# Patient Record
Sex: FEMALE | Race: WHITE | NOT HISPANIC OR LATINO | Employment: OTHER | URBAN - METROPOLITAN AREA
[De-identification: names, ages, dates, MRNs, and addresses within clinical notes are randomized per-mention and may not be internally consistent; named-entity substitution may affect disease eponyms.]

---

## 2017-01-11 RX ORDER — AMLODIPINE BESYLATE 5 MG/1
TABLET ORAL
Qty: 90 TAB | Refills: 3 | Status: SHIPPED | OUTPATIENT
Start: 2017-01-11 | End: 2017-07-11 | Stop reason: SDUPTHER

## 2017-01-17 ENCOUNTER — OFFICE VISIT (OUTPATIENT)
Dept: INTERNAL MEDICINE | Facility: IMAGING CENTER | Age: 82
End: 2017-01-17
Payer: MEDICARE

## 2017-01-17 ENCOUNTER — HOSPITAL ENCOUNTER (OUTPATIENT)
Facility: MEDICAL CENTER | Age: 82
End: 2017-01-17
Attending: INTERNAL MEDICINE
Payer: MEDICARE

## 2017-01-17 VITALS
RESPIRATION RATE: 12 BRPM | HEIGHT: 62 IN | OXYGEN SATURATION: 94 % | HEART RATE: 67 BPM | SYSTOLIC BLOOD PRESSURE: 140 MMHG | WEIGHT: 151 LBS | DIASTOLIC BLOOD PRESSURE: 70 MMHG | TEMPERATURE: 97 F | BODY MASS INDEX: 27.79 KG/M2

## 2017-01-17 DIAGNOSIS — E78.00 PURE HYPERCHOLESTEROLEMIA: ICD-10-CM

## 2017-01-17 DIAGNOSIS — K44.9 HIATAL HERNIA: ICD-10-CM

## 2017-01-17 DIAGNOSIS — I10 ESSENTIAL HYPERTENSION: ICD-10-CM

## 2017-01-17 DIAGNOSIS — Z12.31 SCREENING MAMMOGRAM, ENCOUNTER FOR: ICD-10-CM

## 2017-01-17 DIAGNOSIS — Z12.11 SCREENING FOR COLON CANCER: ICD-10-CM

## 2017-01-17 DIAGNOSIS — R41.3 MEMORY LOSS: ICD-10-CM

## 2017-01-17 DIAGNOSIS — M81.0 OSTEOPOROSIS: ICD-10-CM

## 2017-01-17 DIAGNOSIS — K21.9 GASTROESOPHAGEAL REFLUX DISEASE WITHOUT ESOPHAGITIS: ICD-10-CM

## 2017-01-17 DIAGNOSIS — H83.3X3: ICD-10-CM

## 2017-01-17 DIAGNOSIS — M43.16 SPONDYLOLISTHESIS OF LUMBAR REGION: ICD-10-CM

## 2017-01-17 DIAGNOSIS — Z12.11 SCREEN FOR COLON CANCER: ICD-10-CM

## 2017-01-17 DIAGNOSIS — Z00.00 MEDICARE ANNUAL WELLNESS VISIT, SUBSEQUENT: ICD-10-CM

## 2017-01-17 LAB
ALBUMIN SERPL BCP-MCNC: 4.2 G/DL (ref 3.2–4.9)
ALBUMIN/GLOB SERPL: 1.3 G/DL
ALP SERPL-CCNC: 123 U/L (ref 30–99)
ALT SERPL-CCNC: 18 U/L (ref 2–50)
ANION GAP SERPL CALC-SCNC: 9 MMOL/L (ref 0–11.9)
APPEARANCE UR: CLEAR
AST SERPL-CCNC: 23 U/L (ref 12–45)
BASOPHILS # BLD AUTO: 0.08 K/UL (ref 0–0.12)
BASOPHILS NFR BLD AUTO: 1 % (ref 0–1.8)
BILIRUB SERPL-MCNC: 0.6 MG/DL (ref 0.1–1.5)
BILIRUB UR QL STRIP.AUTO: NEGATIVE
BUN SERPL-MCNC: 19 MG/DL (ref 8–22)
CALCIUM SERPL-MCNC: 9.4 MG/DL (ref 8.5–10.5)
CHLORIDE SERPL-SCNC: 105 MMOL/L (ref 96–112)
CHOLEST SERPL-MCNC: 170 MG/DL (ref 100–199)
CO2 SERPL-SCNC: 26 MMOL/L (ref 20–33)
COLOR UR AUTO: COLORLESS
CREAT SERPL-MCNC: 0.85 MG/DL (ref 0.5–1.4)
CREAT UR-MCNC: 34.6 MG/DL
CULTURE IF INDICATED INDCX: NO UA CULTURE
EOSINOPHIL # BLD: 0.22 K/UL (ref 0–0.51)
EOSINOPHIL NFR BLD AUTO: 2.8 % (ref 0–6.9)
ERYTHROCYTE [DISTWIDTH] IN BLOOD BY AUTOMATED COUNT: 43.8 FL (ref 35.9–50)
GLOBULIN SER CALC-MCNC: 3.3 G/DL (ref 1.9–3.5)
GLUCOSE SERPL-MCNC: 97 MG/DL (ref 65–99)
GLUCOSE UR STRIP.AUTO-MCNC: NEGATIVE MG/DL
HCT VFR BLD AUTO: 45.8 % (ref 37–47)
HDLC SERPL-MCNC: 54 MG/DL
HGB BLD-MCNC: 14.4 G/DL (ref 12–16)
IMM GRANULOCYTES # BLD AUTO: 0.04 K/UL (ref 0–0.11)
IMM GRANULOCYTES NFR BLD AUTO: 0.5 % (ref 0–0.9)
KETONES UR STRIP.AUTO-MCNC: NEGATIVE MG/DL
LDLC SERPL CALC-MCNC: 98 MG/DL
LEUKOCYTE ESTERASE UR QL STRIP.AUTO: NEGATIVE
LYMPHOCYTES # BLD: 2.32 K/UL (ref 1–4.8)
LYMPHOCYTES NFR BLD AUTO: 29.6 % (ref 22–41)
MCH RBC QN AUTO: 25.4 PG (ref 27–33)
MCHC RBC AUTO-ENTMCNC: 31.4 G/DL (ref 33.6–35)
MCV RBC AUTO: 80.6 FL (ref 81.4–97.8)
MICRO URNS: NORMAL
MICROALBUMIN UR-MCNC: <0.7 MG/DL
MICROALBUMIN/CREAT UR: NORMAL MG/G (ref 0–30)
MONOCYTES # BLD: 0.53 K/UL (ref 0–0.85)
MONOCYTES NFR BLD AUTO: 6.8 % (ref 0–13.4)
NEUTROPHILS # BLD: 4.64 K/UL (ref 2–7.15)
NEUTROPHILS NFR BLD AUTO: 59.3 % (ref 44–72)
NITRITE UR QL STRIP.AUTO: NEGATIVE
NRBC # BLD AUTO: 0 K/UL
NRBC BLD-RTO: 0 /100 WBC
PH UR: 5.5 [PH]
PLATELET # BLD AUTO: 280 K/UL (ref 164–446)
PMV BLD AUTO: 11 FL (ref 9–12.9)
POTASSIUM SERPL-SCNC: 4 MMOL/L (ref 3.6–5.5)
PROT SERPL-MCNC: 7.5 G/DL (ref 6–8.2)
PROT UR QL STRIP: NEGATIVE MG/DL
RBC # BLD AUTO: 5.68 M/UL (ref 4.2–5.4)
RBC UR QL AUTO: NEGATIVE
SODIUM SERPL-SCNC: 140 MMOL/L (ref 135–145)
SP GR UR STRIP.AUTO: 1
TRIGL SERPL-MCNC: 92 MG/DL (ref 0–149)
TSH SERPL DL<=0.005 MIU/L-ACNC: 2.58 UIU/ML (ref 0.3–3.7)
VIT B12 SERPL-MCNC: 279 PG/ML (ref 211–911)
WBC # BLD AUTO: 7.8 K/UL (ref 4.8–10.8)

## 2017-01-17 PROCEDURE — 81003 URINALYSIS AUTO W/O SCOPE: CPT

## 2017-01-17 PROCEDURE — 82607 VITAMIN B-12: CPT

## 2017-01-17 PROCEDURE — 85025 COMPLETE CBC W/AUTO DIFF WBC: CPT

## 2017-01-17 PROCEDURE — 80061 LIPID PANEL: CPT

## 2017-01-17 PROCEDURE — 82043 UR ALBUMIN QUANTITATIVE: CPT

## 2017-01-17 PROCEDURE — 80053 COMPREHEN METABOLIC PANEL: CPT

## 2017-01-17 PROCEDURE — 84443 ASSAY THYROID STIM HORMONE: CPT

## 2017-01-17 PROCEDURE — 82570 ASSAY OF URINE CREATININE: CPT

## 2017-01-17 PROCEDURE — G0439 PPPS, SUBSEQ VISIT: HCPCS | Performed by: INTERNAL MEDICINE

## 2017-01-17 ASSESSMENT — PATIENT HEALTH QUESTIONNAIRE - PHQ9: CLINICAL INTERPRETATION OF PHQ2 SCORE: 0

## 2017-01-17 NOTE — MR AVS SNAPSHOT
"        Gloria CYNTHIA Patel   2017 9:00 AM   Office Visit   MRN: [id number]    [facility]   Dept Phone:  [phone]    Description:  Female : 1924   Provider:  Mike Otero M.D.           Reason for Visit     Annual Exam           Allergies as of 2017     Allergen Noted Reactions    Flagyl [Metronidazole Hcl] 2009       Keflex 2009       Morphine 2008   Anaphylaxis    anaphylaxis    Sulfa Drugs 2008   Rash    rash      You were diagnosed with     Medicare annual wellness visit, subsequent   [277168]       Essential hypertension   [7825121]       Osteoporosis   [8232682]       Gastroesophageal reflux disease without esophagitis   [241282]       Spondylolisthesis of lumbar region   [704650]       Pure hypercholesterolemia   [272.0.ICD-9-CM]       Hiatal hernia   [527010]       Memory loss   [780.93.ICD-9-CM]       Screening mammogram, encounter for   [2983116]       Screen for colon cancer   [740070]       Hearing loss d/t noise, bilateral   [3827027]         Vital Signs     Blood Pressure Pulse Temperature Respirations Height Weight    140/70 mmHg 67 36.1 °C (97 °F) 12 1.575 m (5' 2\") 68.493 kg (151 lb)    Body Mass Index Oxygen Saturation Smoking Status             27.61 kg/m2 94% Never Smoker          Basic Information     Date Of Birth Sex Race Ethnicity Preferred Language    1924 Female White Non- English      Problem List              ICD-10-CM Priority Class Noted - Resolved    Hip pain M25.559   2010 - Present    Hypothyroid E03.9   2010 - Present    Overactive bladder N32.81   2010 - Present    Hiatal hernia K44.9   2010 - Present    Hyperlipidemia E78.5   3/7/2012 - Present    GERD (gastroesophageal reflux disease) K21.9   2012 - Present    Spondylolisthesis of lumbar region M43.16   2012 - Present    Impingement syndrome of left shoulder M75.42   2012 - Present    Intervertebral disc protrusion " [id number]   5/22/2012 - Present    Benign positional vertigo H81.10   2/27/2015 - Present    Osteoporosis M81.0   9/8/2015 - Present    TIA (transient ischemic attack) G45.9   4/28/2016 - Present    HTN (hypertension) I10   4/28/2016 - Present    Vision changes H53.9   4/28/2016 - Present      Health Maintenance        Date Due Completion Dates    IMM DTaP/Tdap/Td Vaccine (1 - Tdap) 7/9/1943 ---    BONE DENSITY 9/22/2020 9/22/2015, 8/28/2012, 8/11/2010, 12/16/2008, 12/11/2006            Current Immunizations     13-VALENT PCV PREVNAR 5/18/2015    Hepatitis B Vaccine Non-Recombivax (Ped/Adol) 2/6/2008    Influenza TIV (IM) 9/8/2014, 10/8/2012    Influenza Vaccine Adult HD 10/17/2016, 10/19/2015    Pneumococcal polysaccharide vaccine (PPSV-23) 10/14/2005    SHINGLES VACCINE 2/6/2008      Below and/or attached are the medications your provider expects you to take. Review all of your home medications and newly ordered medications with your provider and/or pharmacist. Follow medication instructions as directed by your provider and/or pharmacist. Please keep your medication list with you and share with your provider. Update the information when medications are discontinued, doses are changed, or new medications (including over-the-counter products) are added; and carry medication information at all times in the event of emergency situations     Allergies:  FLAGYL - (reactions not documented)     KEFLEX - (reactions not documented)     MORPHINE - Anaphylaxis     SULFA DRUGS - Rash               Medications  Valid as of: January 17, 2017 -  1:06 PM    Generic Name Brand Name Tablet Size Instructions for use    ALPRAZolam (Tab) XANAX 0.25 MG Take 1 Tab by mouth 1 time daily as needed for Anxiety.        AmLODIPine Besylate (Tab) NORVASC 5 MG Take 5 mg by mouth every day.        AmLODIPine Besylate (Tab) NORVASC 5 MG TAKE 1 TAB BY MOUTH EVERY DAY.        Ascorbic Acid   Take 1 Tab by mouth every day.        Aspirin (Chew Tab)  ASA 81 MG Take 4 Tabs by mouth every day.        Azelastine HCl (Solution) ASTELIN 137 MCG/SPRAY Spray 1 Spray in nose 1 time daily as needed for Rhinitis.        Cholecalciferol   Take 1 Tab by mouth every day.        Levothyroxine Sodium (Tab) SYNTHROID 75 MCG TAKE 1 TABLET BY MOUTH EVERY DAY        Lisinopril (Tab) PRINIVIL 10 MG Take 1 Tab by mouth every 12 hours.        Metoprolol Succinate (TABLET SR 24 HR) TOPROL XL 25 MG Take 25 mg by mouth every day.        Omeprazole (CAPSULE DELAYED RELEASE) PRILOSEC 20 MG TAKE ONE CAPSULE BY MOUTH TWICE A DAY 30MINUTES BEFORE BREAKFAST AND DINNER        Travoprost (Solution) TRAVATAN Z 0.004 % Place 1 Drop in both eyes every evening.        .                 Medicines prescribed today were sent to:     [facility][id number] - [place], [place] - [address] AT [address] [place] [address]    Phone: [phone] Fax: [phone]    Open 24 Hours?: No      Medication refill instructions:       If your prescription bottle indicates you have medication refills left, it is not necessary to call your provider’s office. Please contact your pharmacy and they will refill your medication.    If your prescription bottle indicates you do not have any refills left, you may request refills at any time through one of the following ways: The online [employer] system (except Urgent Care), by calling your provider’s office, or by asking your pharmacy to contact your provider’s office with a refill request. Medication refills are processed only during regular business hours and may not be available until the next business day. Your provider may request additional information or to have a follow-up visit with you prior to refilling your medication.   *Please Note: Medication refills are assigned a new Rx number when refilled electronically. Your pharmacy may indicate that no refills were authorized even though a new prescription for the same medication is  available at the pharmacy. Please request the medicine by name with the pharmacy before contacting your provider for a refill.        Your To Do List     Future Labs/Procedures Complete By Expires    CBC WITH DIFFERENTIAL  As directed 7/20/2017    COMP METABOLIC PANEL  As directed 7/20/2017    LIPID PROFILE  As directed 7/19/2017    MA-SCREEN MAMMO W/CAD-BILAT  As directed 1/17/2018    MICROALBUMIN CREAT RATIO URINE  As directed 7/17/2017    OCCULT BLOOD FECES IMMUNOASSAY  As directed 1/18/2018    TSH  As directed 7/20/2017    URINALYSIS,CULTURE IF INDICATED  As directed 1/17/2018    VITAMIN B12  As directed 7/20/2017      Other Notes About Your Plan     Colonoscopy No repeat recommended  Dexa 9/22/15  Osteoporosis   Mammo 12/29/14 A1c 1/9/13  5.7   [facility] (scheduled 4/24/13) Neurosurg-[facility]           [employer] Access Code: [email or web address]  Expires: 2/16/2017  1:06 PM    [employer]  A secure, online tool to manage your health information     [employer]’s [employer]® is a secure, online tool that connects you to your personalized health information from the privacy of your home -- day or night - making it very easy for you to manage your healthcare. Once the activation process is completed, you can even access your medical information using the [employer] florina, which is available for free in the Apple Florina store or Google Play store.     [employer] provides the following levels of access (as shown below):   My Chart Features   [facility]  Specialists [facility]  Urgent  Care Non-[facility]  Primary Care  Doctor   Email your healthcare team securely and privately 24/7 X X X    Manage appointments: schedule your next appointment; view details of past/upcoming appointments X      Request prescription refills. X      View recent personal medical records, including lab and immunizations X X X X   View health record, including  health history, allergies, medications X X X X   Read reports about your outpatient visits, procedures, consult and ER notes X X X X   See your discharge summary, which is a recap of your hospital and/or ER visit that includes your diagnosis, lab results, and care plan. X X       How to register for [employer]:  1. Go to  [email or web address].  2. Click on the Sign Up Now box, which takes you to the New Member Sign Up page. You will need to provide the following information:  a. Enter your [employer] Access Code exactly as it appears at the top of this page. (You will not need to use this code after you’ve completed the sign-up process. If you do not sign up before the expiration date, you must request a new code.)   b. Enter your date of birth.   c. Enter your home email address.   d. Click Submit, and follow the next screen’s instructions.  3. Create a [employer] ID. This will be your [employer] login ID and cannot be changed, so think of one that is secure and easy to remember.  4. Create a [employer] password. You can change your password at any time.  5. Enter your Password Reset Question and Answer. This can be used at a later time if you forget your password.   6. Enter your e-mail address. This allows you to receive e-mail notifications when new information is available in [employer].  7. Click Sign Up. You can now view your health information.    For assistance activating your [employer] account, call [phone]

## 2017-01-17 NOTE — PROGRESS NOTES
89 y.o. female presents for the following:    Patient comes in for health risk assessment.    Hypertension-stable and current medications. Blood pressure is at goal today.    Hypothyroid-stable and current medication. Last thyroid function test were last year but were normal. She has multiple readings that have been normal. No hyper or hypothyroid symptoms.    Generalized anxiety disorder-ongoing symptoms. She has a prescription for Xanax but she does not take. She is not interested in other medication.    Osteoporosis-multiple bone density showing osteoporosis in the forearm and osteopenia in the hip. She is failed multiple bisphosphonates due to G.I. side effects.    Lumbar spondylolisthesis-ongoing issues. Chronic pain. Daily symptoms. She takes Tylenol occasionally. She had seen Dr. Jolly in the past but failed injections in therapy. No radicular symptoms.    Hyperlipidemia-currently on no therapy. No recent evaluation of labs.    Hiatal hernia-stable on Prilosec. She takes daily.    GERD-presumably due to hiatal hernia. Stable on Prilosec as above.    Memory loss-she has found that she is more forgetful. She is doing word puzzles on her tablet. Her family also reports that she is using [employer] regularly. She drives. No issues with getting loss, traffic violations or other.    Glaucoma-stable on Travatan. She gets six-month follow-up with ophthalmology.    Screening test:  due for mammogram. She was scheduled last year but canceled because of inclement weather. We discussed that a mammogram is probably not indicated based on her age but she would like to continue monitoring. I think this is reasonable as she is otherwise in excellent health.  Colon cancer screening. She had colonoscopy in 2014. No additional colonoscopy recommended. I recommended fecal immunoassay.    Up-to-date on immunizations.        Screening exams-recommended mammogram and fecal immunoassay.    MMSE    -What is the:  Year, season, date,  "day, month.               5/5 points    -Where are we:   State, county, town, hospital, floor.      5/5 points    -3 objects immediate recall.                                             3/3 points    -World backwards or serial sevens.                                 3/5 points    -Three-minute recall.                                                         1/3 points    -Name 2 objects.                                                               2/2 points    -Repeat the following.      \"No ifs and is or buts\"                                                    1/1 point    -Follow a 3 stage command.      Paper right hand, fold in half, place on floor.                 3/3 points    -Follow a written command.       Close your eyes                                                          1/1 point    -Write a complete sentence.                                           1/1 point    -Copy a design.                                                                1/1 point    Total                                                                                  26/30    Cutoff of 24 indicating dementia.  Annual Wellness Visit/Health Risk Assessment:    Past medical:  Past Medical History   Diagnosis Date   • GERD (gastroesophageal reflux disease)      medicated   • Hypertension      medicated   • Diverticulitis      medicated   • Arthritis    • Unspecified urinary incontinence    • Heart burn    • Hiatus hernia syndrome    • Glaucoma        Past surgical:  Past Surgical History   Procedure Laterality Date   • Other  1945     tonsilectomy   • Other  1954     mikey. breast bx   • Other orthopedic surgery       osteoporosis - medicated   • Other orthopedic surgery  2000     back surgery   • Cholecystectomy  1966     rahat   • Other abdominal surgery  1954     kidney tacked up ?   • Gyn surgery  1970     hysterectomy   • Hip arthroplasty total  6/21/2011     Performed by AMPARO CRAFT at SURGERY [facility] ORS   • " Us-needle core bx-breast panel         Family history: relating to possible risk factors for your patient  Family History   Problem Relation Age of Onset   • Diabetes     • Heart Disease     • Lung Disease     • Cancer Sister        Current Providers (including home care/DME’s):   Colonoscopy No repeat recommended  Dexa 9/22/15  Osteoporosis   Mammo 12/29/14 A1c 1/9/13  5.7   GI-[facility] Re[facility] (scheduled 4/24/13) Neurosurg-[facility]  Laboratory-[facility]      Patient Care Team:  Mike Otero M.D. as PCP - General  Khloe Jones R.N. as Registered Nurse      Medications:   Current Outpatient Prescriptions Ordered in [facility]   Medication Sig Dispense Refill   • amlodipine (NORVASC) 5 MG Tab TAKE 1 TAB BY MOUTH EVERY DAY. 90 Tab 3   • omeprazole (PRILOSEC) 20 MG delayed-release capsule TAKE ONE CAPSULE BY MOUTH TWICE A DAY 30MINUTES BEFORE BREAKFAST AND DINNER 60 Cap 11   • lisinopril (PRINIVIL) 10 MG Tab Take 1 Tab by mouth every 12 hours. 180 Tab 3   • levothyroxine (SYNTHROID) 75 MCG Tab TAKE 1 TABLET BY MOUTH EVERY DAY 90 Tab 3   • alprazolam (XANAX) 0.25 MG Tab Take 1 Tab by mouth 1 time daily as needed for Anxiety. 15 Tab 0   • amlodipine (NORVASC) 5 MG Tab Take 5 mg by mouth every day.     • ASCORBIC ACID PO Take 1 Tab by mouth every day.     • Cholecalciferol (VITAMIN D PO) Take 1 Tab by mouth every day.     • metoprolol SR (TOPROL XL) 25 MG TABLET SR 24 HR Take 25 mg by mouth every day.     • aspirin (ASA) 81 MG Chew Tab chewable tablet Take 4 Tabs by mouth every day. 100 Tab 11   • travoprost (TRAVATAN) 0.004 % SOLN Place 1 Drop in both eyes every evening.     • azelastine (ASTELIN) 137 MCG/SPRAY nasal spray Mode 1 Spray in nose 1 time daily as needed for Rhinitis. 30 mL 3     Current Facility-Administered Medications Ordered in Epic   Medication Dose Route Frequency Provider Last Rate Last Dose   • denosumab (PROLIA) subq injection 60 mg  60 mg Subcutaneous  Once Mike Otero M.D.           Supplements (calcium/vitamins): if not lisited in medications    Depression Screening    Little interest or pleasure in doing things?  0 - not at all  Feeling down, depressed , or hopeless? 0 - not at all  Trouble falling or staying asleep, or sleeping too much?     Feeling tired or having little energy?     Poor appetite or overeating?     Feeling bad about yourself - or that you are a failure or have let yourself or your family down?    Trouble concentrating on things, such as reading the newspaper or watching television?    Moving or speaking so slowly that other people could have noticed.  Or the opposite - being so fidgety or restless that you have been moving around a lot more than usual?     Thoughts that you would be better off dead, or of hurting yourself?     Patient Health Questionnaire Score:      If depressive symptoms identified deferred to follow up visit unless specifically addressed in assesment and plan.      Screening for Cognitive Impairment    Three Minute Recall (banana, sunrise, fence)  1/3    Draw clock face with all 12 numbers set to the hand to show 10 minures past 11 o'clock  1    Cognitive concerns identified defferred for follow up unless specifically addressed in assesment and plan.    Fall Risk Assessment    Has the patient had two or more falls in the last year or any fall with injury in the last year?  Yes    Safety Assessment    Throw rugs on floor.  Yes  Handrails on all stairs.  No  Good lighting in all hallways.  Yes  Difficulty hearing.  No  Patient counseled about all safety risks that were identified.    Functional Assessment ADLs    Are there any barriers preventing you from cooking for yourself or meeting nutritional needs?  No.    Are there any barriers preventing you from driving safely or obtaining transportation?  No.    Are there any barriers preventing you from using a telephone or calling for help?  No.    Are there any barriers  preventing you from shopping?  No.    Are there any barriers preventing you from taking care of your own finances?  No.    Are there any barriers preventing you from managing your medications?  No.    Are currently engaing any exercise or physical activity?  No.       Health Maintenance Summary                IMM DTaP/Tdap/Td Vaccine Overdue 7/9/1943     Annual Wellness Visit Overdue 9/8/2016      Done 9/8/2015      Patient has more history with this topic...    BONE DENSITY Next Due 9/22/2020      Done 9/22/2015 DS-BONE DENSITY STUDY (DEXA)     Patient has more history with this topic...          Patient Care Team:  Mike Otero M.D. as PCP - General  Khloe Jones R.N. as Registered Nurse      Risk Factors:  Depression screening: using standardized screening tools: No depression  Depression Screening    Little interest or pleasure in doing things?  0 - not at all  Feeling down, depressed , or hopeless? 0 - not at all  Trouble falling or staying asleep, or sleeping too much?     Feeling tired or having little energy?     Poor appetite or overeating?     Feeling bad about yourself - or that you are a failure or have let yourself or your family down?    Trouble concentrating on things, such as reading the newspaper or watching television?    Moving or speaking so slowly that other people could have noticed.  Or the opposite - being so fidgety or restless that you have been moving around a lot more than usual?     Thoughts that you would be better off dead, or of hurting yourself?     Patient Health Questionnaire Score:        If depressive symptoms identified deferred to follow up visit unless specifically addressed in assesment and plan.      Interpretation of PHQ-9 Total Score   Score Severity   1-4 Minimal Depression   5-9 Mild Depression   10-14 Moderate Depression   15-19 Moderately Severe Depression   20-27 Severe Depression      Ability to perform ADL’s: Able to perform all activities of daily  "living.  Hearing impairment: Moderate hearing impairment.  Fall risks: Increased risk of falls.  Safety Management: Normal safety precautions including seatbelts and smoke detectors.  Cognitive function: using standardized screening tools: Normal cognitive function.  Urinary and bowel function. No incontinence, nocturia or frequency. Regular bowels. No diarrhea or rectal bleeding/melena.      Written screening schedule 5-10 years out:  Colonoscopy No repeat recommended  Dexa 9/22/15  Osteoporosis   Mammo 12/29/14 A1c 1/9/13  5.7   -[facility] (scheduled 4/24/13) Neurosurg-[facility]  Laboratory-[facility]    Vital measurements:  Blood pressure 140/70, pulse 67, temperature 36.1 °C (97 °F), resp. rate 12, height 1.575 m (5' 2\"), weight 68.493 kg (151 lb), SpO2 94 %.  Body mass index is 27.61 kg/(m^2). (Goal BM I>18 <25)      Exam: *Note disease specific examination*  General: Mildly overweight.  No distress.  Normal appearing. She walks with a cane.  HEENT: Pupils are equal.  Conjunctiva is normal.  Head is normal appearing.  Ears, canals and tympanic membranes are normal.  Oral cavity is pink and moist without lesion.  Neck: Supple without JVD or bruit.  Thyroid is not enlarged.  Pulmonary: Clear with good breath sounds.  Cardiovascular regular rate and rhythm.  No murmur auscultated.  Carotid, radial and pedal pulses are intact.  Abdomen: Soft, nontender, nondistended.  Normal bowel sounds.  Organs are not enlarged.  Neurologic: Cranial nerves intact.  Strength and sensation are normal.  Normal patellar reflex. Gait is normal.  Skin: No obvious suspicious lesions. Diffuse solar changes including seborrheic keratosis.  Lymph: No cervical, supraclavicular, axillary, abdominal or inguinal adenopathy noted.  Breast: Bilateral breasts are normal in appearance. Surgical scar on the right breast. Nipple and areola are normal in appearance. No abnormal skin texture. "   Extremities: Bilateral onychomycosis    Assessment/Plan: *Note for HRA please use the highest specificity diagnosis codes to describe patient’s chronic conditions*    1. Medicare annual wellness visit, subsequent  Annual Wellness Visit - Includes PPPS Subsequent ()   2. Essential hypertension  Annual Wellness Visit - Includes PPPS Subsequent ()   3. Osteoporosis  Annual Wellness Visit - Includes PPPS Subsequent ()    denosumab (PROLIA) subq injection 60 mg   4. Gastroesophageal reflux disease without esophagitis  Annual Wellness Visit - Includes PPPS Subsequent ()   5. Spondylolisthesis of lumbar region  Annual Wellness Visit - Includes PPPS Subsequent ()   6. Pure hypercholesterolemia  Annual Wellness Visit - Includes PPPS Subsequent ()   7. Hiatal hernia  Annual Wellness Visit - Includes PPPS Subsequent ()   8. Memory loss  Annual Wellness Visit - Includes PPPS Subsequent ()   9. Screening mammogram, encounter for  Annual Wellness Visit - Includes PPPS Subsequent ()    MA-SCREEN MAMMO W/CAD-BILAT   10. Screen for colon cancer  Annual Wellness Visit - Includes PPPS Subsequent ()    OCCULT BLOOD FECES IMMUNOASSAY       Assessment/plan:  89-year-old female. She is in good health.    Hypertension-stable on current medications. Do for routine laboratory workup.  Hypothyroid-stable on current supplement. Due for repeat laboratory.  Anxiety-chronic symptoms but stable. She is not interested in SSRI or other. I encouraged her to avoid the Xanax because of side effects.  Osteoporosis-I would like her to start therapy. She is at increased risk of falling. We will see if Prolia is available.  Lumbar spondylolisthesis-daily symptoms. Recommended Tylenol extra strength one tablet three times daily. Encouraged her to continue to use her cane because of changes in gait related to the pain from her spondylolisthesis. She already uses nightlights. No rugs or other trip  hazards.  Generalized osteoarthritis-as above.  Hyperlipidemia-repeat laboratory. Consider statin therapy if indicated.  Hiatal hernia-stable on Prilosec.  GERD-as above.  Mild memory loss without significant cognitive impairment. Recommend that she continue with her WORDsearch. We also discussed reading including reading out loud.  Glaucoma-stable on Travatan.      Tx options for risk factors:    Referrals out: as appropriate   Weight loss: Body mass index is 27.61 kg/(m^2). recommended additional weight loss. Goal BMI 25   Physical activity: Good physical activity level.   Smoking cessation: Nonsmoker   Fall prevention: No increased his fall   Nutrition: Good overall nutrition. Balanced lean meat, fruits, vegetables and complex carbohydrates.      Annual Wellness Visit/HRA  (initial, one time only)                                                   (subsequent)

## 2017-01-31 ENCOUNTER — OUTPATIENT INFUSION SERVICES (OUTPATIENT)
Dept: ONCOLOGY | Facility: MEDICAL CENTER | Age: 82
End: 2017-01-31
Attending: INTERNAL MEDICINE
Payer: MEDICARE

## 2017-01-31 VITALS
WEIGHT: 152.12 LBS | TEMPERATURE: 97.2 F | OXYGEN SATURATION: 97 % | HEIGHT: 60 IN | HEART RATE: 61 BPM | SYSTOLIC BLOOD PRESSURE: 162 MMHG | DIASTOLIC BLOOD PRESSURE: 73 MMHG | BODY MASS INDEX: 29.86 KG/M2 | RESPIRATION RATE: 18 BRPM

## 2017-01-31 PROCEDURE — 700111 HCHG RX REV CODE 636 W/ 250 OVERRIDE (IP): Performed by: INTERNAL MEDICINE

## 2017-01-31 RX ADMIN — DENOSUMAB 60 MG: 60 INJECTION SUBCUTANEOUS at 11:59

## 2017-01-31 ASSESSMENT — PAIN SCALES - GENERAL: PAINLEVEL: NO PAIN

## 2017-01-31 NOTE — Clinical Note
Infusion Services   [address] [place] [address]  [place] [address]  Phone: [phone]  Fax: [phone]              Dear Dr. Otero,    Your patient, Gloria Patel (: 1924), was scheduled at [facility].  Gloria's encounter diagnosis is:  No diagnosis found.  She arrived for her appointment, and  the scheduled treatment was given. These medications were administered to the patient: We administered denosumab..  Gloria tolerated treatment.. In addition, the following labs were drawn  No results found for this or any previous visit (from the past 24 hour(s)).         Her next appointment is scheduled for 2017.    For more information, you may review the nurse's progress notes in chart review under the notes section.       Sincerely,  Infusion [facility]

## 2017-01-31 NOTE — PROGRESS NOTES
Pt to infusion services ambulatory per self with cane and accompanied by daughter.  Pt here for first Prolia injection today.  Plan of care reviewed.  Pt and daughter verbalizes understanding.  Medication and possible side effects reviewed; medication handout provided.  Pt had labs drawn on 01/17/17.  Pt denies any invasive dental work or oral surgery in the past 4 weeks or plans to have any invasive dental work or oral surgery in the next 4 weeks.  Pt tells me she is taking vitamin d, however is not currently taking calcium.  Discussed with pharmacist; okay for Prolia today.  Pharmacist, Dr. Aguilar, to patient chairside to discuss home calcium and vitamin d supplementation recommendations with patient and daughter; both verbalize understanding.  Prolia administered per MD orders to right back of arm via subcutaneous injection.  Pt tolerated well.  Band aid applied to injection site.  Pt observed for approximately 15 minutes post injection for first dose.  No adverse effects observed or expressed.  Pt released to self care and care of daughter in no apparent distress after completion of treatment, ambulatory with cane.  Pt to return in 6 months; next appointment scheduled.

## 2017-02-02 ENCOUNTER — TELEPHONE (OUTPATIENT)
Dept: INTERNAL MEDICINE | Facility: IMAGING CENTER | Age: 82
End: 2017-02-02

## 2017-02-02 ENCOUNTER — HOSPITAL ENCOUNTER (OUTPATIENT)
Facility: MEDICAL CENTER | Age: 82
End: 2017-02-02
Attending: INTERNAL MEDICINE
Payer: MEDICARE

## 2017-02-02 ENCOUNTER — OFFICE VISIT (OUTPATIENT)
Dept: INTERNAL MEDICINE | Facility: IMAGING CENTER | Age: 82
End: 2017-02-02
Payer: MEDICARE

## 2017-02-02 VITALS
HEART RATE: 78 BPM | SYSTOLIC BLOOD PRESSURE: 138 MMHG | RESPIRATION RATE: 16 BRPM | HEIGHT: 62 IN | TEMPERATURE: 97.3 F | DIASTOLIC BLOOD PRESSURE: 82 MMHG | WEIGHT: 151 LBS | BODY MASS INDEX: 27.79 KG/M2 | OXYGEN SATURATION: 98 %

## 2017-02-02 DIAGNOSIS — R82.90 ABNORMAL URINALYSIS: ICD-10-CM

## 2017-02-02 DIAGNOSIS — R35.0 FREQUENT URINATION: ICD-10-CM

## 2017-02-02 DIAGNOSIS — R53.1 WEAKNESS: ICD-10-CM

## 2017-02-02 DIAGNOSIS — N30.00 ACUTE CYSTITIS WITHOUT HEMATURIA: ICD-10-CM

## 2017-02-02 LAB
APPEARANCE UR: CLEAR
BILIRUB UR STRIP-MCNC: NORMAL MG/DL
COLOR UR AUTO: NORMAL
GLUCOSE UR STRIP.AUTO-MCNC: NORMAL MG/DL
KETONES UR STRIP.AUTO-MCNC: NORMAL MG/DL
LEUKOCYTE ESTERASE UR QL STRIP.AUTO: NORMAL
NITRITE UR QL STRIP.AUTO: NORMAL
PH UR STRIP.AUTO: 5 [PH] (ref 5–8)
PROT UR QL STRIP: NORMAL MG/DL
RBC UR QL AUTO: NORMAL
SP GR UR STRIP.AUTO: 1
UROBILINOGEN UR STRIP-MCNC: NORMAL MG/DL

## 2017-02-02 PROCEDURE — 87186 SC STD MICRODIL/AGAR DIL: CPT

## 2017-02-02 PROCEDURE — 87077 CULTURE AEROBIC IDENTIFY: CPT

## 2017-02-02 PROCEDURE — G8482 FLU IMMUNIZE ORDER/ADMIN: HCPCS | Performed by: INTERNAL MEDICINE

## 2017-02-02 PROCEDURE — 99213 OFFICE O/P EST LOW 20 MIN: CPT | Performed by: INTERNAL MEDICINE

## 2017-02-02 PROCEDURE — 87086 URINE CULTURE/COLONY COUNT: CPT

## 2017-02-02 PROCEDURE — 4040F PNEUMOC VAC/ADMIN/RCVD: CPT | Performed by: INTERNAL MEDICINE

## 2017-02-02 PROCEDURE — G8432 DEP SCR NOT DOC, RNG: HCPCS | Performed by: INTERNAL MEDICINE

## 2017-02-02 PROCEDURE — 1036F TOBACCO NON-USER: CPT | Performed by: INTERNAL MEDICINE

## 2017-02-02 PROCEDURE — 0518F FALL PLAN OF CARE DOCD: CPT | Mod: 8P | Performed by: INTERNAL MEDICINE

## 2017-02-02 PROCEDURE — 1100F PTFALLS ASSESS-DOCD GE2>/YR: CPT | Performed by: INTERNAL MEDICINE

## 2017-02-02 PROCEDURE — 81002 URINALYSIS NONAUTO W/O SCOPE: CPT | Performed by: INTERNAL MEDICINE

## 2017-02-02 PROCEDURE — G8420 CALC BMI NORM PARAMETERS: HCPCS | Performed by: INTERNAL MEDICINE

## 2017-02-02 PROCEDURE — 3288F FALL RISK ASSESSMENT DOCD: CPT | Mod: 8P | Performed by: INTERNAL MEDICINE

## 2017-02-02 RX ORDER — NITROFURANTOIN 25; 75 MG/1; MG/1
100 CAPSULE ORAL 2 TIMES DAILY
Qty: 20 CAP | Refills: 0 | Status: SHIPPED | OUTPATIENT
Start: 2017-02-02 | End: 2017-07-11

## 2017-02-02 NOTE — TELEPHONE ENCOUNTER
Patient is unable to take calcium supplements due to stomach upset.  Dr Otero recommends Vit D 2000u daily.

## 2017-02-02 NOTE — MR AVS SNAPSHOT
"        Gloria Patel   2017 3:00 PM   Office Visit   MRN: [id number]    Department:  [facility]   Dept Phone:  [phone]    Description:  Female : 1924   Provider:  Mike Otero M.D.           Allergies as of 2017     Allergen Noted Reactions    Flagyl [Metronidazole Hcl] 2009       Keflex 2009       Morphine 2008   Anaphylaxis    anaphylaxis    Sulfa Drugs 2008   Rash    rash      You were diagnosed with     Acute cystitis without hematuria   [206317]       Frequent urination   [510678]       Abnormal urinalysis   [883664]       Weakness   [675050]         Vital Signs     Blood Pressure Pulse Temperature Respirations Height Weight    138/82 mmHg 78 36.3 °C (97.3 °F) 16 1.575 m (5' 2\") 68.493 kg (151 lb)    Body Mass Index Oxygen Saturation Smoking Status             27.61 kg/m2 98% Never Smoker          Basic Information     Date Of Birth Sex Race Ethnicity Preferred Language    1924 Female White Non- English      Your appointments     2017  3:20 PM   MA [id number] with [facility] MG 2   [facility] ([address])    [address]  [place] [address] [phone]           No deodorant, powder, perfume or lotion under the arm or breast area.            Aug 01, 2017 11:30 AM   Est Injection with INFUSION QUICK INJECT   Infusion Services ([facility])    [address]  [place] [address] [phone]              Problem List              ICD-10-CM Priority Class Noted - Resolved    Hip pain M25.559   2010 - Present    Hypothyroid E03.9   2010 - Present    Overactive bladder N32.81   2010 - Present    Hiatal hernia K44.9   2010 - Present    Hyperlipidemia E78.5   3/7/2012 - Present    GERD (gastroesophageal reflux disease) K21.9   2012 - Present    Spondylolisthesis of lumbar region M43.16   2012 - Present    Impingement syndrome of left shoulder M75.42   2012 - " Present    Intervertebral disc protrusion [id number]   5/22/2012 - Present    Benign positional vertigo H81.10   2/27/2015 - Present    Osteoporosis M81.0   9/8/2015 - Present    TIA (transient ischemic attack) G45.9   4/28/2016 - Present    HTN (hypertension) I10   4/28/2016 - Present    Vision changes H53.9   4/28/2016 - Present      Health Maintenance        Date Due Completion Dates    IMM DTaP/Tdap/Td Vaccine (1 - Tdap) 7[birth year]9/1943 ---    BONE DENSITY 9/22/2020 9/22/2015, 8/28/2012, 8/11/2010, 12/16/2008, 12/11/2006            Results     POCT Urinalysis      Component Value Standard Range & Units    POC Color light yellow Negative    POC Appearance clear Negative    POC Leukocyte Esterase large Negative    POC Nitrites neg Negative    POC Urobiligen neg Negative (0.2) mg/dL    POC Protein neg Negative mg/dL    POC Urine PH 5.0 5.0 - 8.0    POC Blood neg Negative    POC Specific Gravity 1.000 <1.005 - >1.030    POC Ketones neg Negative mg/dL    POC Biliruben neg Negative mg/dL    POC Glucose neg Negative mg/dL                        Current Immunizations     13-VALENT PCV PREVNAR 5/18/2015    Hepatitis B Vaccine Non-Recombivax (Ped/Adol) 2/6/2008    Influenza TIV (IM) 9/8/2014, 10/8/2012    Influenza Vaccine Adult HD 10/17/2016, 10/19/2015    Pneumococcal polysaccharide vaccine (PPSV-23) 10/14/2005    SHINGLES VACCINE 2/6/2008      Below and/or attached are the medications your provider expects you to take. Review all of your home medications and newly ordered medications with your provider and/or pharmacist. Follow medication instructions as directed by your provider and/or pharmacist. Please keep your medication list with you and share with your provider. Update the information when medications are discontinued, doses are changed, or new medications (including over-the-counter products) are added; and carry medication information at all times in the event of emergency situations     Allergies:  FLAGYL - (reactions  not documented)     KEFLEX - (reactions not documented)     MORPHINE - Anaphylaxis     SULFA DRUGS - Rash               Medications  Valid as of: February 02, 2017 -  3:35 PM    Generic Name Brand Name Tablet Size Instructions for use    ALPRAZolam (Tab) XANAX 0.25 MG Take 1 Tab by mouth 1 time daily as needed for Anxiety.        AmLODIPine Besylate (Tab) NORVASC 5 MG Take 5 mg by mouth every day.        AmLODIPine Besylate (Tab) NORVASC 5 MG TAKE 1 TAB BY MOUTH EVERY DAY.        Ascorbic Acid   Take 1 Tab by mouth every day.        Aspirin (Chew Tab) ASA 81 MG Take 4 Tabs by mouth every day.        Azelastine HCl (Solution) ASTELIN 137 MCG/SPRAY Spray 1 Spray in nose 1 time daily as needed for Rhinitis.        Cholecalciferol   Take 1 Tab by mouth every day.        Levothyroxine Sodium (Tab) SYNTHROID 75 MCG TAKE 1 TABLET BY MOUTH EVERY DAY        Lisinopril (Tab) PRINIVIL 10 MG Take 1 Tab by mouth every 12 hours.        Metoprolol Succinate (TABLET SR 24 HR) TOPROL XL 25 MG Take 25 mg by mouth every day.        Nitrofurantoin Monohyd Macro (Cap) MACROBID 100 MG Take 1 Cap by mouth 2 times a day.        Omeprazole (CAPSULE DELAYED RELEASE) PRILOSEC 20 MG TAKE ONE CAPSULE BY MOUTH TWICE A DAY 30MINUTES BEFORE BREAKFAST AND DINNER        Travoprost (Solution) TRAVATAN Z 0.004 % Place 1 Drop in both eyes every evening.        .                 Medicines prescribed today were sent to:     [facility]/PHARMACY #[id number] - [place], [place] - [address] [place] [address]    Phone: [phone] Fax: [phone]    Open 24 Hours?: No      Medication refill instructions:       If your prescription bottle indicates you have medication refills left, it is not necessary to call your provider’s office. Please contact your pharmacy and they will refill your medication.    If your prescription bottle indicates you do not have any refills left, you may request refills at any time through one  of the following ways: The online [employer] system (except Urgent Care), by calling your provider’s office, or by asking your pharmacy to contact your provider’s office with a refill request. Medication refills are processed only during regular business hours and may not be available until the next business day. Your provider may request additional information or to have a follow-up visit with you prior to refilling your medication.   *Please Note: Medication refills are assigned a new Rx number when refilled electronically. Your pharmacy may indicate that no refills were authorized even though a new prescription for the same medication is available at the pharmacy. Please request the medicine by name with the pharmacy before contacting your provider for a refill.        Your To Do List     Future Labs/Procedures Complete By Expires    URINE CULTURE(NEW)  As directed 2/2/2018      Other Notes About Your Plan     Colonoscopy No repeat recommended  Dexa 9/22/15  Osteoporosis   Mammo 12/29/14 A1c 1/9/13  5.7   -[facility] Med & Rehab-[facility] (scheduled 4/24/13) Neurosurg-[facility]  Laboratory-[facility]           [employer] Status: Patient Declined

## 2017-02-04 LAB
BACTERIA UR CULT: ABNORMAL
SIGNIFICANT IND 70042: ABNORMAL
SOURCE SOURCE: ABNORMAL

## 2017-02-14 ENCOUNTER — HOSPITAL ENCOUNTER (OUTPATIENT)
Facility: MEDICAL CENTER | Age: 82
End: 2017-02-14
Attending: INTERNAL MEDICINE
Payer: MEDICARE

## 2017-02-14 ENCOUNTER — NON-PROVIDER VISIT (OUTPATIENT)
Dept: INTERNAL MEDICINE | Facility: IMAGING CENTER | Age: 82
End: 2017-02-14
Payer: MEDICARE

## 2017-02-14 DIAGNOSIS — N39.0 FREQUENT UTI: ICD-10-CM

## 2017-02-14 LAB
APPEARANCE UR: CLEAR
BILIRUB UR QL STRIP.AUTO: NEGATIVE
COLOR UR AUTO: YELLOW
CULTURE IF INDICATED INDCX: NO UA CULTURE
GLUCOSE UR STRIP.AUTO-MCNC: NEGATIVE MG/DL
KETONES UR STRIP.AUTO-MCNC: NEGATIVE MG/DL
LEUKOCYTE ESTERASE UR QL STRIP.AUTO: NEGATIVE
MICRO URNS: NORMAL
NITRITE UR QL STRIP.AUTO: NEGATIVE
PH UR: 5.5 [PH]
PROT UR QL STRIP: NEGATIVE MG/DL
RBC UR QL AUTO: NEGATIVE
SP GR UR STRIP.AUTO: 1.01

## 2017-02-14 PROCEDURE — 81003 URINALYSIS AUTO W/O SCOPE: CPT

## 2017-02-15 ENCOUNTER — APPOINTMENT (OUTPATIENT)
Dept: RADIOLOGY | Facility: MEDICAL CENTER | Age: 82
End: 2017-02-15
Attending: EMERGENCY MEDICINE
Payer: MEDICARE

## 2017-02-15 ENCOUNTER — HOSPITAL ENCOUNTER (EMERGENCY)
Facility: MEDICAL CENTER | Age: 82
End: 2017-02-15
Attending: EMERGENCY MEDICINE
Payer: MEDICARE

## 2017-02-15 VITALS
DIASTOLIC BLOOD PRESSURE: 60 MMHG | RESPIRATION RATE: 23 BRPM | SYSTOLIC BLOOD PRESSURE: 153 MMHG | BODY MASS INDEX: 27.38 KG/M2 | TEMPERATURE: 96.8 F | HEIGHT: 61 IN | HEART RATE: 60 BPM | OXYGEN SATURATION: 95 % | WEIGHT: 145 LBS

## 2017-02-15 DIAGNOSIS — K92.2 ACUTE LOWER GASTROINTESTINAL BLEEDING: ICD-10-CM

## 2017-02-15 DIAGNOSIS — R07.89 LEFT-SIDED CHEST WALL PAIN: ICD-10-CM

## 2017-02-15 LAB
ABO GROUP BLD: NORMAL
ALBUMIN SERPL BCP-MCNC: 3.9 G/DL (ref 3.2–4.9)
ALBUMIN/GLOB SERPL: 1.4 G/DL
ALP SERPL-CCNC: 127 U/L (ref 30–99)
ALT SERPL-CCNC: 13 U/L (ref 2–50)
ANION GAP SERPL CALC-SCNC: 7 MMOL/L (ref 0–11.9)
APTT PPP: 29.9 SEC (ref 24.7–36)
AST SERPL-CCNC: 17 U/L (ref 12–45)
BASOPHILS # BLD AUTO: 0.9 % (ref 0–1.8)
BASOPHILS # BLD: 0.06 K/UL (ref 0–0.12)
BILIRUB SERPL-MCNC: 0.5 MG/DL (ref 0.1–1.5)
BLD GP AB SCN SERPL QL: NORMAL
BUN SERPL-MCNC: 19 MG/DL (ref 8–22)
CALCIUM SERPL-MCNC: 8.4 MG/DL (ref 8.5–10.5)
CHLORIDE SERPL-SCNC: 110 MMOL/L (ref 96–112)
CO2 SERPL-SCNC: 23 MMOL/L (ref 20–33)
CREAT SERPL-MCNC: 0.77 MG/DL (ref 0.5–1.4)
EOSINOPHIL # BLD AUTO: 0.27 K/UL (ref 0–0.51)
EOSINOPHIL NFR BLD: 4.1 % (ref 0–6.9)
ERYTHROCYTE [DISTWIDTH] IN BLOOD BY AUTOMATED COUNT: 44.8 FL (ref 35.9–50)
GFR SERPL CREATININE-BSD FRML MDRD: >60 ML/MIN/1.73 M 2
GLOBULIN SER CALC-MCNC: 2.8 G/DL (ref 1.9–3.5)
GLUCOSE SERPL-MCNC: 101 MG/DL (ref 65–99)
HCT VFR BLD AUTO: 43.1 % (ref 37–47)
HGB BLD-MCNC: 13.4 G/DL (ref 12–16)
IMM GRANULOCYTES # BLD AUTO: 0.02 K/UL (ref 0–0.11)
IMM GRANULOCYTES NFR BLD AUTO: 0.3 % (ref 0–0.9)
INR PPP: 0.92 (ref 0.87–1.13)
LIPASE SERPL-CCNC: 10 U/L (ref 11–82)
LYMPHOCYTES # BLD AUTO: 2.13 K/UL (ref 1–4.8)
LYMPHOCYTES NFR BLD: 32.4 % (ref 22–41)
MCH RBC QN AUTO: 24.8 PG (ref 27–33)
MCHC RBC AUTO-ENTMCNC: 31.1 G/DL (ref 33.6–35)
MCV RBC AUTO: 79.8 FL (ref 81.4–97.8)
MONOCYTES # BLD AUTO: 0.53 K/UL (ref 0–0.85)
MONOCYTES NFR BLD AUTO: 8.1 % (ref 0–13.4)
NEUTROPHILS # BLD AUTO: 3.57 K/UL (ref 2–7.15)
NEUTROPHILS NFR BLD: 54.2 % (ref 44–72)
NRBC # BLD AUTO: 0 K/UL
NRBC BLD AUTO-RTO: 0 /100 WBC
PLATELET # BLD AUTO: 274 K/UL (ref 164–446)
PMV BLD AUTO: 10.4 FL (ref 9–12.9)
POTASSIUM SERPL-SCNC: 4.2 MMOL/L (ref 3.6–5.5)
PROT SERPL-MCNC: 6.7 G/DL (ref 6–8.2)
PROTHROMBIN TIME: 12.6 SEC (ref 12–14.6)
RBC # BLD AUTO: 5.4 M/UL (ref 4.2–5.4)
RH BLD: NORMAL
SODIUM SERPL-SCNC: 140 MMOL/L (ref 135–145)
WBC # BLD AUTO: 6.6 K/UL (ref 4.8–10.8)

## 2017-02-15 PROCEDURE — 80053 COMPREHEN METABOLIC PANEL: CPT

## 2017-02-15 PROCEDURE — 36415 COLL VENOUS BLD VENIPUNCTURE: CPT

## 2017-02-15 PROCEDURE — 83690 ASSAY OF LIPASE: CPT

## 2017-02-15 PROCEDURE — 93005 ELECTROCARDIOGRAM TRACING: CPT

## 2017-02-15 PROCEDURE — 86850 RBC ANTIBODY SCREEN: CPT

## 2017-02-15 PROCEDURE — 86900 BLOOD TYPING SEROLOGIC ABO: CPT

## 2017-02-15 PROCEDURE — 86901 BLOOD TYPING SEROLOGIC RH(D): CPT

## 2017-02-15 PROCEDURE — 71010 DX-CHEST-LIMITED (1 VIEW): CPT

## 2017-02-15 PROCEDURE — 85730 THROMBOPLASTIN TIME PARTIAL: CPT

## 2017-02-15 PROCEDURE — 99284 EMERGENCY DEPT VISIT MOD MDM: CPT

## 2017-02-15 PROCEDURE — 85025 COMPLETE CBC W/AUTO DIFF WBC: CPT

## 2017-02-15 PROCEDURE — 85610 PROTHROMBIN TIME: CPT

## 2017-02-15 ASSESSMENT — PAIN SCALES - GENERAL
PAINLEVEL_OUTOF10: 0
PAINLEVEL_OUTOF10: 0

## 2017-02-15 ASSESSMENT — LIFESTYLE VARIABLES: DO YOU DRINK ALCOHOL: NO

## 2017-02-15 NOTE — ED AVS SNAPSHOT
Home Care Instructions                                                                                                                Gloria Patel   MRN: [id number]    Department:  [facility], Emergency Dept   Date of Visit:  2/15/2017            [facility], Emergency Dept    [address] [address]    Phone:  [phone]      You were seen by     1. Zachary Abarca M.D.    2. Shelia Rasmussen M.D.      Your Diagnosis Was     Acute lower gastrointestinal bleeding     K92.2       Follow-up Information     1. Follow up with Nat Montano M.D.. Call in 1 day.    Specialty:  Gastroenterology    Why:  if you haven't heard from the office for close follow-up    Contact information    [address] [address]  [phone]        Medication Information     Review all of your home medications and newly ordered medications with your primary doctor and/or pharmacist as soon as possible. Follow medication instructions as directed by your doctor and/or pharmacist.     Please keep your complete medication list with you and share with your physician. Update the information when medications are discontinued, doses are changed, or new medications (including over-the-counter products) are added; and carry medication information at all times in the event of emergency situations.               Medication List      ASK your doctor about these medications        Instructions    alprazolam 0.25 MG Tabs   Commonly known as:  XANAX    Take 1 Tab by mouth 1 time daily as needed for Anxiety.   Dose:  0.25 mg       * amlodipine 5 MG Tabs   Commonly known as:  NORVASC    Take 5 mg by mouth every day.   Dose:  5 mg       * amlodipine 5 MG Tabs   Commonly known as:  NORVASC    TAKE 1 TAB BY MOUTH EVERY DAY.       ASCORBIC ACID PO    Take 1 Tab by mouth every day.   Dose:  1 Tab       aspirin 81 MG Chew chewable tablet   Commonly known as:  ASA    Take 4 Tabs by mouth every  "day.   Dose:  324 mg       azelastine 137 MCG/SPRAY nasal spray   Commonly known as:  ASTELIN    Spray 1 Spray in nose 1 time daily as needed for Rhinitis.   Dose:  1 Spray       levothyroxine 75 MCG Tabs   Commonly known as:  SYNTHROID    Doctor's comments:  refill   TAKE 1 TABLET BY MOUTH EVERY DAY       lisinopril 10 MG Tabs   Commonly known as:  PRINIVIL    Take 1 Tab by mouth every 12 hours.   Dose:  10 mg       metoprolol SR 25 MG Tb24   Commonly known as:  TOPROL XL    Take 25 mg by mouth every day.   Dose:  25 mg       nitrofurantoin monohydr macro 100 MG Caps   Commonly known as:  MACROBID    Take 1 Cap by mouth 2 times a day.   Dose:  100 mg       omeprazole 20 MG delayed-release capsule   Commonly known as:  PRILOSEC    TAKE ONE CAPSULE BY MOUTH TWICE A DAY 30MINUTES BEFORE BREAKFAST AND DINNER       TRAVATAN 0.004 % Soln   Generic drug:  travoprost    Place 1 Drop in both eyes every evening.   Dose:  1 Drop       VITAMIN D PO    Take 1 Tab by mouth every day.   Dose:  1 Tab       * Notice:  This list has 2 medication(s) that are the same as other medications prescribed for you. Read the directions carefully, and ask your doctor or other care provider to review them with you.            Procedures and tests performed during your visit     APTT    CARDIAC MONITORING    CBC WITH DIFFERENTIAL    COD (ADULT)    COMP METABOLIC PANEL    DX-CHEST-LIMITED (1 VIEW)    EKG (ER)    ESTIMATED GFR    LIPASE    O2 Protocol    PROTHROMBIN TIME    SALINE LOCK        Discharge Instructions       Bloody Stools  Bloody stools often mean that there is a problem in the digestive tract. Your caregiver may use the term \"melena\" to describe black, tarry, and bad smelling stools or \"hematochezia\" to describe red or maroon-colored stools. Blood seen in the stool can be caused by bleeding anywhere along the intestinal tract.   A black stool usually means that blood is coming from the upper part of the gastrointestinal tract " (esophagus, stomach, or small bowel). Passing maroon-colored stools or bright red blood usually means that blood is coming from lower down in the large bowel or the rectum. However, sometimes massive bleeding in the stomach or small intestine can cause bright red bloody stools.   Consuming black licorice, lead, iron pills, medicines containing bismuth subsalicylate, or blueberries can also cause black stools. Your caregiver can test black stools to see if blood is present.  It is important that the cause of the bleeding be found. Treatment can then be started, and the problem can be corrected. Rectal bleeding may not be serious, but you should not assume everything is okay until you know the cause. It is very important to follow up with your caregiver or a specialist in gastrointestinal problems.  CAUSES   Blood in the stools can come from various underlying causes. Often, the cause is not found during your first visit. Testing is often needed to discover the cause of bleeding in the gastrointestinal tract. Causes range from simple to serious or even life-threatening. Possible causes include:  · Hemorrhoids. These are veins that are full of blood (engorged) in the rectum. They cause pain, inflammation, and may bleed.  · Anal fissures. These are areas of painful tearing which may bleed. They are often caused by passing hard stool.  · Diverticulosis. These are pouches that form on the colon over time, with age, and may bleed significantly.  · Diverticulitis. This is inflammation in areas with diverticulosis. It can cause pain, fever, and bloody stools, although bleeding is rare.  · Proctitis and colitis. These are inflamed areas of the rectum or colon. They may cause pain, fever, and bloody stools.  · Polyps and cancer. Colon cancer is a leading cause of preventable cancer death. It often starts out as precancerous polyps that can be removed during a colonoscopy, preventing progression into cancer. Sometimes, polyps  and cancer may cause rectal bleeding.  · Gastritis and ulcers. Bleeding from the upper gastrointestinal tract (near the stomach) may travel through the intestines and produce black, sometimes tarry, often bad smelling stools. In certain cases, if the bleeding is fast enough, the stools may not be black, but red and the condition may be life-threatening.  SYMPTOMS   You may have stools that are bright red and bloody, that are normal color with blood on them, or that are dark black and tarry. In some cases, you may only have blood in the toilet bowl. Any of these cases need medical care. You may also have:  · Pain at the anus or anywhere in the rectum.  · Lightheadedness or feeling faint.  · Extreme weakness.  · Nausea or vomiting.  · Fever.  DIAGNOSIS  Your caregiver may use the following methods to find the cause of your bleeding:  · Taking a medical history. Age is important. Older people tend to develop polyps and cancer more often. If there is anal pain and a hard, large stool associated with bleeding, a tear of the anus may be the cause. If blood drips into the toilet after a bowel movement, bleeding hemorrhoids may be the problem. The color and frequency of the bleeding are additional considerations. In most cases, the medical history provides clues, but seldom the final answer.  · A visual and finger (digital) exam. Your caregiver will inspect the anal area, looking for tears and hemorrhoids. A finger exam can provide information when there is tenderness or a growth inside. In men, the prostate is also examined.  · Endoscopy. Several types of small, long scopes (endoscopes) are used to view the colon.  · In the office, your caregiver may use a rigid, or more commonly, a flexible viewing sigmoidoscope. This exam is called flexible sigmoidoscopy. It is performed in 5 to 10 minutes.  · A more thorough exam is accomplished with a colonoscope. It allows your caregiver to view the entire 5 to 6 foot long colon.  Medicine to help you relax (sedative) is usually given for this exam. Frequently, a bleeding lesion may be present beyond the reach of the sigmoidoscope. So, a colonoscopy may be the best exam to start with. Both exams are usually done on an outpatient basis. This means the patient does not stay overnight in the hospital or surgery center.  · An upper endoscopy may be needed to examine your stomach. Sedation is used and a flexible endoscope is put in your mouth, down to your stomach.  · A barium enema X-ray. This is an X-ray exam. It uses liquid barium inserted by enema into the rectum. This test alone may not identify an actual bleeding point. X-rays highlight abnormal shadows, such as those made by lumps (tumors), diverticuli, or colitis.  TREATMENT   Treatment depends on the cause of your bleeding.   · For bleeding from the stomach or colon, the caregiver doing your endoscopy or colonoscopy may be able to stop the bleeding as part of the procedure.  · Inflammation or infection of the colon can be treated with medicines.  · Many rectal problems can be treated with creams, suppositories, or warm baths.  · Surgery is sometimes needed.  · Blood transfusions are sometimes needed if you have lost a lot of blood.  · For any bleeding problem, let your caregiver know if you take aspirin or other blood thinners regularly.  HOME CARE INSTRUCTIONS   · Take any medicines exactly as prescribed.  · Keep your stools soft by eating a diet high in fiber. Prunes (1 to 3 a day) work well for many people.  · Drink enough water and fluids to keep your urine clear or pale yellow.  · Take sitz baths if advised. A sitz bath is when you sit in a bathtub with warm water for 10 to 15 minutes to soak, soothe, and cleanse the rectal area.  · If enemas or suppositories are advised, be sure you know how to use them. Tell your caregiver if you have problems with this.  · Monitor your bowel movements to look for signs of improvement or  worsening.  SEEK MEDICAL CARE IF:   · You do not improve in the time expected.  · Your condition worsens after initial improvement.  · You develop any new symptoms.  SEEK IMMEDIATE MEDICAL CARE IF:   · You develop severe or prolonged rectal bleeding.  · You vomit blood.  · You feel weak or faint.  · You have a fever.  MAKE SURE YOU:  · Understand these instructions.  · Will watch your condition.  · Will get help right away if you are not doing well or get worse.  Document Released: 12/08/2003 Document Revised: 03/11/2013 Document Reviewed: 05/04/2012  ExitCare® Patient Information ©2014 [employer].            Patient Information     Patient Information    Following emergency treatment: all patient requiring follow-up care must return either to a private physician or a clinic if your condition worsens before you are able to obtain further medical attention, please return to the emergency room.     Billing Information    At [facility] at [facility], we work to make the billing process streamlined for our patients.  Our Representatives are here to answer any questions you may have regarding your hospital bill.  If you have insurance coverage and have supplied your insurance information to us, we will submit a claim to your insurer on your behalf.  Should you have any questions regarding your bill, we can be reached online or by phone as follows:  Online: You are able pay your bills online or live chat with our representatives about any billing questions you may have. We are here to help Monday - Friday from 8:00am to 7:30pm and 9:00am - 12:00pm on Saturdays.  Please visit [email or web address]  for more information.   Phone:  [phone] or [phone]    Please note that your emergency physician, surgeon, pathologist, radiologist, anesthesiologist, and other specialists are not employed by [facility] and will therefore bill separately for their services.  Please contact them directly for any  questions concerning their bills at the numbers below:     Emergency Physician Services:  [phone]  [facility]:  [phone]  [facility]:  [phone]  [facility]:  [phone]    1. Your final bill may vary from the amount quoted upon discharge if all procedures are not complete at that time, or if your doctor has additional procedures of which we are not aware. You will receive an additional bill if you return to the Emergency Department at [facility] for suture removal regardless of the facility of which the sutures were placed.     2. Please arrange for settlement of this account at the emergency registration.    3. All self-pay accounts are due in full at the time of treatment.  If you are unable to meet this obligation then payment is expected within 4-5 days.     4. If you have had radiology studies (CT, X-ray, Ultrasound, MRI), you have received a preliminary result during your emergency department visit. Please contact the radiology department [phone] to receive a copy of your final result. Please discuss the Final result with your primary physician or with the follow up physician provided.     Crisis Hotline:  [place] Crisis Hotline:  3-284-EBXFDAA or 1-836.150.2528  [place] Crisis Hotline:    1-319.298.3709 or 712-451-9978         ED Discharge Follow Up Questions    1. In order to provide you with very good care, we would like to follow up with a phone call in the next few days.  May we have your permission to contact you?     YES /  NO    2. What is the best phone number to call you? (       )_____-__________    3. What is the best time to call you?      Morning  /  Afternoon  /  Evening                   Patient Signature:  ____________________________________________________________    Date:  ____________________________________________________________      Your appointments     Feb 27, 2017  3:20 PM   PETE ALBARRAN with [facility] MG 2    [facility] ([address])    [address]  [place] [address]   [phone]           No deodorant, powder, perfume or lotion under the arm or breast area.            Aug 01, 2017 11:30 AM   Est Injection with INFUSION QUICK INJECT   [facility] ([facility])    [address] [facility] [address]  [place] [address]   [phone]

## 2017-02-15 NOTE — ED NOTES
Pt. Ambulated to the bathroom well with steady gait with the use of her aparicio. Stool sample and urine sample collected.

## 2017-02-15 NOTE — ED NOTES
IV D/C'd.  Discharge instructions provided to pt.  Pt states understanding.  Pt states all questions have been answered.  Copy of discharge provided to pt.  Signed copy in chart.  Prescriptions provided to pt. Pt states that all personal belongings are in possession.  Pt escorted off unit with assistance from 7 with daughter without incident.

## 2017-02-15 NOTE — ED AVS SNAPSHOT
LocalOn Access Code: [email or web address]  Expires: 2/16/2017  1:06 PM    Your email address is not on file at [facility].  Email Addresses are required for you to sign up for LocalOn, please contact [phone] to verify your personal information and to provide your email address prior to attempting to register for LocalOn.    Gloria Patel  [address] [place] [address]    LocalOn  A secure, online tool to manage your health information     [facility]’s LocalOn® is a secure, online tool that connects you to your personalized health information from the privacy of your home -- day or night - making it very easy for you to manage your healthcare. Once the activation process is completed, you can even access your medical information using the LocalOn florina, which is available for free in the Apple Florina store or Google Play store.     To learn more about LocalOn, visit [email or web address]    There are two levels of access available (as shown below):   My Chart Features  [facility] Primary Care Doctor [facility]  Specialists [facility]  Urgent  Care Non-[facility] Primary Care Doctor   Email your healthcare team securely and privately 24/7 X X X    Manage appointments: schedule your next appointment; view details of past/upcoming appointments X      Request prescription refills. X      View recent personal medical records, including lab and immunizations X X X X   View health record, including health history, allergies, medications X X X X   Read reports about your outpatient visits, procedures, consult and ER notes X X X X   See your discharge summary, which is a recap of your hospital and/or ER visit that includes your diagnosis, lab results, and care plan X X  X     How to register for LocalOn:  Once your e-mail address has been verified, follow the following steps to sign up for LocalOn.     1. Go to  [email or web address]  2. Click on the Sign Up Now box, which takes you to the New Member Sign Up page. You  will need to provide the following information:  a. Enter your seedchange Access Code exactly as it appears at the top of this page. (You will not need to use this code after you’ve completed the sign-up process. If you do not sign up before the expiration date, you must request a new code.)   b. Enter your date of birth.   c. Enter your home email address.   d. Click Submit, and follow the next screen’s instructions.  3. Create a [employer] ID. This will be your seedchange login ID and cannot be changed, so think of one that is secure and easy to remember.  4. Create a seedchange password. You can change your password at any time.  5. Enter your Password Reset Question and Answer. This can be used at a later time if you forget your password.   6. Enter your e-mail address. This allows you to receive e-mail notifications when new information is available in seedchange.  7. Click Sign Up. You can now view your health information.    For assistance activating your seedchange account, call [phone]

## 2017-02-15 NOTE — ED AVS SNAPSHOT
2/15/2017          Gloria Patel  [address] [place] [address]    Dear Gloria:    [facility] wants to ensure your discharge home is safe and you or your loved ones have had all your questions answered regarding your care after you leave the hospital.    You may receive a telephone call within two days of your discharge.  This call is to make certain you understand your discharge instructions as well as ensure we provided you with the best care possible during your stay with us.     The call will only last approximately 3-5 minutes and will be done by a nurse.    Once again, we want to ensure your discharge home is safe and that you have a clear understanding of any next steps in your care.  If you have any questions or concerns, please do not hesitate to contact us, we are here for you.  Thank you for choosing [facility] for your healthcare needs.    Sincerely,    Allen Enamorado    [facility]

## 2017-02-15 NOTE — ED PROVIDER NOTES
"ED Provider Note    Scribed for Dr. Shelia Rasmussen M.D. by Ibrahima Morales. 2/15/2017, 10:11 AM.    Primary care provider: Mike Otero M.D.  Means of arrival: Ambulance  History obtained from: Patient  History limited by: None    CHIEF COMPLAINT  Chief Complaint   Patient presents with   • Bloody Stools     Pt. states having bright red blood in her stool this morning and describes it as \"a lot of blood.\" Pt. states no abdominal pain at this time.   • Rib Pain     Pt. states she fell recently and has left sided rib pain. Bruising is apparent there.       [facility]  Gloria Patel is a 89 y.o. female who presents to the Emergency Department by ambulance complaining of bloody stools and rib pain. The patient reports 3 weeks ago she had a ground level fall and has had left rib pain since the fall. Her rib pain has persisted and she did see her primary care provider for evaluation. She primarily came to the ED because of bright red blood in her solid stool as well as the toilet paper when she wiped this morning. She describes it as \"a lot of blood\" and \"I have never had that before\". She had a second stool this morning she says was normal with no blood. She denies any pain other than her left rib pain. No abdominal pain. She denies any fever, shortness of breath, nausea, vomiting, dizziness. She states she was recently on antibiotics or a UTI. She has had colonoscopys in the past. Last one was about 5 years ago. She has a past surgical history of hysterectomy and cholecystectomy. She has a past medical history of diverticulitis. No recent extensive use of aspirin or anti-inflammatory medication.    REVIEW OF SYSTEMS  Pertinent positives include bloody stools, left rib pain. Pertinent negatives include no abdominal pain, fever, shortness of breath, nausea, vomiting, dizziness, diarrhea. As above, all other systems reviewed and are negative.   See HPI for further details.     PAST MEDICAL HISTORY  Past Medical History "   Diagnosis Date   • GERD (gastroesophageal reflux disease)      medicated   • Hypertension      medicated   • Diverticulitis      medicated   • Arthritis    • Unspecified urinary incontinence    • Heart burn    • Hiatus hernia syndrome    • Glaucoma        SURGICAL HISTORY  Past Surgical History   Procedure Laterality Date   • Other  1945     tonsilectomy   • Other  1954     mikey. breast bx   • Other orthopedic surgery       osteoporosis - medicated   • Other orthopedic surgery  2000     back surgery   • Cholecystectomy  1966     rahat   • Other abdominal surgery  1954     kidney tacked up ?   • Gyn surgery  1970     hysterectomy   • Hip arthroplasty total  6/21/2011     Performed by AMPARO CRAFT at [facility]   • Us-needle core bx-breast panel         SOCIAL HISTORY  Social History   Substance Use Topics   • Smoking status: Never Smoker    • Smokeless tobacco: Never Used   • Alcohol Use: 0.0 oz/week     0 Standard drinks or equivalent per week      Comment: occassional wine      History   Drug Use No       FAMILY HISTORY  Family History   Problem Relation Age of Onset   • Diabetes     • Heart Disease     • Lung Disease     • Cancer Sister        CURRENT MEDICATIONS  Home Medications     Reviewed by Marcia Hinkle R.N. (Registered Nurse) on 02/15/17 at 0933  Med List Status: Partial    Medication Last Dose Status    alprazolam (XANAX) 0.25 MG Tab prn Active    amlodipine (NORVASC) 5 MG Tab  Active    amlodipine (NORVASC) 5 MG Tab taking Active    ASCORBIC ACID PO not taking Active    aspirin (ASA) 81 MG Chew Tab chewable tablet taking Active    azelastine (ASTELIN) 137 MCG/SPRAY nasal spray evenings Active    Cholecalciferol (VITAMIN D PO) taking Active    levothyroxine (SYNTHROID) 75 MCG Tab taking Active    lisinopril (PRINIVIL) 10 MG Tab taking Active    metoprolol SR (TOPROL XL) 25 MG TABLET SR 24 HR taking Active    nitrofurantoin monohydr macro (MACROBID) 100 MG Cap  Active     "omeprazole (PRILOSEC) 20 MG delayed-release capsule taking Active    travoprost (TRAVATAN) 0.004 % SOLN using Active                ALLERGIES  Allergies   Allergen Reactions   • Flagyl [Metronidazole Hcl]    • Keflex    • Morphine Anaphylaxis     anaphylaxis   • Sulfa Drugs Rash     rash       PHYSICAL EXAM  VITAL SIGNS: /60 mmHg  Pulse 66  Temp(Src) 36.2 °C (97.2 °F)  Resp 20  Ht 1.549 m (5' 1\")  Wt 65.772 kg (145 lb)  BMI 27.41 kg/m2  Vitals reviewed.    Consitutional: Well-developed, well-nourished. Negative for: distress.  HENT: Normocephalic, right external ear normal, left external ear normal, oropharynx clear and moist.  Eyes: Conjunctivae normal, extraocular movements normal. Negative for: discharge in right and left eye, icterus.  Neck: Range of motion normal, supple. Negative for cervical adenopathy.  Cardiovascular: Normal rate, regular rhythm, heart sounds normal, intact distal pulses. Negative for: murmur, rub, gallop.  Pulmonary/Chest Wall: Positive tenderness over the left lateral lower chest cage. No pain with AP compression over the sternum. Effort normal, breath sounds normal. Negative for: respiratory distress, wheezes, rales, rhonchi.   Abdominal: Soft, bowel sounds normal. Negative for: distention, tenderness, rebound, guarding.  Rectal: Light brown stool is guaiac negative per testing rectal.   Musculoskeletal: Normal range of motion. Negative for edema.  Neurological: Alert and oriented x3. No focal deficits.  Skin: Warm, dry. Negative for rash.  Psych: Mildly anxious, behavior normal, judgment normal.    DIAGNOSTIC STUDIES / PROCEDURES    LABS  Results for orders placed or performed during the hospital encounter of 02/15/17   COD (ADULT)   Result Value Ref Range    ABO Grouping Only B     Rh Grouping Only POS     Antibody Screen-Cod NEG    CBC WITH DIFFERENTIAL   Result Value Ref Range    WBC 6.6 4.8 - 10.8 K/uL    RBC 5.40 4.20 - 5.40 M/uL    Hemoglobin 13.4 12.0 - 16.0 g/dL    " Hematocrit 43.1 37.0 - 47.0 %    MCV 79.8 (L) 81.4 - 97.8 fL    MCH 24.8 (L) 27.0 - 33.0 pg    MCHC 31.1 (L) 33.6 - 35.0 g/dL    RDW 44.8 35.9 - 50.0 fL    Platelet Count 274 164 - 446 K/uL    MPV 10.4 9.0 - 12.9 fL    Neutrophils-Polys 54.20 44.00 - 72.00 %    Lymphocytes 32.40 22.00 - 41.00 %    Monocytes 8.10 0.00 - 13.40 %    Eosinophils 4.10 0.00 - 6.90 %    Basophils 0.90 0.00 - 1.80 %    Immature Granulocytes 0.30 0.00 - 0.90 %    Nucleated RBC 0.00 /100 WBC    Neutrophils (Absolute) 3.57 2.00 - 7.15 K/uL    Lymphs (Absolute) 2.13 1.00 - 4.80 K/uL    Monos (Absolute) 0.53 0.00 - 0.85 K/uL    Eos (Absolute) 0.27 0.00 - 0.51 K/uL    Baso (Absolute) 0.06 0.00 - 0.12 K/uL    Immature Granulocytes (abs) 0.02 0.00 - 0.11 K/uL    NRBC (Absolute) 0.00 K/uL   COMP METABOLIC PANEL   Result Value Ref Range    Sodium 140 135 - 145 mmol/L    Potassium 4.2 3.6 - 5.5 mmol/L    Chloride 110 96 - 112 mmol/L    Co2 23 20 - 33 mmol/L    Anion Gap 7.0 0.0 - 11.9    Glucose 101 (H) 65 - 99 mg/dL    Bun 19 8 - 22 mg/dL    Creatinine 0.77 0.50 - 1.40 mg/dL    Calcium 8.4 (L) 8.5 - 10.5 mg/dL    AST(SGOT) 17 12 - 45 U/L    ALT(SGPT) 13 2 - 50 U/L    Alkaline Phosphatase 127 (H) 30 - 99 U/L    Total Bilirubin 0.5 0.1 - 1.5 mg/dL    Albumin 3.9 3.2 - 4.9 g/dL    Total Protein 6.7 6.0 - 8.2 g/dL    Globulin 2.8 1.9 - 3.5 g/dL    A-G Ratio 1.4 g/dL   LIPASE   Result Value Ref Range    Lipase 10 (L) 11 - 82 U/L   PROTHROMBIN TIME   Result Value Ref Range    PT 12.6 12.0 - 14.6 sec    INR 0.92 0.87 - 1.13   APTT   Result Value Ref Range    APTT 29.9 24.7 - 36.0 sec   ESTIMATED GFR   Result Value Ref Range    GFR If African American >60 >60 mL/min/1.73 m 2    GFR If Non African American >60 >60 mL/min/1.73 m 2       All labs reviewed by me.    EKG Interpretation:  12-lead EKG by my interpretation shows:SINUS BRADYCARDIA at 59  VENTRICULAR PREMATURE COMPLEX  LEFT AXIS DEVIATION which has been seen previously  No pathologic Q waves  No ST or  T-wave changes to indicate ischemia or infarct  LATE PRECORDIAL R/S TRANSITION  PROBABLE LEFT VENTRICULAR HYPERTROPHY  Compared to ECG 04/28/2016 08:18:53    RADIOLOGY  DX-CHEST-LIMITED (1 VIEW)   Final Result      Stable enlargement of the cardiomediastinal silhouette without acute cardiopulmonary disease.        The radiologist's interpretation of all radiological studies have been reviewed by me.    COURSE & MEDICAL DECISION MAKING  Nursing notes, VS, PMSFHx reviewed in chart.    10:11 AM Patient seen and examined at bedside. The patient presents with bloody stools and left sided rib pain and the differential diagnosis includes but is not limited to musculoskeletal pain, bleeding hemorrhoid, anal fissure. Abdominal exam is completely negative with no other GI symptoms, so I do not think a CT is warranted . Ordered DX-chest, Estimated GFR, COD, CBC with differential, CMP, Lipase, Prothrombin time, APTT, ABO confirmation.     11:37 PM - Paged ANNA     11:44 AM I discussed the patient's case and the above findings with Dr. Bazzi (G.I.) who agreed to follow-up with the patient closely as an outpatient    11:55 AM - Patient re-evaluated at bedside. Patient reports feeling ok. Patient's lab and radiology results discussed which showed no acute findings. No anemia, no signs of ongoing blood loss. I informed patient I spoke with Dr. Bazzi who agreed patient is stable to be discharged home. Discussed patient's condition. Patient advised to return to the ED if symptoms worsen and to follow up with Dr. Montano (G.I.). The patient understood and is in agreement.           DISPOSITION:  Patient will be discharged home in stable condition.    FOLLOW UP:  Nat Montano M.D.  [address] [place] [id number]  [phone]    Call in 1 day  if you haven't heard from the office for close follow-up      FINAL IMPRESSION  1. Acute lower gastrointestinal bleeding    2. Left-sided chest wall pain          IIbrahima  (Scribe), am scribing for, and in the presence of Dr. Shelia Rasmussen    Electronically signed by: Ibrahima Morales (Scribe), 2/15/2017    I, Dr. Shelia Rasmussen providers found personally performed the services described in this documentation, as scribed by Ibrahima Morales in my presence, and it is both accurate and complete.    The note accurately reflects work and decisions made by me.  Shelia Rasmussen  2/16/2017  11:47 AM

## 2017-02-15 NOTE — ED NOTES
"Gloria Patel  89 y.o.  Chief Complaint   Patient presents with   • Bloody Stools     Pt. states having bright red blood in her stool this morning and describes it as \"a lot of blood.\" Pt. states no abdominal pain at this time.   • Rib Pain     Pt. states she fell recently and has left sided rib pain. Bruising is apparent there.     /60 mmHg  Pulse 66  Temp(Src) 36.2 °C (97.2 °F)  Resp 20  Ht 1.549 m (5' 1\")  Wt 65.772 kg (145 lb)  BMI 27.41 kg/m2  Pt has a 20G IV in her left ac placed prior to arrival. EKG completed  "

## 2017-02-15 NOTE — DISCHARGE INSTRUCTIONS
"Bloody Stools  Bloody stools often mean that there is a problem in the digestive tract. Your caregiver may use the term \"melena\" to describe black, tarry, and bad smelling stools or \"hematochezia\" to describe red or maroon-colored stools. Blood seen in the stool can be caused by bleeding anywhere along the intestinal tract.   A black stool usually means that blood is coming from the upper part of the gastrointestinal tract (esophagus, stomach, or small bowel). Passing maroon-colored stools or bright red blood usually means that blood is coming from lower down in the large bowel or the rectum. However, sometimes massive bleeding in the stomach or small intestine can cause bright red bloody stools.   Consuming black licorice, lead, iron pills, medicines containing bismuth subsalicylate, or blueberries can also cause black stools. Your caregiver can test black stools to see if blood is present.  It is important that the cause of the bleeding be found. Treatment can then be started, and the problem can be corrected. Rectal bleeding may not be serious, but you should not assume everything is okay until you know the cause. It is very important to follow up with your caregiver or a specialist in gastrointestinal problems.  CAUSES   Blood in the stools can come from various underlying causes. Often, the cause is not found during your first visit. Testing is often needed to discover the cause of bleeding in the gastrointestinal tract. Causes range from simple to serious or even life-threatening. Possible causes include:  · Hemorrhoids. These are veins that are full of blood (engorged) in the rectum. They cause pain, inflammation, and may bleed.  · Anal fissures. These are areas of painful tearing which may bleed. They are often caused by passing hard stool.  · Diverticulosis. These are pouches that form on the colon over time, with age, and may bleed significantly.  · Diverticulitis. This is inflammation in areas with " diverticulosis. It can cause pain, fever, and bloody stools, although bleeding is rare.  · Proctitis and colitis. These are inflamed areas of the rectum or colon. They may cause pain, fever, and bloody stools.  · Polyps and cancer. Colon cancer is a leading cause of preventable cancer death. It often starts out as precancerous polyps that can be removed during a colonoscopy, preventing progression into cancer. Sometimes, polyps and cancer may cause rectal bleeding.  · Gastritis and ulcers. Bleeding from the upper gastrointestinal tract (near the stomach) may travel through the intestines and produce black, sometimes tarry, often bad smelling stools. In certain cases, if the bleeding is fast enough, the stools may not be black, but red and the condition may be life-threatening.  SYMPTOMS   You may have stools that are bright red and bloody, that are normal color with blood on them, or that are dark black and tarry. In some cases, you may only have blood in the toilet bowl. Any of these cases need medical care. You may also have:  · Pain at the anus or anywhere in the rectum.  · Lightheadedness or feeling faint.  · Extreme weakness.  · Nausea or vomiting.  · Fever.  DIAGNOSIS  Your caregiver may use the following methods to find the cause of your bleeding:  · Taking a medical history. Age is important. Older people tend to develop polyps and cancer more often. If there is anal pain and a hard, large stool associated with bleeding, a tear of the anus may be the cause. If blood drips into the toilet after a bowel movement, bleeding hemorrhoids may be the problem. The color and frequency of the bleeding are additional considerations. In most cases, the medical history provides clues, but seldom the final answer.  · A visual and finger (digital) exam. Your caregiver will inspect the anal area, looking for tears and hemorrhoids. A finger exam can provide information when there is tenderness or a growth inside. In men, the  prostate is also examined.  · Endoscopy. Several types of small, long scopes (endoscopes) are used to view the colon.  · In the office, your caregiver may use a rigid, or more commonly, a flexible viewing sigmoidoscope. This exam is called flexible sigmoidoscopy. It is performed in 5 to 10 minutes.  · A more thorough exam is accomplished with a colonoscope. It allows your caregiver to view the entire 5 to 6 foot long colon. Medicine to help you relax (sedative) is usually given for this exam. Frequently, a bleeding lesion may be present beyond the reach of the sigmoidoscope. So, a colonoscopy may be the best exam to start with. Both exams are usually done on an outpatient basis. This means the patient does not stay overnight in the hospital or surgery center.  · An upper endoscopy may be needed to examine your stomach. Sedation is used and a flexible endoscope is put in your mouth, down to your stomach.  · A barium enema X-ray. This is an X-ray exam. It uses liquid barium inserted by enema into the rectum. This test alone may not identify an actual bleeding point. X-rays highlight abnormal shadows, such as those made by lumps (tumors), diverticuli, or colitis.  TREATMENT   Treatment depends on the cause of your bleeding.   · For bleeding from the stomach or colon, the caregiver doing your endoscopy or colonoscopy may be able to stop the bleeding as part of the procedure.  · Inflammation or infection of the colon can be treated with medicines.  · Many rectal problems can be treated with creams, suppositories, or warm baths.  · Surgery is sometimes needed.  · Blood transfusions are sometimes needed if you have lost a lot of blood.  · For any bleeding problem, let your caregiver know if you take aspirin or other blood thinners regularly.  HOME CARE INSTRUCTIONS   · Take any medicines exactly as prescribed.  · Keep your stools soft by eating a diet high in fiber. Prunes (1 to 3 a day) work well for many people.  · Drink  enough water and fluids to keep your urine clear or pale yellow.  · Take sitz baths if advised. A sitz bath is when you sit in a bathtub with warm water for 10 to 15 minutes to soak, soothe, and cleanse the rectal area.  · If enemas or suppositories are advised, be sure you know how to use them. Tell your caregiver if you have problems with this.  · Monitor your bowel movements to look for signs of improvement or worsening.  SEEK MEDICAL CARE IF:   · You do not improve in the time expected.  · Your condition worsens after initial improvement.  · You develop any new symptoms.  SEEK IMMEDIATE MEDICAL CARE IF:   · You develop severe or prolonged rectal bleeding.  · You vomit blood.  · You feel weak or faint.  · You have a fever.  MAKE SURE YOU:  · Understand these instructions.  · Will watch your condition.  · Will get help right away if you are not doing well or get worse.  Document Released: 12/08/2003 Document Revised: 03/11/2013 Document Reviewed: 05/04/2012  [employer]® Patient Information ©2014 [employer].

## 2017-02-16 LAB — EKG IMPRESSION: NORMAL

## 2017-02-27 ENCOUNTER — HOSPITAL ENCOUNTER (OUTPATIENT)
Dept: RADIOLOGY | Facility: MEDICAL CENTER | Age: 82
End: 2017-02-27
Attending: INTERNAL MEDICINE
Payer: MEDICARE

## 2017-02-27 DIAGNOSIS — Z12.31 SCREENING MAMMOGRAM, ENCOUNTER FOR: ICD-10-CM

## 2017-02-27 PROCEDURE — 77063 BREAST TOMOSYNTHESIS BI: CPT

## 2017-03-01 NOTE — PROGRESS NOTES
"Chief Complaint   Patient presents with   • Dysuria       HISTORY OF THE PRESENT ILLNESS: Patient is a 89 y.o. female. Patient comes in with complaint of one day of dysuria and frequency. She has a history of urinary tract infections. No fever or chills. No flank pain.       Allergies: Flagyl; Keflex; Morphine; and Sulfa drugs    Current Outpatient Prescriptions Ordered in [facility]   Medication Sig Dispense Refill   • nitrofurantoin monohydr macro (MACROBID) 100 MG Cap Take 1 Cap by mouth 2 times a day. 20 Cap 0   • amlodipine (NORVASC) 5 MG Tab TAKE 1 TAB BY MOUTH EVERY DAY. 90 Tab 3   • omeprazole (PRILOSEC) 20 MG delayed-release capsule TAKE ONE CAPSULE BY MOUTH TWICE A DAY 30MINUTES BEFORE BREAKFAST AND DINNER 60 Cap 11   • lisinopril (PRINIVIL) 10 MG Tab Take 1 Tab by mouth every 12 hours. 180 Tab 3   • levothyroxine (SYNTHROID) 75 MCG Tab TAKE 1 TABLET BY MOUTH EVERY DAY 90 Tab 3   • alprazolam (XANAX) 0.25 MG Tab Take 1 Tab by mouth 1 time daily as needed for Anxiety. 15 Tab 0   • azelastine (ASTELIN) 137 MCG/SPRAY nasal spray [place] 1 Spray in nose 1 time daily as needed for Rhinitis. 30 mL 3   • amlodipine (NORVASC) 5 MG Tab Take 5 mg by mouth every day.     • ASCORBIC ACID PO Take 1 Tab by mouth every day.     • Cholecalciferol (VITAMIN D PO) Take 1 Tab by mouth every day.     • metoprolol SR (TOPROL XL) 25 MG TABLET SR 24 HR Take 25 mg by mouth every day.     • aspirin (ASA) 81 MG Chew Tab chewable tablet Take 4 Tabs by mouth every day. 100 Tab 11   • travoprost (TRAVATAN) 0.004 % SOLN Place 1 Drop in both eyes every evening.       No current Epic-ordered facility-administered medications on file.       Past medical history, social history and family history were reviewed from chart today    Review of systems: Per HPI.   All others negative.     Exam: Blood pressure 138/82, pulse 78, temperature 36.3 °C (97.3 °F), resp. rate 16, height 1.575 m (5' 2\"), weight 68.493 kg (151 lb), SpO2 98 %.  General: " Well-appearing. Well-developed. No signs of distress.  HEENT: Grossly normal. Oral cavity is pink and moist.  Neck: Supple without JVD or bruit.  Pulmonary: Clear with good breath sounds. Normal effort.  Cardiovascular: Regular. Carotid and radial pulses are intact.  Abdomen: Soft, nontender, nondistended. Spleen and liver are not enlarged.  Neurologic: Cranial nerves II through XII are grossly normal, alert and oriented x3      Assessment/Plan  1. Acute cystitis without hematuria  nitrofurantoin monohydr macro (MACROBID) 100 MG Cap   2. Frequent urination  POCT Urinalysis   3. Abnormal urinalysis  URINE CULTURE(NEW)   4. Weakness  POCT Urinalysis       89-year-old female with probable acute cystitis.  Recommended Macrobid  Adjust antibiotics as indicated.  Follow-up the symptoms persist, ER if symptoms worsen.

## 2017-03-09 RX ORDER — METOPROLOL SUCCINATE 25 MG/1
TABLET, EXTENDED RELEASE ORAL
Qty: 90 TAB | Refills: 3 | Status: SHIPPED | OUTPATIENT
Start: 2017-03-09 | End: 2017-07-11 | Stop reason: SDUPTHER

## 2017-03-16 ENCOUNTER — NON-PROVIDER VISIT (OUTPATIENT)
Dept: INTERNAL MEDICINE | Facility: IMAGING CENTER | Age: 82
End: 2017-03-16
Payer: MEDICARE

## 2017-03-16 ENCOUNTER — HOSPITAL ENCOUNTER (OUTPATIENT)
Facility: MEDICAL CENTER | Age: 82
End: 2017-03-16
Attending: INTERNAL MEDICINE
Payer: MEDICARE

## 2017-03-16 DIAGNOSIS — R82.90 ABNORMAL URINALYSIS: ICD-10-CM

## 2017-03-16 DIAGNOSIS — R35.0 FREQUENT URINATION: ICD-10-CM

## 2017-03-16 LAB
APPEARANCE UR: CLEAR
BILIRUB UR STRIP-MCNC: NORMAL MG/DL
COLOR UR AUTO: NORMAL
GLUCOSE UR STRIP.AUTO-MCNC: NORMAL MG/DL
KETONES UR STRIP.AUTO-MCNC: NORMAL MG/DL
LEUKOCYTE ESTERASE UR QL STRIP.AUTO: NORMAL
NITRITE UR QL STRIP.AUTO: NORMAL
PH UR STRIP.AUTO: 6.5 [PH] (ref 5–8)
PROT UR QL STRIP: NORMAL MG/DL
RBC UR QL AUTO: NORMAL
SP GR UR STRIP.AUTO: 1
UROBILINOGEN UR STRIP-MCNC: NORMAL MG/DL

## 2017-03-16 PROCEDURE — 87086 URINE CULTURE/COLONY COUNT: CPT

## 2017-03-16 PROCEDURE — 81002 URINALYSIS NONAUTO W/O SCOPE: CPT | Performed by: INTERNAL MEDICINE

## 2017-03-17 DIAGNOSIS — R82.90 ABNORMAL URINALYSIS: ICD-10-CM

## 2017-03-19 LAB
BACTERIA UR CULT: NORMAL
SIGNIFICANT IND 70042: NORMAL
SOURCE SOURCE: NORMAL

## 2017-05-17 RX ORDER — AZELASTINE 1 MG/ML
SPRAY, METERED NASAL
Qty: 90 SPRAY | Refills: 2 | Status: SHIPPED | OUTPATIENT
Start: 2017-05-17 | End: 2017-07-11 | Stop reason: SDUPTHER

## 2017-05-24 RX ORDER — OMEPRAZOLE 20 MG/1
CAPSULE, DELAYED RELEASE ORAL
Qty: 60 CAP | Refills: 11 | Status: SHIPPED | OUTPATIENT
Start: 2017-05-24 | End: 2018-09-25 | Stop reason: SDUPTHER

## 2017-07-11 ENCOUNTER — HOSPITAL ENCOUNTER (OUTPATIENT)
Facility: MEDICAL CENTER | Age: 82
End: 2017-07-11
Attending: INTERNAL MEDICINE
Payer: MEDICARE

## 2017-07-11 ENCOUNTER — OFFICE VISIT (OUTPATIENT)
Dept: INTERNAL MEDICINE | Facility: IMAGING CENTER | Age: 82
End: 2017-07-11
Payer: MEDICARE

## 2017-07-11 VITALS
DIASTOLIC BLOOD PRESSURE: 70 MMHG | OXYGEN SATURATION: 94 % | SYSTOLIC BLOOD PRESSURE: 134 MMHG | HEIGHT: 62 IN | TEMPERATURE: 97.4 F | BODY MASS INDEX: 27.23 KG/M2 | WEIGHT: 148 LBS | RESPIRATION RATE: 16 BRPM | HEART RATE: 64 BPM

## 2017-07-11 DIAGNOSIS — I10 ESSENTIAL HYPERTENSION: ICD-10-CM

## 2017-07-11 DIAGNOSIS — H61.21 IMPACTED CERUMEN OF RIGHT EAR: ICD-10-CM

## 2017-07-11 DIAGNOSIS — N30.01 ACUTE CYSTITIS WITH HEMATURIA: ICD-10-CM

## 2017-07-11 DIAGNOSIS — E03.9 HYPOTHYROIDISM, UNSPECIFIED TYPE: ICD-10-CM

## 2017-07-11 DIAGNOSIS — R82.90 ABNORMAL URINALYSIS: ICD-10-CM

## 2017-07-11 DIAGNOSIS — K21.9 GASTROESOPHAGEAL REFLUX DISEASE WITHOUT ESOPHAGITIS: ICD-10-CM

## 2017-07-11 DIAGNOSIS — R35.0 URINARY FREQUENCY: ICD-10-CM

## 2017-07-11 DIAGNOSIS — K44.9 HIATAL HERNIA: ICD-10-CM

## 2017-07-11 DIAGNOSIS — M43.16 SPONDYLOLISTHESIS OF LUMBAR REGION: ICD-10-CM

## 2017-07-11 LAB
APPEARANCE UR: CLEAR
BILIRUB UR STRIP-MCNC: NORMAL MG/DL
COLOR UR AUTO: YELLOW
GLUCOSE UR STRIP.AUTO-MCNC: NORMAL MG/DL
KETONES UR STRIP.AUTO-MCNC: NORMAL MG/DL
LEUKOCYTE ESTERASE UR QL STRIP.AUTO: NORMAL
NITRITE UR QL STRIP.AUTO: POSITIVE
PH UR STRIP.AUTO: 6 [PH] (ref 5–8)
PROT UR QL STRIP: NORMAL MG/DL
RBC UR QL AUTO: NORMAL
SP GR UR STRIP.AUTO: 1.01
UROBILINOGEN UR STRIP-MCNC: NORMAL MG/DL

## 2017-07-11 PROCEDURE — 99214 OFFICE O/P EST MOD 30 MIN: CPT | Performed by: INTERNAL MEDICINE

## 2017-07-11 PROCEDURE — 81002 URINALYSIS NONAUTO W/O SCOPE: CPT | Performed by: INTERNAL MEDICINE

## 2017-07-11 PROCEDURE — 87077 CULTURE AEROBIC IDENTIFY: CPT

## 2017-07-11 PROCEDURE — 87086 URINE CULTURE/COLONY COUNT: CPT

## 2017-07-11 PROCEDURE — 87186 SC STD MICRODIL/AGAR DIL: CPT

## 2017-07-11 RX ORDER — AMLODIPINE BESYLATE 5 MG/1
5 TABLET ORAL
Qty: 90 TAB | Refills: 3 | Status: SHIPPED | OUTPATIENT
Start: 2017-07-11 | End: 2018-03-26 | Stop reason: SDUPTHER

## 2017-07-11 RX ORDER — CIPROFLOXACIN 250 MG/1
250 TABLET, FILM COATED ORAL 2 TIMES DAILY
Qty: 14 TAB | Refills: 0 | Status: SHIPPED | OUTPATIENT
Start: 2017-07-11 | End: 2017-07-31

## 2017-07-11 RX ORDER — AZELASTINE 1 MG/ML
SPRAY, METERED NASAL
Qty: 90 SPRAY | Refills: 2 | Status: SHIPPED | OUTPATIENT
Start: 2017-07-11 | End: 2019-08-24

## 2017-07-11 RX ORDER — LEVOTHYROXINE SODIUM 0.07 MG/1
75 TABLET ORAL
Qty: 90 TAB | Refills: 3 | Status: SHIPPED | OUTPATIENT
Start: 2017-07-11 | End: 2018-07-10 | Stop reason: SDUPTHER

## 2017-07-11 RX ORDER — LISINOPRIL 10 MG/1
10 TABLET ORAL EVERY 12 HOURS
Qty: 180 TAB | Refills: 3 | Status: SHIPPED | OUTPATIENT
Start: 2017-07-11 | End: 2017-09-29

## 2017-07-11 RX ORDER — METOPROLOL SUCCINATE 25 MG/1
25 TABLET, EXTENDED RELEASE ORAL
Qty: 90 TAB | Refills: 3 | Status: SHIPPED | OUTPATIENT
Start: 2017-07-11 | End: 2018-08-19 | Stop reason: SDUPTHER

## 2017-07-11 NOTE — PROGRESS NOTES
Chief Complaint   Patient presents with   • Follow-Up     Chronic issues    • Dysuria       HISTORY OF THE PRESENT ILLNESS: Patient is a 89 y.o. female.     Patient comes in asking for refills on her medications. She is here for follow-up on her chronic conditions.    Reflux-stable on Prilosec. She gets occasional symptoms depending on timing and rich nests of food she eats.    Hiatal hernia-as above.    Hypothyroid-clinically euthyroid. Last thyroid function tests were normal.    Hypertension-pressure stable on current medications including metoprolol, amlodipine and lisinopril.    Chronic left lower extremity edema-dependent edema. Presumably due to venous insufficiency. This has been a chronic issue. Symptoms may be complicated by amlodipine but we have previously discussed continuing the medication because of her good blood pressure control.       Allergies: Flagyl; Keflex; Morphine; and Sulfa drugs    Current Outpatient Prescriptions Ordered in [facility]   Medication Sig Dispense Refill   • ciprofloxacin (CIPRO) 250 MG Tab Take 1 Tab by mouth 2 times a day. 14 Tab 0   • azelastine (ASTELIN) 137 MCG/SPRAY nasal spray INSTILL 1 SPRAY INTO NOSTRIL TWICE DAILY 90 Spray 2   • metoprolol SR (TOPROL XL) 25 MG TABLET SR 24 HR Take 1 Tab by mouth every day. 90 Tab 3   • amlodipine (NORVASC) 5 MG Tab Take 1 Tab by mouth every day. 90 Tab 3   • lisinopril (PRINIVIL) 10 MG Tab Take 1 Tab by mouth every 12 hours. 180 Tab 3   • levothyroxine (SYNTHROID) 75 MCG Tab Take 1 Tab by mouth every day. 90 Tab 3   • omeprazole (PRILOSEC) 20 MG delayed-release capsule TAKE ONE CAPSULE BY MOUTH TWICE A DAY 30MINUTES BEFORE BREAKFAST AND DINNER 60 Cap 11   • ASCORBIC ACID PO Take 1 Tab by mouth every day.     • Cholecalciferol (VITAMIN D PO) Take 1 Tab by mouth every day.     • travoprost (TRAVATAN) 0.004 % SOLN Place 1 Drop in both eyes every evening.       No current Epic-ordered facility-administered medications on file.       Past  "medical history, social history and family history were reviewed from chart today    Review of systems: Per HPI. She complains of dysuria. No fever, chills, headache, chest pain. All others negative.     Exam: Blood pressure 134/70, pulse 64, temperature 36.3 °C (97.4 °F), resp. rate 16, height 1.575 m (5' 2\"), weight 67.132 kg (148 lb), SpO2 94 %.  General: Well-appearing. Well-developed. No signs of distress.  HEENT: Grossly normal. Oral cavity is pink and moist. The right ear was obstructed with hard cerumen. The ear canal and tympanic membranes were normal after removal. The left canal had a small amount of cerumen but was otherwise unremarkable. Tympanic membranes was normal.  Neck: Supple without JVD or bruit.  Pulmonary: Clear with good breath sounds. Normal effort.  Cardiovascular: Regular. Carotid and radial pulses are intact.  Abdomen: Soft, nontender, nondistended. Spleen and liver are not enlarged.  Neurologic: Cranial nerves II through XII are grossly normal, alert and oriented x3      Assessment/Plan  1. Urinary frequency  POCT Urinalysis   2. Abnormal urinalysis  URINE CULTURE(NEW)   3. Acute cystitis with hematuria     4. Essential hypertension  lisinopril (PRINIVIL) 10 MG Tab   5. Gastroesophageal reflux disease without esophagitis     6. Spondylolisthesis of lumbar region     7. Hiatal hernia     8. Hypothyroidism, unspecified type     9. Impacted cerumen of right ear         Patient has dysuria. Urinalysis is suggestive of infection. I recommended starting antibiotics and adjusting if needed. Typically her cultures have been pan sensitive.    Blood pressure stable on current medications. No change in treatment.    Reflexes stable on PPI. No change in treatment.    She has chronic low back pain from spondylolisthesis. She treats with Tylenol. We discussed additional workup including imaging, physical therapy or referral but she does not wish further intervention at this time.    Thyroid is stable. " Clinically euthyroid. Last thyroid function tests were normal. No change in treatment.    It was discussed with the patient that they have a cerumen impaction. The risks and benefits of removal were discussed. Specifically we discussed the benefits of reduced risk of infection, improved visualization of the landmarks and to improve discomfort due to  impaction. Risk were discussed and included but not limited to infection, bleeding, pain and possibly tympanic membrane perforation. A large cerumen plug was removed from the right ear. Forceps were used to remove the plug. No complications from some impaction removal. Post cerumen impaction care was discussed. Encouraged regular cleaning but to avoid deep penetration with foreign objects such as a Q-tip.

## 2017-07-11 NOTE — MR AVS SNAPSHOT
"        Gloria Patel   2017 10:00 AM   Office Visit   MRN: [id number]    Department:  [facility]   Dept Phone:  [phone]    Description:  Female : 1924   Provider:  Mike Otero M.D.           Reason for Visit     Follow-Up Chronic issues     Dysuria           Allergies as of 2017     Allergen Noted Reactions    Flagyl [Metronidazole Hcl] 2009       Keflex 2009       Morphine 2008   Anaphylaxis    anaphylaxis    Sulfa Drugs 2008   Rash    rash      You were diagnosed with     Urinary frequency   [788.41.ICD-9-CM]       Abnormal urinalysis   [245468]       Acute cystitis with hematuria   [646698]       Essential hypertension   [6382047]       Gastroesophageal reflux disease without esophagitis   [886952]       Spondylolisthesis of lumbar region   [906945]       Hiatal hernia   [399443]       Hypothyroidism, unspecified type   [8838304]       Impacted cerumen of right ear   [611402]         Vital Signs     Blood Pressure Pulse Temperature Respirations Height Weight    134/70 mmHg 64 36.3 °C (97.4 °F) 16 1.575 m (5' 2\") 67.132 kg (148 lb)    Body Mass Index Oxygen Saturation Smoking Status             27.06 kg/m2 94% Never Smoker          Basic Information     Date Of Birth Sex Race Ethnicity Preferred Language    1924 Female White Non- English      Your appointments     Aug 01, 2017 11:30 AM   Est Injection with INFUSION QUICK INJECT   Infusion Services ([facility])    [address] [place] [id number] [phone]              Problem List              ICD-10-CM Priority Class Noted - Resolved    Hip pain M25.559   2010 - Present    Hypothyroid E03.9   2010 - Present    Overactive bladder N32.81   2010 - Present    Hiatal hernia K44.9   2010 - Present    Hyperlipidemia E78.5   3/7/2012 - Present    GERD (gastroesophageal reflux disease) K21.9   2012 - Present    Spondylolisthesis of lumbar region M43.16   " 5/22/2012 - Present    Impingement syndrome of left shoulder M75.42   5/22/2012 - Present    Intervertebral disc protrusion [id number]   5/22/2012 - Present    Benign positional vertigo H81.10   2/27/2015 - Present    Osteoporosis M81.0   9/8/2015 - Present    TIA (transient ischemic attack) G45.9   4/28/2016 - Present    HTN (hypertension) I10   4/28/2016 - Present    Vision changes H53.9   4/28/2016 - Present      Health Maintenance        Date Due Completion Dates    IMM DTaP/Tdap/Td Vaccine (1 - Tdap) 7/9/1943 ---    IMM INFLUENZA (1) 9/1/2017 10/17/2016, 10/19/2015, 9/8/2014, 10/8/2012    BONE DENSITY 9/22/2020 9/22/2015, 8/28/2012, 8/11/2010, 12/16/2008, 12/11/2006            Results     POCT Urinalysis      Component Value Standard Range & Units    POC Color yellow Negative    POC Appearance clear Negative    POC Leukocyte Esterase moderate Negative    POC Nitrites positive Negative    POC Urobiligen neg Negative (0.2) mg/dL    POC Protein trace Negative mg/dL    POC Urine PH 6.0 5.0 - 8.0    POC Blood trace Negative    POC Specific Gravity 1.010 <1.005 - >1.030    POC Ketones neg Negative mg/dL    POC Biliruben neg Negative mg/dL    POC Glucose neg Negative mg/dL                        Current Immunizations     13-VALENT PCV PREVNAR 5/18/2015    Hepatitis B Vaccine Non-Recombivax (Ped/Adol) 2/6/2008    Influenza TIV (IM) 9/8/2014, 10/8/2012    Influenza Vaccine Adult HD 10/17/2016, 10/19/2015    Pneumococcal polysaccharide vaccine (PPSV-23) 10/14/2005    SHINGLES VACCINE 2/6/2008      Below and/or attached are the medications your provider expects you to take. Review all of your home medications and newly ordered medications with your provider and/or pharmacist. Follow medication instructions as directed by your provider and/or pharmacist. Please keep your medication list with you and share with your provider. Update the information when medications are discontinued, doses are changed, or new medications  (including over-the-counter products) are added; and carry medication information at all times in the event of emergency situations     Allergies:  FLAGYL - (reactions not documented)     KEFLEX - (reactions not documented)     MORPHINE - Anaphylaxis     SULFA DRUGS - Rash               Medications  Valid as of: July 11, 2017 - 12:09 PM    Generic Name Brand Name Tablet Size Instructions for use    AmLODIPine Besylate (Tab) NORVASC 5 MG Take 1 Tab by mouth every day.        Ascorbic Acid   Take 1 Tab by mouth every day.        Azelastine HCl (Solution) ASTELIN 137 MCG/SPRAY INSTILL 1 SPRAY INTO NOSTRIL TWICE DAILY        Cholecalciferol   Take 1 Tab by mouth every day.        Ciprofloxacin HCl (Tab) CIPRO 250 MG Take 1 Tab by mouth 2 times a day.        Levothyroxine Sodium (Tab) SYNTHROID 75 MCG Take 1 Tab by mouth every day.        Lisinopril (Tab) PRINIVIL 10 MG Take 1 Tab by mouth every 12 hours.        Metoprolol Succinate (TABLET SR 24 HR) TOPROL XL 25 MG Take 1 Tab by mouth every day.        Omeprazole (CAPSULE DELAYED RELEASE) PRILOSEC 20 MG TAKE ONE CAPSULE BY MOUTH TWICE A DAY 30MINUTES BEFORE BREAKFAST AND DINNER        Travoprost (Solution) TRAVATAN Z 0.004 % Place 1 Drop in both eyes every evening.        .                 Medicines prescribed today were sent to:     [facility]/PHARMACY #[id number] - [place], [place] - [address] [place] [id number]    Phone: [phone] Fax: [phone]    Open 24 Hours?: No      Medication refill instructions:       If your prescription bottle indicates you have medication refills left, it is not necessary to call your provider’s office. Please contact your pharmacy and they will refill your medication.    If your prescription bottle indicates you do not have any refills left, you may request refills at any time through one of the following ways: The online [employer] system (except Urgent Care), by calling your provider’s office, or  by asking your pharmacy to contact your provider’s office with a refill request. Medication refills are processed only during regular business hours and may not be available until the next business day. Your provider may request additional information or to have a follow-up visit with you prior to refilling your medication.   *Please Note: Medication refills are assigned a new Rx number when refilled electronically. Your pharmacy may indicate that no refills were authorized even though a new prescription for the same medication is available at the pharmacy. Please request the medicine by name with the pharmacy before contacting your provider for a refill.        Your To Do List     Future Labs/Procedures Complete By Expires    URINE CULTURE(NEW)  As directed 7/11/2018      Other Notes About Your Plan     Colonoscopy No repeat recommended  Dexa 9/22/15  Osteoporosis   Mammo 2/27/17  Cat 2 A1c 1/9/13  5.7   [facility] Med [facility] Re[facility] ENT-Elías (scheduled 4/24/13) Neurosurg-[facility] Uro-Cynthia Pod[facility]  Laboratory-[facility]           MyChart Status: Patient Declined

## 2017-07-14 LAB
BACTERIA UR CULT: ABNORMAL
BACTERIA UR CULT: ABNORMAL
SIGNIFICANT IND 70042: ABNORMAL
SOURCE SOURCE: ABNORMAL

## 2017-07-18 ENCOUNTER — HOSPITAL ENCOUNTER (OUTPATIENT)
Facility: MEDICAL CENTER | Age: 82
End: 2017-07-18
Attending: INTERNAL MEDICINE
Payer: MEDICARE

## 2017-07-18 ENCOUNTER — NON-PROVIDER VISIT (OUTPATIENT)
Dept: INTERNAL MEDICINE | Facility: IMAGING CENTER | Age: 82
End: 2017-07-18
Payer: MEDICARE

## 2017-07-18 DIAGNOSIS — R35.0 URINARY FREQUENCY: ICD-10-CM

## 2017-07-18 LAB
APPEARANCE UR: CLEAR
BILIRUB UR QL STRIP.AUTO: NEGATIVE
COLOR UR: YELLOW
CULTURE IF INDICATED INDCX: NO UA CULTURE
GLUCOSE UR STRIP.AUTO-MCNC: NEGATIVE MG/DL
KETONES UR STRIP.AUTO-MCNC: NEGATIVE MG/DL
LEUKOCYTE ESTERASE UR QL STRIP.AUTO: NEGATIVE
MICRO URNS: NORMAL
NITRITE UR QL STRIP.AUTO: NEGATIVE
PH UR STRIP.AUTO: 6 [PH]
PROT UR QL STRIP: NEGATIVE MG/DL
RBC UR QL AUTO: NEGATIVE
SP GR UR STRIP.AUTO: 1.02
UROBILINOGEN UR STRIP.AUTO-MCNC: 0.2 MG/DL

## 2017-07-18 PROCEDURE — 81003 URINALYSIS AUTO W/O SCOPE: CPT

## 2017-07-31 DIAGNOSIS — M81.0 AGE RELATED OSTEOPOROSIS, UNSPECIFIED PATHOLOGICAL FRACTURE PRESENCE: ICD-10-CM

## 2017-08-01 ENCOUNTER — APPOINTMENT (OUTPATIENT)
Dept: ONCOLOGY | Facility: MEDICAL CENTER | Age: 82
End: 2017-08-01
Attending: INTERNAL MEDICINE
Payer: MEDICARE

## 2017-08-19 ENCOUNTER — TELEPHONE (OUTPATIENT)
Dept: INTERNAL MEDICINE | Facility: IMAGING CENTER | Age: 82
End: 2017-08-19

## 2017-08-20 NOTE — TELEPHONE ENCOUNTER
Ximena Crawford, pt's niece, called today around noon to discuss Gloria's complaints of waking up with ringing in her ears, sense of fullness, and head pressure since 6 am. Pt denied having any sinus congestion or URI symptoms and had associated feelings of weakness (very non specific despite many questions). Pt's /87 with HR in high 50s. I brought up atmospheric changes causing eustachian tube dysfunction and summer cold is going around - niece did not feel that the patient was suffering from either of these issues, and I directed them to seek evaluation at UC or ER given their appropriate level of concern about her new symptoms and advanced age. I encouraged Ximena to call me back anytime if I could be of further assistance.

## 2017-08-22 ENCOUNTER — OFFICE VISIT (OUTPATIENT)
Dept: INTERNAL MEDICINE | Facility: IMAGING CENTER | Age: 82
End: 2017-08-22
Payer: MEDICARE

## 2017-08-22 VITALS
RESPIRATION RATE: 12 BRPM | HEIGHT: 62 IN | OXYGEN SATURATION: 96 % | DIASTOLIC BLOOD PRESSURE: 78 MMHG | TEMPERATURE: 97.9 F | WEIGHT: 146 LBS | SYSTOLIC BLOOD PRESSURE: 130 MMHG | HEART RATE: 83 BPM | BODY MASS INDEX: 26.87 KG/M2

## 2017-08-22 DIAGNOSIS — H69.93 EUSTACHIAN TUBE DYSFUNCTION, BILATERAL: ICD-10-CM

## 2017-08-22 DIAGNOSIS — R35.0 URINARY FREQUENCY: ICD-10-CM

## 2017-08-22 LAB
APPEARANCE UR: NORMAL
BILIRUB UR STRIP-MCNC: NORMAL MG/DL
COLOR UR AUTO: YELLOW
GLUCOSE UR STRIP.AUTO-MCNC: NORMAL MG/DL
KETONES UR STRIP.AUTO-MCNC: NORMAL MG/DL
LEUKOCYTE ESTERASE UR QL STRIP.AUTO: NORMAL
NITRITE UR QL STRIP.AUTO: NORMAL
PH UR STRIP.AUTO: 5 [PH] (ref 5–8)
PROT UR QL STRIP: NORMAL MG/DL
RBC UR QL AUTO: NORMAL
SP GR UR STRIP.AUTO: 1
UROBILINOGEN UR STRIP-MCNC: NORMAL MG/DL

## 2017-08-22 PROCEDURE — 99214 OFFICE O/P EST MOD 30 MIN: CPT | Performed by: FAMILY MEDICINE

## 2017-08-22 PROCEDURE — 81002 URINALYSIS NONAUTO W/O SCOPE: CPT | Performed by: FAMILY MEDICINE

## 2017-08-22 RX ORDER — FLUTICASONE PROPIONATE 50 MCG
SPRAY, SUSPENSION (ML) NASAL
Qty: 1 BOTTLE | Refills: 1 | Status: SHIPPED | OUTPATIENT
Start: 2017-08-22 | End: 2017-11-06 | Stop reason: SDUPTHER

## 2017-08-22 NOTE — MR AVS SNAPSHOT
"        Gloria MARIE Patel   2017 11:30 AM   Office Visit   MRN: [id number]    [facility]   Dept Phone:  [phone]    Description:  Female : 1924   Provider:  Elisa Limon M.D.           Allergies as of 2017     Allergen Noted Reactions    Flagyl [Metronidazole Hcl] 2009       Keflex 2009       Morphine 2008   Anaphylaxis    anaphylaxis    Sulfa Drugs 2008   Rash    rash      You were diagnosed with     Urinary frequency   [788.41.ICD-9-CM]       Eustachian tube dysfunction, bilateral   [2378900]         Vital Signs     Blood Pressure Pulse Temperature Respirations Height Weight    130/78 mmHg 83 36.6 °C (97.9 °F) 12 1.575 m (5' 2\") 66.225 kg (146 lb)    Body Mass Index Oxygen Saturation Smoking Status             26.70 kg/m2 96% Never Smoker          Basic Information     Date Of Birth Sex Race Ethnicity Preferred Language    1924 Female White Non- English      Problem List              ICD-10-CM Priority Class Noted - Resolved    Hip pain M25.559   2010 - Present    Hypothyroid E03.9   2010 - Present    Overactive bladder N32.81   2010 - Present    Hiatal hernia K44.9   2010 - Present    Hyperlipidemia E78.5   3/7/2012 - Present    GERD (gastroesophageal reflux disease) K21.9   2012 - Present    Spondylolisthesis of lumbar region M43.16   2012 - Present    Impingement syndrome of left shoulder M75.42   2012 - Present    Intervertebral disc protrusion [id number]   2012 - Present    Benign positional vertigo H81.10   2015 - Present    Osteoporosis M81.0   2015 - Present    TIA (transient ischemic attack) G45.9   2016 - Present    HTN (hypertension) I10   2016 - Present    Vision changes H53.9   2016 - Present      Health Maintenance        Date Due Completion Dates    IMM DTaP/Tdap/Td Vaccine (1 - Tdap) 1943 ---    IMM INFLUENZA (1) 2017 10/17/2016, 10/19/2015, " 9/8/2014, 10/8/2012    BONE DENSITY 9/22/2020 9/22/2015, 8/28/2012, 8/11/2010, 12/16/2008, 12/11/2006            Results     POCT Urinalysis      Component Value Standard Range & Units    POC Color yellow Negative    POC Appearance sara Negative    POC Leukocyte Esterase neg Negative    POC Nitrites neg Negative    POC Urobiligen neg Negative (0.2) mg/dL    POC Protein neg Negative mg/dL    POC Urine PH 5.0 5.0 - 8.0    POC Blood neg Negative    POC Specific Gravity 1.005 <1.005 - >1.030    POC Ketones neg Negative mg/dL    POC Biliruben neg Negative mg/dL    POC Glucose neg Negative mg/dL                        Current Immunizations     13-VALENT PCV PREVNAR 5/18/2015    Hepatitis B Vaccine Non-Recombivax (Ped/Adol) 2/6/2008    Influenza TIV (IM) 9/8/2014, 10/8/2012    Influenza Vaccine Adult HD 10/17/2016, 10/19/2015    Pneumococcal polysaccharide vaccine (PPSV-23) 10/14/2005    SHINGLES VACCINE 2/6/2008      Below and/or attached are the medications your provider expects you to take. Review all of your home medications and newly ordered medications with your provider and/or pharmacist. Follow medication instructions as directed by your provider and/or pharmacist. Please keep your medication list with you and share with your provider. Update the information when medications are discontinued, doses are changed, or new medications (including over-the-counter products) are added; and carry medication information at all times in the event of emergency situations     Allergies:  FLAGYL - (reactions not documented)     KEFLEX - (reactions not documented)     MORPHINE - Anaphylaxis     SULFA DRUGS - Rash               Medications  Valid as of: August 22, 2017 -  1:20 PM    Generic Name Brand Name Tablet Size Instructions for use    AmLODIPine Besylate (Tab) NORVASC 5 MG Take 1 Tab by mouth every day.        Ascorbic Acid   Take 1 Tab by mouth every day.        Azelastine HCl (Solution) ASTELIN 137 MCG/SPRAY INSTILL 1  SPRAY INTO NOSTRIL TWICE DAILY        Cholecalciferol   Take 1 Tab by mouth every day.        Fluticasone Propionate (Suspension) FLONASE 50 MCG/ACT One spray each nostril two times per day.        Levothyroxine Sodium (Tab) SYNTHROID 75 MCG Take 1 Tab by mouth every day.        Lisinopril (Tab) PRINIVIL 10 MG Take 1 Tab by mouth every 12 hours.        Metoprolol Succinate (TABLET SR 24 HR) TOPROL XL 25 MG Take 1 Tab by mouth every day.        Omeprazole (CAPSULE DELAYED RELEASE) PRILOSEC 20 MG TAKE ONE CAPSULE BY MOUTH TWICE A DAY 30MINUTES BEFORE BREAKFAST AND DINNER        Travoprost (Solution) TRAVATAN Z 0.004 % Place 1 Drop in both eyes every evening.        .                 Medicines prescribed today were sent to:     [facility]/PHARMACY #[id number] - [place], [place] - [address] [place] [address] AT [address] [address]    Phone: [phone] Fax: [phone]    Open 24 Hours?: No      Medication refill instructions:       If your prescription bottle indicates you have medication refills left, it is not necessary to call your provider’s office. Please contact your pharmacy and they will refill your medication.    If your prescription bottle indicates you do not have any refills left, you may request refills at any time through one of the following ways: The online [employer] system (except Urgent Care), by calling your provider’s office, or by asking your pharmacy to contact your provider’s office with a refill request. Medication refills are processed only during regular business hours and may not be available until the next business day. Your provider may request additional information or to have a follow-up visit with you prior to refilling your medication.   *Please Note: Medication refills are assigned a new Rx number when refilled electronically. Your pharmacy may indicate that no refills were authorized even though a new prescription for the same medication is available at the pharmacy.  Please request the medicine by name with the pharmacy before contacting your provider for a refill.        Other Notes About Your Plan     Colonoscopy No repeat recommended  Dexa 9/22/15  Osteoporosis   Mammo 2/27/17  Cat 2 A1c 1/9/13  5.7   [facility] (scheduled 4/24/13) Neurosurg-[facility]  Laboratory[facility]           MyChart Status: Patient Declined

## 2017-08-22 NOTE — PROGRESS NOTES
"CC: \"not feeling right\"    HPI:  Patient is a 89 y.o. female established patient of Dr. Otero who presents today with her daughter for evaluation of ongoing complaints of \"not feeling right\" since Saturday. I spoke to the patient's niece on Saturday to discuss pt complaining about being tired/ having head fullness without sinus symptoms/ mild b/l ear ringing since waking. Considering her advanced age, I recommended that she take Gloria to  for evaluation. The patient's complaints then resolved almost completely on Sunday and started to come back on Monday. She also states that she has urinary frequency and is up numerous times per night going to the bathroom for the past few days. She has provided a urine sample here at clinic today for evaluation. She is an inconsistent historian today but does state that she has had clear nasal drainage at times but ringing in her ears has resolved. Her daughter brought up melatonin use topic for sleep previously suggested by Dr. Otero - pt reports taking it once in awhile but not nightly. She denies other systemic complaints when asked today.     Patient Active Problem List    Diagnosis Date Noted   • TIA (transient ischemic attack) 04/28/2016   • HTN (hypertension) 04/28/2016   • Vision changes 04/28/2016   • Osteoporosis 09/08/2015   • Benign positional vertigo 02/27/2015   • GERD (gastroesophageal reflux disease) 05/22/2012   • Spondylolisthesis of lumbar region 05/22/2012   • Impingement syndrome of left shoulder 05/22/2012   • Intervertebral disc protrusion 05/22/2012   • Hyperlipidemia 03/07/2012   • Hiatal hernia 11/09/2010   • Hip pain 08/24/2010   • Hypothyroid 08/24/2010   • Overactive bladder 08/24/2010     Past medical, surgical, family, and social history was reviewed in Epic chart by me today.     Medications and allergies reviewed and updated in Epic chart by me today.     ROS:  Pertinent positives listed above in HPI. All other systems have been reviewed and " "are negative.    PE:   /78 mmHg  Pulse 83  Temp(Src) 36.6 °C (97.9 °F)  Resp 12  Ht 1.575 m (5' 2\")  Wt 66.225 kg (146 lb)  BMI 26.70 kg/m2  SpO2 96%  Vital signs reviewed with patient.     Gen: Well developed; well nourished; no acute distress; age appropriate appearance   HEENT: Normocephalic; atraumatic; PEERLA b/l; sclera clear b/l; b/l external auditory canals WNL; b/l TM WNL; oropharynx clear; oral mucosa moist; tongue midline; dentition adequate   Neck: No adenopathy; no thyromegaly  CV: Regular rate and rhythm; S1/ S2 present; no murmur, gallop or rub noted  Pulm: No respiratory distress; clear to ascultation b/l; no wheezing or stridor noted b/l  Abd: Adequate bowel sounds noted; soft and nontender; no rebound, rigidity, nor distention  Extremities: No peripheral edema b/l LE extremities/ no clubbing nor cyanosis noted  Skin: Warm and dry; no rashes noted   Neuro: No focal deficits noted; steady gait with cane  Psych: AAOx3 (likely chronic); mood and affect are appropriate    A/P:  1. Ongoing urinary frequency  No source of infection noted to explain symptoms/ POCT UA WNL at clinic today/ Recommended OTC Azo for PRN use.   - POCT Urinalysis    2. Eustachian tube dysfunction, bilateral  Suspect eustachian tube dysfunction as one potential source - major issue is her lack of regular nighttime sleep. I recommended that she go home and take a nap. If she would like to use melatonin, I would suggest using it nightly for seven days to see if it will be effective for her.   - fluticasone (FLONASE) 50 MCG/ACT nasal spray; One spray each nostril two times per day.  Dispense: 1 Bottle; Refill: 1    Pt is to follow-up with Dr. Otero if above symptoms do not improve and for regularly scheduled visits.             "

## 2017-09-01 DIAGNOSIS — R42 VERTIGO: ICD-10-CM

## 2017-09-01 DIAGNOSIS — H92.02 OTALGIA, LEFT: ICD-10-CM

## 2017-09-04 RX ORDER — MECLIZINE HYDROCHLORIDE 25 MG/1
25 TABLET ORAL 3 TIMES DAILY PRN
Qty: 30 TAB | Refills: 0 | Status: SHIPPED | OUTPATIENT
Start: 2017-09-04 | End: 2019-08-24

## 2017-09-08 DIAGNOSIS — B00.2 ORAL HERPES: ICD-10-CM

## 2017-09-08 RX ORDER — ACYCLOVIR 50 MG/G
OINTMENT TOPICAL
Qty: 15 G | Refills: 1 | Status: SHIPPED | OUTPATIENT
Start: 2017-09-08 | End: 2019-08-24

## 2017-09-29 DIAGNOSIS — I10 ESSENTIAL HYPERTENSION: ICD-10-CM

## 2017-09-29 RX ORDER — LISINOPRIL 10 MG/1
TABLET ORAL
Qty: 180 TAB | Refills: 3 | Status: SHIPPED | OUTPATIENT
Start: 2017-09-29 | End: 2017-10-09

## 2017-10-09 DIAGNOSIS — I10 ESSENTIAL HYPERTENSION: ICD-10-CM

## 2017-10-09 RX ORDER — LISINOPRIL 10 MG/1
TABLET ORAL
Qty: 180 TAB | Refills: 3 | Status: SHIPPED | OUTPATIENT
Start: 2017-10-09 | End: 2018-07-10 | Stop reason: SDUPTHER

## 2017-10-31 ENCOUNTER — OFFICE VISIT (OUTPATIENT)
Dept: INTERNAL MEDICINE | Facility: IMAGING CENTER | Age: 82
End: 2017-10-31
Payer: MEDICARE

## 2017-10-31 VITALS
HEIGHT: 62 IN | OXYGEN SATURATION: 93 % | RESPIRATION RATE: 16 BRPM | DIASTOLIC BLOOD PRESSURE: 80 MMHG | SYSTOLIC BLOOD PRESSURE: 142 MMHG | WEIGHT: 144 LBS | HEART RATE: 66 BPM | TEMPERATURE: 97.2 F | BODY MASS INDEX: 26.5 KG/M2

## 2017-10-31 DIAGNOSIS — I10 ESSENTIAL HYPERTENSION: ICD-10-CM

## 2017-10-31 DIAGNOSIS — N32.81 OVERACTIVE BLADDER: ICD-10-CM

## 2017-10-31 DIAGNOSIS — R42 VERTIGO: ICD-10-CM

## 2017-10-31 DIAGNOSIS — R35.0 URINARY FREQUENCY: ICD-10-CM

## 2017-10-31 LAB
APPEARANCE UR: CLEAR
BILIRUB UR STRIP-MCNC: NORMAL MG/DL
COLOR UR AUTO: YELLOW
GLUCOSE UR STRIP.AUTO-MCNC: NORMAL MG/DL
KETONES UR STRIP.AUTO-MCNC: NORMAL MG/DL
LEUKOCYTE ESTERASE UR QL STRIP.AUTO: NORMAL
NITRITE UR QL STRIP.AUTO: NORMAL
PH UR STRIP.AUTO: 5 [PH] (ref 5–8)
PROT UR QL STRIP: NORMAL MG/DL
RBC UR QL AUTO: NORMAL
SP GR UR STRIP.AUTO: 1
UROBILINOGEN UR STRIP-MCNC: NORMAL MG/DL

## 2017-10-31 PROCEDURE — 81002 URINALYSIS NONAUTO W/O SCOPE: CPT | Performed by: INTERNAL MEDICINE

## 2017-10-31 PROCEDURE — 99214 OFFICE O/P EST MOD 30 MIN: CPT | Performed by: INTERNAL MEDICINE

## 2017-11-01 NOTE — PROGRESS NOTES
Chief Complaint   Patient presents with   • Urinary Frequency       HISTORY OF THE PRESENT ILLNESS: Patient is a 89 y.o. female.Patient comes in with complaint of urinary frequency. Her urinalysis in the office was unremarkable. She has a history of overactive bladder. She was previously seen by urology who tried her on multiple anticholinergics with no success. She was also tried on Myrbetriq but was unable to tolerate. She plans to follow-up with urology. They had also discussed Botox injections.    She also complains of dizziness. Symptoms are primarily on the left side. She has a history of left-sided symptoms presumably due to vertigo. She had previous workup with ENT. She was also diagnosed with chronic maxillary sinusitis. She denies any nasal discharge. She had been given a prescription for Valium by the ENT but this was never filled. We prescribed meclizine approximately 2 months ago. Patient is uncertain if it was helpful.       Allergies: Flagyl [metronidazole hcl]; Keflex; Morphine; and Sulfa drugs    Current Outpatient Prescriptions Ordered in [facility]   Medication Sig Dispense Refill   • lisinopril (PRINIVIL) 10 MG Tab TAKE 1 TABLET BY MOUTH EVERY 12 HOURS 180 Tab 3   • acyclovir (ZOVIRAX) 5 % Ointment Apply to affected area(s) every 3 hours. 15 g 1   • meclizine (ANTIVERT) 25 MG Tab TAKE 1 TAB BY MOUTH 3 TIMES A DAY AS NEEDED FOR DIZZINESS. 30 Tab 0   • fluticasone (FLONASE) 50 MCG/ACT nasal spray One spray each nostril two times per day. 1 Bottle 1   • azelastine (ASTELIN) 137 MCG/SPRAY nasal spray INSTILL 1 SPRAY INTO NOSTRIL TWICE DAILY 90 Spray 2   • metoprolol SR (TOPROL XL) 25 MG TABLET SR 24 HR Take 1 Tab by mouth every day. 90 Tab 3   • amlodipine (NORVASC) 5 MG Tab Take 1 Tab by mouth every day. 90 Tab 3   • levothyroxine (SYNTHROID) 75 MCG Tab Take 1 Tab by mouth every day. 90 Tab 3   • omeprazole (PRILOSEC) 20 MG delayed-release capsule TAKE ONE CAPSULE BY MOUTH TWICE A DAY 30MINUTES BEFORE  "BREAKFAST AND DINNER 60 Cap 11   • ASCORBIC ACID PO Take 1 Tab by mouth every day.     • Cholecalciferol (VITAMIN D PO) Take 1 Tab by mouth every day.     • travoprost (TRAVATAN) 0.004 % SOLN Place 1 Drop in both eyes every evening.       No current Epic-ordered facility-administered medications on file.        Past medical history, social history and family history were reviewed from chart today    Review of systems: Per HPI. All others negative.     Exam: Blood pressure 142/80, pulse 66, temperature 36.2 °C (97.2 °F), resp. rate 16, height 1.575 m (5' 2\"), weight 65.3 kg (144 lb), SpO2 93 %.  General: Well-appearing. Well-developed. No signs of distress.  HEENT: Grossly normal. Oral cavity is pink and moist.  Neck: Supple without JVD or bruit.  Pulmonary: Clear with good breath sounds. Normal effort.  Cardiovascular: Regular. Carotid and radial pulses are intact.  Abdomen: Soft, nontender, nondistended. Spleen and liver are not enlarged.  Neurologic: Cranial nerves II through XII are grossly normal, alert and oriented x3      Assessment/Plan  1. Urinary frequency  POCT Urinalysis   2. Overactive bladder     3. Vertigo     4. Essential hypertension           Urinary frequency presumably due to overactive bladder. Urinalysis was negative. I will defer to urology as she has already failed multiple anticholinergics and Myrbetriq.    Her dizziness seems vertiginous. She had a positive Hallpike maneuver. Recommend she try meclizine. I would recommend avoiding Valium due to side effects. If she does not improve then she should follow-up with ENT for Epley maneuver. Her canals were not obstructed but she had moderate cerumen bilaterally. These could be cleaned also.    Blood pressure is at goal today. She called over the weekend with significantly elevated blood pressures. After no improvement despite additional oral antihypertensive it was felt that her cuff could be measuring incorrect. I recommended that she continue " her current dose of medication and that she bring her cuff in for evaluation for purchase a new cuff but should still bring in so that we can correlate with manual sphygmomanometer

## 2017-11-06 DIAGNOSIS — H69.93 EUSTACHIAN TUBE DYSFUNCTION, BILATERAL: ICD-10-CM

## 2017-11-06 RX ORDER — FLUTICASONE PROPIONATE 50 MCG
SPRAY, SUSPENSION (ML) NASAL
Qty: 1 BOTTLE | Refills: 1 | Status: SHIPPED | OUTPATIENT
Start: 2017-11-06 | End: 2018-04-25 | Stop reason: SDUPTHER

## 2018-01-29 ENCOUNTER — HOSPITAL ENCOUNTER (OUTPATIENT)
Dept: LAB | Facility: MEDICAL CENTER | Age: 83
End: 2018-01-29
Attending: INTERNAL MEDICINE
Payer: MEDICARE

## 2018-01-29 DIAGNOSIS — R35.0 URINE FREQUENCY: ICD-10-CM

## 2018-01-29 LAB
APPEARANCE UR: CLEAR
BACTERIA #/AREA URNS HPF: NEGATIVE /HPF
BILIRUB UR QL STRIP.AUTO: NEGATIVE
COLOR UR: YELLOW
CULTURE IF INDICATED INDCX: YES UA CULTURE
EPI CELLS #/AREA URNS HPF: NEGATIVE /HPF
GLUCOSE UR STRIP.AUTO-MCNC: NEGATIVE MG/DL
HYALINE CASTS #/AREA URNS LPF: ABNORMAL /LPF
KETONES UR STRIP.AUTO-MCNC: NEGATIVE MG/DL
LEUKOCYTE ESTERASE UR QL STRIP.AUTO: ABNORMAL
MICRO URNS: ABNORMAL
NITRITE UR QL STRIP.AUTO: NEGATIVE
PH UR STRIP.AUTO: 6.5 [PH]
PROT UR QL STRIP: NEGATIVE MG/DL
RBC # URNS HPF: ABNORMAL /HPF
RBC UR QL AUTO: NEGATIVE
SP GR UR STRIP.AUTO: 1.01
UROBILINOGEN UR STRIP.AUTO-MCNC: 0.2 MG/DL
WBC #/AREA URNS HPF: ABNORMAL /HPF

## 2018-01-29 PROCEDURE — 87086 URINE CULTURE/COLONY COUNT: CPT

## 2018-01-29 PROCEDURE — 81001 URINALYSIS AUTO W/SCOPE: CPT

## 2018-01-30 ENCOUNTER — TELEPHONE (OUTPATIENT)
Dept: INTERNAL MEDICINE | Facility: IMAGING CENTER | Age: 83
End: 2018-01-30

## 2018-01-30 DIAGNOSIS — D64.9 ANEMIA, UNSPECIFIED TYPE: ICD-10-CM

## 2018-01-30 DIAGNOSIS — I10 ESSENTIAL HYPERTENSION: ICD-10-CM

## 2018-01-30 DIAGNOSIS — E03.9 HYPOTHYROIDISM, UNSPECIFIED TYPE: ICD-10-CM

## 2018-01-30 DIAGNOSIS — M81.6 LOCALIZED OSTEOPOROSIS WITHOUT CURRENT PATHOLOGICAL FRACTURE: ICD-10-CM

## 2018-01-30 DIAGNOSIS — E78.00 PURE HYPERCHOLESTEROLEMIA: ICD-10-CM

## 2018-01-31 LAB
BACTERIA UR CULT: NORMAL
SIGNIFICANT IND 70042: NORMAL
SITE SITE: NORMAL
SOURCE SOURCE: NORMAL

## 2018-02-02 ENCOUNTER — HOSPITAL ENCOUNTER (OUTPATIENT)
Facility: MEDICAL CENTER | Age: 83
End: 2018-02-02
Attending: INTERNAL MEDICINE
Payer: MEDICARE

## 2018-02-02 ENCOUNTER — NON-PROVIDER VISIT (OUTPATIENT)
Dept: INTERNAL MEDICINE | Facility: IMAGING CENTER | Age: 83
End: 2018-02-02
Payer: MEDICARE

## 2018-02-02 DIAGNOSIS — E03.9 HYPOTHYROIDISM, UNSPECIFIED TYPE: ICD-10-CM

## 2018-02-02 DIAGNOSIS — D64.9 ANEMIA, UNSPECIFIED TYPE: ICD-10-CM

## 2018-02-02 DIAGNOSIS — E78.00 PURE HYPERCHOLESTEROLEMIA: ICD-10-CM

## 2018-02-02 LAB
ALBUMIN SERPL BCP-MCNC: 4 G/DL (ref 3.2–4.9)
ALBUMIN/GLOB SERPL: 1.3 G/DL
ALP SERPL-CCNC: 98 U/L (ref 30–99)
ALT SERPL-CCNC: 13 U/L (ref 2–50)
ANION GAP SERPL CALC-SCNC: 8 MMOL/L (ref 0–11.9)
AST SERPL-CCNC: 23 U/L (ref 12–45)
BASOPHILS # BLD AUTO: 0.7 % (ref 0–1.8)
BASOPHILS # BLD: 0.06 K/UL (ref 0–0.12)
BILIRUB SERPL-MCNC: 0.6 MG/DL (ref 0.1–1.5)
BUN SERPL-MCNC: 20 MG/DL (ref 8–22)
CALCIUM SERPL-MCNC: 8.9 MG/DL (ref 8.5–10.5)
CHLORIDE SERPL-SCNC: 106 MMOL/L (ref 96–112)
CHOLEST SERPL-MCNC: 194 MG/DL (ref 100–199)
CO2 SERPL-SCNC: 25 MMOL/L (ref 20–33)
CREAT SERPL-MCNC: 0.83 MG/DL (ref 0.5–1.4)
EOSINOPHIL # BLD AUTO: 0.41 K/UL (ref 0–0.51)
EOSINOPHIL NFR BLD: 5.1 % (ref 0–6.9)
ERYTHROCYTE [DISTWIDTH] IN BLOOD BY AUTOMATED COUNT: 46.3 FL (ref 35.9–50)
GLOBULIN SER CALC-MCNC: 3.1 G/DL (ref 1.9–3.5)
GLUCOSE SERPL-MCNC: 93 MG/DL (ref 65–99)
HCT VFR BLD AUTO: 47.2 % (ref 37–47)
HDLC SERPL-MCNC: 52 MG/DL
HGB BLD-MCNC: 15 G/DL (ref 12–16)
IMM GRANULOCYTES # BLD AUTO: 0.02 K/UL (ref 0–0.11)
IMM GRANULOCYTES NFR BLD AUTO: 0.2 % (ref 0–0.9)
IRON SATN MFR SERPL: 25 % (ref 15–55)
IRON SERPL-MCNC: 74 UG/DL (ref 40–170)
LDLC SERPL CALC-MCNC: 122 MG/DL
LYMPHOCYTES # BLD AUTO: 2.96 K/UL (ref 1–4.8)
LYMPHOCYTES NFR BLD: 36.8 % (ref 22–41)
MCH RBC QN AUTO: 26.3 PG (ref 27–33)
MCHC RBC AUTO-ENTMCNC: 31.8 G/DL (ref 33.6–35)
MCV RBC AUTO: 82.7 FL (ref 81.4–97.8)
MONOCYTES # BLD AUTO: 0.63 K/UL (ref 0–0.85)
MONOCYTES NFR BLD AUTO: 7.8 % (ref 0–13.4)
NEUTROPHILS # BLD AUTO: 3.96 K/UL (ref 2–7.15)
NEUTROPHILS NFR BLD: 49.4 % (ref 44–72)
NRBC # BLD AUTO: 0 K/UL
NRBC BLD-RTO: 0 /100 WBC
PLATELET # BLD AUTO: 266 K/UL (ref 164–446)
PMV BLD AUTO: 10.6 FL (ref 9–12.9)
POTASSIUM SERPL-SCNC: 4.2 MMOL/L (ref 3.6–5.5)
PROT SERPL-MCNC: 7.1 G/DL (ref 6–8.2)
RBC # BLD AUTO: 5.71 M/UL (ref 4.2–5.4)
SODIUM SERPL-SCNC: 139 MMOL/L (ref 135–145)
T4 FREE SERPL-MCNC: 1.43 NG/DL (ref 0.53–1.43)
TIBC SERPL-MCNC: 295 UG/DL (ref 250–450)
TRIGL SERPL-MCNC: 102 MG/DL (ref 0–149)
TSH SERPL DL<=0.005 MIU/L-ACNC: 1.79 UIU/ML (ref 0.38–5.33)
WBC # BLD AUTO: 8 K/UL (ref 4.8–10.8)

## 2018-02-02 PROCEDURE — 85025 COMPLETE CBC W/AUTO DIFF WBC: CPT

## 2018-02-02 PROCEDURE — 84439 ASSAY OF FREE THYROXINE: CPT

## 2018-02-02 PROCEDURE — 80061 LIPID PANEL: CPT

## 2018-02-02 PROCEDURE — 83540 ASSAY OF IRON: CPT

## 2018-02-02 PROCEDURE — 83550 IRON BINDING TEST: CPT

## 2018-02-02 PROCEDURE — 84443 ASSAY THYROID STIM HORMONE: CPT

## 2018-02-02 PROCEDURE — 80053 COMPREHEN METABOLIC PANEL: CPT

## 2018-02-16 ENCOUNTER — HOSPITAL ENCOUNTER (OUTPATIENT)
Facility: MEDICAL CENTER | Age: 83
End: 2018-02-16
Attending: INTERNAL MEDICINE
Payer: MEDICARE

## 2018-02-16 ENCOUNTER — OFFICE VISIT (OUTPATIENT)
Dept: INTERNAL MEDICINE | Facility: IMAGING CENTER | Age: 83
End: 2018-02-16
Payer: MEDICARE

## 2018-02-16 VITALS
TEMPERATURE: 97.4 F | WEIGHT: 146 LBS | SYSTOLIC BLOOD PRESSURE: 140 MMHG | OXYGEN SATURATION: 94 % | HEART RATE: 67 BPM | DIASTOLIC BLOOD PRESSURE: 82 MMHG | RESPIRATION RATE: 16 BRPM | HEIGHT: 62 IN | BODY MASS INDEX: 26.87 KG/M2

## 2018-02-16 DIAGNOSIS — Z00.00 MEDICARE ANNUAL WELLNESS VISIT, SUBSEQUENT: ICD-10-CM

## 2018-02-16 DIAGNOSIS — M81.6 LOCALIZED OSTEOPOROSIS WITHOUT CURRENT PATHOLOGICAL FRACTURE: ICD-10-CM

## 2018-02-16 DIAGNOSIS — Z12.11 SCREEN FOR COLON CANCER: ICD-10-CM

## 2018-02-16 DIAGNOSIS — E03.9 HYPOTHYROIDISM, UNSPECIFIED TYPE: ICD-10-CM

## 2018-02-16 DIAGNOSIS — H40.9 GLAUCOMA OF BOTH EYES, UNSPECIFIED GLAUCOMA TYPE: ICD-10-CM

## 2018-02-16 DIAGNOSIS — K21.9 GASTROESOPHAGEAL REFLUX DISEASE WITHOUT ESOPHAGITIS: ICD-10-CM

## 2018-02-16 DIAGNOSIS — R35.0 FREQUENT URINATION: ICD-10-CM

## 2018-02-16 DIAGNOSIS — E78.00 PURE HYPERCHOLESTEROLEMIA: ICD-10-CM

## 2018-02-16 DIAGNOSIS — R82.90 ABNORMAL URINALYSIS: ICD-10-CM

## 2018-02-16 DIAGNOSIS — M43.16 SPONDYLOLISTHESIS OF LUMBAR REGION: ICD-10-CM

## 2018-02-16 DIAGNOSIS — K44.9 HIATAL HERNIA: ICD-10-CM

## 2018-02-16 DIAGNOSIS — I10 ESSENTIAL HYPERTENSION: ICD-10-CM

## 2018-02-16 LAB
APPEARANCE UR: NORMAL
BILIRUB UR STRIP-MCNC: NORMAL MG/DL
COLOR UR AUTO: YELLOW
GLUCOSE UR STRIP.AUTO-MCNC: NORMAL MG/DL
KETONES UR STRIP.AUTO-MCNC: NORMAL MG/DL
LEUKOCYTE ESTERASE UR QL STRIP.AUTO: NORMAL
NITRITE UR QL STRIP.AUTO: POSITIVE
PH UR STRIP.AUTO: 5 [PH] (ref 5–8)
PROT UR QL STRIP: NORMAL MG/DL
RBC UR QL AUTO: NORMAL
SP GR UR STRIP.AUTO: 1.01
UROBILINOGEN UR STRIP-MCNC: NORMAL MG/DL

## 2018-02-16 PROCEDURE — 82274 ASSAY TEST FOR BLOOD FECAL: CPT

## 2018-02-16 PROCEDURE — 87186 SC STD MICRODIL/AGAR DIL: CPT

## 2018-02-16 PROCEDURE — G0439 PPPS, SUBSEQ VISIT: HCPCS | Performed by: INTERNAL MEDICINE

## 2018-02-16 PROCEDURE — 87086 URINE CULTURE/COLONY COUNT: CPT

## 2018-02-16 PROCEDURE — 81002 URINALYSIS NONAUTO W/O SCOPE: CPT | Performed by: INTERNAL MEDICINE

## 2018-02-16 PROCEDURE — 87077 CULTURE AEROBIC IDENTIFY: CPT

## 2018-02-16 ASSESSMENT — PATIENT HEALTH QUESTIONNAIRE - PHQ9: CLINICAL INTERPRETATION OF PHQ2 SCORE: 0

## 2018-02-16 ASSESSMENT — ACTIVITIES OF DAILY LIVING (ADL): BATHING_REQUIRES_ASSISTANCE: 0

## 2018-02-16 NOTE — PROGRESS NOTES
"89 y.o. female presents for the following:    Hypertension-stable and current medications. Blood pressure is at borderline elevated today.     Hypothyroid-clinically euthyroid. Thyroid function tests are normal. She takes her thyroid with her other medications in the morning.     Generalized anxiety disorder-ongoing symptoms. Symptoms are predominantly in the evening.      Osteoporosis-multiple bone density showing osteoporosis in the forearm and osteopenia in the hip. She is failed multiple bisphosphonates due to G.I. side effects. Last year she received a Prolia injection. She did not get the subsequent injection because she \"did not feel any better\" we have previously discussed taking Tylenol on a regular basis but she did not follow through with this.     Lumbar spondylolisthesis-ongoing issues. Chronic pain. Daily symptoms. She takes Tylenol occasionally. She had seen Dr. Jolly in the past but failed injections in therapy. No radicular symptoms.     Hyperlipidemia-currently on no therapy. Cholesterol is stable. Total cholesterol is <200. LDL is above 100.     Hiatal hernia-stable on Prilosec. She takes daily.     GERD-presumably due to hiatal hernia. Stable on Prilosec as above.     Memory she continues to be forgetful. She is doing word puzzles on her tablet. Her family also reports that she is using [employer] regularly. She drives no issues.. No issues with getting loss, traffic violations or other.     Glaucoma-stable on Travatan. She gets six-month follow-up with ophthalmology.     Screening test:  She'll get mammogram later this year.  She reports that she completed her fecal immunoassay last year but we did not receive results.     Up-to-date on immunizations.    Annual Wellness Visit/Health Risk Assessment:    Past medical:  Past Medical History:   Diagnosis Date   • Arthritis    • Diverticulitis     medicated   • GERD (gastroesophageal reflux disease)     medicated   • Glaucoma    • Heart burn    • " Hiatus hernia syndrome    • Hypertension     medicated   • Unspecified urinary incontinence        Past surgical:  Past Surgical History:   Procedure Laterality Date   • HIP ARTHROPLASTY TOTAL  6/21/2011    Performed by AMPARO CRAFT at SURGERY [facility] ORS   • OTHER ORTHOPEDIC SURGERY  2000    back surgery   • GYN SURGERY  1970    hysterectomy   • CHOLECYSTECTOMY  1966    rahat   • OTHER  1954    mikey. breast bx   • OTHER ABDOMINAL SURGERY  1954    kidney tacked up ?   • OTHER  1945    tonsilectomy   • OTHER ORTHOPEDIC SURGERY      osteoporosis - medicated   • US-NEEDLE CORE BX-BREAST PANEL         Family history: relating to possible risk factors for your patient  Family History   Problem Relation Age of Onset   • Diabetes     • Heart Disease     • Lung Disease     • Cancer Sister        Current Providers (including home care/DME’s):   Colonoscopy No repeat recommended  Dexa 9/22/15  Osteoporosis   Mammo 2/27/17  Cat 2 A1c 1/9/13  5.7   -[facility] (scheduled 4/24/13) Neurosurg-[facility]      Patient Care Team:  Mike Otero M.D. as PCP - General  Khloe Jones R.N. (Inactive) as Registered Nurse      Medications:   Current Outpatient Prescriptions Ordered in [facility]   Medication Sig Dispense Refill   • fluticasone (FLONASE) 50 MCG/ACT nasal spray ONE SPRAY EACH NOSTRIL TWO TIMES PER DAY. 1 Bottle 1   • lisinopril (PRINIVIL) 10 MG Tab TAKE 1 TABLET BY MOUTH EVERY 12 HOURS 180 Tab 3   • azelastine (ASTELIN) 137 MCG/SPRAY nasal spray INSTILL 1 SPRAY INTO NOSTRIL TWICE DAILY 90 Spray 2   • metoprolol SR (TOPROL XL) 25 MG TABLET SR 24 HR Take 1 Tab by mouth every day. 90 Tab 3   • amlodipine (NORVASC) 5 MG Tab Take 1 Tab by mouth every day. 90 Tab 3   • levothyroxine (SYNTHROID) 75 MCG Tab Take 1 Tab by mouth every day. 90 Tab 3   • omeprazole (PRILOSEC) 20 MG delayed-release capsule TAKE ONE CAPSULE BY MOUTH TWICE A DAY 30MINUTES BEFORE BREAKFAST AND DINNER  60 Cap 11   • Cholecalciferol (VITAMIN D PO) Take 1 Tab by mouth every day.     • travoprost (TRAVATAN) 0.004 % SOLN Place 1 Drop in both eyes every evening.     • acyclovir (ZOVIRAX) 5 % Ointment Apply to affected area(s) every 3 hours. 15 g 1   • meclizine (ANTIVERT) 25 MG Tab TAKE 1 TAB BY MOUTH 3 TIMES A DAY AS NEEDED FOR DIZZINESS. 30 Tab 0     Current Facility-Administered Medications Ordered in Epic   Medication Dose Route Frequency Provider Last Rate Last Dose   • denosumab (PROLIA) subq injection 60 mg  60 mg Subcutaneous Once Mike Otero M.D.           Supplements (calcium/vitamins): if not lisited in medications    Chief Complaint   Patient presents with   • Annual Exam         HPI:  Gloria Patel is a 89 y.o. here for Medicare Annual Wellness Visit     Patient Active Problem List    Diagnosis Date Noted   • TIA (transient ischemic attack) 04/28/2016   • HTN (hypertension) 04/28/2016   • Vision changes 04/28/2016   • Osteoporosis 09/08/2015   • Benign positional vertigo 02/27/2015   • GERD (gastroesophageal reflux disease) 05/22/2012   • Spondylolisthesis of lumbar region 05/22/2012   • Impingement syndrome of left shoulder 05/22/2012   • Intervertebral disc protrusion 05/22/2012   • Hyperlipidemia 03/07/2012   • Hiatal hernia 11/09/2010   • Hip pain 08/24/2010   • Hypothyroid 08/24/2010   • Overactive bladder 08/24/2010       Current Outpatient Prescriptions   Medication Sig Dispense Refill   • fluticasone (FLONASE) 50 MCG/ACT nasal spray ONE SPRAY EACH NOSTRIL TWO TIMES PER DAY. 1 Bottle 1   • lisinopril (PRINIVIL) 10 MG Tab TAKE 1 TABLET BY MOUTH EVERY 12 HOURS 180 Tab 3   • azelastine (ASTELIN) 137 MCG/SPRAY nasal spray INSTILL 1 SPRAY INTO NOSTRIL TWICE DAILY 90 Spray 2   • metoprolol SR (TOPROL XL) 25 MG TABLET SR 24 HR Take 1 Tab by mouth every day. 90 Tab 3   • amlodipine (NORVASC) 5 MG Tab Take 1 Tab by mouth every day. 90 Tab 3   • levothyroxine (SYNTHROID) 75 MCG Tab Take 1 Tab by mouth  every day. 90 Tab 3   • omeprazole (PRILOSEC) 20 MG delayed-release capsule TAKE ONE CAPSULE BY MOUTH TWICE A DAY 30MINUTES BEFORE BREAKFAST AND DINNER 60 Cap 11   • Cholecalciferol (VITAMIN D PO) Take 1 Tab by mouth every day.     • travoprost (TRAVATAN) 0.004 % SOLN Place 1 Drop in both eyes every evening.     • acyclovir (ZOVIRAX) 5 % Ointment Apply to affected area(s) every 3 hours. 15 g 1   • meclizine (ANTIVERT) 25 MG Tab TAKE 1 TAB BY MOUTH 3 TIMES A DAY AS NEEDED FOR DIZZINESS. 30 Tab 0     Current Facility-Administered Medications   Medication Dose Route Frequency Provider Last Rate Last Dose   • denosumab (PROLIA) subq injection 60 mg  60 mg Subcutaneous Once Mike Otero M.D.                Current supplements as per medication list.       Allergies: Flagyl [metronidazole hcl]; Keflex; Morphine; and Sulfa drugs    Current social contact/activities: Socially active with family. She also spends time on Facebook and other social media.    She  reports that she has never smoked. She has never used smokeless tobacco. She reports that she drinks alcohol. She reports that she does not use drugs.  Counseling given: Yes        DPA/Advanced Directive:  Patient does not have an Advanced Directive.  A packet and workshop information was given on Advanced Directives.      ROS:    Gait: Uses a cane   Ostomy: no   Other tubes: no   Amputations: no   Chronic oxygen use: no   Last eye exam: Twice yearly due to history of glaucoma  : Reports urinary leakage during the last 6 months that has somewhat interfered with their daily activities or sleep. incontinence.       Screening:  Due for mammogram and fecal immunoassay  Depression Screening    Little interest or pleasure in doing things?  0 - not at all  Feeling down, depressed , or hopeless? 0 - not at all  Patient Health Questionnaire Score: 0     If depressive symptoms identified deferred to follow up visit unless specifically addressed in assessment and  plan.    Interpretation of PHQ-9 Total Score   Score Severity   1-4 No Depression   5-9 Mild Depression   10-14 Moderate Depression   15-19 Moderately Severe Depression   20-27 Severe Depression    Screening for Cognitive Impairment    Three Minute Recall (apple, watch, quirino)  1/3    Draw clock face with all 12 numbers set to the hand to show 10 minutes past 11 o'clock  1    Cognitive concerns identified deferred for follow up unless specifically addressed in assessment and plan.    Fall Risk Assessment    Has the patient had two or more falls in the last year or any fall with injury in the last year?  No    Safety Assessment    Throw rugs on floor.  Yes  Handrails on all stairs.  No  Good lighting in all hallways.  Yes  Difficulty hearing.  No  Patient counseled about all safety risks that were identified.    Functional Assessment ADLs    Are there any barriers preventing you from cooking for yourself or meeting nutritional needs?  No.    Are there any barriers preventing you from driving safely or obtaining transportation?  No.    Are there any barriers preventing you from using a telephone or calling for help?  No.    Are there any barriers preventing you from shopping?  No.    Are there any barriers preventing you from taking care of your own finances?  No.    Are there any barriers preventing you from managing your medications?  No.    Are there any barriers preventing you from showering/bathing yourself?  No.    Are currently engaging any exercise or physical activity?  No.       Health Maintenance Summary                IMM DTaP/Tdap/Td Vaccine Overdue 7/9/1943     Annual Wellness Visit Overdue 1/18/2018      Done 1/17/2017 Visit Dx: Medicare annual wellness visit, subsequent     Patient has more history with this topic...    BONE DENSITY Next Due 9/22/2020      Done 9/22/2015 DS-BONE DENSITY STUDY (DEXA)     Patient has more history with this topic...          Patient Care Team:  Mike Otero M.D. as PCP  "- General  Khloe Jones R.N. (Inactive) as Registered Nurse        Social History   Substance Use Topics   • Smoking status: Never Smoker   • Smokeless tobacco: Never Used   • Alcohol use 0.0 oz/week      Comment: occassional wine     Family History   Problem Relation Age of Onset   • Diabetes     • Heart Disease     • Lung Disease     • Cancer Sister      She  has a past medical history of Arthritis; Diverticulitis; GERD (gastroesophageal reflux disease); Glaucoma; Heart burn; Hiatus hernia syndrome; Hypertension; and Unspecified urinary incontinence.   Past Surgical History:   Procedure Laterality Date   • HIP ARTHROPLASTY TOTAL  6/21/2011    Performed by AMPARO CRAFT at SURGERY [facility] ORS   • OTHER ORTHOPEDIC SURGERY  2000    back surgery   • GYN SURGERY  1970    hysterectomy   • CHOLECYSTECTOMY  1966    rahat   • OTHER  1954    mikey. breast bx   • OTHER ABDOMINAL SURGERY  1954    kidney tacked up ?   • OTHER  1945    tonsilectomy   • OTHER ORTHOPEDIC SURGERY      osteoporosis - medicated   • US-NEEDLE CORE BX-BREAST PANEL         Exam:     Blood pressure 140/82, pulse 67, temperature 36.3 °C (97.4 °F), resp. rate 16, height 1.575 m (5' 2\"), weight 66.2 kg (146 lb), SpO2 94 %. Body mass index is 26.7 kg/m².    Hearing fair.    Dentition good  Alert, oriented in no acute distress.  Eye contact is good, speech goal directed, affect calm  General:   No distress.  Normal appearing.  HEENT: Pupils are equal.  Conjunctiva is normal.  Head is normal appearing.  Ears, canals and tympanic membranes are normal.  Oral cavity is pink and moist without lesion.  Neck: Supple without JVD or bruit.  Thyroid is not enlarged.  Pulmonary: Clear with good breath sounds.  Cardiovascular regular rate and rhythm.  No murmur auscultated.  Carotid, radial and pedal pulses are intact.  Abdomen: Soft, nontender, nondistended.  Normal bowel sounds.  Organs are not enlarged.  Neurologic: Cranial nerves intact.  Strength and " sensation are normal.  Normal patellar reflex.  Skin: No obvious lesions  Lymph: No cervical, supraclavicular, axillary, abdominal or inguinal adenopathy noted.  Breast: Bilateral breasts are normal in appearance. Nipple and areola are normal in appearance. No abnormal skin texture.       Assessment and Plan. The following treatment and monitoring plan is recommended:    1. Medicare annual wellness visit, subsequent  Generally good health. Encouraged her to consider increasing her social activity with friends. I like to see her getting regular activity. Diet is good. No cognitive issues. No balance issues. Denies depression.    2. Hypothyroidism, unspecified type  Chronically euthyroid. No change in treatment.    3. Gastroesophageal reflux disease without esophagitis  Stable proton pump inhibitor     4. Hiatal hernia  As above    5. Pure hypercholesterolemia  Slightly above goal but overall stable. I do not recommend starting statin therapy due to her age and lack of vascular issues. Please see diagnosed with TIA but uncertain if this was related to anxiety or other.    6. Spondylolisthesis of lumbar region  Ongoing symptoms. Recommended Tylenol 3 times daily.    7. Localized osteoporosis without current pathological fracture  Recommended resume Prolia.  - Denosumab    8. Glaucoma of both eyes, unspecified glaucoma type  Continue Travatan. Follow-up with ophthalmology as scheduled.    9. Essential hypertension  Blood pressure is stable, borderline elevated. We will continue to monitor. Based on her age I recommend that her systolic pressure remained approximately 140.    10. Frequent urination  Recurrent urinary tract issues. Her urinalysis today suggest infection. Culture is pending.    11. Abnormal urinalysis  As above      Services suggested: No services needed at this time  Health Care Screening: Age-appropriate preventive services Medicare covers discussed today and ordered if indicated.  Referrals offered:  Community-based lifestyle interventions to reduce health risks and promote self-management and wellness, fall prevention, nutrition, physical activity, tobacco-use cessation, weight loss, and mental health services as per orders if indicated.    Discussion today about general wellness and lifestyle habits:    · Prevent falls and reduce trip hazards; Cautioned about securing or removing rugs.  · Have a working fire alarm and carbon monoxide detector;   · Engage in regular physical activity and social activities       Follow-up: Routine 3 month follow-up

## 2018-02-19 DIAGNOSIS — N30.00 ACUTE CYSTITIS WITHOUT HEMATURIA: ICD-10-CM

## 2018-02-19 DIAGNOSIS — A49.8 E COLI INFECTION: ICD-10-CM

## 2018-02-19 LAB
BACTERIA UR CULT: ABNORMAL
BACTERIA UR CULT: ABNORMAL
SIGNIFICANT IND 70042: ABNORMAL
SITE SITE: ABNORMAL
SOURCE SOURCE: ABNORMAL

## 2018-02-19 RX ORDER — CIPROFLOXACIN 250 MG/1
250 TABLET, FILM COATED ORAL 2 TIMES DAILY
Qty: 14 TAB | Refills: 0 | Status: SHIPPED | OUTPATIENT
Start: 2018-02-19 | End: 2018-02-28

## 2018-02-22 DIAGNOSIS — Z12.11 SCREEN FOR COLON CANCER: ICD-10-CM

## 2018-02-23 LAB — HEMOCCULT STL QL IA: NEGATIVE

## 2018-02-28 DIAGNOSIS — J01.10 ACUTE NON-RECURRENT FRONTAL SINUSITIS: ICD-10-CM

## 2018-02-28 RX ORDER — DOXYCYCLINE HYCLATE 100 MG
100 TABLET ORAL 2 TIMES DAILY
Qty: 14 TAB | Refills: 0 | Status: SHIPPED | OUTPATIENT
Start: 2018-02-28 | End: 2018-07-06

## 2018-03-26 RX ORDER — AMLODIPINE BESYLATE 5 MG/1
TABLET ORAL
Qty: 90 TAB | Refills: 0 | Status: SHIPPED | OUTPATIENT
Start: 2018-03-26 | End: 2018-10-10

## 2018-04-01 ENCOUNTER — OFFICE VISIT (OUTPATIENT)
Dept: URGENT CARE | Facility: PHYSICIAN GROUP | Age: 83
End: 2018-04-01
Payer: MEDICARE

## 2018-04-01 VITALS
WEIGHT: 146 LBS | DIASTOLIC BLOOD PRESSURE: 56 MMHG | HEART RATE: 62 BPM | OXYGEN SATURATION: 93 % | BODY MASS INDEX: 26.7 KG/M2 | TEMPERATURE: 99.5 F | SYSTOLIC BLOOD PRESSURE: 108 MMHG | RESPIRATION RATE: 16 BRPM

## 2018-04-01 DIAGNOSIS — N30.00 ACUTE CYSTITIS WITHOUT HEMATURIA: ICD-10-CM

## 2018-04-01 DIAGNOSIS — J06.9 VIRAL URI WITH COUGH: ICD-10-CM

## 2018-04-01 PROCEDURE — 99214 OFFICE O/P EST MOD 30 MIN: CPT | Performed by: FAMILY MEDICINE

## 2018-04-01 RX ORDER — NITROFURANTOIN 25; 75 MG/1; MG/1
100 CAPSULE ORAL EVERY 12 HOURS
Qty: 10 CAP | Refills: 0 | Status: SHIPPED | OUTPATIENT
Start: 2018-04-01 | End: 2018-10-14 | Stop reason: SDUPTHER

## 2018-04-01 RX ORDER — BENZONATATE 100 MG/1
100 CAPSULE ORAL 3 TIMES DAILY PRN
Qty: 30 CAP | Refills: 0 | Status: SHIPPED | OUTPATIENT
Start: 2018-04-01 | End: 2018-10-10

## 2018-04-02 ENCOUNTER — TELEPHONE (OUTPATIENT)
Dept: INTERNAL MEDICINE | Facility: IMAGING CENTER | Age: 83
End: 2018-04-02

## 2018-04-02 NOTE — TELEPHONE ENCOUNTER
Patient calls today and states that she is sick.  She was seen in the urgent care yesterday.  She has no fever, no productive cough.  Recommend continue with treatment from UC unless she becomes febrile or productive cough occurs.  Encourage rest & good hydration.  Office will check with her tomorrow for update.

## 2018-04-09 ENCOUNTER — HOSPITAL ENCOUNTER (OUTPATIENT)
Facility: MEDICAL CENTER | Age: 83
End: 2018-04-09
Attending: INTERNAL MEDICINE
Payer: MEDICARE

## 2018-04-09 ENCOUNTER — TELEPHONE (OUTPATIENT)
Dept: INTERNAL MEDICINE | Facility: IMAGING CENTER | Age: 83
End: 2018-04-09

## 2018-04-09 PROCEDURE — 87077 CULTURE AEROBIC IDENTIFY: CPT

## 2018-04-09 PROCEDURE — 87186 SC STD MICRODIL/AGAR DIL: CPT

## 2018-04-09 PROCEDURE — 87086 URINE CULTURE/COLONY COUNT: CPT

## 2018-04-09 NOTE — TELEPHONE ENCOUNTER
Gloria states that her cold symptoms are better.  She does c/o slight dizziness, recommend broth & hydration as she doesn't believe her intake is good.   She will monitor frequent urination and possible UA later this week.

## 2018-04-10 ENCOUNTER — APPOINTMENT (OUTPATIENT)
Dept: ONCOLOGY | Facility: MEDICAL CENTER | Age: 83
End: 2018-04-10
Attending: INTERNAL MEDICINE
Payer: MEDICARE

## 2018-04-11 ENCOUNTER — TELEPHONE (OUTPATIENT)
Dept: INTERNAL MEDICINE | Facility: IMAGING CENTER | Age: 83
End: 2018-04-11

## 2018-04-11 DIAGNOSIS — R35.0 URINARY FREQUENCY: ICD-10-CM

## 2018-04-11 NOTE — TELEPHONE ENCOUNTER
Gloria states that her cough is much better. She is concerned for a possible UTI, urinary frequency.  Order for UA placed.  She will go by [facility] lab tomorrow or Friday for a specimen.

## 2018-04-12 ENCOUNTER — NON-PROVIDER VISIT (OUTPATIENT)
Dept: INTERNAL MEDICINE | Facility: IMAGING CENTER | Age: 83
End: 2018-04-12
Payer: MEDICARE

## 2018-04-12 DIAGNOSIS — R30.0 DYSURIA: ICD-10-CM

## 2018-04-12 DIAGNOSIS — R82.998 LEUKOCYTES IN URINE: ICD-10-CM

## 2018-04-12 DIAGNOSIS — A49.8 E COLI INFECTION: ICD-10-CM

## 2018-04-12 DIAGNOSIS — N30.00 ACUTE CYSTITIS WITHOUT HEMATURIA: ICD-10-CM

## 2018-04-12 LAB
APPEARANCE UR: CLEAR
BILIRUB UR STRIP-MCNC: NORMAL MG/DL
COLOR UR AUTO: YELLOW
GLUCOSE UR STRIP.AUTO-MCNC: NORMAL MG/DL
KETONES UR STRIP.AUTO-MCNC: NORMAL MG/DL
LEUKOCYTE ESTERASE UR QL STRIP.AUTO: NORMAL
NITRITE UR QL STRIP.AUTO: POSITIVE
PH UR STRIP.AUTO: 6 [PH] (ref 5–8)
PROT UR QL STRIP: NORMAL MG/DL
RBC UR QL AUTO: NORMAL
SP GR UR STRIP.AUTO: 1
UROBILINOGEN UR STRIP-MCNC: NORMAL MG/DL

## 2018-04-12 PROCEDURE — 87077 CULTURE AEROBIC IDENTIFY: CPT

## 2018-04-12 PROCEDURE — 81002 URINALYSIS NONAUTO W/O SCOPE: CPT | Performed by: INTERNAL MEDICINE

## 2018-04-12 PROCEDURE — 87186 SC STD MICRODIL/AGAR DIL: CPT

## 2018-04-12 PROCEDURE — 87086 URINE CULTURE/COLONY COUNT: CPT

## 2018-04-12 RX ORDER — CIPROFLOXACIN 250 MG/1
250 TABLET, FILM COATED ORAL 2 TIMES DAILY
Qty: 14 TAB | Refills: 0 | Status: SHIPPED | OUTPATIENT
Start: 2018-04-12 | End: 2018-05-15 | Stop reason: SDUPTHER

## 2018-04-25 DIAGNOSIS — H69.93 EUSTACHIAN TUBE DYSFUNCTION, BILATERAL: ICD-10-CM

## 2018-04-26 RX ORDER — FLUTICASONE PROPIONATE 50 MCG
SPRAY, SUSPENSION (ML) NASAL
Qty: 1 BOTTLE | Refills: 11 | Status: SHIPPED | OUTPATIENT
Start: 2018-04-26 | End: 2019-07-19 | Stop reason: SDUPTHER

## 2018-05-15 ENCOUNTER — HOSPITAL ENCOUNTER (OUTPATIENT)
Facility: MEDICAL CENTER | Age: 83
End: 2018-05-15
Attending: INTERNAL MEDICINE
Payer: MEDICARE

## 2018-05-15 ENCOUNTER — NON-PROVIDER VISIT (OUTPATIENT)
Dept: INTERNAL MEDICINE | Facility: IMAGING CENTER | Age: 83
End: 2018-05-15
Payer: MEDICARE

## 2018-05-15 DIAGNOSIS — R82.998 LEUKOCYTES IN URINE: ICD-10-CM

## 2018-05-15 DIAGNOSIS — A49.8 E COLI INFECTION: ICD-10-CM

## 2018-05-15 DIAGNOSIS — R35.0 URINARY FREQUENCY: ICD-10-CM

## 2018-05-15 DIAGNOSIS — N30.00 ACUTE CYSTITIS WITHOUT HEMATURIA: ICD-10-CM

## 2018-05-15 LAB
APPEARANCE UR: CLEAR
BILIRUB UR STRIP-MCNC: NORMAL MG/DL
COLOR UR AUTO: YELLOW
GLUCOSE UR STRIP.AUTO-MCNC: NORMAL MG/DL
KETONES UR STRIP.AUTO-MCNC: NORMAL MG/DL
LEUKOCYTE ESTERASE UR QL STRIP.AUTO: NORMAL
NITRITE UR QL STRIP.AUTO: NORMAL
PH UR STRIP.AUTO: 5.5 [PH] (ref 5–8)
PROT UR QL STRIP: NORMAL MG/DL
RBC UR QL AUTO: NORMAL
SP GR UR STRIP.AUTO: 1
UROBILINOGEN UR STRIP-MCNC: NORMAL MG/DL

## 2018-05-15 PROCEDURE — 87186 SC STD MICRODIL/AGAR DIL: CPT

## 2018-05-15 PROCEDURE — 87086 URINE CULTURE/COLONY COUNT: CPT

## 2018-05-15 PROCEDURE — 87077 CULTURE AEROBIC IDENTIFY: CPT

## 2018-05-15 PROCEDURE — 81002 URINALYSIS NONAUTO W/O SCOPE: CPT | Performed by: INTERNAL MEDICINE

## 2018-05-15 RX ORDER — CIPROFLOXACIN 250 MG/1
250 TABLET, FILM COATED ORAL 2 TIMES DAILY
Qty: 14 TAB | Refills: 0 | Status: SHIPPED | OUTPATIENT
Start: 2018-05-15 | End: 2018-07-05 | Stop reason: SDUPTHER

## 2018-05-16 ENCOUNTER — HOSPITAL ENCOUNTER (OUTPATIENT)
Dept: RADIOLOGY | Facility: MEDICAL CENTER | Age: 83
End: 2018-05-16
Attending: INTERNAL MEDICINE
Payer: MEDICARE

## 2018-05-16 DIAGNOSIS — Z12.39 SCREENING BREAST EXAMINATION: ICD-10-CM

## 2018-05-16 PROCEDURE — 77063 BREAST TOMOSYNTHESIS BI: CPT

## 2018-05-18 ENCOUNTER — TELEPHONE (OUTPATIENT)
Dept: INTERNAL MEDICINE | Facility: IMAGING CENTER | Age: 83
End: 2018-05-18

## 2018-07-03 ENCOUNTER — HOSPITAL ENCOUNTER (OUTPATIENT)
Facility: MEDICAL CENTER | Age: 83
End: 2018-07-03
Attending: INTERNAL MEDICINE
Payer: MEDICARE

## 2018-07-03 ENCOUNTER — NON-PROVIDER VISIT (OUTPATIENT)
Dept: INTERNAL MEDICINE | Facility: IMAGING CENTER | Age: 83
End: 2018-07-03
Payer: MEDICARE

## 2018-07-03 DIAGNOSIS — R30.9 PAINFUL URINATION: ICD-10-CM

## 2018-07-03 DIAGNOSIS — R39.198 ABNORMAL URINATION: ICD-10-CM

## 2018-07-03 DIAGNOSIS — R35.0 FREQUENCY OF URINATION: ICD-10-CM

## 2018-07-03 LAB
APPEARANCE UR: CLEAR
BILIRUB UR STRIP-MCNC: NEGATIVE MG/DL
COLOR UR AUTO: YELLOW
GLUCOSE UR STRIP.AUTO-MCNC: NEGATIVE MG/DL
KETONES UR STRIP.AUTO-MCNC: NEGATIVE MG/DL
LEUKOCYTE ESTERASE UR QL STRIP.AUTO: NORMAL
NITRITE UR QL STRIP.AUTO: NEGATIVE
PH UR STRIP.AUTO: 7 [PH] (ref 5–8)
PROT UR QL STRIP: NEGATIVE MG/DL
RBC UR QL AUTO: NORMAL
SP GR UR STRIP.AUTO: 1.01
UROBILINOGEN UR STRIP-MCNC: 0.2 MG/DL

## 2018-07-03 PROCEDURE — 81002 URINALYSIS NONAUTO W/O SCOPE: CPT | Performed by: INTERNAL MEDICINE

## 2018-07-03 PROCEDURE — 87077 CULTURE AEROBIC IDENTIFY: CPT

## 2018-07-03 PROCEDURE — 87086 URINE CULTURE/COLONY COUNT: CPT

## 2018-07-03 PROCEDURE — 87186 SC STD MICRODIL/AGAR DIL: CPT

## 2018-07-04 DIAGNOSIS — R39.198 ABNORMAL URINATION: ICD-10-CM

## 2018-07-05 ENCOUNTER — TELEPHONE (OUTPATIENT)
Dept: INTERNAL MEDICINE | Facility: IMAGING CENTER | Age: 83
End: 2018-07-05

## 2018-07-05 DIAGNOSIS — A49.8 E COLI INFECTION: ICD-10-CM

## 2018-07-05 DIAGNOSIS — N30.00 ACUTE CYSTITIS WITHOUT HEMATURIA: ICD-10-CM

## 2018-07-05 RX ORDER — CIPROFLOXACIN 250 MG/1
250 TABLET, FILM COATED ORAL 2 TIMES DAILY
Qty: 14 TAB | Refills: 0 | Status: SHIPPED | OUTPATIENT
Start: 2018-07-05 | End: 2018-07-06

## 2018-07-06 ENCOUNTER — TELEPHONE (OUTPATIENT)
Dept: INTERNAL MEDICINE | Facility: IMAGING CENTER | Age: 83
End: 2018-07-06

## 2018-07-06 DIAGNOSIS — N30.00 ACUTE CYSTITIS WITHOUT HEMATURIA: ICD-10-CM

## 2018-07-06 RX ORDER — NITROFURANTOIN 25; 75 MG/1; MG/1
100 CAPSULE ORAL 2 TIMES DAILY
Qty: 14 CAP | Refills: 0 | Status: SHIPPED | OUTPATIENT
Start: 2018-07-06 | End: 2018-10-10

## 2018-07-10 ENCOUNTER — OFFICE VISIT (OUTPATIENT)
Dept: INTERNAL MEDICINE | Facility: IMAGING CENTER | Age: 83
End: 2018-07-10
Payer: MEDICARE

## 2018-07-10 VITALS
HEIGHT: 62 IN | SYSTOLIC BLOOD PRESSURE: 138 MMHG | TEMPERATURE: 98.4 F | BODY MASS INDEX: 26.87 KG/M2 | RESPIRATION RATE: 16 BRPM | OXYGEN SATURATION: 94 % | HEART RATE: 63 BPM | DIASTOLIC BLOOD PRESSURE: 80 MMHG | WEIGHT: 146 LBS

## 2018-07-10 DIAGNOSIS — F41.9 ANXIETY: ICD-10-CM

## 2018-07-10 DIAGNOSIS — R07.9 CHEST PAIN, UNSPECIFIED TYPE: ICD-10-CM

## 2018-07-10 DIAGNOSIS — R53.81 MALAISE: ICD-10-CM

## 2018-07-10 DIAGNOSIS — I10 ESSENTIAL HYPERTENSION: ICD-10-CM

## 2018-07-10 DIAGNOSIS — R23.1 CLAMMINESS: ICD-10-CM

## 2018-07-10 DIAGNOSIS — N30.00 ACUTE CYSTITIS WITHOUT HEMATURIA: ICD-10-CM

## 2018-07-10 PROCEDURE — 99214 OFFICE O/P EST MOD 30 MIN: CPT | Performed by: INTERNAL MEDICINE

## 2018-07-10 RX ORDER — LISINOPRIL 10 MG/1
TABLET ORAL
Qty: 180 TAB | Refills: 3 | Status: SHIPPED | OUTPATIENT
Start: 2018-07-10 | End: 2018-12-10 | Stop reason: SDUPTHER

## 2018-07-10 RX ORDER — LEVOTHYROXINE SODIUM 0.07 MG/1
75 TABLET ORAL
Qty: 90 TAB | Refills: 3 | Status: SHIPPED | OUTPATIENT
Start: 2018-07-10 | End: 2019-08-23 | Stop reason: SDUPTHER

## 2018-07-10 RX ORDER — AMLODIPINE BESYLATE 5 MG/1
TABLET ORAL
Qty: 90 TAB | Refills: 3 | Status: SHIPPED | OUTPATIENT
Start: 2018-07-10 | End: 2019-09-05 | Stop reason: SDUPTHER

## 2018-07-10 NOTE — PROGRESS NOTES
"Chief Complaint   Patient presents with   • Malaise   • UTI       HISTORY OF THE PRESENT ILLNESS: Patient is a 89 y.o. female.     Patient comes in complaining of malaise and feeling clammy.  Symptoms seem related to starting Macrobid for urinary tract infection.  She previously been on ciprofloxacin but this was discontinued because of resistance.  She complains of ongoing polyuria.  Her daughter reports that she drinks moderate amount of fluid because of sensation of dry mouth.  No dysuria.  No hematuria.  She also reports that she gets occasional chest pain that radiates towards the shoulder.  This is been ongoing for \"a long time\".  She had hospitalization for this in the past with negative cardiac workup including stress test.  She also believes that the symptoms could be related to anxiety.  This is also been an ongoing issue.  We tried Xanax as needed but this caused excessive sedation.  She is willing to consider SSRI therapy.    Allergies: Flagyl [metronidazole hcl]; Keflex; Morphine; and Sulfa drugs    Current Outpatient Prescriptions Ordered in [facility]   Medication Sig Dispense Refill   • sertraline (ZOLOFT) 50 MG Tab Take 1/2 tab in the morning for 1 week then increase to full tablet. 30 Tab 3   • nitrofurantoin monohydr macro (MACROBID) 100 MG Cap Take 1 Cap by mouth 2 times a day. 14 Cap 0   • fluticasone (FLONASE) 50 MCG/ACT nasal spray ONE SPRAY EACH NOSTRIL TWO TIMES PER DAY. 1 Bottle 11   • benzonatate (TESSALON) 100 MG Cap Take 1 Cap by mouth 3 times a day as needed for Cough. 30 Cap 0   • amLODIPine (NORVASC) 5 MG Tab TAKE 1 TABLET BY MOUTH ONCE DAILY 90 Tab 0   • lisinopril (PRINIVIL) 10 MG Tab TAKE 1 TABLET BY MOUTH EVERY 12 HOURS 180 Tab 3   • acyclovir (ZOVIRAX) 5 % Ointment Apply to affected area(s) every 3 hours. 15 g 1   • meclizine (ANTIVERT) 25 MG Tab TAKE 1 TAB BY MOUTH 3 TIMES A DAY AS NEEDED FOR DIZZINESS. 30 Tab 0   • azelastine (ASTELIN) 137 MCG/SPRAY nasal spray INSTILL 1 SPRAY INTO " "NOSTRIL TWICE DAILY 90 Spray 2   • metoprolol SR (TOPROL XL) 25 MG TABLET SR 24 HR Take 1 Tab by mouth every day. 90 Tab 3   • levothyroxine (SYNTHROID) 75 MCG Tab Take 1 Tab by mouth every day. 90 Tab 3   • omeprazole (PRILOSEC) 20 MG delayed-release capsule TAKE ONE CAPSULE BY MOUTH TWICE A DAY 30MINUTES BEFORE BREAKFAST AND DINNER 60 Cap 11   • Cholecalciferol (VITAMIN D PO) Take 1 Tab by mouth every day.     • travoprost (TRAVATAN) 0.004 % SOLN Place 1 Drop in both eyes every evening.       No current Epic-ordered facility-administered medications on file.        Past medical history, social history and family history were reviewed from chart today    Review of systems: Per HPI.    Denies headache, chest pain, fever, chills, diarrhea, constipation, abdominal pain, palpitations, depression   All others negative.     Exam: Blood pressure 138/80, pulse 63, temperature 36.9 °C (98.4 °F), resp. rate 16, height 1.575 m (5' 2\"), weight 66.2 kg (146 lb), SpO2 94 %.  General: Well-appearing. Well-developed. No signs of distress.  HEENT: Grossly normal. Oral cavity is pink and moist.  Neck: Supple without JVD or bruit.  Pulmonary: Clear with good breath sounds. Normal effort.  Cardiovascular: Regular. Carotid and radial pulses are intact.  Abdomen: Soft, nontender, nondistended. Spleen and liver are not enlarged.  Neurologic: Cranial nerves II through XII are grossly normal, alert and oriented x3      Assessment/Plan  1. Acute cystitis without hematuria     2. Malaise     3. Clamminess     4. Anxiety  sertraline (ZOLOFT) 50 MG Tab   5. Chest pain, unspecified type       Recommended that she complete the antibiotics.  She has 2 days left.  I suspect the antibiotics are the cause of her malaise and clamminess.  Cannot exclude ongoing infection but the sensitivities suggest that she should respond to the Macrobid.  Trial of Zoloft for her anxiety issues.  No further workup of the chest pain at this time as her symptoms are " the same as when she was hospitalized and underwent negative cardiac workup.  I agree with the patient that these are probably anxiety related.

## 2018-08-20 RX ORDER — METOPROLOL SUCCINATE 25 MG/1
25 TABLET, EXTENDED RELEASE ORAL
Qty: 90 TAB | Refills: 1 | Status: SHIPPED | OUTPATIENT
Start: 2018-08-20 | End: 2019-02-22 | Stop reason: SDUPTHER

## 2018-09-26 RX ORDER — OMEPRAZOLE 20 MG/1
CAPSULE, DELAYED RELEASE ORAL
Qty: 180 CAP | Refills: 3 | Status: SHIPPED | OUTPATIENT
Start: 2018-09-26 | End: 2019-08-24

## 2018-10-10 ENCOUNTER — HOSPITAL ENCOUNTER (OUTPATIENT)
Facility: MEDICAL CENTER | Age: 83
End: 2018-10-10
Attending: INTERNAL MEDICINE
Payer: MEDICARE

## 2018-10-10 ENCOUNTER — OFFICE VISIT (OUTPATIENT)
Dept: INTERNAL MEDICINE | Facility: IMAGING CENTER | Age: 83
End: 2018-10-10
Payer: MEDICARE

## 2018-10-10 VITALS
SYSTOLIC BLOOD PRESSURE: 128 MMHG | WEIGHT: 145 LBS | BODY MASS INDEX: 26.68 KG/M2 | HEART RATE: 60 BPM | DIASTOLIC BLOOD PRESSURE: 78 MMHG | HEIGHT: 62 IN | TEMPERATURE: 98.1 F | RESPIRATION RATE: 16 BRPM | OXYGEN SATURATION: 94 %

## 2018-10-10 DIAGNOSIS — K21.9 GASTROESOPHAGEAL REFLUX DISEASE WITHOUT ESOPHAGITIS: ICD-10-CM

## 2018-10-10 DIAGNOSIS — E03.9 HYPOTHYROIDISM, UNSPECIFIED TYPE: ICD-10-CM

## 2018-10-10 DIAGNOSIS — E78.00 PURE HYPERCHOLESTEROLEMIA: ICD-10-CM

## 2018-10-10 DIAGNOSIS — R35.0 URINARY FREQUENCY: ICD-10-CM

## 2018-10-10 DIAGNOSIS — R82.998 LEUKOCYTES IN URINE: ICD-10-CM

## 2018-10-10 DIAGNOSIS — I10 ESSENTIAL HYPERTENSION: ICD-10-CM

## 2018-10-10 DIAGNOSIS — M47.816 LUMBAR SPONDYLOSIS: ICD-10-CM

## 2018-10-10 DIAGNOSIS — N32.81 OVERACTIVE BLADDER: ICD-10-CM

## 2018-10-10 DIAGNOSIS — H81.10 BENIGN PAROXYSMAL POSITIONAL VERTIGO, UNSPECIFIED LATERALITY: ICD-10-CM

## 2018-10-10 DIAGNOSIS — M81.6 LOCALIZED OSTEOPOROSIS WITHOUT CURRENT PATHOLOGICAL FRACTURE: ICD-10-CM

## 2018-10-10 DIAGNOSIS — R82.90 ABNORMAL URINALYSIS: ICD-10-CM

## 2018-10-10 LAB
ALBUMIN SERPL BCP-MCNC: 4.2 G/DL (ref 3.2–4.9)
ALBUMIN/GLOB SERPL: 1.4 G/DL
ALP SERPL-CCNC: 103 U/L (ref 30–99)
ALT SERPL-CCNC: 15 U/L (ref 2–50)
ANION GAP SERPL CALC-SCNC: 9 MMOL/L (ref 0–11.9)
APPEARANCE UR: NORMAL
AST SERPL-CCNC: 19 U/L (ref 12–45)
BASOPHILS # BLD AUTO: 1 % (ref 0–1.8)
BASOPHILS # BLD: 0.07 K/UL (ref 0–0.12)
BILIRUB SERPL-MCNC: 0.6 MG/DL (ref 0.1–1.5)
BILIRUB UR STRIP-MCNC: NORMAL MG/DL
BUN SERPL-MCNC: 22 MG/DL (ref 8–22)
CALCIUM SERPL-MCNC: 9.3 MG/DL (ref 8.5–10.5)
CHLORIDE SERPL-SCNC: 103 MMOL/L (ref 96–112)
CHOLEST SERPL-MCNC: 213 MG/DL (ref 100–199)
CO2 SERPL-SCNC: 26 MMOL/L (ref 20–33)
COLOR UR AUTO: YELLOW
CREAT SERPL-MCNC: 0.96 MG/DL (ref 0.5–1.4)
EOSINOPHIL # BLD AUTO: 0.3 K/UL (ref 0–0.51)
EOSINOPHIL NFR BLD: 4.2 % (ref 0–6.9)
ERYTHROCYTE [DISTWIDTH] IN BLOOD BY AUTOMATED COUNT: 45.8 FL (ref 35.9–50)
GLOBULIN SER CALC-MCNC: 2.9 G/DL (ref 1.9–3.5)
GLUCOSE SERPL-MCNC: 85 MG/DL (ref 65–99)
GLUCOSE UR STRIP.AUTO-MCNC: NORMAL MG/DL
HCT VFR BLD AUTO: 44.2 % (ref 37–47)
HDLC SERPL-MCNC: 51 MG/DL
HGB BLD-MCNC: 14.2 G/DL (ref 12–16)
IMM GRANULOCYTES # BLD AUTO: 0.02 K/UL (ref 0–0.11)
IMM GRANULOCYTES NFR BLD AUTO: 0.3 % (ref 0–0.9)
KETONES UR STRIP.AUTO-MCNC: NORMAL MG/DL
LDLC SERPL CALC-MCNC: 136 MG/DL
LEUKOCYTE ESTERASE UR QL STRIP.AUTO: NORMAL
LYMPHOCYTES # BLD AUTO: 2.3 K/UL (ref 1–4.8)
LYMPHOCYTES NFR BLD: 32.5 % (ref 22–41)
MCH RBC QN AUTO: 26.5 PG (ref 27–33)
MCHC RBC AUTO-ENTMCNC: 32.1 G/DL (ref 33.6–35)
MCV RBC AUTO: 82.6 FL (ref 81.4–97.8)
MONOCYTES # BLD AUTO: 0.55 K/UL (ref 0–0.85)
MONOCYTES NFR BLD AUTO: 7.8 % (ref 0–13.4)
NEUTROPHILS # BLD AUTO: 3.84 K/UL (ref 2–7.15)
NEUTROPHILS NFR BLD: 54.2 % (ref 44–72)
NITRITE UR QL STRIP.AUTO: POSITIVE
NRBC # BLD AUTO: 0 K/UL
NRBC BLD-RTO: 0 /100 WBC
PH UR STRIP.AUTO: 6 [PH] (ref 5–8)
PLATELET # BLD AUTO: 260 K/UL (ref 164–446)
PMV BLD AUTO: 10.9 FL (ref 9–12.9)
POTASSIUM SERPL-SCNC: 4.4 MMOL/L (ref 3.6–5.5)
PROT SERPL-MCNC: 7.1 G/DL (ref 6–8.2)
PROT UR QL STRIP: NORMAL MG/DL
RBC # BLD AUTO: 5.35 M/UL (ref 4.2–5.4)
RBC UR QL AUTO: NORMAL
SODIUM SERPL-SCNC: 138 MMOL/L (ref 135–145)
SP GR UR STRIP.AUTO: 1.01
T4 FREE SERPL-MCNC: 1.41 NG/DL (ref 0.53–1.43)
TRIGL SERPL-MCNC: 128 MG/DL (ref 0–149)
TSH SERPL DL<=0.005 MIU/L-ACNC: 1.42 UIU/ML (ref 0.38–5.33)
UROBILINOGEN UR STRIP-MCNC: NORMAL MG/DL
WBC # BLD AUTO: 7.1 K/UL (ref 4.8–10.8)

## 2018-10-10 PROCEDURE — 87077 CULTURE AEROBIC IDENTIFY: CPT

## 2018-10-10 PROCEDURE — 99214 OFFICE O/P EST MOD 30 MIN: CPT | Performed by: INTERNAL MEDICINE

## 2018-10-10 PROCEDURE — 81002 URINALYSIS NONAUTO W/O SCOPE: CPT | Performed by: INTERNAL MEDICINE

## 2018-10-10 PROCEDURE — 87086 URINE CULTURE/COLONY COUNT: CPT

## 2018-10-10 PROCEDURE — 84439 ASSAY OF FREE THYROXINE: CPT

## 2018-10-10 PROCEDURE — 80053 COMPREHEN METABOLIC PANEL: CPT

## 2018-10-10 PROCEDURE — 85025 COMPLETE CBC W/AUTO DIFF WBC: CPT

## 2018-10-10 PROCEDURE — 84443 ASSAY THYROID STIM HORMONE: CPT

## 2018-10-10 PROCEDURE — 87186 SC STD MICRODIL/AGAR DIL: CPT

## 2018-10-10 PROCEDURE — 80061 LIPID PANEL: CPT

## 2018-10-10 NOTE — PROGRESS NOTES
Chief Complaint   Patient presents with   • Hypertension   • Gastrophageal Reflux   • UTI       HISTORY OF THE PRESENT ILLNESS: Patient is a 89 y.o. female.     Patient comes in for follow-up on chronic issues.  Generally she has been doing well.  Her daughter, Tejal continues to check on her regularly.  Since her last visit she had one fall and feels that she is having more problems with balance.  She is a walker at home and when she goes out.  Today, she is using a cane because of the short distance between her car and the office.    She has a history of hypertension which is well-controlled with medications including metoprolol, lisinopril and amlodipine.    She has a history of reflux which is stable on Prilosec.    She is a history of recurrent urinary tract infection.  She complains of some increase in frequency but no dysuria.  Her urine analysis in the office today suggest ongoing infection.    She complains of ongoing orthopedic issues including back pain and left shoulder pain.  She saw Dr. Baker in the past with good results.  She is interested in reevaluation.    She complains of fullness in her ears.  She has a history of cerumen impaction.  She has noticed some decreased hearing primarily in the right ear.      Allergies: Flagyl [metronidazole hcl]; Keflex; Morphine; and Sulfa drugs    Current Outpatient Prescriptions Ordered in [facility]   Medication Sig Dispense Refill   • omeprazole (PRILOSEC) 20 MG delayed-release capsule TAKE ONE CAPSULE BY MOUTH TWICE A DAY 30MINUTES BEFORE BREAKFAST AND DINNER 180 Cap 3   • metoprolol SR (TOPROL XL) 25 MG TABLET SR 24 HR TAKE 1 TAB BY MOUTH EVERY DAY. 90 Tab 1   • levothyroxine (SYNTHROID) 75 MCG Tab TAKE 1 TAB BY MOUTH EVERY DAY. 90 Tab 3   • lisinopril (PRINIVIL) 10 MG Tab TAKE 1 TABLET BY MOUTH EVERY 12 HOURS 180 Tab 3   • amLODIPine (NORVASC) 5 MG Tab TAKE 1 TABLET BY MOUTH ONCE DAILY 90 Tab 3   • fluticasone (FLONASE) 50 MCG/ACT nasal spray ONE SPRAY EACH  "NOSTRIL TWO TIMES PER DAY. 1 Bottle 11   • acyclovir (ZOVIRAX) 5 % Ointment Apply to affected area(s) every 3 hours. 15 g 1   • meclizine (ANTIVERT) 25 MG Tab TAKE 1 TAB BY MOUTH 3 TIMES A DAY AS NEEDED FOR DIZZINESS. 30 Tab 0   • azelastine (ASTELIN) 137 MCG/SPRAY nasal spray INSTILL 1 SPRAY INTO NOSTRIL TWICE DAILY 90 [place] 2   • Cholecalciferol (VITAMIN D PO) Take 1 Tab by mouth every day.     • travoprost (TRAVATAN) 0.004 % SOLN Place 1 Drop in both eyes every evening.       No current Epic-ordered facility-administered medications on file.        Past medical history, social history and family history were reviewed from chart today    Review of systems: Per HPI.    Denies headache, chest pain, fever, chills, diarrhea, constipation, abdominal pain, palpitations, depression   All others negative.     Exam: Blood pressure 128/78, pulse 60, temperature 36.7 °C (98.1 °F), temperature source Temporal, resp. rate 16, height 1.575 m (5' 2\"), weight 65.8 kg (145 lb), SpO2 94 %.  General: Well-appearing. Well-developed. No signs of distress.  HEENT: Grossly normal. Oral cavity is pink and moist.  Neck: Supple without JVD or bruit.  Pulmonary: Clear with good breath sounds. Normal effort.  Cardiovascular: Regular. Carotid and radial pulses are intact.  Abdomen: Soft, nontender, nondistended. Spleen and liver are not enlarged.  Neurologic: Cranial nerves II through XII are grossly normal, alert and oriented x3      Assessment/Plan  1. Essential hypertension  COMP METABOLIC PANEL   2. Pure hypercholesterolemia  LIPID PROFILE    COMP METABOLIC PANEL   3. Hypothyroidism, unspecified type  TSH    FREE THYROXINE   4. Localized osteoporosis without current pathological fracture     5. Overactive bladder     6. Gastroesophageal reflux disease without esophagitis     7. Benign paroxysmal positional vertigo, unspecified laterality     8. Urinary frequency  POCT Urinalysis   9. Abnormal urinalysis  URINE CULTURE(NEW)    CBC WITH " DIFFERENTIAL   10. Leukocytes in urine  URINE CULTURE(NEW)    CBC WITH DIFFERENTIAL       For her balance issue I encouraged her to consider physical therapy but she refuses.  She will try to be better about activity and work on standing exercises that we discussed in the office today.  She will continue to use her walker.    Blood pressure stable.  No change in treatment    Reflux is stable.  No change in treatment.    I would refer her back to Dr. Baker for her back and shoulder issues.  These are chronic and she has had workup in the past including neurosurgery who discussed possible back surgery but did not recommended due to the long duration of surgery and recovery.  Her pain overall is reasonably well controlled with Tylenol.    We will wait on the culture before starting antibiotics for the urinary tract symptoms.  Her urine analysis is strongly positive including positive for nitrates.    Her right ear canal was completely obstructed with cerumen.  This was extracted with forceps.  There were no complications.  She had wax in the left canal also but the removal was stopped when it was evident the cerumen was adhered to the wall and removing was causing mild bleeding.  I encouraged home Debrox.  Follow-up if symptoms persist or worsen.’

## 2018-10-14 DIAGNOSIS — N30.00 ACUTE CYSTITIS WITHOUT HEMATURIA: ICD-10-CM

## 2018-10-14 RX ORDER — NITROFURANTOIN 25; 75 MG/1; MG/1
100 CAPSULE ORAL EVERY 12 HOURS
Qty: 14 CAP | Refills: 0 | Status: SHIPPED | OUTPATIENT
Start: 2018-10-14 | End: 2019-01-17 | Stop reason: SDUPTHER

## 2018-12-10 ENCOUNTER — TELEPHONE (OUTPATIENT)
Dept: INTERNAL MEDICINE | Facility: IMAGING CENTER | Age: 83
End: 2018-12-10

## 2018-12-10 DIAGNOSIS — I10 ESSENTIAL HYPERTENSION: ICD-10-CM

## 2018-12-10 RX ORDER — LISINOPRIL 10 MG/1
TABLET ORAL
Qty: 180 TAB | Refills: 3 | Status: SHIPPED | OUTPATIENT
Start: 2018-12-10 | End: 2019-04-23

## 2019-01-09 ENCOUNTER — HOSPITAL ENCOUNTER (OUTPATIENT)
Dept: RADIOLOGY | Facility: MEDICAL CENTER | Age: 84
End: 2019-01-09
Attending: PHYSICAL MEDICINE & REHABILITATION
Payer: MEDICARE

## 2019-01-09 DIAGNOSIS — M47.812 CERVICAL SPONDYLOSIS WITHOUT MYELOPATHY: ICD-10-CM

## 2019-01-09 DIAGNOSIS — M25.511 PAIN OF BOTH SHOULDER JOINTS: ICD-10-CM

## 2019-01-09 DIAGNOSIS — M25.512 PAIN OF BOTH SHOULDER JOINTS: ICD-10-CM

## 2019-01-09 DIAGNOSIS — M54.2 CERVICALGIA: ICD-10-CM

## 2019-01-09 PROCEDURE — 72040 X-RAY EXAM NECK SPINE 2-3 VW: CPT

## 2019-01-09 PROCEDURE — 73030 X-RAY EXAM OF SHOULDER: CPT | Mod: LT

## 2019-01-09 PROCEDURE — 73030 X-RAY EXAM OF SHOULDER: CPT | Mod: RT

## 2019-01-15 ENCOUNTER — HOSPITAL ENCOUNTER (OUTPATIENT)
Facility: MEDICAL CENTER | Age: 84
End: 2019-01-15
Attending: INTERNAL MEDICINE
Payer: MEDICARE

## 2019-01-15 ENCOUNTER — OFFICE VISIT (OUTPATIENT)
Dept: INTERNAL MEDICINE | Facility: IMAGING CENTER | Age: 84
End: 2019-01-15
Payer: MEDICARE

## 2019-01-15 VITALS
RESPIRATION RATE: 16 BRPM | TEMPERATURE: 97.8 F | SYSTOLIC BLOOD PRESSURE: 130 MMHG | WEIGHT: 146 LBS | BODY MASS INDEX: 26.87 KG/M2 | HEIGHT: 62 IN | DIASTOLIC BLOOD PRESSURE: 74 MMHG | OXYGEN SATURATION: 92 % | HEART RATE: 62 BPM

## 2019-01-15 DIAGNOSIS — I10 ESSENTIAL HYPERTENSION: ICD-10-CM

## 2019-01-15 DIAGNOSIS — R82.90 ABNORMAL URINALYSIS: ICD-10-CM

## 2019-01-15 DIAGNOSIS — M19.011 PRIMARY OSTEOARTHRITIS OF BOTH SHOULDERS: ICD-10-CM

## 2019-01-15 DIAGNOSIS — R35.0 URINARY FREQUENCY: ICD-10-CM

## 2019-01-15 DIAGNOSIS — M19.012 PRIMARY OSTEOARTHRITIS OF BOTH SHOULDERS: ICD-10-CM

## 2019-01-15 DIAGNOSIS — M43.16 SPONDYLOLISTHESIS OF LUMBAR REGION: ICD-10-CM

## 2019-01-15 DIAGNOSIS — K21.9 GASTROESOPHAGEAL REFLUX DISEASE WITHOUT ESOPHAGITIS: ICD-10-CM

## 2019-01-15 LAB
APPEARANCE UR: CLEAR
BILIRUB UR STRIP-MCNC: NORMAL MG/DL
COLOR UR AUTO: YELLOW
GLUCOSE UR STRIP.AUTO-MCNC: NORMAL MG/DL
KETONES UR STRIP.AUTO-MCNC: NORMAL MG/DL
LEUKOCYTE ESTERASE UR QL STRIP.AUTO: NORMAL
NITRITE UR QL STRIP.AUTO: NORMAL
PH UR STRIP.AUTO: 5 [PH] (ref 5–8)
PROT UR QL STRIP: NORMAL MG/DL
RBC UR QL AUTO: NORMAL
SP GR UR STRIP.AUTO: <=1.005
UROBILINOGEN UR STRIP-MCNC: NORMAL MG/DL

## 2019-01-15 PROCEDURE — 81002 URINALYSIS NONAUTO W/O SCOPE: CPT | Performed by: INTERNAL MEDICINE

## 2019-01-15 PROCEDURE — 87077 CULTURE AEROBIC IDENTIFY: CPT

## 2019-01-15 PROCEDURE — 87086 URINE CULTURE/COLONY COUNT: CPT

## 2019-01-15 PROCEDURE — 99214 OFFICE O/P EST MOD 30 MIN: CPT | Performed by: INTERNAL MEDICINE

## 2019-01-15 PROCEDURE — 87186 SC STD MICRODIL/AGAR DIL: CPT

## 2019-01-17 ENCOUNTER — TELEPHONE (OUTPATIENT)
Dept: INTERNAL MEDICINE | Facility: IMAGING CENTER | Age: 84
End: 2019-01-17

## 2019-01-17 DIAGNOSIS — N30.00 ACUTE CYSTITIS WITHOUT HEMATURIA: ICD-10-CM

## 2019-01-17 RX ORDER — NITROFURANTOIN 25; 75 MG/1; MG/1
100 CAPSULE ORAL EVERY 12 HOURS
Qty: 14 CAP | Refills: 0 | Status: SHIPPED | OUTPATIENT
Start: 2019-01-17 | End: 2019-04-25 | Stop reason: SDUPTHER

## 2019-01-22 PROBLEM — M19.011 PRIMARY OSTEOARTHRITIS OF BOTH SHOULDERS: Status: ACTIVE | Noted: 2019-01-22

## 2019-01-22 PROBLEM — M19.012 PRIMARY OSTEOARTHRITIS OF BOTH SHOULDERS: Status: ACTIVE | Noted: 2019-01-22

## 2019-01-22 RX ORDER — LISINOPRIL 20 MG/1
10 TABLET ORAL 2 TIMES DAILY
Refills: 3 | COMMUNITY
Start: 2018-12-30 | End: 2019-09-17

## 2019-01-22 RX ORDER — TRAVOPROST 0.04 MG/ML
SOLUTION/ DROPS OPHTHALMIC
Refills: 6 | COMMUNITY
Start: 2018-11-28 | End: 2019-09-28

## 2019-01-22 NOTE — PROGRESS NOTES
Chief Complaint   Patient presents with   • Hypertension   • Shoulder Pain   • Back Pain       HISTORY OF THE PRESENT ILLNESS: Patient is a 89 y.o. female.     Patient comes in with her daughter for follow-up on chronic issues.  She is been feeling well.  Her only complaint is problems with balance.  This is been an ongoing issue.  She is using a walker.  There has been no falls.    Hypertension-stable on lisinopril, amlodipine and metoprolol.    Reflux-stable on Prilosec.  She takes 20 mg daily.    Chronic back and shoulder pain-stable.  She sees Dr. Baker as needed.  She is on no pain medications.  She had recent imaging that showed severe osteoarthritis in both the right and left shoulder.  She has been receiving injections with good results.    Hypothyroid-clinically euthyroid.    Laboratory from October was reviewed.  Her conference of metabolic is normal other than chronically elevated alkaline phosphatase.  Her LDL cholesterol is above goal at 136 but no treatment is indicated based on age and lack of vascular disease.  Her thyroid function studies were normal.    Allergies: Flagyl [metronidazole hcl]; Keflex; Morphine; and Sulfa drugs    Current Outpatient Prescriptions Ordered in [facility]   Medication Sig Dispense Refill   • lisinopril (PRINIVIL) 10 MG Tab TAKE 1 TABLET BY MOUTH EVERY 12 HOURS 180 Tab 3   • omeprazole (PRILOSEC) 20 MG delayed-release capsule TAKE ONE CAPSULE BY MOUTH TWICE A DAY 30MINUTES BEFORE BREAKFAST AND DINNER 180 Cap 3   • metoprolol SR (TOPROL XL) 25 MG TABLET SR 24 HR TAKE 1 TAB BY MOUTH EVERY DAY. 90 Tab 1   • levothyroxine (SYNTHROID) 75 MCG Tab TAKE 1 TAB BY MOUTH EVERY DAY. 90 Tab 3   • amLODIPine (NORVASC) 5 MG Tab TAKE 1 TABLET BY MOUTH ONCE DAILY 90 Tab 3   • fluticasone (FLONASE) 50 MCG/ACT nasal spray ONE SPRAY EACH NOSTRIL TWO TIMES PER DAY. 1 Bottle 11   • acyclovir (ZOVIRAX) 5 % Ointment Apply to affected area(s) every 3 hours. 15 g 1   • meclizine (ANTIVERT) 25 MG Tab TAKE  "1 TAB BY MOUTH 3 TIMES A DAY AS NEEDED FOR DIZZINESS. 30 Tab 0   • azelastine (ASTELIN) 137 MCG/SPRAY nasal spray INSTILL 1 SPRAY INTO NOSTRIL TWICE DAILY 90 [place] 2   • Cholecalciferol (VITAMIN D PO) Take 1 Tab by mouth every day.     • travoprost (TRAVATAN) 0.004 % SOLN Place 1 Drop in both eyes every evening.     • nitrofurantoin monohydr macro (MACROBID) 100 MG Cap Take 1 Cap by mouth every 12 hours for 7 days. 14 Cap 0     No current Epic-ordered facility-administered medications on file.        Past medical history, social history and family history were reviewed from chart today    Review of systems: Per HPI.    Denies headache, chest pain, fever, chills, diarrhea, constipation, abdominal pain, palpitations, depression   All others negative.     Exam: Blood pressure 130/74, pulse 62, temperature 36.6 °C (97.8 °F), temperature source Temporal, resp. rate 16, height 1.575 m (5' 2\"), weight 66.2 kg (146 lb), SpO2 92 %.  General: Well-appearing. Well-developed. No signs of distress.  HEENT: Grossly normal. Oral cavity is pink and moist.  Neck: Supple without JVD or bruit.  Pulmonary: Clear with good breath sounds. Normal effort.  Cardiovascular: Regular. Carotid and radial pulses are intact.  Abdomen: Soft, nontender, nondistended. Spleen and liver are not enlarged.  Neurologic: Cranial nerves II through XII are grossly normal, alert and oriented x3      Assessment/Plan  1. Essential hypertension     2. Gastroesophageal reflux disease without esophagitis     3. Urinary frequency  POCT Urinalysis   4. Abnormal urinalysis  URINE CULTURE(NEW)   5. Spondylolisthesis of lumbar region     6. Primary osteoarthritis of both shoulders         Blood pressure stable.  No change in treatment  Abnormal urine analysis.  Sent for culture.  Treatment is indicated.  Continue to follow-up with physiatry for back and shoulder issues.  Reflux stable on Prevacid.  No change in treatment.  Continue using walker for balance issues.  If " she has falls then she should contact office and at that time would recommend physical therapy.  She has done this in the past.

## 2019-02-22 RX ORDER — METOPROLOL SUCCINATE 25 MG/1
TABLET, EXTENDED RELEASE ORAL
Qty: 90 TAB | Refills: 3 | Status: ON HOLD | OUTPATIENT
Start: 2019-02-22 | End: 2019-08-25

## 2019-03-04 DIAGNOSIS — R35.0 URINARY FREQUENCY: ICD-10-CM

## 2019-03-05 ENCOUNTER — TELEPHONE (OUTPATIENT)
Dept: INTERNAL MEDICINE | Facility: IMAGING CENTER | Age: 84
End: 2019-03-05

## 2019-03-05 NOTE — TELEPHONE ENCOUNTER
Patient states that she has started Cipro due to frequent urination prior to obtaining specimen.  Dr Otero aware.  Instructed to continue Cipro BID X 11 doses, which she has at home.  If the symptoms do not improve, Dr Otero would like to obtain urine culture.  Gloria acknowledges plan.

## 2019-03-14 ENCOUNTER — HOSPITAL ENCOUNTER (OUTPATIENT)
Dept: LAB | Facility: MEDICAL CENTER | Age: 84
End: 2019-03-14
Attending: INTERNAL MEDICINE
Payer: MEDICARE

## 2019-03-14 DIAGNOSIS — R35.0 URINARY FREQUENCY: ICD-10-CM

## 2019-03-14 LAB
APPEARANCE UR: CLEAR
BILIRUB UR QL STRIP.AUTO: NEGATIVE
COLOR UR: YELLOW
GLUCOSE UR STRIP.AUTO-MCNC: NEGATIVE MG/DL
KETONES UR STRIP.AUTO-MCNC: NEGATIVE MG/DL
LEUKOCYTE ESTERASE UR QL STRIP.AUTO: NEGATIVE
MICRO URNS: NORMAL
NITRITE UR QL STRIP.AUTO: NEGATIVE
PH UR STRIP.AUTO: 6.5 [PH]
PROT UR QL STRIP: NEGATIVE MG/DL
RBC UR QL AUTO: NEGATIVE
SP GR UR STRIP.AUTO: 1.01
UROBILINOGEN UR STRIP.AUTO-MCNC: 0.2 MG/DL

## 2019-03-14 PROCEDURE — 81003 URINALYSIS AUTO W/O SCOPE: CPT

## 2019-03-15 ENCOUNTER — TELEPHONE (OUTPATIENT)
Dept: INTERNAL MEDICINE | Facility: IMAGING CENTER | Age: 84
End: 2019-03-15

## 2019-03-18 ENCOUNTER — NON-PROVIDER VISIT (OUTPATIENT)
Dept: INTERNAL MEDICINE | Facility: IMAGING CENTER | Age: 84
End: 2019-03-18
Payer: MEDICARE

## 2019-03-18 ENCOUNTER — HOSPITAL ENCOUNTER (OUTPATIENT)
Facility: MEDICAL CENTER | Age: 84
End: 2019-03-18
Attending: INTERNAL MEDICINE
Payer: MEDICARE

## 2019-03-18 DIAGNOSIS — I10 ESSENTIAL HYPERTENSION: ICD-10-CM

## 2019-03-18 DIAGNOSIS — E03.9 HYPOTHYROIDISM, UNSPECIFIED TYPE: ICD-10-CM

## 2019-03-18 DIAGNOSIS — E78.00 PURE HYPERCHOLESTEROLEMIA: ICD-10-CM

## 2019-03-18 DIAGNOSIS — M81.6 LOCALIZED OSTEOPOROSIS WITHOUT CURRENT PATHOLOGICAL FRACTURE: ICD-10-CM

## 2019-03-18 DIAGNOSIS — R35.0 URINE FREQUENCY: ICD-10-CM

## 2019-03-18 LAB
25(OH)D3 SERPL-MCNC: 27 NG/ML (ref 30–100)
ALBUMIN SERPL BCP-MCNC: 3.9 G/DL (ref 3.2–4.9)
ALBUMIN/GLOB SERPL: 1.4 G/DL
ALP SERPL-CCNC: 136 U/L (ref 30–99)
ALT SERPL-CCNC: 11 U/L (ref 2–50)
ANION GAP SERPL CALC-SCNC: 6 MMOL/L (ref 0–11.9)
AST SERPL-CCNC: 19 U/L (ref 12–45)
BASOPHILS # BLD AUTO: 1 % (ref 0–1.8)
BASOPHILS # BLD: 0.05 K/UL (ref 0–0.12)
BILIRUB SERPL-MCNC: 0.6 MG/DL (ref 0.1–1.5)
BUN SERPL-MCNC: 24 MG/DL (ref 8–22)
CALCIUM SERPL-MCNC: 8.9 MG/DL (ref 8.5–10.5)
CHLORIDE SERPL-SCNC: 108 MMOL/L (ref 96–112)
CHOLEST SERPL-MCNC: 183 MG/DL (ref 100–199)
CO2 SERPL-SCNC: 25 MMOL/L (ref 20–33)
CREAT SERPL-MCNC: 0.84 MG/DL (ref 0.5–1.4)
EOSINOPHIL # BLD AUTO: 0.22 K/UL (ref 0–0.51)
EOSINOPHIL NFR BLD: 4.3 % (ref 0–6.9)
ERYTHROCYTE [DISTWIDTH] IN BLOOD BY AUTOMATED COUNT: 46.2 FL (ref 35.9–50)
GLOBULIN SER CALC-MCNC: 2.8 G/DL (ref 1.9–3.5)
GLUCOSE SERPL-MCNC: 105 MG/DL (ref 65–99)
HCT VFR BLD AUTO: 44.2 % (ref 37–47)
HDLC SERPL-MCNC: 57 MG/DL
HGB BLD-MCNC: 13.7 G/DL (ref 12–16)
IMM GRANULOCYTES # BLD AUTO: 0.01 K/UL (ref 0–0.11)
IMM GRANULOCYTES NFR BLD AUTO: 0.2 % (ref 0–0.9)
LDLC SERPL CALC-MCNC: 109 MG/DL
LYMPHOCYTES # BLD AUTO: 1.37 K/UL (ref 1–4.8)
LYMPHOCYTES NFR BLD: 26.9 % (ref 22–41)
MCH RBC QN AUTO: 25.9 PG (ref 27–33)
MCHC RBC AUTO-ENTMCNC: 31 G/DL (ref 33.6–35)
MCV RBC AUTO: 83.7 FL (ref 81.4–97.8)
MONOCYTES # BLD AUTO: 0.46 K/UL (ref 0–0.85)
MONOCYTES NFR BLD AUTO: 9 % (ref 0–13.4)
NEUTROPHILS # BLD AUTO: 2.99 K/UL (ref 2–7.15)
NEUTROPHILS NFR BLD: 58.6 % (ref 44–72)
NRBC # BLD AUTO: 0 K/UL
NRBC BLD-RTO: 0 /100 WBC
PLATELET # BLD AUTO: 247 K/UL (ref 164–446)
PMV BLD AUTO: 10.3 FL (ref 9–12.9)
POTASSIUM SERPL-SCNC: 4.2 MMOL/L (ref 3.6–5.5)
PROT SERPL-MCNC: 6.7 G/DL (ref 6–8.2)
RBC # BLD AUTO: 5.28 M/UL (ref 4.2–5.4)
SODIUM SERPL-SCNC: 139 MMOL/L (ref 135–145)
T4 FREE SERPL-MCNC: 1.52 NG/DL (ref 0.53–1.43)
TRIGL SERPL-MCNC: 87 MG/DL (ref 0–149)
TSH SERPL DL<=0.005 MIU/L-ACNC: 0.52 UIU/ML (ref 0.38–5.33)
WBC # BLD AUTO: 5.1 K/UL (ref 4.8–10.8)

## 2019-03-18 PROCEDURE — 82306 VITAMIN D 25 HYDROXY: CPT

## 2019-03-18 PROCEDURE — 84439 ASSAY OF FREE THYROXINE: CPT

## 2019-03-18 PROCEDURE — 80053 COMPREHEN METABOLIC PANEL: CPT

## 2019-03-18 PROCEDURE — 84443 ASSAY THYROID STIM HORMONE: CPT

## 2019-03-18 PROCEDURE — 85025 COMPLETE CBC W/AUTO DIFF WBC: CPT

## 2019-03-18 PROCEDURE — 80061 LIPID PANEL: CPT

## 2019-04-23 ENCOUNTER — HOSPITAL ENCOUNTER (OUTPATIENT)
Facility: MEDICAL CENTER | Age: 84
End: 2019-04-23
Attending: INTERNAL MEDICINE
Payer: MEDICARE

## 2019-04-23 ENCOUNTER — OFFICE VISIT (OUTPATIENT)
Dept: INTERNAL MEDICINE | Facility: IMAGING CENTER | Age: 84
End: 2019-04-23
Payer: MEDICARE

## 2019-04-23 VITALS
WEIGHT: 142 LBS | RESPIRATION RATE: 16 BRPM | TEMPERATURE: 98.3 F | DIASTOLIC BLOOD PRESSURE: 78 MMHG | HEIGHT: 62 IN | SYSTOLIC BLOOD PRESSURE: 110 MMHG | HEART RATE: 64 BPM | OXYGEN SATURATION: 93 % | BODY MASS INDEX: 26.13 KG/M2

## 2019-04-23 DIAGNOSIS — K21.9 GASTROESOPHAGEAL REFLUX DISEASE WITHOUT ESOPHAGITIS: ICD-10-CM

## 2019-04-23 DIAGNOSIS — R82.90 ABNORMAL URINALYSIS: ICD-10-CM

## 2019-04-23 DIAGNOSIS — M19.011 PRIMARY OSTEOARTHRITIS OF BOTH SHOULDERS: ICD-10-CM

## 2019-04-23 DIAGNOSIS — I10 ESSENTIAL HYPERTENSION: ICD-10-CM

## 2019-04-23 DIAGNOSIS — R31.9 HEMATURIA, UNSPECIFIED TYPE: ICD-10-CM

## 2019-04-23 DIAGNOSIS — R35.0 URINARY FREQUENCY: ICD-10-CM

## 2019-04-23 DIAGNOSIS — F41.1 GENERALIZED ANXIETY DISORDER: ICD-10-CM

## 2019-04-23 DIAGNOSIS — M43.16 SPONDYLOLISTHESIS OF LUMBAR REGION: ICD-10-CM

## 2019-04-23 DIAGNOSIS — M19.012 PRIMARY OSTEOARTHRITIS OF BOTH SHOULDERS: ICD-10-CM

## 2019-04-23 LAB
APPEARANCE UR: NORMAL
BILIRUB UR STRIP-MCNC: NORMAL MG/DL
COLOR UR AUTO: YELLOW
CREAT UR-MCNC: 74.5 MG/DL
GLUCOSE UR STRIP.AUTO-MCNC: NORMAL MG/DL
KETONES UR STRIP.AUTO-MCNC: NORMAL MG/DL
LEUKOCYTE ESTERASE UR QL STRIP.AUTO: NORMAL
MICROALBUMIN UR-MCNC: 2 MG/DL
MICROALBUMIN/CREAT UR: 27 MG/G (ref 0–30)
NITRITE UR QL STRIP.AUTO: POSITIVE
PH UR STRIP.AUTO: 6 [PH] (ref 5–8)
PROT UR QL STRIP: NORMAL MG/DL
RBC UR QL AUTO: NORMAL
SP GR UR STRIP.AUTO: <=1.005
UROBILINOGEN UR STRIP-MCNC: NORMAL MG/DL

## 2019-04-23 PROCEDURE — 87186 SC STD MICRODIL/AGAR DIL: CPT

## 2019-04-23 PROCEDURE — 87077 CULTURE AEROBIC IDENTIFY: CPT

## 2019-04-23 PROCEDURE — 82570 ASSAY OF URINE CREATININE: CPT

## 2019-04-23 PROCEDURE — 99214 OFFICE O/P EST MOD 30 MIN: CPT | Performed by: INTERNAL MEDICINE

## 2019-04-23 PROCEDURE — 82043 UR ALBUMIN QUANTITATIVE: CPT

## 2019-04-23 PROCEDURE — 87086 URINE CULTURE/COLONY COUNT: CPT

## 2019-04-23 PROCEDURE — 81002 URINALYSIS NONAUTO W/O SCOPE: CPT | Performed by: INTERNAL MEDICINE

## 2019-04-23 NOTE — PROGRESS NOTES
"Chief Complaint   Patient presents with   • Hypertension   • Urinary Frequency       HISTORY OF THE PRESENT ILLNESS: Patient is a 89 y.o. female.     Patient comes in for follow-up on her chronic issues.  She is here with her daughter today.    She has ongoing urinary frequency.  Her urinalysis today was suggestive of infection with trace hemolyzed blood and nitrates.  She has a history of recurrent urinary tract infections.    She is a history of hypertension.  This is been stable on her current medications.  Her medication list was reviewed with her today.    We spent constable time discussing her arthritic/orthopedic issues.  Since her last visit she has seen Dr. Baker on multiple occasions.  She received injections in the left shoulder without benefit.  She is also been through physical therapy which she felt exacerbated the condition.  She is diagnosed with severe bilateral shoulder arthritis.  Dr. Baker told her there is nothing more she can do and recommended Tylenol.  She also recommended a trial of Celebrex or possibly tramadol.  Patient has been on both in the past.  The tramadol was discontinued when she was admitted to the hospital for for possible TIA it was felt to be a possible side effect of the medication.  The Celebrex was discontinued after she had heme positive stool and nausea.  She was seen by GI and started on chronic proton pump inhibitor therapy.  Her daughter reports that GI felt it was probably hemorrhoidal in etiology.    The shoulder and back pain are affecting her quality of life.  It is affecting her balance.  She is using a walker.  She has been partially compliant with the Tylenol therapy recommended.    We discussed her reflux history.  She remains on proton pump inhibitor.  She is tolerating without side effects.  She reports it is her \"miracle drug\"    Lastly, we discussed her generalized anxiety.  I recommend a trial of SSRI therapy in the past but she is refused because of fear " "of side effects.  We discussed short acting medications including benzodiazepines but I do not recommend because of her balance issue as well as avoiding benzodiazepine in elderly patients.    Allergies: Flagyl [metronidazole hcl]; Keflex; Morphine; and Sulfa drugs    Current Outpatient Prescriptions Ordered in [facility]   Medication Sig Dispense Refill   • metoprolol SR (TOPROL XL) 25 MG TABLET SR 24 HR TAKE 1 TABLET BY MOUTH EVERY DAY 90 Tab 3   • lisinopril (PRINIVIL) 20 MG Tab Take 10 mg by mouth 2 Times a Day.  3   • omeprazole (PRILOSEC) 20 MG delayed-release capsule TAKE ONE CAPSULE BY MOUTH TWICE A DAY 30MINUTES BEFORE BREAKFAST AND DINNER 180 Cap 3   • levothyroxine (SYNTHROID) 75 MCG Tab TAKE 1 TAB BY MOUTH EVERY DAY. 90 Tab 3   • amLODIPine (NORVASC) 5 MG Tab TAKE 1 TABLET BY MOUTH ONCE DAILY 90 Tab 3   • TRAVATAN Z 0.004 % Solution INSTILL 1 DROP INTO BOTH EYES AT BEDTIME  6   • fluticasone (FLONASE) 50 MCG/ACT nasal spray ONE SPRAY EACH NOSTRIL TWO TIMES PER DAY. 1 Bottle 11   • acyclovir (ZOVIRAX) 5 % Ointment Apply to affected area(s) every 3 hours. 15 g 1   • meclizine (ANTIVERT) 25 MG Tab TAKE 1 TAB BY MOUTH 3 TIMES A DAY AS NEEDED FOR DIZZINESS. 30 Tab 0   • azelastine (ASTELIN) 137 MCG/SPRAY nasal spray INSTILL 1 SPRAY INTO NOSTRIL TWICE DAILY 90 [place] 2   • Cholecalciferol (VITAMIN D PO) Take 1 Tab by mouth every day.     • travoprost (TRAVATAN) 0.004 % SOLN Place 1 Drop in both eyes every evening.       No current Epic-ordered facility-administered medications on file.        Past medical history, social history and family history were reviewed from chart today    Review of systems: Per HPI.    Denies headache, chest pain, fever, chills, diarrhea, constipation, abdominal pain, palpitations, depression   All others negative.     Exam: /78 (BP Location: Left arm, Patient Position: Sitting, BP Cuff Size: Adult)   Pulse 64   Temp 36.8 °C (98.3 °F) (Temporal)   Resp 16   Ht 1.575 m (5' 2\")   Wt " 64.4 kg (142 lb)   SpO2 93%   General: Well-appearing. Well-developed. No signs of distress.  She is walking with a walker.  HEENT: Grossly normal. Oral cavity is pink and moist.  Neck: Supple without JVD or bruit.  Pulmonary: Clear with good breath sounds. Normal effort.  Cardiovascular: Regular. Carotid and radial pulses are intact.  Abdomen: Soft, nontender, nondistended. Spleen and liver are not enlarged.  Neurologic: Cranial nerves II through XII are grossly normal, alert and oriented x3      Diagnosis:  1. Urinary frequency  POCT Urinalysis   2. Abnormal urinalysis  URINE CULTURE(NEW)   3. Hematuria, unspecified type  URINE CULTURE(NEW)   4. Essential hypertension     5. Primary osteoarthritis of both shoulders     6. Spondylolisthesis of lumbar region     7. Gastroesophageal reflux disease without esophagitis     8. Generalized anxiety disorder         Treat urinary tract infection based on culture results.  Blood pressure stable no change in treatment.  Trial of extra strength Tylenol in the morning, afternoon and 2 tablets at bedtime.  If this does not prove beneficial consider trial of Celebrex.  Continue proton pump inhibitor for reflux.  She is nitrogen treatment for her anxiety.  Her daughter acknowledges that the patient has anxiety and she wishes that she would get treatment but understands that she is not interested in further medications.

## 2019-04-25 DIAGNOSIS — N30.00 ACUTE CYSTITIS WITHOUT HEMATURIA: ICD-10-CM

## 2019-04-25 RX ORDER — NITROFURANTOIN 25; 75 MG/1; MG/1
100 CAPSULE ORAL EVERY 12 HOURS
Qty: 14 CAP | Refills: 0 | Status: SHIPPED
Start: 2019-04-25 | End: 2019-05-29 | Stop reason: SDUPTHER

## 2019-05-17 ENCOUNTER — HOSPITAL ENCOUNTER (OUTPATIENT)
Dept: RADIOLOGY | Facility: MEDICAL CENTER | Age: 84
End: 2019-05-17
Attending: INTERNAL MEDICINE
Payer: MEDICARE

## 2019-05-17 DIAGNOSIS — Z12.31 VISIT FOR SCREENING MAMMOGRAM: ICD-10-CM

## 2019-05-17 PROCEDURE — 77063 BREAST TOMOSYNTHESIS BI: CPT

## 2019-05-22 ENCOUNTER — TELEPHONE (OUTPATIENT)
Dept: INTERNAL MEDICINE | Facility: IMAGING CENTER | Age: 84
End: 2019-05-22

## 2019-05-29 ENCOUNTER — NON-PROVIDER VISIT (OUTPATIENT)
Dept: INTERNAL MEDICINE | Facility: IMAGING CENTER | Age: 84
End: 2019-05-29
Payer: MEDICARE

## 2019-05-29 ENCOUNTER — HOSPITAL ENCOUNTER (OUTPATIENT)
Facility: MEDICAL CENTER | Age: 84
End: 2019-05-29
Attending: INTERNAL MEDICINE
Payer: MEDICARE

## 2019-05-29 DIAGNOSIS — N30.00 ACUTE CYSTITIS WITHOUT HEMATURIA: ICD-10-CM

## 2019-05-29 DIAGNOSIS — R30.0 DYSURIA: ICD-10-CM

## 2019-05-29 DIAGNOSIS — R82.90 ABNORMAL URINALYSIS: ICD-10-CM

## 2019-05-29 DIAGNOSIS — R82.998 LEUKOCYTES IN URINE: ICD-10-CM

## 2019-05-29 LAB
APPEARANCE UR: CLEAR
BILIRUB UR STRIP-MCNC: NORMAL MG/DL
COLOR UR AUTO: YELLOW
GLUCOSE UR STRIP.AUTO-MCNC: NORMAL MG/DL
KETONES UR STRIP.AUTO-MCNC: NORMAL MG/DL
LEUKOCYTE ESTERASE UR QL STRIP.AUTO: NORMAL
NITRITE UR QL STRIP.AUTO: POSITIVE
PH UR STRIP.AUTO: 5.5 [PH] (ref 5–8)
PROT UR QL STRIP: NORMAL MG/DL
RBC UR QL AUTO: NORMAL
SP GR UR STRIP.AUTO: 1.01
UROBILINOGEN UR STRIP-MCNC: NORMAL MG/DL

## 2019-05-29 PROCEDURE — 81002 URINALYSIS NONAUTO W/O SCOPE: CPT | Performed by: INTERNAL MEDICINE

## 2019-05-29 PROCEDURE — 87086 URINE CULTURE/COLONY COUNT: CPT

## 2019-05-29 PROCEDURE — 87186 SC STD MICRODIL/AGAR DIL: CPT

## 2019-05-29 PROCEDURE — 87077 CULTURE AEROBIC IDENTIFY: CPT

## 2019-05-29 RX ORDER — NITROFURANTOIN 25; 75 MG/1; MG/1
100 CAPSULE ORAL EVERY 12 HOURS
Qty: 14 CAP | Refills: 0 | Status: SHIPPED | OUTPATIENT
Start: 2019-05-29 | End: 2019-06-12 | Stop reason: SDUPTHER

## 2019-06-12 DIAGNOSIS — N30.00 ACUTE CYSTITIS WITHOUT HEMATURIA: ICD-10-CM

## 2019-06-12 RX ORDER — NITROFURANTOIN 25; 75 MG/1; MG/1
100 CAPSULE ORAL EVERY 12 HOURS
Qty: 14 CAP | Refills: 0 | Status: SHIPPED | OUTPATIENT
Start: 2019-06-12 | End: 2019-06-19

## 2019-06-12 NOTE — TELEPHONE ENCOUNTER
Gloria states that she has no way of getting here today to give us a urine sample.  She states that she is having frequency and burning again.  It just started up again last night.

## 2019-06-22 DIAGNOSIS — N30.00 ACUTE CYSTITIS WITHOUT HEMATURIA: ICD-10-CM

## 2019-06-22 DIAGNOSIS — A49.8 E COLI INFECTION: ICD-10-CM

## 2019-06-22 RX ORDER — CIPROFLOXACIN 250 MG/1
250 TABLET, FILM COATED ORAL 2 TIMES DAILY
Qty: 14 TAB | Refills: 0 | Status: SHIPPED | OUTPATIENT
Start: 2019-06-22 | End: 2019-08-24

## 2019-06-25 DIAGNOSIS — R35.0 URINARY FREQUENCY: ICD-10-CM

## 2019-06-25 DIAGNOSIS — M79.672 FOOT PAIN, LEFT: ICD-10-CM

## 2019-06-25 DIAGNOSIS — G47.00 INSOMNIA, UNSPECIFIED TYPE: ICD-10-CM

## 2019-06-25 RX ORDER — TRAZODONE HYDROCHLORIDE 50 MG/1
25 TABLET ORAL
Qty: 15 TAB | Refills: 3 | Status: SHIPPED | OUTPATIENT
Start: 2019-06-25 | End: 2019-08-24

## 2019-07-01 ENCOUNTER — NON-PROVIDER VISIT (OUTPATIENT)
Dept: INTERNAL MEDICINE | Facility: IMAGING CENTER | Age: 84
End: 2019-07-01
Payer: MEDICARE

## 2019-07-01 DIAGNOSIS — R35.0 URINARY FREQUENCY: ICD-10-CM

## 2019-07-01 PROCEDURE — 81002 URINALYSIS NONAUTO W/O SCOPE: CPT | Performed by: FAMILY MEDICINE

## 2019-07-02 ENCOUNTER — TELEPHONE (OUTPATIENT)
Dept: INTERNAL MEDICINE | Facility: IMAGING CENTER | Age: 84
End: 2019-07-02

## 2019-07-14 ENCOUNTER — APPOINTMENT (OUTPATIENT)
Dept: RADIOLOGY | Facility: MEDICAL CENTER | Age: 84
End: 2019-07-14
Attending: EMERGENCY MEDICINE
Payer: MEDICARE

## 2019-07-14 ENCOUNTER — HOSPITAL ENCOUNTER (EMERGENCY)
Facility: MEDICAL CENTER | Age: 84
End: 2019-07-14
Attending: EMERGENCY MEDICINE
Payer: MEDICARE

## 2019-07-14 VITALS
SYSTOLIC BLOOD PRESSURE: 151 MMHG | RESPIRATION RATE: 21 BRPM | HEART RATE: 64 BPM | OXYGEN SATURATION: 97 % | WEIGHT: 141.98 LBS | TEMPERATURE: 98.4 F | BODY MASS INDEX: 24.24 KG/M2 | DIASTOLIC BLOOD PRESSURE: 59 MMHG | HEIGHT: 64 IN

## 2019-07-14 DIAGNOSIS — R55 NEAR SYNCOPE: ICD-10-CM

## 2019-07-14 DIAGNOSIS — M53.3 SACRAL BACK PAIN: ICD-10-CM

## 2019-07-14 DIAGNOSIS — M47.818 OSTEOARTHRITIS OF SACRAL AND SACROCOCCYGEAL SPINAL REGION, UNSPECIFIED SPINAL OSTEOARTHRITIS COMPLICATION STATUS: ICD-10-CM

## 2019-07-14 DIAGNOSIS — N39.0 URINARY TRACT INFECTION WITHOUT HEMATURIA, SITE UNSPECIFIED: ICD-10-CM

## 2019-07-14 LAB
ALBUMIN SERPL BCP-MCNC: 3.8 G/DL (ref 3.2–4.9)
ALBUMIN/GLOB SERPL: 1.3 G/DL
ALP SERPL-CCNC: 81 U/L (ref 30–99)
ALT SERPL-CCNC: 9 U/L (ref 2–50)
ANION GAP SERPL CALC-SCNC: 7 MMOL/L (ref 0–11.9)
APPEARANCE UR: CLEAR
AST SERPL-CCNC: 13 U/L (ref 12–45)
BACTERIA #/AREA URNS HPF: NEGATIVE /HPF
BASOPHILS # BLD AUTO: 0.7 % (ref 0–1.8)
BASOPHILS # BLD: 0.04 K/UL (ref 0–0.12)
BILIRUB SERPL-MCNC: 0.4 MG/DL (ref 0.1–1.5)
BILIRUB UR QL STRIP.AUTO: NEGATIVE
BUN SERPL-MCNC: 18 MG/DL (ref 8–22)
CALCIUM SERPL-MCNC: 8.7 MG/DL (ref 8.5–10.5)
CHLORIDE SERPL-SCNC: 107 MMOL/L (ref 96–112)
CO2 SERPL-SCNC: 25 MMOL/L (ref 20–33)
COLOR UR: YELLOW
COMMENT 1642: NORMAL
CREAT SERPL-MCNC: 0.78 MG/DL (ref 0.5–1.4)
EKG IMPRESSION: NORMAL
EOSINOPHIL # BLD AUTO: 0.17 K/UL (ref 0–0.51)
EOSINOPHIL NFR BLD: 2.9 % (ref 0–6.9)
EPI CELLS #/AREA URNS HPF: NEGATIVE /HPF
ERYTHROCYTE [DISTWIDTH] IN BLOOD BY AUTOMATED COUNT: 43.8 FL (ref 35.9–50)
GLOBULIN SER CALC-MCNC: 2.9 G/DL (ref 1.9–3.5)
GLUCOSE SERPL-MCNC: 106 MG/DL (ref 65–99)
GLUCOSE UR STRIP.AUTO-MCNC: NEGATIVE MG/DL
HCT VFR BLD AUTO: 43 % (ref 37–47)
HGB BLD-MCNC: 13.6 G/DL (ref 12–16)
HYALINE CASTS #/AREA URNS LPF: ABNORMAL /LPF
IMM GRANULOCYTES # BLD AUTO: 0.02 K/UL (ref 0–0.11)
IMM GRANULOCYTES NFR BLD AUTO: 0.3 % (ref 0–0.9)
KETONES UR STRIP.AUTO-MCNC: NEGATIVE MG/DL
LEUKOCYTE ESTERASE UR QL STRIP.AUTO: ABNORMAL
LYMPHOCYTES # BLD AUTO: 1.53 K/UL (ref 1–4.8)
LYMPHOCYTES NFR BLD: 26.4 % (ref 22–41)
MCH RBC QN AUTO: 26.5 PG (ref 27–33)
MCHC RBC AUTO-ENTMCNC: 31.6 G/DL (ref 33.6–35)
MCV RBC AUTO: 83.7 FL (ref 81.4–97.8)
MICRO URNS: ABNORMAL
MONOCYTES # BLD AUTO: 0.51 K/UL (ref 0–0.85)
MONOCYTES NFR BLD AUTO: 8.8 % (ref 0–13.4)
MORPHOLOGY BLD-IMP: NORMAL
NEUTROPHILS # BLD AUTO: 3.52 K/UL (ref 2–7.15)
NEUTROPHILS NFR BLD: 60.9 % (ref 44–72)
NITRITE UR QL STRIP.AUTO: NEGATIVE
NRBC # BLD AUTO: 0 K/UL
NRBC BLD-RTO: 0 /100 WBC
PH UR STRIP.AUTO: 6.5 [PH]
PLATELET # BLD AUTO: 216 K/UL (ref 164–446)
PMV BLD AUTO: 9.9 FL (ref 9–12.9)
POTASSIUM SERPL-SCNC: 3.9 MMOL/L (ref 3.6–5.5)
PROT SERPL-MCNC: 6.7 G/DL (ref 6–8.2)
PROT UR QL STRIP: NEGATIVE MG/DL
RBC # BLD AUTO: 5.14 M/UL (ref 4.2–5.4)
RBC # URNS HPF: ABNORMAL /HPF
RBC UR QL AUTO: NEGATIVE
SODIUM SERPL-SCNC: 139 MMOL/L (ref 135–145)
SP GR UR STRIP.AUTO: 1.01
TROPONIN T SERPL-MCNC: 13 NG/L (ref 6–19)
UROBILINOGEN UR STRIP.AUTO-MCNC: 0.2 MG/DL
WBC # BLD AUTO: 5.8 K/UL (ref 4.8–10.8)
WBC #/AREA URNS HPF: ABNORMAL /HPF

## 2019-07-14 PROCEDURE — 96365 THER/PROPH/DIAG IV INF INIT: CPT

## 2019-07-14 PROCEDURE — 87086 URINE CULTURE/COLONY COUNT: CPT

## 2019-07-14 PROCEDURE — 700105 HCHG RX REV CODE 258: Performed by: EMERGENCY MEDICINE

## 2019-07-14 PROCEDURE — 700111 HCHG RX REV CODE 636 W/ 250 OVERRIDE (IP): Performed by: EMERGENCY MEDICINE

## 2019-07-14 PROCEDURE — 84484 ASSAY OF TROPONIN QUANT: CPT

## 2019-07-14 PROCEDURE — 72220 X-RAY EXAM SACRUM TAILBONE: CPT

## 2019-07-14 PROCEDURE — 80053 COMPREHEN METABOLIC PANEL: CPT

## 2019-07-14 PROCEDURE — 36415 COLL VENOUS BLD VENIPUNCTURE: CPT

## 2019-07-14 PROCEDURE — 93005 ELECTROCARDIOGRAM TRACING: CPT | Performed by: EMERGENCY MEDICINE

## 2019-07-14 PROCEDURE — 85025 COMPLETE CBC W/AUTO DIFF WBC: CPT

## 2019-07-14 PROCEDURE — 99285 EMERGENCY DEPT VISIT HI MDM: CPT

## 2019-07-14 PROCEDURE — 81001 URINALYSIS AUTO W/SCOPE: CPT

## 2019-07-14 RX ORDER — NITROFURANTOIN 25; 75 MG/1; MG/1
100 CAPSULE ORAL 2 TIMES DAILY
Qty: 10 CAP | Refills: 0 | Status: SHIPPED | OUTPATIENT
Start: 2019-07-14 | End: 2019-08-19 | Stop reason: SDUPTHER

## 2019-07-14 RX ADMIN — CEFTRIAXONE SODIUM 1 G: 1 INJECTION, POWDER, FOR SOLUTION INTRAMUSCULAR; INTRAVENOUS at 12:31

## 2019-07-14 ASSESSMENT — ENCOUNTER SYMPTOMS
HEADACHES: 0
FALLS: 0
VOMITING: 0
PALPITATIONS: 0
COUGH: 0
ABDOMINAL PAIN: 0
CHILLS: 0
FEVER: 0
DIARRHEA: 0
NAUSEA: 0
FLANK PAIN: 0
SHORTNESS OF BREATH: 0

## 2019-07-14 ASSESSMENT — LIFESTYLE VARIABLES: DO YOU DRINK ALCOHOL: NO

## 2019-07-14 NOTE — ED NOTES
PIV removed and bandaged.  Gloria V Patel discharged via wheekchair with RN to family in lobby driving home.  Discharge instructions given and reviewed, patient educated to follow up with PCP, verbalized understanding.  Prescriptions given x 1 - electronic .  All personal belongings in possession.  No questions at this time.

## 2019-07-14 NOTE — ED NOTES
Family member agitated over wait to be DC'd. Educated regarding time for IVPB ABX to infuse and arrival of pt to another room. Assist RN to bedside to assist with DC and removal of PIV.

## 2019-07-14 NOTE — ED PROVIDER NOTES
"ED Provider Note    ED Provider Note    Primary care provider: Mike Otero M.D.  Means of arrival: EMS  History obtained from: Patient  History limited by: None    CHIEF COMPLAINT  Chief Complaint   Patient presents with   • Near Syncopal   • Urinary Frequency   • Tailbone Pain       HPI  Gloria Patel is a 89 y.o. female who presents to the Emergency Department of urinary frequency.  An episode of near syncope and pain to her left tailbone.  Patient states that about a week ago, she was leaning down to get in the area is been bothering her since then although she is been able to ambulate per her normal.  Patient is actually quite independent.  She lives alone.  She has history of frequent urinary tract infections.  She states even just a week ago, she finished a course of antibiotics and feels as though she likely has another UTI she denies feeling any urinary frequency or dysuria.  No hematuria.  Additionally, she notes this morning, she was sitting down when she suddenly felt \"woozy\".  She had just gotten up to go to the bathroom and had returned to where she was sitting when this occurred.  It resolved and she is not having any time.  She denies any association with chest pain, shortness of breath. No vertiginous symptoms.  No recent trauma or falls.      REVIEW OF SYSTEMS  Review of Systems   Constitutional: Negative for chills and fever.   HENT: Negative for congestion.    Respiratory: Negative for cough and shortness of breath.    Cardiovascular: Negative for chest pain and palpitations.   Gastrointestinal: Negative for abdominal pain, diarrhea, nausea and vomiting.   Genitourinary: Negative for dysuria, flank pain, frequency, hematuria and urgency.   Musculoskeletal: Negative for falls.   Skin: Negative for rash.   Neurological: Negative for headaches.   All other systems reviewed and are negative.      PAST MEDICAL HISTORY   has a past medical history of Arthritis; Diverticulitis; GERD " (gastroesophageal reflux disease); Glaucoma; Heart burn; Hiatus hernia syndrome; Hypertension; Primary osteoarthritis of both shoulders (1/22/2019); and Unspecified urinary incontinence.    SURGICAL HISTORY   has a past surgical history that includes other (1945); other (1954); other orthopedic surgery; other orthopedic surgery (2000); cholecystectomy (1966); other abdominal surgery (1954); gyn surgery (1970); hip arthroplasty total (6/21/2011); and us-needle core bx-breast panel.    SOCIAL HISTORY  Social History   Substance Use Topics   • Smoking status: Never Smoker   • Smokeless tobacco: Never Used   • Alcohol use 0.0 oz/week      Comment: occassional wine      History   Drug Use No       FAMILY HISTORY  Family History   Problem Relation Age of Onset   • Diabetes Unknown    • Heart Disease Unknown    • Lung Disease Unknown    • Cancer Sister        CURRENT MEDICATIONS  Home Medications     Reviewed by Nohemi Beaver R.N. (Registered Nurse) on 07/14/19 at 1022  Med List Status: Complete   Medication Last Dose Status   acyclovir (ZOVIRAX) 5 % Ointment  Active   amLODIPine (NORVASC) 5 MG Tab  Active   azelastine (ASTELIN) 137 MCG/SPRAY nasal spray  Active   Cholecalciferol (VITAMIN D PO)  Active   ciprofloxacin (CIPRO) 250 MG Tab  Active   fluticasone (FLONASE) 50 MCG/ACT nasal spray  Active   levothyroxine (SYNTHROID) 75 MCG Tab  Active   lisinopril (PRINIVIL) 20 MG Tab  Active   meclizine (ANTIVERT) 25 MG Tab  Active   metoprolol SR (TOPROL XL) 25 MG TABLET SR 24 HR  Active   omeprazole (PRILOSEC) 20 MG delayed-release capsule  Active   TRAVATAN Z 0.004 % Solution  Active   travoprost (TRAVATAN) 0.004 % SOLN  Active   traZODone (DESYREL) 50 MG Tab  Active                ALLERGIES  Allergies   Allergen Reactions   • Flagyl [Metronidazole Hcl]    • Keflex    • Morphine Anaphylaxis     anaphylaxis   • Sulfa Drugs Rash     rash       PHYSICAL EXAM  VITAL SIGNS: /59   Pulse 63   Temp 36.9 °C (98.4 °F)  "(Temporal)   Resp (!) 21   Ht 1.626 m (5' 4\")   Wt 64.4 kg (141 lb 15.6 oz)   SpO2 95%   BMI 24.37 kg/m²   Vitals reviewed.  Constitutional: Patient is oriented to person, place, and time. Appears well-developed and well-nourished. No distress.    Head: Normocephalic and atraumatic.   Ears: Normal external ears bilaterally.   Mouth/Throat: Oropharynx is clear and moist  Eyes: Conjunctivae are normal.   Neck: Normal range of motion. Neck supple.  Cardiovascular: Normal rate, regular rhythm and normal heart sounds. Normal peripheral pulses.  Pulmonary/Chest: Effort normal and breath sounds normal. No respiratory distress, no wheezes, rhonchi, or rales. No chest wall tenderness.  Abdominal: Soft. Bowel sounds are normal. There is no tenderness. No rebound or guarding, or peritoneal signs. No CVA tenderness.  Musculoskeletal: No edema and no tenderness.   Neurological: No focal deficits.   Skin: Skin is warm and dry. No erythema. No pallor.   Psychiatric: Patient has a normal mood and affect.     LABS  Results for orders placed or performed during the hospital encounter of 07/14/19   CBC WITH DIFFERENTIAL   Result Value Ref Range    WBC 5.8 4.8 - 10.8 K/uL    RBC 5.14 4.20 - 5.40 M/uL    Hemoglobin 13.6 12.0 - 16.0 g/dL    Hematocrit 43.0 37.0 - 47.0 %    MCV 83.7 81.4 - 97.8 fL    MCH 26.5 (L) 27.0 - 33.0 pg    MCHC 31.6 (L) 33.6 - 35.0 g/dL    RDW 43.8 35.9 - 50.0 fL    Platelet Count 216 164 - 446 K/uL    MPV 9.9 9.0 - 12.9 fL    Neutrophils-Polys 60.90 44.00 - 72.00 %    Lymphocytes 26.40 22.00 - 41.00 %    Monocytes 8.80 0.00 - 13.40 %    Eosinophils 2.90 0.00 - 6.90 %    Basophils 0.70 0.00 - 1.80 %    Immature Granulocytes 0.30 0.00 - 0.90 %    Nucleated RBC 0.00 /100 WBC    Neutrophils (Absolute) 3.52 2.00 - 7.15 K/uL    Lymphs (Absolute) 1.53 1.00 - 4.80 K/uL    Monos (Absolute) 0.51 0.00 - 0.85 K/uL    Eos (Absolute) 0.17 0.00 - 0.51 K/uL    Baso (Absolute) 0.04 0.00 - 0.12 K/uL    Immature Granulocytes " (abs) 0.02 0.00 - 0.11 K/uL    NRBC (Absolute) 0.00 K/uL   COMP METABOLIC PANEL   Result Value Ref Range    Sodium 139 135 - 145 mmol/L    Potassium 3.9 3.6 - 5.5 mmol/L    Chloride 107 96 - 112 mmol/L    Co2 25 20 - 33 mmol/L    Anion Gap 7.0 0.0 - 11.9    Glucose 106 (H) 65 - 99 mg/dL    Bun 18 8 - 22 mg/dL    Creatinine 0.78 0.50 - 1.40 mg/dL    Calcium 8.7 8.5 - 10.5 mg/dL    AST(SGOT) 13 12 - 45 U/L    ALT(SGPT) 9 2 - 50 U/L    Alkaline Phosphatase 81 30 - 99 U/L    Total Bilirubin 0.4 0.1 - 1.5 mg/dL    Albumin 3.8 3.2 - 4.9 g/dL    Total Protein 6.7 6.0 - 8.2 g/dL    Globulin 2.9 1.9 - 3.5 g/dL    A-G Ratio 1.3 g/dL   TROPONIN   Result Value Ref Range    Troponin T 13 6 - 19 ng/L   URINALYSIS CULTURE, IF INDICATED   Result Value Ref Range    Color Yellow     Character Clear     Specific Gravity 1.006 <1.035    Ph 6.5 5.0 - 8.0    Glucose Negative Negative mg/dL    Ketones Negative Negative mg/dL    Protein Negative Negative mg/dL    Bilirubin Negative Negative    Urobilinogen, Urine 0.2 Negative    Nitrite Negative Negative    Leukocyte Esterase Moderate (A) Negative    Occult Blood Negative Negative    Micro Urine Req Microscopic    URINE MICROSCOPIC (W/UA)   Result Value Ref Range    WBC 10-20 (A) /hpf    RBC 0-2 /hpf    Bacteria Negative None /hpf    Epithelial Cells Negative /hpf    Hyaline Cast 0-2 /lpf   ESTIMATED GFR   Result Value Ref Range    GFR If African American >60 >60 mL/min/1.73 m 2    GFR If Non African American >60 >60 mL/min/1.73 m 2   PERIPHERAL SMEAR REVIEW   Result Value Ref Range    Peripheral Smear Review see below    DIFFERENTIAL COMMENT   Result Value Ref Range    Comments-Diff see below    EKG (NOW)   Result Value Ref Range    Report       [facility] Emergency Dept.    Test Date:  2019-07-14  Pt Name:    MARY BRAND              Department: ER  MRN:        [id number]                      Room:        20  Gender:     Female                       Technician:  [id number]  :        [id number]                   Requested By:SANG KRUGER  Order #:    [id number]                    Reading MD: SANG KRUGER DO    Measurements  Intervals                                Axis  Rate:       58                           P:          25  AR:         196                          QRS:        -34  QRSD:       90                           T:          4  QT:         444  QTc:        437    Interpretive Statements  SINUS BRADYCARDIA  LEFT AXIS DEVIATION  LATE PRECORDIAL R/S TRANSITION  BORDERLINE T WAVE ABNORMALITIES  Compared to ECG 02/15/2017 09:27:28  T-wave abnormality now present  Ventricular premature complex(es) no longer present  Q waves no longer present  Myocardial infarct finding no longer present  Possible isch emia no longer present    Electronically Signed On 2019 11:56:14 PDT by SANG KRUGER DO         All labs reviewed by me.    EKG Interpretation  Interpreted by me    Rhythm: Sinus bradycardia  Rate: 58  Axis: normal  Ectopy: none  Conduction: normal  ST Segments: no acute change  T Waves: no acute change  Q Waves: none    Clinical Impression: Comparison made to prior EKG from 2017.  No acute changes no acute changes noted.  Sinus bradycardia.    RADIOLOGY  DX-SACRUM AND COCCYX 2+   Final Result      1.  No acute findings.        The radiologist's interpretation of all radiological studies have been reviewed by me.    COURSE & MEDICAL DECISION MAKING  Pertinent Labs & Imaging studies reviewed. (See chart for details)    Obtained and reviewed past medical records.  Multiple outpatient encounters.  Last ED visit in 2017.  Patient was seen for bloody stools and rib pain.    10:18 AM - Patient seen and examined at bedside.  This is a very pleasant and overall quite healthy, appearing younger than her stated age, 89-year-old female who lives independently.  She had an episode of near syncope today while sitting down.  No associated symptoms.  She has a  history of frequent UTIs and is concerned about a recurrence.  No recent trauma.  She is noted ambulating to and from the bathroom without significant symptoms.  IV started, labs drawn per nursing protocols.  Will obtain an EKG.    The differential diagnoses include but are not limited to: Near syncope, UTI, dehydration, electrolyte disturbance, arrhythmia.    12:10 PM patient's reevaluated bedside.  Her niece is also at the bedside.  We discussed x-ray findings which shows significant arthritis but no acute findings or fractures noted.  She also has evidence of a urinary tract infection and we again discussed talking with her primary care doctor about maintenance therapy for this.  Her niece thinks that she is probably had 6 UTIs this year.  She will be treated with antibiotics.  The patient is very much eager to go home.  We discussed possibility of admission she is not amenable to this plan.  She has helped that comes into the house 3 times a week and she also has family in town who can help her.  At this point, I think this is a reasonable disposition.  Patient is very much awake, alert with a decision-making capacity, despite her age.  Patient has numerous positive urine cultures in our system, going back to 2 February 2018.  There is evidence of resistance to Bactrim, ciprofloxacin, levofloxacin.  It has been sensitive to cephalosporins.  Most recently in May of this year, urine positive for E. coli with pan sensitivity and intermediate sensitivity only to cephalothin.  Urine culture from April and January of this year shows pan sensitivity.    Once medicated, she will be discharged home in stable condition.    FINAL IMPRESSION  1. Urinary tract infection without hematuria, site unspecified    2. Near syncope    3. Sacral back pain    4. 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

## 2019-07-14 NOTE — ED TRIAGE NOTES
"BIB EMS from home with   Chief Complaint   Patient presents with   • Near Syncopal   • Urinary Frequency   • Tailbone Pain   Per report, pt states recurrent UTI x 3 months. Last course of oral ABX finished 5 days ago. States urinary frequency, however denies hematuria, dysuria, or flank pain.     States 7 days ago she was doing laundry when she bent over and heard a \"pop\" to tailbone and has 8/10 .     Near syncopal today while sitting and watching TV.      "

## 2019-07-14 NOTE — ED NOTES
This RN walked with pt to BR and back to room. C/o significant tailbone pain and has an unsteady gait. Urine specimen obtained and set to lab. Pure-wik set up due to urinary frequency and unsteady gait. Family member at bedside and updated on POC and pending tests. Discussed imaging with ERP and orders place. PIV occluded and slightly dislodged. PIV removed and per ERP ok to hold off for now.

## 2019-07-16 LAB
BACTERIA UR CULT: NORMAL
SIGNIFICANT IND 70042: NORMAL
SITE SITE: NORMAL
SOURCE SOURCE: NORMAL

## 2019-07-19 DIAGNOSIS — H69.93 EUSTACHIAN TUBE DYSFUNCTION, BILATERAL: ICD-10-CM

## 2019-07-19 RX ORDER — FLUTICASONE PROPIONATE 50 MCG
SPRAY, SUSPENSION (ML) NASAL
Qty: 3 BOTTLE | Refills: 3 | Status: SHIPPED | OUTPATIENT
Start: 2019-07-19 | End: 2019-08-24

## 2019-07-25 ENCOUNTER — OFFICE VISIT (OUTPATIENT)
Dept: INTERNAL MEDICINE | Facility: IMAGING CENTER | Age: 84
End: 2019-07-25
Payer: MEDICARE

## 2019-07-25 VITALS
OXYGEN SATURATION: 92 % | TEMPERATURE: 98.4 F | RESPIRATION RATE: 16 BRPM | WEIGHT: 138 LBS | HEIGHT: 64 IN | BODY MASS INDEX: 23.56 KG/M2 | HEART RATE: 62 BPM | SYSTOLIC BLOOD PRESSURE: 140 MMHG | DIASTOLIC BLOOD PRESSURE: 70 MMHG

## 2019-07-25 DIAGNOSIS — R35.0 URINARY FREQUENCY: ICD-10-CM

## 2019-07-25 DIAGNOSIS — M54.32 SCIATICA, LEFT SIDE: ICD-10-CM

## 2019-07-25 DIAGNOSIS — N32.81 OVERACTIVE BLADDER: ICD-10-CM

## 2019-07-25 PROCEDURE — 99214 OFFICE O/P EST MOD 30 MIN: CPT | Performed by: INTERNAL MEDICINE

## 2019-07-25 RX ORDER — TRIMETHOPRIM 100 MG/1
100 TABLET ORAL 2 TIMES DAILY
Qty: 60 TAB | Refills: 0
Start: 2019-07-25 | End: 2019-08-24

## 2019-07-25 NOTE — PROGRESS NOTES
Chief Complaint   Patient presents with   • Dysuria   • Urinary Frequency   • Leg Pain       HISTORY OF THE PRESENT ILLNESS: Patient is a 89 y.o. female.     Patient comes in after recent ER visit.  She was for dysuria and frequency.  She was diagnosed with urinary tract infection.  Her culture was negative.  Her urine was positive for leukocytes.  The emergency room physician told the patient she may need to be on prophylactic antibiotics.  When she presented to the emergency department she also complained of fatigue and feeling weak.  She also complains of left buttock and leg pain.  She has a history of spondylolisthesis and sciatica.  She has seen Dr. Baker in the past.  Her daughter is present with her today.    Allergies: Flagyl [metronidazole hcl]; Keflex; Morphine; and Sulfa drugs    Current Outpatient Prescriptions Ordered in [facility]   Medication Sig Dispense Refill   • trimethoprim (TRIMPEX) 100 MG Tab Take 1 Tab by mouth 2 times a day. 60 Tab 0   • fluticasone (FLONASE) 50 MCG/ACT nasal spray USE ONE SPRAY EACH NOSTRIL TWO TIMES PER DAY. 3 Bottle 3   • traZODone (DESYREL) 50 MG Tab Take 0.5 Tabs by mouth every bedtime. 15 Tab 3   • ciprofloxacin (CIPRO) 250 MG Tab Take 1 Tab by mouth 2 times a day. 14 Tab 0   • metoprolol SR (TOPROL XL) 25 MG TABLET SR 24 HR TAKE 1 TABLET BY MOUTH EVERY DAY 90 Tab 3   • lisinopril (PRINIVIL) 20 MG Tab Take 10 mg by mouth 2 Times a Day.  3   • TRAVATAN Z 0.004 % Solution INSTILL 1 DROP INTO BOTH EYES AT BEDTIME  6   • omeprazole (PRILOSEC) 20 MG delayed-release capsule TAKE ONE CAPSULE BY MOUTH TWICE A DAY 30MINUTES BEFORE BREAKFAST AND DINNER 180 Cap 3   • levothyroxine (SYNTHROID) 75 MCG Tab TAKE 1 TAB BY MOUTH EVERY DAY. 90 Tab 3   • amLODIPine (NORVASC) 5 MG Tab TAKE 1 TABLET BY MOUTH ONCE DAILY 90 Tab 3   • acyclovir (ZOVIRAX) 5 % Ointment Apply to affected area(s) every 3 hours. 15 g 1   • meclizine (ANTIVERT) 25 MG Tab TAKE 1 TAB BY MOUTH 3 TIMES A DAY AS NEEDED FOR  "DIZZINESS. 30 Tab 0   • azelastine (ASTELIN) 137 MCG/SPRAY nasal spray INSTILL 1 SPRAY INTO NOSTRIL TWICE DAILY 90 [place] 2   • Cholecalciferol (VITAMIN D PO) Take 1 Tab by mouth every day.     • travoprost (TRAVATAN) 0.004 % SOLN Place 1 Drop in both eyes every evening.       No current Epic-ordered facility-administered medications on file.        Past medical history, social history and family history were reviewed from chart today    Review of systems: Per HPI.      All others negative.     Exam: /70 (BP Location: Left arm, Patient Position: Sitting, BP Cuff Size: Adult)   Pulse 62   Temp 36.9 °C (98.4 °F) (Temporal)   Resp 16   Ht 1.626 m (5' 4\")   Wt 62.6 kg (138 lb)   SpO2 92%   General: Well-appearing. Well-developed. No signs of distress.  She is walking with a walker.  HEENT: Grossly normal. Oral cavity is pink and moist.  Neck: Supple without JVD or bruit.  Pulmonary: Clear with good breath sounds. Normal effort.  Cardiovascular: Regular. Carotid and radial pulses are intact.  Abdomen: Soft, nontender, nondistended. Spleen and liver are not enlarged.  Neurologic: Cranial nerves II through XII are grossly normal, alert and oriented x3      Diagnosis:  1. Sciatica, left side     2. Urinary frequency     3. Overactive bladder         Encouraged her to follow-up with Dr. Baker for sciatica symptoms.  She has ongoing urinary frequency.  Her last urine culture was negative.  She has previously seen urology.  She was last seen approximately 5 years ago.  At that time she was diagnosed with overactive bladder.  She failed anticholinergics due to constipation and dry mouth.  She was tried on Myrbetriq but this was discontinued for unclear reasons.    Samples of Myrbetriq 50 mg were given.  Take 1 tablet in the dinner.  Follow-up with physiatry as above.  I do not recommend prophylactic antibiotics at this time.    "

## 2019-08-19 ENCOUNTER — TELEPHONE (OUTPATIENT)
Dept: INTERNAL MEDICINE | Facility: IMAGING CENTER | Age: 84
End: 2019-08-19

## 2019-08-19 RX ORDER — NITROFURANTOIN 25; 75 MG/1; MG/1
100 CAPSULE ORAL 2 TIMES DAILY
Qty: 10 CAP | Refills: 0 | Status: ON HOLD | OUTPATIENT
Start: 2019-08-19 | End: 2019-08-25

## 2019-08-19 NOTE — TELEPHONE ENCOUNTER
Gloria is in [place] with family and having sx of UTI.   Push fluids and will send in macrobid.  followup when back in [place]

## 2019-08-22 DIAGNOSIS — R53.1 WEAKNESS: Primary | ICD-10-CM

## 2019-08-22 LAB — EKG IMPRESSION: NORMAL

## 2019-08-22 PROCEDURE — 93010 ELECTROCARDIOGRAM REPORT: CPT | Performed by: INTERNAL MEDICINE

## 2019-08-23 RX ORDER — LEVOTHYROXINE SODIUM 0.07 MG/1
TABLET ORAL
Qty: 90 TAB | Refills: 3 | Status: SHIPPED | OUTPATIENT
Start: 2019-08-23 | End: 2020-04-27 | Stop reason: SDUPTHER

## 2019-08-24 ENCOUNTER — HOSPITAL ENCOUNTER (OUTPATIENT)
Facility: MEDICAL CENTER | Age: 84
End: 2019-08-25
Attending: EMERGENCY MEDICINE | Admitting: INTERNAL MEDICINE
Payer: MEDICARE

## 2019-08-24 ENCOUNTER — APPOINTMENT (OUTPATIENT)
Dept: CARDIOLOGY | Facility: MEDICAL CENTER | Age: 84
End: 2019-08-24
Attending: INTERNAL MEDICINE
Payer: MEDICARE

## 2019-08-24 DIAGNOSIS — M43.16 SPONDYLOLISTHESIS OF LUMBAR REGION: ICD-10-CM

## 2019-08-24 DIAGNOSIS — R42 DIZZINESS: ICD-10-CM

## 2019-08-24 DIAGNOSIS — R79.89 ELEVATED TROPONIN: ICD-10-CM

## 2019-08-24 DIAGNOSIS — R53.1 WEAKNESS: ICD-10-CM

## 2019-08-24 DIAGNOSIS — R11.0 NAUSEA: ICD-10-CM

## 2019-08-24 PROBLEM — R00.1 BRADYCARDIA: Status: ACTIVE | Noted: 2019-08-24

## 2019-08-24 PROBLEM — I16.0 HYPERTENSIVE URGENCY: Status: ACTIVE | Noted: 2019-08-24

## 2019-08-24 LAB
ALBUMIN SERPL BCP-MCNC: 3.6 G/DL (ref 3.2–4.9)
ALBUMIN/GLOB SERPL: 1.3 G/DL
ALP SERPL-CCNC: 70 U/L (ref 30–99)
ALT SERPL-CCNC: 7 U/L (ref 2–50)
ANION GAP SERPL CALC-SCNC: 9 MMOL/L (ref 0–11.9)
APPEARANCE UR: CLEAR
AST SERPL-CCNC: 14 U/L (ref 12–45)
BASOPHILS # BLD AUTO: 0.4 % (ref 0–1.8)
BASOPHILS # BLD: 0.03 K/UL (ref 0–0.12)
BILIRUB SERPL-MCNC: 0.6 MG/DL (ref 0.1–1.5)
BILIRUB UR QL STRIP.AUTO: NEGATIVE
BUN SERPL-MCNC: 13 MG/DL (ref 8–22)
CALCIUM SERPL-MCNC: 8.8 MG/DL (ref 8.5–10.5)
CHLORIDE SERPL-SCNC: 106 MMOL/L (ref 96–112)
CO2 SERPL-SCNC: 22 MMOL/L (ref 20–33)
COLOR UR: YELLOW
CREAT SERPL-MCNC: 0.72 MG/DL (ref 0.5–1.4)
EKG IMPRESSION: NORMAL
EOSINOPHIL # BLD AUTO: 0.26 K/UL (ref 0–0.51)
EOSINOPHIL NFR BLD: 3.9 % (ref 0–6.9)
ERYTHROCYTE [DISTWIDTH] IN BLOOD BY AUTOMATED COUNT: 44.7 FL (ref 35.9–50)
GLOBULIN SER CALC-MCNC: 2.7 G/DL (ref 1.9–3.5)
GLUCOSE SERPL-MCNC: 103 MG/DL (ref 65–99)
GLUCOSE UR STRIP.AUTO-MCNC: NEGATIVE MG/DL
HCT VFR BLD AUTO: 41.3 % (ref 37–47)
HGB BLD-MCNC: 12.7 G/DL (ref 12–16)
IMM GRANULOCYTES # BLD AUTO: 0.03 K/UL (ref 0–0.11)
IMM GRANULOCYTES NFR BLD AUTO: 0.4 % (ref 0–0.9)
KETONES UR STRIP.AUTO-MCNC: NEGATIVE MG/DL
LEUKOCYTE ESTERASE UR QL STRIP.AUTO: NEGATIVE
LV EJECT FRACT  99904: 55
LV EJECT FRACT MOD 2C 99903: 60.82
LV EJECT FRACT MOD 4C 99902: 56.57
LV EJECT FRACT MOD BP 99901: 57.89
LYMPHOCYTES # BLD AUTO: 1.62 K/UL (ref 1–4.8)
LYMPHOCYTES NFR BLD: 24.2 % (ref 22–41)
MCH RBC QN AUTO: 25.4 PG (ref 27–33)
MCHC RBC AUTO-ENTMCNC: 30.8 G/DL (ref 33.6–35)
MCV RBC AUTO: 82.6 FL (ref 81.4–97.8)
MICRO URNS: NORMAL
MONOCYTES # BLD AUTO: 0.51 K/UL (ref 0–0.85)
MONOCYTES NFR BLD AUTO: 7.6 % (ref 0–13.4)
NEUTROPHILS # BLD AUTO: 4.24 K/UL (ref 2–7.15)
NEUTROPHILS NFR BLD: 63.5 % (ref 44–72)
NITRITE UR QL STRIP.AUTO: NEGATIVE
NRBC # BLD AUTO: 0 K/UL
NRBC BLD-RTO: 0 /100 WBC
PH UR STRIP.AUTO: 6 [PH] (ref 5–8)
PLATELET # BLD AUTO: 230 K/UL (ref 164–446)
PMV BLD AUTO: 9.6 FL (ref 9–12.9)
POTASSIUM SERPL-SCNC: 4 MMOL/L (ref 3.6–5.5)
PROT SERPL-MCNC: 6.3 G/DL (ref 6–8.2)
PROT UR QL STRIP: NEGATIVE MG/DL
RBC # BLD AUTO: 5 M/UL (ref 4.2–5.4)
RBC UR QL AUTO: NEGATIVE
SODIUM SERPL-SCNC: 137 MMOL/L (ref 135–145)
SP GR UR STRIP.AUTO: 1.01
TROPONIN T SERPL-MCNC: 20 NG/L (ref 6–19)
UROBILINOGEN UR STRIP.AUTO-MCNC: 0.2 MG/DL
WBC # BLD AUTO: 6.7 K/UL (ref 4.8–10.8)

## 2019-08-24 PROCEDURE — 36415 COLL VENOUS BLD VENIPUNCTURE: CPT

## 2019-08-24 PROCEDURE — 80053 COMPREHEN METABOLIC PANEL: CPT

## 2019-08-24 PROCEDURE — 93306 TTE W/DOPPLER COMPLETE: CPT | Mod: 26 | Performed by: INTERNAL MEDICINE

## 2019-08-24 PROCEDURE — A9270 NON-COVERED ITEM OR SERVICE: HCPCS | Performed by: EMERGENCY MEDICINE

## 2019-08-24 PROCEDURE — 99285 EMERGENCY DEPT VISIT HI MDM: CPT

## 2019-08-24 PROCEDURE — A9270 NON-COVERED ITEM OR SERVICE: HCPCS | Performed by: INTERNAL MEDICINE

## 2019-08-24 PROCEDURE — 81003 URINALYSIS AUTO W/O SCOPE: CPT

## 2019-08-24 PROCEDURE — 99220 PR INITIAL OBSERVATION CARE,LEVL III: CPT | Performed by: INTERNAL MEDICINE

## 2019-08-24 PROCEDURE — 700102 HCHG RX REV CODE 250 W/ 637 OVERRIDE(OP): Performed by: INTERNAL MEDICINE

## 2019-08-24 PROCEDURE — G0378 HOSPITAL OBSERVATION PER HR: HCPCS

## 2019-08-24 PROCEDURE — 700102 HCHG RX REV CODE 250 W/ 637 OVERRIDE(OP): Performed by: EMERGENCY MEDICINE

## 2019-08-24 PROCEDURE — 93005 ELECTROCARDIOGRAM TRACING: CPT | Performed by: EMERGENCY MEDICINE

## 2019-08-24 PROCEDURE — 84484 ASSAY OF TROPONIN QUANT: CPT

## 2019-08-24 PROCEDURE — 93306 TTE W/DOPPLER COMPLETE: CPT

## 2019-08-24 PROCEDURE — 85025 COMPLETE CBC W/AUTO DIFF WBC: CPT

## 2019-08-24 RX ORDER — POLYETHYLENE GLYCOL 3350 17 G/17G
1 POWDER, FOR SOLUTION ORAL
Status: DISCONTINUED | OUTPATIENT
Start: 2019-08-24 | End: 2019-08-25 | Stop reason: HOSPADM

## 2019-08-24 RX ORDER — ONDANSETRON 4 MG/1
4 TABLET, ORALLY DISINTEGRATING ORAL EVERY 4 HOURS PRN
Status: DISCONTINUED | OUTPATIENT
Start: 2019-08-24 | End: 2019-08-24

## 2019-08-24 RX ORDER — AMLODIPINE BESYLATE 5 MG/1
5 TABLET ORAL
Status: DISCONTINUED | OUTPATIENT
Start: 2019-08-25 | End: 2019-08-25 | Stop reason: HOSPADM

## 2019-08-24 RX ORDER — OMEPRAZOLE 20 MG/1
20 CAPSULE, DELAYED RELEASE ORAL DAILY
COMMUNITY
End: 2019-09-28

## 2019-08-24 RX ORDER — ENALAPRILAT 1.25 MG/ML
1.25 INJECTION INTRAVENOUS EVERY 6 HOURS PRN
Status: DISCONTINUED | OUTPATIENT
Start: 2019-08-24 | End: 2019-08-25 | Stop reason: HOSPADM

## 2019-08-24 RX ORDER — ASPIRIN 81 MG/1
324 TABLET, CHEWABLE ORAL ONCE
Status: COMPLETED | OUTPATIENT
Start: 2019-08-24 | End: 2019-08-24

## 2019-08-24 RX ORDER — OMEPRAZOLE 20 MG/1
20 CAPSULE, DELAYED RELEASE ORAL DAILY
Status: DISCONTINUED | OUTPATIENT
Start: 2019-08-25 | End: 2019-08-25 | Stop reason: HOSPADM

## 2019-08-24 RX ORDER — BISACODYL 10 MG
10 SUPPOSITORY, RECTAL RECTAL
Status: DISCONTINUED | OUTPATIENT
Start: 2019-08-24 | End: 2019-08-25 | Stop reason: HOSPADM

## 2019-08-24 RX ORDER — LEVOTHYROXINE SODIUM 0.07 MG/1
75 TABLET ORAL
Status: DISCONTINUED | OUTPATIENT
Start: 2019-08-25 | End: 2019-08-25 | Stop reason: HOSPADM

## 2019-08-24 RX ORDER — ONDANSETRON 2 MG/ML
4 INJECTION INTRAMUSCULAR; INTRAVENOUS EVERY 4 HOURS PRN
Status: DISCONTINUED | OUTPATIENT
Start: 2019-08-24 | End: 2019-08-24

## 2019-08-24 RX ORDER — PHENAZOPYRIDINE HYDROCHLORIDE 100 MG/1
100 TABLET, FILM COATED ORAL 3 TIMES DAILY PRN
COMMUNITY
End: 2019-08-24

## 2019-08-24 RX ORDER — LATANOPROST 50 UG/ML
1 SOLUTION/ DROPS OPHTHALMIC EVERY EVENING
Status: DISCONTINUED | OUTPATIENT
Start: 2019-08-24 | End: 2019-08-25 | Stop reason: HOSPADM

## 2019-08-24 RX ORDER — LISINOPRIL 10 MG/1
10 TABLET ORAL 2 TIMES DAILY
Status: DISCONTINUED | OUTPATIENT
Start: 2019-08-24 | End: 2019-08-25 | Stop reason: HOSPADM

## 2019-08-24 RX ORDER — ACETAMINOPHEN 325 MG/1
650 TABLET ORAL EVERY 6 HOURS PRN
Status: DISCONTINUED | OUTPATIENT
Start: 2019-08-24 | End: 2019-08-25 | Stop reason: HOSPADM

## 2019-08-24 RX ORDER — AMOXICILLIN 250 MG
2 CAPSULE ORAL 2 TIMES DAILY
Status: DISCONTINUED | OUTPATIENT
Start: 2019-08-24 | End: 2019-08-25 | Stop reason: HOSPADM

## 2019-08-24 RX ORDER — HYDROCHLOROTHIAZIDE 25 MG/1
25 TABLET ORAL
Status: DISCONTINUED | OUTPATIENT
Start: 2019-08-24 | End: 2019-08-25 | Stop reason: HOSPADM

## 2019-08-24 RX ORDER — METOPROLOL SUCCINATE 25 MG/1
25 TABLET, EXTENDED RELEASE ORAL DAILY
Status: DISCONTINUED | OUTPATIENT
Start: 2019-08-25 | End: 2019-08-24

## 2019-08-24 RX ADMIN — HYDROCHLOROTHIAZIDE 25 MG: 25 TABLET ORAL at 14:59

## 2019-08-24 RX ADMIN — LISINOPRIL 10 MG: 10 TABLET ORAL at 17:54

## 2019-08-24 RX ADMIN — ACETAMINOPHEN 650 MG: 325 TABLET ORAL at 20:03

## 2019-08-24 RX ADMIN — ASPIRIN 81 MG 324 MG: 81 TABLET ORAL at 14:58

## 2019-08-24 RX ADMIN — LATANOPROST 1 DROP: 50 SOLUTION OPHTHALMIC at 20:04

## 2019-08-24 ASSESSMENT — ENCOUNTER SYMPTOMS
STRIDOR: 0
ORTHOPNEA: 0
FOCAL WEAKNESS: 0
DIARRHEA: 0
CHILLS: 0
SEIZURES: 0
SHORTNESS OF BREATH: 0
NAUSEA: 1
EYE DISCHARGE: 0
WEIGHT LOSS: 0
HEADACHES: 0
HEARTBURN: 0
BLURRED VISION: 0
ABDOMINAL PAIN: 0
COUGH: 0
BACK PAIN: 0
DIZZINESS: 1
PALPITATIONS: 0
DEPRESSION: 0
SPUTUM PRODUCTION: 0
INSOMNIA: 0
NECK PAIN: 0
EYE REDNESS: 0
NERVOUS/ANXIOUS: 0
EYE PAIN: 0
MYALGIAS: 0
VOMITING: 0
FEVER: 0

## 2019-08-24 ASSESSMENT — LIFESTYLE VARIABLES
ON A TYPICAL DAY WHEN YOU DRINK ALCOHOL HOW MANY DRINKS DO YOU HAVE: 0
TOTAL SCORE: 0
ALCOHOL_USE: NO
HAVE YOU EVER FELT YOU SHOULD CUT DOWN ON YOUR DRINKING: NO
TOTAL SCORE: 0
CONSUMPTION TOTAL: NEGATIVE
AVERAGE NUMBER OF DAYS PER WEEK YOU HAVE A DRINK CONTAINING ALCOHOL: 0
HOW MANY TIMES IN THE PAST YEAR HAVE YOU HAD 5 OR MORE DRINKS IN A DAY: 0
EVER_SMOKED: NEVER
TOTAL SCORE: 0
HAVE PEOPLE ANNOYED YOU BY CRITICIZING YOUR DRINKING: NO
EVER HAD A DRINK FIRST THING IN THE MORNING TO STEADY YOUR NERVES TO GET RID OF A HANGOVER: NO
EVER FELT BAD OR GUILTY ABOUT YOUR DRINKING: NO

## 2019-08-24 ASSESSMENT — PATIENT HEALTH QUESTIONNAIRE - PHQ9
2. FEELING DOWN, DEPRESSED, IRRITABLE, OR HOPELESS: NOT AT ALL
SUM OF ALL RESPONSES TO PHQ9 QUESTIONS 1 AND 2: 0
1. LITTLE INTEREST OR PLEASURE IN DOING THINGS: NOT AT ALL

## 2019-08-24 NOTE — ED NOTES
Med rec complete per pt at bedside with family and list (returned)  Allergies reviewed  Pt states she was taking an old  ABX from 2016 earlier this week because she thought she had a UTI   Pt went to doctor and they gave her a new RX of Macrobid 100mg twice daily X 5 days, pt only took one dose   Pt also got Oxybutynin and Phenazopyridine but states she stopped taking them because she thinks they were making her sick

## 2019-08-24 NOTE — CARE PLAN
Problem: Discharge Barriers/Planning  Goal: Patient's continuum of care needs will be met  Outcome: PROGRESSING AS EXPECTED     Admitted for Nausea.    Seen pt, AOx 4. On cardiac monitor with Sbrady, HR 48-50's. Denies any pain and nausea. Plan of care discussed includes Safety, labs, cardiac monitoring, BP management/medications adjustment, ECHO,  and pt understands.

## 2019-08-24 NOTE — ASSESSMENT & PLAN NOTE
Systolic pressure over 180  With heart rate around 60  I stopped metoprolol   Continue amlodipine 5 mg daily and lisinopril 10 mg daily  I added hydrochlorothiazide 25 mg daily  Adjust medication as needed  IV Vasotec with parameter

## 2019-08-24 NOTE — H&P
Hospital Medicine History & Physical Note    Date of Service  8/24/2019    Primary Care Physician  Mike Otero M.D.    Consultants  none    Code Status  full    Chief Complaint  Nausea, dizziness    History of Presenting Illness  89 y.o. female with PMH of HTN, hypothyroid, GERD who presented 8/24/2019 with    nausea and dizziness for the past several day.  She was recently evaluated at [facility] emergency room for her symptoms and she was discharged from the ER without any significant finding.  But she continued to have above symptom and therefore she decided to come to ER.  She stated that she does not drink much water.  She usually have more dizziness with body position changes.  In the ER her blood pressure was over 180.  She will be admitted for observation.    Review of Systems  Review of Systems   Constitutional: Negative for chills, fever and weight loss.   HENT: Negative for congestion and nosebleeds.    Eyes: Negative for blurred vision, pain, discharge and redness.   Respiratory: Negative for cough, sputum production, shortness of breath and stridor.    Cardiovascular: Negative for chest pain, palpitations and orthopnea.   Gastrointestinal: Positive for nausea. Negative for abdominal pain, diarrhea, heartburn and vomiting.   Genitourinary: Negative for dysuria, frequency and urgency.   Musculoskeletal: Negative for back pain, myalgias and neck pain.   Skin: Negative for itching and rash.   Neurological: Positive for dizziness. Negative for focal weakness, seizures and headaches.   Psychiatric/Behavioral: Negative for depression. The patient is not nervous/anxious and does not have insomnia.        Past Medical History   has a past medical history of Arthritis, Diverticulitis, GERD (gastroesophageal reflux disease), Glaucoma, Heart burn, Hiatus hernia syndrome, Hypertension, Primary osteoarthritis of both shoulders (1/22/2019), and Unspecified urinary incontinence.    Surgical History   has a past  surgical history that includes other (1945); other (1954); other orthopedic surgery; other orthopedic surgery (2000); cholecystectomy (1966); other abdominal surgery (1954); gyn surgery (1970); hip arthroplasty total (6/21/2011); and us-needle core bx-breast panel.     Family History  family history includes Cancer in her sister; Diabetes in her unknown relative; Heart Disease in her unknown relative; Lung Disease in her unknown relative.     Social History   reports that she has never smoked. She has never used smokeless tobacco. She reports that she drinks alcohol. She reports that she does not use drugs.    Allergies  Allergies   Allergen Reactions   • Flagyl [Metronidazole Hcl]    • Keflex    • Morphine Anaphylaxis     anaphylaxis   • Sulfa Drugs Rash     rash       Medications  Prior to Admission Medications   Prescriptions Last Dose Informant Patient Reported? Taking?   TRAVATAN Z 0.004 % Solution 8/23/2019 at HS Patient Yes No   Sig: INSTILL 1 DROP INTO BOTH EYES AT BEDTIME   amLODIPine (NORVASC) 5 MG Tab 8/24/2019 at AM Patient No No   Sig: TAKE 1 TABLET BY MOUTH ONCE DAILY   levothyroxine (SYNTHROID) 75 MCG Tab 8/24/2019 at AM Patient No No   Sig: TAKE 1 TABLET BY MOUTH EVERY DAY   lisinopril (PRINIVIL) 20 MG Tab 8/24/2019 at AM Patient Yes No   Sig: Take 10 mg by mouth 2 Times a Day.   metoprolol SR (TOPROL XL) 25 MG TABLET SR 24 HR 8/24/2019 at AM Patient No No   Sig: TAKE 1 TABLET BY MOUTH EVERY DAY   nitrofurantoin monohyd macro (MACROBID) 100 MG Cap 8/20/2019 at PM Patient No No   Sig: Take 1 Cap by mouth 2 times a day for 5 days.   omeprazole (PRILOSEC) 20 MG delayed-release capsule 8/24/2019 at AM Patient Yes Yes   Sig: Take 20 mg by mouth every day.      Facility-Administered Medications: None       Physical Exam  Temp:  [36.9 °C (98.5 °F)] 36.9 °C (98.5 °F)  Pulse:  [55-68] 55  Resp:  [15] 15  BP: (165)/(59) 165/59  SpO2:  [92 %-96 %] 92 %    Physical Exam   Constitutional: She is oriented to  person, place, and time. No distress.   HENT:   Head: Normocephalic and atraumatic.   Mouth/Throat: Oropharynx is clear and moist.   Eyes: Pupils are equal, round, and reactive to light. Conjunctivae and EOM are normal.   Neck: Normal range of motion. Neck supple. No tracheal deviation present. No thyromegaly present.   Cardiovascular: Normal rate and regular rhythm.   No murmur heard.  Pulmonary/Chest: Effort normal and breath sounds normal. No respiratory distress. She has no wheezes.   Abdominal: Soft. Bowel sounds are normal. She exhibits no distension. There is no tenderness.   Musculoskeletal: She exhibits no edema or tenderness.   Neurological: She is alert and oriented to person, place, and time. No cranial nerve deficit.   Skin: Skin is warm and dry. She is not diaphoretic. No erythema.   Psychiatric: She has a normal mood and affect. Her behavior is normal. Thought content normal.   Vitals reviewed.      Laboratory:  Recent Labs     08/24/19  0935   WBC 6.7   RBC 5.00   HEMOGLOBIN 12.7   HEMATOCRIT 41.3   MCV 82.6   MCH 25.4*   MCHC 30.8*   RDW 44.7   PLATELETCT 230   MPV 9.6     Recent Labs     08/24/19  0935   SODIUM 137   POTASSIUM 4.0   CHLORIDE 106   CO2 22   GLUCOSE 103*   BUN 13   CREATININE 0.72   CALCIUM 8.8     Recent Labs     08/24/19  0935   ALTSGPT 7   ASTSGOT 14   ALKPHOSPHAT 70   TBILIRUBIN 0.6   GLUCOSE 103*         No results for input(s): NTPROBNP in the last 72 hours.      Recent Labs     08/24/19  0935   TROPONINT 20*       Urinalysis:    Recent Labs     08/24/19  0819   SPECGRAVITY 1.006   GLUCOSEUR Negative   KETONES Negative   NITRITE Negative   LEUKESTERAS Negative        Imaging:  No orders to display         Assessment/Plan:  I anticipate this patient is appropriate for observation status at this time.    Bradycardia- (present on admission)  Assessment & Plan  Likely related to metoprolol  Hold metoprolol  Monitor on telemetry    Hypertensive urgency- (present on  admission)  Assessment & Plan  Systolic pressure over 180  With heart rate around 60  I stopped metoprolol   Continue amlodipine 5 mg daily and lisinopril 10 mg daily  I added hydrochlorothiazide 25 mg daily  Adjust medication as needed  IV Vasotec with parameter      Dizziness- (present on admission)  Assessment & Plan  To check orthostatic blood pressure  Monitor on telemetry  I order echocardiogram  For precaution      VTE prophylaxis: lovenox

## 2019-08-24 NOTE — ED TRIAGE NOTES
Pt BIB [facility] with c/c of nausea for approx 5 days. Pt was taken to [facility] ER on Tuesday and had a large work up for nausea and urinary frequency. Pt reporting she is still not feeling better

## 2019-08-25 ENCOUNTER — HOME HEALTH ADMISSION (OUTPATIENT)
Dept: HOME HEALTH SERVICES | Facility: HOME HEALTHCARE | Age: 84
End: 2019-08-25
Payer: MEDICARE

## 2019-08-25 VITALS
SYSTOLIC BLOOD PRESSURE: 166 MMHG | HEIGHT: 62 IN | BODY MASS INDEX: 25.56 KG/M2 | OXYGEN SATURATION: 94 % | HEART RATE: 48 BPM | DIASTOLIC BLOOD PRESSURE: 76 MMHG | WEIGHT: 138.89 LBS | TEMPERATURE: 96.8 F | RESPIRATION RATE: 16 BRPM

## 2019-08-25 LAB
ALBUMIN SERPL BCP-MCNC: 3.2 G/DL (ref 3.2–4.9)
ALBUMIN/GLOB SERPL: 1.3 G/DL
ALP SERPL-CCNC: 63 U/L (ref 30–99)
ALT SERPL-CCNC: 5 U/L (ref 2–50)
ANION GAP SERPL CALC-SCNC: 6 MMOL/L (ref 0–11.9)
AST SERPL-CCNC: 13 U/L (ref 12–45)
BILIRUB SERPL-MCNC: 0.5 MG/DL (ref 0.1–1.5)
BUN SERPL-MCNC: 15 MG/DL (ref 8–22)
CALCIUM SERPL-MCNC: 8.5 MG/DL (ref 8.5–10.5)
CHLORIDE SERPL-SCNC: 104 MMOL/L (ref 96–112)
CO2 SERPL-SCNC: 25 MMOL/L (ref 20–33)
CREAT SERPL-MCNC: 0.9 MG/DL (ref 0.5–1.4)
ERYTHROCYTE [DISTWIDTH] IN BLOOD BY AUTOMATED COUNT: 44.2 FL (ref 35.9–50)
GLOBULIN SER CALC-MCNC: 2.5 G/DL (ref 1.9–3.5)
GLUCOSE SERPL-MCNC: 103 MG/DL (ref 65–99)
HCT VFR BLD AUTO: 38.2 % (ref 37–47)
HGB BLD-MCNC: 12.2 G/DL (ref 12–16)
MCH RBC QN AUTO: 26.5 PG (ref 27–33)
MCHC RBC AUTO-ENTMCNC: 31.9 G/DL (ref 33.6–35)
MCV RBC AUTO: 82.9 FL (ref 81.4–97.8)
PLATELET # BLD AUTO: 194 K/UL (ref 164–446)
PMV BLD AUTO: 9.2 FL (ref 9–12.9)
POTASSIUM SERPL-SCNC: 4 MMOL/L (ref 3.6–5.5)
PROT SERPL-MCNC: 5.7 G/DL (ref 6–8.2)
RBC # BLD AUTO: 4.61 M/UL (ref 4.2–5.4)
SODIUM SERPL-SCNC: 135 MMOL/L (ref 135–145)
T4 FREE SERPL-MCNC: 1.06 NG/DL (ref 0.53–1.43)
TSH SERPL DL<=0.005 MIU/L-ACNC: 2.19 UIU/ML (ref 0.38–5.33)
WBC # BLD AUTO: 5.7 K/UL (ref 4.8–10.8)

## 2019-08-25 PROCEDURE — 85027 COMPLETE CBC AUTOMATED: CPT

## 2019-08-25 PROCEDURE — 700102 HCHG RX REV CODE 250 W/ 637 OVERRIDE(OP): Performed by: INTERNAL MEDICINE

## 2019-08-25 PROCEDURE — 84439 ASSAY OF FREE THYROXINE: CPT

## 2019-08-25 PROCEDURE — 84443 ASSAY THYROID STIM HORMONE: CPT

## 2019-08-25 PROCEDURE — A9270 NON-COVERED ITEM OR SERVICE: HCPCS | Performed by: INTERNAL MEDICINE

## 2019-08-25 PROCEDURE — 99217 PR OBSERVATION CARE DISCHARGE: CPT | Performed by: INTERNAL MEDICINE

## 2019-08-25 PROCEDURE — 36415 COLL VENOUS BLD VENIPUNCTURE: CPT

## 2019-08-25 PROCEDURE — G0378 HOSPITAL OBSERVATION PER HR: HCPCS

## 2019-08-25 PROCEDURE — 97161 PT EVAL LOW COMPLEX 20 MIN: CPT

## 2019-08-25 PROCEDURE — 80053 COMPREHEN METABOLIC PANEL: CPT

## 2019-08-25 RX ADMIN — AMLODIPINE BESYLATE 5 MG: 5 TABLET ORAL at 05:55

## 2019-08-25 RX ADMIN — LISINOPRIL 10 MG: 10 TABLET ORAL at 05:55

## 2019-08-25 RX ADMIN — HYDROCHLOROTHIAZIDE 25 MG: 25 TABLET ORAL at 05:55

## 2019-08-25 RX ADMIN — LEVOTHYROXINE SODIUM 75 MCG: 75 TABLET ORAL at 05:58

## 2019-08-25 RX ADMIN — OMEPRAZOLE 20 MG: 20 CAPSULE, DELAYED RELEASE ORAL at 05:56

## 2019-08-25 ASSESSMENT — COGNITIVE AND FUNCTIONAL STATUS - GENERAL
MOBILITY SCORE: 24
SUGGESTED CMS G CODE MODIFIER MOBILITY: CH

## 2019-08-25 ASSESSMENT — GAIT ASSESSMENTS
GAIT LEVEL OF ASSIST: SUPERVISED
ASSISTIVE DEVICE: FRONT WHEEL WALKER
DISTANCE (FEET): 300

## 2019-08-25 NOTE — DISCHARGE PLANNING
CM met with pt and daughter at bedside to discuss HH. [facility] and [facility] were selected and Choice was faxed to ext [phone] with follow up call to Carolina DHILLON. Original filed in medical record.    Pt and daughter were given a copy of choice to follow up tomorrow if they've not been contacted for acceptance.

## 2019-08-25 NOTE — DISCHARGE SUMMARY
Discharge Summary    CHIEF COMPLAINT ON ADMISSION  Chief Complaint   Patient presents with   • Nausea       Reason for Admission  EMS     Admission Date  8/24/2019    Discharge Date  08/25/19    CODE STATUS  Full Code    HPI & HOSPITAL COURSE  This is a 89 y.o. Female PMH HTN, hypothyroidism, GERD and previous TIA who presented 8/24/2019 with nausea and dizziness.  Please see full H&P by Dr. Schwartz.  Her home BP medications include metoprolol, lisinopril and amlodipine.  CBC and BMP unremarkable.  Troponin 20. TSH and T4 WNL.  UA negative for infection.  Twelve-lead EKG per my read shows sinus rhythm with first-degree AVB.  Overnight telemetry showed sinus rhythm sinus bradycardia rates 45 at 62. We discontinued her Toprol-XL.  (-) orthostatics. ECHO shows EF 55% with mild prolapse of the posterior mitral leaflet and RVSP 49 mmHg.  She was evaluated by PT and OT who recommend home health.  This was set up for her prior to discharge.  She was seen and examined prior to discharge.  She is ambulatory independently with no chest pain, shortness of breath, dizziness or palpitations.  Her SBP's have been 140-160/60-70's. Her HR is 50. She and her daughter at bedside are agreeable to the plan of care and eager for DC home today.     Therefore, she is discharged in good and stable condition to home with close outpatient follow-up    FOLLOW UP ITEMS POST DISCHARGE  Discharge Instructions per MIKE Lancaster    -FU with PCP  -Home Health  -STOP taking Metoprolol for now.    DIET: As Tolerated    ACTIVITY: As Tolerated    DIAGNOSIS: Low heart rate, dizziness    Return to ER if your heart rate continues to be low and you are experiencing dizziness, weakness, CP, SOB.    DISCHARGE DIAGNOSES  Active Problems:    Dizziness POA: Yes    Hypertensive urgency POA: Yes    Bradycardia POA: Yes  Resolved Problems:    * No resolved hospital problems. *      FOLLOW UP  No future appointments.  Mike Otero M.D.  [address]  [place] [address]  [phone]    In 2 weeks  Call for Follow up appointment      MEDICATIONS ON DISCHARGE     Medication List      CONTINUE taking these medications      Instructions   amLODIPine 5 MG Tabs  Commonly known as:  NORVASC   TAKE 1 TABLET BY MOUTH ONCE DAILY     levothyroxine 75 MCG Tabs  Commonly known as:  SYNTHROID   TAKE 1 TABLET BY MOUTH EVERY DAY     lisinopril 20 MG Tabs  Commonly known as:  PRINIVIL   Take 10 mg by mouth 2 Times a Day.  Dose:  10 mg     omeprazole 20 MG delayed-release capsule  Commonly known as:  PRILOSEC   Take 20 mg by mouth every day.  Dose:  20 mg     TRAVATAN Z 0.004 % Soln  Generic drug:  travoprost   INSTILL 1 DROP INTO BOTH EYES AT BEDTIME        STOP taking these medications    metoprolol SR 25 MG Tb24  Commonly known as:  TOPROL XL     nitrofurantoin monohyd macro 100 MG Caps  Commonly known as:  MACROBID            Allergies  Allergies   Allergen Reactions   • Flagyl [Metronidazole Hcl]    • Keflex    • Morphine Anaphylaxis     anaphylaxis   • Sulfa Drugs Rash     rash       DIET  Orders Placed This Encounter   Procedures   • Diet Order Regular     Standing Status:   Standing     Number of Occurrences:   1     Order Specific Question:   Diet:     Answer:   Regular [1]       ACTIVITY  As tolerated.  Weight bearing as tolerated    CONSULTATIONS  NONE    PROCEDURES  NONE    LABORATORY  Lab Results   Component Value Date    SODIUM 135 08/25/2019    POTASSIUM 4.0 08/25/2019    CHLORIDE 104 08/25/2019    CO2 25 08/25/2019    GLUCOSE 103 (H) 08/25/2019    BUN 15 08/25/2019    CREATININE 0.90 08/25/2019    CREATININE 0.99 01/09/2013    GLOMRATE >59 08/04/2010        Lab Results   Component Value Date    WBC 5.7 08/25/2019    WBC 7.8 01/09/2013    HEMOGLOBIN 12.2 08/25/2019    HEMATOCRIT 38.2 08/25/2019    PLATELETCT 194 08/25/2019        LESLY Lancaster.

## 2019-08-25 NOTE — THERAPY
"Physical Therapy Evaluation completed.   Bed Mobility:  Supine to Sit: Supervised  Transfers: Sit to Stand: Supervised  Gait: Level Of Assist: Supervised with Front-Wheel Walker       Plan of Care: Patient with no further skilled PT needs in the acute care setting at this time  Discharge Recommendations: Equipment: No Equipment Needed. Post-acute therapy Discharge to home with home health for additional skilled therapy services.    See \"Rehab Therapy-Acute\" Patient Summary Report for complete documentation.     Pt is a 89 y.o. female admitted to the hospital for nausea and dizziness. Pt demonstrates good functional mobility, activity tolerance, and ambulation skills. Pt reports that she is 100% at baseline in terms of mobility. Pt would not benefit from further skilled PT in the acute care setting at this time. Anticipate d/c home with home health.  "

## 2019-08-25 NOTE — DISCHARGE PLANNING
ATTN: Case Management  RE: Referral for Home Health    As of 08/25/2019, we have accepted the Home Health referral for the patient listed above.    A [facility] clinician will be out to see the patient within 48 hours. If you have any questions or concerns regarding the patient’s transition to Home Health, please do not hesitate to contact us at [phone].      We look forward to collaborating with you,  [facility] Home Health Team

## 2019-08-25 NOTE — FACE TO FACE
Face to Face Supporting Documentation - Home Health    The encounter with this patient was in whole or in part the primary reason for home health admission.    Date of encounter:   Patient:                    MRN:                       YOB: 2019  Gloria Patel  [id number]  7/9/1924     Home health to see patient for:  Skilled Nursing care for assessment, interventions & education, Physical Therapy evaluation and treatment and Occupational therapy evaluation and treatment    Skilled need for:  New Onset Medical Diagnosis dizziness, increased weakness and Medication Management BP meds    Skilled nursing interventions to include:  Comment: Assessments and Medication Management    Homebound status evidenced by:  Need the aid of supportive devices such as crutches, canes, wheelchairs or walkers or Needs the assistance of another person in order to leave the home. Leaving home requires a considerable and taxing effort. There is a normal inability to leave the home.    Community Physician to provide follow up care: Mike Otero M.D.     Optional Interventions? No      I certify the face to face encounter for this home health care referral meets the CMS requirements and the encounter/clinical assessment with the patient was, in whole, or in part, for the medical condition(s) listed above, which is the primary reason for home health care. Based on my clinical findings: the service(s) are medically necessary, support the need for home health care, and the homebound criteria are met.  I certify that this patient has had a face to face encounter by myself.  LESLY Lancaster. - NPI: [id number]

## 2019-08-25 NOTE — DISCHARGE INSTRUCTIONS
Discharge Instructions    Discharged to home by car with relative. Discharged via wheelchair, hospital escort: Yes.  Special equipment needed: Not Applicable    Be sure to schedule a follow-up appointment with your primary care doctor or any specialists as instructed.     Discharge Plan:   Diet Plan: Discussed  Activity Level: Discussed  Confirmed Follow up Appointment: Patient to Call and Schedule Appointment  Confirmed Symptoms Management: Discussed  Medication Reconciliation Updated: Yes  Influenza Vaccine Indication: Not indicated: Previously immunized this influenza season and > 8 years of age    I understand that a diet low in cholesterol, fat, and sodium is recommended for good health. Unless I have been given specific instructions below for another diet, I accept this instruction as my diet prescription.   Other diet: Regular Diet    Special Instructions: None    · Is patient discharged on Warfarin / Coumadin?   No     Depression / Suicide Risk    As you are discharged from this [facility] facility, it is important to learn how to keep safe from harming yourself.    Recognize the warning signs:  · Abrupt changes in personality, positive or negative- including increase in energy   · Giving away possessions  · Change in eating patterns- significant weight changes-  positive or negative  · Change in sleeping patterns- unable to sleep or sleeping all the time   · Unwillingness or inability to communicate  · Depression  · Unusual sadness, discouragement and loneliness  · Talk of wanting to die  · Neglect of personal appearance   · Rebelliousness- reckless behavior  · Withdrawal from people/activities they love  · Confusion- inability to concentrate     If you or a loved one observes any of these behaviors or has concerns about self-harm, here's what you can do:  · Talk about it- your feelings and reasons for harming yourself  · Remove any means that you might use to hurt yourself (examples: pills, rope,  extension cords, firearm)  · Get professional help from the community (Mental Health, Substance Abuse, psychological counseling)  · Do not be alone:Call your Safe Contact- someone whom you trust who will be there for you.  · Call your local CRISIS HOTLINE 637-6512 or 788-670-5012  · Call your local Children's Mobile Crisis Response Team [place] (345) 694-3121 or www.REQQI  · Call the toll free National Suicide Prevention Hotlines   · National Suicide Prevention Lifeline 370-453-EJNV (3506)  · [place] [facility] 800-SUICIDE (753-5870)        Discharge Instructions per MIKE Lancaster    -FU with PCP  -Home Health  -STOP taking Metoprolol for now.    DIET: As Tolerated    ACTIVITY: As Tolerated    DIAGNOSIS: Low heart rate, dizziness    Return to ER if your heart rate continues to be low and you are experiencing dizziness, weakness, CP, SOB.

## 2019-08-25 NOTE — DISCHARGE PLANNING
Received Choice form at 1100  Agency/Facility Name: [facility]  Referral sent per Choice form at 1110

## 2019-08-25 NOTE — PROGRESS NOTES
Gave report to Cecily BENTON.    Shift/Tele Summary: Ayaz, HR 47-50's when awake, lowest at 42 when she is sleeping. No nausea. Tolerating PO well.

## 2019-08-28 ENCOUNTER — HOME CARE VISIT (OUTPATIENT)
Dept: HOME HEALTH SERVICES | Facility: HOME HEALTHCARE | Age: 84
End: 2019-08-28
Payer: MEDICARE

## 2019-08-28 VITALS
DIASTOLIC BLOOD PRESSURE: 74 MMHG | HEIGHT: 62 IN | RESPIRATION RATE: 16 BRPM | WEIGHT: 134.5 LBS | BODY MASS INDEX: 24.75 KG/M2 | TEMPERATURE: 98.3 F | SYSTOLIC BLOOD PRESSURE: 149 MMHG | OXYGEN SATURATION: 94 % | HEART RATE: 71 BPM

## 2019-08-28 PROCEDURE — 665999 HH PPS REVENUE DEBIT

## 2019-08-28 PROCEDURE — 665001 SOC-HOME HEALTH

## 2019-08-28 PROCEDURE — 665998 HH PPS REVENUE CREDIT

## 2019-08-28 PROCEDURE — G0493 RN CARE EA 15 MIN HH/HOSPICE: HCPCS

## 2019-08-28 ASSESSMENT — ENCOUNTER SYMPTOMS
DEPRESSED MOOD: 1
VOMITING: DENIES
NAUSEA: DENIES

## 2019-08-28 ASSESSMENT — PATIENT HEALTH QUESTIONNAIRE - PHQ9
CLINICAL INTERPRETATION OF PHQ2 SCORE: 0
1. LITTLE INTEREST OR PLEASURE IN DOING THINGS: 00
2. FEELING DOWN, DEPRESSED, IRRITABLE, OR HOPELESS: 01

## 2019-08-28 ASSESSMENT — ACTIVITIES OF DAILY LIVING (ADL)
OASIS_M1830: 03
HOME_HEALTH_OASIS: 00

## 2019-08-29 ENCOUNTER — DOCUMENTATION (OUTPATIENT)
Dept: VASCULAR LAB | Facility: MEDICAL CENTER | Age: 84
End: 2019-08-29

## 2019-08-29 ENCOUNTER — OFFICE VISIT (OUTPATIENT)
Dept: INTERNAL MEDICINE | Facility: IMAGING CENTER | Age: 84
End: 2019-08-29
Payer: MEDICARE

## 2019-08-29 VITALS
DIASTOLIC BLOOD PRESSURE: 70 MMHG | TEMPERATURE: 97.6 F | HEART RATE: 59 BPM | OXYGEN SATURATION: 94 % | RESPIRATION RATE: 16 BRPM | WEIGHT: 138 LBS | HEIGHT: 62 IN | BODY MASS INDEX: 25.4 KG/M2 | SYSTOLIC BLOOD PRESSURE: 152 MMHG

## 2019-08-29 DIAGNOSIS — F41.1 GENERALIZED ANXIETY DISORDER: ICD-10-CM

## 2019-08-29 DIAGNOSIS — F51.04 PSYCHOPHYSIOLOGICAL INSOMNIA: ICD-10-CM

## 2019-08-29 DIAGNOSIS — R35.0 FREQUENT URINATION: ICD-10-CM

## 2019-08-29 LAB
APPEARANCE UR: CLEAR
BILIRUB UR STRIP-MCNC: NORMAL MG/DL
COLOR UR AUTO: YELLOW
GLUCOSE UR STRIP.AUTO-MCNC: NORMAL MG/DL
KETONES UR STRIP.AUTO-MCNC: NORMAL MG/DL
LEUKOCYTE ESTERASE UR QL STRIP.AUTO: NORMAL
NITRITE UR QL STRIP.AUTO: NORMAL
PH UR STRIP.AUTO: 6.5 [PH] (ref 5–8)
PROT UR QL STRIP: NORMAL MG/DL
RBC UR QL AUTO: NORMAL
SP GR UR STRIP.AUTO: 1.01
UROBILINOGEN UR STRIP-MCNC: NORMAL MG/DL

## 2019-08-29 PROCEDURE — 665998 HH PPS REVENUE CREDIT

## 2019-08-29 PROCEDURE — 99213 OFFICE O/P EST LOW 20 MIN: CPT | Performed by: INTERNAL MEDICINE

## 2019-08-29 PROCEDURE — 665999 HH PPS REVENUE DEBIT

## 2019-08-29 PROCEDURE — 81002 URINALYSIS NONAUTO W/O SCOPE: CPT | Performed by: INTERNAL MEDICINE

## 2019-08-29 NOTE — PROGRESS NOTES
Chief Complaint   Patient presents with   • Insomnia   • Anxiety       HISTORY OF THE PRESENT ILLNESS: Patient is a 89 y.o. female.     Patient comes in for evaluation of inability to sleep. She is been feeling more anxious. She has a history of anxiety and is currently on sertraline. She is also been hospitalized twice recently. In mid-July she was in the hospital in [place] for dysuria. The cultures ended up negative in the file diagnosis was overactive bladder. She was discharged on some medication but it is unclear what. She was then seen in emergency department last week here in [facility]. This was for nausea and dizziness. She was found to be bradycardic on monitor. Her metoprolol was discontinued.    Currently, she denies dizziness or dysuria. Her urine analysis in the office is normal. Her only complaint she is having difficulty sleeping because she feels anxious. She previously been given a prescription for trazodone. She is not been taking it for unclear reasons.        Allergies: Flagyl [metronidazole hcl]; Keflex; Morphine; and Sulfa drugs    Current Outpatient Medications Ordered in Epic   Medication Sig Dispense Refill   • non-formulary med Take 1 Cap by mouth as needed (Bladder). AZO Bladder control PRN for urinary tract symptoms.     • sertraline (ZOLOFT) 50 MG Tab Take 50 mg by mouth every day. Take 1/2 tablet for 7 days then start taking a whole tablet  Indications: Major Depressive Disorder     • traZODone (DESYREL) 50 MG Tab Take 50 mg by mouth at bedtime as needed for Sleep. Indications: Trouble Sleeping     • acetaminophen (TYLENOL) 650 MG CR tablet Take 650 mg by mouth every 6 hours as needed for Mild Pain.     • Multiple Vitamins-Minerals (PRESERVISION AREDS 2 PO) Take 1 Cap by mouth every day. Indications: Vitamin supplement     • Cholecalciferol (VITAMIN D) 2000 units Cap Take 1 Cap by mouth every day. Indications: Supplement     • aspirin 81 MG EC tablet Take 81 mg by mouth every day.    "  • Acetaminophen 500 MG Cap Take 1 Cap by mouth every day. Indications: Pain     • famotidine (PEPCID) 20 MG Tab Take 10 mg by mouth as needed (acid reflux).     • polyethylene glycol 3350 (MIRALAX) Powder Take 17 g by mouth every day. Indications: Constipation     • omeprazole (PRILOSEC) 20 MG delayed-release capsule Take 20 mg by mouth every day.     • levothyroxine (SYNTHROID) 75 MCG Tab TAKE 1 TABLET BY MOUTH EVERY DAY 90 Tab 3   • lisinopril (PRINIVIL) 20 MG Tab Take 10 mg by mouth 2 Times a Day.  3   • TRAVATAN Z 0.004 % Solution INSTILL 1 DROP INTO BOTH EYES AT BEDTIME  6   • amLODIPine (NORVASC) 5 MG Tab TAKE 1 TABLET BY MOUTH ONCE DAILY 90 Tab 3     No current Epic-ordered facility-administered medications on file.        Past medical history, social history and family history were reviewed from chart today    Review of systems: Per HPI.      All others negative.     Exam: /70 (BP Location: Left arm, Patient Position: Sitting, BP Cuff Size: Adult)   Pulse (!) 59   Temp 36.4 °C (97.6 °F) (Temporal)   Resp 16   Ht 1.575 m (5' 2\")   Wt 62.6 kg (138 lb)   SpO2 94%   General: Anxious and somewhat disheveled but friendly and appropriate  HEENT: Grossly normal. Oral cavity is pink and moist.  Neck: Supple without JVD or bruit.  Pulmonary: Clear with good breath sounds. Normal effort.  Cardiovascular: Regular. Carotid and radial pulses are intact.  Abdomen: Soft, nontender, nondistended. Spleen and liver are not enlarged.  Neurologic: Cranial nerves II through XII are grossly normal, alert and oriented x3      Diagnosis:  1. Psychophysiological insomnia     2. Generalized anxiety disorder     3. Frequent urination  POCT Urinalysis       Assessment:  insomnia due to anxiety.    Plan:  trial of trazodone 50 mg. She has her previous prescription.  If she does not tolerate the trazodone consider increasing the dose to 100 mg or possibly starting temazepam. I would like to avoid benzodiazepines but this is " been an ongoing issue. I do not think she is good candidate for Ambien.

## 2019-08-30 ENCOUNTER — APPOINTMENT (OUTPATIENT)
Dept: RADIOLOGY | Facility: MEDICAL CENTER | Age: 84
DRG: 392 | End: 2019-08-30
Attending: EMERGENCY MEDICINE
Payer: MEDICARE

## 2019-08-30 ENCOUNTER — HOSPITAL ENCOUNTER (INPATIENT)
Facility: MEDICAL CENTER | Age: 84
LOS: 2 days | DRG: 392 | End: 2019-09-01
Attending: EMERGENCY MEDICINE | Admitting: INTERNAL MEDICINE
Payer: MEDICARE

## 2019-08-30 ENCOUNTER — HOME CARE VISIT (OUTPATIENT)
Dept: HOME HEALTH SERVICES | Facility: HOME HEALTHCARE | Age: 84
End: 2019-08-30
Payer: MEDICARE

## 2019-08-30 VITALS
SYSTOLIC BLOOD PRESSURE: 107 MMHG | DIASTOLIC BLOOD PRESSURE: 60 MMHG | HEART RATE: 77 BPM | TEMPERATURE: 98.1 F | RESPIRATION RATE: 16 BRPM | OXYGEN SATURATION: 96 %

## 2019-08-30 DIAGNOSIS — K57.92 DIVERTICULITIS: ICD-10-CM

## 2019-08-30 DIAGNOSIS — K59.00 CONSTIPATION, UNSPECIFIED CONSTIPATION TYPE: ICD-10-CM

## 2019-08-30 LAB
ALBUMIN SERPL BCP-MCNC: 4.1 G/DL (ref 3.2–4.9)
ALBUMIN/GLOB SERPL: 1.4 G/DL
ALP SERPL-CCNC: 86 U/L (ref 30–99)
ALT SERPL-CCNC: 10 U/L (ref 2–50)
ANION GAP SERPL CALC-SCNC: 12 MMOL/L (ref 0–11.9)
APPEARANCE UR: CLEAR
AST SERPL-CCNC: 21 U/L (ref 12–45)
BACTERIA #/AREA URNS HPF: NEGATIVE /HPF
BASOPHILS # BLD AUTO: 0.3 % (ref 0–1.8)
BASOPHILS # BLD: 0.06 K/UL (ref 0–0.12)
BILIRUB SERPL-MCNC: 1 MG/DL (ref 0.1–1.5)
BILIRUB UR QL STRIP.AUTO: ABNORMAL
BUN SERPL-MCNC: 16 MG/DL (ref 8–22)
CALCIUM SERPL-MCNC: 9.2 MG/DL (ref 8.5–10.5)
CHLORIDE SERPL-SCNC: 101 MMOL/L (ref 96–112)
CO2 SERPL-SCNC: 20 MMOL/L (ref 20–33)
COLOR UR: ABNORMAL
CREAT SERPL-MCNC: 1.21 MG/DL (ref 0.5–1.4)
EOSINOPHIL # BLD AUTO: 0.04 K/UL (ref 0–0.51)
EOSINOPHIL NFR BLD: 0.2 % (ref 0–6.9)
EPI CELLS #/AREA URNS HPF: NORMAL /HPF
ERYTHROCYTE [DISTWIDTH] IN BLOOD BY AUTOMATED COUNT: 45.6 FL (ref 35.9–50)
GLOBULIN SER CALC-MCNC: 3 G/DL (ref 1.9–3.5)
GLUCOSE SERPL-MCNC: 124 MG/DL (ref 65–99)
GLUCOSE UR STRIP.AUTO-MCNC: NEGATIVE MG/DL
HCT VFR BLD AUTO: 44.8 % (ref 37–47)
HGB BLD-MCNC: 14.3 G/DL (ref 12–16)
HYALINE CASTS #/AREA URNS LPF: NORMAL /LPF
IMM GRANULOCYTES # BLD AUTO: 0.09 K/UL (ref 0–0.11)
IMM GRANULOCYTES NFR BLD AUTO: 0.5 % (ref 0–0.9)
INR PPP: 1.05 (ref 0.87–1.13)
KETONES UR STRIP.AUTO-MCNC: 15 MG/DL
LACTATE BLD-SCNC: 1.8 MMOL/L (ref 0.5–2)
LACTATE BLD-SCNC: 2 MMOL/L (ref 0.5–2)
LACTATE BLD-SCNC: 2.3 MMOL/L (ref 0.5–2)
LEUKOCYTE ESTERASE UR QL STRIP.AUTO: ABNORMAL
LIPASE SERPL-CCNC: 3 U/L (ref 11–82)
LYMPHOCYTES # BLD AUTO: 0.61 K/UL (ref 1–4.8)
LYMPHOCYTES NFR BLD: 3.3 % (ref 22–41)
MCH RBC QN AUTO: 26.5 PG (ref 27–33)
MCHC RBC AUTO-ENTMCNC: 31.9 G/DL (ref 33.6–35)
MCV RBC AUTO: 83.1 FL (ref 81.4–97.8)
MICRO URNS: ABNORMAL
MONOCYTES # BLD AUTO: 0.86 K/UL (ref 0–0.85)
MONOCYTES NFR BLD AUTO: 4.7 % (ref 0–13.4)
NEUTROPHILS # BLD AUTO: 16.61 K/UL (ref 2–7.15)
NEUTROPHILS NFR BLD: 91 % (ref 44–72)
NITRITE UR QL STRIP.AUTO: NEGATIVE
NRBC # BLD AUTO: 0 K/UL
NRBC BLD-RTO: 0 /100 WBC
PH UR STRIP.AUTO: 5 [PH] (ref 5–8)
PLATELET # BLD AUTO: 252 K/UL (ref 164–446)
PMV BLD AUTO: 9.5 FL (ref 9–12.9)
POTASSIUM SERPL-SCNC: 4.1 MMOL/L (ref 3.6–5.5)
PROT SERPL-MCNC: 7.1 G/DL (ref 6–8.2)
PROT UR QL STRIP: NEGATIVE MG/DL
PROTHROMBIN TIME: 13.9 SEC (ref 12–14.6)
RBC # BLD AUTO: 5.39 M/UL (ref 4.2–5.4)
RBC # URNS HPF: NORMAL /HPF
RBC UR QL AUTO: NEGATIVE
SODIUM SERPL-SCNC: 133 MMOL/L (ref 135–145)
SP GR UR STRIP.AUTO: 1.04
UROBILINOGEN UR STRIP.AUTO-MCNC: 1 MG/DL
WBC # BLD AUTO: 18.3 K/UL (ref 4.8–10.8)
WBC #/AREA URNS HPF: NORMAL /HPF

## 2019-08-30 PROCEDURE — 665999 HH PPS REVENUE DEBIT

## 2019-08-30 PROCEDURE — 83690 ASSAY OF LIPASE: CPT

## 2019-08-30 PROCEDURE — 99223 1ST HOSP IP/OBS HIGH 75: CPT | Performed by: INTERNAL MEDICINE

## 2019-08-30 PROCEDURE — 302128 INFUSION PUMP W/POLE: Performed by: INTERNAL MEDICINE

## 2019-08-30 PROCEDURE — 36415 COLL VENOUS BLD VENIPUNCTURE: CPT

## 2019-08-30 PROCEDURE — 96375 TX/PRO/DX INJ NEW DRUG ADDON: CPT

## 2019-08-30 PROCEDURE — 700111 HCHG RX REV CODE 636 W/ 250 OVERRIDE (IP): Performed by: INTERNAL MEDICINE

## 2019-08-30 PROCEDURE — 700117 HCHG RX CONTRAST REV CODE 255: Performed by: EMERGENCY MEDICINE

## 2019-08-30 PROCEDURE — 96376 TX/PRO/DX INJ SAME DRUG ADON: CPT

## 2019-08-30 PROCEDURE — 74177 CT ABD & PELVIS W/CONTRAST: CPT

## 2019-08-30 PROCEDURE — 700105 HCHG RX REV CODE 258: Performed by: EMERGENCY MEDICINE

## 2019-08-30 PROCEDURE — A9270 NON-COVERED ITEM OR SERVICE: HCPCS | Performed by: INTERNAL MEDICINE

## 2019-08-30 PROCEDURE — 87040 BLOOD CULTURE FOR BACTERIA: CPT

## 2019-08-30 PROCEDURE — 80053 COMPREHEN METABOLIC PANEL: CPT

## 2019-08-30 PROCEDURE — 700111 HCHG RX REV CODE 636 W/ 250 OVERRIDE (IP): Performed by: EMERGENCY MEDICINE

## 2019-08-30 PROCEDURE — 700102 HCHG RX REV CODE 250 W/ 637 OVERRIDE(OP): Performed by: INTERNAL MEDICINE

## 2019-08-30 PROCEDURE — 85025 COMPLETE CBC W/AUTO DIFF WBC: CPT

## 2019-08-30 PROCEDURE — 700102 HCHG RX REV CODE 250 W/ 637 OVERRIDE(OP): Performed by: EMERGENCY MEDICINE

## 2019-08-30 PROCEDURE — A9270 NON-COVERED ITEM OR SERVICE: HCPCS | Performed by: EMERGENCY MEDICINE

## 2019-08-30 PROCEDURE — 85610 PROTHROMBIN TIME: CPT

## 2019-08-30 PROCEDURE — 81001 URINALYSIS AUTO W/SCOPE: CPT

## 2019-08-30 PROCEDURE — 96365 THER/PROPH/DIAG IV INF INIT: CPT

## 2019-08-30 PROCEDURE — 700105 HCHG RX REV CODE 258: Performed by: INTERNAL MEDICINE

## 2019-08-30 PROCEDURE — 665998 HH PPS REVENUE CREDIT

## 2019-08-30 PROCEDURE — 770006 HCHG ROOM/CARE - MED/SURG/GYN SEMI*

## 2019-08-30 PROCEDURE — 99285 EMERGENCY DEPT VISIT HI MDM: CPT

## 2019-08-30 PROCEDURE — 83605 ASSAY OF LACTIC ACID: CPT

## 2019-08-30 PROCEDURE — G0151 HHCP-SERV OF PT,EA 15 MIN: HCPCS

## 2019-08-30 RX ORDER — HYDROMORPHONE HYDROCHLORIDE 1 MG/ML
0.5 INJECTION, SOLUTION INTRAMUSCULAR; INTRAVENOUS; SUBCUTANEOUS ONCE
Status: COMPLETED | OUTPATIENT
Start: 2019-08-30 | End: 2019-08-30

## 2019-08-30 RX ORDER — ONDANSETRON 2 MG/ML
4 INJECTION INTRAMUSCULAR; INTRAVENOUS EVERY 4 HOURS PRN
Status: DISCONTINUED | OUTPATIENT
Start: 2019-08-30 | End: 2019-09-01 | Stop reason: HOSPADM

## 2019-08-30 RX ORDER — POLYETHYLENE GLYCOL 3350 17 G/17G
1 POWDER, FOR SOLUTION ORAL
Status: DISCONTINUED | OUTPATIENT
Start: 2019-08-30 | End: 2019-09-01 | Stop reason: HOSPADM

## 2019-08-30 RX ORDER — LACTOBACILLUS RHAMNOSUS GG 10B CELL
1 CAPSULE ORAL
Status: DISCONTINUED | OUTPATIENT
Start: 2019-08-31 | End: 2019-09-01 | Stop reason: HOSPADM

## 2019-08-30 RX ORDER — SODIUM CHLORIDE, SODIUM LACTATE, POTASSIUM CHLORIDE, CALCIUM CHLORIDE 600; 310; 30; 20 MG/100ML; MG/100ML; MG/100ML; MG/100ML
1000 INJECTION, SOLUTION INTRAVENOUS ONCE
Status: COMPLETED | OUTPATIENT
Start: 2019-08-30 | End: 2019-08-30

## 2019-08-30 RX ORDER — ONDANSETRON 4 MG/1
4 TABLET, ORALLY DISINTEGRATING ORAL EVERY 4 HOURS PRN
Status: DISCONTINUED | OUTPATIENT
Start: 2019-08-30 | End: 2019-09-01 | Stop reason: HOSPADM

## 2019-08-30 RX ORDER — HYDROMORPHONE HYDROCHLORIDE 2 MG/1
0.5 TABLET ORAL
Status: DISCONTINUED | OUTPATIENT
Start: 2019-08-30 | End: 2019-09-01 | Stop reason: HOSPADM

## 2019-08-30 RX ORDER — TRAZODONE HYDROCHLORIDE 50 MG/1
50 TABLET ORAL
Status: DISCONTINUED | OUTPATIENT
Start: 2019-08-30 | End: 2019-09-01 | Stop reason: HOSPADM

## 2019-08-30 RX ORDER — SODIUM CHLORIDE, SODIUM LACTATE, POTASSIUM CHLORIDE, AND CALCIUM CHLORIDE .6; .31; .03; .02 G/100ML; G/100ML; G/100ML; G/100ML
500 INJECTION, SOLUTION INTRAVENOUS
Status: DISCONTINUED | OUTPATIENT
Start: 2019-08-30 | End: 2019-09-01 | Stop reason: HOSPADM

## 2019-08-30 RX ORDER — POLYETHYLENE GLYCOL 3350 17 G/17G
17 POWDER, FOR SOLUTION ORAL DAILY
Status: DISCONTINUED | OUTPATIENT
Start: 2019-08-31 | End: 2019-08-30

## 2019-08-30 RX ORDER — AMLODIPINE BESYLATE 5 MG/1
5 TABLET ORAL
Status: DISCONTINUED | OUTPATIENT
Start: 2019-08-31 | End: 2019-09-01 | Stop reason: HOSPADM

## 2019-08-30 RX ORDER — LISINOPRIL 10 MG/1
10 TABLET ORAL 2 TIMES DAILY
Status: DISCONTINUED | OUTPATIENT
Start: 2019-08-30 | End: 2019-09-01 | Stop reason: HOSPADM

## 2019-08-30 RX ORDER — ONDANSETRON 2 MG/ML
4 INJECTION INTRAMUSCULAR; INTRAVENOUS ONCE
Status: COMPLETED | OUTPATIENT
Start: 2019-08-30 | End: 2019-08-30

## 2019-08-30 RX ORDER — LEVOTHYROXINE SODIUM 0.07 MG/1
75 TABLET ORAL DAILY
Status: DISCONTINUED | OUTPATIENT
Start: 2019-08-31 | End: 2019-09-01 | Stop reason: HOSPADM

## 2019-08-30 RX ORDER — LATANOPROST 50 UG/ML
1 SOLUTION/ DROPS OPHTHALMIC EVERY EVENING
Status: DISCONTINUED | OUTPATIENT
Start: 2019-08-30 | End: 2019-09-01 | Stop reason: HOSPADM

## 2019-08-30 RX ORDER — ACETAMINOPHEN 325 MG/1
650 TABLET ORAL ONCE
Status: COMPLETED | OUTPATIENT
Start: 2019-08-30 | End: 2019-08-30

## 2019-08-30 RX ORDER — BISACODYL 10 MG
10 SUPPOSITORY, RECTAL RECTAL
Status: DISCONTINUED | OUTPATIENT
Start: 2019-08-30 | End: 2019-09-01 | Stop reason: HOSPADM

## 2019-08-30 RX ORDER — MULTIVIT-MIN/IRON/FOLIC ACID/K 18-600-40
1 CAPSULE ORAL DAILY
Status: DISCONTINUED | OUTPATIENT
Start: 2019-08-31 | End: 2019-08-30

## 2019-08-30 RX ORDER — AMOXICILLIN 250 MG
2 CAPSULE ORAL 2 TIMES DAILY
Status: DISCONTINUED | OUTPATIENT
Start: 2019-08-30 | End: 2019-09-01 | Stop reason: HOSPADM

## 2019-08-30 RX ORDER — ACETAMINOPHEN 325 MG/1
650 TABLET ORAL EVERY 6 HOURS PRN
Status: DISCONTINUED | OUTPATIENT
Start: 2019-08-30 | End: 2019-09-01 | Stop reason: HOSPADM

## 2019-08-30 RX ADMIN — LATANOPROST 1 DROP: 50 SOLUTION OPHTHALMIC at 21:45

## 2019-08-30 RX ADMIN — AMPICILLIN AND SULBACTAM 3 G: 2; 1 INJECTION, POWDER, FOR SOLUTION INTRAVENOUS at 23:08

## 2019-08-30 RX ADMIN — SENNOSIDES, DOCUSATE SODIUM 2 TABLET: 50; 8.6 TABLET, FILM COATED ORAL at 21:04

## 2019-08-30 RX ADMIN — IOHEXOL 90 ML: 350 INJECTION, SOLUTION INTRAVENOUS at 16:17

## 2019-08-30 RX ADMIN — ONDANSETRON 4 MG: 2 INJECTION INTRAMUSCULAR; INTRAVENOUS at 13:32

## 2019-08-30 RX ADMIN — ACETAMINOPHEN 650 MG: 325 TABLET, FILM COATED ORAL at 17:38

## 2019-08-30 RX ADMIN — HYDROMORPHONE HYDROCHLORIDE 0.5 MG: 1 INJECTION, SOLUTION INTRAMUSCULAR; INTRAVENOUS; SUBCUTANEOUS at 13:40

## 2019-08-30 RX ADMIN — HYDROMORPHONE HYDROCHLORIDE 0.5 MG: 1 INJECTION, SOLUTION INTRAMUSCULAR; INTRAVENOUS; SUBCUTANEOUS at 14:49

## 2019-08-30 RX ADMIN — SODIUM CHLORIDE, POTASSIUM CHLORIDE, SODIUM LACTATE AND CALCIUM CHLORIDE 1000 ML: 600; 310; 30; 20 INJECTION, SOLUTION INTRAVENOUS at 13:08

## 2019-08-30 RX ADMIN — PIPERACILLIN AND TAZOBACTAM 3.38 G: 3; .375 INJECTION, POWDER, LYOPHILIZED, FOR SOLUTION INTRAVENOUS; PARENTERAL at 14:58

## 2019-08-30 RX ADMIN — HYDROMORPHONE HYDROCHLORIDE 0.5 MG: 1 INJECTION, SOLUTION INTRAMUSCULAR; INTRAVENOUS; SUBCUTANEOUS at 17:18

## 2019-08-30 RX ADMIN — LISINOPRIL 10 MG: 10 TABLET ORAL at 21:04

## 2019-08-30 ASSESSMENT — LIFESTYLE VARIABLES
EVER_SMOKED: NEVER
HOW MANY TIMES IN THE PAST YEAR HAVE YOU HAD 5 OR MORE DRINKS IN A DAY: 0
EVER FELT BAD OR GUILTY ABOUT YOUR DRINKING: NO
TOTAL SCORE: 0
ON A TYPICAL DAY WHEN YOU DRINK ALCOHOL HOW MANY DRINKS DO YOU HAVE: 0
CONSUMPTION TOTAL: NEGATIVE
TOTAL SCORE: 0
ALCOHOL_USE: NO
HAVE YOU EVER FELT YOU SHOULD CUT DOWN ON YOUR DRINKING: NO
AVERAGE NUMBER OF DAYS PER WEEK YOU HAVE A DRINK CONTAINING ALCOHOL: 0
EVER HAD A DRINK FIRST THING IN THE MORNING TO STEADY YOUR NERVES TO GET RID OF A HANGOVER: NO
TOTAL SCORE: 0
HAVE PEOPLE ANNOYED YOU BY CRITICIZING YOUR DRINKING: NO

## 2019-08-30 ASSESSMENT — PATIENT HEALTH QUESTIONNAIRE - PHQ9
SUM OF ALL RESPONSES TO PHQ9 QUESTIONS 1 AND 2: 0
2. FEELING DOWN, DEPRESSED, IRRITABLE, OR HOPELESS: NOT AT ALL
1. LITTLE INTEREST OR PLEASURE IN DOING THINGS: NOT AT ALL

## 2019-08-30 ASSESSMENT — ACTIVITIES OF DAILY LIVING (ADL)
ADLS_COMMENTS: <!--EPICS-->SEE OT EVAL<!--EPICE-->
IADLS_COMMENTS: <!--EPICS-->SEE OT EVAL<!--EPICE-->
TRANSPORTATION COMMENTS: REQUIRES ASSIST OF ANOTHER PERSON AND AD OUT OF HOME

## 2019-08-30 ASSESSMENT — COGNITIVE AND FUNCTIONAL STATUS - GENERAL
SUGGESTED CMS G CODE MODIFIER MOBILITY: CK
WALKING IN HOSPITAL ROOM: A LITTLE
MOVING FROM LYING ON BACK TO SITTING ON SIDE OF FLAT BED: A LOT
HELP NEEDED FOR BATHING: A LITTLE
CLIMB 3 TO 5 STEPS WITH RAILING: A LOT
MOBILITY SCORE: 16
STANDING UP FROM CHAIR USING ARMS: A LOT
DRESSING REGULAR LOWER BODY CLOTHING: A LITTLE
PERSONAL GROOMING: A LITTLE
SUGGESTED CMS G CODE MODIFIER DAILY ACTIVITY: CJ
DAILY ACTIVITIY SCORE: 20
MOVING TO AND FROM BED TO CHAIR: A LITTLE
TOILETING: A LITTLE

## 2019-08-30 ASSESSMENT — COPD QUESTIONNAIRES
HAVE YOU SMOKED AT LEAST 100 CIGARETTES IN YOUR ENTIRE LIFE: NO/DON'T KNOW
DO YOU EVER COUGH UP ANY MUCUS OR PHLEGM?: NO/ONLY WITH OCCASIONAL COLDS OR INFECTIONS
DURING THE PAST 4 WEEKS HOW MUCH DID YOU FEEL SHORT OF BREATH: NONE/LITTLE OF THE TIME
IN THE PAST 12 MONTHS DO YOU DO LESS THAN YOU USED TO BECAUSE OF YOUR BREATHING PROBLEMS: DISAGREE/UNSURE
COPD SCREENING SCORE: 2

## 2019-08-30 NOTE — ED PROVIDER NOTES
ED Provider Note    Scribed for Beto Zhu M.D. by Brianna Serrano. 8/30/2019  12:43 PM    Primary care provider: Mike Otero M.D.  Means of arrival: ambulance  History obtained from: Patient  History limited by: None    CHIEF COMPLAINT  Chief Complaint   Patient presents with   • Abdominal Pain       [facility]  Gloria Patel is a 89 y.o. female who presents to the Emergency Department for evaluation of moderate left upper abdominal pain onset earlier this morning. Patient reports that her last bowel movement was 5 days ago. She is still urinating appropriately. She reports, that she feels she needs to have a bowel movement but is unable to produce any stool when she sits for long periods of time. No complaints of fevers, vomiting. Patient does have a history of bowel obstruction that required surgical intervention. She was not experiencing any vomiting at that time and further abdominal surgery history includes appendectomy, cholecystectomy, hysterectomy. No reported history of ulcers or infectious process of her abdomen.    REVIEW OF SYSTEMS  Pertinent negatives include no fevers, vomiting. As above, all other systems reviewed and are negative.   See HPI for further details.     PAST MEDICAL HISTORY   has a past medical history of Arthritis, Diverticulitis, GERD (gastroesophageal reflux disease), Glaucoma, Heart burn, Hiatus hernia syndrome, Hypertension, Primary osteoarthritis of both shoulders (1/22/2019), and Unspecified urinary incontinence.    SURGICAL HISTORY   has a past surgical history that includes other (1945); other (1954); other orthopedic surgery; other orthopedic surgery (2000); cholecystectomy (1966); other abdominal surgery (1954); gyn surgery (1970); hip arthroplasty total (6/21/2011); and us-needle core bx-breast panel.    SOCIAL HISTORY  Social History     Tobacco Use   • Smoking status: Never Smoker   • Smokeless tobacco: Never Used   Substance Use Topics   • Alcohol use: Yes      Alcohol/week: 0.0 oz     Comment: occassional wine   • Drug use: No      Social History     Substance and Sexual Activity   Drug Use No       FAMILY HISTORY  Family History   Problem Relation Age of Onset   • Diabetes Other    • Heart Disease Other    • Lung Disease Other    • Cancer Sister        CURRENT MEDICATIONS    Current Facility-Administered Medications:   •  HYDROmorphone (DILAUDID) tablet 0.5 mg, 0.5 mg, Oral, Q2HRS PRN, Beto Zhu M.D., Stopped at 08/30/19 1331  •  HYDROmorphone pf (DILAUDID) injection 0.5 mg, 0.5 mg, Intravenous, Once, Beto Zhu M.D.    Current Outpatient Medications:   •  non-formulary med, Take 1 Cap by mouth as needed (Bladder). AZO Bladder control PRN for urinary tract symptoms., Disp: , Rfl:   •  sertraline (ZOLOFT) 50 MG Tab, Take 50 mg by mouth every day. Take 1/2 tablet for 7 days then start taking a whole tablet  Indications: Major Depressive Disorder, Disp: , Rfl:   •  traZODone (DESYREL) 50 MG Tab, Take 50 mg by mouth at bedtime as needed for Sleep. Indications: Trouble Sleeping, Disp: , Rfl:   •  acetaminophen (TYLENOL) 650 MG CR tablet, Take 650 mg by mouth every 6 hours as needed for Mild Pain., Disp: , Rfl:   •  Multiple Vitamins-Minerals (PRESERVISION AREDS 2 PO), Take 1 Cap by mouth every day. Indications: Vitamin supplement, Disp: , Rfl:   •  Cholecalciferol (VITAMIN D) 2000 units Cap, Take 1 Cap by mouth every day. Indications: Supplement, Disp: , Rfl:   •  aspirin 81 MG EC tablet, Take 81 mg by mouth every day., Disp: , Rfl:   •  Acetaminophen 500 MG Cap, Take 1 Cap by mouth every day. Indications: Pain, Disp: , Rfl:   •  famotidine (PEPCID) 20 MG Tab, Take 10 mg by mouth as needed (acid reflux)., Disp: , Rfl:   •  polyethylene glycol 3350 (MIRALAX) Powder, Take 17 g by mouth every day. Indications: Constipation, Disp: , Rfl:   •  omeprazole (PRILOSEC) 20 MG delayed-release capsule, Take 20 mg by mouth every day., Disp: , Rfl:   •  levothyroxine (SYNTHROID) 75  "MCG Tab, TAKE 1 TABLET BY MOUTH EVERY DAY, Disp: 90 Tab, Rfl: 3  •  lisinopril (PRINIVIL) 20 MG Tab, Take 10 mg by mouth 2 Times a Day., Disp: , Rfl: 3  •  TRAVATAN Z 0.004 % Solution, INSTILL 1 DROP INTO BOTH EYES AT BEDTIME, Disp: , Rfl: 6  •  amLODIPine (NORVASC) 5 MG Tab, TAKE 1 TABLET BY MOUTH ONCE DAILY, Disp: 90 Tab, Rfl: 3    ALLERGIES  Allergies   Allergen Reactions   • Flagyl [Metronidazole Hcl]    • Keflex    • Morphine Anaphylaxis     anaphylaxis   • Sulfa Drugs Rash     rash       PHYSICAL EXAM  VITAL SIGNS: /53   Pulse 85   Resp 20   Ht 1.575 m (5' 2\")   Wt 62.1 kg (137 lb)   SpO2 93%   BMI 25.06 kg/m²     Constitutional: Well developed, Well nourished, No acute distress, Non-toxic appearance.   HENT: Normocephalic, Atraumatic, Bilateral external ears normal, Oropharynx is clear mucous membranes are dry.  Eyes: Pupils are equal round and reactive, EOMI, Conjunctiva normal, No discharge.   Neck: Normal range of motion, No tenderness, Supple  Lymphatic: No lymphadenopathy noted.   Cardiovascular: Regular rate and rhythm without murmur rub or gallop.  Thorax & Lungs: Clear breath sounds bilaterally without wheezes, rhonchi or rales. There is no chest wall tenderness.   Abdomen: Soft, LUQ tenderness. There is no rebound or guarding. No organomegaly is appreciated. Bowel sounds are normal.  Rectal: Non thrombosed non erythematous external hemorrhoids with no indications of impaction.  Skin: Normal without rash.   Extremities: Intact distal pulses, No edema, No tenderness, No cyanosis, No clubbing. Capillary refill is less than 2 seconds.  Musculoskeletal: Good range of motion in all major joints. No tenderness to palpation or major deformities noted.   Neurologic: Alert & oriented x 3, Normal motor function, Normal sensory function, No focal deficits noted. Reflexes are normal.  Psychiatric: Affect normal, Judgment normal, Mood normal. There is no suicidal ideation or patient reported " hallucinations.       DIAGNOSTIC STUDIES / PROCEDURES    LABS  Labs Reviewed   CBC WITH DIFFERENTIAL - Abnormal; Notable for the following components:       Result Value    WBC 18.3 (*)     MCH 26.5 (*)     MCHC 31.9 (*)     Neutrophils-Polys 91.00 (*)     Lymphocytes 3.30 (*)     Neutrophils (Absolute) 16.61 (*)     Lymphs (Absolute) 0.61 (*)     Monos (Absolute) 0.86 (*)     All other components within normal limits   COMP METABOLIC PANEL - Abnormal; Notable for the following components:    Sodium 133 (*)     Anion Gap 12.0 (*)     Glucose 124 (*)     All other components within normal limits   LIPASE - Abnormal; Notable for the following components:    Lipase 3 (*)     All other components within normal limits   ESTIMATED GFR - Abnormal; Notable for the following components:    GFR If  50 (*)     GFR If Non  41 (*)     All other components within normal limits   LACTIC ACID   URINALYSIS,CULTURE IF INDICATED      All labs reviewed by me.    RADIOLOGY  CT-ABDOMEN-PELVIS WITH   Final Result      Diverticulosis with mild bowel wall thickening and inflammatory change involving the splenic flexure suggesting diverticulitis without evidence of abscess or free air.      Large amount of stool in the colon suggest constipation.      Intra and extra hepatic biliary duct dilatation likely related to patient age and history of cholecystectomy.      Large hiatal hernia.      Bilateral renal cysts.        The radiologist's interpretation of all radiological studies have been reviewed by me.    COURSE & MEDICAL DECISION MAKING  Nursing notes, VS, PMSFHx reviewed in chart.    12:43 PM Patient seen and examined at bedside. Ordered for imaging and lab work to evaluate. Patient was treated with 0.5mg IV Dilaudid for pain management and IV LR for his dry mucous membranes and NPO status with concerns for possible surgical etiology, including bowel obstruction. I informed the patient that I would  evaluate for any acute process with lab work and imaging, managing her discomfort here in the ED. She understands and agrees with treatment plan.    1:13 PM Patient's WBC is elevated at 18.3, 3.375mg IV Zosyn will be started.  I am concerned about a surgical process as well as infection.  White blood cell count shows 91% shift with no evidence of significant acidosis but does have mild elevation of anion gap of 12.  Lactic acid is unremarkable at 1.8.  I do not think she has sepsis.    CT scan shows diverticulitis without abscess or perforation.  There is no evidence of mass.    4:47 PM I re-evaluated patient at bedside and updated her on her work up results for today. She is still experiencing abdominal pain and I informed her that this is most likely due to diverticulitis evident on her work up today. I explained that she would be admitted for further evaluation and treatment, ordering for pain medication here in the ED to continue to manage. She understands and agrees.    DISPOSITION:  Patient will be admitted to Dr. Fletcher, Hospitalist in guarded condition.    FINAL IMPRESSION  1. Diverticulitis    2. Constipation, unspecified constipation type          I, Brianna Serrano (Gael), am scribing for, and in the presence of, Beto Zhu M.D..    Electronically signed by: Brianna Serrano (Gael), 8/30/2019    IBeto M.D. personally performed the services described in this documentation, as scribed by Brianna Serrano in my presence, and it is both accurate and complete.    The note accurately reflects work and decisions made by me.  Beto Zhu  8/30/2019  5:05 PM

## 2019-08-30 NOTE — ED TRIAGE NOTES
Patient arrives with complaints of lower abdominal pain, right side worse than left, since this morning. Patient reports she was recently discharged from the hospital with similar symptoms on Tuesday. Denies fevers. Reports pain does not radiate anywhere else.

## 2019-08-31 ENCOUNTER — HOME CARE VISIT (OUTPATIENT)
Dept: HOME HEALTH SERVICES | Facility: HOME HEALTHCARE | Age: 84
End: 2019-08-31
Payer: MEDICARE

## 2019-08-31 LAB
ANION GAP SERPL CALC-SCNC: 10 MMOL/L (ref 0–11.9)
BUN SERPL-MCNC: 19 MG/DL (ref 8–22)
CALCIUM SERPL-MCNC: 8.6 MG/DL (ref 8.5–10.5)
CHLORIDE SERPL-SCNC: 98 MMOL/L (ref 96–112)
CO2 SERPL-SCNC: 21 MMOL/L (ref 20–33)
CREAT SERPL-MCNC: 1.16 MG/DL (ref 0.5–1.4)
ERYTHROCYTE [DISTWIDTH] IN BLOOD BY AUTOMATED COUNT: 46.7 FL (ref 35.9–50)
GLUCOSE SERPL-MCNC: 142 MG/DL (ref 65–99)
HCT VFR BLD AUTO: 41.3 % (ref 37–47)
HGB BLD-MCNC: 12.6 G/DL (ref 12–16)
LACTATE BLD-SCNC: 1.5 MMOL/L (ref 0.5–2)
LACTATE BLD-SCNC: 2.5 MMOL/L (ref 0.5–2)
MCH RBC QN AUTO: 25.6 PG (ref 27–33)
MCHC RBC AUTO-ENTMCNC: 30.5 G/DL (ref 33.6–35)
MCV RBC AUTO: 83.9 FL (ref 81.4–97.8)
PLATELET # BLD AUTO: 222 K/UL (ref 164–446)
PMV BLD AUTO: 9.2 FL (ref 9–12.9)
POTASSIUM SERPL-SCNC: 4.5 MMOL/L (ref 3.6–5.5)
RBC # BLD AUTO: 4.92 M/UL (ref 4.2–5.4)
SODIUM SERPL-SCNC: 129 MMOL/L (ref 135–145)
WBC # BLD AUTO: 17.7 K/UL (ref 4.8–10.8)

## 2019-08-31 PROCEDURE — 36415 COLL VENOUS BLD VENIPUNCTURE: CPT

## 2019-08-31 PROCEDURE — A9270 NON-COVERED ITEM OR SERVICE: HCPCS | Performed by: INTERNAL MEDICINE

## 2019-08-31 PROCEDURE — 665998 HH PPS REVENUE CREDIT

## 2019-08-31 PROCEDURE — 99232 SBSQ HOSP IP/OBS MODERATE 35: CPT | Performed by: HOSPITALIST

## 2019-08-31 PROCEDURE — 700105 HCHG RX REV CODE 258: Performed by: INTERNAL MEDICINE

## 2019-08-31 PROCEDURE — 700105 HCHG RX REV CODE 258: Performed by: HOSPITALIST

## 2019-08-31 PROCEDURE — 770006 HCHG ROOM/CARE - MED/SURG/GYN SEMI*

## 2019-08-31 PROCEDURE — 80048 BASIC METABOLIC PNL TOTAL CA: CPT

## 2019-08-31 PROCEDURE — 700111 HCHG RX REV CODE 636 W/ 250 OVERRIDE (IP): Performed by: INTERNAL MEDICINE

## 2019-08-31 PROCEDURE — 665999 HH PPS REVENUE DEBIT

## 2019-08-31 PROCEDURE — 85027 COMPLETE CBC AUTOMATED: CPT

## 2019-08-31 PROCEDURE — 83605 ASSAY OF LACTIC ACID: CPT

## 2019-08-31 PROCEDURE — 700102 HCHG RX REV CODE 250 W/ 637 OVERRIDE(OP): Performed by: INTERNAL MEDICINE

## 2019-08-31 RX ORDER — SODIUM CHLORIDE 9 MG/ML
1000 INJECTION, SOLUTION INTRAVENOUS ONCE
Status: COMPLETED | OUTPATIENT
Start: 2019-08-31 | End: 2019-08-31

## 2019-08-31 RX ADMIN — ENOXAPARIN SODIUM 30 MG: 100 INJECTION SUBCUTANEOUS at 14:04

## 2019-08-31 RX ADMIN — SENNOSIDES, DOCUSATE SODIUM 2 TABLET: 50; 8.6 TABLET, FILM COATED ORAL at 05:52

## 2019-08-31 RX ADMIN — LISINOPRIL 10 MG: 10 TABLET ORAL at 18:19

## 2019-08-31 RX ADMIN — ASPIRIN 81 MG: 81 TABLET, COATED ORAL at 05:52

## 2019-08-31 RX ADMIN — SERTRALINE HYDROCHLORIDE 50 MG: 50 TABLET ORAL at 05:52

## 2019-08-31 RX ADMIN — THERA TABS 1 TABLET: TAB at 05:52

## 2019-08-31 RX ADMIN — AMLODIPINE BESYLATE 5 MG: 5 TABLET ORAL at 05:52

## 2019-08-31 RX ADMIN — Medication 1 CAPSULE: at 07:47

## 2019-08-31 RX ADMIN — LEVOTHYROXINE SODIUM 75 MCG: 75 TABLET ORAL at 05:53

## 2019-08-31 RX ADMIN — AMPICILLIN AND SULBACTAM 3 G: 2; 1 INJECTION, POWDER, FOR SOLUTION INTRAVENOUS at 10:29

## 2019-08-31 RX ADMIN — ONDANSETRON 4 MG: 2 INJECTION INTRAMUSCULAR; INTRAVENOUS at 07:47

## 2019-08-31 RX ADMIN — FAMOTIDINE 20 MG: 10 INJECTION INTRAVENOUS at 05:53

## 2019-08-31 RX ADMIN — ACETAMINOPHEN 650 MG: 325 TABLET, FILM COATED ORAL at 03:35

## 2019-08-31 RX ADMIN — LISINOPRIL 10 MG: 10 TABLET ORAL at 05:52

## 2019-08-31 RX ADMIN — POLYETHYLENE GLYCOL 3350 1 PACKET: 17 POWDER, FOR SOLUTION ORAL at 14:04

## 2019-08-31 RX ADMIN — SODIUM CHLORIDE 1000 ML: 9 INJECTION, SOLUTION INTRAVENOUS at 03:24

## 2019-08-31 RX ADMIN — LATANOPROST 1 DROP: 50 SOLUTION OPHTHALMIC at 18:19

## 2019-08-31 RX ADMIN — AMPICILLIN AND SULBACTAM 3 G: 2; 1 INJECTION, POWDER, FOR SOLUTION INTRAVENOUS at 21:10

## 2019-08-31 ASSESSMENT — ENCOUNTER SYMPTOMS
HEMOPTYSIS: 0
FEVER: 0
NERVOUS/ANXIOUS: 0
FLANK PAIN: 0
HEADACHES: 0
DIZZINESS: 0
COUGH: 0
FOCAL WEAKNESS: 0
ABDOMINAL PAIN: 1
CONSTIPATION: 1
DIARRHEA: 0
FALLS: 0
BLOOD IN STOOL: 0
LOSS OF CONSCIOUSNESS: 0
INSOMNIA: 0
EYE PAIN: 0
TREMORS: 0
NAUSEA: 0
PALPITATIONS: 0
WEAKNESS: 0
WHEEZING: 0
CONSTIPATION: 0
SEIZURES: 0
PSYCHIATRIC NEGATIVE: 1
EYE REDNESS: 0
CHILLS: 0
MYALGIAS: 0
SHORTNESS OF BREATH: 0
VOMITING: 0

## 2019-08-31 NOTE — H&P
Hospital Medicine History & Physical Note    Date of Service  8/30/2019    Primary Care Physician  Mike Otero M.D.    Consultants      Code Status  full    Chief Complaint  Abdominal Pain        History of Presenting Illness  89 y.o. female who presented 8/30/2019 with Abdominal Pain  Cognitive deficits but pretty sharp for her age.  History of appendectomy, cholecystectomy, hysterectomy  Presented this morning with LLQ pain. She admits she has been constipated and last moved bowels last week. She did say she has poor PO intake. She does pass gas. No nausea or vomiting. No unusual food, no recent travel. Denies fevers or chills.    At the ED, she is afebrile, hemodynamically stable.  Diverticulosis with mild bowel wall thickening and inflammatory change involving the splenic flexure suggesting diverticulitis without evidence of abscess or free air.  Large amount of stool in the colon suggest constipation.  Intra and extra hepatic biliary duct dilatation likely related to patient age and history of cholecystectomy.  Large hiatal hernia.  Bilateral renal cysts.  Leukocytosis.   When I saw her at ED, no acute distress. Mild LLQ tenderness.    Review of Systems  Review of Systems   Constitutional: Negative for chills and fever.   HENT: Negative for congestion, hearing loss and nosebleeds.    Eyes: Negative for pain and redness.   Respiratory: Negative for cough, hemoptysis, shortness of breath and wheezing.    Cardiovascular: Negative for chest pain and palpitations.   Gastrointestinal: Positive for abdominal pain and constipation. Negative for blood in stool, diarrhea, nausea and vomiting.   Genitourinary: Negative for dysuria, frequency and hematuria.   Musculoskeletal: Negative for falls, joint pain and myalgias.   Skin: Negative for rash.   Neurological: Negative for dizziness, tremors, focal weakness, seizures, loss of consciousness, weakness and headaches.   Psychiatric/Behavioral: The patient is not  nervous/anxious and does not have insomnia.    All other systems reviewed and are negative.      Past Medical History   has a past medical history of Arthritis, Diverticulitis, GERD (gastroesophageal reflux disease), Glaucoma, Heart burn, Hiatus hernia syndrome, Hypertension, Primary osteoarthritis of both shoulders (1/22/2019), and Unspecified urinary incontinence.    Surgical History   has a past surgical history that includes other (1945); other (1954); other orthopedic surgery; other orthopedic surgery (2000); cholecystectomy (1966); other abdominal surgery (1954); gyn surgery (1970); hip arthroplasty total (6/21/2011); and us-needle core bx-breast panel.     Family History  family history includes Cancer in her sister; Diabetes in an other family member; Heart Disease in an other family member; Lung Disease in an other family member.     Social History   reports that she has never smoked. She has never used smokeless tobacco. She reports that she drinks alcohol. She reports that she does not use drugs.    Allergies  Allergies   Allergen Reactions   • Flagyl [Metronidazole Hcl]    • Keflex    • Morphine Anaphylaxis     anaphylaxis   • Sulfa Drugs Rash     rash       Medications  Prior to Admission Medications   Prescriptions Last Dose Informant Patient Reported? Taking?   Cholecalciferol (VITAMIN D) 2000 units Cap 8/30/2019 at AM Family Member Yes No   Sig: Take 1 Cap by mouth every day. Indications: Supplement   Multiple Vitamins-Minerals (PRESERVISION AREDS 2 PO) 8/30/2019 at AM Family Member Yes No   Sig: Take 1 Cap by mouth every day. Indications: Vitamin supplement   TRAVATAN Z 0.004 % Solution 8/29/2019 at HS Family Member Yes No   Sig: INSTILL 1 DROP INTO BOTH EYES AT BEDTIME   acetaminophen (TYLENOL) 650 MG CR tablet PRN at PRN Family Member Yes No   Sig: Take 650 mg by mouth every 6 hours as needed for Mild Pain.   amLODIPine (NORVASC) 5 MG Tab 8/30/2019 at AM Family Member No No   Sig: TAKE 1 TABLET BY  MOUTH ONCE DAILY   aspirin 81 MG EC tablet 8/30/2019 at AM Family Member Yes No   Sig: Take 81 mg by mouth every day.   famotidine (PEPCID) 20 MG Tab PRN at PRN Family Member Yes No   Sig: Take 10 mg by mouth as needed (acid reflux).   levothyroxine (SYNTHROID) 75 MCG Tab 8/30/2019 at AM Family Member No No   Sig: TAKE 1 TABLET BY MOUTH EVERY DAY   lisinopril (PRINIVIL) 20 MG Tab 8/30/2019 at AM Family Member Yes No   Sig: Take 10 mg by mouth 2 Times a Day.   non-formulary med PRN at PRN Family Member Yes No   Sig: Take 1 Cap by mouth as needed (Bladder). AZO Bladder control PRN for urinary tract symptoms.   omeprazole (PRILOSEC) 20 MG delayed-release capsule 8/30/2019 at AM Family Member Yes No   Sig: Take 20 mg by mouth every day.   polyethylene glycol 3350 (MIRALAX) Powder 8/30/2019 at AM Family Member Yes No   Sig: Take 17 g by mouth every day. Indications: Constipation   sertraline (ZOLOFT) 50 MG Tab 8/30/2019 at AM Family Member Yes No   Sig: Take 50 mg by mouth every day. Indications: Major Depressive Disorder   traZODone (DESYREL) 50 MG Tab PRN at PRN Family Member Yes No   Sig: Take 50 mg by mouth at bedtime as needed for Sleep. Indications: Trouble Sleeping      Facility-Administered Medications: None       Physical Exam  Temp:  [36.5 °C (97.7 °F)] 36.5 °C (97.7 °F)  Pulse:  [60-86] 86  Resp:  [16-20] 16  BP: (114-147)/(53-69) 147/69  SpO2:  [93 %-97 %] 96 %    Physical Exam   Constitutional: She appears well-developed.   Elderly, frail     HENT:   Head: Normocephalic and atraumatic.   Eyes: Conjunctivae are normal. No scleral icterus.   Neck: Normal range of motion. Neck supple.   Cardiovascular: Normal rate and regular rhythm. Exam reveals no gallop and no friction rub.   No murmur heard.  Pulmonary/Chest: Effort normal and breath sounds normal. No respiratory distress. She has no wheezes. She has no rales.   Abdominal: Soft. Bowel sounds are normal. She exhibits no distension. There is tenderness (LLQ).  There is no rebound and no guarding.   Musculoskeletal: She exhibits no edema or tenderness.   Neurological: She is alert.   Skin: Skin is warm.   Psychiatric: She has a normal mood and affect. Her behavior is normal.       Laboratory:  Recent Labs     08/30/19  1240   WBC 18.3*   RBC 5.39   HEMOGLOBIN 14.3   HEMATOCRIT 44.8   MCV 83.1   MCH 26.5*   MCHC 31.9*   RDW 45.6   PLATELETCT 252   MPV 9.5     Recent Labs     08/30/19  1240   SODIUM 133*   POTASSIUM 4.1   CHLORIDE 101   CO2 20   GLUCOSE 124*   BUN 16   CREATININE 1.21   CALCIUM 9.2     Recent Labs     08/30/19  1240   ALTSGPT 10   ASTSGOT 21   ALKPHOSPHAT 86   TBILIRUBIN 1.0   LIPASE 3*   GLUCOSE 124*         No results for input(s): NTPROBNP in the last 72 hours.      No results for input(s): TROPONINT in the last 72 hours.    Urinalysis:    No results found     Imaging:  CT-ABDOMEN-PELVIS WITH   Final Result      Diverticulosis with mild bowel wall thickening and inflammatory change involving the splenic flexure suggesting diverticulitis without evidence of abscess or free air.      Large amount of stool in the colon suggest constipation.      Intra and extra hepatic biliary duct dilatation likely related to patient age and history of cholecystectomy.      Large hiatal hernia.      Bilateral renal cysts.            Assessment/Plan:  I anticipate this patient will require at least two midnights for appropriate medical management, necessitating inpatient admission.    * Diverticulitis  Assessment & Plan  Uncomplicated.  Ordered bowel rest, IV fluids, antiemetics, pain control with bowel regimen.   Discuss diverticular diet  Treat constipation  Referral to GI in the outpatient although given age would probably not need procedures.    CN (constipation)  Assessment & Plan  Bowel regimen ordered    HTN (hypertension)- (present on admission)  Assessment & Plan  Stable. Continue blood pressure medications.    Hiatal hernia- (present on admission)  Assessment &  Plan  Ordered famotidine    Hypothyroid- (present on admission)  Assessment & Plan  Continue synthroid        VTE prophylaxis: SCDs, Lovenox SQ  Reviewed vitals, labs, imaging, staff notes.  Discussed assessment and plan with Gloria Patel   Discussed with ED physician

## 2019-08-31 NOTE — PROGRESS NOTES
[facility] Medicine Daily Progress Note    Date of Service  8/31/2019    Chief Complaint  89 y.o. female admitted 8/30/2019 with diverticulitis and increasing abdominal pain.    Interval Problem Update  Feels improved after having BMs x3 but still with left lower quadrant abdominal discomfort. No concerns from bedside RN.    Consultants/Specialty  None    Code Status  Full    Disposition  Anticipate home in the next 1-2 days.    Review of Systems  Review of Systems   Constitutional: Positive for malaise/fatigue. Negative for chills and fever.   Respiratory: Negative for shortness of breath.    Cardiovascular: Negative for chest pain and palpitations.   Gastrointestinal: Positive for abdominal pain. Negative for blood in stool, constipation, nausea and vomiting.   Genitourinary: Negative for dysuria and flank pain.   Musculoskeletal: Negative for falls.   Neurological: Negative for dizziness, loss of consciousness and headaches.   Psychiatric/Behavioral: Negative.    All other systems reviewed and are negative.       Physical Exam  Temp:  [36.5 °C (97.7 °F)-37.4 °C (99.3 °F)] 37.1 °C (98.8 °F)  Pulse:  [60-86] 68  Resp:  [14-20] 17  BP: (112-147)/(52-69) 117/54  SpO2:  [92 %-97 %] 92 %    Physical Exam   Constitutional: She is oriented to person, place, and time. She appears well-developed. No distress.   Elderly    HENT:   Head: Normocephalic.   Mouth/Throat: Oropharynx is clear and moist.   Eyes: EOM are normal. No scleral icterus.   Neck: Normal range of motion. No tracheal deviation present.   Cardiovascular: Normal rate, regular rhythm and intact distal pulses.   Pulmonary/Chest: Effort normal and breath sounds normal. No respiratory distress. She has no rales.   Abdominal: Soft. Bowel sounds are normal. She exhibits no distension. There is tenderness (LLQ).   Musculoskeletal: She exhibits no tenderness.   Neurological: She is alert and oriented to person, place, and time. No cranial nerve deficit.   Skin: Skin is  warm and dry.   Psychiatric: She has a normal mood and affect. Her behavior is normal.   Vitals reviewed.      Fluids    Intake/Output Summary (Last 24 hours) at 8/31/2019 0748  Last data filed at 8/31/2019 0425  Gross per 24 hour   Intake 350 ml   Output --   Net 350 ml       Laboratory  Recent Labs     08/30/19  1240 08/31/19  0208   WBC 18.3* 17.7*   RBC 5.39 4.92   HEMOGLOBIN 14.3 12.6   HEMATOCRIT 44.8 41.3   MCV 83.1 83.9   MCH 26.5* 25.6*   MCHC 31.9* 30.5*   RDW 45.6 46.7   PLATELETCT 252 222   MPV 9.5 9.2     Recent Labs     08/30/19  1240 08/31/19  0208   SODIUM 133* 129*   POTASSIUM 4.1 4.5   CHLORIDE 101 98   CO2 20 21   GLUCOSE 124* 142*   BUN 16 19   CREATININE 1.21 1.16   CALCIUM 9.2 8.6     Recent Labs     08/30/19  2155   INR 1.05               Imaging  CT-ABDOMEN-PELVIS WITH   Final Result      Diverticulosis with mild bowel wall thickening and inflammatory change involving the splenic flexure suggesting diverticulitis without evidence of abscess or free air.      Large amount of stool in the colon suggest constipation.      Intra and extra hepatic biliary duct dilatation likely related to patient age and history of cholecystectomy.      Large hiatal hernia.      Bilateral renal cysts.           Assessment/Plan  * Diverticulitis- (present on admission)  Assessment & Plan  Uncomplicated.  - continue with IV fluids, antiemetics, pain control  - start full liquid diet then advance as she tolerates  - Treat constipation, increased bowel regimen    CN (constipation)  Assessment & Plan  Resolved. Had BM x3 in the first 24 hours  - Bowel regimen    HTN (hypertension)- (present on admission)  Assessment & Plan  Stable.   - Continue home lisinopril and amlodipine with holding parameters    Hiatal hernia- (present on admission)  Assessment & Plan  - famotidine    Hypothyroid- (present on admission)  Assessment & Plan  - Continue synthroid       VTE prophylaxis: lovenox

## 2019-08-31 NOTE — ASSESSMENT & PLAN NOTE
Uncomplicated.  - continue with IV fluids, antiemetics, pain control  - start full liquid diet then advance as she tolerates  - Treat constipation, increased bowel regimen

## 2019-08-31 NOTE — PROGRESS NOTES
Lactic acid level trending up. MD aware, MD gave writer telephone order to give pt IV bolus of NS @200ml/hr. Next Lactic acid lab to be drawn @0545  Pt had 1 Lg BM overnight, continues to have LLQ abdominal pain  Pt endorsed HA, given tylenol   3/10 per pt report  Pt endorses dizziness, ambulates up with one, walker

## 2019-08-31 NOTE — CARE PLAN
Problem: Communication  Goal: The ability to communicate needs accurately and effectively will improve  Outcome: PROGRESSING AS EXPECTED   Pt communicating needs as they arise. Pt without home hearing aids, however pt able to repeat back instructions when they are given in a clear and moderately loud manner.     Problem: Safety  Goal: Will remain free from falls  Outcome: PROGRESSING AS EXPECTED   Pt calling appropriately. Pt with mild STM loss, however, pt is aware of fall risk precautions and is compliant.

## 2019-08-31 NOTE — DISCHARGE PLANNING
*ATTN Discharge Planning team: This patient is currently on service with [facility]. Please submit a referral order and face-to-face prior to discharging the patient home. If you have any questions, contact our Transitional Care Specialist team at x [phone].

## 2019-08-31 NOTE — CARE PLAN
Problem: Infection  Goal: Will remain free from infection  Outcome: PROGRESSING AS EXPECTED   Provide antibiotics as ordered      Problem: Safety  Goal: Will remain free from injury  Outcome: PROGRESSING SLOWER THAN EXPECTED   Safety precautions in use including use of call bell for assistance, bed in lowest position, bed/chair alarm utilized and use of treaded socks. Objects in room moved to allow access to bathroom

## 2019-08-31 NOTE — PROGRESS NOTES
2 RN skin check completed with Mandy BENTON  Devices in place: Nasal Cannula.  Skin assessed under devices: yes.  Confirmed pressure ulcers: None.  New potential pressure ulcers noted? None. Wound consult placed? No. Photo uploaded? n/a.   The following interventions are in place: Hourly rounding, pt turns self, ambulation as able.

## 2019-08-31 NOTE — PROGRESS NOTES
"Received bedside report from Night RN. Pt awake and alert. Bed alarm in use. No complains of pain at this time. Discussed plan of care. Stand by assistance with front wheeled walker. Up to bathroom frequently with assistance. /54   Pulse 68   Temp 37.1 °C (98.8 °F) (Temporal)   Resp 17   Ht 1.575 m (5' 2\")   Wt 64.7 kg (142 lb 10.2 oz)   SpO2 92%   BMI 26.09 kg/m²     "

## 2019-08-31 NOTE — ED NOTES
Med rec complete per pt family at bedside   Allergies reviewed  No oral ABX since pt was discharged on 08/25/19 (stopped Macrobid)

## 2019-09-01 VITALS
OXYGEN SATURATION: 93 % | TEMPERATURE: 98.1 F | RESPIRATION RATE: 17 BRPM | SYSTOLIC BLOOD PRESSURE: 134 MMHG | DIASTOLIC BLOOD PRESSURE: 57 MMHG | HEART RATE: 74 BPM | BODY MASS INDEX: 26.25 KG/M2 | WEIGHT: 142.64 LBS | HEIGHT: 62 IN

## 2019-09-01 PROBLEM — K59.00 CN (CONSTIPATION): Status: RESOLVED | Noted: 2019-08-30 | Resolved: 2019-09-01

## 2019-09-01 PROBLEM — K57.92 DIVERTICULITIS: Status: RESOLVED | Noted: 2019-08-30 | Resolved: 2019-09-01

## 2019-09-01 LAB
ANION GAP SERPL CALC-SCNC: 8 MMOL/L (ref 0–11.9)
BUN SERPL-MCNC: 12 MG/DL (ref 8–22)
CALCIUM SERPL-MCNC: 8.5 MG/DL (ref 8.5–10.5)
CHLORIDE SERPL-SCNC: 100 MMOL/L (ref 96–112)
CO2 SERPL-SCNC: 24 MMOL/L (ref 20–33)
CREAT SERPL-MCNC: 0.85 MG/DL (ref 0.5–1.4)
ERYTHROCYTE [DISTWIDTH] IN BLOOD BY AUTOMATED COUNT: 44.5 FL (ref 35.9–50)
GLUCOSE SERPL-MCNC: 108 MG/DL (ref 65–99)
HCT VFR BLD AUTO: 39 % (ref 37–47)
HGB BLD-MCNC: 12.6 G/DL (ref 12–16)
MCH RBC QN AUTO: 26.3 PG (ref 27–33)
MCHC RBC AUTO-ENTMCNC: 32.3 G/DL (ref 33.6–35)
MCV RBC AUTO: 81.4 FL (ref 81.4–97.8)
PLATELET # BLD AUTO: 234 K/UL (ref 164–446)
PMV BLD AUTO: 9.6 FL (ref 9–12.9)
POTASSIUM SERPL-SCNC: 3.5 MMOL/L (ref 3.6–5.5)
RBC # BLD AUTO: 4.79 M/UL (ref 4.2–5.4)
SODIUM SERPL-SCNC: 132 MMOL/L (ref 135–145)
WBC # BLD AUTO: 8.7 K/UL (ref 4.8–10.8)

## 2019-09-01 PROCEDURE — 36415 COLL VENOUS BLD VENIPUNCTURE: CPT

## 2019-09-01 PROCEDURE — 665999 HH PPS REVENUE DEBIT

## 2019-09-01 PROCEDURE — 700111 HCHG RX REV CODE 636 W/ 250 OVERRIDE (IP): Performed by: INTERNAL MEDICINE

## 2019-09-01 PROCEDURE — 700105 HCHG RX REV CODE 258: Performed by: INTERNAL MEDICINE

## 2019-09-01 PROCEDURE — A9270 NON-COVERED ITEM OR SERVICE: HCPCS | Performed by: INTERNAL MEDICINE

## 2019-09-01 PROCEDURE — 665998 HH PPS REVENUE CREDIT

## 2019-09-01 PROCEDURE — 85027 COMPLETE CBC AUTOMATED: CPT

## 2019-09-01 PROCEDURE — 80048 BASIC METABOLIC PNL TOTAL CA: CPT

## 2019-09-01 PROCEDURE — 99239 HOSP IP/OBS DSCHRG MGMT >30: CPT | Performed by: HOSPITALIST

## 2019-09-01 PROCEDURE — 700102 HCHG RX REV CODE 250 W/ 637 OVERRIDE(OP): Performed by: INTERNAL MEDICINE

## 2019-09-01 RX ORDER — LACTOBACILLUS RHAMNOSUS GG 10B CELL
1 CAPSULE ORAL
Qty: 5 CAP | Refills: 0 | Status: SHIPPED | OUTPATIENT
Start: 2019-09-02 | End: 2019-09-13

## 2019-09-01 RX ORDER — AMOXICILLIN AND CLAVULANATE POTASSIUM 875; 125 MG/1; MG/1
1 TABLET, FILM COATED ORAL 2 TIMES DAILY
Qty: 10 TAB | Refills: 0 | Status: SHIPPED | OUTPATIENT
Start: 2019-09-01 | End: 2019-09-13

## 2019-09-01 RX ADMIN — LISINOPRIL 10 MG: 10 TABLET ORAL at 04:14

## 2019-09-01 RX ADMIN — AMPICILLIN AND SULBACTAM 3 G: 2; 1 INJECTION, POWDER, FOR SOLUTION INTRAVENOUS at 08:24

## 2019-09-01 RX ADMIN — LEVOTHYROXINE SODIUM 75 MCG: 75 TABLET ORAL at 04:14

## 2019-09-01 RX ADMIN — ACETAMINOPHEN 650 MG: 325 TABLET, FILM COATED ORAL at 02:18

## 2019-09-01 RX ADMIN — ASPIRIN 81 MG: 81 TABLET, COATED ORAL at 04:14

## 2019-09-01 RX ADMIN — ENOXAPARIN SODIUM 30 MG: 100 INJECTION SUBCUTANEOUS at 04:13

## 2019-09-01 RX ADMIN — AMLODIPINE BESYLATE 5 MG: 5 TABLET ORAL at 04:14

## 2019-09-01 RX ADMIN — ONDANSETRON 4 MG: 2 INJECTION INTRAMUSCULAR; INTRAVENOUS at 04:09

## 2019-09-01 RX ADMIN — SERTRALINE HYDROCHLORIDE 50 MG: 50 TABLET ORAL at 04:14

## 2019-09-01 RX ADMIN — FAMOTIDINE 20 MG: 10 INJECTION INTRAVENOUS at 04:12

## 2019-09-01 RX ADMIN — Medication 1 CAPSULE: at 08:23

## 2019-09-01 RX ADMIN — THERA TABS 1 TABLET: TAB at 04:14

## 2019-09-01 NOTE — DISCHARGE PLANNING
ATTN: Case Management  RE: Referral for Home Health    As of 09/01/2019, we have accepted the Home Health referral for the patient listed above.    A [facility] clinician will be out to see the patient within 48 hours. If you have any questions or concerns regarding the patient’s transition to Home Health, please do not hesitate to contact us at [phone].      We look forward to collaborating with you,  [facility] Home Health Team

## 2019-09-01 NOTE — CARE PLAN
Problem: Safety  Goal: Will remain free from injury  Outcome: PROGRESSING AS EXPECTED  Intervention: Provide assistance with mobility  Flowsheets (Taken 9/1/2019 0200 by Jeannette Arrington, C.N.A.)  Assistance: Assistance of One  Ambulation Tolerance: Tires Quickly  Note:   Patient educated regarding fall risks. Environmental hazards reviewed such as IV tubing, spills. Patient directed to use call light for assistance before getting out of bed or ambulating. Patient verbalized understanding.        Problem: Pain Management  Goal: Pain level will decrease to patient's comfort goal  Outcome: PROGRESSING AS EXPECTED  Intervention: Follow pain managment plan developed in collaboration with patient and Interdisciplinary Team  Note:   Patient educated regarding pharmacological and non-pharmacological pain management. Review of PRN pain management, medication schedule, pain scale, and reassessment.

## 2019-09-01 NOTE — FACE TO FACE
Face to Face Supporting Documentation - Home Health    The encounter with this patient was in whole or in part the primary reason for home health admission.    Date of encounter:   Patient:                    MRN:                       YOB: 2019  Gloria Patel  [id number]  7/9/1924     Home health to see patient for:  Skilled Nursing care for assessment, interventions & education, Physical Therapy evaluation and treatment and Occupational therapy evaluation and treatment    Skilled need for:  Recent Deterioration of Health Status constipation and diverticulitis    Skilled nursing interventions to include:  Comment: medication education    Homebound status evidenced by:  Needs the assistance of another person in order to leave the home. Leaving home requires a considerable and taxing effort. There is a normal inability to leave the home.    Community Physician to provide follow up care: Mike Otero M.D.     Optional Interventions? No      I certify the face to face encounter for this home health care referral meets the CMS requirements and the encounter/clinical assessment with the patient was, in whole, or in part, for the medical condition(s) listed above, which is the primary reason for home health care. Based on my clinical findings: the service(s) are medically necessary, support the need for home health care, and the homebound criteria are met.  I certify that this patient has had a face to face encounter by myself.  Destiny Ghotra M.D. - NPI: [id number]

## 2019-09-01 NOTE — DISCHARGE SUMMARY
Discharge Summary    CHIEF COMPLAINT ON ADMISSION  Chief Complaint   Patient presents with   • Abdominal Pain       Reason for Admission  EMS     Admission Date  8/30/2019    CODE STATUS  Full Code    HPI & HOSPITAL COURSE  This is a 89 y.o. female with mild cognitive deficits, multiple abdominal surgeries (appendectomy, hysterectomy, and cholecystectomy) here with abdominal pain. She has noted constipation but still with flatus. CT showed diverticulitis without abscess or free air. She was started on IVF, bowel rest and empiric antibiotics. On admission, WBC of 17.7, Na 129, Cr 1.16 that all resolved the next day. Lactic acidosis resolved with IVF. She was given a bowel regimen and then had multiple bowel movements with significant improvement in her abdominal pain. She was continued on antibiotics and diet slowly advanced. She felt improved and was eager for discharge. Evaluated by PT who recommended continuation of her home health services.        Therefore, she is discharged in good and stable condition to home with organized home healthcare and close outpatient follow-up.    The patient met 2-midnight criteria for an inpatient stay at the time of discharge.    Discharge Date  9/1/2019    FOLLOW UP ITEMS POST DISCHARGE  Please follow up with your PCP and GI if clinically indicated.    DISCHARGE DIAGNOSES  Principal Problem (Resolved):    Diverticulitis POA: Yes  Active Problems:    Hypothyroid POA: Yes    Hiatal hernia POA: Yes    HTN (hypertension) POA: Yes  Resolved Problems:    CN (constipation) POA: Unknown      FOLLOW UP  Future Appointments   Date Time Provider Department Center   9/2/2019 To Be Determined Angela Perkins R.N. [facility] None   9/2/2019 To Be Determined Kami Burk, OT [facility] None   9/3/2019 To Be Determined Joan K Ormonde, PT [facility] None   9/5/2019 To Be Determined Joan K Ormonde, PT [facility] None   9/9/2019 To Be Determined Joan K Ormonde, PT [facility] None   9/10/2019 To Be Determined SINA ROMAN  TE[facility] None   9/11/2019 To Be Determined Joan K Ormonde, PT [facility] None   9/17/2019 To Be Determined RWN  TE[facility] None   9/24/2019 To Be Determined RWN  TE[facility] None   10/1/2019 To Be Determined Angela Perkins R.N. [facility] None   10/8/2019 To Be Determined Angela Perkins R.N. [facility] None   10/15/2019 To Be Determined Angela Perkins R.N. [facility] None   10/22/2019 To Be Determined Angela Perkins R.N. [facility] None     Mike Otero M.D.  [address] [facility]  [address]  [facility] [address]  [phone]            MEDICATIONS ON DISCHARGE     Medication List      START taking these medications      Instructions   amoxicillin-clavulanate 875-125 MG Tabs  Commonly known as:  AUGMENTIN   Take 1 Tab by mouth 2 times a day for 5 days.  Dose:  1 Tab     lactobacillus rhamnosus Caps capsule  Start taking on:  9/2/2019   Take 1 Cap by mouth every morning with breakfast for 5 days.  Dose:  1 Cap        CONTINUE taking these medications      Instructions   acetaminophen 650 MG CR tablet  Commonly known as:  TYLENOL   Take 650 mg by mouth every 6 hours as needed for Mild Pain.  Dose:  650 mg     amLODIPine 5 MG Tabs  Commonly known as:  NORVASC   TAKE 1 TABLET BY MOUTH ONCE DAILY     aspirin 81 MG EC tablet   Take 81 mg by mouth every day.  Dose:  81 mg     famotidine 20 MG Tabs  Commonly known as:  PEPCID   Take 10 mg by mouth as needed (acid reflux).  Dose:  10 mg     levothyroxine 75 MCG Tabs  Commonly known as:  SYNTHROID   TAKE 1 TABLET BY MOUTH EVERY DAY     lisinopril 20 MG Tabs  Commonly known as:  PRINIVIL   Take 10 mg by mouth 2 Times a Day.  Dose:  10 mg     non-formulary med   Take 1 Cap by mouth as needed (Bladder). AZO Bladder control PRN for urinary tract symptoms.  Dose:  1 Cap     omeprazole 20 MG delayed-release capsule  Commonly known as:  PRILOSEC   Take 20 mg by mouth every day.  Dose:  20 mg     polyethylene glycol 3350 Powd  Commonly known as:  MIRALAX   Take 17 g by mouth every day.  Indications: Constipation  Dose:  17 g     PRESERVISION AREDS 2 PO   Take 1 Cap by mouth every day. Indications: Vitamin supplement  Dose:  1 Cap     sertraline 50 MG Tabs  Commonly known as:  ZOLOFT   Take 50 mg by mouth every day. Indications: Major Depressive Disorder  Dose:  50 mg     TRAVATAN Z 0.004 % Soln  Generic drug:  travoprost   INSTILL 1 DROP INTO BOTH EYES AT BEDTIME     traZODone 50 MG Tabs  Commonly known as:  DESYREL   Take 50 mg by mouth at bedtime as needed for Sleep. Indications: Trouble Sleeping  Dose:  50 mg     Vitamin D 2000 units Caps   Take 1 Cap by mouth every day. Indications: Supplement  Dose:  1 Cap            Allergies  Allergies   Allergen Reactions   • Flagyl [Metronidazole Hcl]    • Keflex    • Morphine Anaphylaxis     anaphylaxis   • Sulfa Drugs Rash     rash       DIET  Orders Placed This Encounter   Procedures   • Diet Order Full Liquid     Standing Status:   Standing     Number of Occurrences:   1     Order Specific Question:   Diet:     Answer:   Full Liquid [11]   • Discontinue Diet Tray     Standing Status:   Standing     Number of Occurrences:   1       ACTIVITY  As tolerated.  Weight bearing as tolerated    CONSULTATIONS  None    PROCEDURES  CT-ABDOMEN-PELVIS WITH   Final Result      Diverticulosis with mild bowel wall thickening and inflammatory change involving the splenic flexure suggesting diverticulitis without evidence of abscess or free air.      Large amount of stool in the colon suggest constipation.      Intra and extra hepatic biliary duct dilatation likely related to patient age and history of cholecystectomy.      Large hiatal hernia.      Bilateral renal cysts.            LABORATORY  Lab Results   Component Value Date    SODIUM 132 (L) 09/01/2019    POTASSIUM 3.5 (L) 09/01/2019    CHLORIDE 100 09/01/2019    CO2 24 09/01/2019    GLUCOSE 108 (H) 09/01/2019    BUN 12 09/01/2019    CREATININE 0.85 09/01/2019    CREATININE 0.99 01/09/2013    GLOMRATE >59 08/04/2010         Lab Results   Component Value Date    WBC 8.7 09/01/2019    WBC 7.8 01/09/2013    HEMOGLOBIN 12.6 09/01/2019    HEMATOCRIT 39.0 09/01/2019    PLATELETCT 234 09/01/2019        Total time of the discharge process exceeds 33 minutes.

## 2019-09-01 NOTE — DISCHARGE INSTRUCTIONS
Discharge Instructions per Destiny Ghotra M.D.    Please follow up with your PCP.    DIET: GI soft until your abdominal pain completely resolves    ACTIVITY: as tolerated    DIAGNOSIS: constipation and diverticulitis    Return to ER if your develop new or worsening symptoms, or bleeding.    Discharge Instructions    Discharged to home by car with relative. Discharged via wheelchair, hospital escort: Refused.  Special equipment needed: Not Applicable    Be sure to schedule a follow-up appointment with your primary care doctor or any specialists as instructed.     Discharge Plan:   Diet Plan: Discussed  Activity Level: Discussed  Confirmed Follow up Appointment: Patient to Call and Schedule Appointment  Confirmed Symptoms Management: Discussed  Medication Reconciliation Updated: Yes  Influenza Vaccine Indication: Indicated: Not available from distributor/    I understand that a diet low in cholesterol, fat, and sodium is recommended for good health. Unless I have been given specific instructions below for another diet, I accept this instruction as my diet prescription.   Other diet: GI soft    Special Instructions: None    · Is patient discharged on Warfarin / Coumadin?   No     Depression / Suicide Risk    As you are discharged from this [facility] facility, it is important to learn how to keep safe from harming yourself.    Recognize the warning signs:  · Abrupt changes in personality, positive or negative- including increase in energy   · Giving away possessions  · Change in eating patterns- significant weight changes-  positive or negative  · Change in sleeping patterns- unable to sleep or sleeping all the time   · Unwillingness or inability to communicate  · Depression  · Unusual sadness, discouragement and loneliness  · Talk of wanting to die  · Neglect of personal appearance   · Rebelliousness- reckless behavior  · Withdrawal from people/activities they love  · Confusion- inability to  concentrate     If you or a loved one observes any of these behaviors or has concerns about self-harm, here's what you can do:  · Talk about it- your feelings and reasons for harming yourself  · Remove any means that you might use to hurt yourself (examples: pills, rope, extension cords, firearm)  · Get professional help from the community (Mental Health, Substance Abuse, psychological counseling)  · Do not be alone:Call your Safe Contact- someone whom you trust who will be there for you.  · Call your local CRISIS HOTLINE 200-5906 or 091-840-0020  · Call your local Children's Mobile Crisis Response Team [place] (310) 324-3009 or www.[employer]  · Call the toll free National Suicide Prevention Hotlines   · National Suicide Prevention Lifeline 600-187-VNVS (1760)  · Cashback [place] [employer] 800-SUICIDE (140-6151)        Diverticulitis  Diverticulitis is when small pockets that have formed in your colon (large intestine) become infected or swollen.  Follow these instructions at home:  · Follow your doctor's instructions.  · Follow a special diet if told by your doctor.  · When you feel better, your doctor may tell you to change your diet. You may be told to eat a lot of fiber. Fruits and vegetables are good sources of fiber. Fiber makes it easier to poop (have bowel movements).  · Take supplements or probiotics as told by your doctor.  · Only take medicines as told by your doctor.  · Keep all follow-up visits with your doctor.  Contact a doctor if:  · Your pain does not get better.  · You have a hard time eating food.  · You are not pooping like normal.  Get help right away if:  · Your pain gets worse.  · Your problems do not get better.  · Your problems suddenly get worse.  · You have a fever.  · You keep throwing up (vomiting).  · You have bloody or black, tarry poop (stool).  This information is not intended to replace advice given to you by your health care provider. Make sure you discuss any  questions you have with your health care provider.  Document Released: 06/05/2009 Document Revised: 05/25/2017 Document Reviewed: 11/12/2014  [employer] Interactive Patient Education © 2017 [employer]

## 2019-09-01 NOTE — PROGRESS NOTES
Report received from CHETAN Thomas. Assumed care at 0700, assessment complete. Pt is A & O x 4, intermittently confused, in the AM/first rounds pt is confused, asked orientation questions pt is able to answer appropriately.  Pt denies having any pain at this time. Fall precautions and appropriate signs in place. Pt oriented to unit routine, call light/phone system and RN extension number provided. Pt educated regarding fall precautions. Bed alarm in use. Pt denies any additional needs at this time. Call light within reach.

## 2019-09-01 NOTE — PROGRESS NOTES
0245: Patient is A&O x2. Disoriented to place and time. This is a change from HS assessment which patient was A&O x4. Patient believes the staff has kidnapped her away from the hospital. Patient was able to be redirected to place and time, intermittent confusion continues. Pain assessed 0/10 at worst, 4/10 at best d/t reported headache. PRN pain medicine provided as ordered. Patient educated regarding pharmacological and non-pharmacological interventions for pain.     Overall assessment: Neuro assessment shows 4/5 RUE, 4/5 LUE, 4/5 LLE, 4/5 RLE. Patient denies numbness and tingling present. Cardiac assessment WNL. Lungs diminished per auscultation. Last BM 9/1. Abdomen round, firm, tender to palpitation. Edy score 18. High fall risk. Interventions in place.

## 2019-09-01 NOTE — DISCHARGE PLANNING
Rec'd call from Neftaly with [facility] health requesting MD place a resume Home Health order and a face to face for pt who discharged yesterday. Gene texted Dr Ghotra to request she place those.

## 2019-09-01 NOTE — PROGRESS NOTES
Patient complained of left lower abdominal pain. She also had bloating and hyperactive bowel sounds. She was given miralax. She then had four large loose stools.

## 2019-09-02 PROCEDURE — 665998 HH PPS REVENUE CREDIT

## 2019-09-02 PROCEDURE — 665999 HH PPS REVENUE DEBIT

## 2019-09-03 ENCOUNTER — HOME CARE VISIT (OUTPATIENT)
Dept: HOME HEALTH SERVICES | Facility: HOME HEALTHCARE | Age: 84
End: 2019-09-03
Payer: MEDICARE

## 2019-09-03 ENCOUNTER — ANTICOAGULATION MONITORING (OUTPATIENT)
Dept: VASCULAR LAB | Facility: MEDICAL CENTER | Age: 84
End: 2019-09-03

## 2019-09-03 VITALS
HEART RATE: 69 BPM | HEIGHT: 62 IN | DIASTOLIC BLOOD PRESSURE: 68 MMHG | OXYGEN SATURATION: 16 % | RESPIRATION RATE: 16 BRPM | TEMPERATURE: 99 F | BODY MASS INDEX: 26.39 KG/M2 | WEIGHT: 143.38 LBS | SYSTOLIC BLOOD PRESSURE: 122 MMHG

## 2019-09-03 PROCEDURE — 665999 HH PPS REVENUE DEBIT

## 2019-09-03 PROCEDURE — 665998 HH PPS REVENUE CREDIT

## 2019-09-03 PROCEDURE — G0493 RN CARE EA 15 MIN HH/HOSPICE: HCPCS

## 2019-09-03 ASSESSMENT — ENCOUNTER SYMPTOMS
VOMITING: DENIES
DIFFICULTY THINKING: 1
DIFFICULTY THINKING: 1
DEPRESSED MOOD: 1
NAUSEA: DENIES

## 2019-09-03 ASSESSMENT — PATIENT HEALTH QUESTIONNAIRE - PHQ9
CLINICAL INTERPRETATION OF PHQ2 SCORE: 1
2. FEELING DOWN, DEPRESSED, IRRITABLE, OR HOPELESS: 01
5. POOR APPETITE OR OVEREATING: 0 - NOT AT ALL
1. LITTLE INTEREST OR PLEASURE IN DOING THINGS: 00
SUM OF ALL RESPONSES TO PHQ QUESTIONS 1-9: 2

## 2019-09-03 ASSESSMENT — ACTIVITIES OF DAILY LIVING (ADL)
OASIS_M1830: 03
HOME_HEALTH_OASIS: 01

## 2019-09-03 NOTE — PROGRESS NOTES
Received referral from [facility]. Medications reviewed.     Medium   Drug-Drug: aspirin and lisinopril   Inhibition of prostaglandin synthesis by salicylates may impair the beneficial effects of ACE inhibitors in heart failure and hypertension.   Filtered by system settings     Drug-Drug: levothyroxine and sertraline   Pharmacological effects and therapeutic benefits of Thyroid Hormones may be decreased by Sertraline.   Filtered by system settings     Drug-Drug: aspirin and sertraline   The risk of upper gastrointestinal bleeding may be increased with concurrent administration of Aspirin and Selective Serotonin Reuptake Inhibitors.   Filtered by system settings     Drug-Drug: traZODone and sertraline   Coadministration of Trazodone and Serotonin Modulators may lead to the development of serotonin syndrome.       All minor interactions, no interventions needed     Shant Coleman M.S., Pharm.D., [facility], [facility]    This note was created using voice recognition software (Dragon). The accuracy of the dictation is limited by the abilities of the software. I have reviewed the note prior to signing, however some errors in grammar and context are still possible. If you have any questions related to this note please do not hesitate to contact our office.

## 2019-09-04 LAB
BACTERIA BLD CULT: NORMAL
BACTERIA BLD CULT: NORMAL
SIGNIFICANT IND 70042: NORMAL
SIGNIFICANT IND 70042: NORMAL
SITE SITE: NORMAL
SITE SITE: NORMAL
SOURCE SOURCE: NORMAL
SOURCE SOURCE: NORMAL

## 2019-09-04 PROCEDURE — 665998 HH PPS REVENUE CREDIT

## 2019-09-04 PROCEDURE — 665999 HH PPS REVENUE DEBIT

## 2019-09-05 ENCOUNTER — HOME CARE VISIT (OUTPATIENT)
Dept: HOME HEALTH SERVICES | Facility: HOME HEALTHCARE | Age: 84
End: 2019-09-05
Payer: MEDICARE

## 2019-09-05 PROCEDURE — 665999 HH PPS REVENUE DEBIT

## 2019-09-05 PROCEDURE — 665998 HH PPS REVENUE CREDIT

## 2019-09-05 PROCEDURE — G0299 HHS/HOSPICE OF RN EA 15 MIN: HCPCS

## 2019-09-05 PROCEDURE — G0151 HHCP-SERV OF PT,EA 15 MIN: HCPCS

## 2019-09-05 RX ORDER — AMLODIPINE BESYLATE 5 MG/1
TABLET ORAL
Qty: 90 TAB | Refills: 3 | Status: SHIPPED | OUTPATIENT
Start: 2019-09-05 | End: 2020-04-27 | Stop reason: SDUPTHER

## 2019-09-06 ENCOUNTER — HOME CARE VISIT (OUTPATIENT)
Dept: HOME HEALTH SERVICES | Facility: HOME HEALTHCARE | Age: 84
End: 2019-09-06
Payer: MEDICARE

## 2019-09-06 VITALS
SYSTOLIC BLOOD PRESSURE: 120 MMHG | HEART RATE: 70 BPM | RESPIRATION RATE: 18 BRPM | DIASTOLIC BLOOD PRESSURE: 70 MMHG | TEMPERATURE: 96.2 F | OXYGEN SATURATION: 97 %

## 2019-09-06 VITALS
HEART RATE: 70 BPM | SYSTOLIC BLOOD PRESSURE: 120 MMHG | TEMPERATURE: 98.4 F | RESPIRATION RATE: 18 BRPM | DIASTOLIC BLOOD PRESSURE: 70 MMHG | OXYGEN SATURATION: 97 %

## 2019-09-06 PROCEDURE — G0152 HHCP-SERV OF OT,EA 15 MIN: HCPCS

## 2019-09-06 PROCEDURE — 665998 HH PPS REVENUE CREDIT

## 2019-09-06 PROCEDURE — 665999 HH PPS REVENUE DEBIT

## 2019-09-06 SDOH — ECONOMIC STABILITY: HOUSING INSECURITY: HOME SAFETY: THE PROCESS OF INCREASING PT'S CURRENT LEVEL OF CARE; WILL DISCUSS FURTHER WITH MSW.

## 2019-09-06 SDOH — ECONOMIC STABILITY: HOUSING INSECURITY
HOME SAFETY: LIVES ALONE IN ONE STORY HOME WITH 2 STEPS TO ENTER. PLOF: INDEPENDENT GAIT WITH 4WW, LIGHT HOUSEHOLD ACTIVITY, INDEPENDENT SHOWERING, DRIVING TO BEAUTY SHOP, CG DOES MOST DRIVING. HAS A CG 3 DAYS A WEEK FOR 3 HOURS EACH DAY. FAMILY/ PT CURRENTLY IN

## 2019-09-06 ASSESSMENT — ACTIVITIES OF DAILY LIVING (ADL)
TRANSPORTATION COMMENTS: REQUIRES ASSISTANCE TO LEAVE THE HOME
IADLS_COMMENTS: <!--EPICS-->REFER TO OT EVALUATION<!--EPICE-->
ADLS_COMMENTS: <!--EPICS-->REFER TO OT EVALUATION<!--EPICE-->

## 2019-09-07 PROCEDURE — 665998 HH PPS REVENUE CREDIT

## 2019-09-07 PROCEDURE — 665999 HH PPS REVENUE DEBIT

## 2019-09-08 PROCEDURE — 665998 HH PPS REVENUE CREDIT

## 2019-09-08 PROCEDURE — 665999 HH PPS REVENUE DEBIT

## 2019-09-09 ENCOUNTER — HOME CARE VISIT (OUTPATIENT)
Dept: HOME HEALTH SERVICES | Facility: HOME HEALTHCARE | Age: 84
End: 2019-09-09
Payer: MEDICARE

## 2019-09-09 VITALS
HEART RATE: 73 BPM | TEMPERATURE: 98.5 F | OXYGEN SATURATION: 97 % | SYSTOLIC BLOOD PRESSURE: 138 MMHG | DIASTOLIC BLOOD PRESSURE: 70 MMHG | RESPIRATION RATE: 16 BRPM

## 2019-09-09 VITALS
OXYGEN SATURATION: 96 % | DIASTOLIC BLOOD PRESSURE: 70 MMHG | SYSTOLIC BLOOD PRESSURE: 120 MMHG | TEMPERATURE: 97.7 F | HEART RATE: 60 BPM | RESPIRATION RATE: 18 BRPM

## 2019-09-09 VITALS
HEART RATE: 60 BPM | TEMPERATURE: 97.7 F | RESPIRATION RATE: 18 BRPM | DIASTOLIC BLOOD PRESSURE: 80 MMHG | OXYGEN SATURATION: 94 % | SYSTOLIC BLOOD PRESSURE: 120 MMHG

## 2019-09-09 PROCEDURE — G0151 HHCP-SERV OF PT,EA 15 MIN: HCPCS

## 2019-09-09 PROCEDURE — G0495 RN CARE TRAIN/EDU IN HH: HCPCS

## 2019-09-09 PROCEDURE — 665999 HH PPS REVENUE DEBIT

## 2019-09-09 PROCEDURE — 665998 HH PPS REVENUE CREDIT

## 2019-09-09 ASSESSMENT — ACTIVITIES OF DAILY LIVING (ADL)
TELEPHONE_ASSISTANCE: 0
EATING_ASSISTANCE: 1
BATHING_ASSISTANCE: 4
GROOMING_ASSISTANCE: 0
TOILETING_EQUIPMENT_USED: 3:1 COMMODE
ORAL_CARE_ASSISTANCE: 0
DRESSING_LB_ASSISTANCE: 1
TRANSPORTATION COMMENTS: REQUIRES ASSISTANCE TO LEAVE THE HOME
DRESSING_UB_ASSISTANCE: 1
TOILETING_ASSISTANCE: 0
BATHING_ASSISTIVE_EQUIPMENT_USED: GRAB BAR

## 2019-09-09 ASSESSMENT — ENCOUNTER SYMPTOMS
DEBILITATING PAIN: 1
DEBILITATING PAIN: 1
DEPRESSED MOOD: 1
SEVERE DYSPNEA: 1

## 2019-09-10 ENCOUNTER — HOME CARE VISIT (OUTPATIENT)
Dept: HOME HEALTH SERVICES | Facility: HOME HEALTHCARE | Age: 84
End: 2019-09-10
Payer: MEDICARE

## 2019-09-10 PROCEDURE — G0180 MD CERTIFICATION HHA PATIENT: HCPCS | Performed by: INTERNAL MEDICINE

## 2019-09-10 PROCEDURE — 665998 HH PPS REVENUE CREDIT

## 2019-09-10 PROCEDURE — 665999 HH PPS REVENUE DEBIT

## 2019-09-10 PROCEDURE — G0152 HHCP-SERV OF OT,EA 15 MIN: HCPCS

## 2019-09-11 VITALS
HEART RATE: 69 BPM | DIASTOLIC BLOOD PRESSURE: 68 MMHG | OXYGEN SATURATION: 94 % | RESPIRATION RATE: 17 BRPM | SYSTOLIC BLOOD PRESSURE: 116 MMHG | TEMPERATURE: 98.9 F

## 2019-09-11 PROCEDURE — 665998 HH PPS REVENUE CREDIT

## 2019-09-11 PROCEDURE — 665999 HH PPS REVENUE DEBIT

## 2019-09-11 ASSESSMENT — ENCOUNTER SYMPTOMS: DEBILITATING PAIN: 1

## 2019-09-12 ENCOUNTER — HOME CARE VISIT (OUTPATIENT)
Dept: HOME HEALTH SERVICES | Facility: HOME HEALTHCARE | Age: 84
End: 2019-09-12
Payer: MEDICARE

## 2019-09-12 PROCEDURE — 665999 HH PPS REVENUE DEBIT

## 2019-09-12 PROCEDURE — G0151 HHCP-SERV OF PT,EA 15 MIN: HCPCS

## 2019-09-12 PROCEDURE — 665998 HH PPS REVENUE CREDIT

## 2019-09-13 ENCOUNTER — HOME CARE VISIT (OUTPATIENT)
Dept: HOME HEALTH SERVICES | Facility: HOME HEALTHCARE | Age: 84
End: 2019-09-13
Payer: MEDICARE

## 2019-09-13 VITALS
RESPIRATION RATE: 16 BRPM | OXYGEN SATURATION: 97 % | HEART RATE: 68 BPM | SYSTOLIC BLOOD PRESSURE: 110 MMHG | TEMPERATURE: 97.7 F | DIASTOLIC BLOOD PRESSURE: 70 MMHG

## 2019-09-13 VITALS
SYSTOLIC BLOOD PRESSURE: 120 MMHG | RESPIRATION RATE: 17 BRPM | DIASTOLIC BLOOD PRESSURE: 66 MMHG | HEART RATE: 62 BPM | TEMPERATURE: 98.6 F | OXYGEN SATURATION: 98 %

## 2019-09-13 PROCEDURE — G0493 RN CARE EA 15 MIN HH/HOSPICE: HCPCS

## 2019-09-13 PROCEDURE — 665999 HH PPS REVENUE DEBIT

## 2019-09-13 PROCEDURE — 665998 HH PPS REVENUE CREDIT

## 2019-09-13 ASSESSMENT — ENCOUNTER SYMPTOMS
VOMITING: DENIES
DEBILITATING PAIN: 1
DEPRESSED MOOD: 1
NAUSEA: DENIES
DIFFICULTY THINKING: 1

## 2019-09-13 ASSESSMENT — ACTIVITIES OF DAILY LIVING (ADL): TRANSPORTATION COMMENTS: REQUIRES ASSISTANCE TO LEAVE THE HOME

## 2019-09-14 PROCEDURE — 665999 HH PPS REVENUE DEBIT

## 2019-09-14 PROCEDURE — 665998 HH PPS REVENUE CREDIT

## 2019-09-15 DIAGNOSIS — I10 ESSENTIAL HYPERTENSION: ICD-10-CM

## 2019-09-15 PROCEDURE — 665998 HH PPS REVENUE CREDIT

## 2019-09-15 PROCEDURE — 665999 HH PPS REVENUE DEBIT

## 2019-09-16 ENCOUNTER — HOME CARE VISIT (OUTPATIENT)
Dept: HOME HEALTH SERVICES | Facility: HOME HEALTHCARE | Age: 84
End: 2019-09-16
Payer: MEDICARE

## 2019-09-16 VITALS
DIASTOLIC BLOOD PRESSURE: 80 MMHG | RESPIRATION RATE: 18 BRPM | SYSTOLIC BLOOD PRESSURE: 128 MMHG | TEMPERATURE: 99 F | HEART RATE: 68 BPM | OXYGEN SATURATION: 96 %

## 2019-09-16 PROCEDURE — 665999 HH PPS REVENUE DEBIT

## 2019-09-16 PROCEDURE — G0299 HHS/HOSPICE OF RN EA 15 MIN: HCPCS

## 2019-09-16 PROCEDURE — 665998 HH PPS REVENUE CREDIT

## 2019-09-16 RX ORDER — LISINOPRIL 20 MG/1
TABLET ORAL
Qty: 90 TAB | Refills: 3 | Status: SHIPPED | OUTPATIENT
Start: 2019-09-16 | End: 2020-04-27 | Stop reason: SDUPTHER

## 2019-09-16 ASSESSMENT — ENCOUNTER SYMPTOMS
NAUSEA: DENIES
VOMITING: DENIES

## 2019-09-17 ENCOUNTER — HOME CARE VISIT (OUTPATIENT)
Dept: HOME HEALTH SERVICES | Facility: HOME HEALTHCARE | Age: 84
End: 2019-09-17
Payer: MEDICARE

## 2019-09-17 DIAGNOSIS — R11.2 NAUSEA & VOMITING: ICD-10-CM

## 2019-09-17 PROCEDURE — 665998 HH PPS REVENUE CREDIT

## 2019-09-17 PROCEDURE — G0155 HHCP-SVS OF CSW,EA 15 MIN: HCPCS

## 2019-09-17 PROCEDURE — G0152 HHCP-SERV OF OT,EA 15 MIN: HCPCS

## 2019-09-17 PROCEDURE — 665999 HH PPS REVENUE DEBIT

## 2019-09-17 RX ORDER — ONDANSETRON 4 MG/1
4 TABLET, ORALLY DISINTEGRATING ORAL EVERY 8 HOURS
Qty: 10 TAB | Refills: 0 | Status: SHIPPED | OUTPATIENT
Start: 2019-09-17 | End: 2019-09-28

## 2019-09-18 ENCOUNTER — HOME CARE VISIT (OUTPATIENT)
Dept: HOME HEALTH SERVICES | Facility: HOME HEALTHCARE | Age: 84
End: 2019-09-18
Payer: MEDICARE

## 2019-09-18 VITALS
RESPIRATION RATE: 19 BRPM | SYSTOLIC BLOOD PRESSURE: 122 MMHG | OXYGEN SATURATION: 95 % | HEART RATE: 61 BPM | TEMPERATURE: 99.6 F | DIASTOLIC BLOOD PRESSURE: 76 MMHG

## 2019-09-18 VITALS
OXYGEN SATURATION: 95 % | DIASTOLIC BLOOD PRESSURE: 66 MMHG | SYSTOLIC BLOOD PRESSURE: 128 MMHG | TEMPERATURE: 98.4 F | RESPIRATION RATE: 20 BRPM | HEART RATE: 71 BPM

## 2019-09-18 PROCEDURE — 665998 HH PPS REVENUE CREDIT

## 2019-09-18 PROCEDURE — G0151 HHCP-SERV OF PT,EA 15 MIN: HCPCS

## 2019-09-18 PROCEDURE — 665999 HH PPS REVENUE DEBIT

## 2019-09-18 ASSESSMENT — ACTIVITIES OF DAILY LIVING (ADL)
DRESSING_UB_ASSISTANCE: 1
TRANSPORTATION COMMENTS: REQUIRES ASSISTANCE TO LEAVE THE HOME
TOILETING_ASSISTANCE: 0
EATING_ASSISTANCE: 0
ORAL_CARE_ASSISTANCE: 0
DRESSING_LB_ASSISTANCE: 4
GROOMING_ASSISTANCE: 0
BATHING_ASSISTANCE: 1

## 2019-09-19 ENCOUNTER — OFFICE VISIT (OUTPATIENT)
Dept: INTERNAL MEDICINE | Facility: IMAGING CENTER | Age: 84
End: 2019-09-19
Payer: MEDICARE

## 2019-09-19 ENCOUNTER — HOSPITAL ENCOUNTER (OUTPATIENT)
Facility: MEDICAL CENTER | Age: 84
End: 2019-09-19
Attending: INTERNAL MEDICINE
Payer: MEDICARE

## 2019-09-19 VITALS
HEIGHT: 62 IN | DIASTOLIC BLOOD PRESSURE: 70 MMHG | RESPIRATION RATE: 16 BRPM | SYSTOLIC BLOOD PRESSURE: 128 MMHG | HEART RATE: 77 BPM | TEMPERATURE: 97.3 F | BODY MASS INDEX: 25.21 KG/M2 | WEIGHT: 137 LBS | OXYGEN SATURATION: 94 %

## 2019-09-19 DIAGNOSIS — R35.0 URINARY FREQUENCY: ICD-10-CM

## 2019-09-19 DIAGNOSIS — Z23 NEED FOR INFLUENZA VACCINATION: ICD-10-CM

## 2019-09-19 DIAGNOSIS — R82.90 ABNORMAL URINALYSIS: ICD-10-CM

## 2019-09-19 DIAGNOSIS — R30.0 DYSURIA: ICD-10-CM

## 2019-09-19 DIAGNOSIS — R31.9 HEMATURIA, UNSPECIFIED TYPE: ICD-10-CM

## 2019-09-19 LAB
APPEARANCE UR: NORMAL
BILIRUB UR STRIP-MCNC: NORMAL MG/DL
COLOR UR AUTO: YELLOW
GLUCOSE UR STRIP.AUTO-MCNC: NORMAL MG/DL
KETONES UR STRIP.AUTO-MCNC: NORMAL MG/DL
LEUKOCYTE ESTERASE UR QL STRIP.AUTO: NORMAL
NITRITE UR QL STRIP.AUTO: POSITIVE
PH UR STRIP.AUTO: 5.5 [PH] (ref 5–8)
PROT UR QL STRIP: NORMAL MG/DL
RBC UR QL AUTO: NORMAL
SP GR UR STRIP.AUTO: 1.01
UROBILINOGEN UR STRIP-MCNC: NORMAL MG/DL

## 2019-09-19 PROCEDURE — 81002 URINALYSIS NONAUTO W/O SCOPE: CPT | Performed by: INTERNAL MEDICINE

## 2019-09-19 PROCEDURE — 99214 OFFICE O/P EST MOD 30 MIN: CPT | Performed by: INTERNAL MEDICINE

## 2019-09-19 PROCEDURE — 665999 HH PPS REVENUE DEBIT

## 2019-09-19 PROCEDURE — 87086 URINE CULTURE/COLONY COUNT: CPT

## 2019-09-19 PROCEDURE — 87186 SC STD MICRODIL/AGAR DIL: CPT

## 2019-09-19 PROCEDURE — 87077 CULTURE AEROBIC IDENTIFY: CPT

## 2019-09-19 PROCEDURE — G0008 ADMIN INFLUENZA VIRUS VAC: HCPCS | Performed by: INTERNAL MEDICINE

## 2019-09-19 PROCEDURE — 90662 IIV NO PRSV INCREASED AG IM: CPT | Performed by: INTERNAL MEDICINE

## 2019-09-19 PROCEDURE — 665998 HH PPS REVENUE CREDIT

## 2019-09-19 RX ORDER — AMOXICILLIN AND CLAVULANATE POTASSIUM 875; 125 MG/1; MG/1
1 TABLET, FILM COATED ORAL 2 TIMES DAILY
Qty: 20 TAB | Refills: 0 | Status: ON HOLD | OUTPATIENT
Start: 2019-09-19 | End: 2019-09-30

## 2019-09-19 RX ORDER — MIRTAZAPINE 15 MG/1
7.5-15 TABLET, FILM COATED ORAL
Qty: 30 TAB | Refills: 3 | Status: SHIPPED | OUTPATIENT
Start: 2019-09-19 | End: 2020-01-09

## 2019-09-19 NOTE — LETTER
2019        Gloria Patel  [address] [place] [address]        Dear Giuliana:    This letter is in regards to Gloria Patel ( 2024).  She will be moving to your facility in the near future.  She has a complex medical history including benign positional vertigo, dizziness, osteoarthritis of the shoulders and spondylolisthesis of the lumbar spine.  Because of her chronic condition she requires additional care with medications and activities of daily living.    If you have any questions or concerns, please don't hesitate to call.        Sincerely,        Mike Otero M.D.  Board-Certified in Internal Medicine  [address] [place]  Office: [phone]  Fax: [phone]

## 2019-09-19 NOTE — PROGRESS NOTES
Chief Complaint   Patient presents with   • Hospital Follow-up     Diverticulitis       HISTORY OF THE PRESENT ILLNESS: Patient is a 89 y.o. female.     Patient comes in for follow-up after recent hospitalization.  She was admitted on August 30 and discharged on September 1 for acute diverticulitis.  She has had diverticulitis in the past.  She was treated with IV antibiotics and discharged on amoxicillin.  She reports that she improved but her left lower quadrant abdominal pain has never completely resolved.  She had a normal bowel movement yesterday.  No fever chills.    She complains of dysuria.  She has frequent urinary tract infections.  Her urine analysis was positive for nitrates in the office today.    Her daughter reports that she has been having issues with sleep.  She has done well with trazodone but often has anxiety in the morning after taking the medication.  Symptoms occur only on nights that she takes the medication.        Allergies: Flagyl [metronidazole hcl]; Keflex; Morphine; and Sulfa drugs    Current Outpatient Medications Ordered in Epic   Medication Sig Dispense Refill   • mirtazapine (REMERON) 15 MG Tab Take 0.5-1 Tabs by mouth every bedtime. As needed for insomnia 30 Tab 3   • ondansetron (ZOFRAN ODT) 4 MG TABLET DISPERSIBLE Take 1 Tab by mouth every 8 hours. AS NEEDED FOR NAUSEA/VOMITING. 10 Tab 0   • lisinopril (PRINIVIL) 20 MG Tab TAKE HALF A TABLET BY MOUTH TWICE A DAY 90 Tab 3   • amLODIPine (NORVASC) 5 MG Tab TAKE 1 TABLET BY MOUTH EVERY DAY 90 Tab 3   • non-formulary med Take 1 Cap by mouth as needed (Bladder). AZO Bladder control PRN for urinary tract symptoms.     • sertraline (ZOLOFT) 50 MG Tab Take 50 mg by mouth every day. Indications: Major Depressive Disorder     • acetaminophen (TYLENOL) 650 MG CR tablet Take 650 mg by mouth every 6 hours as needed for Mild Pain.     • Multiple Vitamins-Minerals (PRESERVISION AREDS 2 PO) Take 1 Cap by mouth every day. Indications: Vitamin  "supplement     • Cholecalciferol (VITAMIN D) 2000 units Cap Take 1 Cap by mouth every day. Indications: Supplement     • aspirin 81 MG EC tablet Take 81 mg by mouth every day.     • famotidine (PEPCID) 20 MG Tab Take 10 mg by mouth as needed (acid reflux).     • polyethylene glycol 3350 (MIRALAX) Powder Take 17 g by mouth every day. Indications: Constipation     • omeprazole (PRILOSEC) 20 MG delayed-release capsule Take 20 mg by mouth every day.     • levothyroxine (SYNTHROID) 75 MCG Tab TAKE 1 TABLET BY MOUTH EVERY DAY 90 Tab 3   • TRAVATAN Z 0.004 % Solution INSTILL 1 DROP INTO BOTH EYES AT BEDTIME  6     No current [facility]-ordered facility-administered medications on file.        Past medical history, social history and family history were reviewed from chart today    Review of systems: Per HPI.    Denies headache, chest pain, fever, chills, diarrhea, constipation, palpitations, depression   All others negative.     Exam: /70 (BP Location: Right arm, Patient Position: Sitting, BP Cuff Size: Adult)   Pulse 77   Temp 36.3 °C (97.3 °F) (Temporal)   Resp 16   Ht 1.575 m (5' 2\")   Wt 62.1 kg (137 lb)   SpO2 94%   General: Well-appearing. Well-developed. No signs of distress.  HEENT: Grossly normal. Oral cavity is pink and moist.  Neck: Supple without JVD or bruit.  Pulmonary: Clear with good breath sounds. Normal effort.  Cardiovascular: Regular. Carotid and radial pulses are intact.  Abdomen: Left lower quadrant tenderness.  Increased bowel sounds.  No rebound.  Neurologic: Cranial nerves II through XII are grossly normal, alert and oriented x3      Diagnosis:  1. Dysuria  POCT Urinalysis   2. Urinary frequency  POCT Urinalysis   3. Hematuria, unspecified type  URINE CULTURE(NEW)   4. Abnormal urinalysis  URINE CULTURE(NEW)       I believe the diverticulitis has not completely resolved.  I recommend resuming Augmentin.  We will check her urine culture and treat appropriately.  Try switching from trazodone to " mirtazapine.  If she continues to have anxiety issues in the morning when she takes the medication we will switch her off tricyclic/tetracyclic medications entirely.

## 2019-09-20 ENCOUNTER — HOME CARE VISIT (OUTPATIENT)
Dept: HOME HEALTH SERVICES | Facility: HOME HEALTHCARE | Age: 84
End: 2019-09-20
Payer: MEDICARE

## 2019-09-20 VITALS
OXYGEN SATURATION: 95 % | TEMPERATURE: 98.8 F | RESPIRATION RATE: 18 BRPM | SYSTOLIC BLOOD PRESSURE: 125 MMHG | HEART RATE: 63 BPM | DIASTOLIC BLOOD PRESSURE: 70 MMHG

## 2019-09-20 VITALS
OXYGEN SATURATION: 94 % | SYSTOLIC BLOOD PRESSURE: 125 MMHG | RESPIRATION RATE: 18 BRPM | TEMPERATURE: 98.8 F | DIASTOLIC BLOOD PRESSURE: 70 MMHG | HEART RATE: 63 BPM

## 2019-09-20 PROCEDURE — G0151 HHCP-SERV OF PT,EA 15 MIN: HCPCS

## 2019-09-20 PROCEDURE — 665999 HH PPS REVENUE DEBIT

## 2019-09-20 PROCEDURE — G0299 HHS/HOSPICE OF RN EA 15 MIN: HCPCS

## 2019-09-20 PROCEDURE — 665998 HH PPS REVENUE CREDIT

## 2019-09-20 ASSESSMENT — ACTIVITIES OF DAILY LIVING (ADL)
LAUNDRY_ASSISTANCE: 6
HOUSEKEEPING_ASSISTANCE: 6
TRANSPORTATION_ASSISTANCE: 6
SHOPPING_ASSISTANCE: 6
TELEPHONE_ASSISTANCE: 0
MEAL_PREP_ASSISTANCE: 0

## 2019-09-21 PROCEDURE — 665999 HH PPS REVENUE DEBIT

## 2019-09-21 PROCEDURE — 665998 HH PPS REVENUE CREDIT

## 2019-09-22 ENCOUNTER — TELEPHONE (OUTPATIENT)
Dept: INTERNAL MEDICINE | Facility: IMAGING CENTER | Age: 84
End: 2019-09-22

## 2019-09-22 PROCEDURE — 665999 HH PPS REVENUE DEBIT

## 2019-09-22 PROCEDURE — 665998 HH PPS REVENUE CREDIT

## 2019-09-23 ENCOUNTER — HOME CARE VISIT (OUTPATIENT)
Dept: HOME HEALTH SERVICES | Facility: HOME HEALTHCARE | Age: 84
End: 2019-09-23
Payer: MEDICARE

## 2019-09-23 VITALS
HEART RATE: 70 BPM | DIASTOLIC BLOOD PRESSURE: 80 MMHG | RESPIRATION RATE: 18 BRPM | OXYGEN SATURATION: 94 % | TEMPERATURE: 98.3 F | SYSTOLIC BLOOD PRESSURE: 130 MMHG

## 2019-09-23 PROCEDURE — 665999 HH PPS REVENUE DEBIT

## 2019-09-23 PROCEDURE — G0495 RN CARE TRAIN/EDU IN HH: HCPCS

## 2019-09-23 PROCEDURE — 665998 HH PPS REVENUE CREDIT

## 2019-09-23 ASSESSMENT — ENCOUNTER SYMPTOMS
VOMITING: DENIES
NAUSEA: DENIES

## 2019-09-24 PROCEDURE — 665998 HH PPS REVENUE CREDIT

## 2019-09-24 PROCEDURE — 665999 HH PPS REVENUE DEBIT

## 2019-09-25 PROCEDURE — 665998 HH PPS REVENUE CREDIT

## 2019-09-25 PROCEDURE — 665999 HH PPS REVENUE DEBIT

## 2019-09-26 ENCOUNTER — HOME CARE VISIT (OUTPATIENT)
Dept: HOME HEALTH SERVICES | Facility: HOME HEALTHCARE | Age: 84
End: 2019-09-26
Payer: MEDICARE

## 2019-09-26 VITALS
RESPIRATION RATE: 18 BRPM | OXYGEN SATURATION: 95 % | SYSTOLIC BLOOD PRESSURE: 110 MMHG | DIASTOLIC BLOOD PRESSURE: 78 MMHG | TEMPERATURE: 98 F | HEART RATE: 75 BPM

## 2019-09-26 PROCEDURE — 665999 HH PPS REVENUE DEBIT

## 2019-09-26 PROCEDURE — 665998 HH PPS REVENUE CREDIT

## 2019-09-26 PROCEDURE — G0151 HHCP-SERV OF PT,EA 15 MIN: HCPCS

## 2019-09-27 ENCOUNTER — HOME CARE VISIT (OUTPATIENT)
Dept: HOME HEALTH SERVICES | Facility: HOME HEALTHCARE | Age: 84
End: 2019-09-27
Payer: MEDICARE

## 2019-09-27 VITALS
OXYGEN SATURATION: 96 % | RESPIRATION RATE: 16 BRPM | SYSTOLIC BLOOD PRESSURE: 120 MMHG | DIASTOLIC BLOOD PRESSURE: 70 MMHG | HEART RATE: 68 BPM | TEMPERATURE: 98.7 F

## 2019-09-27 PROCEDURE — 665999 HH PPS REVENUE DEBIT

## 2019-09-27 PROCEDURE — G0299 HHS/HOSPICE OF RN EA 15 MIN: HCPCS

## 2019-09-27 PROCEDURE — 665998 HH PPS REVENUE CREDIT

## 2019-09-27 ASSESSMENT — ENCOUNTER SYMPTOMS
NAUSEA: DENIES
VOMITING: DENIES
DEBILITATING PAIN: 1

## 2019-09-27 ASSESSMENT — ACTIVITIES OF DAILY LIVING (ADL): TRANSPORTATION COMMENTS: REQUIRES ASSISTANCE TO LEAVE THE HOME

## 2019-09-28 ENCOUNTER — HOSPITAL ENCOUNTER (INPATIENT)
Facility: MEDICAL CENTER | Age: 84
LOS: 2 days | DRG: 372 | End: 2019-09-30
Attending: EMERGENCY MEDICINE | Admitting: INTERNAL MEDICINE
Payer: MEDICARE

## 2019-09-28 ENCOUNTER — APPOINTMENT (OUTPATIENT)
Dept: RADIOLOGY | Facility: MEDICAL CENTER | Age: 84
DRG: 372 | End: 2019-09-28
Attending: EMERGENCY MEDICINE
Payer: MEDICARE

## 2019-09-28 DIAGNOSIS — I10 ESSENTIAL HYPERTENSION: ICD-10-CM

## 2019-09-28 DIAGNOSIS — N32.81 OVERACTIVE BLADDER: ICD-10-CM

## 2019-09-28 DIAGNOSIS — R00.1 BRADYCARDIA: ICD-10-CM

## 2019-09-28 DIAGNOSIS — K52.9 COLITIS: ICD-10-CM

## 2019-09-28 PROBLEM — N28.1 RENAL CYST: Status: ACTIVE | Noted: 2019-09-28

## 2019-09-28 PROBLEM — B96.89 URINARY TRACT INFECTION DUE TO ESBL KLEBSIELLA: Status: ACTIVE | Noted: 2019-09-28

## 2019-09-28 PROBLEM — K51.00 PANCOLITIS (HCC): Status: ACTIVE | Noted: 2019-09-28

## 2019-09-28 PROBLEM — N39.0 URINARY TRACT INFECTION DUE TO ESBL KLEBSIELLA: Status: ACTIVE | Noted: 2019-09-28

## 2019-09-28 LAB
ALBUMIN SERPL BCP-MCNC: 3.9 G/DL (ref 3.2–4.9)
ALBUMIN/GLOB SERPL: 1.1 G/DL
ALP SERPL-CCNC: 132 U/L (ref 30–99)
ALT SERPL-CCNC: 12 U/L (ref 2–50)
ANION GAP SERPL CALC-SCNC: 13 MMOL/L (ref 0–11.9)
APPEARANCE UR: CLEAR
AST SERPL-CCNC: 19 U/L (ref 12–45)
BACTERIA #/AREA URNS HPF: ABNORMAL /HPF
BASOPHILS # BLD AUTO: 0.5 % (ref 0–1.8)
BASOPHILS # BLD: 0.06 K/UL (ref 0–0.12)
BILIRUB SERPL-MCNC: 0.6 MG/DL (ref 0.1–1.5)
BILIRUB UR QL STRIP.AUTO: NEGATIVE
BUN SERPL-MCNC: 12 MG/DL (ref 8–22)
C DIFF DNA SPEC QL NAA+PROBE: NEGATIVE
C DIFF TOX A+B STL QL IA: POSITIVE
C DIFF TOX GENS STL QL NAA+PROBE: NORMAL
CALCIUM SERPL-MCNC: 9.1 MG/DL (ref 8.4–10.2)
CHLORIDE SERPL-SCNC: 100 MMOL/L (ref 96–112)
CO2 SERPL-SCNC: 22 MMOL/L (ref 20–33)
COLOR UR: YELLOW
CREAT SERPL-MCNC: 0.82 MG/DL (ref 0.5–1.4)
EOSINOPHIL # BLD AUTO: 0.05 K/UL (ref 0–0.51)
EOSINOPHIL NFR BLD: 0.4 % (ref 0–6.9)
EPI CELLS #/AREA URNS HPF: ABNORMAL /HPF
ERYTHROCYTE [DISTWIDTH] IN BLOOD BY AUTOMATED COUNT: 43.8 FL (ref 35.9–50)
GLOBULIN SER CALC-MCNC: 3.4 G/DL (ref 1.9–3.5)
GLUCOSE SERPL-MCNC: 111 MG/DL (ref 65–99)
GLUCOSE UR STRIP.AUTO-MCNC: NEGATIVE MG/DL
HCT VFR BLD AUTO: 43.7 % (ref 37–47)
HGB BLD-MCNC: 13.9 G/DL (ref 12–16)
IMM GRANULOCYTES # BLD AUTO: 0.05 K/UL (ref 0–0.11)
IMM GRANULOCYTES NFR BLD AUTO: 0.4 % (ref 0–0.9)
KETONES UR STRIP.AUTO-MCNC: NEGATIVE MG/DL
LACTATE BLD-SCNC: 2 MMOL/L (ref 0.5–2)
LACTATE BLD-SCNC: 2.1 MMOL/L (ref 0.5–2)
LEUKOCYTE ESTERASE UR QL STRIP.AUTO: ABNORMAL
LIPASE SERPL-CCNC: 7 U/L (ref 7–58)
LYMPHOCYTES # BLD AUTO: 1.57 K/UL (ref 1–4.8)
LYMPHOCYTES NFR BLD: 12.5 % (ref 22–41)
MCH RBC QN AUTO: 25.6 PG (ref 27–33)
MCHC RBC AUTO-ENTMCNC: 31.8 G/DL (ref 33.6–35)
MCV RBC AUTO: 80.5 FL (ref 81.4–97.8)
MICRO URNS: ABNORMAL
MONOCYTES # BLD AUTO: 0.91 K/UL (ref 0–0.85)
MONOCYTES NFR BLD AUTO: 7.2 % (ref 0–13.4)
NEUTROPHILS # BLD AUTO: 9.95 K/UL (ref 2–7.15)
NEUTROPHILS NFR BLD: 79 % (ref 44–72)
NITRITE UR QL STRIP.AUTO: NEGATIVE
NRBC # BLD AUTO: 0 K/UL
NRBC BLD-RTO: 0 /100 WBC
PH UR STRIP.AUTO: 5.5 [PH] (ref 5–8)
PLATELET # BLD AUTO: 240 K/UL (ref 164–446)
PMV BLD AUTO: 9.5 FL (ref 9–12.9)
POTASSIUM SERPL-SCNC: 4.2 MMOL/L (ref 3.6–5.5)
PROT SERPL-MCNC: 7.3 G/DL (ref 6–8.2)
PROT UR QL STRIP: NEGATIVE MG/DL
RBC # BLD AUTO: 5.43 M/UL (ref 4.2–5.4)
RBC # URNS HPF: ABNORMAL /HPF
RBC UR QL AUTO: NEGATIVE
SODIUM SERPL-SCNC: 135 MMOL/L (ref 135–145)
SP GR UR STRIP.AUTO: <=1.005
WBC # BLD AUTO: 12.6 K/UL (ref 4.8–10.8)
WBC #/AREA URNS HPF: ABNORMAL /HPF
WBC STL QL MICRO: NORMAL

## 2019-09-28 PROCEDURE — 96365 THER/PROPH/DIAG IV INF INIT: CPT

## 2019-09-28 PROCEDURE — 36415 COLL VENOUS BLD VENIPUNCTURE: CPT

## 2019-09-28 PROCEDURE — 89055 LEUKOCYTE ASSESSMENT FECAL: CPT

## 2019-09-28 PROCEDURE — 81001 URINALYSIS AUTO W/SCOPE: CPT

## 2019-09-28 PROCEDURE — 80053 COMPREHEN METABOLIC PANEL: CPT

## 2019-09-28 PROCEDURE — 700111 HCHG RX REV CODE 636 W/ 250 OVERRIDE (IP): Performed by: INTERNAL MEDICINE

## 2019-09-28 PROCEDURE — 87324 CLOSTRIDIUM AG IA: CPT

## 2019-09-28 PROCEDURE — 99223 1ST HOSP IP/OBS HIGH 75: CPT | Mod: AI | Performed by: INTERNAL MEDICINE

## 2019-09-28 PROCEDURE — 87086 URINE CULTURE/COLONY COUNT: CPT

## 2019-09-28 PROCEDURE — A9270 NON-COVERED ITEM OR SERVICE: HCPCS | Performed by: INTERNAL MEDICINE

## 2019-09-28 PROCEDURE — 99285 EMERGENCY DEPT VISIT HI MDM: CPT

## 2019-09-28 PROCEDURE — 87046 STOOL CULTR AEROBIC BACT EA: CPT

## 2019-09-28 PROCEDURE — 700105 HCHG RX REV CODE 258: Performed by: INTERNAL MEDICINE

## 2019-09-28 PROCEDURE — 665999 HH PPS REVENUE DEBIT

## 2019-09-28 PROCEDURE — 700111 HCHG RX REV CODE 636 W/ 250 OVERRIDE (IP): Performed by: EMERGENCY MEDICINE

## 2019-09-28 PROCEDURE — 85025 COMPLETE CBC W/AUTO DIFF WBC: CPT

## 2019-09-28 PROCEDURE — 83605 ASSAY OF LACTIC ACID: CPT

## 2019-09-28 PROCEDURE — 87045 FECES CULTURE AEROBIC BACT: CPT

## 2019-09-28 PROCEDURE — 700105 HCHG RX REV CODE 258: Performed by: EMERGENCY MEDICINE

## 2019-09-28 PROCEDURE — 87899 AGENT NOS ASSAY W/OPTIC: CPT

## 2019-09-28 PROCEDURE — 700102 HCHG RX REV CODE 250 W/ 637 OVERRIDE(OP): Performed by: INTERNAL MEDICINE

## 2019-09-28 PROCEDURE — 665998 HH PPS REVENUE CREDIT

## 2019-09-28 PROCEDURE — 770006 HCHG ROOM/CARE - MED/SURG/GYN SEMI*

## 2019-09-28 PROCEDURE — 74177 CT ABD & PELVIS W/CONTRAST: CPT

## 2019-09-28 PROCEDURE — 83690 ASSAY OF LIPASE: CPT

## 2019-09-28 PROCEDURE — 87493 C DIFF AMPLIFIED PROBE: CPT

## 2019-09-28 PROCEDURE — 700117 HCHG RX CONTRAST REV CODE 255: Performed by: EMERGENCY MEDICINE

## 2019-09-28 RX ORDER — MECLIZINE HYDROCHLORIDE 25 MG/1
25 TABLET ORAL 3 TIMES DAILY PRN
COMMUNITY
End: 2021-12-21

## 2019-09-28 RX ORDER — ONDANSETRON 4 MG/1
4 TABLET, ORALLY DISINTEGRATING ORAL EVERY 4 HOURS PRN
Status: DISCONTINUED | OUTPATIENT
Start: 2019-09-28 | End: 2019-09-30 | Stop reason: HOSPADM

## 2019-09-28 RX ORDER — ONDANSETRON 2 MG/ML
4 INJECTION INTRAMUSCULAR; INTRAVENOUS EVERY 4 HOURS PRN
Status: DISCONTINUED | OUTPATIENT
Start: 2019-09-28 | End: 2019-09-30 | Stop reason: HOSPADM

## 2019-09-28 RX ORDER — LEVOTHYROXINE SODIUM 0.05 MG/1
75 TABLET ORAL
Status: DISCONTINUED | OUTPATIENT
Start: 2019-09-29 | End: 2019-09-28

## 2019-09-28 RX ORDER — OMEPRAZOLE 20 MG/1
20 CAPSULE, DELAYED RELEASE ORAL DAILY
Status: ON HOLD | COMMUNITY
End: 2019-09-30

## 2019-09-28 RX ORDER — MECLIZINE HYDROCHLORIDE 25 MG/1
25 TABLET ORAL 3 TIMES DAILY PRN
Status: DISCONTINUED | OUTPATIENT
Start: 2019-09-28 | End: 2019-09-30 | Stop reason: HOSPADM

## 2019-09-28 RX ORDER — LATANOPROST 50 UG/ML
1 SOLUTION/ DROPS OPHTHALMIC EVERY EVENING
Status: DISCONTINUED | OUTPATIENT
Start: 2019-09-28 | End: 2019-09-30 | Stop reason: HOSPADM

## 2019-09-28 RX ORDER — SODIUM CHLORIDE 9 MG/ML
INJECTION, SOLUTION INTRAVENOUS CONTINUOUS
Status: DISCONTINUED | OUTPATIENT
Start: 2019-09-28 | End: 2019-09-30 | Stop reason: HOSPADM

## 2019-09-28 RX ORDER — ACETAMINOPHEN 325 MG/1
650 TABLET ORAL EVERY 6 HOURS PRN
Status: DISCONTINUED | OUTPATIENT
Start: 2019-09-28 | End: 2019-09-30 | Stop reason: HOSPADM

## 2019-09-28 RX ORDER — LISINOPRIL 20 MG/1
20 TABLET ORAL
Status: DISCONTINUED | OUTPATIENT
Start: 2019-09-28 | End: 2019-09-30 | Stop reason: HOSPADM

## 2019-09-28 RX ORDER — LEVOTHYROXINE SODIUM 0.07 MG/1
75 TABLET ORAL
Status: DISCONTINUED | OUTPATIENT
Start: 2019-09-29 | End: 2019-09-30 | Stop reason: HOSPADM

## 2019-09-28 RX ORDER — MIRTAZAPINE 15 MG/1
15 TABLET, ORALLY DISINTEGRATING ORAL
Status: DISCONTINUED | OUTPATIENT
Start: 2019-09-28 | End: 2019-09-30 | Stop reason: HOSPADM

## 2019-09-28 RX ADMIN — ASPIRIN 81 MG: 81 TABLET, COATED ORAL at 17:31

## 2019-09-28 RX ADMIN — MIRTAZAPINE 15 MG: 15 TABLET, ORALLY DISINTEGRATING ORAL at 20:53

## 2019-09-28 RX ADMIN — SODIUM CHLORIDE: 9 INJECTION, SOLUTION INTRAVENOUS at 17:10

## 2019-09-28 RX ADMIN — LATANOPROST 1 DROP: 50 SOLUTION/ DROPS OPHTHALMIC at 17:31

## 2019-09-28 RX ADMIN — VANCOMYCIN 125 MG: KIT at 17:31

## 2019-09-28 RX ADMIN — AMPICILLIN AND SULBACTAM 3 G: 1; 2 INJECTION, POWDER, FOR SOLUTION INTRAMUSCULAR; INTRAVENOUS at 12:39

## 2019-09-28 RX ADMIN — IOHEXOL 100 ML: 350 INJECTION, SOLUTION INTRAVENOUS at 12:13

## 2019-09-28 RX ADMIN — ENOXAPARIN SODIUM 40 MG: 100 INJECTION SUBCUTANEOUS at 17:09

## 2019-09-28 ASSESSMENT — ENCOUNTER SYMPTOMS
EYE REDNESS: 0
BACK PAIN: 0
FLANK PAIN: 0
SORE THROAT: 0
COUGH: 0
DIARRHEA: 1
NAUSEA: 1
SINUS PAIN: 0
WEAKNESS: 1
SHORTNESS OF BREATH: 0
DIZZINESS: 1
CONSTIPATION: 0
NERVOUS/ANXIOUS: 0
CHILLS: 0
VOMITING: 0
BLOOD IN STOOL: 0
ABDOMINAL PAIN: 1
HEARTBURN: 1
HEADACHES: 0
DIAPHORESIS: 0
WEIGHT LOSS: 0
FEVER: 1
EYE DISCHARGE: 0

## 2019-09-28 ASSESSMENT — PAIN DESCRIPTION - DESCRIPTORS: DESCRIPTORS: ACHING

## 2019-09-28 ASSESSMENT — COGNITIVE AND FUNCTIONAL STATUS - GENERAL
CLIMB 3 TO 5 STEPS WITH RAILING: A LITTLE
MOBILITY SCORE: 22
WALKING IN HOSPITAL ROOM: A LITTLE
SUGGESTED CMS G CODE MODIFIER MOBILITY: CJ
DAILY ACTIVITIY SCORE: 24
SUGGESTED CMS G CODE MODIFIER DAILY ACTIVITY: CH

## 2019-09-28 ASSESSMENT — LIFESTYLE VARIABLES
EVER HAD A DRINK FIRST THING IN THE MORNING TO STEADY YOUR NERVES TO GET RID OF A HANGOVER: NO
EVER_SMOKED: NEVER
EVER FELT BAD OR GUILTY ABOUT YOUR DRINKING: NO
TOTAL SCORE: 0
TOTAL SCORE: 0
AVERAGE NUMBER OF DAYS PER WEEK YOU HAVE A DRINK CONTAINING ALCOHOL: 0
TOTAL SCORE: 0
HOW MANY TIMES IN THE PAST YEAR HAVE YOU HAD 5 OR MORE DRINKS IN A DAY: 0
HAVE YOU EVER FELT YOU SHOULD CUT DOWN ON YOUR DRINKING: NO
HAVE PEOPLE ANNOYED YOU BY CRITICIZING YOUR DRINKING: NO
ON A TYPICAL DAY WHEN YOU DRINK ALCOHOL HOW MANY DRINKS DO YOU HAVE: 0
DOES PATIENT WANT TO STOP DRINKING: NO
CONSUMPTION TOTAL: NEGATIVE
ALCOHOL_USE: NO

## 2019-09-28 ASSESSMENT — PATIENT HEALTH QUESTIONNAIRE - PHQ9
SUM OF ALL RESPONSES TO PHQ9 QUESTIONS 1 AND 2: 0
2. FEELING DOWN, DEPRESSED, IRRITABLE, OR HOPELESS: NOT AT ALL

## 2019-09-28 NOTE — ED TRIAGE NOTES
"Presents with a hx of diverticulitis, and UTI.  She reports a recent hospitalization to address same symptomatology.  Chief Complaint   Patient presents with   • LLQ Pain     /72   Pulse 86   Temp 37.1 °C (98.8 °F) (Temporal)   Resp 20   Ht 1.575 m (5' 2\")   Wt 62 kg (136 lb 11 oz)   SpO2 96%   BMI 25.00 kg/m²     "

## 2019-09-28 NOTE — H&P
Hospital Medicine History & Physical Note    Date of Service  9/28/2019    Primary Care Physician  Mike Otero M.D.    Consultants  Amber- ID    Code Status  DNR/ I OK  Discussed in detail with patient and her daughter who is her POA at bedside.  Daughter stated that no one has ever discussed these things with the patient before.  We addressed CODE STATUS and outcome that patient would not be able to live independently if she were to survive CPR.  Patient decided she did not wish to be resuscitated.  In matters of intubation we discussed duration that would be acceptable, I explained that tracheostomy is routinely done after 7 to 10 days of not being able to wean from the vent-patient daughter agreed that she would not want a tracheostomy.  We discussed feeding tubes, she does not wish to have a feeding tube longer than 2 months.  I filled out a POLST form stating their preferences however patient had left the department before I could get them to sign it.  The form was therefore given to the .  10 minutes were devoted to this activity.    Chief Complaint  Abdominal pain, nausea    History of Presenting Illness  89 y.o. female who presented 9/28/2019 with persistent nausea and lower abdominal pain.  Patient was admitted at the end of August and discharged with 5 days of Augmentin.  She continued to be nauseated and have abdominal pain and loose stools, she followed up with her primary care doctor who stated she should have been treated with 10 days of Augmentin and resumed this medication.  She is now 8 days into her second course but has worsening lower abdominal pain.  For unclear reasons a urine culture was done on the 19th, results show ESBL Klebsiella but is not look like she was ever treated for this.  She has increased urinary frequency but denies dysuria.  She has had loose stools as stated throughout the month but they have been worse over the last 24 hours.  She has had low-grade  temperature of 99.5.  Patient's nausea has been somewhat chronic over recent weeks, there is been no emesis.  The nausea is a little worse in the mornings.  The abdominal pain is mild about 3 out of 10.  She is found no exacerbating or relieving factors, at times it feels crampy like when you have to have a bowel movement.    Review of Systems  Review of Systems   Constitutional: Positive for fever (T-max at home 99 5) and malaise/fatigue. Negative for chills, diaphoresis and weight loss.   HENT: Negative for sinus pain and sore throat.    Eyes: Negative for discharge and redness.   Respiratory: Negative for cough and shortness of breath.    Cardiovascular: Negative for chest pain.   Gastrointestinal: Positive for abdominal pain, diarrhea, heartburn and nausea. Negative for blood in stool, constipation, melena and vomiting.        No noted mucus in the stools no particular foul odor   Genitourinary: Positive for frequency. Negative for dysuria, flank pain, hematuria and urgency.   Musculoskeletal: Negative for back pain and joint pain.   Skin: Negative for itching and rash.   Neurological: Positive for dizziness and weakness. Negative for headaches.   Psychiatric/Behavioral: The patient is not nervous/anxious.        Past Medical History   has a past medical history of Arthritis, Diverticulitis, GERD (gastroesophageal reflux disease), Glaucoma, Heart burn, Hiatus hernia syndrome, Hypertension, Primary osteoarthritis of both shoulders (1/22/2019), and Unspecified urinary incontinence.    Surgical History   has a past surgical history that includes other (1945); other (1954); other orthopedic surgery; other orthopedic surgery (2000); cholecystectomy (1966); other abdominal surgery (1954); gyn surgery (1970); hip arthroplasty total (6/21/2011); and us-needle core bx-breast panel.  Tonsillectomy hip replacement appendectomy with her hysterectomy kidney surgery.    Family History  family history includes Cancer in her  sister; Diabetes in an other family member; Heart Disease in an other family member; Lung Disease in an other family member.     Social History   reports that she has never smoked. She has never used smokeless tobacco. She reports that she drinks alcohol. She reports that she does not use drugs.  Patient lives independently she has a son who lives near [place] she has 1 daughter in [place] and one daughter in [place].    Allergies  Allergies   Allergen Reactions   • Flagyl [Metronidazole Hcl]    • Keflex    • Morphine Anaphylaxis     anaphylaxis   • Sulfa Drugs Rash     rash       Medications  Prior to Admission Medications   Prescriptions Last Dose Informant Patient Reported? Taking?   amLODIPine (NORVASC) 5 MG Tab 9/28/2019 at 0730 Family Member No No   Sig: TAKE 1 TABLET BY MOUTH EVERY DAY   amoxicillin-clavulanate (AUGMENTIN) 875-125 MG Tab 9/27/2019 at PM Family Member No No   Sig: Take 1 Tab by mouth 2 times a day.   aspirin 81 MG EC tablet 9/27/2019 at PM Family Member Yes No   Sig: Take 81 mg by mouth every day.   levothyroxine (SYNTHROID) 75 MCG Tab 9/28/2019 at 0730 Family Member No No   Sig: TAKE 1 TABLET BY MOUTH EVERY DAY   lisinopril (PRINIVIL) 20 MG Tab 9/27/2019 at 0730 Family Member No No   Sig: TAKE HALF A TABLET BY MOUTH TWICE A DAY   meclizine (ANTIVERT) 25 MG Tab PRN at PRN Family Member Yes Yes   Sig: Take 25 mg by mouth 3 times a day as needed.   mirtazapine (REMERON) 15 MG Tab NEW RX Family Member No No   Sig: Take 0.5-1 Tabs by mouth every bedtime. As needed for insomnia   omeprazole (PRILOSEC) 20 MG delayed-release capsule 9/27/2019 at 1600 Family Member Yes Yes   Sig: Take 20 mg by mouth every day.   polyethylene glycol 3350 (MIRALAX) Powder 9/28/2019 at 0730 Family Member Yes No   Sig: Take 17 g by mouth every day. Indications: Constipation      Facility-Administered Medications: None       Physical Exam  Temp:  [37.1 °C (98.8 °F)] 37.1 °C (98.8 °F)  Pulse:  [78-91] 91  Resp:  [20]  20  BP: (127-135)/(60-72) 127/61  SpO2:  [94 %-96 %] 96 %    Physical Exam   Constitutional: She is oriented to person, place, and time. She appears well-developed and well-nourished. No distress.   Nontoxic-appearing   HENT:   Head: Normocephalic and atraumatic.   Mouth/Throat: Oropharynx is clear and moist.   Eyes: Pupils are equal, round, and reactive to light. Conjunctivae and EOM are normal. Right eye exhibits no discharge. Left eye exhibits no discharge. No scleral icterus.   Neck: Neck supple.   Cardiovascular: Normal rate and regular rhythm.   Pulmonary/Chest: Effort normal and breath sounds normal.   Abdominal: Soft. She exhibits distension (Mild fullness). She exhibits no mass. There is tenderness (Lower abdomen). There is no rebound and no guarding.   Decreased bowel   Musculoskeletal: She exhibits edema (Trace bilaterally daughter states this is normal on the left but unusual on the right). She exhibits no tenderness.   Neurological: She is alert and oriented to person, place, and time. No cranial nerve deficit.   Skin: Skin is warm and dry. She is not diaphoretic.   Psychiatric: She has a normal mood and affect.   Nursing note and vitals reviewed.      Laboratory:  Recent Labs     09/28/19  1040   WBC 12.6*   RBC 5.43*   HEMOGLOBIN 13.9   HEMATOCRIT 43.7   MCV 80.5*   MCH 25.6*   MCHC 31.8*   RDW 43.8   PLATELETCT 240   MPV 9.5     Recent Labs     09/28/19  1040   SODIUM 135   POTASSIUM 4.2   CHLORIDE 100   CO2 22   GLUCOSE 111*   BUN 12   CREATININE 0.82   CALCIUM 9.1     Recent Labs     09/28/19  1040   ALTSGPT 12   ASTSGOT 19   ALKPHOSPHAT 132*   TBILIRUBIN 0.6   LIPASE 7   GLUCOSE 111*         No results for input(s): NTPROBNP in the last 72 hours.      No results for input(s): TROPONINT in the last 72 hours.    Urinalysis:    Recent Labs     09/28/19  1113   SPECGRAVITY <=1.005   GLUCOSEUR Negative   KETONES Negative   NITRITE Negative   LEUKESTERAS Small*   WBCURINE 2-5   RBCURINE 0-2   BACTERIA  Few*   EPITHELCELL Few        Imaging:  CT-ABDOMEN-PELVIS WITH   Final Result      1.  Wall thickening and edema of the wall of the large bowel is present. This raises the possibility of colitis. Differential diagnosis includes inflammatory bowel disease or infectious colitis.      2.  Diverticulosis.      3.  Large hiatal hernia again identified.      4.  Bilateral renal cysts again noted.      5.  Small cysts in the pancreatic head region are again identified with no change.               Assessment/Plan:  I anticipate this patient will require at least two midnights for appropriate medical management, necessitating inpatient admission.    Pancolitis (HCC)  Assessment & Plan  Suspect C dif after 1 month of Augmentin for diverticulitis while on PPI     Urinary tract infection due to ESBL Klebsiella  Assessment & Plan  Not previously addressed    HTN (hypertension)- (present on admission)  Assessment & Plan  Continue ACE w/ parameters  Resume Norvasc if BP increases    Benign positional vertigo- (present on admission)  Assessment & Plan  Continue prn antivert    GERD (gastroesophageal reflux disease)- (present on admission)  Assessment & Plan  Hold PPI, - c dif r/o  tums prn    Hypothyroid- (present on admission)  Assessment & Plan  Continue Levothyroxine          VTE prophylaxis: lovenox

## 2019-09-28 NOTE — ED NOTES
Med rec complete per family at bedside with list  Allergies reviewed    Patient is on the 8th day of Augmentin

## 2019-09-28 NOTE — ED PROVIDER NOTES
ED Provider Note  CHIEF COMPLAINT  Chief Complaint   Patient presents with   • LLQ Pain       HPI  Gloria Patel is a 89 y.o. female who presents with complaint of worsening right lower quadrant abdominal pain and worsening nausea over the last several days.  The patient is currently being treated with Augmentin for diverticulitis.  She was admitted with diverticulitis at [facility] on August 30.  She was discharged on September 1.  She followed up with her primary care physician on September 19 and was switched from amoxicillin to Augmentin as Dr. Dayo Otero did not feel she was improving on her antibiotic and still felt that the diverticulitis was present.  Patient has been on her Augmentin since September 19.  She is scheduled to finish her course of antibiotics tomorrow.  Since being on the Augmentin she is had about 3-4 episodes of diarrhea a day.  She has quite a lot of nausea with the medication.  Over the last few days her pain is gotten worse, particularly in the mornings.  The nausea is also worse in the morning.  She has not vomited.  She had a low-grade temperature today of 99.5.  No shaking chills.  No blood in her stool.  The pain in her right lower quadrant is the same pain that she is had on and off since the diagnosis of diverticulitis a month ago.    REVIEW OF SYSTEMS  See HPI for further details.  Positive for nausea, abdominal pain, diarrhea, low-grade temperature.  Negative for shaking chills, cough, runny nose, sore throat, blood in stool, hematuria, dysuria.  All other systems are negative.    PAST MEDICAL HISTORY  Past Medical History:   Diagnosis Date   • Arthritis    • Diverticulitis     medicated   • GERD (gastroesophageal reflux disease)     medicated   • Glaucoma    • Heart burn    • Hiatus hernia syndrome    • Hypertension     medicated   • Primary osteoarthritis of both shoulders 1/22/2019   • Unspecified urinary incontinence        FAMILY HISTORY  Family History   Problem  Relation Age of Onset   • Diabetes Other    • Heart Disease Other    • Lung Disease Other    • Cancer Sister        SOCIAL HISTORY  Social History     Socioeconomic History   • Marital status:      Spouse name: Not on file   • Number of children: Not on file   • Years of education: Not on file   • Highest education level: Not on file   Occupational History   • Not on file   Social Needs   • Financial resource strain: Not on file   • Food insecurity:     Worry: Not on file     Inability: Not on file   • Transportation needs:     Medical: Not on file     Non-medical: Not on file   Tobacco Use   • Smoking status: Never Smoker   • Smokeless tobacco: Never Used   Substance and Sexual Activity   • Alcohol use: Yes     Alcohol/week: 0.0 oz     Comment: occassional wine   • Drug use: No   • Sexual activity: Not on file   Lifestyle   • Physical activity:     Days per week: Not on file     Minutes per session: Not on file   • Stress: Not on file   Relationships   • Social connections:     Talks on phone: Not on file     Gets together: Not on file     Attends Buddhist service: Not on file     Active member of club or organization: Not on file     Attends meetings of clubs or organizations: Not on file     Relationship status: Not on file   • Intimate partner violence:     Fear of current or ex partner: Not on file     Emotionally abused: Not on file     Physically abused: Not on file     Forced sexual activity: Not on file   Other Topics Concern   • Not on file   Social History Narrative   • Not on file       SURGICAL HISTORY  Past Surgical History:   Procedure Laterality Date   • HIP ARTHROPLASTY TOTAL  6/21/2011    Performed by AMPARO CRAFT at SURGERY [facility]   • OTHER ORTHOPEDIC SURGERY  2000    back surgery   • GYN SURGERY  1970    hysterectomy   • CHOLECYSTECTOMY  1966    rahat   • OTHER  1954    mikey. breast bx   • OTHER ABDOMINAL SURGERY  1954    kidney tacked up ?   • OTHER  1945    tonsilectomy  "  • OTHER ORTHOPEDIC SURGERY      osteoporosis - medicated   • US-NEEDLE CORE BX-BREAST PANEL         CURRENT MEDICATIONS  Home Medications    **Home medications have not yet been reviewed for this encounter**         ALLERGIES  Allergies   Allergen Reactions   • Flagyl [Metronidazole Hcl]    • Keflex    • Morphine Anaphylaxis     anaphylaxis   • Sulfa Drugs Rash     rash       PHYSICAL EXAM  VITAL SIGNS: /72   Pulse 86   Temp 37.1 °C (98.8 °F) (Temporal)   Resp 20   Ht 1.575 m (5' 2\")   Wt 62 kg (136 lb 11 oz)   SpO2 96%   BMI 25.00 kg/m²    Constitutional: Well developed, well nourished; No acute distress; Non-toxic appearance.   HENT: Normocephalic, atraumatic; Bilateral external ears normal; Oropharynx with moist mucous membranes; No erythema or exudates in the posterior oropharynx.   Eyes: PERRL, EOMI, Conjunctiva normal. No discharge.   Neck:  Supple, nontender midline; No stridor; No nuchal rigidity.   Lymphatic: No cervical lymphadenopathy noted.   Cardiovascular: Regular rate and rhythm without murmurs, rubs, or gallop.   Thorax & Lungs: No respiratory distress, breath sounds clear to auscultation bilaterally without wheezing, rales or rhonchi. Nontender chest wall. No crepitus or subcutaneous air  Abdomen: Soft, tender to percussion in the left lower quadrant.  Tender to palpation in the left lower quadrant and the right lower quadrant.  Bowel sounds normal. No obvious masses; No pulsatile masses; no rebound, guarding, or peritoneal signs.   Skin: Good color; warm and dry without rash or petechia.  Back: Nontender, No CVA tenderness.    Extremities: Distal dorsalis pedis, posterior tibial pulses are equal bilaterally; No edema; Nontender calves or saphenous, No cyanosis, No clubbing.   Musculoskeletal: Good range of motion in all major joints. No tenderness to palpation or major deformities noted.   Neurologic: Alert & oriented x 4, clear speech        RADIOLOGY/PROCEDURES  CT-ABDOMEN-PELVIS " WITH   Final Result      1.  Wall thickening and edema of the wall of the large bowel is present. This raises the possibility of colitis. Differential diagnosis includes inflammatory bowel disease or infectious colitis.      2.  Diverticulosis.      3.  Large hiatal hernia again identified.      4.  Bilateral renal cysts again noted.      5.  Small cysts in the pancreatic head region are again identified with no change.             COURSE & MEDICAL DECISION MAKING  Pertinent Labs & Imaging studies reviewed. (See chart for details)  Results for orders placed or performed during the hospital encounter of 09/28/19   CBC WITH DIFFERENTIAL   Result Value Ref Range    WBC 12.6 (H) 4.8 - 10.8 K/uL    RBC 5.43 (H) 4.20 - 5.40 M/uL    Hemoglobin 13.9 12.0 - 16.0 g/dL    Hematocrit 43.7 37.0 - 47.0 %    MCV 80.5 (L) 81.4 - 97.8 fL    MCH 25.6 (L) 27.0 - 33.0 pg    MCHC 31.8 (L) 33.6 - 35.0 g/dL    RDW 43.8 35.9 - 50.0 fL    Platelet Count 240 164 - 446 K/uL    MPV 9.5 9.0 - 12.9 fL    Neutrophils-Polys 79.00 (H) 44.00 - 72.00 %    Lymphocytes 12.50 (L) 22.00 - 41.00 %    Monocytes 7.20 0.00 - 13.40 %    Eosinophils 0.40 0.00 - 6.90 %    Basophils 0.50 0.00 - 1.80 %    Immature Granulocytes 0.40 0.00 - 0.90 %    Nucleated RBC 0.00 /100 WBC    Neutrophils (Absolute) 9.95 (H) 2.00 - 7.15 K/uL    Lymphs (Absolute) 1.57 1.00 - 4.80 K/uL    Monos (Absolute) 0.91 (H) 0.00 - 0.85 K/uL    Eos (Absolute) 0.05 0.00 - 0.51 K/uL    Baso (Absolute) 0.06 0.00 - 0.12 K/uL    Immature Granulocytes (abs) 0.05 0.00 - 0.11 K/uL    NRBC (Absolute) 0.00 K/uL   COMP METABOLIC PANEL   Result Value Ref Range    Sodium 135 135 - 145 mmol/L    Potassium 4.2 3.6 - 5.5 mmol/L    Chloride 100 96 - 112 mmol/L    Co2 22 20 - 33 mmol/L    Anion Gap 13.0 (H) 0.0 - 11.9    Glucose 111 (H) 65 - 99 mg/dL    Bun 12 8 - 22 mg/dL    Creatinine 0.82 0.50 - 1.40 mg/dL    Calcium 9.1 8.4 - 10.2 mg/dL    AST(SGOT) 19 12 - 45 U/L    ALT(SGPT) 12 2 - 50 U/L    Alkaline  Phosphatase 132 (H) 30 - 99 U/L    Total Bilirubin 0.6 0.1 - 1.5 mg/dL    Albumin 3.9 3.2 - 4.9 g/dL    Total Protein 7.3 6.0 - 8.2 g/dL    Globulin 3.4 1.9 - 3.5 g/dL    A-G Ratio 1.1 g/dL   LIPASE   Result Value Ref Range    Lipase 7 7 - 58 U/L   URINALYSIS (UA)   Result Value Ref Range    Color Yellow     Character Clear     Specific Gravity <=1.005 <1.035    Ph 5.5 5.0 - 8.0    Glucose Negative Negative mg/dL    Ketones Negative Negative mg/dL    Protein Negative Negative mg/dL    Bilirubin Negative Negative    Nitrite Negative Negative    Leukocyte Esterase Small (A) Negative    Occult Blood Negative Negative    Micro Urine Req Microscopic    STOOL WBC'S   Result Value Ref Range    Stool WBC's None seen None seen   ESTIMATED GFR   Result Value Ref Range    GFR If African American >60 >60 mL/min/1.73 m 2    GFR If Non African American >60 >60 mL/min/1.73 m 2   URINE MICROSCOPIC (W/UA)   Result Value Ref Range    WBC 2-5 /hpf    RBC 0-2 /hpf    Bacteria Few (A) None /hpf    Epithelial Cells Few Few /hpf     1245:  Paged hospitalist.  Hospitalist changes over at 1pm.  Re-paged hospitalist at 1pm.  Dr. Watters sent text back to unit clerk stating she is on her way into the hospital.    1400:  Spoke with Dr. Watters upon her arrival to the ER.  She will admit pt to her service.      Patient presents to the ER with worsening lower abdominal pain, right greater than left, as well as worsening diarrhea and nausea despite being on Augmentin for the last 8 days for a diverticulitis.  Patient was initially diagnosed with diverticulitis at [facility] on August 30.  She was admitted overnight.  She was discharged on September 1.  She followed up with her primary care physician on September 19.  She still had abdominal pain and tenderness.  He was concerned that she was not improved despite being on antibiotics.  He switched her from amoxicillin to Augmentin.  Despite being on Augmentin she continues to have  worsening pain, particularly at nighttime.  She has tenderness in the right lower quadrant and left lower quadrant, right greater than left, here in the ER.  She has had quite a lot of nausea.  No vomiting.  She is had 3-4 episodes of watery and flaky diarrhea a day.  The diarrhea is nonbloody per daughter.  Overall patient is well-appearing.  Her vital signs are normal stable.  Her white counts a little high at 12.6.  CT scan with IV contrast was ordered to evaluate for persistent diverticulitis, colitis, abscess, perforation etc.  CT scan reveals thickening and inflammation of the colon consistent with a colitis.  Patient was given Unasyn here in the ER.  Stool studies were sent at the time of this dictation the C. difficile studies are still pending.  Patient will be admitted to the hospital service under the care of Dr. Watters or further evaluation and management.     FINAL IMPRESSION  1. Colitis Acute         This dictation has been created using voice recognition software. The accuracy of the dictation is limited by the abilities of the software. I expect there may be some errors of grammar and possibly content. I made every attempt to manually correct the errors within my dictation. However, errors related to voice recognition software may still exist and should be interpreted within the appropriate context.    Electronically signed by: Aib Jackson, 9/28/2019 9:45 AM

## 2019-09-28 NOTE — PROGRESS NOTES
Report from ER RN.     Pt ambulated to room with assistance of 2. Uses walker at home normally. Steady gait.

## 2019-09-29 ENCOUNTER — HOME CARE VISIT (OUTPATIENT)
Dept: HOME HEALTH SERVICES | Facility: HOME HEALTHCARE | Age: 84
End: 2019-09-29
Payer: MEDICARE

## 2019-09-29 LAB
ALBUMIN SERPL BCP-MCNC: 3.3 G/DL (ref 3.2–4.9)
ALBUMIN/GLOB SERPL: 1.1 G/DL
ALP SERPL-CCNC: 100 U/L (ref 30–99)
ALT SERPL-CCNC: 10 U/L (ref 2–50)
ANION GAP SERPL CALC-SCNC: 13 MMOL/L (ref 0–11.9)
AST SERPL-CCNC: 18 U/L (ref 12–45)
BASOPHILS # BLD AUTO: 0.7 % (ref 0–1.8)
BASOPHILS # BLD: 0.05 K/UL (ref 0–0.12)
BILIRUB SERPL-MCNC: 0.4 MG/DL (ref 0.1–1.5)
BUN SERPL-MCNC: 10 MG/DL (ref 8–22)
CALCIUM SERPL-MCNC: 8.8 MG/DL (ref 8.4–10.2)
CHLORIDE SERPL-SCNC: 106 MMOL/L (ref 96–112)
CO2 SERPL-SCNC: 21 MMOL/L (ref 20–33)
CREAT SERPL-MCNC: 0.78 MG/DL (ref 0.5–1.4)
E COLI SXT1+2 STL IA: NORMAL
EOSINOPHIL # BLD AUTO: 0.17 K/UL (ref 0–0.51)
EOSINOPHIL NFR BLD: 2.3 % (ref 0–6.9)
ERYTHROCYTE [DISTWIDTH] IN BLOOD BY AUTOMATED COUNT: 44.7 FL (ref 35.9–50)
GLOBULIN SER CALC-MCNC: 2.9 G/DL (ref 1.9–3.5)
GLUCOSE SERPL-MCNC: 96 MG/DL (ref 65–99)
HCT VFR BLD AUTO: 39.3 % (ref 37–47)
HGB BLD-MCNC: 12.5 G/DL (ref 12–16)
IMM GRANULOCYTES # BLD AUTO: 0.01 K/UL (ref 0–0.11)
IMM GRANULOCYTES NFR BLD AUTO: 0.1 % (ref 0–0.9)
LACTATE BLD-SCNC: 1.4 MMOL/L (ref 0.5–2)
LYMPHOCYTES # BLD AUTO: 1.63 K/UL (ref 1–4.8)
LYMPHOCYTES NFR BLD: 22.3 % (ref 22–41)
MCH RBC QN AUTO: 26 PG (ref 27–33)
MCHC RBC AUTO-ENTMCNC: 31.8 G/DL (ref 33.6–35)
MCV RBC AUTO: 81.7 FL (ref 81.4–97.8)
MONOCYTES # BLD AUTO: 0.75 K/UL (ref 0–0.85)
MONOCYTES NFR BLD AUTO: 10.2 % (ref 0–13.4)
NEUTROPHILS # BLD AUTO: 4.71 K/UL (ref 2–7.15)
NEUTROPHILS NFR BLD: 64.4 % (ref 44–72)
NRBC # BLD AUTO: 0 K/UL
NRBC BLD-RTO: 0 /100 WBC
PLATELET # BLD AUTO: 214 K/UL (ref 164–446)
PMV BLD AUTO: 10.2 FL (ref 9–12.9)
POTASSIUM SERPL-SCNC: 4.3 MMOL/L (ref 3.6–5.5)
PROT SERPL-MCNC: 6.2 G/DL (ref 6–8.2)
RBC # BLD AUTO: 4.81 M/UL (ref 4.2–5.4)
SIGNIFICANT IND 70042: NORMAL
SITE SITE: NORMAL
SODIUM SERPL-SCNC: 140 MMOL/L (ref 135–145)
SOURCE SOURCE: NORMAL
WBC # BLD AUTO: 7.3 K/UL (ref 4.8–10.8)

## 2019-09-29 PROCEDURE — A9270 NON-COVERED ITEM OR SERVICE: HCPCS | Performed by: INTERNAL MEDICINE

## 2019-09-29 PROCEDURE — 700111 HCHG RX REV CODE 636 W/ 250 OVERRIDE (IP): Performed by: INTERNAL MEDICINE

## 2019-09-29 PROCEDURE — 99232 SBSQ HOSP IP/OBS MODERATE 35: CPT | Performed by: HOSPITALIST

## 2019-09-29 PROCEDURE — 83605 ASSAY OF LACTIC ACID: CPT

## 2019-09-29 PROCEDURE — 700102 HCHG RX REV CODE 250 W/ 637 OVERRIDE(OP): Performed by: INTERNAL MEDICINE

## 2019-09-29 PROCEDURE — 99223 1ST HOSP IP/OBS HIGH 75: CPT | Performed by: INTERNAL MEDICINE

## 2019-09-29 PROCEDURE — 36415 COLL VENOUS BLD VENIPUNCTURE: CPT

## 2019-09-29 PROCEDURE — 665998 HH PPS REVENUE CREDIT

## 2019-09-29 PROCEDURE — 665999 HH PPS REVENUE DEBIT

## 2019-09-29 PROCEDURE — 80053 COMPREHEN METABOLIC PANEL: CPT

## 2019-09-29 PROCEDURE — 770006 HCHG ROOM/CARE - MED/SURG/GYN SEMI*

## 2019-09-29 PROCEDURE — 85025 COMPLETE CBC W/AUTO DIFF WBC: CPT

## 2019-09-29 RX ADMIN — LATANOPROST 1 DROP: 50 SOLUTION/ DROPS OPHTHALMIC at 20:09

## 2019-09-29 RX ADMIN — ENOXAPARIN SODIUM 40 MG: 100 INJECTION SUBCUTANEOUS at 05:35

## 2019-09-29 RX ADMIN — VANCOMYCIN 125 MG: KIT at 00:20

## 2019-09-29 RX ADMIN — ASPIRIN 81 MG: 81 TABLET, COATED ORAL at 17:00

## 2019-09-29 RX ADMIN — MIRTAZAPINE 15 MG: 15 TABLET, ORALLY DISINTEGRATING ORAL at 20:10

## 2019-09-29 RX ADMIN — LEVOTHYROXINE SODIUM 75 MCG: 75 TABLET ORAL at 05:35

## 2019-09-29 RX ADMIN — VANCOMYCIN 125 MG: KIT at 17:00

## 2019-09-29 RX ADMIN — LISINOPRIL 20 MG: 20 TABLET ORAL at 05:34

## 2019-09-29 RX ADMIN — VANCOMYCIN 125 MG: KIT at 11:17

## 2019-09-29 RX ADMIN — VANCOMYCIN 125 MG: KIT at 07:19

## 2019-09-29 ASSESSMENT — ENCOUNTER SYMPTOMS
COUGH: 0
DEPRESSION: 0
VOMITING: 0
HEMOPTYSIS: 0
HEADACHES: 0
EYE PAIN: 0
PND: 0
BLOOD IN STOOL: 0
HEARTBURN: 0
SPEECH CHANGE: 0
DOUBLE VISION: 0
CLAUDICATION: 0
NAUSEA: 0
NERVOUS/ANXIOUS: 0
SENSORY CHANGE: 0
CONSTIPATION: 0
PHOTOPHOBIA: 0
NECK PAIN: 0
MEMORY LOSS: 0
SPUTUM PRODUCTION: 0
CHILLS: 0
STRIDOR: 0
SHORTNESS OF BREATH: 0
FEVER: 0
BLURRED VISION: 0
DIZZINESS: 0
MYALGIAS: 0
ORTHOPNEA: 0
DIARRHEA: 1
TINGLING: 0
TREMORS: 0
BACK PAIN: 0
SORE THROAT: 0
PALPITATIONS: 0
WEAKNESS: 0

## 2019-09-29 NOTE — CONSULTS
DATE OF SERVICE:  09/29/2019    INFECTIOUS DISEASE CONSULTATION    REASON FOR CONSULT:  Clostridium difficile colitis.    CONSULTING PHYSICIAN:  Vivi Watters MD    HISTORY OF PRESENT ILLNESS:  This is a very pleasant 89-year-old white female   who was admitted to the hospital on 09/28/2019 due to persistent nausea,   diarrhea, and lower abdominal pain that has gotten progressively worse over   the day prior to admission.  She was recently admitted to the hospital and   diagnosed with diverticulitis as well as urinary tract infection.  She   completed 1 course of antibiotics and because of persistent nausea, abdominal   pain, and loose stools she was given another course of Augmentin.  On day 8 of   the 10-day course, her abdominal pain got significantly worse.  She did not   have any florina fever or chills.  She had nausea, but no vomiting, and   decreased oral intake.  By the time she presented to the ED, the abdominal   pain was around 3/10.  It is not worse with food.  Nothing makes it better,   described as crampy, but persistent.  A CT scan was done, which showed   inflammation of the colon.  She was started initially on Unasyn and oral   vancomycin.  Subsequently, C. diff toxin came back positive.  Unasyn has been   discontinued.  She is continuing on oral vancomycin.  Infectious disease is   consulted for antibiotic recommendations and management.    REVIEW OF SYSTEMS:  Since admission, nausea, vomiting, and diarrhea have   improved.  She denies any melena or bright red blood per rectum.  All other   systems are reviewed and are negative.    ALLERGIES:  SHE HAS UNKNOWN REACTION TO FLAGYL, SHE SAID SHE HAS ANAPHYLAXIS   TO MORPHINE, UNKNOWN REACTION TO KEFLEX, AND RASH WITH SULFA DRUGS.    PAST MEDICAL HISTORY:  Osteoarthritis, diverticulitis, GERD, hiatal hernia,   hypertension, and urinary incontinence.    FAMILY HISTORY:  Cancer, diabetes, and heart disease.    SOCIAL HISTORY:  She is a nonsmoker.   She does occasionally drink, does not   use illicit drugs.  She normally lives independently.    PHYSICAL EXAMINATION:  GENERAL:  She is an elderly female in no acute distress, very pleasant and   cooperative.  VITAL SIGNS:  She has been afebrile since admission with a T-max of 99.6,   current temperature 98.5, blood pressure 146/54, pulse 74, respiratory rate   16, oxygen saturation 96% on room air.  She weighs 62 kilos.  HEENT:  Normocephalic, atraumatic.  Pupils are equal, round, and reactive to   light.  Extraocular movements are intact.  Oropharynx is clear.  She has   anicteric sclerae.  She has no conjunctivitis.  NECK:  Supple.  There is no JVD or stridor.  CARDIOVASCULAR:  Regular rate and rhythm with ectopy.  CHEST:  Clear to auscultation bilaterally, unlabored.  There is no chest   tenderness.  ABDOMEN:  Soft, nontender, nondistended.  There is no rebound or guarding.    She does have bowel sounds.  EXTREMITIES:  Show no cyanosis or clubbing.  She has trace dependent edema.    She has osteoarthritic changes in the joints.  No joint swelling.  NEUROLOGIC:  She is awake, alert and oriented.  Speech is fluent without   dysarthria.  Cranial nerves are intact.  She is moving all extremities.  SKIN:  Warm and dry.  She is not diaphoretic.  PSYCHIATRIC:  She has normal mood, normal affect.  Behavior is normal.    CURRENT LABORATORY DATA:  Showing admitting white blood cell count of 12.6,   now 7.3, H and H 12.5 and 39.3, and platelets of 214.  Sodium was 135,   potassium 4.2, chloride 100, bicarbonate 22, glucose 111, BUN 12, creatinine   0.8, AST 19, ALT 12, alkaline phosphatase 132, now 100, albumin of 3.9.    Lipase of 1.1.  Lactic acid was 2.  Stool wbc's were negative.  Urinalysis   showed 2-5 wbc's, 0-2 rbc's, negative nitrite, negative leukocyte esterase.    She did have an urine culture on 09/19 that showed a relatively resistant   Klebsiella oxytoca sensitive to cefepime, cefotaxime, but resistant to    ceftriaxone susceptible to Cipro, Bactrim, and nitrofurantoin, levofloxacin,   gentamicin, etc.  It is not ESBL.  Clostridium difficile toxin was positive on   09/28.    IMAGING:  CT scan of the abdomen and pelvis showed a hiatal hernia, normal   liver and spleen, bilateral renal cysts, normal adrenal glands,   mild-to-moderate wall thickening and edema of the large bowel, and   diverticulosis.    ASSESSMENT AND PLAN:  A 89-year-old female with recent antibiotic treatment   for diverticulitis, admitted with Clostridium difficile colitis.  Since   admission, her gastrointestinal symptoms have improved.  She has been   afebrile.  Her leukocytosis was under 15,000.  She had no acute kidney injury.    This was consistent with mild clostridium difficile colitis.  This is her   first episode.  We would continue her on the oral vancomycin 125 mg p.o. 4   times daily for 14 days.  She is still at a 20%-30% risk of relapse, so if she   does develop diarrhea again in the future, she should be presumed to have   Clostridium difficile colitis until proven otherwise.  If she should require   antibiotics again in the future, she should be considered for oral vancomycin   prophylaxis.  Dietary modifications recommended i.e. avoid caffeine, lactose,   etc. until diarrhea completely resolves.  A CT scan showed no recurrence of   her diverticulitis.  No additional antimicrobial therapy is needed for this.    She does have a prior urine culture positive for Klebsiella, it was not an   extended-spectrum beta-lactamase.  Unclear if it was treated; however, she has   no urinary symptoms, so it is unclear if this represents asymptomatic   bacteriuria versus a prior urinary tract infection, but again no additional   antibiotics are recommended for that.  She is okay from infectious disease   standpoint for discharge if she is cleared by the other services.    Thank you and please reconsult us if we can be of further assistance.     Discussed with internal medicine.       ____________________________________     MD ECTOR LAURENT / MIKEL    DD:  09/29/2019 10:00:51  DT:  09/29/2019 13:43:45    D#:  [id number]  Job#:  [id number]

## 2019-09-29 NOTE — PROGRESS NOTES
[facility] Medicine Daily Progress Note    Date of Service  9/29/2019    Chief Complaint  89 y.o. female admitted 9/28/2019 with abdominal pain.    Hospital Course    per HPI      Interval Problem Update  No acute issues overnight, patient still reporting to have watery diarrhea several episodes since last night.  She felt mildly dizzy as she got up to the bathroom because she was rushing last night, but currently feeling better.  Says she is having mild cramping sensation in her stomach.  Otherwise denies fevers chills.    Consultants/Specialty  ID     Code Status  DNAR/ Intubation okay    Disposition  TBD    Review of Systems  Review of Systems   Constitutional: Positive for malaise/fatigue. Negative for chills and fever.   HENT: Negative for congestion, hearing loss, sore throat and tinnitus.    Eyes: Negative for blurred vision, double vision, photophobia and pain.   Respiratory: Negative for cough, hemoptysis, sputum production, shortness of breath and stridor.    Cardiovascular: Negative for chest pain, palpitations, orthopnea, claudication and PND.   Gastrointestinal: Positive for diarrhea. Negative for blood in stool, constipation, heartburn, melena, nausea and vomiting.   Genitourinary: Negative for dysuria, frequency and urgency.   Musculoskeletal: Negative for back pain, myalgias and neck pain.   Neurological: Negative for dizziness, tingling, tremors, sensory change, speech change, weakness and headaches.   Psychiatric/Behavioral: Negative for depression, memory loss and suicidal ideas. The patient is not nervous/anxious.    All other systems reviewed and are negative.       Physical Exam  Temp:  [36.8 °C (98.3 °F)-37.6 °C (99.6 °F)] 37.6 °C (99.6 °F)  Pulse:  [74-98] 74  Resp:  [16-20] 16  BP: (103-135)/(54-72) 103/54  SpO2:  [92 %-96 %] 92 %    Physical Exam   Constitutional: She is oriented to person, place, and time. She appears well-developed and well-nourished. No distress.   HENT:   Head:  Normocephalic and atraumatic.   Mouth/Throat: No oropharyngeal exudate.   Eyes: Pupils are equal, round, and reactive to light. Conjunctivae are normal. Right eye exhibits no discharge. No scleral icterus.   Neck: Neck supple. No JVD present. No thyromegaly present.   Cardiovascular: Normal rate and intact distal pulses.   No murmur heard.  Pulmonary/Chest: Effort normal and breath sounds normal. No stridor. No respiratory distress. She has no wheezes. She has no rales.   Abdominal: Soft. She exhibits distension. She exhibits no mass. There is no tenderness. There is no rebound and no guarding.   Hyperactive bowel sounds   Musculoskeletal: Normal range of motion. She exhibits no edema.   Neurological: She is alert and oriented to person, place, and time. No cranial nerve deficit.   Skin: Skin is warm. She is not diaphoretic. No erythema.   Psychiatric: She has a normal mood and affect. Her behavior is normal. Thought content normal.   Nursing note and vitals reviewed.      Fluids    Intake/Output Summary (Last 24 hours) at 9/29/2019 0722  Last data filed at 9/28/2019 1800  Gross per 24 hour   Intake 220 ml   Output --   Net 220 ml       Laboratory  Recent Labs     09/28/19  1040 09/29/19  0011   WBC 12.6* 7.3   RBC 5.43* 4.81   HEMOGLOBIN 13.9 12.5   HEMATOCRIT 43.7 39.3   MCV 80.5* 81.7   MCH 25.6* 26.0*   MCHC 31.8* 31.8*   RDW 43.8 44.7   PLATELETCT 240 214   MPV 9.5 10.2     Recent Labs     09/28/19  1040 09/29/19  0011   SODIUM 135 140   POTASSIUM 4.2 4.3   CHLORIDE 100 106   CO2 22 21   GLUCOSE 111* 96   BUN 12 10   CREATININE 0.82 0.78   CALCIUM 9.1 8.8                   Imaging  CT-ABDOMEN-PELVIS WITH   Final Result      1.  Wall thickening and edema of the wall of the large bowel is present. This raises the possibility of colitis. Differential diagnosis includes inflammatory bowel disease or infectious colitis.      2.  Diverticulosis.      3.  Large hiatal hernia again identified.      4.  Bilateral renal  cysts again noted.      5.  Small cysts in the pancreatic head region are again identified with no change.              Assessment/Plan  * Pancolitis (HCC)  Assessment & Plan  Most likely 2/2 to CDIFF from recent antibiotic use  CDIFF PCR +  Resume PO Vancomycin  Still having diarrha but improving.     Renal cyst  Assessment & Plan  bilateral renal cysts noted.  Recommend outpatient urology followup.      Urinary tract infection due to ESBL Klebsiella  Assessment & Plan  Unimpressive UA on admit.  Asymptomatic. Will not treat as most ESBL's are colonizers.     HTN (hypertension)- (present on admission)  Assessment & Plan  Controlled  Resume lisinopril.    Benign positional vertigo- (present on admission)  Assessment & Plan  Continue with meclizine prn.    GERD (gastroesophageal reflux disease)- (present on admission)  Assessment & Plan  D/c omeprazole in light of CDIFF  We will utilize tums.     Hypothyroid- (present on admission)  Assessment & Plan  Continue with home dose of Levothyroxine       Patient plan of care discussed at multidisplinary team rounds and with patient and R.N at beside.    VTE prophylaxis: Lovenox

## 2019-09-29 NOTE — PROGRESS NOTES
Pt with diarrhea, ambulating with walker to bathroom. Placed bedside commode next to bed due to pt stating she was getting dizzy after trying to rush to bathroom.

## 2019-09-29 NOTE — DISCHARGE PLANNING
*ATTN Discharge Planning team: This patient is currently on service with [facility]. Please submit a referral order and face-to-face prior to discharging the patient home. If you have any questions, contact our Transitional Care Specialist team at x [phone].

## 2019-09-30 VITALS
HEART RATE: 78 BPM | RESPIRATION RATE: 18 BRPM | DIASTOLIC BLOOD PRESSURE: 64 MMHG | SYSTOLIC BLOOD PRESSURE: 152 MMHG | TEMPERATURE: 98 F | WEIGHT: 136.69 LBS | BODY MASS INDEX: 25.15 KG/M2 | OXYGEN SATURATION: 95 % | HEIGHT: 62 IN

## 2019-09-30 PROBLEM — K51.00 PANCOLITIS (HCC): Status: RESOLVED | Noted: 2019-09-28 | Resolved: 2019-09-30

## 2019-09-30 PROBLEM — B96.89 URINARY TRACT INFECTION DUE TO ESBL KLEBSIELLA: Status: RESOLVED | Noted: 2019-09-28 | Resolved: 2019-09-30

## 2019-09-30 PROBLEM — N39.0 URINARY TRACT INFECTION DUE TO ESBL KLEBSIELLA: Status: RESOLVED | Noted: 2019-09-28 | Resolved: 2019-09-30

## 2019-09-30 LAB
BACTERIA UR CULT: NORMAL
SIGNIFICANT IND 70042: NORMAL
SITE SITE: NORMAL
SOURCE SOURCE: NORMAL

## 2019-09-30 PROCEDURE — 665998 HH PPS REVENUE CREDIT

## 2019-09-30 PROCEDURE — 665999 HH PPS REVENUE DEBIT

## 2019-09-30 PROCEDURE — 99239 HOSP IP/OBS DSCHRG MGMT >30: CPT | Performed by: HOSPITALIST

## 2019-09-30 PROCEDURE — 99232 SBSQ HOSP IP/OBS MODERATE 35: CPT | Performed by: INTERNAL MEDICINE

## 2019-09-30 PROCEDURE — 700111 HCHG RX REV CODE 636 W/ 250 OVERRIDE (IP): Performed by: INTERNAL MEDICINE

## 2019-09-30 PROCEDURE — A9270 NON-COVERED ITEM OR SERVICE: HCPCS | Performed by: INTERNAL MEDICINE

## 2019-09-30 PROCEDURE — 700102 HCHG RX REV CODE 250 W/ 637 OVERRIDE(OP): Performed by: INTERNAL MEDICINE

## 2019-09-30 RX ADMIN — LISINOPRIL 20 MG: 20 TABLET ORAL at 05:36

## 2019-09-30 RX ADMIN — VANCOMYCIN 125 MG: KIT at 05:36

## 2019-09-30 RX ADMIN — VANCOMYCIN 125 MG: KIT at 11:42

## 2019-09-30 RX ADMIN — ENOXAPARIN SODIUM 40 MG: 100 INJECTION SUBCUTANEOUS at 05:36

## 2019-09-30 RX ADMIN — LEVOTHYROXINE SODIUM 75 MCG: 75 TABLET ORAL at 05:36

## 2019-09-30 RX ADMIN — VANCOMYCIN 125 MG: KIT at 00:10

## 2019-09-30 ASSESSMENT — ENCOUNTER SYMPTOMS
CHILLS: 0
DIARRHEA: 1
HEADACHES: 0
COUGH: 0
ABDOMINAL PAIN: 1
NAUSEA: 0
FEVER: 0
DIZZINESS: 0
SHORTNESS OF BREATH: 0
VOMITING: 0
MYALGIAS: 0

## 2019-09-30 NOTE — PROGRESS NOTES
Infectious Disease Progress Note    Author: Alysa Rivera M.D. Date & Time of service: 2019  8:29 AM    Chief Complaint:  FU Clostridium difficile colitis    Interval History:  89-year-old female with recent antibiotic treatment for diverticulitis, admitted with Clostridium difficile colitis.     afebrile no CBC done today.  Patient denies any nausea, or vomiting.  Patient eating breakfast.  Has some mild abdominal pain.  Had multiple stools overnight but states frequency has decreased    Labs Reviewed.    Review of Systems:  Review of Systems   Constitutional: Negative for chills and fever.   Respiratory: Negative for cough and shortness of breath.    Cardiovascular: Negative for chest pain.   Gastrointestinal: Positive for abdominal pain and diarrhea. Negative for nausea and vomiting.   Genitourinary: Negative for dysuria.   Musculoskeletal: Negative for myalgias.   Neurological: Negative for dizziness and headaches.   All other systems reviewed and are negative.      Hemodynamics:  Temp (24hrs), Av.8 °C (98.2 °F), Min:36.7 °C (98 °F), Max:36.8 °C (98.3 °F)  Temperature: 36.7 °C (98 °F)  Pulse  Av.5  Min: 73  Max: 98   Blood Pressure : 148/68       Physical Exam:  Physical Exam   Constitutional: She is oriented to person, place, and time. She appears well-developed. No distress.   HENT:   Mouth/Throat: No oropharyngeal exudate.   Eyes: Pupils are equal, round, and reactive to light. Conjunctivae and EOM are normal.   Neck: Neck supple.   Cardiovascular: Normal rate, regular rhythm and normal heart sounds.   Pulmonary/Chest: Effort normal. She has no wheezes. She has no rales.   Abdominal: Soft. There is tenderness.   Lower quadrant bilateral   Musculoskeletal: Normal range of motion. She exhibits no edema.   Neurological: She is alert and oriented to person, place, and time.   Skin: Skin is warm. No erythema.   Psychiatric: She has a normal mood and affect. Her behavior is normal.   Pleasant        Meds:    Current Facility-Administered Medications:   •  aspirin  •  lisinopril  •  meclizine  •  mirtazapine  •  vancomycin 50 mg/mL  •  NS  •  enoxaparin  •  acetaminophen  •  ondansetron  •  ondansetron  •  levothyroxine  •  latanoprost    Labs:  Recent Labs     09/28/19  1040 09/29/19  0011   WBC 12.6* 7.3   RBC 5.43* 4.81   HEMOGLOBIN 13.9 12.5   HEMATOCRIT 43.7 39.3   MCV 80.5* 81.7   MCH 25.6* 26.0*   RDW 43.8 44.7   PLATELETCT 240 214   MPV 9.5 10.2   NEUTSPOLYS 79.00* 64.40   LYMPHOCYTES 12.50* 22.30   MONOCYTES 7.20 10.20   EOSINOPHILS 0.40 2.30   BASOPHILS 0.50 0.70     Recent Labs     09/28/19  1040 09/29/19  0011   SODIUM 135 140   POTASSIUM 4.2 4.3   CHLORIDE 100 106   CO2 22 21   GLUCOSE 111* 96   BUN 12 10     Recent Labs     09/28/19  1040 09/29/19  0011   ALBUMIN 3.9 3.3   TBILIRUBIN 0.6 0.4   ALKPHOSPHAT 132* 100*   TOTPROTEIN 7.3 6.2   ALTSGPT 12 10   ASTSGOT 19 18   CREATININE 0.82 0.78       Imaging:  Ct-abdomen-pelvis With    Result Date: 9/28/2019 9/28/2019 11:31 AM HISTORY/REASON FOR EXAM:  Left lower quadrant abdominal pain TECHNIQUE/EXAM DESCRIPTION: CT scan of the abdomen and pelvis with contrast. Contrast-enhanced helical scanning was obtained from the diaphragmatic domes through the pubic symphysis following the bolus administration of 100 mL of Optiray 350 nonionic contrast without complication. Low dose optimization technique was utilized for this CT exam including automated exposure control and adjustment of the mA and/or kV according to patient size. COMPARISON: 08/30/2019 FINDINGS: CT Abdomen: Hiatal hernia is again identified containing the proximal half of the stomach. The liver is normal in appearance. The spleen is normal. Bilateral renal cysts are again identified. Largest again arises from the upper pole of the right kidney measuring 11.3 cm in diameter. The adrenal glands are normal. Small cysts are again noted in the region of the pancreatic head. The aorta is normal  in caliber.  No evidence of retroperitoneal adenopathy. There appears to be mild to moderate wall thickening and edema involving the large bowel. Diverticulosis is again noted. CT Pelvis: There is no evidence of bowel obstruction or inflammatory change. Right hip arthroplasty is again noted. There is no evidence of free fluid.     1.  Wall thickening and edema of the wall of the large bowel is present. This raises the possibility of colitis. Differential diagnosis includes inflammatory bowel disease or infectious colitis. 2.  Diverticulosis. 3.  Large hiatal hernia again identified. 4.  Bilateral renal cysts again noted. 5.  Small cysts in the pancreatic head region are again identified with no change.       Micro:  Results     Procedure Component Value Units Date/Time    URINE CULTURE [[id number]] Collected:  09/28/19 1113    Order Status:  Completed Specimen:  Urine, Clean Catch Updated:  09/30/19 0724     Significant Indicator NEG     Source UR     Site URINE, CLEAN CATCH     Culture Result Mixed enteric layo ,000 cfu/mL    Narrative:       Indication for culture:->Patient WITHOUT an indwelling Snell  catheter in place with new onset of Dysuria, Frequency,  Urgency, and/or Suprapubic pain  Indication for culture:->Patient WITHOUT an indwelling Snell    EHEC(Siga Toxin)Detection [[id number]] Collected:  09/28/19 1035    Order Status:  Completed Specimen:  Stool Updated:  09/29/19 1541     Significant Indicator NEG     Source STL     Site STOOL     EHEC Negative for Shiga Toxin 1 and 2.    Narrative:       Does this patient have risk factors for C-diff?->Yes  C-Diff Risk Factors->antibiotic exposure  Has patient taken stool softeners or laxatives in the last 5  days?->No    CULTURE STOOL [[id number]] Collected:  09/28/19 1035    Order Status:  Completed Specimen:  Stool Updated:  09/29/19 1541     Significant Indicator NEG     Source STL     Site STOOL     Culture Result Culture in progress.    NOTE:    Stool  cultures are screened for Shiga Toxins 1 and 2,    Salmonella, Shigella, Campylobacter, Aeromonas,    Plesiomonas, and Vibrio.       EHEC Negative for Shiga Toxin 1 and 2.    Narrative:       Does this patient have risk factors for C-diff?->Yes  C-Diff Risk Factors->antibiotic exposure  Has patient taken stool softeners or laxatives in the last 5  days?->No    C Diff Toxin [[id number]]  (Abnormal) Collected:  09/28/19 1035    Order Status:  Completed Updated:  09/28/19 2251     C.Diff Toxin A&B Positive     Comment: TOXIN POSITIVE  Toxin detected by EIA; C. difficile detected by PCR.  If clinically correlated, treatment indicated per guidelines.  Test of cure is not recommended.         Narrative:       Does this patient have risk factors for C-diff?->Yes  C-Diff Risk Factors->antibiotic exposure  Has patient taken stool softeners or laxatives in the last 5  days?->No    C Diff by PCR rflx Toxin [[id number]] Collected:  09/28/19 1035    Order Status:  Completed Specimen:  Blood from Stool Updated:  09/28/19 2250     C Diff by PCR See Toxin     [id number] Presumptive Negative    Narrative:       Does this patient have risk factors for C-diff?->Yes  C-Diff Risk Factors->antibiotic exposure  Has patient taken stool softeners or laxatives in the last 5  days?->No    URINALYSIS (UA) [[id number]]  (Abnormal) Collected:  09/28/19 1113    Order Status:  Completed Specimen:  Urine Updated:  09/28/19 1146     Color Yellow     Character Clear     Specific Gravity <=1.005     Ph 5.5     Glucose Negative mg/dL      Ketones Negative mg/dL      Protein Negative mg/dL      Bilirubin Negative     Nitrite Negative     Leukocyte Esterase Small     Occult Blood Negative     Micro Urine Req Microscopic    Narrative:       Indication for culture:->Patient WITHOUT an indwelling Snell  catheter in place with new onset of Dysuria, Frequency,  Urgency, and/or Suprapubic pain          Assessment:  Active Hospital Problems    Diagnosis   •  *Pancolitis (HCC) [K51.00]   C. difficile colitis    Plan:  C. difficile colitis, first episode  Recently treated for diverticulitis and urinary tract infection  No fevers  Leukocytosis resolved  CT+ findings concerning for colitis  Frequency of diarrhea improved per patient  Continue p.o. Vanco 125 mg 4 times daily to complete a 14-day course total  Stop date 10/13/2019  Patient is at increased risk for recurrent C. difficile colitis.  If she needs to be treated with antibiotics in the future, she should also be on p.o. vancomycin twice daily.    Okay to discharge patient from ID standpoint    No ID clinic follow-up needed    Plan of care discussed with internal medicine/Dr. Hoang

## 2019-09-30 NOTE — DISCHARGE PLANNING
Received Choice form at 1400  Agency/Facility Name: [facility]  Referral sent per Choice form at 1403

## 2019-09-30 NOTE — DISCHARGE INSTRUCTIONS
Clostridium Difficile Infection  Introduction   Clostridium difficile (C. difficile or C. diff) infection causes inflammation of the large intestine (colon). This condition can result in damage to the lining of your colon and may lead to another condition called colitis. This infection can be passed from person to person (is contagious).  Follow these instructions at home:  Eating and drinking  · Drink enough fluid to keep your pee (urine) clear or pale yellow.  · Avoid drinking:  ¨ Milk.  ¨ Caffeine.  ¨ Alcohol.  · Follow exact instructions from your doctor about how to get enough fluid in your body (rehydrate).  · Eat small meals often instead of large meals.  Medicines  · Take your antibiotic medicine as told by your doctor. Do not stop taking the antibiotic even if you start to feel better unless your doctor told you to do that.  · Take over-the-counter and prescription medicines only as told by your doctor.  · Do not use medicines to help with watery poop (diarrhea).  General instructions  · Wash your hands fully before you prepare food and after you use the bathroom. Make sure people who live with you also wash their  · hands often.  · Clean the surfaces that you touch. Use a product that contains chlorine bleach.  · Keep all follow-up visits as told by your doctor. This is important.  Contact a doctor if:  · Your symptoms do not get better with treatment.  · Your symptoms get worse with treatment.  · Your symptoms go away and then come back.  · You have a fever.  · You have new symptoms.  Get help right away if:  · You have more pain or tenderness in your belly (abdomen).  · Your poop (stool) is mostly bloody.  · Your poop looks dark black and tarry.  · You cannot eat or drink without throwing up (vomiting).  · You have signs of dehydration, such as:  ¨ Dark pee, very little pee, or no pee.  ¨ Cracked lips.  ¨ Not making tears when you cry.  ¨ Dry mouth.  ¨ Sunken eyes.  ¨ Feeling sleepy.  ¨ Feeling  "weak.  ¨ Feeling dizzy.  This information is not intended to replace advice given to you by your health care provider. Make sure you discuss any questions you have with your health care provider.  Document Released: 10/15/2010 Document Revised: 05/25/2017 Document Reviewed: 06/20/2016  © 2017 [employer]        Clostridium Difficile FAQs  What is Clostridium difficile infection?   Clostridium difficile [pronounced Djw-ZBZFK-gj-um dif-uh-SEEL], also known as \"C. diff\" [See-dif], is a germ that can cause diarrhea. Most cases of C. diff infection occur in patients taking antibiotics. The most common symptoms of a C. diff infection include:  · Watery diarrhea  · Fever  · Loss of appetite  · Nausea  · Belly pain and tenderness  Who is most likely to get C. diff infection?   The elderly and people with certain medical problems have the greatest chance of getting C. diff. C. diff spores can live outside the human body for a very long time and may be found on things in the environment such as bed linens, bed rails, bathroom fixtures, and medical equipment. C. diff infection can spread from person-to-person on contaminated equipment and on the hands of doctors, nurses, other healthcare providers and visitors.  Can C. diff infection be treated?   Yes, there are antibiotics that can be used to treat C. diff. In some severe cases, a person might have to have surgery to remove the infected part of the intestines. This surgery is needed in only 1 or 2 out of every 100 persons with C. diff.  What can I do to help prevent C. diff infections?  · Only take antibiotics as prescribed by your doctor.  · Be sure to clean your own hands often, especially after using the bathroom and before eating.  Can my friends and family get C. diff when they visit me?   C. diff infection usually does not occur in persons who are not taking antibiotics. Visitors are not likely to get C. diff. Still, to make it safer for visitors, they should:  · Clean " their hands before they enter your room and as they leave your room  · Ask the nurse if they need to wear protective gowns and gloves when they visit you.  What do I need to do when I go home from the hospital?   Once you are back at home, you can return to your normal routine. Often, the diarrhea will be better or completely gone before you go home. This makes giving C. diff to other people much less likely. There are a few things you should do, however, to lower the chances of developing C. diff infection again or of spreading it to others.  · If you are given a prescription to treat C. diff, take the medicine exactly as prescribed by your doctor and pharmacist. Do not take half-doses or stop before you run out.  · Wash your hands often, especially after going to the bathroom and before preparing food.  · People who live with you should wash their hands often as well.  · If you develop more diarrhea after you get home, tell your doctor immediately.  · Your doctor may give you additional instructions.  If you have questions, please ask your doctor or nurse.   Developed and co-sponsored by The Society for Healthcare Epidemiology of [place] (SHEA); Infectious Diseases Society of [place] (IDSA); American Hospital Association; Association for Professionals in Infection Control and Epidemiology (APIC); Centers for Disease Control and Prevention (CDC); and The Joint Commission.   This information is not intended to replace advice given to you by your health care provider. Make sure you discuss any questions you have with your health care provider.  Document Released: 12/23/2014 Document Revised: 05/25/2017 Document Reviewed: 03/02/2016  [employer] Patient Education © 2017 [employer]      Vancomycin oral solution  What is this medicine?  VANCOMYCIN (van yefri MYE sin) is a glycopeptide antibiotic. It is used to treat certain kinds of bacterial infections in the bowel. It will not work for colds, flu, or other  viral infections.  This medicine may be used for other purposes; ask your health care provider or pharmacist if you have questions.  COMMON BRAND NAME(S): Vancocin  What should I tell my health care provider before I take this medicine?  They need to know if you have any of these conditions:  -dehydration  -inflammatory bowel disease  -kidney disease  -other chronic illness  -an unusual or allergic reaction to vancomycin, other medicines, foods, dyes, or preservatives  -pregnant or trying to get pregnant  -breast-feeding  How should I use this medicine?  Take this medicine by mouth. Follow the directions on the prescription label. Shake well before using. Use a specially marked spoon or dropper to measure each dose. Ask your pharmacist if you do not have one. Household spoons are not accurate. Take your medicine at regular intervals. Do not take your medicine more often than directed. Take all of your medicine as directed even if you think you are better. Do not skip doses or stop your medicine early.  Talk to your pediatrician regarding the use of this medicine in children. Special care may be needed.  Overdosage: If you think you have taken too much of this medicine contact a poison control center or emergency room at once.  NOTE: This medicine is only for you. Do not share this medicine with others.  What if I miss a dose?  If you miss a dose, take it as soon as you can. If it is almost time for your next dose, take only that dose. Do not take double or extra doses.  What may interact with this medicine?  -birth control pills  -cholestyramine  -colestipol  -vancomycin injection  This list may not describe all possible interactions. Give your health care provider a list of all the medicines, herbs, non-prescription drugs, or dietary supplements you use. Also tell them if you smoke, drink alcohol, or use illegal drugs. Some items may interact with your medicine.  What should I watch for while using this  medicine?  Tell your doctor or health care professional if your symptoms do not improve or if you get new symptoms.  Avoid taking this medicine within 3 or 4 hours of taking cholestyramine or colestipol.  What side effects may I notice from receiving this medicine?  Side effects that you should report to your doctor or health care professional as soon as possible:  -allergic reactions like skin rash, itching or hives, swelling of the face, lips, or tongue  -breathing difficulty  -change in amount, color of urine  -change in hearing  -dizziness  -fever, infection  -redness, blistering, peeling or loosening of the skin, including inside the mouth  -unusual bleeding or bruising  -unusually weak or tired  Side effects that usually do not require medical attention (report to your doctor or health care professional if they continue or are bothersome):  -nausea, vomiting  -stomach cramps  This list may not describe all possible side effects. Call your doctor for medical advice about side effects. You may report side effects to FDA at 2-317-FDA-5732.  Where should I keep my medicine?  Keep out of the reach of children.  After this medicine is mixed by your pharmacist, store it in a refrigerator between 2 and 8 degrees C (36 and 46 degrees F). Do not freeze. Throw away any unused medicine after 14 days.  NOTE: This sheet is a summary. It may not cover all possible information. If you have questions about this medicine, talk to your doctor, pharmacist, or health care provider.  © 2018 Elsevier/Gold Standard (2014-07-25 14:46:53)      Discharge Instructions    Discharged to home by car with relative. Discharged via wheelchair, hospital escort: Yes.  Special equipment needed: Not Applicable    Be sure to schedule a follow-up appointment with your primary care doctor or any specialists as instructed.     Discharge Plan:   Diet Plan: Discussed  Activity Level: Discussed  Confirmed Follow up Appointment: Patient to Call and  Schedule Appointment  Confirmed Symptoms Management: Discussed  Medication Reconciliation Updated: Yes  Influenza Vaccine Indication: Not indicated: Previously immunized this influenza season and > 8 years of age    I understand that a diet low in cholesterol, fat, and sodium is recommended for good health. Unless I have been given specific instructions below for another diet, I accept this instruction as my diet prescription.   Other diet: regular    Special Instructions: None    · Is patient discharged on Warfarin / Coumadin?   No     Depression / Suicide Risk    As you are discharged from this [facility] facility, it is important to learn how to keep safe from harming yourself.    Recognize the warning signs:  · Abrupt changes in personality, positive or negative- including increase in energy   · Giving away possessions  · Change in eating patterns- significant weight changes-  positive or negative  · Change in sleeping patterns- unable to sleep or sleeping all the time   · Unwillingness or inability to communicate  · Depression  · Unusual sadness, discouragement and loneliness  · Talk of wanting to die  · Neglect of personal appearance   · Rebelliousness- reckless behavior  · Withdrawal from people/activities they love  · Confusion- inability to concentrate     If you or a loved one observes any of these behaviors or has concerns about self-harm, here's what you can do:  · Talk about it- your feelings and reasons for harming yourself  · Remove any means that you might use to hurt yourself (examples: pills, rope, extension cords, firearm)  · Get professional help from the community (Mental Health, Substance Abuse, psychological counseling)  · Do not be alone:Call your Safe Contact- someone whom you trust who will be there for you.  · Call your local CRISIS HOTLINE 044-5131 or 195-738-5362  · Call your local Children's Mobile Crisis Response Team [place] (734) 937-7045 or www.[employer]  · Call the  toll free National Suicide Prevention Hotlines   · National Suicide Prevention Lifeline 831-372-OVOR (4968)  · National Hope Line Network 800-SUICIDE (678-2235)

## 2019-09-30 NOTE — DISCHARGE PLANNING
Anticipated Discharge Disposition:   · Home with Home Health Care     Action:   · 1316: LSW notified attending, Dr. Hoang via TT of need for home health order and F2F  · Pt [facility] and voiced she would like to return home with agency services. CHOICE form completed and faxed to [facility] ext [phone].  Barriers to Discharge:   · No barriers identified at this time. Orders/F2F and CHOICE completed for referral to be sent.     Plan:   · Pt to discharge home, transportation provided by family.

## 2019-09-30 NOTE — FACE TO FACE
Face to Face Supporting Documentation - Home Health    The encounter with this patient was in whole or in part the primary reason for home health admission.    Date of encounter:   Patient:                    MRN:                       YOB: 2019  Gloria Patel  [id number]  7/9/1924     Home health to see patient for:  Skilled Nursing care for assessment, interventions & education, Registered dietitian consult, Medical social work consult, Home health aide, Physical Therapy evaluation and treatment and Occupational therapy evaluation and treatment    Skilled need for:  New Onset Medical Diagnosis CIDFF colitis    Skilled nursing interventions to include:  Comment: na    Homebound status evidenced by:  Need the aid of supportive devices such as crutches, canes, wheelchairs or walkers, Require the use of special transportation, Needs the assistance of another person in order to leave the home or Have a condition such that leaving his or her home is medically contraindicated. Leaving home requires a considerable and taxing effort. There is a normal inability to leave the home.    Community Physician to provide follow up care: Mike Otero M.D.     Optional Interventions? No      I certify the face to face encounter for this home health care referral meets the CMS requirements and the encounter/clinical assessment with the patient was, in whole, or in part, for the medical condition(s) listed above, which is the primary reason for home health care. Based on my clinical findings: the service(s) are medically necessary, support the need for home health care, and the homebound criteria are met.  I certify that this patient has had a face to face encounter by myself.  Aleisha Hoang M.D. - NPI: [id number]    [facility]

## 2019-09-30 NOTE — DISCHARGE SUMMARY
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  However during her eighth day of Augmentin course patient started developing abdominal pain, fevers and chills and watery profuse diarrhea.  On admission CT scan of her abdomen and pelvis was done which showed inflammation of her colon, subsequently ordered a C. difficile PCR which was positive.  As result patient was started on oral vancomycin.  Infectious disease was also consulted for antibiotic management.  Over the past 2 days patient has had a significant clinical improvement with resolution of her abdominal pain, in addition to her diarrhea is subsiding.  At this point patient will need to take a 14-day course of oral vancomycin.  I did tell her that if her diarrhea was to get worse to immediately consult the physician or come back to the emergency room.      Therefore, she is discharged in good and stable condition to home with close outpatient follow-up.    The patient met 2-midnight criteria for an inpatient stay at the time of discharge.    Discharge Date  9/30/2019    FOLLOW UP ITEMS POST DISCHARGE  PCP in 1 week    DISCHARGE DIAGNOSES  Principal Problem (Resolved):    Pancolitis (HCC) POA: Unknown  Active Problems:    Hypothyroid POA: Yes    GERD (gastroesophageal reflux disease) POA: Yes      Overview: Sees Dr. Hamilton    HTN (hypertension) POA: Yes    Renal cyst POA: Unknown  Resolved Problems:    Benign positional vertigo POA: Yes    Urinary tract infection due to ESBL Klebsiella POA:  Unknown      FOLLOW UP  Future Appointments   Date Time Provider Department Center   11/6/2019  9:30 AM Mike Otero M.D. [facility] None     No follow-up provider specified.    MEDICATIONS ON DISCHARGE     Medication List      START taking these medications      Instructions   vancomycin 50 mg/mL 50 MG/ML Soln oral solution   Take 2.5 mL by mouth every 6 hours for 13 days.  Dose:  125 mg        CONTINUE taking these medications      Instructions   amLODIPine 5 MG Tabs  Commonly known as:  NORVASC   TAKE 1 TABLET BY MOUTH EVERY DAY     aspirin 81 MG EC tablet   Take 81 mg by mouth every day.  Dose:  81 mg     levothyroxine 75 MCG Tabs  Commonly known as:  SYNTHROID   TAKE 1 TABLET BY MOUTH EVERY DAY     lisinopril 20 MG Tabs  Commonly known as:  PRINIVIL   TAKE HALF A TABLET BY MOUTH TWICE A DAY     meclizine 25 MG Tabs  Commonly known as:  ANTIVERT   Take 25 mg by mouth 3 times a day as needed.  Dose:  25 mg     mirtazapine 15 MG Tabs  Commonly known as:  REMERON   Take 0.5-1 Tabs by mouth every bedtime. As needed for insomnia  Dose:  7.5-15 mg     polyethylene glycol 3350 Powd  Commonly known as:  MIRALAX   Take 17 g by mouth every day. Indications: Constipation  Dose:  17 g        STOP taking these medications    amoxicillin-clavulanate 875-125 MG Tabs  Commonly known as:  AUGMENTIN     omeprazole 20 MG delayed-release capsule  Commonly known as:  PRILOSEC            Allergies  Allergies   Allergen Reactions   • Flagyl [Metronidazole Hcl]    • Keflex    • Morphine Anaphylaxis     anaphylaxis   • Sulfa Drugs Rash     rash       DIET  Orders Placed This Encounter   Procedures   • Diet Order Regular (no steak)     Standing Status:   Standing     Number of Occurrences:   1     Order Specific Question:   Diet:     Answer:   Regular [1]     Comments:   no steak       ACTIVITY  As tolerated.  Weight bearing as tolerated    CONSULTATIONS  ID     PROCEDURES  None    LABORATORY  Lab Results   Component Value Date     SODIUM 140 09/29/2019    POTASSIUM 4.3 09/29/2019    CHLORIDE 106 09/29/2019    CO2 21 09/29/2019    GLUCOSE 96 09/29/2019    BUN 10 09/29/2019    CREATININE 0.78 09/29/2019    CREATININE 0.99 01/09/2013    GLOMRATE >59 08/04/2010        Lab Results   Component Value Date    WBC 7.3 09/29/2019    WBC 7.8 01/09/2013    HEMOGLOBIN 12.5 09/29/2019    HEMATOCRIT 39.3 09/29/2019    PLATELETCT 214 09/29/2019        Total time of the discharge process exceeds 33 minutes.

## 2019-09-30 NOTE — DISCHARGE PLANNING
ATTN: Case Management  RE: Referral for Home Health    As of 9/30/19, we have accepted the Home Health referral for the patient listed above.    A [facility] clinician will be out to see the patient within 48 hours. If you have any questions or concerns regarding the patient’s transition to Home Health, please do not hesitate to contact us at [phone].      We look forward to collaborating with you,  [facility] – part of the [facility] Health Team

## 2019-10-01 ENCOUNTER — HOME CARE VISIT (OUTPATIENT)
Dept: HOME HEALTH SERVICES | Facility: HOME HEALTHCARE | Age: 84
End: 2019-10-01
Payer: MEDICARE

## 2019-10-01 ENCOUNTER — DOCUMENTATION (OUTPATIENT)
Dept: VASCULAR LAB | Facility: MEDICAL CENTER | Age: 84
End: 2019-10-01

## 2019-10-01 VITALS
OXYGEN SATURATION: 98 % | WEIGHT: 133 LBS | HEART RATE: 72 BPM | BODY MASS INDEX: 24.48 KG/M2 | TEMPERATURE: 98.2 F | SYSTOLIC BLOOD PRESSURE: 138 MMHG | DIASTOLIC BLOOD PRESSURE: 65 MMHG | HEIGHT: 62 IN | RESPIRATION RATE: 16 BRPM

## 2019-10-01 LAB
BACTERIA STL CULT: NORMAL
E COLI SXT1+2 STL IA: NORMAL
SIGNIFICANT IND 70042: NORMAL
SITE SITE: NORMAL
SOURCE SOURCE: NORMAL

## 2019-10-01 PROCEDURE — 665998 HH PPS REVENUE CREDIT

## 2019-10-01 PROCEDURE — 665999 HH PPS REVENUE DEBIT

## 2019-10-01 PROCEDURE — G0493 RN CARE EA 15 MIN HH/HOSPICE: HCPCS

## 2019-10-01 ASSESSMENT — ACTIVITIES OF DAILY LIVING (ADL)
TRANSPORTATION COMMENTS: FATIGUES EASILY
OASIS_M1830: 03

## 2019-10-01 ASSESSMENT — ENCOUNTER SYMPTOMS
SHORTNESS OF BREATH: T
ADDITIONAL INFORMATION: 0-7 PAIN RANGE
DIFFICULTY THINKING: 1
NAUSEA: DENIES
POOR JUDGMENT: 1
VOMITING: DENIES

## 2019-10-01 ASSESSMENT — PATIENT HEALTH QUESTIONNAIRE - PHQ9
1. LITTLE INTEREST OR PLEASURE IN DOING THINGS: 00
CLINICAL INTERPRETATION OF PHQ2 SCORE: 0
2. FEELING DOWN, DEPRESSED, IRRITABLE, OR HOPELESS: 00

## 2019-10-01 NOTE — PROGRESS NOTES
[facility]    Received referral from [facility] to review patient's medication list.    Med list reviewed and reconciled.  Allergies reviewed.    Yaritza Rand, CharbelD

## 2019-10-02 ENCOUNTER — HOME CARE VISIT (OUTPATIENT)
Dept: HOME HEALTH SERVICES | Facility: HOME HEALTHCARE | Age: 84
End: 2019-10-02
Payer: MEDICARE

## 2019-10-02 ENCOUNTER — TELEPHONE (OUTPATIENT)
Dept: INTERNAL MEDICINE | Facility: IMAGING CENTER | Age: 84
End: 2019-10-02

## 2019-10-02 PROCEDURE — 665998 HH PPS REVENUE CREDIT

## 2019-10-02 PROCEDURE — 665999 HH PPS REVENUE DEBIT

## 2019-10-02 PROCEDURE — G0493 RN CARE EA 15 MIN HH/HOSPICE: HCPCS

## 2019-10-03 ENCOUNTER — HOME CARE VISIT (OUTPATIENT)
Dept: HOME HEALTH SERVICES | Facility: HOME HEALTHCARE | Age: 84
End: 2019-10-03
Payer: MEDICARE

## 2019-10-03 VITALS
SYSTOLIC BLOOD PRESSURE: 132 MMHG | DIASTOLIC BLOOD PRESSURE: 78 MMHG | TEMPERATURE: 97.6 F | HEART RATE: 74 BPM | RESPIRATION RATE: 16 BRPM | OXYGEN SATURATION: 97 %

## 2019-10-03 PROCEDURE — G0151 HHCP-SERV OF PT,EA 15 MIN: HCPCS

## 2019-10-03 PROCEDURE — 665998 HH PPS REVENUE CREDIT

## 2019-10-03 PROCEDURE — 665999 HH PPS REVENUE DEBIT

## 2019-10-03 ASSESSMENT — ENCOUNTER SYMPTOMS
NAUSEA: DENIES
VOMITING: DENIES

## 2019-10-04 ENCOUNTER — HOME CARE VISIT (OUTPATIENT)
Dept: HOME HEALTH SERVICES | Facility: HOME HEALTHCARE | Age: 84
End: 2019-10-04
Payer: MEDICARE

## 2019-10-04 VITALS
DIASTOLIC BLOOD PRESSURE: 80 MMHG | OXYGEN SATURATION: 95 % | HEART RATE: 65 BPM | TEMPERATURE: 99.3 F | SYSTOLIC BLOOD PRESSURE: 160 MMHG | RESPIRATION RATE: 24 BRPM

## 2019-10-04 PROCEDURE — 665998 HH PPS REVENUE CREDIT

## 2019-10-04 PROCEDURE — 665999 HH PPS REVENUE DEBIT

## 2019-10-04 PROCEDURE — G0299 HHS/HOSPICE OF RN EA 15 MIN: HCPCS

## 2019-10-04 SDOH — ECONOMIC STABILITY: HOUSING INSECURITY
HOME SAFETY: PT LIVES ALONE WITH CAREGIVER AND FAMILY ASSISTANCE. PT IS PLANNING TO TRANSITION TO THE FOUNTAINS ON 10/11.

## 2019-10-04 ASSESSMENT — ACTIVITIES OF DAILY LIVING (ADL): TRANSPORTATION COMMENTS: REQUIRES ASSISTANCE TO LEAVE THE HOME

## 2019-10-05 PROCEDURE — 665999 HH PPS REVENUE DEBIT

## 2019-10-05 PROCEDURE — 665998 HH PPS REVENUE CREDIT

## 2019-10-06 PROCEDURE — 665998 HH PPS REVENUE CREDIT

## 2019-10-06 PROCEDURE — 665999 HH PPS REVENUE DEBIT

## 2019-10-07 ENCOUNTER — HOME CARE VISIT (OUTPATIENT)
Dept: HOME HEALTH SERVICES | Facility: HOME HEALTHCARE | Age: 84
End: 2019-10-07
Payer: MEDICARE

## 2019-10-07 VITALS
DIASTOLIC BLOOD PRESSURE: 70 MMHG | OXYGEN SATURATION: 95 % | RESPIRATION RATE: 18 BRPM | SYSTOLIC BLOOD PRESSURE: 122 MMHG | HEART RATE: 62 BPM | TEMPERATURE: 97.7 F

## 2019-10-07 PROCEDURE — 665998 HH PPS REVENUE CREDIT

## 2019-10-07 PROCEDURE — G0493 RN CARE EA 15 MIN HH/HOSPICE: HCPCS

## 2019-10-07 PROCEDURE — G0151 HHCP-SERV OF PT,EA 15 MIN: HCPCS

## 2019-10-07 PROCEDURE — 665999 HH PPS REVENUE DEBIT

## 2019-10-07 ASSESSMENT — ENCOUNTER SYMPTOMS
VOMITING: DENIES
NAUSEA: DENIES

## 2019-10-08 ENCOUNTER — HOME CARE VISIT (OUTPATIENT)
Dept: HOME HEALTH SERVICES | Facility: HOME HEALTHCARE | Age: 84
End: 2019-10-08
Payer: MEDICARE

## 2019-10-08 VITALS
OXYGEN SATURATION: 95 % | DIASTOLIC BLOOD PRESSURE: 70 MMHG | RESPIRATION RATE: 18 BRPM | TEMPERATURE: 97.7 F | SYSTOLIC BLOOD PRESSURE: 122 MMHG | HEART RATE: 62 BPM

## 2019-10-08 PROCEDURE — G0151 HHCP-SERV OF PT,EA 15 MIN: HCPCS

## 2019-10-08 PROCEDURE — 665998 HH PPS REVENUE CREDIT

## 2019-10-08 PROCEDURE — 665999 HH PPS REVENUE DEBIT

## 2019-10-08 ASSESSMENT — ACTIVITIES OF DAILY LIVING (ADL): TRANSPORTATION COMMENTS: REQUIRES ASSISTANCE TO LEAVE THE HOME

## 2019-10-09 VITALS
SYSTOLIC BLOOD PRESSURE: 130 MMHG | RESPIRATION RATE: 18 BRPM | TEMPERATURE: 98.4 F | DIASTOLIC BLOOD PRESSURE: 82 MMHG | HEART RATE: 64 BPM | OXYGEN SATURATION: 96 %

## 2019-10-09 VITALS
HEART RATE: 71 BPM | RESPIRATION RATE: 18 BRPM | DIASTOLIC BLOOD PRESSURE: 60 MMHG | TEMPERATURE: 98.2 F | OXYGEN SATURATION: 98 % | SYSTOLIC BLOOD PRESSURE: 110 MMHG

## 2019-10-09 PROCEDURE — 665999 HH PPS REVENUE DEBIT

## 2019-10-09 PROCEDURE — 665998 HH PPS REVENUE CREDIT

## 2019-10-09 ASSESSMENT — ACTIVITIES OF DAILY LIVING (ADL): TRANSPORTATION COMMENTS: REQUIRES ASSISTANCE TO LEAVE THE HOME

## 2019-10-10 ENCOUNTER — HOME CARE VISIT (OUTPATIENT)
Dept: HOME HEALTH SERVICES | Facility: HOME HEALTHCARE | Age: 84
End: 2019-10-10
Payer: MEDICARE

## 2019-10-10 PROCEDURE — 665998 HH PPS REVENUE CREDIT

## 2019-10-10 PROCEDURE — G0299 HHS/HOSPICE OF RN EA 15 MIN: HCPCS

## 2019-10-10 PROCEDURE — 665999 HH PPS REVENUE DEBIT

## 2019-10-11 VITALS
HEART RATE: 63 BPM | OXYGEN SATURATION: 98 % | DIASTOLIC BLOOD PRESSURE: 70 MMHG | TEMPERATURE: 98 F | RESPIRATION RATE: 18 BRPM | SYSTOLIC BLOOD PRESSURE: 110 MMHG

## 2019-10-11 PROCEDURE — 665998 HH PPS REVENUE CREDIT

## 2019-10-11 PROCEDURE — 665999 HH PPS REVENUE DEBIT

## 2019-10-11 ASSESSMENT — ENCOUNTER SYMPTOMS
DEBILITATING PAIN: 1
NAUSEA: OCCASIONAL

## 2019-10-12 PROCEDURE — 665999 HH PPS REVENUE DEBIT

## 2019-10-12 PROCEDURE — 665998 HH PPS REVENUE CREDIT

## 2019-10-13 DIAGNOSIS — G47.00 INSOMNIA, UNSPECIFIED TYPE: ICD-10-CM

## 2019-10-13 PROCEDURE — 665998 HH PPS REVENUE CREDIT

## 2019-10-13 PROCEDURE — 665999 HH PPS REVENUE DEBIT

## 2019-10-14 ENCOUNTER — HOME CARE VISIT (OUTPATIENT)
Dept: HOME HEALTH SERVICES | Facility: HOME HEALTHCARE | Age: 84
End: 2019-10-14
Payer: MEDICARE

## 2019-10-14 VITALS
TEMPERATURE: 98.1 F | RESPIRATION RATE: 18 BRPM | OXYGEN SATURATION: 97 % | SYSTOLIC BLOOD PRESSURE: 130 MMHG | HEART RATE: 65 BPM | DIASTOLIC BLOOD PRESSURE: 68 MMHG

## 2019-10-14 DIAGNOSIS — R30.0 DYSURIA: ICD-10-CM

## 2019-10-14 DIAGNOSIS — R19.7 DIARRHEA OF PRESUMED INFECTIOUS ORIGIN: ICD-10-CM

## 2019-10-14 DIAGNOSIS — R35.0 URINARY FREQUENCY: ICD-10-CM

## 2019-10-14 PROCEDURE — G0151 HHCP-SERV OF PT,EA 15 MIN: HCPCS

## 2019-10-14 PROCEDURE — 665999 HH PPS REVENUE DEBIT

## 2019-10-14 PROCEDURE — 665998 HH PPS REVENUE CREDIT

## 2019-10-14 RX ORDER — TRAZODONE HYDROCHLORIDE 50 MG/1
25 TABLET ORAL
Qty: 45 TAB | Refills: 1 | Status: SHIPPED | OUTPATIENT
Start: 2019-10-14 | End: 2019-11-11

## 2019-10-15 ENCOUNTER — TELEPHONE (OUTPATIENT)
Dept: INTERNAL MEDICINE | Facility: IMAGING CENTER | Age: 84
End: 2019-10-15

## 2019-10-15 ENCOUNTER — HOSPITAL ENCOUNTER (OUTPATIENT)
Facility: MEDICAL CENTER | Age: 84
End: 2019-10-15
Attending: INTERNAL MEDICINE
Payer: MEDICARE

## 2019-10-15 ENCOUNTER — HOME CARE VISIT (OUTPATIENT)
Dept: HOME HEALTH SERVICES | Facility: HOME HEALTHCARE | Age: 84
End: 2019-10-15
Payer: MEDICARE

## 2019-10-15 DIAGNOSIS — R11.2 NAUSEA & VOMITING: ICD-10-CM

## 2019-10-15 DIAGNOSIS — R35.0 URINARY FREQUENCY: ICD-10-CM

## 2019-10-15 DIAGNOSIS — R30.0 DYSURIA: ICD-10-CM

## 2019-10-15 DIAGNOSIS — R19.7 DIARRHEA OF PRESUMED INFECTIOUS ORIGIN: ICD-10-CM

## 2019-10-15 DIAGNOSIS — R11.2 NAUSEA AND VOMITING, INTRACTABILITY OF VOMITING NOT SPECIFIED, UNSPECIFIED VOMITING TYPE: ICD-10-CM

## 2019-10-15 LAB
APPEARANCE UR: ABNORMAL
BACTERIA #/AREA URNS HPF: ABNORMAL /HPF
BILIRUB UR QL STRIP.AUTO: NEGATIVE
C DIFF DNA SPEC QL NAA+PROBE: NEGATIVE
C DIFF TOX GENS STL QL NAA+PROBE: NEGATIVE
COLOR UR: YELLOW
EPI CELLS #/AREA URNS HPF: NEGATIVE /HPF
GLUCOSE UR STRIP.AUTO-MCNC: NEGATIVE MG/DL
HYALINE CASTS #/AREA URNS LPF: ABNORMAL /LPF
KETONES UR STRIP.AUTO-MCNC: NEGATIVE MG/DL
LEUKOCYTE ESTERASE UR QL STRIP.AUTO: ABNORMAL
MICRO URNS: ABNORMAL
NITRITE UR QL STRIP.AUTO: POSITIVE
PH UR STRIP.AUTO: 6.5 [PH] (ref 5–8)
PROT UR QL STRIP: NEGATIVE MG/DL
RBC # URNS HPF: ABNORMAL /HPF
RBC UR QL AUTO: ABNORMAL
SP GR UR STRIP.AUTO: 1.01
UROBILINOGEN UR STRIP.AUTO-MCNC: 0.2 MG/DL
WBC #/AREA URNS HPF: ABNORMAL /HPF

## 2019-10-15 PROCEDURE — 87186 SC STD MICRODIL/AGAR DIL: CPT

## 2019-10-15 PROCEDURE — 665999 HH PPS REVENUE DEBIT

## 2019-10-15 PROCEDURE — 665998 HH PPS REVENUE CREDIT

## 2019-10-15 PROCEDURE — 87086 URINE CULTURE/COLONY COUNT: CPT

## 2019-10-15 PROCEDURE — G0299 HHS/HOSPICE OF RN EA 15 MIN: HCPCS

## 2019-10-15 PROCEDURE — 81001 URINALYSIS AUTO W/SCOPE: CPT

## 2019-10-15 PROCEDURE — 87077 CULTURE AEROBIC IDENTIFY: CPT

## 2019-10-15 PROCEDURE — 87493 C DIFF AMPLIFIED PROBE: CPT

## 2019-10-15 RX ORDER — ONDANSETRON 4 MG/1
4 TABLET, ORALLY DISINTEGRATING ORAL EVERY 8 HOURS
Qty: 10 TAB | Refills: 0 | Status: SHIPPED | OUTPATIENT
Start: 2019-10-15 | End: 2019-11-11

## 2019-10-16 ENCOUNTER — HOME CARE VISIT (OUTPATIENT)
Dept: HOME HEALTH SERVICES | Facility: HOME HEALTHCARE | Age: 84
End: 2019-10-16
Payer: MEDICARE

## 2019-10-16 VITALS
RESPIRATION RATE: 17 BRPM | DIASTOLIC BLOOD PRESSURE: 60 MMHG | HEART RATE: 68 BPM | TEMPERATURE: 98.4 F | SYSTOLIC BLOOD PRESSURE: 110 MMHG | OXYGEN SATURATION: 95 %

## 2019-10-16 PROCEDURE — G0151 HHCP-SERV OF PT,EA 15 MIN: HCPCS

## 2019-10-16 PROCEDURE — 665998 HH PPS REVENUE CREDIT

## 2019-10-16 PROCEDURE — 665999 HH PPS REVENUE DEBIT

## 2019-10-17 ENCOUNTER — HOME CARE VISIT (OUTPATIENT)
Dept: HOME HEALTH SERVICES | Facility: HOME HEALTHCARE | Age: 84
End: 2019-10-17
Payer: MEDICARE

## 2019-10-17 PROCEDURE — 665999 HH PPS REVENUE DEBIT

## 2019-10-17 PROCEDURE — 665998 HH PPS REVENUE CREDIT

## 2019-10-18 ENCOUNTER — TELEPHONE (OUTPATIENT)
Dept: INTERNAL MEDICINE | Facility: IMAGING CENTER | Age: 84
End: 2019-10-18

## 2019-10-18 ENCOUNTER — OFFICE VISIT (OUTPATIENT)
Dept: INTERNAL MEDICINE | Facility: IMAGING CENTER | Age: 84
End: 2019-10-18
Payer: MEDICARE

## 2019-10-18 VITALS
DIASTOLIC BLOOD PRESSURE: 70 MMHG | TEMPERATURE: 98 F | RESPIRATION RATE: 15 BRPM | OXYGEN SATURATION: 96 % | HEART RATE: 80 BPM | SYSTOLIC BLOOD PRESSURE: 110 MMHG

## 2019-10-18 VITALS
DIASTOLIC BLOOD PRESSURE: 70 MMHG | TEMPERATURE: 98.1 F | OXYGEN SATURATION: 94 % | HEIGHT: 62 IN | BODY MASS INDEX: 24.29 KG/M2 | RESPIRATION RATE: 12 BRPM | WEIGHT: 132 LBS | HEART RATE: 77 BPM | SYSTOLIC BLOOD PRESSURE: 110 MMHG

## 2019-10-18 DIAGNOSIS — R30.0 DYSURIA: ICD-10-CM

## 2019-10-18 DIAGNOSIS — E03.9 HYPOTHYROIDISM, UNSPECIFIED TYPE: ICD-10-CM

## 2019-10-18 DIAGNOSIS — K21.9 GASTROESOPHAGEAL REFLUX DISEASE WITHOUT ESOPHAGITIS: ICD-10-CM

## 2019-10-18 DIAGNOSIS — I10 ESSENTIAL HYPERTENSION: ICD-10-CM

## 2019-10-18 DIAGNOSIS — R11.2 NON-INTRACTABLE VOMITING WITH NAUSEA, UNSPECIFIED VOMITING TYPE: ICD-10-CM

## 2019-10-18 DIAGNOSIS — G47.00 INSOMNIA, UNSPECIFIED TYPE: ICD-10-CM

## 2019-10-18 DIAGNOSIS — A04.72 C. DIFFICILE COLITIS: ICD-10-CM

## 2019-10-18 DIAGNOSIS — F41.1 GENERALIZED ANXIETY DISORDER: ICD-10-CM

## 2019-10-18 PROCEDURE — 99214 OFFICE O/P EST MOD 30 MIN: CPT | Performed by: INTERNAL MEDICINE

## 2019-10-18 PROCEDURE — 665999 HH PPS REVENUE DEBIT

## 2019-10-18 PROCEDURE — 665998 HH PPS REVENUE CREDIT

## 2019-10-18 RX ORDER — CIPROFLOXACIN 250 MG/1
250 TABLET, FILM COATED ORAL 2 TIMES DAILY
Qty: 14 TAB | Refills: 0 | Status: SHIPPED | OUTPATIENT
Start: 2019-10-18 | End: 2019-10-22 | Stop reason: SDUPTHER

## 2019-10-18 RX ORDER — FLUTICASONE PROPIONATE 50 MCG
SPRAY, SUSPENSION (ML) NASAL
Refills: 3 | COMMUNITY
Start: 2019-09-24 | End: 2020-01-27 | Stop reason: SDUPTHER

## 2019-10-18 ASSESSMENT — ENCOUNTER SYMPTOMS
DEPRESSED MOOD: 1
DEBILITATING PAIN: 1

## 2019-10-18 NOTE — PROGRESS NOTES
Chief Complaint   Patient presents with   • Nausea     Patient recently treated for C. difficile colitis.  She is off omeprazole and experiencing nausea       HISTORY OF THE PRESENT ILLNESS: Patient is a 89 y.o. female.     Patient comes in after recent hospitalization for diverticulitis.  Post treatment she developed C. difficile colitis which resulted in a second hospitalization.  She is completed 2 courses of vancomycin.  Her bowel movements are solid.  She is expensing some constipation issues.  No fever chills.  Her repeat stool test was negative for C. difficile.    She complains primarily of nausea.  She has a history of gastritis.  She has done well on omeprazole but we have avoided resuming the medication due to increased risk of C. difficile colitis.  The family did not understand the directions regarding the use of Pepcid and carbonation with Tums.  They have only been giving it to her PRN instead of twice daily as recommended.  She is tried Zofran for the nausea without benefit.    She complains of dysuria.  Her recent urine analysis showed gram-negative rods.  The culture is pending.      Her appetite is poor and she has lost weight.  This is primarily due to the nausea described above.    She is back on sertraline.  Her family feels it is helped with anxiety.  She has not required mirtazapine for sleep.  The trazodone has been discontinued due to morning anxiety.    We reviewed her regular medications for thyroid and blood pressure.  She has stayed on the medication as prescribed.  Her blood pressure is appropriate today.    Allergies: Flagyl [metronidazole hcl]; Keflex; Morphine; and Sulfa drugs    Current Outpatient Medications Ordered in Epic   Medication Sig Dispense Refill   • fluticasone (FLONASE) 50 MCG/ACT nasal spray USE ONE SPRAY EACH NOSTRIL TWO TIMES PER DAY.  3   • sertraline (ZOLOFT) 25 MG tablet 50 mg.   1/2 tablet morning for 1 week then increase to full tablet     • ondansetron (ZOFRAN  "ODT) 4 MG TABLET DISPERSIBLE Take 1 Tab by mouth every 8 hours. AS NEEDED FOR NAUSEA/VOMITING. 10 Tab 0   • traZODone (DESYREL) 50 MG Tab TAKE 0.5 TABS BY MOUTH EVERY BEDTIME. 45 Tab 1   • Lactobacillus (PROBIOTIC ACIDOPHILUS) Cap Take 1 Cap by mouth every day at 6 PM. Indications: supplement     • Acetaminophen (ACETAMINOPHEN EXTRA STRENGTH) 500 MG Cap Take 500-1,000 mg by mouth 4 times a day as needed (pain).     • travoprost (TRAVATAN Z) 0.004 % Solution Place 1 Drop in both eyes every day at 6 PM. Indications: Wide-Angle Glaucoma     • meclizine (ANTIVERT) 25 MG Tab Take 25 mg by mouth 3 times a day as needed.     • mirtazapine (REMERON) 15 MG Tab Take 0.5-1 Tabs by mouth every bedtime. As needed for insomnia 30 Tab 3   • lisinopril (PRINIVIL) 20 MG Tab TAKE HALF A TABLET BY MOUTH TWICE A DAY 90 Tab 3   • amLODIPine (NORVASC) 5 MG Tab TAKE 1 TABLET BY MOUTH EVERY DAY 90 Tab 3   • aspirin 81 MG EC tablet Take 81 mg by mouth every day.     • polyethylene glycol 3350 (MIRALAX) Powder Take 17 g by mouth every day. Indications: Constipation     • levothyroxine (SYNTHROID) 75 MCG Tab TAKE 1 TABLET BY MOUTH EVERY DAY 90 Tab 3     No current Epic-ordered facility-administered medications on file.        Past medical history, social history and family history were reviewed from chart today    Review of systems: Per HPI.    Denies headache, chest pain, fever, chills, diarrhea,  palpitations, depression   All others negative.     Exam: /70 (BP Location: Right arm, Patient Position: Sitting, BP Cuff Size: Adult)   Pulse 77   Temp 36.7 °C (98.1 °F) (Temporal)   Resp 12   Ht 1.575 m (5' 2\")   Wt 59.9 kg (132 lb)   SpO2 94%   General: Well-appearing. Well-developed. No signs of distress.  HEENT: Grossly normal. Oral cavity is pink and moist.  Neck: Supple without JVD or bruit.  Pulmonary: Clear with good breath sounds. Normal effort.  Cardiovascular: Regular. Carotid and radial pulses are intact.  Abdomen: Mild " right lower quadrant tenderness otherwise soft, nontender, nondistended. Spleen and liver are not enlarged.  Neurologic: Cranial nerves II through XII are grossly normal, alert and oriented x3      Diagnosis:  1. Non-intractable vomiting with nausea, unspecified vomiting type     2. Dysuria     3. Gastroesophageal reflux disease without esophagitis     4. Insomnia, unspecified type     5. Essential hypertension     6. Generalized anxiety disorder     7. Hypothyroidism, unspecified type     8. C. difficile colitis         Plan:  Pepcid 20 mg with 2 Tums twice daily  Discontinue Zofran  Await culture results on urine, treat appropriately.  If she does not respond to the Pepcid resume omeprazole.  Discontinue trazodone from pharmacy  Trial of mirtazapine if needed but currently she has been doing well.  She is also tolerating the low-dose sertraline at 12.5 mg.  Once the GI issues have resolved we may consider increasing to 25 mg.  No change on the medications.

## 2019-10-19 PROCEDURE — 665998 HH PPS REVENUE CREDIT

## 2019-10-19 PROCEDURE — 665999 HH PPS REVENUE DEBIT

## 2019-10-20 PROCEDURE — 665999 HH PPS REVENUE DEBIT

## 2019-10-20 PROCEDURE — 665998 HH PPS REVENUE CREDIT

## 2019-10-21 ENCOUNTER — HOME CARE VISIT (OUTPATIENT)
Dept: HOME HEALTH SERVICES | Facility: HOME HEALTHCARE | Age: 84
End: 2019-10-21

## 2019-10-21 ENCOUNTER — HOME CARE VISIT (OUTPATIENT)
Dept: HOME HEALTH SERVICES | Facility: HOME HEALTHCARE | Age: 84
End: 2019-10-21
Payer: MEDICARE

## 2019-10-21 PROCEDURE — 665999 HH PPS REVENUE DEBIT

## 2019-10-21 PROCEDURE — 665998 HH PPS REVENUE CREDIT

## 2019-10-21 PROCEDURE — G0493 RN CARE EA 15 MIN HH/HOSPICE: HCPCS

## 2019-10-22 ENCOUNTER — HOSPITAL ENCOUNTER (OUTPATIENT)
Facility: MEDICAL CENTER | Age: 84
End: 2019-10-22
Attending: INTERNAL MEDICINE
Payer: MEDICARE

## 2019-10-22 ENCOUNTER — TELEPHONE (OUTPATIENT)
Dept: INTERNAL MEDICINE | Facility: IMAGING CENTER | Age: 84
End: 2019-10-22

## 2019-10-22 VITALS
OXYGEN SATURATION: 96 % | RESPIRATION RATE: 18 BRPM | HEART RATE: 62 BPM | SYSTOLIC BLOOD PRESSURE: 118 MMHG | DIASTOLIC BLOOD PRESSURE: 70 MMHG | TEMPERATURE: 97.8 F

## 2019-10-22 DIAGNOSIS — R19.7 DIARRHEA OF PRESUMED INFECTIOUS ORIGIN: ICD-10-CM

## 2019-10-22 PROCEDURE — 665998 HH PPS REVENUE CREDIT

## 2019-10-22 PROCEDURE — 665999 HH PPS REVENUE DEBIT

## 2019-10-22 PROCEDURE — 87493 C DIFF AMPLIFIED PROBE: CPT

## 2019-10-22 RX ORDER — CIPROFLOXACIN 250 MG/1
250 TABLET, FILM COATED ORAL 2 TIMES DAILY
Qty: 14 TAB | Refills: 0 | Status: SHIPPED | OUTPATIENT
Start: 2019-10-22 | End: 2019-10-24

## 2019-10-22 ASSESSMENT — ENCOUNTER SYMPTOMS
VOMITING: DENIES
NAUSEA: DENIES

## 2019-10-22 NOTE — TELEPHONE ENCOUNTER
Caregiver Peyton calls to report that Gloria has severe diarrhea x 1 day.  Dr Otero informed.  The following instructions were given.  Stool sample for c-diff, hold miralax, continue Imodium AD, stop cipro.  If c-diff culture come back positive, vancomycin sent to pharmacy, recommend holding off picking it up until culture final report is received.

## 2019-10-23 ENCOUNTER — TELEPHONE (OUTPATIENT)
Dept: INTERNAL MEDICINE | Facility: IMAGING CENTER | Age: 84
End: 2019-10-23

## 2019-10-23 DIAGNOSIS — R19.7 DIARRHEA OF PRESUMED INFECTIOUS ORIGIN: ICD-10-CM

## 2019-10-23 LAB
C DIFF DNA SPEC QL NAA+PROBE: NEGATIVE
C DIFF TOX GENS STL QL NAA+PROBE: NEGATIVE

## 2019-10-23 PROCEDURE — 665998 HH PPS REVENUE CREDIT

## 2019-10-23 PROCEDURE — 665999 HH PPS REVENUE DEBIT

## 2019-10-23 RX ORDER — SERTRALINE HYDROCHLORIDE 25 MG/1
25 TABLET, FILM COATED ORAL DAILY
Qty: 90 TAB | Refills: 3 | Status: SHIPPED | OUTPATIENT
Start: 2019-10-23 | End: 2020-04-27 | Stop reason: SDUPTHER

## 2019-10-24 ENCOUNTER — TELEPHONE (OUTPATIENT)
Dept: INTERNAL MEDICINE | Facility: IMAGING CENTER | Age: 84
End: 2019-10-24

## 2019-10-24 ENCOUNTER — HOME CARE VISIT (OUTPATIENT)
Dept: HOME HEALTH SERVICES | Facility: HOME HEALTHCARE | Age: 84
End: 2019-10-24
Payer: MEDICARE

## 2019-10-24 VITALS
DIASTOLIC BLOOD PRESSURE: 68 MMHG | SYSTOLIC BLOOD PRESSURE: 122 MMHG | TEMPERATURE: 97.8 F | HEART RATE: 63 BPM | OXYGEN SATURATION: 98 % | RESPIRATION RATE: 18 BRPM

## 2019-10-24 PROCEDURE — 665999 HH PPS REVENUE DEBIT

## 2019-10-24 PROCEDURE — G0493 RN CARE EA 15 MIN HH/HOSPICE: HCPCS

## 2019-10-24 PROCEDURE — 665998 HH PPS REVENUE CREDIT

## 2019-10-24 ASSESSMENT — ACTIVITIES OF DAILY LIVING (ADL): OASIS_M1830: 03

## 2019-10-24 ASSESSMENT — ENCOUNTER SYMPTOMS
VOMITING: DENIES
NAUSEA: DENIES

## 2019-10-24 ASSESSMENT — PATIENT HEALTH QUESTIONNAIRE - PHQ9: CLINICAL INTERPRETATION OF PHQ2 SCORE: 0

## 2019-10-25 PROCEDURE — 665998 HH PPS REVENUE CREDIT

## 2019-10-25 PROCEDURE — 665999 HH PPS REVENUE DEBIT

## 2019-10-26 PROCEDURE — 665999 HH PPS REVENUE DEBIT

## 2019-10-26 PROCEDURE — 665998 HH PPS REVENUE CREDIT

## 2019-10-26 PROCEDURE — 665997 HH PPS REVENUE ADJ

## 2019-10-27 PROCEDURE — 665999 HH PPS REVENUE DEBIT

## 2019-10-27 PROCEDURE — 665998 HH PPS REVENUE CREDIT

## 2019-10-28 PROCEDURE — 665998 HH PPS REVENUE CREDIT

## 2019-10-28 PROCEDURE — 665999 HH PPS REVENUE DEBIT

## 2019-10-29 ENCOUNTER — HOME CARE VISIT (OUTPATIENT)
Dept: HOME HEALTH SERVICES | Facility: HOME HEALTHCARE | Age: 84
End: 2019-10-29
Payer: MEDICARE

## 2019-10-29 VITALS
TEMPERATURE: 97.8 F | SYSTOLIC BLOOD PRESSURE: 124 MMHG | HEART RATE: 64 BPM | OXYGEN SATURATION: 92 % | DIASTOLIC BLOOD PRESSURE: 68 MMHG | RESPIRATION RATE: 16 BRPM

## 2019-10-29 PROCEDURE — 665998 HH PPS REVENUE CREDIT

## 2019-10-29 PROCEDURE — 665003 FOLLOW UP-HOME HEALTH

## 2019-10-29 PROCEDURE — 665999 HH PPS REVENUE DEBIT

## 2019-10-29 PROCEDURE — G0493 RN CARE EA 15 MIN HH/HOSPICE: HCPCS

## 2019-10-29 ASSESSMENT — ENCOUNTER SYMPTOMS
VOMITING: DENIES
NAUSEA: DENIES

## 2019-10-30 PROCEDURE — 665998 HH PPS REVENUE CREDIT

## 2019-10-30 PROCEDURE — 665999 HH PPS REVENUE DEBIT

## 2019-10-31 PROCEDURE — 665999 HH PPS REVENUE DEBIT

## 2019-10-31 PROCEDURE — 665998 HH PPS REVENUE CREDIT

## 2019-11-01 ENCOUNTER — HOME CARE VISIT (OUTPATIENT)
Dept: HOME HEALTH SERVICES | Facility: HOME HEALTHCARE | Age: 84
End: 2019-11-01
Payer: MEDICARE

## 2019-11-01 VITALS
DIASTOLIC BLOOD PRESSURE: 78 MMHG | RESPIRATION RATE: 16 BRPM | HEART RATE: 63 BPM | TEMPERATURE: 98.1 F | OXYGEN SATURATION: 96 % | SYSTOLIC BLOOD PRESSURE: 122 MMHG

## 2019-11-01 PROCEDURE — G0493 RN CARE EA 15 MIN HH/HOSPICE: HCPCS

## 2019-11-01 PROCEDURE — 665998 HH PPS REVENUE CREDIT

## 2019-11-01 PROCEDURE — 665999 HH PPS REVENUE DEBIT

## 2019-11-01 PROCEDURE — 665997 HH PPS REVENUE ADJ

## 2019-11-01 ASSESSMENT — ACTIVITIES OF DAILY LIVING (ADL)
HOME_HEALTH_OASIS: 01
OASIS_M1830: 02

## 2019-11-01 ASSESSMENT — PATIENT HEALTH QUESTIONNAIRE - PHQ9: CLINICAL INTERPRETATION OF PHQ2 SCORE: 0

## 2019-11-02 PROCEDURE — 665999 HH PPS REVENUE DEBIT

## 2019-11-02 PROCEDURE — 665998 HH PPS REVENUE CREDIT

## 2019-11-03 PROCEDURE — 665998 HH PPS REVENUE CREDIT

## 2019-11-03 PROCEDURE — 665999 HH PPS REVENUE DEBIT

## 2019-11-04 PROCEDURE — 665999 HH PPS REVENUE DEBIT

## 2019-11-04 PROCEDURE — 665998 HH PPS REVENUE CREDIT

## 2019-11-05 PROCEDURE — 665998 HH PPS REVENUE CREDIT

## 2019-11-05 PROCEDURE — G0179 MD RECERTIFICATION HHA PT: HCPCS | Performed by: INTERNAL MEDICINE

## 2019-11-05 PROCEDURE — 665999 HH PPS REVENUE DEBIT

## 2019-11-06 PROCEDURE — 665998 HH PPS REVENUE CREDIT

## 2019-11-06 PROCEDURE — 665999 HH PPS REVENUE DEBIT

## 2019-11-07 PROCEDURE — 665998 HH PPS REVENUE CREDIT

## 2019-11-07 PROCEDURE — 665999 HH PPS REVENUE DEBIT

## 2019-11-08 PROCEDURE — 665998 HH PPS REVENUE CREDIT

## 2019-11-08 PROCEDURE — 665999 HH PPS REVENUE DEBIT

## 2019-11-09 PROCEDURE — 665999 HH PPS REVENUE DEBIT

## 2019-11-09 PROCEDURE — 665998 HH PPS REVENUE CREDIT

## 2019-11-10 PROCEDURE — 665998 HH PPS REVENUE CREDIT

## 2019-11-10 PROCEDURE — 665999 HH PPS REVENUE DEBIT

## 2019-11-11 ENCOUNTER — OFFICE VISIT (OUTPATIENT)
Dept: INTERNAL MEDICINE | Facility: IMAGING CENTER | Age: 84
End: 2019-11-11
Payer: MEDICARE

## 2019-11-11 ENCOUNTER — HOSPITAL ENCOUNTER (OUTPATIENT)
Facility: MEDICAL CENTER | Age: 84
End: 2019-11-11
Attending: INTERNAL MEDICINE
Payer: MEDICARE

## 2019-11-11 VITALS
RESPIRATION RATE: 16 BRPM | DIASTOLIC BLOOD PRESSURE: 60 MMHG | WEIGHT: 134 LBS | HEIGHT: 62 IN | BODY MASS INDEX: 24.66 KG/M2 | SYSTOLIC BLOOD PRESSURE: 120 MMHG | TEMPERATURE: 97.8 F | HEART RATE: 92 BPM | OXYGEN SATURATION: 93 %

## 2019-11-11 DIAGNOSIS — R30.0 DYSURIA: ICD-10-CM

## 2019-11-11 DIAGNOSIS — K21.9 GASTROESOPHAGEAL REFLUX DISEASE WITHOUT ESOPHAGITIS: ICD-10-CM

## 2019-11-11 DIAGNOSIS — G47.00 INSOMNIA, UNSPECIFIED TYPE: ICD-10-CM

## 2019-11-11 DIAGNOSIS — R82.90 ABNORMAL URINALYSIS: ICD-10-CM

## 2019-11-11 DIAGNOSIS — M25.512 CHRONIC LEFT SHOULDER PAIN: ICD-10-CM

## 2019-11-11 DIAGNOSIS — K59.01 SLOW TRANSIT CONSTIPATION: ICD-10-CM

## 2019-11-11 DIAGNOSIS — G89.29 CHRONIC LEFT SHOULDER PAIN: ICD-10-CM

## 2019-11-11 DIAGNOSIS — I10 ESSENTIAL HYPERTENSION: ICD-10-CM

## 2019-11-11 DIAGNOSIS — E03.9 HYPOTHYROIDISM, UNSPECIFIED TYPE: ICD-10-CM

## 2019-11-11 PROCEDURE — 81002 URINALYSIS NONAUTO W/O SCOPE: CPT | Performed by: INTERNAL MEDICINE

## 2019-11-11 PROCEDURE — 87086 URINE CULTURE/COLONY COUNT: CPT

## 2019-11-11 PROCEDURE — 99214 OFFICE O/P EST MOD 30 MIN: CPT | Performed by: INTERNAL MEDICINE

## 2019-11-11 PROCEDURE — 665999 HH PPS REVENUE DEBIT

## 2019-11-11 PROCEDURE — 87077 CULTURE AEROBIC IDENTIFY: CPT

## 2019-11-11 PROCEDURE — 665998 HH PPS REVENUE CREDIT

## 2019-11-11 PROCEDURE — 87186 SC STD MICRODIL/AGAR DIL: CPT

## 2019-11-12 PROCEDURE — 665999 HH PPS REVENUE DEBIT

## 2019-11-12 PROCEDURE — 665998 HH PPS REVENUE CREDIT

## 2019-11-12 NOTE — PROGRESS NOTES
Chief Complaint   Patient presents with   • Gastrophageal Reflux   • Insomnia       HISTORY OF THE PRESENT ILLNESS: Patient is a 89 y.o. female.     Patient comes in for one-month follow-up. She was last seen for hospital follow-up. She was admitted for C. diff colitis. This occurred after treatment for presumed diverticulitis.    At that time she is complaining of nausea. She had avoided omeprazole because of increased risk of recurrent C. diff colitis. She had been instructed to start Pepcid but there was confusion in the family regarding dosing. She had failed Zofran for nausea. RA.    Since her last visit the patient has been to assisted living. She is doing well there. She is happy with the food, accommodations and her dining companions. She plans to start going to the exercise class. She also plans to become involved in the social groups there.    No further GI issues. She does complain of mild constipation. She decreased her MiraLAX. She was admitted recently for diverticulitis which was complicated by C. diff colitis after treatment with antibiotics.    She has a history of reflux. This is stable with Pepcid and Tums. She is off Prilosec due to increased risk of recurrence of the C. diff colitis. Her nausea has also completely resolved with treatment of her reflux.    We discussed sleep. She states that she often wakes up and has difficulty going back to sleep. She had been on trazodone. We switched her medications to mirtazapine but she has not tried the medication.    She also complains of left shoulder pain. This is chronic. She’s previously been diagnosed with osteoarthritis.    Blood pressure has been stable on current medications.    She has a history of hypothyroid.  She is been clinically euthyroid and her last thyroid function tests were normal.    She has recurrent urinary tract infections.  She complains of mild dysuria today but she feels this is chronic and not consistent when she typically has  a urinary tract infection      Allergies: Flagyl [metronidazole hcl]; Keflex; Morphine; and Sulfa drugs    Current Outpatient Medications Ordered in Epic   Medication Sig Dispense Refill   • sertraline (ZOLOFT) 25 MG tablet Take 1 Tab by mouth every day. 90 Tab 3   • Famotidine-Ca Carb-Mag Hydrox -165 MG Chew Tab Take 1 tablet by mouth 2 Times a Day. Indications: Heartburn     • Calcium Carbonate Antacid (TUMS ULTRA 1000) 1000 MG Chew Tab Take 2,000 mg by mouth 2 Times a Day. Indications: Heartburn     • fluticasone (FLONASE) 50 MCG/ACT nasal spray USE ONE SPRAY EACH NOSTRIL TWO TIMES PER DAY.  3   • Lactobacillus (PROBIOTIC ACIDOPHILUS) Cap Take 1 Cap by mouth every day at 6 PM. Indications: supplement     • Acetaminophen (ACETAMINOPHEN EXTRA STRENGTH) 500 MG Cap Take 500-1,000 mg by mouth 4 times a day as needed (pain).     • travoprost (TRAVATAN Z) 0.004 % Solution Place 1 Drop in both eyes every day at 6 PM. Indications: Wide-Angle Glaucoma     • lisinopril (PRINIVIL) 20 MG Tab TAKE HALF A TABLET BY MOUTH TWICE A DAY 90 Tab 3   • amLODIPine (NORVASC) 5 MG Tab TAKE 1 TABLET BY MOUTH EVERY DAY 90 Tab 3   • aspirin 81 MG EC tablet Take 81 mg by mouth every day.     • polyethylene glycol 3350 (MIRALAX) Powder Take 17 g by mouth every day. Indications: Constipation     • levothyroxine (SYNTHROID) 75 MCG Tab TAKE 1 TABLET BY MOUTH EVERY DAY 90 Tab 3   • meclizine (ANTIVERT) 25 MG Tab Take 25 mg by mouth 3 times a day as needed.     • mirtazapine (REMERON) 15 MG Tab Take 0.5-1 Tabs by mouth every bedtime. As needed for insomnia 30 Tab 3     No current Epic-ordered facility-administered medications on file.        Past medical history, social history and family history were reviewed from chart today    Review of systems: Per HPI.    Denies headache, chest pain, fever, chills, diarrhea, constipation, abdominal pain, palpitations, depression   All others negative.     Exam: /60 (BP Location: Left arm, Patient  "Position: Sitting, BP Cuff Size: Adult)   Pulse 92   Temp 36.6 °C (97.8 °F) (Temporal)   Resp 16   Ht 1.575 m (5' 2\")   Wt 60.8 kg (134 lb)   SpO2 93%   General: Well-appearing. Well-developed. No signs of distress.  HEENT: Grossly normal. Oral cavity is pink and moist.  Neck: Supple without JVD or bruit.  Pulmonary: Clear with good breath sounds. Normal effort.  Cardiovascular: Regular. Carotid and radial pulses are intact.  Abdomen: Soft, nontender, nondistended. Spleen and liver are not enlarged.  Neurologic: Cranial nerves II through XII are grossly normal, alert and oriented x3      Diagnosis:  1. Gastroesophageal reflux disease without esophagitis     2. Essential hypertension     3. Hypothyroidism, unspecified type     4. Insomnia, unspecified type     5. Abnormal urinalysis  URINE CULTURE(NEW)   6. Dysuria  POCT Urinalysis   7. Slow transit constipation         Overall she is doing great.  Blood pressure and thyroid are stable.  No change in treatment  Encouraged her to increase her MiraLAX. She could also add prune juice which she took in the past.  Encouraged trial of mirtazapine for the sleep.  Continue with Pepcid and Tums for reflux.  Refer to orthopedics for the shoulder issue. She received injections in the past with good results.  We discussed her diverticulitis. I do not recommend any dietary restriction. I encouraged her to use MiraLAX, stool softener or prune juice as discussed above to ensure regularity.  Urinalysis sent for culture.  "

## 2019-11-13 PROCEDURE — 665999 HH PPS REVENUE DEBIT

## 2019-11-13 PROCEDURE — 665998 HH PPS REVENUE CREDIT

## 2019-11-14 ENCOUNTER — TELEPHONE (OUTPATIENT)
Dept: INTERNAL MEDICINE | Facility: IMAGING CENTER | Age: 84
End: 2019-11-14

## 2019-11-14 PROCEDURE — 665999 HH PPS REVENUE DEBIT

## 2019-11-14 PROCEDURE — 665998 HH PPS REVENUE CREDIT

## 2019-11-14 RX ORDER — NITROFURANTOIN MACROCRYSTALS 100 MG/1
100 CAPSULE ORAL
Qty: 14 CAP | Refills: 0 | Status: SHIPPED | OUTPATIENT
Start: 2019-11-14 | End: 2019-11-14 | Stop reason: SDUPTHER

## 2019-11-14 RX ORDER — NITROFURANTOIN MACROCRYSTALS 100 MG/1
100 CAPSULE ORAL 2 TIMES DAILY
Qty: 14 CAP | Refills: 0 | Status: SHIPPED
Start: 2019-11-14 | End: 2020-04-27

## 2019-11-15 PROCEDURE — 665998 HH PPS REVENUE CREDIT

## 2019-11-15 PROCEDURE — 665999 HH PPS REVENUE DEBIT

## 2019-11-16 PROCEDURE — 665998 HH PPS REVENUE CREDIT

## 2019-11-16 PROCEDURE — 665999 HH PPS REVENUE DEBIT

## 2019-11-17 PROCEDURE — 665999 HH PPS REVENUE DEBIT

## 2019-11-17 PROCEDURE — 665998 HH PPS REVENUE CREDIT

## 2019-11-18 PROCEDURE — 665998 HH PPS REVENUE CREDIT

## 2019-11-18 PROCEDURE — 665999 HH PPS REVENUE DEBIT

## 2020-01-09 RX ORDER — MIRTAZAPINE 15 MG/1
7.5-15 TABLET, FILM COATED ORAL
Qty: 90 TAB | Refills: 1 | Status: SHIPPED | OUTPATIENT
Start: 2020-01-09 | End: 2020-06-26

## 2020-01-20 ENCOUNTER — OFFICE VISIT (OUTPATIENT)
Dept: INTERNAL MEDICINE | Facility: IMAGING CENTER | Age: 85
End: 2020-01-20
Payer: MEDICARE

## 2020-01-20 VITALS
WEIGHT: 134.04 LBS | TEMPERATURE: 98 F | SYSTOLIC BLOOD PRESSURE: 118 MMHG | HEART RATE: 52 BPM | RESPIRATION RATE: 17 BRPM | BODY MASS INDEX: 24.67 KG/M2 | OXYGEN SATURATION: 98 % | DIASTOLIC BLOOD PRESSURE: 71 MMHG | HEIGHT: 62 IN

## 2020-01-20 DIAGNOSIS — M19.011 PRIMARY OSTEOARTHRITIS OF BOTH SHOULDERS: ICD-10-CM

## 2020-01-20 DIAGNOSIS — E03.9 HYPOTHYROIDISM, UNSPECIFIED TYPE: ICD-10-CM

## 2020-01-20 DIAGNOSIS — N39.0 RECURRENT UTI: ICD-10-CM

## 2020-01-20 DIAGNOSIS — R42 DIZZINESS: ICD-10-CM

## 2020-01-20 DIAGNOSIS — R30.0 DYSURIA: ICD-10-CM

## 2020-01-20 DIAGNOSIS — M19.012 PRIMARY OSTEOARTHRITIS OF BOTH SHOULDERS: ICD-10-CM

## 2020-01-20 DIAGNOSIS — I10 ESSENTIAL HYPERTENSION: ICD-10-CM

## 2020-01-20 DIAGNOSIS — K21.9 GASTROESOPHAGEAL REFLUX DISEASE WITHOUT ESOPHAGITIS: ICD-10-CM

## 2020-01-20 PROBLEM — I16.0 HYPERTENSIVE URGENCY: Status: RESOLVED | Noted: 2019-08-24 | Resolved: 2020-01-20

## 2020-01-20 LAB
APPEARANCE UR: CLEAR
COLOR UR AUTO: YELLOW
GLUCOSE UR STRIP.AUTO-MCNC: NEGATIVE MG/DL
KETONES UR STRIP.AUTO-MCNC: NEGATIVE MG/DL
LEUKOCYTE ESTERASE UR QL STRIP.AUTO: NEGATIVE
NITRITE UR QL STRIP.AUTO: NEGATIVE
PH UR STRIP.AUTO: 6.5 [PH] (ref 5–8)
PROT UR QL STRIP: NEGATIVE MG/DL
RBC UR QL AUTO: NEGATIVE
SP GR UR STRIP.AUTO: 1.01

## 2020-01-20 PROCEDURE — 99214 OFFICE O/P EST MOD 30 MIN: CPT | Performed by: INTERNAL MEDICINE

## 2020-01-20 PROCEDURE — 81002 URINALYSIS NONAUTO W/O SCOPE: CPT | Performed by: INTERNAL MEDICINE

## 2020-01-20 NOTE — PROGRESS NOTES
Chief Complaint   Patient presents with   • Hypertension   • Hypothyroidism       HISTORY OF THE PRESENT ILLNESS: Patient is a 89 y.o. female.       Patient comes in with her son-in-law for follow-up on chronic issues. She is been feeling well.    Hypertension-stable and current medications.    Hypothyroid-clinically euthyroid. Complaint with supplementation. Last thyroid function tests were normal.    Recurrent urinary tract infection-asymptomatic today. Urinalysis in the office was normal.    Insomnia-improved. She did not start the mirtazapine.    Reflux-stable on no medications.    Bilateral shoulder pain-she's been diagnosed with severe arthritis in the left shoulder and moderate in the right. She's going to follow-up with orthopedics today. She received an injection both shoulders in December. She reports that it help for a few hours. She is currently taking Tylenol as needed.      Allergies: Flagyl [metronidazole hcl]; Keflex; Morphine; and Sulfa drugs    Current Outpatient Medications Ordered in Epic   Medication Sig Dispense Refill   • mirtazapine (REMERON) 15 MG Tab TAKE 0.5-1 TABS BY MOUTH EVERY BEDTIME. AS NEEDED FOR INSOMNIA 90 Tab 1   • nitrofurantoin macro crystals (MACRODANTIN) 100 MG Cap Take 1 Cap by mouth 2 Times a Day. 14 Cap 0   • sertraline (ZOLOFT) 25 MG tablet Take 1 Tab by mouth every day. 90 Tab 3   • Famotidine-Ca Carb-Mag Hydrox -165 MG Chew Tab Take 1 tablet by mouth 2 Times a Day. Indications: Heartburn     • Calcium Carbonate Antacid (TUMS ULTRA 1000) 1000 MG Chew Tab Take 2,000 mg by mouth 2 Times a Day. Indications: Heartburn     • fluticasone (FLONASE) 50 MCG/ACT nasal spray USE ONE SPRAY EACH NOSTRIL TWO TIMES PER DAY.  3   • Lactobacillus (PROBIOTIC ACIDOPHILUS) Cap Take 1 Cap by mouth every day at 6 PM. Indications: supplement     • Acetaminophen (ACETAMINOPHEN EXTRA STRENGTH) 500 MG Cap Take 500-1,000 mg by mouth 4 times a day as needed (pain).     • travoprost (TRAVATAN  "Z) 0.004 % Solution Place 1 Drop in both eyes every day at 6 PM. Indications: Wide-Angle Glaucoma     • meclizine (ANTIVERT) 25 MG Tab Take 25 mg by mouth 3 times a day as needed.     • lisinopril (PRINIVIL) 20 MG Tab TAKE HALF A TABLET BY MOUTH TWICE A DAY 90 Tab 3   • amLODIPine (NORVASC) 5 MG Tab TAKE 1 TABLET BY MOUTH EVERY DAY 90 Tab 3   • aspirin 81 MG EC tablet Take 81 mg by mouth every day.     • polyethylene glycol 3350 (MIRALAX) Powder Take 17 g by mouth every day. Indications: Constipation     • levothyroxine (SYNTHROID) 75 MCG Tab TAKE 1 TABLET BY MOUTH EVERY DAY 90 Tab 3     No current Epic-ordered facility-administered medications on file.        Past medical history, social history and family history were reviewed from chart today    Review of systems: Per HPI.    Denies headache, chest pain, fever, chills, diarrhea, constipation, abdominal pain, palpitations, depression   All others negative.     Exam: /71 (BP Location: Left arm, Patient Position: Sitting, BP Cuff Size: Adult)   Pulse (!) 52   Temp 36.7 °C (98 °F) (Temporal)   Resp 17   Ht 1.575 m (5' 2\")   Wt 60.8 kg (134 lb 0.6 oz)   SpO2 98%   General: Well-appearing. Well-developed. No signs of distress.  HEENT: Grossly normal. Oral cavity is pink and moist.  Neck: Supple without JVD or bruit.  Pulmonary: Clear with good breath sounds. Normal effort.  Cardiovascular: Regular. Carotid and radial pulses are intact.  Abdomen: Soft, nontender, nondistended. Spleen and liver are not enlarged.  Neurologic: Cranial nerves II through XII are grossly normal, alert and oriented x3      Diagnosis:  1. Essential hypertension     2. Hypothyroidism, unspecified type     3. Gastroesophageal reflux disease without esophagitis     4. Dizziness     5. Primary osteoarthritis of both shoulders     6. Recurrent UTI     7. Dysuria  POC URINALYSIS    POC URINALYSIS       Overall doing well.  Encouraged to remain off reflux medications as she is having no " symptoms.  No change in other medications at this time.  Routine follow-up in three months.

## 2020-01-28 RX ORDER — FLUTICASONE PROPIONATE 50 MCG
SPRAY, SUSPENSION (ML) NASAL
Qty: 16 G | Refills: 3 | Status: SHIPPED | OUTPATIENT
Start: 2020-01-28 | End: 2020-04-20 | Stop reason: SDUPTHER

## 2020-02-01 RX ORDER — METOPROLOL SUCCINATE 25 MG/1
TABLET, EXTENDED RELEASE ORAL
Qty: 90 TAB | Refills: 3 | Status: SHIPPED | OUTPATIENT
Start: 2020-02-01 | End: 2020-11-16 | Stop reason: SDUPTHER

## 2020-03-30 ENCOUNTER — TELEMEDICINE2 (OUTPATIENT)
Dept: INTERNAL MEDICINE | Facility: IMAGING CENTER | Age: 85
End: 2020-03-30
Payer: MEDICARE

## 2020-03-30 DIAGNOSIS — R19.8 CHANGE IN BOWEL MOVEMENT: ICD-10-CM

## 2020-03-30 DIAGNOSIS — M19.012 PRIMARY OSTEOARTHRITIS OF BOTH SHOULDERS: ICD-10-CM

## 2020-03-30 DIAGNOSIS — M19.011 PRIMARY OSTEOARTHRITIS OF BOTH SHOULDERS: ICD-10-CM

## 2020-03-30 DIAGNOSIS — I10 ESSENTIAL HYPERTENSION: ICD-10-CM

## 2020-03-30 DIAGNOSIS — N39.0 RECURRENT UTI: ICD-10-CM

## 2020-03-30 DIAGNOSIS — R53.83 OTHER FATIGUE: ICD-10-CM

## 2020-03-30 DIAGNOSIS — E03.9 HYPOTHYROIDISM, UNSPECIFIED TYPE: ICD-10-CM

## 2020-03-30 PROCEDURE — 99442 PR PHYSICIAN TELEPHONE EVALUATION 11-20 MIN: CPT | Performed by: INTERNAL MEDICINE

## 2020-03-30 RX ORDER — NAPROXEN 500 MG/1
TABLET ORAL
COMMUNITY
End: 2020-03-30

## 2020-03-30 RX ORDER — TRAZODONE HYDROCHLORIDE 50 MG/1
TABLET ORAL
COMMUNITY
Start: 2020-02-05 | End: 2020-09-28

## 2020-03-30 NOTE — PROGRESS NOTES
"  Telephone Appointment Visit   As a means of avoiding spread of COVID-19, this visit is being conducted by telephone. This telephone visit was initiated by the patient and they verbally consented.    Time at start of call: 10:00 AM     Reason for Call:  Symptom Follow-up    Patient Comments / History:   Spoke with patient on the telephone regarding new issues as well as chronic issues.  She reports that recently she has been feeling more tired.  No fever, chills, cough, dyspnea or other.  She resides at an assisted living.  She reports that they are \"on lockdown\" without contact from outside people and with decreased social interaction internally at the facility.    Over the last 2 days she has been feeling more tired.  She is also noticed a change in bowel movements.  She continues to have a bowel movement every day but she reports they are looking darker.  She thinks the symptoms may have begun with starting Naprosyn by Dr. Quintana for her arthritis in the shoulders.  She reports that it helps with the pain but she is concerned about the change in bowel movement.  She tried discontinuing the Naprosyn and she felt that the bowels return to a more normal color.  No dyspepsia, abdominal pain, nausea or vomiting.  No history of ulcerative disease.  She continues to take famotidine daily due to history of reflux.    We discussed her chronic issues including blood pressure and hypothyroid.  She is clinically euthyroid.  She reports they have been monitoring her blood pressure at the facility and her numbers have been normal.  She also has a history of recurrent urinary tract infection and denies any symptoms currently.    Labs / Images Reviewed   No recent results for evaluation    Assessment and Plan:     1. Change in bowel movement    2. Other fatigue    3. Primary osteoarthritis of both shoulders    4. Essential hypertension    5. Hypothyroidism, unspecified type    6. Recurrent UTI    Other orders  - traZODone " (DESYREL) 50 MG Tab; TAKE 0.5 TABS BY MOUTH EVERY BEDTIME.    Patient with change in bowel movements.  She may be experiencing some GI bleeding issues from the Naprosyn.  No pain.  No diarrhea but the stools been darker.  Symptoms improved when she discontinued the Naprosyn.  I recommended that she discontinue the Naprosyn and continue with Tylenol.  She should also continue with the famotidine.  We discussed increasing her protection to proton pump inhibitor but she would like to see if her stools improve.  We agreed that if her symptoms persist that she will need CBC and fecal testing.  Both can be done in this office.    Blood pressure and thyroid are stable.  No change in treatment.    No sign of recurrent urinary tract infection.  No change in treatment.  Follow-up: We agreed to have a phone conversation later this week to see how she is doing  Time at end of call: 10:16 AM  Total Time Spent: 11-20 minutes    Mike Otero M.D.

## 2020-04-20 RX ORDER — FLUTICASONE PROPIONATE 50 MCG
SPRAY, SUSPENSION (ML) NASAL
Qty: 16 G | Refills: 3 | Status: SHIPPED | OUTPATIENT
Start: 2020-04-20 | End: 2020-04-27 | Stop reason: SDUPTHER

## 2020-04-27 DIAGNOSIS — I10 ESSENTIAL HYPERTENSION: ICD-10-CM

## 2020-04-27 RX ORDER — LISINOPRIL 20 MG/1
TABLET ORAL
Qty: 90 TAB | Refills: 3 | Status: SHIPPED | OUTPATIENT
Start: 2020-04-27 | End: 2021-05-20 | Stop reason: SDUPTHER

## 2020-04-27 RX ORDER — SERTRALINE HYDROCHLORIDE 25 MG/1
25 TABLET, FILM COATED ORAL DAILY
Qty: 90 TAB | Refills: 3 | Status: SHIPPED | OUTPATIENT
Start: 2020-04-27 | End: 2021-05-20 | Stop reason: SDUPTHER

## 2020-04-27 RX ORDER — FLUTICASONE PROPIONATE 50 MCG
SPRAY, SUSPENSION (ML) NASAL
Qty: 16 G | Refills: 6 | Status: SHIPPED | OUTPATIENT
Start: 2020-04-27 | End: 2020-10-12

## 2020-04-27 RX ORDER — LEVOTHYROXINE SODIUM 0.07 MG/1
75 TABLET ORAL
Qty: 90 TAB | Refills: 3 | Status: SHIPPED | OUTPATIENT
Start: 2020-04-27 | End: 2021-03-15

## 2020-04-27 RX ORDER — AMLODIPINE BESYLATE 5 MG/1
5 TABLET ORAL
Qty: 90 TAB | Refills: 3 | Status: SHIPPED | OUTPATIENT
Start: 2020-04-27 | End: 2021-05-20 | Stop reason: SDUPTHER

## 2020-04-27 RX ORDER — OMEPRAZOLE 20 MG/1
20 CAPSULE, DELAYED RELEASE ORAL 2 TIMES DAILY
Qty: 90 CAP | Refills: 3 | Status: SHIPPED | OUTPATIENT
Start: 2020-04-27 | End: 2020-04-29

## 2020-06-26 ENCOUNTER — OFFICE VISIT (OUTPATIENT)
Dept: INTERNAL MEDICINE | Facility: IMAGING CENTER | Age: 85
End: 2020-06-26
Payer: MEDICARE

## 2020-06-26 VITALS
HEART RATE: 60 BPM | BODY MASS INDEX: 25.97 KG/M2 | TEMPERATURE: 98 F | DIASTOLIC BLOOD PRESSURE: 80 MMHG | OXYGEN SATURATION: 94 % | SYSTOLIC BLOOD PRESSURE: 140 MMHG | RESPIRATION RATE: 16 BRPM | WEIGHT: 142 LBS

## 2020-06-26 DIAGNOSIS — H61.23 BILATERAL HEARING LOSS DUE TO CERUMEN IMPACTION: ICD-10-CM

## 2020-06-26 DIAGNOSIS — M19.012 PRIMARY OSTEOARTHRITIS OF BOTH SHOULDERS: ICD-10-CM

## 2020-06-26 DIAGNOSIS — F51.04 CHRONIC INSOMNIA: ICD-10-CM

## 2020-06-26 DIAGNOSIS — K21.9 GASTROESOPHAGEAL REFLUX DISEASE WITHOUT ESOPHAGITIS: ICD-10-CM

## 2020-06-26 DIAGNOSIS — M19.011 PRIMARY OSTEOARTHRITIS OF BOTH SHOULDERS: ICD-10-CM

## 2020-06-26 DIAGNOSIS — E03.9 HYPOTHYROIDISM, UNSPECIFIED TYPE: ICD-10-CM

## 2020-06-26 DIAGNOSIS — N39.0 RECURRENT UTI: ICD-10-CM

## 2020-06-26 DIAGNOSIS — I10 ESSENTIAL HYPERTENSION: ICD-10-CM

## 2020-06-26 PROCEDURE — 99214 OFFICE O/P EST MOD 30 MIN: CPT | Performed by: INTERNAL MEDICINE

## 2020-06-26 PROCEDURE — 81002 URINALYSIS NONAUTO W/O SCOPE: CPT | Performed by: INTERNAL MEDICINE

## 2020-06-26 RX ORDER — NAPROXEN 500 MG/1
TABLET ORAL
COMMUNITY
Start: 2020-04-23 | End: 2021-12-21

## 2020-06-26 ASSESSMENT — FIBROSIS 4 INDEX: FIB4 SCORE: 2.53

## 2020-06-26 NOTE — PROGRESS NOTES
Chief Complaint   Patient presents with   • Hypertension   • Hypothyroidism       HISTORY OF THE PRESENT ILLNESS: Patient is a 89 y.o. female.     Patient comes in for follow-up on chronic issues.  She is here with her daughter today.  She has been feeling well.  She is now at assisted living.  She is ambulating with a walker but with no problems.  She is dining in the dining room with table mates.  She is managing her own medications.    She has a history of arthritis in the shoulders.  She was seen by orthopedics who did injections.  This lasted for a few days.  She takes Tylenol only for pain control.  Pain is limiting her ability to exercise with the upper extremities.    She has a history of hypothyroidism.  She is compliant with her supplement.  She is clinically euthyroid.  Last thyroid function test were normal.    Blood pressures been stable on amlodipine and lisinopril.  No lower extremity edema from the amlodipine.  No cough from lisinopril.    Reflux is stable with Pepcid and Tums.  She was previously on proton pump inhibitor but seemed to get recurrent urinary tract infections.  This is been much better since the change in medication.  I discussed with the patient and her daughter that I can not easily explain the change.    I noticed at today's visit that she had decreased hearing despite wearing her hearing aids.  Evaluation of the ear showed bilateral cerumen impaction.  I tried to use a curette but this caused discomfort so I discontinued.  I recommended evaluation with ENT    Allergies: Codeine; Flagyl [metronidazole hcl]; Keflex; Morphine; and Sulfa drugs    Current Outpatient Medications Ordered in Epic   Medication Sig Dispense Refill   • Famotidine-Ca Carb-Mag Hydrox -165 MG Chew Tab Take 1 tablet by mouth 2 Times a Day. Indications: Heartburn     • Calcium Carbonate Antacid (TUMS ULTRA 1000) 1000 MG Chew Tab Take 2,000 mg by mouth 2 Times a Day. Indications: Heartburn     • Lactobacillus  (PROBIOTIC ACIDOPHILUS) Cap Take 1 Cap by mouth every day at 6 PM. Indications: supplement     • Acetaminophen (ACETAMINOPHEN EXTRA STRENGTH) 500 MG Cap Take 500-1,000 mg by mouth 4 times a day as needed (pain).     • travoprost (TRAVATAN Z) 0.004 % Solution Place 1 Drop in both eyes every day at 6 PM. Indications: Wide-Angle Glaucoma     • meclizine (ANTIVERT) 25 MG Tab Take 25 mg by mouth 3 times a day as needed.     • aspirin 81 MG EC tablet Take 81 mg by mouth every day.     • polyethylene glycol 3350 (MIRALAX) Powder Take 17 g by mouth every day. Indications: Constipation     • naproxen (NAPROSYN) 500 MG Tab TAKE 1 TABLET BY MOUTH TWICE A DAY     • amLODIPine (NORVASC) 5 MG Tab Take 1 Tab by mouth every day. 90 Tab 3   • fluticasone (FLONASE) 50 MCG/ACT nasal spray USE ONE SPRAY EACH NOSTRIL TWO TIMES PER DAY. 16 g 6   • levothyroxine (SYNTHROID) 75 MCG Tab Take 1 Tab by mouth every day. 90 Tab 3   • lisinopril (PRINIVIL) 20 MG Tab TAKE HALF A TABLET BY MOUTH TWICE A DAY 90 Tab 3   • sertraline (ZOLOFT) 25 MG tablet Take 1 Tab by mouth every day. 90 Tab 3   • traZODone (DESYREL) 50 MG Tab TAKE 0.5 TABS BY MOUTH EVERY BEDTIME.     • metoprolol SR (TOPROL XL) 25 MG TABLET SR 24 HR TAKE 1 TABLET BY MOUTH EVERY DAY 90 Tab 3     No current Epic-ordered facility-administered medications on file.        Past medical history, social history and family history were reviewed from chart today    Review of systems: Per HPI.  She is experiencing some insomnia  Denies headache, chest pain, fever, chills, diarrhea, constipation, abdominal pain, palpitations, depression   All others negative.     Exam: /80 (BP Location: Left arm, Patient Position: Sitting, BP Cuff Size: Adult)   Pulse 60   Temp 36.7 °C (98 °F) (Temporal)   Resp 16   Wt 64.4 kg (142 lb)   SpO2 94%   General: Well-appearing. Well-developed. No signs of distress.  HEENT: Grossly normal. Oral cavity is pink and moist.  Bilateral canals are obstructed  with cerumen plugs.  Neck: Supple without JVD or bruit.  Pulmonary: Clear with good breath sounds. Normal effort.  Cardiovascular: Regular. Carotid and radial pulses are intact.  Abdomen: Soft, nontender, nondistended. Spleen and liver are not enlarged.  Neurologic: Cranial nerves II through XII are grossly normal, alert and oriented x3      Diagnosis:  1. Recurrent UTI  POCT Urinalysis   2. Primary osteoarthritis of both shoulders     3. Hypothyroidism, unspecified type     4. Essential hypertension     5. Gastroesophageal reflux disease without esophagitis     6. Bilateral hearing loss due to cerumen impaction     7. Chronic insomnia         Overall the patient is doing well.  Chronic issues are stable.  She is complaining more of her insomnia today.  We have previously tried mirtazapine which she did not like the side effects.  She has trazodone but has never tried the medication.    Medications:  Trial of Tylenol p.m. for sleep.  Consider trial of trazodone but do not mix with Tylenol PM    Laboratory:  Urine analysis was normal    Imaging:  No imaging    Referrals:  Refer to ENT for cerumen impaction

## 2020-07-14 ENCOUNTER — TELEPHONE (OUTPATIENT)
Dept: INTERNAL MEDICINE | Facility: IMAGING CENTER | Age: 85
End: 2020-07-14

## 2020-07-14 NOTE — TELEPHONE ENCOUNTER
Calling [facility] for referral by Dr Otero.  Message left, request office calls daughterAtiya and phone number provided.

## 2020-08-11 ENCOUNTER — OFFICE VISIT (OUTPATIENT)
Dept: INTERNAL MEDICINE | Facility: IMAGING CENTER | Age: 85
End: 2020-08-11
Payer: MEDICARE

## 2020-08-11 VITALS
SYSTOLIC BLOOD PRESSURE: 124 MMHG | DIASTOLIC BLOOD PRESSURE: 80 MMHG | HEART RATE: 55 BPM | OXYGEN SATURATION: 94 % | BODY MASS INDEX: 25.76 KG/M2 | WEIGHT: 140 LBS | RESPIRATION RATE: 14 BRPM | TEMPERATURE: 99 F | HEIGHT: 62 IN

## 2020-08-11 DIAGNOSIS — R35.0 URINE FREQUENCY: ICD-10-CM

## 2020-08-11 DIAGNOSIS — M79.672 LEFT FOOT PAIN: ICD-10-CM

## 2020-08-11 DIAGNOSIS — N39.0 RECURRENT UTI: ICD-10-CM

## 2020-08-11 DIAGNOSIS — R60.0 EDEMA OF LEFT LOWER LEG: ICD-10-CM

## 2020-08-11 DIAGNOSIS — I10 ESSENTIAL HYPERTENSION: ICD-10-CM

## 2020-08-11 LAB
APPEARANCE UR: CLEAR
BILIRUB UR STRIP-MCNC: NEGATIVE MG/DL
COLOR UR AUTO: YELLOW
GLUCOSE UR STRIP.AUTO-MCNC: NEGATIVE MG/DL
KETONES UR STRIP.AUTO-MCNC: NEGATIVE MG/DL
LEUKOCYTE ESTERASE UR QL STRIP.AUTO: NEGATIVE
NITRITE UR QL STRIP.AUTO: NEGATIVE
PH UR STRIP.AUTO: 6 [PH] (ref 5–8)
PROT UR QL STRIP: NEGATIVE MG/DL
RBC UR QL AUTO: NEGATIVE
SP GR UR STRIP.AUTO: 1.01
UROBILINOGEN UR STRIP-MCNC: 0.2 MG/DL

## 2020-08-11 PROCEDURE — 99214 OFFICE O/P EST MOD 30 MIN: CPT | Performed by: INTERNAL MEDICINE

## 2020-08-11 PROCEDURE — 81002 URINALYSIS NONAUTO W/O SCOPE: CPT | Performed by: INTERNAL MEDICINE

## 2020-08-11 ASSESSMENT — FIBROSIS 4 INDEX: FIB4 SCORE: 2.55

## 2020-08-11 NOTE — PROGRESS NOTES
Chief Complaint   Patient presents with   • Foot Pain   • Other     Urine frequency       HISTORY OF THE PRESENT ILLNESS: Patient is a 89 y.o. female.     Patient comes in for evaluation of left foot pain.  She is noticed increased discomfort with her foot with standing.  She is concerned that she may have gout.  She is also noticed that her left foot has been swollen.  She has a history of chronic left lower extremity edema.  She takes Tylenol for pain.    She also complains of urinary frequency.  This is been a chronic issue.  She has a history of recurrent urinary tract infection.  Her last positive test was in November 2010.  Her last 3 tests have been negative.  Her urinalysis in the office today was negative    Allergies: Codeine, Flagyl [metronidazole hcl], Keflex, Morphine, and Sulfa drugs    Current Outpatient Medications Ordered in Epic   Medication Sig Dispense Refill   • naproxen (NAPROSYN) 500 MG Tab TAKE 1 TABLET BY MOUTH TWICE A DAY     • amLODIPine (NORVASC) 5 MG Tab Take 1 Tab by mouth every day. 90 Tab 3   • fluticasone (FLONASE) 50 MCG/ACT nasal spray USE ONE SPRAY EACH NOSTRIL TWO TIMES PER DAY. 16 g 6   • levothyroxine (SYNTHROID) 75 MCG Tab Take 1 Tab by mouth every day. 90 Tab 3   • lisinopril (PRINIVIL) 20 MG Tab TAKE HALF A TABLET BY MOUTH TWICE A DAY 90 Tab 3   • sertraline (ZOLOFT) 25 MG tablet Take 1 Tab by mouth every day. 90 Tab 3   • traZODone (DESYREL) 50 MG Tab TAKE 0.5 TABS BY MOUTH EVERY BEDTIME.     • metoprolol SR (TOPROL XL) 25 MG TABLET SR 24 HR TAKE 1 TABLET BY MOUTH EVERY DAY 90 Tab 3   • Famotidine-Ca Carb-Mag Hydrox -165 MG Chew Tab Take 1 tablet by mouth 2 Times a Day. Indications: Heartburn     • Calcium Carbonate Antacid (TUMS ULTRA 1000) 1000 MG Chew Tab Take 2,000 mg by mouth 2 Times a Day. Indications: Heartburn     • Lactobacillus (PROBIOTIC ACIDOPHILUS) Cap Take 1 Cap by mouth every day at 6 PM. Indications: supplement     • Acetaminophen (ACETAMINOPHEN EXTRA  "STRENGTH) 500 MG Cap Take 500-1,000 mg by mouth 4 times a day as needed (pain).     • travoprost (TRAVATAN Z) 0.004 % Solution Place 1 Drop in both eyes every day at 6 PM. Indications: Wide-Angle Glaucoma     • meclizine (ANTIVERT) 25 MG Tab Take 25 mg by mouth 3 times a day as needed.     • aspirin 81 MG EC tablet Take 81 mg by mouth every day.     • polyethylene glycol 3350 (MIRALAX) Powder Take 17 g by mouth every day. Indications: Constipation       No current Epic-ordered facility-administered medications on file.        Past medical history, social history and family history were reviewed from chart today    Review of systems: Per HPI.    Denies headache, chest pain, fever, chills, diarrhea, constipation, abdominal pain, palpitations, depression   All others negative.     Exam: /80   Pulse (!) 55   Temp 37.2 °C (99 °F) (Temporal)   Resp 14   Ht 1.575 m (5' 2.01\")   Wt 63.5 kg (140 lb)   SpO2 94%   General: Well-appearing. Well-developed. No signs of distress.  HEENT: Grossly normal. Oral cavity is pink and moist.  Neck: Supple without JVD or bruit.  Pulmonary: Clear with good breath sounds. Normal effort.  Cardiovascular: Regular. Carotid and radial pulses are intact.  Abdomen: Soft, nontender, nondistended. Spleen and liver are not enlarged.  Neurologic: Cranial nerves II through XII are grossly normal, alert and oriented x3  Extremities: Tenderness over the metatarsals and ankle.  No significant swelling or erythema.    Diagnosis:  1. Left foot pain     2. Edema of left lower leg     3. Recurrent UTI  POCT Urinalysis   4. Urine frequency         Foot pain seems most consistent with osteoarthritis.  Recommend she continue with Tylenol.  She could also try heat such as a heating pad or warm soak.  Edema is chronic.  Unchanged from baseline.  She is unable to wear compression hose because of difficulty getting on.  She wears a compressive device on her foot which she thinks helps.  Urinalysis was " negative for infection.  Her increase in symptoms may be related to increase fluid intake.  We she also has a degree of overactive bladder.  She has failed medicines previously for treatment.  History of hypertension.  Blood pressures been stable.  She is on amlodipine which may increase her risk of edema.  I do not recommend diuretic therapy because of her high risk of infection and complications from incontinence including skin breakdown

## 2020-09-21 ENCOUNTER — HOSPITAL ENCOUNTER (OUTPATIENT)
Facility: MEDICAL CENTER | Age: 85
End: 2020-09-21
Attending: INTERNAL MEDICINE
Payer: MEDICARE

## 2020-09-21 ENCOUNTER — NON-PROVIDER VISIT (OUTPATIENT)
Dept: INTERNAL MEDICINE | Facility: IMAGING CENTER | Age: 85
End: 2020-09-21
Payer: MEDICARE

## 2020-09-21 DIAGNOSIS — I10 ESSENTIAL HYPERTENSION: ICD-10-CM

## 2020-09-21 DIAGNOSIS — E03.9 HYPOTHYROIDISM, UNSPECIFIED TYPE: ICD-10-CM

## 2020-09-21 DIAGNOSIS — E78.00 PURE HYPERCHOLESTEROLEMIA: ICD-10-CM

## 2020-09-21 DIAGNOSIS — Z23 NEED FOR INFLUENZA VACCINATION: ICD-10-CM

## 2020-09-21 PROCEDURE — 82043 UR ALBUMIN QUANTITATIVE: CPT

## 2020-09-21 PROCEDURE — 80061 LIPID PANEL: CPT

## 2020-09-21 PROCEDURE — 84439 ASSAY OF FREE THYROXINE: CPT

## 2020-09-21 PROCEDURE — 84443 ASSAY THYROID STIM HORMONE: CPT

## 2020-09-21 PROCEDURE — 83540 ASSAY OF IRON: CPT

## 2020-09-21 PROCEDURE — 81003 URINALYSIS AUTO W/O SCOPE: CPT

## 2020-09-21 PROCEDURE — 90662 IIV NO PRSV INCREASED AG IM: CPT | Performed by: INTERNAL MEDICINE

## 2020-09-21 PROCEDURE — G0008 ADMIN INFLUENZA VIRUS VAC: HCPCS | Performed by: INTERNAL MEDICINE

## 2020-09-21 PROCEDURE — 85025 COMPLETE CBC W/AUTO DIFF WBC: CPT

## 2020-09-21 PROCEDURE — 83550 IRON BINDING TEST: CPT

## 2020-09-21 PROCEDURE — 99999 PR NO CHARGE: CPT | Performed by: INTERNAL MEDICINE

## 2020-09-21 PROCEDURE — 82570 ASSAY OF URINE CREATININE: CPT

## 2020-09-21 PROCEDURE — 82728 ASSAY OF FERRITIN: CPT

## 2020-09-21 PROCEDURE — 80053 COMPREHEN METABOLIC PANEL: CPT

## 2020-09-22 DIAGNOSIS — D64.9 ANEMIA, UNSPECIFIED TYPE: ICD-10-CM

## 2020-09-22 LAB
ALBUMIN SERPL BCP-MCNC: 4 G/DL (ref 3.2–4.9)
ALBUMIN/GLOB SERPL: 1.3 G/DL
ALP SERPL-CCNC: 109 U/L (ref 30–99)
ALT SERPL-CCNC: 8 U/L (ref 2–50)
ANION GAP SERPL CALC-SCNC: 11 MMOL/L (ref 7–16)
ANISOCYTOSIS BLD QL SMEAR: ABNORMAL
APPEARANCE UR: CLEAR
AST SERPL-CCNC: 17 U/L (ref 12–45)
BASOPHILS # BLD AUTO: 1 % (ref 0–1.8)
BASOPHILS # BLD: 0.06 K/UL (ref 0–0.12)
BILIRUB SERPL-MCNC: 0.3 MG/DL (ref 0.1–1.5)
BILIRUB UR QL STRIP.AUTO: NEGATIVE
BUN SERPL-MCNC: 27 MG/DL (ref 8–22)
CALCIUM SERPL-MCNC: 9.2 MG/DL (ref 8.5–10.5)
CHLORIDE SERPL-SCNC: 101 MMOL/L (ref 96–112)
CHOLEST SERPL-MCNC: 198 MG/DL (ref 100–199)
CO2 SERPL-SCNC: 25 MMOL/L (ref 20–33)
COLOR UR: YELLOW
COMMENT 1642: NORMAL
CREAT SERPL-MCNC: 0.97 MG/DL (ref 0.5–1.4)
CREAT UR-MCNC: 121.72 MG/DL
EOSINOPHIL # BLD AUTO: 0.26 K/UL (ref 0–0.51)
EOSINOPHIL NFR BLD: 4.5 % (ref 0–6.9)
ERYTHROCYTE [DISTWIDTH] IN BLOOD BY AUTOMATED COUNT: 57.3 FL (ref 35.9–50)
FERRITIN SERPL-MCNC: 23.6 NG/ML (ref 10–291)
GLOBULIN SER CALC-MCNC: 3.1 G/DL (ref 1.9–3.5)
GLUCOSE SERPL-MCNC: 94 MG/DL (ref 65–99)
GLUCOSE UR STRIP.AUTO-MCNC: NEGATIVE MG/DL
HCT VFR BLD AUTO: 39.1 % (ref 37–47)
HDLC SERPL-MCNC: 44 MG/DL
HGB BLD-MCNC: 11.6 G/DL (ref 12–16)
HYPOCHROMIA BLD QL SMEAR: ABNORMAL
IMM GRANULOCYTES # BLD AUTO: 0.02 K/UL (ref 0–0.11)
IMM GRANULOCYTES NFR BLD AUTO: 0.3 % (ref 0–0.9)
IRON SATN MFR SERPL: 19 % (ref 15–55)
IRON SERPL-MCNC: 47 UG/DL (ref 40–170)
KETONES UR STRIP.AUTO-MCNC: NEGATIVE MG/DL
LDLC SERPL CALC-MCNC: 130 MG/DL
LEUKOCYTE ESTERASE UR QL STRIP.AUTO: NEGATIVE
LYMPHOCYTES # BLD AUTO: 1.82 K/UL (ref 1–4.8)
LYMPHOCYTES NFR BLD: 31.2 % (ref 22–41)
MCH RBC QN AUTO: 22.4 PG (ref 27–33)
MCHC RBC AUTO-ENTMCNC: 29.7 G/DL (ref 33.6–35)
MCV RBC AUTO: 75.6 FL (ref 81.4–97.8)
MICRO URNS: NORMAL
MICROALBUMIN UR-MCNC: <1.2 MG/DL
MICROALBUMIN/CREAT UR: NORMAL MG/G (ref 0–30)
MICROCYTES BLD QL SMEAR: ABNORMAL
MONOCYTES # BLD AUTO: 0.5 K/UL (ref 0–0.85)
MONOCYTES NFR BLD AUTO: 8.6 % (ref 0–13.4)
MORPHOLOGY BLD-IMP: NORMAL
NEUTROPHILS # BLD AUTO: 3.18 K/UL (ref 2–7.15)
NEUTROPHILS NFR BLD: 54.4 % (ref 44–72)
NITRITE UR QL STRIP.AUTO: NEGATIVE
NRBC # BLD AUTO: 0 K/UL
NRBC BLD-RTO: 0 /100 WBC
OVALOCYTES BLD QL SMEAR: NORMAL
PH UR STRIP.AUTO: 5.5 [PH] (ref 5–8)
PLATELET # BLD AUTO: 260 K/UL (ref 164–446)
PLATELET BLD QL SMEAR: NORMAL
PMV BLD AUTO: 10.5 FL (ref 9–12.9)
POTASSIUM SERPL-SCNC: 4.8 MMOL/L (ref 3.6–5.5)
PROT SERPL-MCNC: 7.1 G/DL (ref 6–8.2)
PROT UR QL STRIP: NEGATIVE MG/DL
RBC # BLD AUTO: 5.17 M/UL (ref 4.2–5.4)
RBC BLD AUTO: PRESENT
RBC UR QL AUTO: NEGATIVE
SODIUM SERPL-SCNC: 137 MMOL/L (ref 135–145)
SP GR UR STRIP.AUTO: 1.02
T4 FREE SERPL-MCNC: 1.46 NG/DL (ref 0.93–1.7)
TIBC SERPL-MCNC: 253 UG/DL (ref 250–450)
TRIGL SERPL-MCNC: 121 MG/DL (ref 0–149)
TSH SERPL DL<=0.005 MIU/L-ACNC: 1.33 UIU/ML (ref 0.38–5.33)
UIBC SERPL-MCNC: 206 UG/DL (ref 110–370)
UROBILINOGEN UR STRIP.AUTO-MCNC: 0.2 MG/DL
WBC # BLD AUTO: 5.8 K/UL (ref 4.8–10.8)

## 2020-09-28 ENCOUNTER — OFFICE VISIT (OUTPATIENT)
Dept: INTERNAL MEDICINE | Facility: IMAGING CENTER | Age: 85
End: 2020-09-28
Payer: MEDICARE

## 2020-09-28 VITALS
RESPIRATION RATE: 16 BRPM | TEMPERATURE: 98.5 F | WEIGHT: 142 LBS | BODY MASS INDEX: 26.13 KG/M2 | SYSTOLIC BLOOD PRESSURE: 128 MMHG | OXYGEN SATURATION: 93 % | HEART RATE: 60 BPM | DIASTOLIC BLOOD PRESSURE: 70 MMHG | HEIGHT: 62 IN

## 2020-09-28 DIAGNOSIS — N39.0 RECURRENT UTI: ICD-10-CM

## 2020-09-28 DIAGNOSIS — Z00.00 MEDICARE ANNUAL WELLNESS VISIT, SUBSEQUENT: ICD-10-CM

## 2020-09-28 DIAGNOSIS — E78.00 PURE HYPERCHOLESTEROLEMIA: ICD-10-CM

## 2020-09-28 DIAGNOSIS — Z12.11 COLON CANCER SCREENING: ICD-10-CM

## 2020-09-28 DIAGNOSIS — M19.041 PRIMARY OSTEOARTHRITIS OF BOTH HANDS: ICD-10-CM

## 2020-09-28 DIAGNOSIS — M19.042 PRIMARY OSTEOARTHRITIS OF BOTH HANDS: ICD-10-CM

## 2020-09-28 DIAGNOSIS — M19.011 PRIMARY OSTEOARTHRITIS OF BOTH SHOULDERS: ICD-10-CM

## 2020-09-28 DIAGNOSIS — M19.012 PRIMARY OSTEOARTHRITIS OF BOTH SHOULDERS: ICD-10-CM

## 2020-09-28 DIAGNOSIS — D50.9 MICROCYTIC ANEMIA: ICD-10-CM

## 2020-09-28 DIAGNOSIS — I10 ESSENTIAL HYPERTENSION: ICD-10-CM

## 2020-09-28 DIAGNOSIS — E03.9 HYPOTHYROIDISM, UNSPECIFIED TYPE: ICD-10-CM

## 2020-09-28 PROCEDURE — G0439 PPPS, SUBSEQ VISIT: HCPCS | Performed by: INTERNAL MEDICINE

## 2020-09-28 ASSESSMENT — ACTIVITIES OF DAILY LIVING (ADL): BATHING_REQUIRES_ASSISTANCE: 0

## 2020-09-28 ASSESSMENT — PATIENT HEALTH QUESTIONNAIRE - PHQ9: CLINICAL INTERPRETATION OF PHQ2 SCORE: 0

## 2020-09-28 ASSESSMENT — FIBROSIS 4 INDEX: FIB4 SCORE: 2.22

## 2020-09-28 ASSESSMENT — ENCOUNTER SYMPTOMS: GENERAL WELL-BEING: GOOD

## 2020-10-01 ENCOUNTER — HOSPITAL ENCOUNTER (OUTPATIENT)
Facility: MEDICAL CENTER | Age: 85
End: 2020-10-01
Attending: INTERNAL MEDICINE
Payer: MEDICARE

## 2020-10-01 PROCEDURE — 82274 ASSAY TEST FOR BLOOD FECAL: CPT

## 2020-10-06 DIAGNOSIS — H61.23 BILATERAL HEARING LOSS DUE TO CERUMEN IMPACTION: ICD-10-CM

## 2020-10-09 DIAGNOSIS — Z12.11 COLON CANCER SCREENING: ICD-10-CM

## 2020-10-12 RX ORDER — FLUTICASONE PROPIONATE 50 MCG
SPRAY, SUSPENSION (ML) NASAL
Qty: 48 ML | Refills: 2 | Status: SHIPPED | OUTPATIENT
Start: 2020-10-12 | End: 2021-06-30 | Stop reason: SDUPTHER

## 2020-10-12 NOTE — PROGRESS NOTES
89 y.o. female presents for the following:    Patient comes in for annual physical, health risk assessment review of laboratory. She reports that her health is been good. She is living in an assisted living facility. She is able to perform her activities of daily living. She is walking with a Walker.    Hypertension-blood pressure has been stable on current medications including amlodipine, lisinopril, metoprolol. No sign of an organ disease.  Hypothyroid-clinically euthyroid. Thyroid function tests are normal.  Anxiety-stable on sertraline.  GERD-hiatal hernia-stable. Patient takes Pepcid complete with good results.  Osteoporosis-currently on calcium and vitamin D. Last bone density was September 2015. Forearm score was -3.8 and hip was -1.4.      Annual Wellness Visit/Health Risk Assessment:    Past medical:  Past Medical History:   Diagnosis Date   • Arthritis    • Diverticulitis     medicated   • GERD (gastroesophageal reflux disease)     medicated   • Glaucoma    • Heart burn    • Hiatus hernia syndrome    • Hypertension     medicated   • Primary osteoarthritis of both shoulders 1/22/2019   • Unspecified urinary incontinence        Past surgical:  Past Surgical History:   Procedure Laterality Date   • HIP ARTHROPLASTY TOTAL  6/21/2011    Performed by AMPARO CRAFT at [facility] ORS   • OTHER ORTHOPEDIC SURGERY  2000    back surgery   • GYN SURGERY  1970    hysterectomy   • CHOLECYSTECTOMY  1966    rahat   • OTHER  1954    mikey. breast bx   • OTHER ABDOMINAL SURGERY  1954    kidney tacked up ?   • OTHER  1945    tonsilectomy   • OTHER ORTHOPEDIC SURGERY      osteoporosis - medicated   • US-NEEDLE CORE BX-BREAST PANEL         Family history: relating to possible risk factors for your patient  Family History   Problem Relation Age of Onset   • Diabetes Other    • Heart Disease Other    • Lung Disease Other    • Cancer Sister        Current Providers (including home care/DME’s):   Colonoscopy No repeat  recommended  2/16/18  FIT NEG Dexa 9/22/15  Osteoporosis   Mammo 5/17/19 Cat 2 A1c 1/9/13  5.7   [facility]  [facility]      Patient Care Team:  Mike Otero M.D. as PCP - General  Khloe Jones R.N. (Inactive) as Registered Nurse  [facility] as Home Health Provider  [facility]      Medications:   Current Outpatient Medications Ordered in Epic   Medication Sig Dispense Refill   • amLODIPine (NORVASC) 5 MG Tab Take 1 Tab by mouth every day. 90 Tab 3   • levothyroxine (SYNTHROID) 75 MCG Tab Take 1 Tab by mouth every day. 90 Tab 3   • lisinopril (PRINIVIL) 20 MG Tab TAKE HALF A TABLET BY MOUTH TWICE A DAY 90 Tab 3   • sertraline (ZOLOFT) 25 MG tablet Take 1 Tab by mouth every day. 90 Tab 3   • metoprolol SR (TOPROL XL) 25 MG TABLET SR 24 HR TAKE 1 TABLET BY MOUTH EVERY DAY 90 Tab 3   • Famotidine-Ca Carb-Mag Hydrox -165 MG Chew Tab Take 1 tablet by mouth 2 Times a Day. Indications: Heartburn     • Calcium Carbonate Antacid (TUMS ULTRA 1000) 1000 MG Chew Tab Take 2,000 mg by mouth 2 Times a Day. Indications: Heartburn     • Lactobacillus (PROBIOTIC ACIDOPHILUS) Cap Take 1 Cap by mouth every day at 6 PM. Indications: supplement     • Acetaminophen (ACETAMINOPHEN EXTRA STRENGTH) 500 MG Cap Take 500-1,000 mg by mouth 4 times a day as needed (pain).     • travoprost (TRAVATAN Z) 0.004 % Solution Place 1 Drop in both eyes every day at 6 PM. Indications: Wide-Angle Glaucoma     • meclizine (ANTIVERT) 25 MG Tab Take 25 mg by mouth 3 times a day as needed.     • aspirin 81 MG EC tablet Take 81 mg by mouth every day.     • polyethylene glycol 3350 (MIRALAX) Powder Take 17 g by mouth every day. Indications: Constipation     • naproxen (NAPROSYN) 500 MG Tab TAKE 1 TABLET BY MOUTH TWICE A DAY     • fluticasone (FLONASE) 50 MCG/ACT nasal spray USE ONE SPRAY EACH NOSTRIL TWO TIMES PER DAY. 16 g 6     No current Epic-ordered  facility-administered medications on file.        Supplements (calcium/vitamins): if not lisited in medications    No chief complaint on file.        HPI:  Gloria Patel is a 89 y.o. here for Medicare Annual Wellness Visit     Patient Active Problem List    Diagnosis Date Noted   • Recurrent UTI 01/20/2020   • Renal cyst 09/28/2019   • Bradycardia 08/24/2019   • Primary osteoarthritis of both shoulders 01/22/2019   • TIA (transient ischemic attack) 04/28/2016   • HTN (hypertension) 04/28/2016   • Vision changes 04/28/2016   • Osteoporosis 09/08/2015   • GERD (gastroesophageal reflux disease) 05/22/2012   • Spondylolisthesis of lumbar region 05/22/2012   • Intervertebral disc protrusion 05/22/2012   • Hyperlipidemia 03/07/2012   • Hiatal hernia 11/09/2010   • Hip pain 08/24/2010   • Hypothyroid 08/24/2010   • Overactive bladder 08/24/2010       Current Outpatient Medications   Medication Sig Dispense Refill   • amLODIPine (NORVASC) 5 MG Tab Take 1 Tab by mouth every day. 90 Tab 3   • levothyroxine (SYNTHROID) 75 MCG Tab Take 1 Tab by mouth every day. 90 Tab 3   • lisinopril (PRINIVIL) 20 MG Tab TAKE HALF A TABLET BY MOUTH TWICE A DAY 90 Tab 3   • sertraline (ZOLOFT) 25 MG tablet Take 1 Tab by mouth every day. 90 Tab 3   • metoprolol SR (TOPROL XL) 25 MG TABLET SR 24 HR TAKE 1 TABLET BY MOUTH EVERY DAY 90 Tab 3   • Famotidine-Ca Carb-Mag Hydrox -165 MG Chew Tab Take 1 tablet by mouth 2 Times a Day. Indications: Heartburn     • Calcium Carbonate Antacid (TUMS ULTRA 1000) 1000 MG Chew Tab Take 2,000 mg by mouth 2 Times a Day. Indications: Heartburn     • Lactobacillus (PROBIOTIC ACIDOPHILUS) Cap Take 1 Cap by mouth every day at 6 PM. Indications: supplement     • Acetaminophen (ACETAMINOPHEN EXTRA STRENGTH) 500 MG Cap Take 500-1,000 mg by mouth 4 times a day as needed (pain).     • travoprost (TRAVATAN Z) 0.004 % Solution Place 1 Drop in both eyes every day at 6 PM. Indications: Wide-Angle Glaucoma     •  meclizine (ANTIVERT) 25 MG Tab Take 25 mg by mouth 3 times a day as needed.     • aspirin 81 MG EC tablet Take 81 mg by mouth every day.     • polyethylene glycol 3350 (MIRALAX) Powder Take 17 g by mouth every day. Indications: Constipation     • naproxen (NAPROSYN) 500 MG Tab TAKE 1 TABLET BY MOUTH TWICE A DAY     • fluticasone (FLONASE) 50 MCG/ACT nasal spray USE ONE SPRAY EACH NOSTRIL TWO TIMES PER DAY. 16 g 6     No current facility-administered medications for this visit.             Current supplements as per medication list.       Allergies: Codeine, Flagyl [metronidazole hcl], Keflex, Morphine, and Sulfa drugs    Current social contact/activities:  Social with friends and family.    She  reports that she has never smoked. She has never used smokeless tobacco. She reports current alcohol use. She reports that she does not use drugs.  Counseling given: Not Answered        DPA/Advanced Directive:  Online in EPIC      ROS:    Gait: Uses : Walker  Ostomy: No  Other tubes: no   Amputations: no   Chronic oxygen use: no   Last eye exam: 1 year    : Denies any urinary leakage during the last 6 months incontinence.       Screening:  Colonoscopy No repeat recommended  2/16/18  FIT NEG Dexa 9/22/15  Osteoporosis   Mammo 5/17/19 Cat 2 A1c 1/9/13  5.7   [facility]-Elías  [facility]      POLST/AD    Does the patient have a POLST or Advanced Directive?       Depression Screening    Little interest or pleasure in doing things?  0 - not at all  Feeling down, depressed , or hopeless? 0 - not at all  Patient Health Questionnaire Score: 0     If depressive symptoms identified deferred to follow up visit unless specifically addressed in assessment and plan.    Interpretation of PHQ-9 Total Score   Score Severity   1-4 No Depression   5-9 Mild Depression   10-14 Moderate Depression   15-19 Moderately Severe Depression   20-27 Severe Depression    Screening for Cognitive  Impairment    Three Minute Recall (river, nation, finger) 0/3    Sg clock face with all 12 numbers and set the hands to show 10 past 11.  Yes    Cognitive concerns identified deferred for follow up unless specifically addressed in assessment and plan.    Fall Risk Assessment    Has the patient had two or more falls in the last year or any fall with injury in the last year?  No    Safety Assessment    Throw rugs on floor.  No  Handrails on all stairs.  No  Good lighting in all hallways.  Yes  Difficulty hearing.  No  Patient counseled about all safety risks that were identified.    Functional Assessment ADLs    Are there any barriers preventing you from cooking for yourself or meeting nutritional needs?  No.    Are there any barriers preventing you from driving safely or obtaining transportation?  No.    Are there any barriers preventing you from using a telephone or calling for help?  No.    Are there any barriers preventing you from shopping?  No.    Are there any barriers preventing you from taking care of your own finances?  No.    Are there any barriers preventing you from managing your medications?  No.    Are there any barriers preventing you from showering, bathing or dressing yourself?  No.    Are you currently engaging in any exercise or physical activity?  No.     What is your perception of your health?  Good.      Health Maintenance Summary                IMM DTaP/Tdap/Td Vaccine Overdue 7/9/1943     IMM ZOSTER VACCINES Overdue 4/2/2008      Done 2/6/2008 Imm Admin: Zoster Vaccine Live (ZVL) (Zostavax) - HISTORICAL DATA    Annual Wellness Visit Next Due 9/29/2021      Done 9/28/2020 Visit Dx: Medicare annual wellness visit, subsequent     Patient has more history with this topic...          Patient Care Team:  Mike Otero M.D. as PCP - General  Khloe Jones R.N. (Inactive) as Registered Nurse  [facility] as Home Health Provider  [facility] as Home Health Provider        Social  "History     Tobacco Use   • Smoking status: Never Smoker   • Smokeless tobacco: Never Used   Substance Use Topics   • Alcohol use: Yes     Alcohol/week: 0.0 oz     Comment: occassional wine   • Drug use: No     Family History   Problem Relation Age of Onset   • Diabetes Other    • Heart Disease Other    • Lung Disease Other    • Cancer Sister      She  has a past medical history of Arthritis, Diverticulitis, GERD (gastroesophageal reflux disease), Glaucoma, Heart burn, Hiatus hernia syndrome, Hypertension, Primary osteoarthritis of both shoulders (1/22/2019), and Unspecified urinary incontinence.   Past Surgical History:   Procedure Laterality Date   • HIP ARTHROPLASTY TOTAL  6/21/2011    Performed by AMPARO CRAFT at [facility]   • OTHER ORTHOPEDIC SURGERY  2000    back surgery   • GYN SURGERY  1970    hysterectomy   • CHOLECYSTECTOMY  1966    rahat   • OTHER  1954    mikey. breast bx   • OTHER ABDOMINAL SURGERY  1954    kidney tacked up ?   • OTHER  1945    tonsilectomy   • OTHER ORTHOPEDIC SURGERY      osteoporosis - medicated   • US-NEEDLE CORE BX-BREAST PANEL         Exam:     /70 (BP Location: Left arm, Patient Position: Sitting, BP Cuff Size: Adult)   Pulse 60   Temp 36.9 °C (98.5 °F) (Temporal)   Resp 16   Ht 1.575 m (5' 2\")   Wt 64.4 kg (142 lb)   SpO2 93%  Body mass index is 25.97 kg/m².    Hearing fair.    Dentition good  Alert, oriented in no acute distress.  Eye contact is good, speech goal directed, affect calm  General: Well-appearing. Well-developed. No signs of distress.  HEENT: Grossly normal. Oral cavity is pink and moist.  Neck: Supple without JVD or bruit.  Pulmonary: Clear with good breath sounds. Normal effort.  Cardiovascular: Regular. Carotid and radial pulses are intact.  Abdomen: Soft, nontender, nondistended. Spleen and liver are not enlarged.  Neurologic: Cranial nerves II through XII are grossly normal, alert and oriented x3      Assessment and Plan. The " following treatment and monitoring plan is recommended:    1. Medicare annual wellness visit, subsequent     2. Microcytic anemia     3. Colon cancer screening  OCCULT BLOOD FECES IMMUNOASSAY   4. Essential hypertension     5. Hypothyroidism, unspecified type     6. Pure hypercholesterolemia     7. Recurrent UTI     8. Primary osteoarthritis of both shoulders     9. Primary osteoarthritis of both hands           89-year-old female in overall excellent health.  Blood pressure stable. No change in treatment.  Thyroid is stable. Labs are normal. No change in treatment.  Anxiety stable on Zoloft. No change in treatment.  Reflex issues are stable with over-the-counter H2 blocker with calcium carbonate.  Osteoporosis-discuss treatment options and they would like to proceed with Prolia. I discussed within that this requires commitment every six months and if they want to change to other medication that we should transition her without discontinuing treatment.  Anemia-hemoglobin is 10.6 with low MCV. Iron studies were normal. Recommended fecal immunoassay.  Hyperlipidemia with . I do not recommend starting treatment on [age]-year-old female.  Osteoarthritis-she is previously undergone injections in the shoulder with no benefit.  She has symptoms in the hands.  I recommended evaluation for injections but she is not interested at this time.    Services suggested: No services required at this time  Health Care Screening: Age-appropriate preventive services Medicare covers discussed today and ordered if indicated.  Referrals offered: Community-based lifestyle interventions to reduce health risks and promote self-management and wellness, fall prevention, nutrition, physical activity, tobacco-use cessation, weight loss, and mental health services as per orders if indicated.    Discussion today about general wellness and lifestyle habits:    · Prevent falls and reduce trip hazards; Cautioned about securing or removing  vic.  · Have a working fire alarm and carbon monoxide detector;   · Engage in regular physical activity and social activities       Follow-up: 3 months

## 2020-10-21 LAB — HEMOCCULT STL QL IA: NEGATIVE

## 2020-11-02 ENCOUNTER — APPOINTMENT (OUTPATIENT)
Dept: ONCOLOGY | Facility: MEDICAL CENTER | Age: 85
End: 2020-11-02
Attending: INTERNAL MEDICINE
Payer: MEDICARE

## 2020-11-16 RX ORDER — METOPROLOL SUCCINATE 25 MG/1
25 TABLET, EXTENDED RELEASE ORAL
Qty: 90 TAB | Refills: 3 | Status: SHIPPED | OUTPATIENT
Start: 2020-11-16 | End: 2022-02-19

## 2021-01-11 DIAGNOSIS — Z23 NEED FOR VACCINATION: ICD-10-CM

## 2021-03-15 RX ORDER — LEVOTHYROXINE SODIUM 0.07 MG/1
TABLET ORAL
Qty: 90 TABLET | Refills: 3 | Status: SHIPPED | OUTPATIENT
Start: 2021-03-15 | End: 2022-05-31 | Stop reason: SDUPTHER

## 2021-03-19 ENCOUNTER — HOSPITAL ENCOUNTER (OUTPATIENT)
Facility: MEDICAL CENTER | Age: 86
End: 2021-03-19
Attending: INTERNAL MEDICINE
Payer: MEDICARE

## 2021-03-19 ENCOUNTER — OFFICE VISIT (OUTPATIENT)
Dept: INTERNAL MEDICINE | Facility: IMAGING CENTER | Age: 86
End: 2021-03-19
Payer: MEDICARE

## 2021-03-19 VITALS
HEART RATE: 50 BPM | RESPIRATION RATE: 16 BRPM | DIASTOLIC BLOOD PRESSURE: 80 MMHG | WEIGHT: 146 LBS | OXYGEN SATURATION: 93 % | TEMPERATURE: 98.4 F | BODY MASS INDEX: 26.87 KG/M2 | HEIGHT: 62 IN | SYSTOLIC BLOOD PRESSURE: 128 MMHG

## 2021-03-19 DIAGNOSIS — M81.6 LOCALIZED OSTEOPOROSIS WITHOUT CURRENT PATHOLOGICAL FRACTURE: ICD-10-CM

## 2021-03-19 DIAGNOSIS — I10 ESSENTIAL HYPERTENSION: ICD-10-CM

## 2021-03-19 DIAGNOSIS — E78.00 PURE HYPERCHOLESTEROLEMIA: ICD-10-CM

## 2021-03-19 DIAGNOSIS — D50.9 MICROCYTIC ANEMIA: ICD-10-CM

## 2021-03-19 DIAGNOSIS — E03.9 HYPOTHYROIDISM, UNSPECIFIED TYPE: ICD-10-CM

## 2021-03-19 DIAGNOSIS — E55.9 VITAMIN D DEFICIENCY: ICD-10-CM

## 2021-03-19 LAB
25(OH)D3 SERPL-MCNC: 38 NG/ML (ref 30–100)
ALBUMIN SERPL BCP-MCNC: 4.1 G/DL (ref 3.2–4.9)
ALBUMIN/GLOB SERPL: 1.4 G/DL
ALP SERPL-CCNC: 101 U/L (ref 30–99)
ALT SERPL-CCNC: 10 U/L (ref 2–50)
ANION GAP SERPL CALC-SCNC: 7 MMOL/L (ref 7–16)
APPEARANCE UR: CLEAR
AST SERPL-CCNC: 17 U/L (ref 12–45)
BACTERIA #/AREA URNS HPF: NEGATIVE /HPF
BASOPHILS # BLD AUTO: 0.9 % (ref 0–1.8)
BASOPHILS # BLD: 0.06 K/UL (ref 0–0.12)
BILIRUB SERPL-MCNC: 0.3 MG/DL (ref 0.1–1.5)
BILIRUB UR QL STRIP.AUTO: NEGATIVE
BUN SERPL-MCNC: 23 MG/DL (ref 8–22)
CALCIUM SERPL-MCNC: 9.3 MG/DL (ref 8.5–10.5)
CHLORIDE SERPL-SCNC: 103 MMOL/L (ref 96–112)
CHOLEST SERPL-MCNC: 183 MG/DL (ref 100–199)
CO2 SERPL-SCNC: 27 MMOL/L (ref 20–33)
COLOR UR: YELLOW
CREAT SERPL-MCNC: 1.03 MG/DL (ref 0.5–1.4)
EOSINOPHIL # BLD AUTO: 0.26 K/UL (ref 0–0.51)
EOSINOPHIL NFR BLD: 3.7 % (ref 0–6.9)
EPI CELLS #/AREA URNS HPF: ABNORMAL /HPF
ERYTHROCYTE [DISTWIDTH] IN BLOOD BY AUTOMATED COUNT: 49.5 FL (ref 35.9–50)
FERRITIN SERPL-MCNC: 34.3 NG/ML (ref 10–291)
GLOBULIN SER CALC-MCNC: 3 G/DL (ref 1.9–3.5)
GLUCOSE SERPL-MCNC: 84 MG/DL (ref 65–99)
GLUCOSE UR STRIP.AUTO-MCNC: NEGATIVE MG/DL
HCT VFR BLD AUTO: 41.4 % (ref 37–47)
HDLC SERPL-MCNC: 42 MG/DL
HGB BLD-MCNC: 12.5 G/DL (ref 12–16)
HGB RETIC QN AUTO: 29.4 PG/CELL (ref 29–35)
HYALINE CASTS #/AREA URNS LPF: ABNORMAL /LPF
IMM GRANULOCYTES # BLD AUTO: 0.03 K/UL (ref 0–0.11)
IMM GRANULOCYTES NFR BLD AUTO: 0.4 % (ref 0–0.9)
IMM RETICS NFR: 12 % (ref 9.3–17.4)
IRON SATN MFR SERPL: 29 % (ref 15–55)
IRON SERPL-MCNC: 71 UG/DL (ref 40–170)
KETONES UR STRIP.AUTO-MCNC: NEGATIVE MG/DL
LDLC SERPL CALC-MCNC: 112 MG/DL
LEUKOCYTE ESTERASE UR QL STRIP.AUTO: ABNORMAL
LYMPHOCYTES # BLD AUTO: 2.69 K/UL (ref 1–4.8)
LYMPHOCYTES NFR BLD: 38.3 % (ref 22–41)
MCH RBC QN AUTO: 24.8 PG (ref 27–33)
MCHC RBC AUTO-ENTMCNC: 30.2 G/DL (ref 33.6–35)
MCV RBC AUTO: 82 FL (ref 81.4–97.8)
MICRO URNS: ABNORMAL
MONOCYTES # BLD AUTO: 0.57 K/UL (ref 0–0.85)
MONOCYTES NFR BLD AUTO: 8.1 % (ref 0–13.4)
NEUTROPHILS # BLD AUTO: 3.41 K/UL (ref 2–7.15)
NEUTROPHILS NFR BLD: 48.6 % (ref 44–72)
NITRITE UR QL STRIP.AUTO: NEGATIVE
NRBC # BLD AUTO: 0 K/UL
NRBC BLD-RTO: 0 /100 WBC
PH UR STRIP.AUTO: 6.5 [PH] (ref 5–8)
PLATELET # BLD AUTO: 251 K/UL (ref 164–446)
PMV BLD AUTO: 10.8 FL (ref 9–12.9)
POTASSIUM SERPL-SCNC: 5.6 MMOL/L (ref 3.6–5.5)
PROT SERPL-MCNC: 7.1 G/DL (ref 6–8.2)
PROT UR QL STRIP: NEGATIVE MG/DL
RBC # BLD AUTO: 5.05 M/UL (ref 4.2–5.4)
RBC # URNS HPF: ABNORMAL /HPF
RBC UR QL AUTO: NEGATIVE
RETICS # AUTO: 0.07 M/UL (ref 0.04–0.06)
RETICS/RBC NFR: 1.3 % (ref 0.8–2.1)
SODIUM SERPL-SCNC: 137 MMOL/L (ref 135–145)
SP GR UR STRIP.AUTO: 1.01
T4 FREE SERPL-MCNC: 1.51 NG/DL (ref 0.93–1.7)
TIBC SERPL-MCNC: 245 UG/DL (ref 250–450)
TRIGL SERPL-MCNC: 147 MG/DL (ref 0–149)
TSH SERPL DL<=0.005 MIU/L-ACNC: 2.14 UIU/ML (ref 0.38–5.33)
UIBC SERPL-MCNC: 174 UG/DL (ref 110–370)
UROBILINOGEN UR STRIP.AUTO-MCNC: 0.2 MG/DL
WBC # BLD AUTO: 7 K/UL (ref 4.8–10.8)
WBC #/AREA URNS HPF: ABNORMAL /HPF

## 2021-03-19 PROCEDURE — 83540 ASSAY OF IRON: CPT

## 2021-03-19 PROCEDURE — 84439 ASSAY OF FREE THYROXINE: CPT

## 2021-03-19 PROCEDURE — 80061 LIPID PANEL: CPT

## 2021-03-19 PROCEDURE — 82668 ASSAY OF ERYTHROPOIETIN: CPT

## 2021-03-19 PROCEDURE — 85046 RETICYTE/HGB CONCENTRATE: CPT

## 2021-03-19 PROCEDURE — 84443 ASSAY THYROID STIM HORMONE: CPT

## 2021-03-19 PROCEDURE — 83550 IRON BINDING TEST: CPT

## 2021-03-19 PROCEDURE — 82306 VITAMIN D 25 HYDROXY: CPT

## 2021-03-19 PROCEDURE — 99214 OFFICE O/P EST MOD 30 MIN: CPT | Performed by: INTERNAL MEDICINE

## 2021-03-19 PROCEDURE — 82728 ASSAY OF FERRITIN: CPT

## 2021-03-19 PROCEDURE — 80053 COMPREHEN METABOLIC PANEL: CPT

## 2021-03-19 PROCEDURE — 81001 URINALYSIS AUTO W/SCOPE: CPT

## 2021-03-19 PROCEDURE — 85025 COMPLETE CBC W/AUTO DIFF WBC: CPT

## 2021-03-19 PROCEDURE — 82570 ASSAY OF URINE CREATININE: CPT

## 2021-03-19 PROCEDURE — 82043 UR ALBUMIN QUANTITATIVE: CPT

## 2021-03-19 ASSESSMENT — FIBROSIS 4 INDEX: FIB4 SCORE: 2.22

## 2021-03-19 NOTE — PROGRESS NOTES
Chief Complaint   Patient presents with   • Hypertension   • Hypothyroidism       HISTORY OF THE PRESENT ILLNESS: Patient is a 89 y.o. female.     Patient comes in with her daughter for regular follow-up.  Overall she has been feeling well.  She continues to live at assisted living.  She has become less ambulatory.  She uses a walker but one of her roommates pushes her back to her room after dinner.  She reports that she is walking in the house and hallways.    Hypertension-stable on current medications.  Blood pressure is at goal.  No sign of endorgan disease.    Osteoporosis-stable on Prolia.  She is scheduled to get her next injection in May.  No side effects.    Hypothyroid-stable.  Clinically euthyroid.  Most recent thyroid function tests were normal.  She is due for updated labs.    Recurrent urinary tract infection-stable.  No recent infections.  She is experiencing nocturia which is presumably overactive bladder.      Allergies: Codeine, Flagyl [metronidazole hcl], Keflex, Morphine, and Sulfa drugs    Current Outpatient Medications Ordered in Epic   Medication Sig Dispense Refill   • levothyroxine (SYNTHROID) 75 MCG Tab TAKE 1 TABLET BY MOUTH EVERY DAY 90 tablet 3   • denosumab (PROLIA) 60 MG/ML Solution Prefilled Syringe Inject 1 mL under the skin every 6 months. 1 mL 1   • metoprolol SR (TOPROL XL) 25 MG TABLET SR 24 HR Take 1 Tab by mouth every day. 90 Tab 3   • fluticasone (FLONASE) 50 MCG/ACT nasal spray USE ONE SPRAY EACH NOSTRIL TWO TIMES PER DAY. 48 mL 2   • naproxen (NAPROSYN) 500 MG Tab TAKE 1 TABLET BY MOUTH TWICE A DAY     • amLODIPine (NORVASC) 5 MG Tab Take 1 Tab by mouth every day. 90 Tab 3   • lisinopril (PRINIVIL) 20 MG Tab TAKE HALF A TABLET BY MOUTH TWICE A DAY 90 Tab 3   • sertraline (ZOLOFT) 25 MG tablet Take 1 Tab by mouth every day. 90 Tab 3   • Famotidine-Ca Carb-Mag Hydrox -165 MG Chew Tab Take 1 tablet by mouth 2 Times a Day. Indications: Heartburn     • Calcium Carbonate  "Antacid (TUMS ULTRA 1000) 1000 MG Chew Tab Take 2,000 mg by mouth 2 Times a Day. Indications: Heartburn     • Lactobacillus (PROBIOTIC ACIDOPHILUS) Cap Take 1 Cap by mouth every day at 6 PM. Indications: supplement     • Acetaminophen (ACETAMINOPHEN EXTRA STRENGTH) 500 MG Cap Take 500-1,000 mg by mouth 4 times a day as needed (pain).     • travoprost (TRAVATAN Z) 0.004 % Solution Place 1 Drop in both eyes every day at 6 PM. Indications: Wide-Angle Glaucoma     • meclizine (ANTIVERT) 25 MG Tab Take 25 mg by mouth 3 times a day as needed.     • aspirin 81 MG EC tablet Take 81 mg by mouth every day.     • polyethylene glycol 3350 (MIRALAX) Powder Take 17 g by mouth every day. Indications: Constipation       No current Epic-ordered facility-administered medications on file.       Past medical history, social history and family history were reviewed from chart today    Review of systems: Per HPI.    Denies headache, chest pain, fever, chills, diarrhea, constipation, abdominal pain, palpitations, depression   All others negative.     Exam: /80 (BP Location: Left arm, Patient Position: Sitting, BP Cuff Size: Adult)   Pulse (!) 50   Temp 36.9 °C (98.4 °F) (Temporal)   Resp 16   Ht 1.575 m (5' 2\")   Wt 66.2 kg (146 lb)   SpO2 93%   General: Well-appearing. Well-developed. No signs of distress.  HEENT: Grossly normal. Oral cavity is pink and moist.  Ears: Canals are normal.  There is no cerumen in the canals.  Neck: Supple without JVD or bruit.  Pulmonary: Clear with good breath sounds. Normal effort.  Cardiovascular: Regular. Carotid and radial pulses are intact.  Abdomen: Soft, nontender, nondistended. Spleen and liver are not enlarged.  Neurologic: Cranial nerves II through XII are grossly normal, alert and oriented x3      Diagnosis:  1. Essential hypertension  CBC WITH DIFFERENTIAL    URINALYSIS,CULTURE IF INDICATED    Comp Metabolic Panel    MICROALBUMIN CREAT RATIO URINE   2. Hypothyroidism, unspecified " type  FREE THYROXINE    TSH   3. Microcytic anemia  CBC WITH DIFFERENTIAL    RETICULOCYTES COUNT    ERYTHROPOIETIN    FERRITIN    IRON/TOTAL IRON BIND   4. Pure hypercholesterolemia  Lipid Profile    Comp Metabolic Panel   5. Vitamin D deficiency  VITAMIN D,25 HYDROXY   6. Localized osteoporosis without current pathological fracture  VITAMIN D,25 HYDROXY       89-year-old female.  Overall she is doing very well.    I encouraged increased activity.  She should walk with her walker regularly.  I also encouraged her to consider going to the exercise room and riding the stationary bike.    Hypertension stable on current medications.  No change in treatment.  She is due for updated labs.    History of microcytic anemia.  Negative fecal immunoassay in October 2020.  Iron studies were normal in September 2020.  Suspect her anemia is related to age/chronic disease.    History of mild hyperlipidemia.  No history of vascular disease.  Do not recommend starting a statin.    History of osteoporosis.  Continue Prolia every 6 months.  Check vitamin D and treat as indicated.

## 2021-03-20 LAB
CREAT UR-MCNC: 85.22 MG/DL
MICROALBUMIN UR-MCNC: <1.2 MG/DL
MICROALBUMIN/CREAT UR: NORMAL MG/G (ref 0–30)

## 2021-03-21 LAB — EPO SERPL-ACNC: 13 MU/ML (ref 4–27)

## 2021-04-02 RX ORDER — MIRABEGRON 25 MG/1
25 TABLET, FILM COATED, EXTENDED RELEASE ORAL DAILY
Qty: 30 TABLET | Refills: 3 | Status: SHIPPED | OUTPATIENT
Start: 2021-04-02 | End: 2021-12-21

## 2021-05-20 DIAGNOSIS — I10 ESSENTIAL HYPERTENSION: ICD-10-CM

## 2021-05-20 RX ORDER — AMLODIPINE BESYLATE 5 MG/1
5 TABLET ORAL
Qty: 90 TABLET | Refills: 3 | Status: SHIPPED | OUTPATIENT
Start: 2021-05-20 | End: 2022-03-14

## 2021-05-20 RX ORDER — SERTRALINE HYDROCHLORIDE 25 MG/1
25 TABLET, FILM COATED ORAL DAILY
Qty: 90 TABLET | Refills: 3 | Status: SHIPPED | OUTPATIENT
Start: 2021-05-20 | End: 2022-05-31 | Stop reason: SDUPTHER

## 2021-05-20 RX ORDER — LISINOPRIL 20 MG/1
TABLET ORAL
Qty: 90 TABLET | Refills: 3 | Status: SHIPPED | OUTPATIENT
Start: 2021-05-20 | End: 2022-05-25

## 2021-06-30 RX ORDER — FLUTICASONE PROPIONATE 50 MCG
1 SPRAY, SUSPENSION (ML) NASAL 2 TIMES DAILY
Qty: 48 ML | Refills: 2 | Status: SHIPPED | OUTPATIENT
Start: 2021-06-30 | End: 2021-07-30

## 2021-09-20 ENCOUNTER — OFFICE VISIT (OUTPATIENT)
Dept: INTERNAL MEDICINE | Facility: IMAGING CENTER | Age: 86
End: 2021-09-20
Payer: MEDICARE

## 2021-09-20 ENCOUNTER — HOSPITAL ENCOUNTER (OUTPATIENT)
Facility: MEDICAL CENTER | Age: 86
End: 2021-09-20
Attending: INTERNAL MEDICINE
Payer: MEDICARE

## 2021-09-20 VITALS
DIASTOLIC BLOOD PRESSURE: 72 MMHG | TEMPERATURE: 97 F | RESPIRATION RATE: 16 BRPM | SYSTOLIC BLOOD PRESSURE: 128 MMHG | OXYGEN SATURATION: 93 % | HEART RATE: 83 BPM | WEIGHT: 143 LBS | BODY MASS INDEX: 26.16 KG/M2

## 2021-09-20 DIAGNOSIS — M81.0 AGE-RELATED OSTEOPOROSIS WITHOUT CURRENT PATHOLOGICAL FRACTURE: ICD-10-CM

## 2021-09-20 DIAGNOSIS — R26.89 BALANCE DISORDER: ICD-10-CM

## 2021-09-20 DIAGNOSIS — I10 ESSENTIAL HYPERTENSION: ICD-10-CM

## 2021-09-20 DIAGNOSIS — S90.811A ABRASION OF SKIN OF RIGHT FOOT: ICD-10-CM

## 2021-09-20 DIAGNOSIS — N32.81 OVERACTIVE BLADDER: ICD-10-CM

## 2021-09-20 DIAGNOSIS — E03.9 HYPOTHYROIDISM, UNSPECIFIED TYPE: ICD-10-CM

## 2021-09-20 DIAGNOSIS — J30.1 SEASONAL ALLERGIC RHINITIS DUE TO POLLEN: ICD-10-CM

## 2021-09-20 DIAGNOSIS — F32.0 CURRENT MILD EPISODE OF MAJOR DEPRESSIVE DISORDER WITHOUT PRIOR EPISODE (HCC): ICD-10-CM

## 2021-09-20 DIAGNOSIS — R13.14 PHARYNGOESOPHAGEAL DYSPHAGIA: ICD-10-CM

## 2021-09-20 PROCEDURE — 99214 OFFICE O/P EST MOD 30 MIN: CPT | Performed by: INTERNAL MEDICINE

## 2021-09-20 PROCEDURE — 84443 ASSAY THYROID STIM HORMONE: CPT

## 2021-09-20 PROCEDURE — 80053 COMPREHEN METABOLIC PANEL: CPT

## 2021-09-20 PROCEDURE — 84439 ASSAY OF FREE THYROXINE: CPT

## 2021-09-20 RX ORDER — LORATADINE 10 MG/1
10 TABLET ORAL DAILY
Qty: 90 TABLET | Refills: 3 | Status: SHIPPED | OUTPATIENT
Start: 2021-09-20 | End: 2021-12-02 | Stop reason: SDUPTHER

## 2021-09-20 ASSESSMENT — FIBROSIS 4 INDEX: FIB4 SCORE: 2.08

## 2021-09-21 ENCOUNTER — TELEPHONE (OUTPATIENT)
Dept: INTERNAL MEDICINE | Facility: IMAGING CENTER | Age: 86
End: 2021-09-21

## 2021-09-21 LAB
ALBUMIN SERPL BCP-MCNC: 4.2 G/DL (ref 3.2–4.9)
ALBUMIN/GLOB SERPL: 1.3 G/DL
ALP SERPL-CCNC: 92 U/L (ref 30–99)
ALT SERPL-CCNC: 6 U/L (ref 2–50)
ANION GAP SERPL CALC-SCNC: 13 MMOL/L (ref 7–16)
AST SERPL-CCNC: 15 U/L (ref 12–45)
BILIRUB SERPL-MCNC: 0.3 MG/DL (ref 0.1–1.5)
BUN SERPL-MCNC: 25 MG/DL (ref 8–22)
CALCIUM SERPL-MCNC: 9.3 MG/DL (ref 8.5–10.5)
CHLORIDE SERPL-SCNC: 103 MMOL/L (ref 96–112)
CO2 SERPL-SCNC: 25 MMOL/L (ref 20–33)
CREAT SERPL-MCNC: 0.86 MG/DL (ref 0.5–1.4)
GLOBULIN SER CALC-MCNC: 3.2 G/DL (ref 1.9–3.5)
GLUCOSE SERPL-MCNC: 94 MG/DL (ref 65–99)
POTASSIUM SERPL-SCNC: 4.4 MMOL/L (ref 3.6–5.5)
PROT SERPL-MCNC: 7.4 G/DL (ref 6–8.2)
SODIUM SERPL-SCNC: 141 MMOL/L (ref 135–145)
T4 FREE SERPL-MCNC: 1.27 NG/DL (ref 0.93–1.7)
TSH SERPL DL<=0.005 MIU/L-ACNC: 2.76 UIU/ML (ref 0.38–5.33)

## 2021-09-21 NOTE — PROGRESS NOTES
Chief Complaint   Patient presents with   • Hypertension   • Skin Lesion     Right foot   • Hypothyroidism       HISTORY OF THE PRESENT ILLNESS: Patient is a 89 y.o. female.     Patient comes in with her daughter for routine follow-up.  She complains of the following acute issues:    She has a sore on her right foot.  No bleeding or discharge.  It does not seem to heal.  Is been present for possibly 1 month or longer.  She has not tried any specific treatment.    Complains that food gets stuck when she swallows.  Occasionally she will have discomfort in the mid chest with eating.  It typically spontaneously resolves.  There is mild to moderate pain.  Symptoms seem worse with dry, solid foods such as fish, chicken and nuts.    She complains of sinus congestion and cough.  She has been using Flonase more than directed.    We discussed her chronic issues:    Hypertension-blood pressure has been stable on amlodipine and lisinopril.    Hypothyroid-clinically euthyroid.  Last thyroid tests were in March 2021 and were normal.  She is compliant with her medication, takes daily as directed.    Overactive bladder-stable with Myrbetriq.    Depression-some increase in symptoms possibly related to isolation.  She is at an assisted living.  She has developed some friendships there but mostly stays in her room.  She is on low-dose sertraline.    Osteoporosis-she has been receiving Prolia every 6 months.  Her last prescription was in March 2021 although the date of administration is unknown..  She is due for repeat injection in November.    Allergies: Codeine, Flagyl [metronidazole hcl], Keflex, Morphine, and Sulfa drugs    Current Outpatient Medications Ordered in Epic   Medication Sig Dispense Refill   • loratadine (CLARITIN) 10 MG Tab Take 1 Tablet by mouth every day. 90 Tablet 3   • lisinopril (PRINIVIL) 20 MG Tab TAKE HALF A TABLET BY MOUTH TWICE A DAY 90 tablet 3   • amLODIPine (NORVASC) 5 MG Tab Take 1 tablet by mouth every  day. 90 tablet 3   • sertraline (ZOLOFT) 25 MG tablet Take 1 tablet by mouth every day. 90 tablet 3   • Mirabegron ER (MYRBETRIQ) 25 MG TABLET SR 24 HR Take 25 mg by mouth every day. 30 tablet 3   • levothyroxine (SYNTHROID) 75 MCG Tab TAKE 1 TABLET BY MOUTH EVERY DAY 90 tablet 3   • denosumab (PROLIA) 60 MG/ML Solution Prefilled Syringe Inject 1 mL under the skin every 6 months. 1 mL 1   • metoprolol SR (TOPROL XL) 25 MG TABLET SR 24 HR Take 1 Tab by mouth every day. 90 Tab 3   • naproxen (NAPROSYN) 500 MG Tab TAKE 1 TABLET BY MOUTH TWICE A DAY     • Famotidine-Ca Carb-Mag Hydrox -165 MG Chew Tab Take 1 tablet by mouth 2 Times a Day. Indications: Heartburn     • Calcium Carbonate Antacid (TUMS ULTRA 1000) 1000 MG Chew Tab Take 2,000 mg by mouth 2 Times a Day. Indications: Heartburn     • Lactobacillus (PROBIOTIC ACIDOPHILUS) Cap Take 1 Cap by mouth every day at 6 PM. Indications: supplement     • Acetaminophen (ACETAMINOPHEN EXTRA STRENGTH) 500 MG Cap Take 500-1,000 mg by mouth 4 times a day as needed (pain).     • travoprost (TRAVATAN Z) 0.004 % Solution Place 1 Drop in both eyes every day at 6 PM. Indications: Wide-Angle Glaucoma     • meclizine (ANTIVERT) 25 MG Tab Take 25 mg by mouth 3 times a day as needed.     • aspirin 81 MG EC tablet Take 81 mg by mouth every day.     • polyethylene glycol 3350 (MIRALAX) Powder Take 17 g by mouth every day. Indications: Constipation       No current Epic-ordered facility-administered medications on file.       Past medical history, social history and family history were reviewed from chart today    Review of systems: Per HPI.    Denies headache, chest pain, fever, chills, diarrhea, constipation, abdominal pain, palpitations, depression   All others negative.     Exam: /72 (BP Location: Left arm, Patient Position: Sitting, BP Cuff Size: Adult)   Pulse 83   Temp 36.1 °C (97 °F) (Temporal)   Resp 16   Wt 64.9 kg (143 lb)   SpO2 93%   General: Well-appearing.  Well-developed. No signs of distress.  HEENT: Grossly normal. Oral cavity is pink and moist.  Neck: Supple without JVD or bruit.  Pulmonary: Clear with good breath sounds. Normal effort.  Cardiovascular: Regular. Carotid and radial pulses are intact.  Abdomen: Soft, nontender, nondistended. Spleen and liver are not enlarged.  Neurologic: Cranial nerves II through XII are grossly normal, alert and oriented x3  Skin: The top of right foot has a 1 x 1 cm superficial skin abrasion.  No sign of infection.    Diagnosis:  1. Hypothyroidism, unspecified type  TSH    FREE THYROXINE   2. Essential hypertension  Comp Metabolic Panel   3. Overactive bladder     4. Current mild episode of major depressive disorder without prior episode (HCC)     5. Age-related osteoporosis without current pathological fracture     6. Abrasion of skin of right foot     7. Pharyngoesophageal dysphagia     8. Seasonal allergic rhinitis due to pollen     9. Balance disorder         Overall patient is doing well.  Thyroid is stable.  No change in treatment.  I would like to update her laboratory.  Blood pressure stable.  I would like to update her laboratory.  She is on an ACE inhibitor and her last potassium was slightly elevated.  Creatinine has been stable.  Overactive bladder issues are stable on Myrbetriq.  No subsequent hypertension with the beta 3 stimulant.  Depression issues are stable/worse.  This may be due to the isolation.  I do not recommend adjusting medication at this time.  Continue Prolia for osteoporosis.  The lesion on her right foot appears to be an abrasion.  Encouraged covering and not wearing compression hose.  I also encouraged her to try and wear different shoes which may be exacerbating the problem.  Matthieu not to take more than 1 spray in each nostril of Flonase.  We will add Claritin for her seasonal allergy symptoms.  Continue from a walker at all times for balance issues.  This is further complicated by arthritis in  the knees.

## 2021-12-02 RX ORDER — LORATADINE 10 MG/1
10 TABLET ORAL DAILY
Qty: 90 TABLET | Refills: 3 | Status: SHIPPED | OUTPATIENT
Start: 2021-12-02 | End: 2021-12-21

## 2021-12-21 ENCOUNTER — APPOINTMENT (OUTPATIENT)
Dept: RADIOLOGY | Facility: MEDICAL CENTER | Age: 86
End: 2021-12-21
Attending: EMERGENCY MEDICINE
Payer: MEDICARE

## 2021-12-21 ENCOUNTER — HOSPITAL ENCOUNTER (OUTPATIENT)
Facility: MEDICAL CENTER | Age: 86
End: 2021-12-23
Attending: EMERGENCY MEDICINE | Admitting: STUDENT IN AN ORGANIZED HEALTH CARE EDUCATION/TRAINING PROGRAM
Payer: MEDICARE

## 2021-12-21 DIAGNOSIS — M43.16 SPONDYLOLISTHESIS OF LUMBAR REGION: ICD-10-CM

## 2021-12-21 DIAGNOSIS — M25.559 HIP PAIN: ICD-10-CM

## 2021-12-21 DIAGNOSIS — H25.9 AGE-RELATED CATARACT OF BOTH EYES, UNSPECIFIED AGE-RELATED CATARACT TYPE: ICD-10-CM

## 2021-12-21 DIAGNOSIS — M19.012 PRIMARY OSTEOARTHRITIS OF BOTH SHOULDERS: ICD-10-CM

## 2021-12-21 DIAGNOSIS — M81.0 OSTEOPOROSIS, UNSPECIFIED OSTEOPOROSIS TYPE, UNSPECIFIED PATHOLOGICAL FRACTURE PRESENCE: ICD-10-CM

## 2021-12-21 DIAGNOSIS — S82.841A ANKLE FRACTURE, BIMALLEOLAR, CLOSED, RIGHT, INITIAL ENCOUNTER: ICD-10-CM

## 2021-12-21 DIAGNOSIS — M19.011 PRIMARY OSTEOARTHRITIS OF BOTH SHOULDERS: ICD-10-CM

## 2021-12-21 LAB
ALBUMIN SERPL BCP-MCNC: 4 G/DL (ref 3.2–4.9)
ALBUMIN/GLOB SERPL: 1.4 G/DL
ALP SERPL-CCNC: 101 U/L (ref 30–99)
ALT SERPL-CCNC: 10 U/L (ref 2–50)
ANION GAP SERPL CALC-SCNC: 7 MMOL/L (ref 7–16)
APTT PPP: 33.8 SEC (ref 24.7–36)
AST SERPL-CCNC: 17 U/L (ref 12–45)
BASOPHILS # BLD AUTO: 0.8 % (ref 0–1.8)
BASOPHILS # BLD: 0.04 K/UL (ref 0–0.12)
BILIRUB SERPL-MCNC: 0.3 MG/DL (ref 0.1–1.5)
BUN SERPL-MCNC: 24 MG/DL (ref 8–22)
CALCIUM SERPL-MCNC: 8.7 MG/DL (ref 8.5–10.5)
CHLORIDE SERPL-SCNC: 104 MMOL/L (ref 96–112)
CO2 SERPL-SCNC: 24 MMOL/L (ref 20–33)
CREAT SERPL-MCNC: 0.85 MG/DL (ref 0.5–1.4)
EOSINOPHIL # BLD AUTO: 0.22 K/UL (ref 0–0.51)
EOSINOPHIL NFR BLD: 4.3 % (ref 0–6.9)
ERYTHROCYTE [DISTWIDTH] IN BLOOD BY AUTOMATED COUNT: 47.3 FL (ref 35.9–50)
GLOBULIN SER CALC-MCNC: 2.8 G/DL (ref 1.9–3.5)
GLUCOSE SERPL-MCNC: 141 MG/DL (ref 65–99)
HCT VFR BLD AUTO: 40.3 % (ref 37–47)
HGB BLD-MCNC: 12.8 G/DL (ref 12–16)
IMM GRANULOCYTES # BLD AUTO: 0.01 K/UL (ref 0–0.11)
IMM GRANULOCYTES NFR BLD AUTO: 0.2 % (ref 0–0.9)
INR PPP: 1.03 (ref 0.87–1.13)
LYMPHOCYTES # BLD AUTO: 2.01 K/UL (ref 1–4.8)
LYMPHOCYTES NFR BLD: 39.6 % (ref 22–41)
MCH RBC QN AUTO: 26.7 PG (ref 27–33)
MCHC RBC AUTO-ENTMCNC: 31.8 G/DL (ref 33.6–35)
MCV RBC AUTO: 84 FL (ref 81.4–97.8)
MONOCYTES # BLD AUTO: 0.26 K/UL (ref 0–0.85)
MONOCYTES NFR BLD AUTO: 5.1 % (ref 0–13.4)
NEUTROPHILS # BLD AUTO: 2.53 K/UL (ref 2–7.15)
NEUTROPHILS NFR BLD: 50 % (ref 44–72)
NRBC # BLD AUTO: 0 K/UL
NRBC BLD-RTO: 0 /100 WBC
PLATELET # BLD AUTO: 198 K/UL (ref 164–446)
PMV BLD AUTO: 10.1 FL (ref 9–12.9)
POTASSIUM SERPL-SCNC: 4.8 MMOL/L (ref 3.6–5.5)
PROT SERPL-MCNC: 6.8 G/DL (ref 6–8.2)
PROTHROMBIN TIME: 13.2 SEC (ref 12–14.6)
RBC # BLD AUTO: 4.8 M/UL (ref 4.2–5.4)
SARS-COV+SARS-COV-2 AG RESP QL IA.RAPID: NOTDETECTED
SODIUM SERPL-SCNC: 135 MMOL/L (ref 135–145)
SPECIMEN SOURCE: NORMAL
WBC # BLD AUTO: 5.1 K/UL (ref 4.8–10.8)

## 2021-12-21 PROCEDURE — G0378 HOSPITAL OBSERVATION PER HR: HCPCS

## 2021-12-21 PROCEDURE — 73600 X-RAY EXAM OF ANKLE: CPT | Mod: RT

## 2021-12-21 PROCEDURE — 700105 HCHG RX REV CODE 258: Performed by: EMERGENCY MEDICINE

## 2021-12-21 PROCEDURE — 87426 SARSCOV CORONAVIRUS AG IA: CPT

## 2021-12-21 PROCEDURE — 700111 HCHG RX REV CODE 636 W/ 250 OVERRIDE (IP): Performed by: EMERGENCY MEDICINE

## 2021-12-21 PROCEDURE — 85730 THROMBOPLASTIN TIME PARTIAL: CPT

## 2021-12-21 PROCEDURE — 80053 COMPREHEN METABOLIC PANEL: CPT

## 2021-12-21 PROCEDURE — 99220 PR INITIAL OBSERVATION CARE,LEVL III: CPT | Performed by: STUDENT IN AN ORGANIZED HEALTH CARE EDUCATION/TRAINING PROGRAM

## 2021-12-21 PROCEDURE — 96374 THER/PROPH/DIAG INJ IV PUSH: CPT

## 2021-12-21 PROCEDURE — 700102 HCHG RX REV CODE 250 W/ 637 OVERRIDE(OP): Performed by: STUDENT IN AN ORGANIZED HEALTH CARE EDUCATION/TRAINING PROGRAM

## 2021-12-21 PROCEDURE — 73590 X-RAY EXAM OF LOWER LEG: CPT | Mod: RT

## 2021-12-21 PROCEDURE — 27840 TREAT ANKLE DISLOCATION: CPT

## 2021-12-21 PROCEDURE — 96375 TX/PRO/DX INJ NEW DRUG ADDON: CPT | Mod: XU

## 2021-12-21 PROCEDURE — 29515 APPLICATION SHORT LEG SPLINT: CPT

## 2021-12-21 PROCEDURE — 27788 TREATMENT OF ANKLE FRACTURE: CPT

## 2021-12-21 PROCEDURE — 302875 HCHG BANDAGE ACE 4 OR 6""

## 2021-12-21 PROCEDURE — 94799 UNLISTED PULMONARY SVC/PX: CPT

## 2021-12-21 PROCEDURE — 306637 HCHG MISC ORTHO ITEM RC 0274

## 2021-12-21 PROCEDURE — 85025 COMPLETE CBC W/AUTO DIFF WBC: CPT

## 2021-12-21 PROCEDURE — 27825 TREAT LOWER LEG FRACTURE: CPT

## 2021-12-21 PROCEDURE — 85610 PROTHROMBIN TIME: CPT

## 2021-12-21 PROCEDURE — A9270 NON-COVERED ITEM OR SERVICE: HCPCS | Performed by: STUDENT IN AN ORGANIZED HEALTH CARE EDUCATION/TRAINING PROGRAM

## 2021-12-21 PROCEDURE — 99285 EMERGENCY DEPT VISIT HI MDM: CPT

## 2021-12-21 PROCEDURE — 99222 1ST HOSP IP/OBS MODERATE 55: CPT | Mod: 57 | Performed by: STUDENT IN AN ORGANIZED HEALTH CARE EDUCATION/TRAINING PROGRAM

## 2021-12-21 PROCEDURE — 96376 TX/PRO/DX INJ SAME DRUG ADON: CPT | Mod: XU

## 2021-12-21 RX ORDER — LATANOPROST 50 UG/ML
1 SOLUTION/ DROPS OPHTHALMIC EVERY EVENING
Status: DISCONTINUED | OUTPATIENT
Start: 2021-12-22 | End: 2021-12-23 | Stop reason: HOSPADM

## 2021-12-21 RX ORDER — METOPROLOL SUCCINATE 25 MG/1
25 TABLET, EXTENDED RELEASE ORAL
Status: DISCONTINUED | OUTPATIENT
Start: 2021-12-21 | End: 2021-12-21

## 2021-12-21 RX ORDER — METOPROLOL SUCCINATE 25 MG/1
12.5 TABLET, EXTENDED RELEASE ORAL
Status: DISCONTINUED | OUTPATIENT
Start: 2021-12-21 | End: 2021-12-23 | Stop reason: HOSPADM

## 2021-12-21 RX ORDER — ENALAPRILAT 1.25 MG/ML
1.25 INJECTION INTRAVENOUS EVERY 6 HOURS PRN
Status: DISCONTINUED | OUTPATIENT
Start: 2021-12-21 | End: 2021-12-23 | Stop reason: HOSPADM

## 2021-12-21 RX ORDER — LEVOTHYROXINE SODIUM 0.07 MG/1
75 TABLET ORAL
Status: DISCONTINUED | OUTPATIENT
Start: 2021-12-21 | End: 2021-12-23 | Stop reason: HOSPADM

## 2021-12-21 RX ORDER — TRAVOPROST OPHTHALMIC SOLUTION 0.04 MG/ML
1 SOLUTION OPHTHALMIC EVERY EVENING
Status: DISCONTINUED | OUTPATIENT
Start: 2021-12-22 | End: 2021-12-21

## 2021-12-21 RX ORDER — ACETAMINOPHEN 325 MG/1
650 TABLET ORAL EVERY 6 HOURS PRN
Status: DISCONTINUED | OUTPATIENT
Start: 2021-12-21 | End: 2021-12-23

## 2021-12-21 RX ORDER — POLYETHYLENE GLYCOL 3350 17 G/17G
1 POWDER, FOR SOLUTION ORAL
Status: DISCONTINUED | OUTPATIENT
Start: 2021-12-21 | End: 2021-12-23 | Stop reason: HOSPADM

## 2021-12-21 RX ORDER — SODIUM CHLORIDE, SODIUM LACTATE, POTASSIUM CHLORIDE, CALCIUM CHLORIDE 600; 310; 30; 20 MG/100ML; MG/100ML; MG/100ML; MG/100ML
1000 INJECTION, SOLUTION INTRAVENOUS
Status: COMPLETED | OUTPATIENT
Start: 2021-12-21 | End: 2021-12-22

## 2021-12-21 RX ORDER — LISINOPRIL 20 MG/1
20 TABLET ORAL
Status: DISCONTINUED | OUTPATIENT
Start: 2021-12-22 | End: 2021-12-23 | Stop reason: HOSPADM

## 2021-12-21 RX ORDER — LABETALOL HYDROCHLORIDE 5 MG/ML
10 INJECTION, SOLUTION INTRAVENOUS EVERY 4 HOURS PRN
Status: DISCONTINUED | OUTPATIENT
Start: 2021-12-21 | End: 2021-12-21

## 2021-12-21 RX ORDER — AMOXICILLIN 250 MG
2 CAPSULE ORAL 2 TIMES DAILY
Status: DISCONTINUED | OUTPATIENT
Start: 2021-12-21 | End: 2021-12-23 | Stop reason: HOSPADM

## 2021-12-21 RX ORDER — FLUTICASONE PROPIONATE 50 MCG
1 SPRAY, SUSPENSION (ML) NASAL DAILY
Status: SHIPPED | COMMUNITY
Start: 2021-09-26 | End: 2022-11-23 | Stop reason: SDUPTHER

## 2021-12-21 RX ORDER — BISACODYL 10 MG
10 SUPPOSITORY, RECTAL RECTAL
Status: DISCONTINUED | OUTPATIENT
Start: 2021-12-21 | End: 2021-12-23 | Stop reason: HOSPADM

## 2021-12-21 RX ORDER — AMLODIPINE BESYLATE 5 MG/1
5 TABLET ORAL
Status: DISCONTINUED | OUTPATIENT
Start: 2021-12-21 | End: 2021-12-23 | Stop reason: HOSPADM

## 2021-12-21 RX ORDER — PROPOFOL 10 MG/ML
0.5 INJECTION, EMULSION INTRAVENOUS ONCE
Status: COMPLETED | OUTPATIENT
Start: 2021-12-21 | End: 2021-12-21

## 2021-12-21 RX ADMIN — METOPROLOL SUCCINATE 12.5 MG: 25 TABLET, EXTENDED RELEASE ORAL at 23:30

## 2021-12-21 RX ADMIN — FENTANYL CITRATE 50 MCG: 50 INJECTION, SOLUTION INTRAMUSCULAR; INTRAVENOUS at 22:08

## 2021-12-21 RX ADMIN — PROPOFOL 30 MG: 10 INJECTION, EMULSION INTRAVENOUS at 20:57

## 2021-12-21 RX ADMIN — FENTANYL CITRATE 25 MCG: 50 INJECTION, SOLUTION INTRAMUSCULAR; INTRAVENOUS at 18:39

## 2021-12-21 RX ADMIN — SODIUM CHLORIDE, POTASSIUM CHLORIDE, SODIUM LACTATE AND CALCIUM CHLORIDE 1000 ML: 600; 310; 30; 20 INJECTION, SOLUTION INTRAVENOUS at 20:37

## 2021-12-21 RX ADMIN — SENNOSIDES AND DOCUSATE SODIUM 2 TABLET: 50; 8.6 TABLET ORAL at 22:09

## 2021-12-21 ASSESSMENT — ENCOUNTER SYMPTOMS
SHORTNESS OF BREATH: 0
SORE THROAT: 0
FEVER: 0
CHILLS: 0
FALLS: 1
DIARRHEA: 0
COUGH: 0
DIZZINESS: 0
VOMITING: 0
HEADACHES: 0
NAUSEA: 0
ABDOMINAL PAIN: 0

## 2021-12-21 ASSESSMENT — FIBROSIS 4 INDEX: FIB4 SCORE: 2.37

## 2021-12-21 ASSESSMENT — LIFESTYLE VARIABLES: DO YOU DRINK ALCOHOL: NO

## 2021-12-22 ENCOUNTER — ANESTHESIA EVENT (OUTPATIENT)
Dept: SURGERY | Facility: MEDICAL CENTER | Age: 86
End: 2021-12-22
Payer: MEDICARE

## 2021-12-22 ENCOUNTER — APPOINTMENT (OUTPATIENT)
Dept: RADIOLOGY | Facility: MEDICAL CENTER | Age: 86
End: 2021-12-22
Attending: ORTHOPAEDIC SURGERY
Payer: MEDICARE

## 2021-12-22 ENCOUNTER — ANESTHESIA (OUTPATIENT)
Dept: SURGERY | Facility: MEDICAL CENTER | Age: 86
End: 2021-12-22
Payer: MEDICARE

## 2021-12-22 LAB
ANION GAP SERPL CALC-SCNC: 9 MMOL/L (ref 7–16)
BUN SERPL-MCNC: 19 MG/DL (ref 8–22)
CALCIUM SERPL-MCNC: 8.2 MG/DL (ref 8.5–10.5)
CHLORIDE SERPL-SCNC: 105 MMOL/L (ref 96–112)
CO2 SERPL-SCNC: 22 MMOL/L (ref 20–33)
CREAT SERPL-MCNC: 0.58 MG/DL (ref 0.5–1.4)
EKG IMPRESSION: NORMAL
GLUCOSE SERPL-MCNC: 119 MG/DL (ref 65–99)
POTASSIUM SERPL-SCNC: 4.2 MMOL/L (ref 3.6–5.5)
SODIUM SERPL-SCNC: 136 MMOL/L (ref 135–145)

## 2021-12-22 PROCEDURE — G0378 HOSPITAL OBSERVATION PER HR: HCPCS

## 2021-12-22 PROCEDURE — A9270 NON-COVERED ITEM OR SERVICE: HCPCS | Performed by: STUDENT IN AN ORGANIZED HEALTH CARE EDUCATION/TRAINING PROGRAM

## 2021-12-22 PROCEDURE — 160029 HCHG SURGERY MINUTES - 1ST 30 MINS LEVEL 4: Performed by: ORTHOPAEDIC SURGERY

## 2021-12-22 PROCEDURE — C1713 ANCHOR/SCREW BN/BN,TIS/BN: HCPCS | Performed by: ORTHOPAEDIC SURGERY

## 2021-12-22 PROCEDURE — 502000 HCHG MISC OR IMPLANTS RC 0278: Performed by: ORTHOPAEDIC SURGERY

## 2021-12-22 PROCEDURE — 160035 HCHG PACU - 1ST 60 MINS PHASE I: Performed by: ORTHOPAEDIC SURGERY

## 2021-12-22 PROCEDURE — 500881 HCHG PACK, EXTREMITY: Performed by: ORTHOPAEDIC SURGERY

## 2021-12-22 PROCEDURE — 96376 TX/PRO/DX INJ SAME DRUG ADON: CPT | Mod: XU

## 2021-12-22 PROCEDURE — 501838 HCHG SUTURE GENERAL: Performed by: ORTHOPAEDIC SURGERY

## 2021-12-22 PROCEDURE — 64445 NJX AA&/STRD SCIATIC NRV IMG: CPT | Performed by: ORTHOPAEDIC SURGERY

## 2021-12-22 PROCEDURE — 96365 THER/PROPH/DIAG IV INF INIT: CPT | Mod: XU

## 2021-12-22 PROCEDURE — 700101 HCHG RX REV CODE 250: Performed by: ORTHOPAEDIC SURGERY

## 2021-12-22 PROCEDURE — 700111 HCHG RX REV CODE 636 W/ 250 OVERRIDE (IP): Performed by: STUDENT IN AN ORGANIZED HEALTH CARE EDUCATION/TRAINING PROGRAM

## 2021-12-22 PROCEDURE — 160009 HCHG ANES TIME/MIN: Performed by: ORTHOPAEDIC SURGERY

## 2021-12-22 PROCEDURE — 700102 HCHG RX REV CODE 250 W/ 637 OVERRIDE(OP): Performed by: STUDENT IN AN ORGANIZED HEALTH CARE EDUCATION/TRAINING PROGRAM

## 2021-12-22 PROCEDURE — 99224 PR SUBSEQUENT OBSERVATION CARE,LEVEL I: CPT | Performed by: NURSE PRACTITIONER

## 2021-12-22 PROCEDURE — 160041 HCHG SURGERY MINUTES - EA ADDL 1 MIN LEVEL 4: Performed by: ORTHOPAEDIC SURGERY

## 2021-12-22 PROCEDURE — 93005 ELECTROCARDIOGRAM TRACING: CPT | Performed by: ORTHOPAEDIC SURGERY

## 2021-12-22 PROCEDURE — 160048 HCHG OR STATISTICAL LEVEL 1-5: Performed by: ORTHOPAEDIC SURGERY

## 2021-12-22 PROCEDURE — 93010 ELECTROCARDIOGRAM REPORT: CPT | Performed by: INTERNAL MEDICINE

## 2021-12-22 PROCEDURE — 27822 TREATMENT OF ANKLE FRACTURE: CPT | Mod: RT | Performed by: ORTHOPAEDIC SURGERY

## 2021-12-22 PROCEDURE — 500054 HCHG BANDAGE, ELASTIC 6: Performed by: ORTHOPAEDIC SURGERY

## 2021-12-22 PROCEDURE — 27829 TREAT LOWER LEG JOINT: CPT | Performed by: ORTHOPAEDIC SURGERY

## 2021-12-22 PROCEDURE — 80048 BASIC METABOLIC PNL TOTAL CA: CPT

## 2021-12-22 PROCEDURE — 700105 HCHG RX REV CODE 258: Performed by: ANESTHESIOLOGY

## 2021-12-22 PROCEDURE — 73600 X-RAY EXAM OF ANKLE: CPT | Mod: RT

## 2021-12-22 PROCEDURE — 160002 HCHG RECOVERY MINUTES (STAT): Performed by: ORTHOPAEDIC SURGERY

## 2021-12-22 PROCEDURE — 700101 HCHG RX REV CODE 250: Performed by: ANESTHESIOLOGY

## 2021-12-22 PROCEDURE — 700111 HCHG RX REV CODE 636 W/ 250 OVERRIDE (IP): Performed by: ORTHOPAEDIC SURGERY

## 2021-12-22 PROCEDURE — 700111 HCHG RX REV CODE 636 W/ 250 OVERRIDE (IP): Performed by: ANESTHESIOLOGY

## 2021-12-22 DEVICE — SCREW BONE VARIAX T10 FULL THREAD L12 MM OD3.5 MM FOOT ANKLE LOCK STARDRIVE NONSTERILE: Type: IMPLANTABLE DEVICE | Site: ANKLE | Status: FUNCTIONAL

## 2021-12-22 DEVICE — IMPLANTABLE DEVICE: Type: IMPLANTABLE DEVICE | Site: ANKLE | Status: FUNCTIONAL

## 2021-12-22 DEVICE — SCREW BONE VARIAX T10 FULL THREAD L14 MM OD3.5 MM FOOT ANKLE STARDRIVE NONSTERILE: Type: IMPLANTABLE DEVICE | Site: ANKLE | Status: FUNCTIONAL

## 2021-12-22 DEVICE — SCREW BONE VARIAX T10 FULL THREAD L12 MM OD3.5 MM FOOT ANKLE STARDRIVE NONSTERILE: Type: IMPLANTABLE DEVICE | Site: ANKLE | Status: FUNCTIONAL

## 2021-12-22 DEVICE — SCREW BONE ASNIS III TITANIUM PARTIAL THREAD REVERSE CUT FLUTE 40 MM 4 MM CANNULATED HEAD: Type: IMPLANTABLE DEVICE | Site: ANKLE | Status: FUNCTIONAL

## 2021-12-22 DEVICE — SCREW BONE VARIAX T10 FULL THREAD L16 MM OD3.5 MM FOOT ANKLE LOCK STARDRIVE NONSTERILE: Type: IMPLANTABLE DEVICE | Site: ANKLE | Status: FUNCTIONAL

## 2021-12-22 RX ORDER — CEFAZOLIN SODIUM 1 G/3ML
INJECTION, POWDER, FOR SOLUTION INTRAMUSCULAR; INTRAVENOUS PRN
Status: DISCONTINUED | OUTPATIENT
Start: 2021-12-22 | End: 2021-12-22 | Stop reason: SURG

## 2021-12-22 RX ORDER — PHENYLEPHRINE HYDROCHLORIDE 10 MG/ML
INJECTION, SOLUTION INTRAMUSCULAR; INTRAVENOUS; SUBCUTANEOUS PRN
Status: DISCONTINUED | OUTPATIENT
Start: 2021-12-22 | End: 2021-12-22 | Stop reason: SURG

## 2021-12-22 RX ORDER — DIPHENHYDRAMINE HYDROCHLORIDE 50 MG/ML
12.5 INJECTION INTRAMUSCULAR; INTRAVENOUS
Status: DISCONTINUED | OUTPATIENT
Start: 2021-12-22 | End: 2021-12-23 | Stop reason: HOSPADM

## 2021-12-22 RX ORDER — ACETAMINOPHEN 10 MG/ML
1000 INJECTION, SOLUTION INTRAVENOUS ONCE
Status: COMPLETED | OUTPATIENT
Start: 2021-12-22 | End: 2021-12-22

## 2021-12-22 RX ORDER — HALOPERIDOL 5 MG/ML
1 INJECTION INTRAMUSCULAR
Status: DISCONTINUED | OUTPATIENT
Start: 2021-12-22 | End: 2021-12-23

## 2021-12-22 RX ORDER — BUPIVACAINE HYDROCHLORIDE 5 MG/ML
INJECTION, SOLUTION EPIDURAL; INTRACAUDAL PRN
Status: DISCONTINUED | OUTPATIENT
Start: 2021-12-22 | End: 2021-12-22 | Stop reason: SURG

## 2021-12-22 RX ORDER — DEXAMETHASONE SODIUM PHOSPHATE 4 MG/ML
INJECTION, SOLUTION INTRA-ARTICULAR; INTRALESIONAL; INTRAMUSCULAR; INTRAVENOUS; SOFT TISSUE PRN
Status: DISCONTINUED | OUTPATIENT
Start: 2021-12-22 | End: 2021-12-22 | Stop reason: SURG

## 2021-12-22 RX ORDER — CEFAZOLIN SODIUM 2 G/100ML
2 INJECTION, SOLUTION INTRAVENOUS EVERY 8 HOURS
Status: DISPENSED | OUTPATIENT
Start: 2021-12-22 | End: 2021-12-23

## 2021-12-22 RX ORDER — ROCURONIUM BROMIDE 10 MG/ML
INJECTION, SOLUTION INTRAVENOUS PRN
Status: DISCONTINUED | OUTPATIENT
Start: 2021-12-22 | End: 2021-12-22 | Stop reason: SURG

## 2021-12-22 RX ORDER — SODIUM CHLORIDE, SODIUM LACTATE, POTASSIUM CHLORIDE, CALCIUM CHLORIDE 600; 310; 30; 20 MG/100ML; MG/100ML; MG/100ML; MG/100ML
INJECTION, SOLUTION INTRAVENOUS
Status: DISCONTINUED | OUTPATIENT
Start: 2021-12-22 | End: 2021-12-22 | Stop reason: SURG

## 2021-12-22 RX ORDER — BUPIVACAINE HYDROCHLORIDE AND EPINEPHRINE 5; 5 MG/ML; UG/ML
INJECTION, SOLUTION EPIDURAL; INTRACAUDAL; PERINEURAL
Status: DISCONTINUED | OUTPATIENT
Start: 2021-12-22 | End: 2021-12-22 | Stop reason: HOSPADM

## 2021-12-22 RX ORDER — PHENYLEPHRINE HCL IN 0.9% NACL 0.5 MG/5ML
SYRINGE (ML) INTRAVENOUS PRN
Status: DISCONTINUED | OUTPATIENT
Start: 2021-12-22 | End: 2021-12-22 | Stop reason: SURG

## 2021-12-22 RX ORDER — ONDANSETRON 2 MG/ML
4 INJECTION INTRAMUSCULAR; INTRAVENOUS
Status: DISCONTINUED | OUTPATIENT
Start: 2021-12-22 | End: 2021-12-23 | Stop reason: HOSPADM

## 2021-12-22 RX ORDER — ONDANSETRON 2 MG/ML
INJECTION INTRAMUSCULAR; INTRAVENOUS PRN
Status: DISCONTINUED | OUTPATIENT
Start: 2021-12-22 | End: 2021-12-22 | Stop reason: SURG

## 2021-12-22 RX ORDER — PROPOFOL 10 MG/ML
INJECTION, EMULSION INTRAVENOUS PRN
Status: DISCONTINUED | OUTPATIENT
Start: 2021-12-22 | End: 2021-12-22 | Stop reason: SURG

## 2021-12-22 RX ADMIN — CEFAZOLIN SODIUM 2 G: 2 INJECTION, SOLUTION INTRAVENOUS at 16:49

## 2021-12-22 RX ADMIN — PHENYLEPHRINE HYDROCHLORIDE 100 MCG: 10 INJECTION INTRAVENOUS at 08:22

## 2021-12-22 RX ADMIN — FENTANYL CITRATE 25 MCG: 50 INJECTION, SOLUTION INTRAMUSCULAR; INTRAVENOUS at 01:47

## 2021-12-22 RX ADMIN — ACETAMINOPHEN 1000 MG: 10 INJECTION, SOLUTION INTRAVENOUS at 08:46

## 2021-12-22 RX ADMIN — EPHEDRINE SULFATE 10 MG: 50 INJECTION, SOLUTION INTRAVENOUS at 08:22

## 2021-12-22 RX ADMIN — CEFAZOLIN 2 G: 330 INJECTION, POWDER, FOR SOLUTION INTRAMUSCULAR; INTRAVENOUS at 08:22

## 2021-12-22 RX ADMIN — EPHEDRINE SULFATE 5 MG: 50 INJECTION, SOLUTION INTRAVENOUS at 09:01

## 2021-12-22 RX ADMIN — Medication 100 MCG: at 09:01

## 2021-12-22 RX ADMIN — ROCURONIUM BROMIDE 30 MG: 10 INJECTION, SOLUTION INTRAVENOUS at 08:10

## 2021-12-22 RX ADMIN — PROPOFOL 75 MCG/KG/MIN: 10 INJECTION, EMULSION INTRAVENOUS at 08:12

## 2021-12-22 RX ADMIN — SODIUM CHLORIDE, POTASSIUM CHLORIDE, SODIUM LACTATE AND CALCIUM CHLORIDE: 600; 310; 30; 20 INJECTION, SOLUTION INTRAVENOUS at 08:39

## 2021-12-22 RX ADMIN — SODIUM CHLORIDE, POTASSIUM CHLORIDE, SODIUM LACTATE AND CALCIUM CHLORIDE: 600; 310; 30; 20 INJECTION, SOLUTION INTRAVENOUS at 08:06

## 2021-12-22 RX ADMIN — DEXAMETHASONE SODIUM PHOSPHATE 1 MG: 4 INJECTION, SOLUTION INTRA-ARTICULAR; INTRALESIONAL; INTRAMUSCULAR; INTRAVENOUS; SOFT TISSUE at 08:17

## 2021-12-22 RX ADMIN — SERTRALINE 25 MG: 50 TABLET, FILM COATED ORAL at 05:12

## 2021-12-22 RX ADMIN — LISINOPRIL 20 MG: 20 TABLET ORAL at 05:12

## 2021-12-22 RX ADMIN — BUPIVACAINE HYDROCHLORIDE 25 MG: 5 INJECTION, SOLUTION EPIDURAL; INTRACAUDAL; PERINEURAL at 08:17

## 2021-12-22 RX ADMIN — ONDANSETRON 4 MG: 2 INJECTION INTRAMUSCULAR; INTRAVENOUS at 09:12

## 2021-12-22 RX ADMIN — PROPOFOL 100 MG: 10 INJECTION, EMULSION INTRAVENOUS at 08:10

## 2021-12-22 RX ADMIN — EPHEDRINE SULFATE 10 MG: 50 INJECTION, SOLUTION INTRAVENOUS at 08:39

## 2021-12-22 RX ADMIN — LATANOPROST 1 DROP: 50 SOLUTION OPHTHALMIC at 18:08

## 2021-12-22 RX ADMIN — GLYCOPYRROLATE 0.1 MG: 0.2 INJECTION INTRAMUSCULAR; INTRAVENOUS at 08:22

## 2021-12-22 RX ADMIN — FENTANYL CITRATE 50 MCG: 50 INJECTION, SOLUTION INTRAMUSCULAR; INTRAVENOUS at 08:10

## 2021-12-22 RX ADMIN — ACETAMINOPHEN 650 MG: 325 TABLET, FILM COATED ORAL at 21:42

## 2021-12-22 RX ADMIN — PHENYLEPHRINE HYDROCHLORIDE 100 MCG: 10 INJECTION INTRAVENOUS at 08:39

## 2021-12-22 ASSESSMENT — PAIN SCALES - GENERAL: PAIN_LEVEL: 0

## 2021-12-22 ASSESSMENT — LIFESTYLE VARIABLES
HAVE PEOPLE ANNOYED YOU BY CRITICIZING YOUR DRINKING: NO
HAVE YOU EVER FELT YOU SHOULD CUT DOWN ON YOUR DRINKING: NO
TOTAL SCORE: 0
DOES PATIENT WANT TO STOP DRINKING: NO
EVER FELT BAD OR GUILTY ABOUT YOUR DRINKING: NO
ON A TYPICAL DAY WHEN YOU DRINK ALCOHOL HOW MANY DRINKS DO YOU HAVE: 0
HOW MANY TIMES IN THE PAST YEAR HAVE YOU HAD 5 OR MORE DRINKS IN A DAY: 0
CONSUMPTION TOTAL: NEGATIVE
ALCOHOL_USE: NO
TOTAL SCORE: 0
TOTAL SCORE: 0
AVERAGE NUMBER OF DAYS PER WEEK YOU HAVE A DRINK CONTAINING ALCOHOL: 0
EVER HAD A DRINK FIRST THING IN THE MORNING TO STEADY YOUR NERVES TO GET RID OF A HANGOVER: NO

## 2021-12-22 ASSESSMENT — PATIENT HEALTH QUESTIONNAIRE - PHQ9
1. LITTLE INTEREST OR PLEASURE IN DOING THINGS: NOT AT ALL
SUM OF ALL RESPONSES TO PHQ9 QUESTIONS 1 AND 2: 0
2. FEELING DOWN, DEPRESSED, IRRITABLE, OR HOPELESS: NOT AT ALL

## 2021-12-22 ASSESSMENT — PAIN DESCRIPTION - PAIN TYPE
TYPE: ACUTE PAIN
TYPE: ACUTE PAIN

## 2021-12-22 NOTE — OR NURSING
0926: Pt arrived from OR post ORIF R ankle. Pt is asleep. Sight dressing is CDI; R LE cap refill in brisk. Cardiac rhythm appears to be SB 50's.    0945: Pt more awake; reports some confusion; will need further reorientation. Pt denies nausea. Pt reports no pain.     1015: Updated Tejal, pt's daughter. Report to CHETAN Begum.     1020: Dressing still CDI; pt can wiggle toes; brisk cap refill on R LE toes; pt reports numbness RLE; otherwise neurologically intact.     1038: Pt back to room via bed with pt transport. Pt on 1 L. Oxygen tank is full.

## 2021-12-22 NOTE — ED NOTES
Med rec  Updated and complete. Allergies reviewed and updated Pt confirmed name and  Date of birth. Pt denies antibiotic use in last 30 days.    Home pharmacy -[phone]

## 2021-12-22 NOTE — CARE PLAN
The patient is Stable - Low risk of patient condition declining or worsening    Shift Goals  Clinical Goals: Sx this AM, pain control  Patient Goals: pain control  Family Goals: not present    Progress made toward(s) clinical / shift goals:  ORIF performed.  Pt currently has no pain.  Problem: Pain - Standard  Goal: Alleviation of pain or a reduction in pain to the patient’s comfort goal  Outcome: Progressing     Problem: Knowledge Deficit - Standard  Goal: Patient and family/care givers will demonstrate understanding of plan of care, disease process/condition, diagnostic tests and medications  Outcome: Progressing     Problem: Fall Risk  Goal: Patient will remain free from falls  Outcome: Progressing       Patient is not progressing towards the following goals: N/A

## 2021-12-22 NOTE — ANESTHESIA PREPROCEDURE EVALUATION
Case: [id number] Date/Time: 12/22/21 0745    Procedure: OPEN REDUCTION AND INTERNAL FIXATION, ANKLE (Right )    Location: [facility] OR 16 / SURGERY [facility]    Surgeons: Pop Causey M.D.          Relevant Problems   NEURO   (positive) TIA (transient ischemic attack)      CARDIAC   (positive) HTN (hypertension)      GI   (positive) GERD (gastroesophageal reflux disease)   (positive) Hiatal hernia         (positive) Renal cyst      ENDO   (positive) Hypothyroid       Physical Exam    Airway   Mallampati: II  TM distance: >3 FB  Neck ROM: full       Cardiovascular - normal exam  Rhythm: regular  Rate: normal  (-) murmur     Dental - normal exam           Pulmonary - normal exam  Breath sounds clear to auscultation     Abdominal    Neurological - normal exam                 Anesthesia Plan    ASA 1       Plan - general       Airway plan will be LMA        Plan Factors:   Patient was previously instructed to abstain from smoking on day of procedure.  Patient did not smoke on day of procedure.      Induction: intravenous    Postoperative Plan: Postoperative administration of opioids is intended.    Pertinent diagnostic labs and testing reviewed    Informed Consent:    Anesthetic plan and risks discussed with patient.    Use of blood products discussed with: patient whom consented to blood products.

## 2021-12-22 NOTE — ANESTHESIA PROCEDURE NOTES
Peripheral Block    Date/Time: 12/22/2021 8:17 AM  Performed by: Dawit Leal M.D.  Authorized by: Dawit Leal M.D.     Patient Location:  OR  Start Time:  12/22/2021 8:17 AM  Reason for Block: at surgeon's request and post-op pain management ONLY    patient identified, IV checked, site marked, risks and benefits discussed, surgical consent, monitors and equipment checked, pre-op evaluation and timeout performed    Patient Position:  Supine  Prep: ChloraPrep    Monitoring:  Heart rate, continuous pulse ox and cardiac monitor  Block Region:  Lower Extremity  Lower Extremity - Block Type:  SCIATIC nerve block, lateral approach    Laterality:  Right  Procedures: ultrasound guided  Image captured, interpreted and electronically stored.  Local Infiltration:  Lidocaine  Strength:  1 %  Dose:  3 ml  Block Type:  Single-shot  Needle Length:  100mm  Needle Gauge:  21 G  Needle Localization:  Ultrasound guidance  Injection Assessment:  Negative aspiration for heme, no paresthesia on injection, incremental injection and local visualized surrounding nerve on ultrasound

## 2021-12-22 NOTE — PROGRESS NOTES
Patient care assumed. Report received from Maribel RN    Assessment completed. Pt A&Ox 4. Respirations are even and unlabored on 2 liters nasal cannula. Pt reports severe 10/10 R ankle pain at this time. Interventions provided (see MAR)  VS stable, call light and belongings within reach. POC updated (Sx in AM, pain control). Pt educated on room and call light, pt verbalized understanding. Communication board updated. Needs met.

## 2021-12-22 NOTE — ED NOTES
2048 Propofol 30 mg IV given  2049 Right ankle reduced by Dr Jones.  2050 Splint being applied. HR 50, /65, sats 99% on 2L via NC.ET 26.  2055 Family at bedside.

## 2021-12-22 NOTE — ANESTHESIA PROCEDURE NOTES
Airway    Date/Time: 12/22/2021 8:11 AM  Performed by: Dawit Leal M.D.  Authorized by: Dawit Leal M.D.     Location:  OR  Urgency:  Elective  Indications for Airway Management:  Anesthesia      Spontaneous Ventilation: absent    Sedation Level:  Deep  Preoxygenated: Yes    Final Airway Type:  Supraglottic airway  Final Supraglottic Airway:  Standard LMA    SGA Size:  3  Number of Attempts at Approach:  1

## 2021-12-22 NOTE — PROGRESS NOTES
Report received from CHETAN Jaimes.  Patient brought up to the CDU by transport form the PACU.  Pt assessed and started on post-op vitals.  Patient A & O  X 4.  No apparent signs of distress or complaints of pain.  Post op orders released.  Rt leg is elevated and capillary refill assessed.  Purewick in place.  Safety precautions in place.  Patient educated to call for assistance. Hourly rounding in place.

## 2021-12-22 NOTE — CONSULTS
Orthopaedic Consult / H&P Note      CHIEF COMPLAINT: Right closed ankle fracture dislocation    HISTORY OF PRESENT ILLNESS: Gloria Patel is a 89 y.o. who presents with a displaced closed right ankle fracture dislocation after a ground level fall. The patient uses a front wheeled walker that has a seat at baseline. She reports that she was bending down to grab something under her walker when she twisted her right ankle and fell to the ground. She was brought to the ED from the facility she lives at where she was diagnosed with fracture dislocation of the right ankle for which orthopedics was consulted.    On interview this morning, she denies any numbness or loss of sensation to the right lower extremity. She denies injury to her other extremities. She reports that prior to this she was in her normal state of good health. She has had no previous injury to this ankle. She does have history of a right CHEN in the remote past.    Past Medical History:   Diagnosis Date   • Arthritis    • Diverticulitis     medicated   • GERD (gastroesophageal reflux disease)     medicated   • Glaucoma    • Heart burn    • Hiatus hernia syndrome    • Hypertension     medicated   • Primary osteoarthritis of both shoulders 1/22/2019   • Unspecified urinary incontinence        Past Surgical History:   Procedure Laterality Date   • HIP ARTHROPLASTY TOTAL  6/21/2011    Performed by AMPARO CRAFT at SURGERY [facility] ORS   • OTHER ORTHOPEDIC SURGERY  2000    back surgery   • GYN SURGERY  1970    hysterectomy   • CHOLECYSTECTOMY  1966    rahat   • OTHER  1954    mikey. breast bx   • OTHER ABDOMINAL SURGERY  1954    kidney tacked up ?   • OTHER  1945    tonsilectomy   • OTHER ORTHOPEDIC SURGERY      osteoporosis - medicated   • US-NEEDLE CORE BX-BREAST PANEL         No current facility-administered medications on file prior to encounter.     Current Outpatient Medications on File Prior to Encounter   Medication Sig Dispense Refill   •  fluticasone (FLONASE) 50 MCG/ACT nasal spray Administer 1 Spray into affected nostril(S) every day.     • loratadine (CLARITIN) 10 MG Tab Take 1 Tablet by mouth every day. 90 Tablet 3   • lisinopril (PRINIVIL) 20 MG Tab TAKE HALF A TABLET BY MOUTH TWICE A DAY 90 tablet 3   • amLODIPine (NORVASC) 5 MG Tab Take 1 tablet by mouth every day. 90 tablet 3   • sertraline (ZOLOFT) 25 MG tablet Take 1 tablet by mouth every day. 90 tablet 3   • Mirabegron ER (MYRBETRIQ) 25 MG TABLET SR 24 HR Take 25 mg by mouth every day. 30 tablet 3   • levothyroxine (SYNTHROID) 75 MCG Tab TAKE 1 TABLET BY MOUTH EVERY DAY 90 tablet 3   • metoprolol SR (TOPROL XL) 25 MG TABLET SR 24 HR Take 1 Tab by mouth every day. 90 Tab 3   • naproxen (NAPROSYN) 500 MG Tab TAKE 1 TABLET BY MOUTH TWICE A DAY     • Famotidine-Ca Carb-Mag Hydrox -165 MG Chew Tab Take 1 tablet by mouth 2 Times a Day. Indications: Heartburn     • Calcium Carbonate Antacid (TUMS ULTRA 1000) 1000 MG Chew Tab Take 2,000 mg by mouth 2 Times a Day. Indications: Heartburn     • Lactobacillus (PROBIOTIC ACIDOPHILUS) Cap Take 1 Cap by mouth every day at 6 PM. Indications: supplement     • travoprost (TRAVATAN Z) 0.004 % Solution Place 1 Drop in both eyes every day at 6 PM. Indications: Wide-Angle Glaucoma     • meclizine (ANTIVERT) 25 MG Tab Take 25 mg by mouth 3 times a day as needed.     • aspirin 81 MG EC tablet Take 81 mg by mouth every day.     • polyethylene glycol 3350 (MIRALAX) Powder Take 17 g by mouth every day. Indications: Constipation         Allergies: Codeine, Flagyl [metronidazole hcl], Keflex, Morphine, and Sulfa drugs    Social History     Socioeconomic History   • Marital status:      Spouse name: Not on file   • Number of children: Not on file   • Years of education: Not on file   • Highest education level: Not on file   Occupational History   • Not on file   Tobacco Use   • Smoking status: Never Smoker   • Smokeless tobacco: Never Used   Substance and  Sexual Activity   • Alcohol use: Yes     Alcohol/week: 0.0 oz     Comment: occassional wine   • Drug use: No   • Sexual activity: Not on file   Other Topics Concern   • Not on file   Social History Narrative   • Not on file     Social Determinants of Health     Financial Resource Strain:    • Difficulty of Paying Living Expenses: Not on file   Food Insecurity:    • Worried About Running Out of Food in the Last Year: Not on file   • Ran Out of Food in the Last Year: Not on file   Transportation Needs:    • Lack of Transportation (Medical): Not on file   • Lack of Transportation (Non-Medical): Not on file   Physical Activity:    • Days of Exercise per Week: Not on file   • Minutes of Exercise per Session: Not on file   Stress:    • Feeling of Stress : Not on file   Social Connections:    • Frequency of Communication with Friends and Family: Not on file   • Frequency of Social Gatherings with Friends and Family: Not on file   • Attends [employer] Services: Not on file   • Active Member of Clubs or Organizations: Not on file   • Attends Club or Organization Meetings: Not on file   • Marital Status: Not on file   Intimate Partner Violence:    • Fear of Current or Ex-Partner: Not on file   • Emotionally Abused: Not on file   • Physically Abused: Not on file   • Sexually Abused: Not on file   Housing Stability:    • Unable to Pay for Housing in the Last Year: Not on file   • Number of Places Lived in the Last Year: Not on file   • Unstable Housing in the Last Year: Not on file       Family History   Problem Relation Age of Onset   • Diabetes Other    • Heart Disease Other    • Lung Disease Other    • Cancer Sister        REVIEW OF SYSTEMS: Full 13-point review of systems obtained with positives   noted in the HPI above, all others negative.    PHYSICAL EXAMINATION:  Vitals:    12/21/21 1748 12/21/21 1952   BP: (!) 187/77    Pulse: (!) 54    Resp: 18    Temp: 36.1 °C (97 °F)    TempSrc: Tympanic    SpO2: 93%    Weight:  63.5  "kg (140 lb)   Height:  1.575 m (5' 2\")       GENERAL: No acute distress, alert and oriented x3.  HEENT: Normocephalic, atraumatic  CARDIOVASCULAR: Regular rate  RESPIRATORY: Unlabored  MUSCULOSKELETAL: The right lower extremity is in a short leg splint. Fires EHL/FHL, sensation intact to light touch distally, warm and well perfused distally.    LABORATORY DATA:     Lab Results   Component Value Date/Time    WBC 7.0 03/19/2021 11:20 AM    WBC 7.8 01/09/2013 07:58 AM    RBC 5.05 03/19/2021 11:20 AM    RBC 5.47 (H) 01/09/2013 07:58 AM    HEMOGLOBIN 12.5 03/19/2021 11:20 AM    HEMATOCRIT 41.4 03/19/2021 11:20 AM        Lab Results   Component Value Date/Time    SODIUM 141 09/20/2021 03:30 PM    POTASSIUM 4.4 09/20/2021 03:30 PM    CHLORIDE 103 09/20/2021 03:30 PM    CO2 25 09/20/2021 03:30 PM    GLUCOSE 94 09/20/2021 03:30 PM    BUN 25 (H) 09/20/2021 03:30 PM    CREATININE 0.86 09/20/2021 03:30 PM    CREATININE 0.99 01/09/2013 07:58 AM    BUNCREATRAT 20 01/09/2013 07:58 AM    GLOMRATE >59 08/04/2010 07:50 AM        Lab Results   Component Value Date/Time    PROTHROMBTM 13.9 08/30/2019 09:55 PM    INR 1.05 08/30/2019 09:55 PM          IMAGING: Xray of the right ankle demonstrates a fracture dislocation with a transverse medial malleolus fracture and Cortez C fibula fracture.    ASSESSMENT AND PLAN:     #Closed right ankle fracture dilocation  -Close reduce and splint in short leg splint  -NPO at midnight  -Plan for OR tomorrow with Dr. Causey  -Please obtain post-reduction xrays  -Admit to hospitalist medicine service, will likely require SNF post surgery      Danyel Vee M.D.      "

## 2021-12-22 NOTE — ED PROVIDER NOTES
ED Provider Note    CHIEF COMPLAINT  Chief Complaint   Patient presents with   • Ankle Injury       [facility]  Gloria Patel is a 89 y.o. female who presents after a mechanical fall.  She dropped something in her room and turned to get it when she lost her balance with her walker next to her.  She fell to the ground and has isolated right ankle pain with deformity there.  No open wounds.  No loss of consciousness or syncope.  No headache or neck pain or back pain.  No chest pain or trouble breathing or abdominal pain.  No prior injuries to the right lower extremity.    REVIEW OF SYSTEMS  See HPI for further details. All other systems are negative.     PAST MEDICAL HISTORY   has a past medical history of Arthritis, Diverticulitis, GERD (gastroesophageal reflux disease), Glaucoma, Heart burn, Hiatus hernia syndrome, Hypertension, Primary osteoarthritis of both shoulders (1/22/2019), and Unspecified urinary incontinence.    SOCIAL HISTORY  Social History     Tobacco Use   • Smoking status: Never Smoker   • Smokeless tobacco: Never Used   Substance and Sexual Activity   • Alcohol use: Yes     Alcohol/week: 0.0 oz     Comment: occassional wine   • Drug use: No   • Sexual activity: Not on file       SURGICAL HISTORY   has a past surgical history that includes other (1945); other (1954); other orthopedic surgery; other orthopedic surgery (2000); cholecystectomy (1966); other abdominal surgery (1954); gyn surgery (1970); hip arthroplasty total (6/21/2011); and us-needle core bx-breast panel.    CURRENT MEDICATIONS  Home Medications     Reviewed by Reyna Armstrong R.N. (Registered Nurse) on 12/22/21 at 0152  Med List Status: Complete   Medication Last Dose Status   amLODIPine (NORVASC) 5 MG Tab 12/21/2021 Active   fluticasone (FLONASE) 50 MCG/ACT nasal spray 12/21/2021 Active   levothyroxine (SYNTHROID) 75 MCG Tab 12/21/2021 Active   lisinopril (PRINIVIL) 20 MG Tab 12/21/2021 Active   metoprolol SR (TOPROL XL) 25 MG TABLET  SR 24 HR 12/21/2021 Active   polyethylene glycol 3350 (MIRALAX) Powder 12/21/2021 Active   sertraline (ZOLOFT) 25 MG tablet 12/21/2021 Active   travoprost (TRAVATAN Z) 0.004 % Solution 12/20/2021 Active                ALLERGIES  Allergies   Allergen Reactions   • Codeine    • Flagyl [Metronidazole Hcl]    • Keflex    • Morphine Anaphylaxis     anaphylaxis   • Sulfa Drugs Rash     rash       PHYSICAL EXAM  VITAL SIGNS: BP (!) 187/77   Pulse (!) 54   Temp 36.1 °C (97 °F) (Tympanic)   Resp 18   SpO2 93%   Pulse ox interpretation: I interpret this pulse ox as normal.  Constitutional: Alert in no apparent distress.  HENT: No signs of trauma, Bilateral external ears normal, Nose normal.   Eyes:  Conjunctiva normal, Non-icteric.   Neck: Normal range of motion, No tenderness, Supple, No stridor.   Cardiovascular: Regular rate and rhythm.   Thorax & Lungs: Normal breath sounds, No respiratory distress, No chest tenderness.   Abdomen: Soft, No tenderness, No masses, No pulsatile masses. No peritoneal signs.  Skin: Warm, Dry, No erythema, No rash.   Back: No bony tenderness  Extremities: Intact distal pulses, right ankle tenderness, No cyanosis  Musculoskeletal: Obvious without open wounds.  Has bruising to the medial aspect of the ankle.  Neurovascularly intact distally.  Neurologic: Alert, Normal motor function, Normal sensory function, No focal deficits noted.       DIAGNOSTIC STUDIES / PROCEDURES      LABS  Labs Reviewed   CBC WITH DIFFERENTIAL - Abnormal; Notable for the following components:       Result Value    MCH 26.7 (*)     MCHC 31.8 (*)     All other components within normal limits    Narrative:     Collected By:  Indicate which anticoagulants the patient is on:->NONE   COMP METABOLIC PANEL - Abnormal; Notable for the following components:    Glucose 141 (*)     Bun 24 (*)     Alkaline Phosphatase 101 (*)     All other components within normal limits    Narrative:     Collected By:  Indicate which  anticoagulants the patient is on:->NONE   PROTHROMBIN TIME    Narrative:     Collected By:  Indicate which anticoagulants the patient is on:->NONE   APTT    Narrative:     Collected By:  Indicate which anticoagulants the patient is on:->NONE   SARS-COV ANTIGEN RICARDO    Narrative:     Collected By:  Have you been in close contact with a person who is suspected  or known to be positive for COVID-19 within the last 30 days  (e.g. last seen that person < 30 days ago)->Unknown   ESTIMATED GFR    Narrative:     Collected By:  Indicate which anticoagulants the patient is on:->NONE   BASIC METABOLIC PANEL         RADIOLOGY  DX-ANKLE 2- VIEWS RIGHT   Final Result      Improved alignment of the ankle joint, distal tibial and fibular fractures following closed reduction.      DX-TIBIA AND FIBULA RIGHT   Final Result         Acute displaced fractures of the distal tibia and fibula with dislocated tibiotalar joint.      DX-ANKLE 2- VIEWS RIGHT   Final Result         Acute displaced fractures of the distal tibia and fibula with dislocated tibiotalar joint.            Conscious Sedation Procedure Note    Indication: ankle dislocation and fracture dislocation    Consent: I have discussed with the patient and/or the patient representative the indication, alternatives, and the possible risks and/or complications of the planned procedure and the anesthesia methods. The patient and/or patient representative appear to understand and agree to proceed.    Physician Involvement: The attending physician was present and supervising this procedure.    Pre-Sedation Documentation and Exam: I have personally completed a history, physical exam & review of systems for this patient (see notes).  Vital signs have been reviewed (see flow sheet for vitals).  I have reviewed the patient's history and review of systems.  Airway Assessment: normal, dentition not prohibitive, normal neck range of motion, Mallampati Class II - (soft palate, fauces & uvula  are visible)  f3  Prior History of Anesthesia Complications: none    ASA Classification: Class 2 - A normal healthy patient with mild systemic disease    Sedation/ Anesthesia Plan: intravenous sedation    Medications Used: propofol intravenously    Monitoring and Safety: The patient was placed on a cardiac monitor and vital signs, pulse oximetry and level of consciousness were continuously evaluated throughout the procedure. The patient was closely monitored until recovery from the medications was complete and the patient had returned to baseline status. Respiratory therapy was on standby at all times during the procedure.      (The following sections must be completed)  Post-Sedation Vital Signs: Vital signs were reviewed and were stable after the procedure (see flow sheet for vitals)            Intraservice Time: Greater than 10 minutes    Post-Sedation Exam: Lungs: clear to auscultation bilaterally and Cardiovascular: normal, regular rate and rhythm           Complications: none    I provided both the sedation and procedure, a nurse was present at the bedside for the entire procedure.     Joint Reduction Procedure Note    Indication: Joint dislocation and fracture    Consent: The patient was counseled regarding the procedure, it's indications, risks, potential complications and alternatives and any questions were answered. Consent was obtained.    Procedure: The pre-reduction exam showed distal perfusion & neurologic function to be normal. The patient was placed in the appropriate position. Anesthesia/pain control was obtained using conscious sedation with propofol intravenously. Reduction of the right ankle was performed by direct traction. Post reduction films were obtained and revealed satisfactory reduction. A post-reduction exam revealed distal perfusion & neurologic function to be normal. The affected area was immobilized with a 3 sided short leg splint.    The patient tolerated the procedure  well.    Complications: None      COURSE & MEDICAL DECISION MAKING    Medications   senna-docusate (PERICOLACE or SENOKOT S) 8.6-50 MG per tablet 2 Tablet (2 Tablets Oral Given 12/21/21 2209)     And   polyethylene glycol/lytes (MIRALAX) PACKET 1 Packet (has no administration in time range)     And   magnesium hydroxide (MILK OF MAGNESIA) suspension 30 mL (has no administration in time range)     And   bisacodyl (DULCOLAX) suppository 10 mg (has no administration in time range)   acetaminophen (Tylenol) tablet 650 mg (has no administration in time range)   fentaNYL (SUBLIMAZE) injection 25 mcg (25 mcg Intravenous Given 12/22/21 0147)   amLODIPine (NORVASC) tablet 5 mg (0 mg Oral Held 12/21/21 2330)   levothyroxine (SYNTHROID) tablet 75 mcg (0 mcg Oral Held 12/21/21 2330)   lisinopril (PRINIVIL) tablet 20 mg (has no administration in time range)   sertraline (Zoloft) tablet 25 mg (has no administration in time range)   metoprolol SR (TOPROL XL) tablet 12.5 mg (12.5 mg Oral Given 12/21/21 2330)   enalaprilat (Vasotec) injection 1.25 mg 1 mL (has no administration in time range)   latanoprost (XALATAN) 0.005 % ophthalmic solution 1 Drop (has no administration in time range)   fentaNYL (SUBLIMAZE) injection 25 mcg (25 mcg Intravenous Given 12/21/21 1839)   propofol (DIPRIVAN) 20mL vial injection (RWN) (30 mg Intravenous Given 12/21/21 2057)   lactated ringers (LR) bolus (0 mL Intravenous Stopped 12/22/21 0000)   fentaNYL (SUBLIMAZE) injection 50 mcg (50 mcg Intravenous Given 12/21/21 2208)       Pertinent Labs & Imaging studies reviewed. (See chart for details)  89 y.o. female presenting with right ankle deformity and pain after a mechanical ground-level fall.  She lost her balance when she dropped an object and want to pick it up.  She typically gets around with a walker.  She lives in an assisted living facility.    She denies any other injuries as a result of her fall.  No head, neck, back pain.  No chest pain or  "trouble breathing.  No abdominal pain, nausea, vomiting, diarrhea.  No recent fevers or illness.  Has isolated right lower extremity pain.  X-ray of the region shows obvious deformity to the right ankle with tibiotalar dislocation.    No other signs of significant traumatic injury.  No head trauma.  No chronic anticoagulation.  No neck pain or back pain.  No syncope or loss of consciousness.  No headaches.  No chest pain or trouble breathing.    Patient was provided with procedural sedation and ankle was reduced without complication.  She was placed in a 3 sided splint.  She will be hospitalized under the care of the hospitalist service with orthopedic surgery following.  The patient is unable to care for herself in her current state at an assisted living facility.  I did speak with the on-call orthopedic surgeon who will follow the patient.  Requesting n.p.o. status after midnight.      BP (!) 171/100   Pulse 62   Temp 36.3 °C (97.3 °F) (Temporal)   Resp 20   Ht 1.626 m (5' 4\")   Wt 63.5 kg (140 lb)   SpO2 96%   BMI 24.03 kg/m²       FINAL IMPRESSION  Right ankle fracture  Ground-level fall      Electronically signed by: Wade Jones M.D., 12/21/2021 6:15 PM    "

## 2021-12-22 NOTE — ANESTHESIA POSTPROCEDURE EVALUATION
Patient: Gloria Patel    Procedure Summary     Date: 12/22/21 Room / Location: [place] [address] / SURGERY [facility]    Anesthesia Start: 0806 Anesthesia Stop: 0928    Procedure: OPEN REDUCTION AND INTERNAL FIXATION, ANKLE (Right Ankle) Diagnosis: (Right ankle Fracture)    Surgeons: Pop Causey M.D. Responsible Provider: Dawit Leal M.D.    Anesthesia Type: general ASA Status: 1          Final Anesthesia Type: general  Last vitals  BP   Blood Pressure : 159/67    Temp   36.4 °C (97.6 °F)    Pulse   (!) 59   Resp   18    SpO2   94 %      Anesthesia Post Evaluation    Patient location during evaluation: PACU  Patient participation: complete - patient participated  Level of consciousness: awake and alert  Pain score: 0    Airway patency: patent  Anesthetic complications: no  Cardiovascular status: hemodynamically stable  Respiratory status: acceptable  Hydration status: euvolemic    PONV: none          No complications documented.     Nurse Pain Score: 5 (NPRS)

## 2021-12-22 NOTE — ED TRIAGE NOTES
Brought in by EMS. Pt bending over to  something, right ankle twisted and pt fell. Deformity noted, splint by EMS, IV access and pt medicated with Fentanyl 75 mcg IV. Pt alert and awake. Pt from [facility].

## 2021-12-22 NOTE — ASSESSMENT & PLAN NOTE
-Fall mechanical fall, history of osteoporosis.  Dislocation reduced in the ED  -Plans for surgery on 12/22 by Ortho  -N.p.o. at midnight, pain management  -PT, OT -needs evaluation for postacute needs  -Physiatry consulted

## 2021-12-22 NOTE — CARE PLAN
The patient is Watcher - Medium risk of patient condition declining or worsening    Shift Goals  Clinical Goals: Sx in AM, pain control  Patient Goals: pain control  Family Goals: not present      Problem: Pain - Standard  Goal: Alleviation of pain or a reduction in pain to the patient’s comfort goal  Outcome: Progressing     Problem: Knowledge Deficit - Standard  Goal: Patient and family/care givers will demonstrate understanding of plan of care, disease process/condition, diagnostic tests and medications  Outcome: Progressing     Problem: Fall Risk  Goal: Patient will remain free from falls  Outcome: Progressing

## 2021-12-22 NOTE — PROGRESS NOTES
4 Eyes Skin Assessment Completed by Reyna, CHETAN and CHETAN Nicole.    Head WDL  Ears WDL  Nose WDL  Mouth WDL  Neck WDL  Breast/Chest WDL  Shoulder Blades WDL  Spine WDL  (R) Arm/Elbow/Hand WDL  (L) Arm/Elbow/Hand WDL  Abdomen WDL  Groin WDL  Scrotum/Coccyx/Buttocks WDL  (R) Leg Swelling R ankle fracture, splint in place  (L) Leg WDL  (R) Heel/Foot/Toe Swelling R ankle fracture, splint in place  (L) Heel/Foot/Toe WDL          Devices In Places Pulse Ox and Nasal Cannula      Interventions In Place NC W/Ear Foams, Pillows, Heels Loaded W/Pillows and Pressure Redistribution Mattress    Possible Skin Injury Yes    Pictures Uploaded Into Epic N/A  Wound Consult Placed N/A  RN Wound Prevention Protocol Ordered No

## 2021-12-22 NOTE — HOSPITAL COURSE
Ms. Gloria Patel is a 89-year-old female with a PMHx of HTN, hypothyroidism, glaucoma, TIA, renal cyst, GERD, who presented on 12/21/2021 after having a mechanical fall and complains of right ankle pain thereafter.  Patient lives in an independent living facility at [facility].  Patient reported to use a walker ambulating, and prior to admission, patient noted to have slipped while trying to  something off the ground.  EMS was called and patient was brought to the ER.    In ER, x-rays obtained and noted closed right ankle fracture dislocation.  The ankle was reduced in ER and orthopedics was consulted.  Patient was taken to the OR on 12/22/2021 for an open reduction internal fixation of the right ankle with orthopedics, Dr. Causey.  Patient brought into the observation unit for post procedure monitoring and postacute evaluation needs.    During this course of stay, patient was evaluated by PT/OT of which she was recommended to have postacute placement for further treatments and therapies.  Patient encouraged to follow-up with PCP s/p hospitalization.  Discussed with patient following up with orthopedic surgery in approximately 2 weeks status post surgical intervention.  All questions and concerns answered from patient and family prior to being discharged to advance SNF.  All appropriate prescription sent to the other facility and pain medications are printed until SNF provider takes over.  Patient will be transferred over to [facility] care SNF.

## 2021-12-22 NOTE — H&P
Hospital Medicine History & Physical Note    Date of Service  12/21/2021    Primary Care Physician  Mike Otero M.D.    Consultants  orthopedics    Code Status  Full Code    Chief Complaint  Chief Complaint   Patient presents with   • Ankle Injury       History of Presenting Illness  Gloria Patel is a 89 y.o. female who presented 12/21/2021 with fall.  PMH of hypertension, hypothyroidism, glaucoma.  Comes in following a mechanical fall today.  She lives in independent living and uses walker, slipped while trying to pick something up off the ground.  X-rays in the ED showed closed right ankle fracture dislocation.  Ankle reduced in the ED.  Ortho evaluated and will take to the OR tomorrow.    I discussed the plan of care with patient, family and bedside RN.    Review of Systems  Review of Systems   Constitutional: Negative for chills and fever.   HENT: Negative for sore throat.    Respiratory: Negative for cough and shortness of breath.    Cardiovascular: Negative for chest pain.   Gastrointestinal: Negative for abdominal pain, diarrhea, nausea and vomiting.   Genitourinary: Negative for dysuria and urgency.   Musculoskeletal: Positive for falls and joint pain.   Neurological: Negative for dizziness and headaches.   All other systems reviewed and are negative.      Past Medical History   has a past medical history of Arthritis, Diverticulitis, GERD (gastroesophageal reflux disease), Glaucoma, Heart burn, Hiatus hernia syndrome, Hypertension, Primary osteoarthritis of both shoulders (1/22/2019), and Unspecified urinary incontinence.    Surgical History   has a past surgical history that includes other (1945); other (1954); other orthopedic surgery; other orthopedic surgery (2000); cholecystectomy (1966); other abdominal surgery (1954); gyn surgery (1970); hip arthroplasty total (6/21/2011); and us-needle core bx-breast panel.     Family History  family history includes Cancer in her sister; Diabetes in an  other family member; Heart Disease in an other family member; Lung Disease in an other family member.   Family history reviewed with patient. There is no family history that is pertinent to the chief complaint.     Social History   reports that she has never smoked. She has never used smokeless tobacco. She reports current alcohol use. She reports that she does not use drugs.    Allergies  Allergies   Allergen Reactions   • Morphine Anaphylaxis     anaphylaxis   • Cephalexin Rash     Full body   • Codeine Vomiting and Nausea   • Metronidazole Vomiting and Nausea   • Sulfa Drugs Rash     Full body       Medications  Prior to Admission Medications   Prescriptions Last Dose Informant Patient Reported? Taking?   amLODIPine (NORVASC) 5 MG Tab 12/21/2021 at 0830 Patient No No   Sig: Take 1 tablet by mouth every day.   fluticasone (FLONASE) 50 MCG/ACT nasal spray 12/21/2021 at 0830 Patient Yes No   Sig: Administer 1 Spray into affected nostril(S) every day.   levothyroxine (SYNTHROID) 75 MCG Tab 12/21/2021 at 0830 Patient No No   Sig: TAKE 1 TABLET BY MOUTH EVERY DAY   lisinopril (PRINIVIL) 20 MG Tab 12/21/2021 at 0830 Patient No No   Sig: TAKE HALF A TABLET BY MOUTH TWICE A DAY   metoprolol SR (TOPROL XL) 25 MG TABLET SR 24 HR 12/21/2021 at 0830 Patient No No   Sig: Take 1 Tab by mouth every day.   polyethylene glycol 3350 (MIRALAX) Powder 12/21/2021 at 0830 Patient Yes No   Sig: Take 17 g by mouth every day. Indications: Constipation   sertraline (ZOLOFT) 25 MG tablet 12/21/2021 at 0830 Patient No No   Sig: Take 1 tablet by mouth every day.   travoprost (TRAVATAN Z) 0.004 % Solution 12/20/2021 at 1930 Patient Yes No   Sig: Administer 1 Drop into both eyes every evening. Indications: Wide-Angle Glaucoma      Facility-Administered Medications: None       Physical Exam  Temp:  [36.1 °C (97 °F)] 36.1 °C (97 °F)  Pulse:  [50-55] 50  Resp:  [18-30] 18  BP: (142-187)/(64-77) 158/70  SpO2:  [93 %-100 %] 98 %  Blood Pressure :  (!) 166/71   Temperature: 36.1 °C (97 °F)   Pulse: (!) 51   Respiration: 20   Pulse Oximetry: 100 %       Physical Exam  Constitutional:       Appearance: Normal appearance.   HENT:      Head: Normocephalic and atraumatic.      Mouth/Throat:      Mouth: Mucous membranes are moist.      Pharynx: No oropharyngeal exudate or posterior oropharyngeal erythema.   Eyes:      General: No scleral icterus.  Cardiovascular:      Rate and Rhythm: Normal rate and regular rhythm.      Pulses: Normal pulses.      Heart sounds: Normal heart sounds. No murmur heard.      Pulmonary:      Effort: Pulmonary effort is normal. No respiratory distress.      Breath sounds: Normal breath sounds.   Abdominal:      General: There is no distension.      Palpations: Abdomen is soft.      Tenderness: There is no abdominal tenderness.   Musculoskeletal:      Cervical back: Normal range of motion and neck supple.      Comments: RLE in brace    Skin:     General: Skin is warm and dry.   Neurological:      General: No focal deficit present.      Mental Status: She is alert and oriented to person, place, and time.   Psychiatric:         Mood and Affect: Mood normal.         Laboratory:  Recent Labs     12/21/21 2026   WBC 5.1   RBC 4.80   HEMOGLOBIN 12.8   HEMATOCRIT 40.3   MCV 84.0   MCH 26.7*   MCHC 31.8*   RDW 47.3   PLATELETCT 198   MPV 10.1     Recent Labs     12/21/21 2026   SODIUM 135   POTASSIUM 4.8   CHLORIDE 104   CO2 24   GLUCOSE 141*   BUN 24*   CREATININE 0.85   CALCIUM 8.7     Recent Labs     12/21/21 2026   ALTSGPT 10   ASTSGOT 17   ALKPHOSPHAT 101*   TBILIRUBIN 0.3   GLUCOSE 141*     Recent Labs     12/21/21 2026   APTT 33.8   INR 1.03     No results for input(s): NTPROBNP in the last 72 hours.      No results for input(s): TROPONINT in the last 72 hours.    Imaging:  DX-ANKLE 2- VIEWS RIGHT   Final Result      Improved alignment of the ankle joint, distal tibial and fibular fractures following closed reduction.      DX-TIBIA AND  FIBULA RIGHT   Final Result         Acute displaced fractures of the distal tibia and fibula with dislocated tibiotalar joint.      DX-ANKLE 2- VIEWS RIGHT   Final Result         Acute displaced fractures of the distal tibia and fibula with dislocated tibiotalar joint.          X-Ray:  I have personally reviewed the images and compared with prior images.    Assessment/Plan:  I anticipate this patient is appropriate for observation status at this time.    * Ankle fracture, bimalleolar, closed, right, initial encounter- (present on admission)  Assessment & Plan  Fall mechanical fall, history of osteoporosis.  Dislocation reduced in the ED  Plans for surgery tomorrow by Ortho  N.p.o. at midnight, pain management  PT, OT    HTN (hypertension)- (present on admission)  Assessment & Plan  Continue lisinopril, amlodipine, metoprolol    Bradycardia- (present on admission)  Assessment & Plan  Asymptomatic bradycardia in the low 50s  On metoprolol, reduced home dose this admission    Hypothyroid- (present on admission)  Assessment & Plan  Continue levothyroxine      VTE prophylaxis: pharmacologic prophylaxis contraindicated due to surgery tomorrow

## 2021-12-22 NOTE — ANESTHESIA TIME REPORT
Anesthesia Start and Stop Event Times     Date Time Event    12/22/2021 0756 Ready for Procedure     0806 Anesthesia Start     0928 Anesthesia Stop        Responsible Staff  12/22/21    Name Role Begin End    aDwit Leal M.D. Anesth 0806 0928        Preop Diagnosis (Free Text):  Pre-op Diagnosis     Right ankle Fracture        Preop Diagnosis (Codes):    Premium Reason  Non-Premium    Comments:

## 2021-12-22 NOTE — PROGRESS NOTES
[facility] Medicine Daily Progress Note    Date of Service  12/22/2021    Chief Complaint  Gloria Patel is a 89 y.o. female admitted 12/21/2021 with right ankle pain    Hospital Course  Ms. Gloria Patel is a 89-year-old female with a PMHx of HTN, hypothyroidism, glaucoma, TIA, renal cyst, GERD, who presented on 12/21/2021 after having a mechanical fall and complains of right ankle pain thereafter.  Patient lives in an independent living facility at [facility].  Patient reported to use a walker ambulating, and prior to admission, patient noted to have slipped while trying to  something off the ground.  EMS was called and patient was brought to the ER.    In ER, x-rays obtained and noted closed right ankle fracture dislocation.  The ankle was reduced in ER and orthopedics was consulted.  Patient was taken to the OR on 12/22/2021 for an open reduction internal fixation of the right ankle with orthopedics, Dr. Causey.  Patient brought into the observation unit for post procedure monitoring and postacute evaluation needs.      Interval Problem Update  -Patient seen and examined after procedure.  Noted right ankle postprocedure bandaging.  Patient is very hard of hearing.  Patient denies any pain or discomfort at this time.  Discussed plan of care with patient along with family who is at bedside.  -Plan of care: Continue pain management; PT/OT evaluation for postacute recommendations  -Disposition: Pending clearance from orthopedics PT for postacute recommendations  -Lab work: Reviewed; unremarkable  -VSS at this time    I have personally seen and examined the patient at bedside. I discussed the plan of care with patient, family, bedside RN and .    Consultants/Specialty  orthopedics    Code Status  Full Code    Disposition  Patient is not medically cleared for discharge.   Anticipate discharge to to home with organized home healthcare and close outpatient follow-up.  I have placed the  appropriate orders for post-discharge needs.    Review of Systems  ROS     Physical Exam  Temp:  [35.9 °C (96.7 °F)-36.6 °C (97.8 °F)] 35.9 °C (96.7 °F)  Pulse:  [48-65] 65  Resp:  [17-51] 24  BP: (110-187)/() 152/67  SpO2:  [88 %-100 %] 88 %    Physical Exam    Fluids    Intake/Output Summary (Last 24 hours) at 12/22/2021 1420  Last data filed at 12/22/2021 0928  Gross per 24 hour   Intake 1500 ml   Output 50 ml   Net 1450 ml       Laboratory  Recent Labs     12/21/21 2026   WBC 5.1   RBC 4.80   HEMOGLOBIN 12.8   HEMATOCRIT 40.3   MCV 84.0   MCH 26.7*   MCHC 31.8*   RDW 47.3   PLATELETCT 198   MPV 10.1     Recent Labs     12/21/21 2026 12/22/21  0520   SODIUM 135 136   POTASSIUM 4.8 4.2   CHLORIDE 104 105   CO2 24 22   GLUCOSE 141* 119*   BUN 24* 19   CREATININE 0.85 0.58   CALCIUM 8.7 8.2*     Recent Labs     12/21/21 2026   APTT 33.8   INR 1.03               Imaging  DX-ANKLE 2- VIEWS RIGHT   Final Result      Intraoperative images intended for localization and not for diagnostic purposes demonstrate ORIF of bimalleolar fracture. See procedure report for details.      Fluoroscopy Time:  1 minutes and 6 seconds.  4 fluoroscopic images were obtained.      DX-PORTABLE FLUOROSCOPY < 1 HOUR   Final Result      Intraoperative images intended for localization and not for diagnostic purposes demonstrate ORIF of bimalleolar fracture. See procedure report for details.      Fluoroscopy Time:  1 minutes and 6 seconds.  4 fluoroscopic images were obtained.      DX-ANKLE 2- VIEWS RIGHT   Final Result      Improved alignment of the ankle joint, distal tibial and fibular fractures following closed reduction.      DX-TIBIA AND FIBULA RIGHT   Final Result         Acute displaced fractures of the distal tibia and fibula with dislocated tibiotalar joint.      DX-ANKLE 2- VIEWS RIGHT   Final Result         Acute displaced fractures of the distal tibia and fibula with dislocated tibiotalar joint.           Assessment/Plan  *  Ankle fracture, bimalleolar, closed, right, initial encounter- (present on admission)  Assessment & Plan  -Fall mechanical fall, history of osteoporosis.  Dislocation reduced in the ED  -Plans for surgery on 12/22 by Ortho  -N.p.o. at midnight, pain management  -PT, OT -needs evaluation for postacute needs    Bradycardia- (present on admission)  Assessment & Plan  Asymptomatic bradycardia in the low 50s  On metoprolol, reduced home dose this admission    HTN (hypertension)- (present on admission)  Assessment & Plan  -Continue lisinopril, amlodipine, metoprolol    Hypothyroid- (present on admission)  Assessment & Plan  -Continue levothyroxine       VTE prophylaxis: SCDs/TEDs    I have performed a physical exam and reviewed and updated ROS and Plan today (12/22/2021). In review of yesterday's note (12/21/2021), there are no changes except as documented above.    ======================================================================================================  Please note that this dictation was created using voice recognition software. I have made every reasonable attempt to correct obvious errors, but there may be errors of grammar and possibly content that I did not discover before finalizing the note.    Electronically signed by:  DENISE Cordero, MSN, APRN, FNP-C  Hospitalist Services  [facility]  [phone]  [email or web address]  12/22/21    [id number]

## 2021-12-23 VITALS
WEIGHT: 140 LBS | BODY MASS INDEX: 23.9 KG/M2 | HEIGHT: 64 IN | SYSTOLIC BLOOD PRESSURE: 150 MMHG | HEART RATE: 68 BPM | TEMPERATURE: 98.7 F | OXYGEN SATURATION: 91 % | RESPIRATION RATE: 20 BRPM | DIASTOLIC BLOOD PRESSURE: 67 MMHG

## 2021-12-23 PROBLEM — S82.841A ANKLE FRACTURE, BIMALLEOLAR, CLOSED, RIGHT, INITIAL ENCOUNTER: Status: RESOLVED | Noted: 2021-12-21 | Resolved: 2021-12-23

## 2021-12-23 LAB
ANION GAP SERPL CALC-SCNC: 10 MMOL/L (ref 7–16)
BUN SERPL-MCNC: 16 MG/DL (ref 8–22)
CALCIUM SERPL-MCNC: 8.3 MG/DL (ref 8.5–10.5)
CHLORIDE SERPL-SCNC: 100 MMOL/L (ref 96–112)
CO2 SERPL-SCNC: 24 MMOL/L (ref 20–33)
CREAT SERPL-MCNC: 0.75 MG/DL (ref 0.5–1.4)
ERYTHROCYTE [DISTWIDTH] IN BLOOD BY AUTOMATED COUNT: 45.5 FL (ref 35.9–50)
GLUCOSE SERPL-MCNC: 121 MG/DL (ref 65–99)
HCT VFR BLD AUTO: 34.1 % (ref 37–47)
HGB BLD-MCNC: 11.2 G/DL (ref 12–16)
MCH RBC QN AUTO: 27.1 PG (ref 27–33)
MCHC RBC AUTO-ENTMCNC: 32.8 G/DL (ref 33.6–35)
MCV RBC AUTO: 82.4 FL (ref 81.4–97.8)
PLATELET # BLD AUTO: 177 K/UL (ref 164–446)
PMV BLD AUTO: 9.6 FL (ref 9–12.9)
POTASSIUM SERPL-SCNC: 4.3 MMOL/L (ref 3.6–5.5)
RBC # BLD AUTO: 4.14 M/UL (ref 4.2–5.4)
SODIUM SERPL-SCNC: 134 MMOL/L (ref 135–145)
WBC # BLD AUTO: 7.7 K/UL (ref 4.8–10.8)

## 2021-12-23 PROCEDURE — 700102 HCHG RX REV CODE 250 W/ 637 OVERRIDE(OP): Performed by: NURSE PRACTITIONER

## 2021-12-23 PROCEDURE — 700102 HCHG RX REV CODE 250 W/ 637 OVERRIDE(OP): Performed by: STUDENT IN AN ORGANIZED HEALTH CARE EDUCATION/TRAINING PROGRAM

## 2021-12-23 PROCEDURE — 99217 PR OBSERVATION CARE DISCHARGE: CPT | Performed by: NURSE PRACTITIONER

## 2021-12-23 PROCEDURE — 97166 OT EVAL MOD COMPLEX 45 MIN: CPT

## 2021-12-23 PROCEDURE — A9270 NON-COVERED ITEM OR SERVICE: HCPCS | Performed by: STUDENT IN AN ORGANIZED HEALTH CARE EDUCATION/TRAINING PROGRAM

## 2021-12-23 PROCEDURE — G0378 HOSPITAL OBSERVATION PER HR: HCPCS

## 2021-12-23 PROCEDURE — 80048 BASIC METABOLIC PNL TOTAL CA: CPT

## 2021-12-23 PROCEDURE — 97530 THERAPEUTIC ACTIVITIES: CPT

## 2021-12-23 PROCEDURE — 96375 TX/PRO/DX INJ NEW DRUG ADDON: CPT

## 2021-12-23 PROCEDURE — 97162 PT EVAL MOD COMPLEX 30 MIN: CPT

## 2021-12-23 PROCEDURE — 700111 HCHG RX REV CODE 636 W/ 250 OVERRIDE (IP): Performed by: NURSE PRACTITIONER

## 2021-12-23 PROCEDURE — 97535 SELF CARE MNGMENT TRAINING: CPT | Mod: XU

## 2021-12-23 PROCEDURE — 36415 COLL VENOUS BLD VENIPUNCTURE: CPT

## 2021-12-23 PROCEDURE — A9270 NON-COVERED ITEM OR SERVICE: HCPCS | Performed by: NURSE PRACTITIONER

## 2021-12-23 PROCEDURE — 85027 COMPLETE CBC AUTOMATED: CPT

## 2021-12-23 PROCEDURE — 700111 HCHG RX REV CODE 636 W/ 250 OVERRIDE (IP): Performed by: ANESTHESIOLOGY

## 2021-12-23 RX ORDER — BACLOFEN 5 MG/1
5 TABLET ORAL 3 TIMES DAILY
Qty: 45 TABLET | Refills: 0 | Status: SHIPPED
Start: 2021-12-23 | End: 2022-01-07

## 2021-12-23 RX ORDER — ACETAMINOPHEN 500 MG
1000 TABLET ORAL EVERY 6 HOURS PRN
Status: DISCONTINUED | OUTPATIENT
Start: 2021-12-28 | End: 2021-12-23 | Stop reason: HOSPADM

## 2021-12-23 RX ORDER — LATANOPROST 50 UG/ML
1 SOLUTION/ DROPS OPHTHALMIC EVERY EVENING
Qty: 1 EACH | Refills: 0 | Status: SHIPPED
Start: 2021-12-23 | End: 2022-01-22

## 2021-12-23 RX ORDER — IBUPROFEN 800 MG/1
800 TABLET ORAL EVERY 6 HOURS PRN
Qty: 60 TABLET | Refills: 0 | Status: SHIPPED
Start: 2021-12-23 | End: 2022-01-07

## 2021-12-23 RX ORDER — HYDROMORPHONE HYDROCHLORIDE 2 MG/1
1 TABLET ORAL
Status: DISCONTINUED | OUTPATIENT
Start: 2021-12-23 | End: 2021-12-23 | Stop reason: HOSPADM

## 2021-12-23 RX ORDER — TRAMADOL HYDROCHLORIDE 50 MG/1
50 TABLET ORAL EVERY 8 HOURS PRN
Qty: 10 TABLET | Refills: 0 | Status: SHIPPED | OUTPATIENT
Start: 2021-12-23 | End: 2021-12-26

## 2021-12-23 RX ORDER — ACETAMINOPHEN 500 MG
1000 TABLET ORAL EVERY 6 HOURS
Status: DISCONTINUED | OUTPATIENT
Start: 2021-12-23 | End: 2021-12-23 | Stop reason: HOSPADM

## 2021-12-23 RX ORDER — HYDROMORPHONE HYDROCHLORIDE 4 MG/1
4 TABLET ORAL
Status: DISCONTINUED | OUTPATIENT
Start: 2021-12-23 | End: 2021-12-23

## 2021-12-23 RX ORDER — BACLOFEN 5 MG/1
5 TABLET ORAL 3 TIMES DAILY
Status: DISCONTINUED | OUTPATIENT
Start: 2021-12-23 | End: 2021-12-23 | Stop reason: HOSPADM

## 2021-12-23 RX ORDER — HYDROMORPHONE HYDROCHLORIDE 1 MG/ML
1 INJECTION, SOLUTION INTRAMUSCULAR; INTRAVENOUS; SUBCUTANEOUS
Status: DISCONTINUED | OUTPATIENT
Start: 2021-12-23 | End: 2021-12-23

## 2021-12-23 RX ORDER — HYDROMORPHONE HYDROCHLORIDE 2 MG/1
2 TABLET ORAL
Status: DISCONTINUED | OUTPATIENT
Start: 2021-12-23 | End: 2021-12-23

## 2021-12-23 RX ORDER — KETOROLAC TROMETHAMINE 30 MG/ML
15 INJECTION, SOLUTION INTRAMUSCULAR; INTRAVENOUS EVERY 6 HOURS
Status: DISCONTINUED | OUTPATIENT
Start: 2021-12-23 | End: 2021-12-23 | Stop reason: HOSPADM

## 2021-12-23 RX ORDER — HYDROMORPHONE HYDROCHLORIDE 2 MG/1
2 TABLET ORAL
Status: DISCONTINUED | OUTPATIENT
Start: 2021-12-23 | End: 2021-12-23 | Stop reason: HOSPADM

## 2021-12-23 RX ORDER — IBUPROFEN 800 MG/1
800 TABLET ORAL 3 TIMES DAILY PRN
Status: DISCONTINUED | OUTPATIENT
Start: 2021-12-26 | End: 2021-12-23 | Stop reason: HOSPADM

## 2021-12-23 RX ORDER — HYDROMORPHONE HYDROCHLORIDE 1 MG/ML
1 INJECTION, SOLUTION INTRAMUSCULAR; INTRAVENOUS; SUBCUTANEOUS
Status: DISCONTINUED | OUTPATIENT
Start: 2021-12-23 | End: 2021-12-23 | Stop reason: HOSPADM

## 2021-12-23 RX ADMIN — HALOPERIDOL LACTATE 1 MG: 5 INJECTION, SOLUTION INTRAMUSCULAR at 00:32

## 2021-12-23 RX ADMIN — BACLOFEN 5 MG: 5 TABLET ORAL at 13:35

## 2021-12-23 RX ADMIN — ACETAMINOPHEN 1000 MG: 500 TABLET ORAL at 13:35

## 2021-12-23 RX ADMIN — SERTRALINE 25 MG: 50 TABLET, FILM COATED ORAL at 05:34

## 2021-12-23 RX ADMIN — LISINOPRIL 20 MG: 20 TABLET ORAL at 05:34

## 2021-12-23 RX ADMIN — KETOROLAC TROMETHAMINE 15 MG: 30 INJECTION, SOLUTION INTRAMUSCULAR at 13:35

## 2021-12-23 ASSESSMENT — GAIT ASSESSMENTS: GAIT LEVEL OF ASSIST: UNABLE TO PARTICIPATE

## 2021-12-23 ASSESSMENT — COGNITIVE AND FUNCTIONAL STATUS - GENERAL
STANDING UP FROM CHAIR USING ARMS: A LOT
DAILY ACTIVITIY SCORE: 14
CLIMB 3 TO 5 STEPS WITH RAILING: TOTAL
WALKING IN HOSPITAL ROOM: TOTAL
MOVING FROM LYING ON BACK TO SITTING ON SIDE OF FLAT BED: A LOT
TURNING FROM BACK TO SIDE WHILE IN FLAT BAD: A LOT
SUGGESTED CMS G CODE MODIFIER MOBILITY: CL
DRESSING REGULAR UPPER BODY CLOTHING: A LOT
PERSONAL GROOMING: A LITTLE
HELP NEEDED FOR BATHING: A LOT
MOBILITY SCORE: 10
TOILETING: A LOT
EATING MEALS: A LITTLE
MOVING TO AND FROM BED TO CHAIR: A LOT
DRESSING REGULAR LOWER BODY CLOTHING: A LOT
SUGGESTED CMS G CODE MODIFIER DAILY ACTIVITY: CK

## 2021-12-23 ASSESSMENT — ACTIVITIES OF DAILY LIVING (ADL): TOILETING: REQUIRES ASSIST

## 2021-12-23 NOTE — THERAPY
"Physical Therapy   Initial Evaluation     Patient Name: Gloria Patel  Age:  89 y.o., Sex:  female  Medical Record #: [id number]  Today's Date: 12/23/2021     Precautions  Precautions: Fall Risk;Non Weight Bearing Right Lower Extremity  Comments: hx of BUE shoulder arthritis     Assessment  Ms. Gloria Patel is a 89-year-old female with a PMHx of HTN, hypothyroidism, glaucoma, TIA, renal cyst, GERD, who presented on 12/21/2021 after having a mechanical fall and complains of right ankle pain thereafter and is now s/p R ankle ORIF.  Patient was indep with her 4WW PTA and had some assist for her ADLs. She is Ax)x4 and very cooperative. She needs modA for her bed mobility and MaxA for STS w/FWW.  She will benefit from placement for further therapy. Anticipate she will handle 3 hours of daily therapy and has excellent support from family.     Plan    Recommend Physical Therapy 4 times per week until therapy goals are met for the following treatments:  Bed Mobility, Equipment, Gait Training, Neuro Re-Education / Balance, Stair Training, Therapeutic Activities and Therapeutic Exercises    DC Equipment Recommendations: Unable to determine at this time  Discharge Recommendations: Recommend post-acute placement for additional physical therapy services prior to discharge home       Subjective    \"I'm not doing so good\"      Objective       12/23/21 0915   Precautions   Precautions Fall Risk;Non Weight Bearing Right Lower Extremity   Pain 0 - 10 Group   Location Leg   Location Orientation Right;Left   Therapist Pain Assessment Post Activity Pain Same as Prior to Activity  (does not quantify )   Prior Living Situation   Prior Services Intermittent Physical Support for ADL Per Service   Housing / Facility Assisted Living Residence   Steps Into Home 0   Steps In Home 0   Bathroom Set up Walk In Shower;Grab Bars   Equipment Owned 4-Wheel Walker   Lives with - Patient's Self Care Capacity Alone and Unable to Care For Self "   Comments Patient gets assist for dressing and bathing but ambulates to the dining kenyon at baseline. She can have her meals brought to her room    Prior Level of Functional Mobility   Bed Mobility Independent   Transfer Status Independent   Ambulation Independent   Distance Ambulation (Feet)   (household distances )   Assistive Devices Used 4-Wheel Walker   Comments indep ambulation to and from the dining kenyon   History of Falls   History of Falls Yes   Date of Last Fall   (admitted for a fall )   Cognition    Cognition / Consciousness X   Level of Consciousness Alert   New Learning Impaired   Sequencing Impaired   Comments very pleasant and cooperative. Difficulty remembering NWB precautions    Passive ROM Lower Body   Passive ROM Lower Body X   Comments L ankle impaired 2/2 prcautions    Active ROM Lower Body    Active ROM Lower Body  X   Comments see above    Strength Lower Body   Lower Body Strength  X   Comments R LE grossly 3/5 and L LE grossly 4/5   Sensation Lower Body   Lower Extremity Sensation   WDL   Strength Upper Body   Upper Body Strength  X   Comments grossly weakn with impaired ROM 2/2 OA, L>R   Vision   Vision Comments wears glasses    Balance Assessment   Sitting Balance (Static) Fair   Sitting Balance (Dynamic) Fair   Standing Balance (Static) Trace +   Standing Balance (Dynamic) Trace   Weight Shift Sitting Fair   Weight Shift Standing Absent   Comments w/FWW   Gait Analysis   Gait Level Of Assist Unable to Participate   Bed Mobility    Supine to Sit Moderate Assist   Sit to Supine Moderate Assist   Scooting Minimal Assist   Rolling Minimal Assist to Rt.;Minimum Assist to Lt.   Functional Mobility   Sit to Stand Maximal Assist   Bed, Chair, Wheelchair Transfer Unable to Participate   Comments STSx2 with one person holding R LE. Able to come to full upright stance but unable to hop    How much difficulty does the patient currently have...   Turning over in bed (including adjusting bedclothes,  sheets and blankets)? 2   Sitting down on and standing up from a chair with arms (e.g., wheelchair, bedside commode, etc.) 2   Moving from lying on back to sitting on the side of the bed? 2   How much help from another person does the patient currently need...   Moving to and from a bed to a chair (including a wheelchair)? 2   Need to walk in a hospital room? 1   Climbing 3-5 steps with a railing? 1   6 clicks Mobility Score 10   Activity Tolerance   Sitting Edge of Bed 15 min    Standing 1 min    Edema / Skin Assessment   Comments at risk for skinbreakdown    Patient / Family Goals    Patient / Family Goal #1 to go home    Short Term Goals    Short Term Goal # 1 in 6 visits patient will demo all bed mobility indep for safe DC    Short Term Goal # 2 in 6 visits patient will demo STS with FWW and Aldo for indep with ADLs   Short Term Goal # 3 in 6 visits patient will demo all functional transfers with Aldo for safe DC    Short Term Goal # 4 in 6 visits patient will self-propel in WC 50' with supervision for safe DC    Education Group   Education Provided Use of Assistive Device;Role of Physical Therapist;Weight Bearing Precautions   Role of Physical Therapist Patient Response Patient;Acceptance;Explanation;Demonstration;Family;Verbal Demonstration   Use of Assistive Device Patient Response Patient;Acceptance;Explanation;Demonstration;Verbal Demonstration;Action Demonstration   Problem List    Problems Pain;Impaired Bed Mobility;Impaired Transfers;Impaired Ambulation;Functional ROM Deficit;Functional Strength Deficit;Impaired Balance;Decreased Activity Tolerance;Limited Knowledge of Post-Op Precautions   Anticipated Discharge Equipment and Recommendations   DC Equipment Recommendations Unable to determine at this time   Discharge Recommendations Recommend post-acute placement for additional physical therapy services prior to discharge home     Judie Rivera, PT, DPT, GCS

## 2021-12-23 NOTE — PROGRESS NOTES
Patient care assumed. Report received from Shy BENTON.    Assessment completed. Pt A&Ox 3, with difficulties with current situation. Patient reorients appropriately, just requires often. Respirations are even and unlabored on 2 liters via nasal cannula. Pt denies any pain at this time. VS stable, call light and belongings within reach. POC updated (PT/OT, pain control). Pt educated on room and call light, pt verbalized understanding. Communication board updated. Needs met.

## 2021-12-23 NOTE — PROGRESS NOTES
Pt report called to Megan BENTON at [facility]. Pt assisted to dress. Discharged via wc. Family at bedside, belongings with pt.

## 2021-12-23 NOTE — DISCHARGE PLANNING
Received Choice form at 1105  Agency/Facility Name: [facility]  Referral sent per Choice form @ 1113     1150-  Agency/Facility Name: [facility]  Spoke To: Skylar  Outcome: Pt is accepted to facility, admissions requesting a 1330 transport time.    NITA Smith requesting to know who admitting MD is.    DPA to confirm 1330 transport time and admitting MD upon callback from [facility].     1215-  Agency/Facility Name: [facility]  Spoke To: Skylar   Outcome: DPA confirmed 1330 transport time via [facility], admitting doctor is Leonila.    RN NITA notified

## 2021-12-23 NOTE — DISCHARGE PLANNING
Received message that patient accepted to [facility]. Transport time set for 1330. Spoke with daughter Tejal, Patient and granddaughter to update and go over that patient is OBS and family updated CM that patient will be going to [facility] selfpay. Bentley signed. Chart copied and tranfer packet completed.

## 2021-12-23 NOTE — PROGRESS NOTES
"Patient is very agitated and yelling out into the hallway. Patient states we are trying to bartolo her and she will not allow that to happen. Patient family contacted for patient to speak to. Patient states, \"They are poisoning me. I need to know what my sister's name is. I'm almost 89 years old, how could you do this to me. I hope you never do this to your mother\". Patient daughter driving to hospital to comfort patient. Patient attempting to get up out of bed, with non-weight bearing restrictions. Patient agreed to get back into bed, and is currently speaking with daughter on the phone. Patient medicated for agitation, (see MAR). Medication provided as IM injection rather than subcutaneous. MD verbalized okay to give as IM injection. Bonnie BENTON confirmed of medication route administration.  "

## 2021-12-23 NOTE — PROGRESS NOTES
"Patient yelling out from room. Patient A&O x1, and is unable to reoriented at this time. Patient is very agitated and refusing all aspects of care. Assessed patient, and patient states, \"This is a scam and I want no part of this. My daughter does not even know Im here\". Patient attempted to get out of bed, but has non-weight bearing status at this time. CNA and myself attempted to reorient patient and get her comfortable in bed, but patient believes CNA and myself are poisoning her and have kidnapped her at this time. Patient back in bed, but still very agitated at this time. CNA is at bedside attempting to calm patient.  "

## 2021-12-23 NOTE — CARE PLAN
The patient is Watcher - Medium risk of patient condition declining or worsening    Shift Goals  Clinical Goals: Iv ABX, pain control, DC  Patient Goals: pain control / DC  Family Goals: comfort      Problem: Pain - Standard  Goal: Alleviation of pain or a reduction in pain to the patient’s comfort goal  Outcome: Progressing     Problem: Knowledge Deficit - Standard  Goal: Patient and family/care givers will demonstrate understanding of plan of care, disease process/condition, diagnostic tests and medications  Outcome: Progressing     Problem: Fall Risk  Goal: Patient will remain free from falls  Outcome: Progressing     Problem: Skin Integrity  Goal: Skin integrity is maintained or improved  Outcome: Progressing

## 2021-12-23 NOTE — DISCHARGE INSTRUCTIONS
Discharge Instructions    Discharged to other by medical transportation with escort. Discharged via wheelchair, hospital escort: Yes.  Special equipment needed: Not Applicable    Be sure to schedule a follow-up appointment with your primary care doctor or any specialists as instructed.     Discharge Plan:   Diet Plan: Discussed  Activity Level: Discussed-non weight bearing right    Confirmed Follow up Appointment: Patient to Call and Schedule Appointment  Confirmed Symptoms Management: Discussed  Medication Reconciliation Updated: Yes  Influenza Vaccine Indication: Patient Refuses    I understand that a diet low in cholesterol, fat, and sodium is recommended for good health. Unless I have been given specific instructions below for another diet, I accept this instruction as my diet prescription.       Special Instructions: None    · Is patient discharged on Warfarin / Coumadin?   No     Depression / Suicide Risk    As you are discharged from [facility], it is important to learn how to keep safe from harming yourself.    Recognize the warning signs:  · Abrupt changes in personality, positive or negative- including increase in energy   · Giving away possessions  · Change in eating patterns- significant weight changes-  positive or negative  · Change in sleeping patterns- unable to sleep or sleeping all the time   · Unwillingness or inability to communicate  · Depression  · Unusual sadness, discouragement and loneliness  · Talk of wanting to die  · Neglect of personal appearance   · Rebelliousness- reckless behavior  · Withdrawal from people/activities they love  · Confusion- inability to concentrate     If you or a loved one observes any of these behaviors or has concerns about self-harm, here's what you can do:  · Talk about it- your feelings and reasons for harming yourself  · Remove any means that you might use to hurt yourself (examples: pills, rope, extension cords, firearm)  · Get professional  help from the community (Mental Health, Substance Abuse, psychological counseling)  · Do not be alone:Call your Safe Contact- someone whom you trust who will be there for you.  · Call your local CRISIS HOTLINE 796-2821 or 195-205-7425  · Call your local Children's Mobile Crisis Response Team [place] (041) 589-8961 or www.Kaiam  · Call the toll free National Suicide Prevention Hotlines   · National Suicide Prevention Lifeline 037-847-QLRA (9277)  · National Hope Line Network 800-SUICIDE (118-0689)    Discharge Instructions per MEI PatelRMasonN.    -Patient to transfer to [facility] for further treatment and therapy  -Patient recommended to follow-up with PCP in approximately 1-2 weeks s/p hospitalization  -Patient to follow-up with orthopedic surgery, Dr. Causey in approximately 1-2 weeks s/p surgical intervention    DIET: As tolerated    ACTIVITY: As tolerated    DIAGNOSIS: Right ankle fracture    Return to ER if symptoms persist, chest pain, palpitations, shortness of breath, numbness, tingling, weakness, and high fevers.

## 2021-12-23 NOTE — THERAPY
"Occupational Therapy   Initial Evaluation     Patient Name: Gloria Patel  Age:  89 y.o., Sex:  female  Medical Record #: [id number]  Today's Date: 12/23/2021     Precautions: Fall Risk,Non Weight Bearing Right Lower Extremity  Comments: hx of BUE shoulder arthritis     Assessment  Patient is 89 y.o. female admitted for ankle injury. PMHX: hypertension, hypothyroidism, glaucoma, GERD ,osteoporosis, TIA, recurrent UTI and bilateral shoulder arthritis. This admission pt is dx w/bimalleoloa right ankle fx s/p surgical repair and is NWB, and bradycardia. At this time pt was unable to complete a functional stand or txf, unable to maintain her NWB status or complete her ADLs such as dressing or toileting. Pt does live in an OSCAR, but does not have support for physical assist for mobility. Strongly recommend post acute placement, if not available pt will need increased physical assistance from a caregiver for mobility and ADL;s and likely use a WC.     Plan  Recommend Occupational Therapy 4 times per week until therapy goals are met for the following treatments:  Adaptive Equipment, Self Care/Activities of Daily Living, Therapeutic Activities and Therapeutic Exercises.    DC Equipment Recommendations: Unable to determine at this time  Discharge Recommendations: Recommend post-acute placement for additional occupational therapy services prior to discharge home (see note )     Subjective    \"That's my good leg\" (referring to the right)      Objective     12/23/21 6004   Prior Living Situation   Prior Services Intermittent Physical Support for ADL Per Service;Housekeeping / Homemaker Services   Housing / Facility Assisted Living Residence   Equipment Owned 4-Wheel Walker   Lives with - Patient's Self Care Capacity Attendant / Paid Care Giver   Comments Pt resides at the [facility] family reports some assist w/dressing and bathing and her meals can be brought to her room, but the facility can not provide her current level of " assistance    Prior Level of ADL Function   Self Feeding Independent   Grooming / Hygiene Independent   Bathing Requires Assist   Dressing Requires Assist   Toileting Requires Assist   Prior Level of IADL Function   Medication Management Requires Assist   Laundry Dependent   Kitchen Mobility Dependent   Finances Dependent   Home Management Dependent   Shopping Dependent   Prior Level Of Mobility Supervision With Device in Home   Precautions   Precautions Fall Risk;Non Weight Bearing Right Lower Extremity   Comments hx of BUE shoulder arthritis    Pain 0 - 10 Group   Therapist Pain Assessment During Activity;Nurse Notified  (not specified )   Cognition    Cognition / Consciousness X   Level of Consciousness Alert   New Learning Impaired   Comments very pleasant and cooperative, having signficant difficulty w/ahearing to NWB status    Passive ROM Upper Body   Passive ROM Upper Body X   Comments impaired proximally in shoulders d/t arthritis    Active ROM Upper Body   Active ROM Upper Body  X   Dominant Hand Right   Comments impaired bilaterally shoulders    Strength Upper Body   Upper Body Strength  X   Gross Strength Generalized Weakness, Equal Bilaterally.    Sensation Upper Body   Upper Extremity Sensation  Not Tested   Coordination Upper Body   Coordination X   Comments grossly limited by shoulder arthritis    Balance Assessment   Sitting Balance (Static) Fair   Sitting Balance (Dynamic) Fair   Standing Balance (Static) Trace   Standing Balance (Dynamic) Dependent   Weight Shift Sitting Poor   Weight Shift Standing Absent   Comments unable to reach full upright stanidng posture while maintaining NWB    Bed Mobility    Supine to Sit Moderate Assist   Sit to Supine Moderate Assist   Scooting Maximal Assist   ADL Assessment   Grooming Minimal Assist;Seated   Upper Body Dressing Moderate Assist   Lower Body Dressing Maximal Assist   Toileting Maximal Assist   Comments incontinent of urine    How much help from another  person does the patient currently need...   6 Clicks Daily Activity Score 14   Functional Mobility   Sit to Stand Maximal Assist   Bed, Chair, Wheelchair Transfer Unable to Participate   Toilet Transfers Unable to Participate   Mobility EOB attempted sit>stand unable to maintain NWB    Visual Perception   Visual Perception  Not Tested   Edema / Skin Assessment   Edema / Skin  Not Assessed   Activity Tolerance   Comments c/o fatigue    Patient / Family Goals   Patient / Family Goal #1 to get to a rehab facility    Short Term Goals   Short Term Goal # 1 pt will complete grooming seated w/set up    Short Term Goal # 2 pt will complete txf to BSC w/mod A    Short Term Goal # 3 pt will complete LB dressing w/mod A    Education Group   Education Provided Weight Bearing Precautions;Activities of Daily Living;Role of Occupational Therapist;Transfers;Home Safety   Role of Occupational Therapist Patient Response Patient;Family;Acceptance;Explanation;Demonstration   Home Safety Patient Response Patient;Family;Acceptance;Explanation;Demonstration   Transfers Patient Response Patient;Family;Acceptance;Demonstration;Explanation   ADL Patient Response Patient;Family;Acceptance;Explanation;Demonstration   Weight Bearing Precautions Patient Response Patient;Family;Acceptance;Explanation;Demonstration   Problem List   Problem List Decreased Active Daily Living Skills;Decreased Upper Extremity AROM Right;Decreased Upper Extremity Strength Left;Decreased Upper Extremity Strength Right;Decreased Upper Extremity AROM Left;Decreased Functional Mobility;Decreased Activity Tolerance;Safety Awareness Deficits / Cognition;Impaired Postural Control / Balance;Limited Knowledge of Post Op Precautions

## 2021-12-23 NOTE — PROGRESS NOTES
[facility] Medicine Daily Progress Note    Date of Service  12/23/2021    Chief Complaint  Gloria Patel is a 89 y.o. female admitted 12/21/2021 with right ankle pain    Hospital Course  Ms. Gloria Patel is a 89-year-old female with a PMHx of HTN, hypothyroidism, glaucoma, TIA, renal cyst, GERD, who presented on 12/21/2021 after having a mechanical fall and complains of right ankle pain thereafter.  Patient lives in an independent living facility at [facility].  Patient reported to use a walker ambulating, and prior to admission, patient noted to have slipped while trying to  something off the ground.  EMS was called and patient was brought to the ER.    In ER, x-rays obtained and noted closed right ankle fracture dislocation.  The ankle was reduced in ER and orthopedics was consulted.  Patient was taken to the OR on 12/22/2021 for an open reduction internal fixation of the right ankle with orthopedics, Dr. Causey.  Patient brought into the observation unit for post procedure monitoring and postacute evaluation needs.      Interval Problem Update  12/22/2021  -Patient seen and examined after procedure.  Noted right ankle postprocedure bandaging.  Patient is very hard of hearing.  Patient denies any pain or discomfort at this time.  Discussed plan of care with patient along with family who is at bedside.  -Plan of care: Continue pain management; PT/OT evaluation for postacute recommendations  -Disposition: Pending clearance from orthopedics PT for postacute recommendations  -Lab work: Reviewed; unremarkable  -VSS at this time    12/23/2021  -Patient seen and examined.  Patient's family at bedside and discussed plan of care with patient alongside with family.  Patient noted to have had an episode of confusion.  Patient and family denies any history of dementia or sundowning.  Patient also reports not remembering events overnight.  Discussed with patient plan of care.  -Plan of care: Continue pain  management; hold off giving too much pain medication at night to assess patient for sundowning; physiatry consultation placed; continue PT OT; transfer orders placed  -Disposition: Pending clearance from orthopedics; pending placement postacute  -Lab work: Reviewed; unremarkable  -VSS at this time  -There are no significant changes from previous ROS/PE, please see my previous notes for further details.      I have personally seen and examined the patient at bedside. I discussed the plan of care with patient, family, bedside RN and .    Consultants/Specialty  orthopedics    Code Status  Full Code    Disposition  Patient is not medically cleared for discharge.   Anticipate discharge to to home with organized home healthcare and close outpatient follow-up.  I have placed the appropriate orders for post-discharge needs.    Review of Systems  ROS     Physical Exam  Temp:  [35.9 °C (96.7 °F)-36.6 °C (97.8 °F)] 36.2 °C (97.2 °F)  Pulse:  [51-68] 68  Resp:  [16-24] 20  BP: (111-171)/(58-80) 165/76  SpO2:  [88 %-96 %] 91 %    Physical Exam    Fluids    Intake/Output Summary (Last 24 hours) at 12/23/2021 1011  Last data filed at 12/22/2021 1800  Gross per 24 hour   Intake 240 ml   Output 700 ml   Net -460 ml       Laboratory  Recent Labs     12/21/21 2026 12/23/21  0412   WBC 5.1 7.7   RBC 4.80 4.14*   HEMOGLOBIN 12.8 11.2*   HEMATOCRIT 40.3 34.1*   MCV 84.0 82.4   MCH 26.7* 27.1   MCHC 31.8* 32.8*   RDW 47.3 45.5   PLATELETCT 198 177   MPV 10.1 9.6     Recent Labs     12/21/21 2026 12/22/21  0520 12/23/21  0412   SODIUM 135 136 134*   POTASSIUM 4.8 4.2 4.3   CHLORIDE 104 105 100   CO2 24 22 24   GLUCOSE 141* 119* 121*   BUN 24* 19 16   CREATININE 0.85 0.58 0.75   CALCIUM 8.7 8.2* 8.3*     Recent Labs     12/21/21 2026   APTT 33.8   INR 1.03               Imaging  DX-ANKLE 2- VIEWS RIGHT   Final Result      Intraoperative images intended for localization and not for diagnostic purposes demonstrate ORIF of  bimalleolar fracture. See procedure report for details.      Fluoroscopy Time:  1 minutes and 6 seconds.  4 fluoroscopic images were obtained.      DX-PORTABLE FLUOROSCOPY < 1 HOUR   Final Result      Intraoperative images intended for localization and not for diagnostic purposes demonstrate ORIF of bimalleolar fracture. See procedure report for details.      Fluoroscopy Time:  1 minutes and 6 seconds.  4 fluoroscopic images were obtained.      DX-ANKLE 2- VIEWS RIGHT   Final Result      Improved alignment of the ankle joint, distal tibial and fibular fractures following closed reduction.      DX-TIBIA AND FIBULA RIGHT   Final Result         Acute displaced fractures of the distal tibia and fibula with dislocated tibiotalar joint.      DX-ANKLE 2- VIEWS RIGHT   Final Result         Acute displaced fractures of the distal tibia and fibula with dislocated tibiotalar joint.           Assessment/Plan  * Ankle fracture, bimalleolar, closed, right, initial encounter- (present on admission)  Assessment & Plan  -Fall mechanical fall, history of osteoporosis.  Dislocation reduced in the ED  -Plans for surgery on 12/22 by Ortho  -N.p.o. at midnight, pain management  -PT, OT -needs evaluation for postacute needs  -Physiatry consulted    Bradycardia- (present on admission)  Assessment & Plan  -Asymptomatic bradycardia in the low 50s  -On metoprolol, reduced home dose this admission    HTN (hypertension)- (present on admission)  Assessment & Plan  -Continue lisinopril, amlodipine, metoprolol    Hypothyroid- (present on admission)  Assessment & Plan  -Continue levothyroxine       VTE prophylaxis: SCDs/TEDs    I have performed a physical exam and reviewed and updated ROS and Plan today (12/23/2021). In review of yesterday's note (12/22/2021), there are no changes except as documented above.    ======================================================================================================  Please note that this dictation was  created using voice recognition software. I have made every reasonable attempt to correct obvious errors, but there may be errors of grammar and possibly content that I did not discover before finalizing the note.    Electronically signed by:  DENISE Cordero, MSN, APRN, FNP-C  Hospitalist Services  [facility]  [phone]  [email or web address]  12/23/21    1020

## 2021-12-23 NOTE — FACE TO FACE
Face to Face Supporting Documentation - Home Health    The encounter with this patient was in whole or in part the primary reason for home health admission.    Date of encounter:   Patient:                    MRN:                       YOB: 2021  Gloria Patel  [id number]  7/9/1924     Home health to see patient for:  Skilled Nursing care for assessment, interventions & education, Wound Care, Home health aide, Physical Therapy evaluation and treatment and Occupational therapy evaluation and treatment    Skilled need for:  Surgical Aftercare RIGHT ankle fx and Recent Deterioration of Health Status debility    Skilled nursing interventions to include:  Wound Care and Comment: PT/OT    Homebound status evidenced by:  Need the aid of supportive devices such as crutches, canes, wheelchairs or walkers or Needs the assistance of another person in order to leave the home. Leaving home requires a considerable and taxing effort. There is a normal inability to leave the home.    Community Physician to provide follow up care: Mike Otero M.D.     Optional Interventions? No      I certify the face to face encounter for this home health care referral meets the CMS requirements and the encounter/clinical assessment with the patient was, in whole, or in part, for the medical condition(s) listed above, which is the primary reason for home health care. Based on my clinical findings: the service(s) are medically necessary, support the need for home health care, and the homebound criteria are met.  I certify that this patient has had a face to face encounter by myself.  MIKE Patel - NPI: [id number]    ======================================================================================================  Please note that this dictation was created using voice recognition software. I have made every reasonable attempt to correct obvious errors, but there may be errors of grammar and  possibly content that I did not discover before finalizing the note.    Electronically signed by:  DENISE Cordero, MSN, APRN, FNP-C  Hospitalist Services  [facility]  [phone]  [email or web address]  12/23/21        0756

## 2021-12-23 NOTE — DISCHARGE PLANNING
Received call from daughter Atiya MARIE. Per Atiya she has spoken with [facility] and they are holding a bed for patient. Discussed with Atiya that patient is OBS and primary insurance is Medicare. Atiya wants [facility] sent to [facility] because they said patient was ambulatory prior to admit and is no longer ambulatory due to fracture it should be ok [facility] to [facility]. Updated Martina APRN and referral to SNF placed. Choice form filled out with telephone consent for SNF and faxed to Ander PIERRE. Message sent to Ander PIERRE to update.

## 2021-12-23 NOTE — CARE PLAN
Problem: Pain - Standard  Goal: Alleviation of pain or a reduction in pain to the patient’s comfort goal  Outcome: Progressing     Problem: Knowledge Deficit - Standard  Goal: Patient and family/care givers will demonstrate understanding of plan of care, disease process/condition, diagnostic tests and medications  Outcome: Progressing     Problem: Fall Risk  Goal: Patient will remain free from falls  Outcome: Progressing     Problem: Skin Integrity  Goal: Skin integrity is maintained or improved  Outcome: Progressing   The patient is Stable - Low risk of patient condition declining or worsening    Shift Goals  Clinical Goals: pain control, monilize  Patient Goals: get out of bed  Family Goals: n/a    Progress made toward(s) clinical / shift goals:  Denies pain. More oriented this am.    Patient is not progressing towards the following goals:

## 2021-12-23 NOTE — DISCHARGE PLANNING
[facility] Transitional Care Coordination    Referral from:  ALFREDO Cordero    Insurance Provider on Facesheet:KRISTEL/VIN    Potential Rehab Diagnosis: Other Ortho    Chart review indicates patient may have on going medical management and may have therapy needs to possibly meet inpatient rehab facility criteria with the goal of returning to community.    D/C support: TBD     Physiatry consultation forwarded per protocol.      Thank you for the referral.

## 2021-12-26 PROBLEM — S82.891D CLOSED FRACTURE OF RIGHT ANKLE WITH ROUTINE HEALING: Status: ACTIVE | Noted: 2021-12-26

## 2021-12-27 PROBLEM — F05 SUNDOWNING: Status: ACTIVE | Noted: 2021-12-27

## 2022-01-10 NOTE — OP REPORT
DATE OF OPERATION: 12/22/2021     PREOPERATIVE DIAGNOSIS: Right ankle fracture dislocation    POSTOPERATIVE DIAGNOSIS: Same    PROCEDURE PERFORMED: Open reduction internal fixation of right trimalleolar ankle fracture without fixation of the posterior lip, open reduction internal fixation of right ankle syndesmosis    SURGEON: Pop Causey M.D.     ASSISTANT: None    ANESTHESIA: General    SPECIMEN: None    ESTIMATED BLOOD LOSS: 10 mL    IMPLANTS: OIC small fragment screws, ankle plates, 4.0 cannulated screws      INDICATIONS: The patient is a 89 y.o. female who presented with a right ankle fracture dislocation after a ground-level fall.  Initial closed reduction had been.  I recommended open reduction sternal fixation to reestablish a stable ankle mortise allow early range of motion weightbearing.  I discussed the risks and benefits of the procedure which include but are not limited to risks of infection, wound healing complication, neurovascular injury, pain, malunion, non-union, malrotation, and the medical risks of anesthesia including MI, stroke, and death.  Alternatives to surgery were also discussed, including non-operative management, which I did not recommend.  The patient was in agreement with the plan to proceed, and the informed consent was signed and documented.  I met with the patient pre-operatively and marked the operative extremity with their agreement.  We proceeded to the operating room.     DESCRIPTION OF PROCEDURE:  Patient was seen in the preoperative holding area on the day of surgery. The operative site was marked with my initials.  she was taken to the operating room and placed supine on the operative table.  Anesthesia was induced.  The operative extremity was prepped and draped in the normal sterile fashion.  Operative pause was conducted and the correct patient, site, side, procedure, and surgeon's initials on extremity were identified.  Attention was first turned to the medial  malleolus.  A curvilinear incision was made at the anteromedial corner of the medial malleolus.  Care was taken to protect the saphenous vein.  Reduction was obtained using cortical reads post radiographic reads.  Fracture hematoma was removed and the fracture site.  Once this had been accurately reduced this was held in place with 2 K wires for the 4.0 mm cannulated screws.  2 partially-threaded screws were then placed over these K wires.  This compressed and maintain this reduction.  Attention was then turned to the lateral malleolus.  A percutaneous means of fixation was used given the frailty of her skin.  A small incision was made over the metaphysis of the medial malleolus.  An elevator was used to elevate the soft tissue off of the metaphysis and shaft.  An [facility] lateral fibular locking plate was slid subcutaneously along the border of the bone.  This was fixated distally with locking screws in the metaphysis.  Then using the plate for aid in reduction traction and manipulation of the lateral malleolus was applied allowing for an anatomic reduction.  This was then secured with nonlocking screws more proximally.  Given the initial syndesmotic displacement is also noted that syndesmosis was injured.  This was further secured with 2 separate Quadra cortical screws.  These were placed while holding the syndesmosis in a reduced position using a hand clamp technique.  Once the screws had been fully seated final fluoroscopic images were obtained.  The posterior malleolus was reduced through fixation and reduction of the medial and lateral malleoli.  No further fixation was required for this aspect.  The wounds were irrigated closed in layered fashion with 3-0 Vicryl and 3-0 nylon.  A well-padded splint was applied.  The patient was then allowed to awaken in the operating room taken to PACU in stable condition.    POSTOPERATIVE PLAN: None weight bearing.  Mobilize with physical and occupational therapies.  DVT  prophylaxis with SCDs and Lovenox until mobilizing independently and then can be switched to aspirin for 4 weeks.  The patient will follow up in clinic in 2 weeks to check wounds and remove sutures/staples.      ____________________________________   Pop Causey M.D.   DD: 1/10/2022  1:32 PM

## 2022-01-13 PROBLEM — R54 AGE-RELATED PHYSICAL DEBILITY: Status: ACTIVE | Noted: 2022-01-13

## 2022-01-24 PROBLEM — S82.891A FRACTURE OF RIGHT ANKLE: Status: ACTIVE | Noted: 2021-12-26

## 2022-01-24 PROBLEM — R00.1 BRADYCARDIA: Chronic | Status: ACTIVE | Noted: 2019-08-24

## 2022-01-24 PROBLEM — S82.891A FRACTURE OF RIGHT ANKLE: Chronic | Status: ACTIVE | Noted: 2021-12-26

## 2022-02-11 ENCOUNTER — HOME HEALTH ADMISSION (OUTPATIENT)
Dept: HOME HEALTH SERVICES | Facility: HOME HEALTHCARE | Age: 87
End: 2022-02-11
Payer: MEDICARE

## 2022-02-15 ENCOUNTER — NON-PROVIDER VISIT (OUTPATIENT)
Dept: INTERNAL MEDICINE | Facility: IMAGING CENTER | Age: 87
End: 2022-02-15
Payer: MEDICARE

## 2022-02-15 NOTE — NON-PROVIDER
Gloria recently discharged from rehab.  Doing well.  Appointment made for Friday with Dr Otero for follow up and medication review.

## 2022-02-16 ENCOUNTER — HOME CARE VISIT (OUTPATIENT)
Dept: HOME HEALTH SERVICES | Facility: HOME HEALTHCARE | Age: 87
End: 2022-02-16
Payer: MEDICARE

## 2022-02-16 PROCEDURE — 665999 HH PPS REVENUE DEBIT

## 2022-02-16 PROCEDURE — G0493 RN CARE EA 15 MIN HH/HOSPICE: HCPCS

## 2022-02-16 PROCEDURE — 665005 NO-PAY RAP - HOME HEALTH

## 2022-02-16 PROCEDURE — 665001 SOC-HOME HEALTH

## 2022-02-16 PROCEDURE — 665998 HH PPS REVENUE CREDIT

## 2022-02-16 ASSESSMENT — ENCOUNTER SYMPTOMS: POOR JUDGMENT: 1

## 2022-02-16 ASSESSMENT — FIBROSIS 4 INDEX: FIB4 SCORE: 2.95

## 2022-02-17 ENCOUNTER — HOME CARE VISIT (OUTPATIENT)
Dept: HOME HEALTH SERVICES | Facility: HOME HEALTHCARE | Age: 87
End: 2022-02-17
Payer: MEDICARE

## 2022-02-17 ENCOUNTER — DOCUMENTATION (OUTPATIENT)
Dept: MEDICAL GROUP | Facility: PHYSICIAN GROUP | Age: 87
End: 2022-02-17
Payer: MEDICARE

## 2022-02-17 VITALS
HEART RATE: 60 BPM | SYSTOLIC BLOOD PRESSURE: 140 MMHG | RESPIRATION RATE: 60 BRPM | DIASTOLIC BLOOD PRESSURE: 66 MMHG | TEMPERATURE: 98.7 F | OXYGEN SATURATION: 95 %

## 2022-02-17 VITALS
TEMPERATURE: 98.8 F | DIASTOLIC BLOOD PRESSURE: 62 MMHG | OXYGEN SATURATION: 97 % | BODY MASS INDEX: 24.72 KG/M2 | HEART RATE: 58 BPM | SYSTOLIC BLOOD PRESSURE: 122 MMHG | RESPIRATION RATE: 16 BRPM | WEIGHT: 144 LBS

## 2022-02-17 PROCEDURE — G0151 HHCP-SERV OF PT,EA 15 MIN: HCPCS

## 2022-02-17 PROCEDURE — 665998 HH PPS REVENUE CREDIT

## 2022-02-17 PROCEDURE — 665999 HH PPS REVENUE DEBIT

## 2022-02-17 ASSESSMENT — ENCOUNTER SYMPTOMS
PAIN LOCATION - EXACERBATING FACTORS: CERTAIN MOVEMENTS
PAIN LOCATION - PAIN SEVERITY: 5/10
PERSON REPORTING PAIN: PATIENT
PAIN LOCATION - PAIN FREQUENCY: CONSTANT
HIGHEST PAIN SEVERITY IN PAST 24 HOURS: 5/10
PAIN LOCATION: LEFT SHOULDER
PAIN: 1
LOWEST PAIN SEVERITY IN PAST 24 HOURS: 4/10
PAIN LOCATION - PAIN SEVERITY: 5/10
PAIN: 1
PAIN LOCATION - PAIN QUALITY: ACHING
PAIN LOCATION - RELIEVING FACTORS: REST, MEDICATION
SUBJECTIVE PAIN PROGRESSION: WAXING AND WANING
PAIN LOCATION - PAIN DURATION: CHRONIC
PAIN LOCATION - RELIEVING FACTORS: MEDICATION, REST
PAIN LOCATION: RIGHT SHOULDER
SUBJECTIVE PAIN PROGRESSION: UNCHANGED
HIGHEST PAIN SEVERITY IN PAST 24 HOURS: 7/10
PAIN SEVERITY GOAL: 2/10
DEBILITATING PAIN: 1
PAIN LOCATION - PAIN DURATION: DAILY
PAIN SEVERITY GOAL: 4/10
PAIN LOCATION - EXACERBATING FACTORS: MOVEMENT
PERSON REPORTING PAIN: PATIENT
POOR JUDGMENT: 1
PAIN LOCATION - PAIN QUALITY: SHARP
PAIN LOCATION - PAIN FREQUENCY: WITH ACTIVITY

## 2022-02-17 ASSESSMENT — PATIENT HEALTH QUESTIONNAIRE - PHQ9
CLINICAL INTERPRETATION OF PHQ2 SCORE: 1
SUM OF ALL RESPONSES TO PHQ QUESTIONS 1-9: 2
5. POOR APPETITE OR OVEREATING: 0 - NOT AT ALL
2. FEELING DOWN, DEPRESSED, IRRITABLE, OR HOPELESS: 01
1. LITTLE INTEREST OR PLEASURE IN DOING THINGS: 00

## 2022-02-17 ASSESSMENT — ACTIVITIES OF DAILY LIVING (ADL)
AMBULATION_DISTANCE/DURATION_TOLERATED: 80 FEET
OASIS_M1830: 03
AMBULATION ASSISTANCE ON FLAT SURFACES: 1

## 2022-02-17 NOTE — PROGRESS NOTES
Medication chart review for [facility] services    PCP:  Mike Otero M.D.  [address] [facility] [address]  [place] [address]  Fax: [phone]    Current medication list     Current Outpatient Medications:   •  mirtazapine, 7.5-15 mg, Oral, QHS PRN  •  DORZOLAMIDE HCL-TIMOLOL MAL OP, 1 Drop, Both Eyes, QAM  •  Famotidine-Ca Carb-Mag Hydrox, 1 Tablet, Oral, QDAY PRN  •  diclofenac sodium, 2 g, Topical, BID PRN  •  Linh Saline, 1 Drop, Both Eyes, DAILY  •  Melatonin, 1 Capsule, Oral, QHS PRN  •  Ibuprofen-Acetaminophen, 1-2 Tablet, Oral, Q8HRS PRN  •  Multiple Vitamins-Minerals (PRESERVISION AREDS 2 PO), 1 Tablet, Oral, DAILY  •  Tums Chewy Bites, 1 Tablet, Oral, QDAY PRN  •  Citracal Maximum Plus, 1 Tablet, Oral, DAILY  •  fluticasone, 1 Spray, Nasal, DAILY  •  lisinopril, TAKE HALF A TABLET BY MOUTH TWICE A DAY  •  amLODIPine, 5 mg, Oral, QDAY  •  sertraline, 25 mg, Oral, DAILY  •  levothyroxine, TAKE 1 TABLET BY MOUTH EVERY DAY  •  metoprolol SR, 25 mg, Oral, QDAY  •  travoprost, 1 Drop, Both Eyes, Q EVENING  •  polyethylene glycol 3350, 17 g, Oral, DAILY    Allergies   Allergen Reactions   • Morphine Anaphylaxis     anaphylaxis   • Cephalexin Rash     Full body   • Codeine Vomiting and Nausea   • Metronidazole Vomiting and Nausea   • Sulfa Drugs Rash     Full body       Labs     Lab Results   Component Value Date/Time    SODIUM 134 (L) 12/23/2021 04:12 AM    POTASSIUM 4.3 12/23/2021 04:12 AM    CHLORIDE 100 12/23/2021 04:12 AM    CO2 24 12/23/2021 04:12 AM    GLUCOSE 121 (H) 12/23/2021 04:12 AM    BUN 16 12/23/2021 04:12 AM    CREATININE 0.75 12/23/2021 04:12 AM    CREATININE 0.99 01/09/2013 07:58 AM    BUNCREATRAT 20 01/09/2013 07:58 AM    GLOMRATE >59 08/04/2010 07:50 AM     Lab Results   Component Value Date/Time    ALKPHOSPHAT 101 (H) 12/21/2021 08:26 PM    ASTSGOT 17 12/21/2021 08:26 PM    ALTSGPT 10 12/21/2021 08:26 PM    TBILIRUBIN 0.3 12/21/2021 08:26 PM    INR 1.03 12/21/2021 08:26 PM    ALBUMIN 4.0  12/21/2021 08:26 PM        Assessment and Plan:   • Received referral from [facility]. Medications reviewed.       Shant Coleman, PharmD, MS, BCACP, [facility]  Phone [phone] fax [phone]    This note was created using voice recognition software (Dragon). The accuracy of the dictation is limited by the abilities of the software. I have reviewed the note prior to signing, however some errors in grammar and context are still possible. If you have any questions related to this note please do not hesitate to contact our office.

## 2022-02-18 ENCOUNTER — HOME CARE VISIT (OUTPATIENT)
Dept: HOME HEALTH SERVICES | Facility: HOME HEALTHCARE | Age: 87
End: 2022-02-18
Payer: MEDICARE

## 2022-02-18 ENCOUNTER — OFFICE VISIT (OUTPATIENT)
Dept: INTERNAL MEDICINE | Facility: IMAGING CENTER | Age: 87
End: 2022-02-18
Payer: MEDICARE

## 2022-02-18 VITALS
TEMPERATURE: 99.2 F | HEART RATE: 60 BPM | DIASTOLIC BLOOD PRESSURE: 72 MMHG | OXYGEN SATURATION: 99 % | SYSTOLIC BLOOD PRESSURE: 110 MMHG | RESPIRATION RATE: 18 BRPM

## 2022-02-18 VITALS
OXYGEN SATURATION: 96 % | HEART RATE: 43 BPM | TEMPERATURE: 98.4 F | RESPIRATION RATE: 16 BRPM | WEIGHT: 142 LBS | SYSTOLIC BLOOD PRESSURE: 142 MMHG | DIASTOLIC BLOOD PRESSURE: 60 MMHG | BODY MASS INDEX: 24.37 KG/M2

## 2022-02-18 DIAGNOSIS — K21.9 GASTROESOPHAGEAL REFLUX DISEASE WITHOUT ESOPHAGITIS: ICD-10-CM

## 2022-02-18 DIAGNOSIS — M81.0 AGE-RELATED OSTEOPOROSIS WITHOUT CURRENT PATHOLOGICAL FRACTURE: ICD-10-CM

## 2022-02-18 DIAGNOSIS — S82.891D CLOSED FRACTURE OF RIGHT ANKLE WITH ROUTINE HEALING, SUBSEQUENT ENCOUNTER: ICD-10-CM

## 2022-02-18 DIAGNOSIS — I10 ESSENTIAL HYPERTENSION: ICD-10-CM

## 2022-02-18 DIAGNOSIS — F32.0 CURRENT MILD EPISODE OF MAJOR DEPRESSIVE DISORDER WITHOUT PRIOR EPISODE (HCC): ICD-10-CM

## 2022-02-18 DIAGNOSIS — E03.9 HYPOTHYROIDISM, UNSPECIFIED TYPE: ICD-10-CM

## 2022-02-18 PROCEDURE — G0152 HHCP-SERV OF OT,EA 15 MIN: HCPCS

## 2022-02-18 PROCEDURE — 665999 HH PPS REVENUE DEBIT

## 2022-02-18 PROCEDURE — 99214 OFFICE O/P EST MOD 30 MIN: CPT | Performed by: INTERNAL MEDICINE

## 2022-02-18 PROCEDURE — 665998 HH PPS REVENUE CREDIT

## 2022-02-18 PROCEDURE — G0493 RN CARE EA 15 MIN HH/HOSPICE: HCPCS

## 2022-02-18 ASSESSMENT — ENCOUNTER SYMPTOMS
PERSON REPORTING PAIN: PATIENT
SUBJECTIVE PAIN PROGRESSION: UNCHANGED
HIGHEST PAIN SEVERITY IN PAST 24 HOURS: 0/10
LOWEST PAIN SEVERITY IN PAST 24 HOURS: 0/10
NAUSEA: DENIES
MUSCLE WEAKNESS: 1
PAIN SEVERITY GOAL: 0/10
VOMITING: DENIES
DENIES PAIN: 1

## 2022-02-18 ASSESSMENT — FIBROSIS 4 INDEX: FIB4 SCORE: 2.95

## 2022-02-18 NOTE — LETTER
February 18, 2022        Gloria Protilloers   [address]  [facility] [address]        Dear Gloria:    Current Outpatient Medications on File Prior to Visit   Medication Sig Dispense Refill   • mirtazapine (REMERON) 15 MG Tab Take 7.5-15 mg by mouth at bedtime as needed (for insomnia, take if Melatonin not effective).     • DORZOLAMIDE HCL-TIMOLOL MAL OP Administer 1 Drop into both eyes every morning.     • Famotidine-Ca Carb-Mag Hydrox -165 MG Chew Tab Chew 1 Tablet 1 time a day as needed (for indegestion).     • diclofenac sodium (VOLTAREN) 1 % Gel Apply 2 g topically 2 times a day as needed (for mild to moderate pain).     • Soft Lens Products (FRANCESCA SALINE) Solution Administer 1 Drop into both eyes every day.     • Melatonin 5 MG Cap Take 1 Capsule by mouth at bedtime as needed (for insomnia, takes first).     • Ibuprofen-Acetaminophen 125-250 MG Tab Take 1-2 Tablets by mouth every 8 hours as needed (for mild to moderate pain.).     • Multiple Vitamins-Minerals (PRESERVISION AREDS 2 PO) Take 1 Tablet by mouth every day.     • calcium carbonate (TUMS CHEWY BITES) 750 MG chewable tablet Chew 1 Tablet 1 time a day as needed (for stomach upset).     • Multiple Minerals-Vitamins (CITRACAL MAXIMUM PLUS) Tab Take 1 Tablet by mouth every day.     • fluticasone (FLONASE) 50 MCG/ACT nasal spray Administer 1 Spray into affected nostril(S) every day.     • lisinopril (PRINIVIL) 20 MG Tab TAKE HALF A TABLET BY MOUTH TWICE A DAY 90 tablet 3   • amLODIPine (NORVASC) 5 MG Tab Take 1 tablet by mouth every day. 90 tablet 3   • sertraline (ZOLOFT) 25 MG tablet Take 1 tablet by mouth every day. 90 tablet 3   • levothyroxine (SYNTHROID) 75 MCG Tab TAKE 1 TABLET BY MOUTH EVERY DAY 90 tablet 3   • metoprolol SR (TOPROL XL) 25 MG TABLET SR 24 HR Take 1 Tab by mouth every day. 90 Tab 3   • travoprost (TRAVATAN Z) 0.004 % Solution Administer 1 Drop into both eyes every evening. Indications: Wide-Angle Glaucoma     •  polyethylene glycol 3350 (MIRALAX) Powder Take 17 g by mouth every day. Indications: Constipation       No current facility-administered medications on file prior to visit.         If you have any questions or concerns, please don't hesitate to call.        Sincerely,        Mike Otero M.D.    Electronically Signed

## 2022-02-19 PROCEDURE — 665998 HH PPS REVENUE CREDIT

## 2022-02-19 PROCEDURE — 665999 HH PPS REVENUE DEBIT

## 2022-02-20 VITALS
HEART RATE: 60 BPM | TEMPERATURE: 99.2 F | DIASTOLIC BLOOD PRESSURE: 72 MMHG | RESPIRATION RATE: 18 BRPM | OXYGEN SATURATION: 99 % | SYSTOLIC BLOOD PRESSURE: 110 MMHG

## 2022-02-20 PROCEDURE — 665998 HH PPS REVENUE CREDIT

## 2022-02-20 PROCEDURE — 665999 HH PPS REVENUE DEBIT

## 2022-02-20 ASSESSMENT — ACTIVITIES OF DAILY LIVING (ADL)
WASHING_UPB_CURRENT_FUNCTION: CONTACT GUARD ASSIST
TOILETING: INDEPENDENT
TOILETING EQUIPMENT USED: RAISED TOILET SEAT
PREPARING MEALS: DEPENDENT
GROOMING_WITHIN_DEFINED_LIMITS: 1
FEEDING_WITHIN_DEFINED_LIMITS: 1
DRESSING_UB_CURRENT_FUNCTION: INDEPENDENT
LAUNDRY: DEPENDENT
AMBULATION ASSISTANCE: INDEPENDENT
GROOMING ASSESSED: 1
CURRENT_FUNCTION: SUPERVISION
GROOMING_CURRENT_FUNCTION: INDEPENDENT
PHYSICAL TRANSFERS ASSESSED: 1
BATHING ASSESSED: 1
FEEDING ASSESSED: 1
TOILETING: 1
HOUSEKEEPING ASSESSED: 1
WASHING_LB_CURRENT_FUNCTION: MODERATE ASSIST
LAUNDRY ASSESSED: 1
DRESSING_LB_CURRENT_FUNCTION: INDEPENDENT
BATHING_CURRENT_FUNCTION: MODERATE ASSIST
LIGHT HOUSEKEEPING: DEPENDENT
FEEDING: INDEPENDENT
ORAL_CARE_CURRENT_FUNCTION: INDEPENDENT
AMBULATION ASSISTANCE: 1
ORAL_CARE_ASSESSED: 1

## 2022-02-20 ASSESSMENT — ENCOUNTER SYMPTOMS
DENIES PAIN: 1
PERSON REPORTING PAIN: PATIENT

## 2022-02-20 NOTE — CASE COMMUNICATION
OCCUPATIONAL THERAPY EVALUATION AND PLAN OF CARE     ·       Patient:  Gloria Patel     ·       Home Health Admission due to:  GLF resulting in R ankle fx and ORIF.     ·       Living Situation/PLOF:  Esther lives in a senior living facility.  She has CGs who assist with Bathing and dressing.  She goes to dining room for 3 meals a day and keeps few easy snacks in her room.  She has grab bars in her shower and next to her toilet.  She  has a raised toilet seat.  Does not want a shower chair.       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  Esther, her daughters and OT agree that her main iss ue for OT is acitivy tolerance. Client has OA B shoulders, so OT will not give her an UE HEP secondary to possibility of increasing shoulder pain.  Esther and her daughters feel that working with PT for activity tolerance, LE strengthening and mobility will be all the therapy that she wants for now.  She participates in many activities in her living facility, including exercise class, and doesn't want a lot of people coming in taking up  her time.     Does the patient get SOB with Moderate exertion?  not apparent  How often (if at all) does pain interfere with patient's movements?  occasionally

## 2022-02-21 ENCOUNTER — HOME CARE VISIT (OUTPATIENT)
Dept: HOME HEALTH SERVICES | Facility: HOME HEALTHCARE | Age: 87
End: 2022-02-21
Payer: MEDICARE

## 2022-02-21 VITALS
DIASTOLIC BLOOD PRESSURE: 58 MMHG | HEART RATE: 60 BPM | TEMPERATURE: 97.9 F | RESPIRATION RATE: 20 BRPM | SYSTOLIC BLOOD PRESSURE: 130 MMHG | OXYGEN SATURATION: 95 %

## 2022-02-21 PROCEDURE — 665998 HH PPS REVENUE CREDIT

## 2022-02-21 PROCEDURE — 665999 HH PPS REVENUE DEBIT

## 2022-02-21 PROCEDURE — G0151 HHCP-SERV OF PT,EA 15 MIN: HCPCS

## 2022-02-21 ASSESSMENT — ENCOUNTER SYMPTOMS
POOR JUDGMENT: 1
LOWEST PAIN SEVERITY IN PAST 24 HOURS: 0/10
SUBJECTIVE PAIN PROGRESSION: WAXING AND WANING
PAIN LOCATION - RELIEVING FACTORS: REST
PAIN SEVERITY GOAL: 0/10
PAIN LOCATION - PAIN DURATION: DAILY
PAIN LOCATION - EXACERBATING FACTORS: MOVEMENT
PERSON REPORTING PAIN: PATIENT
PAIN LOCATION - PAIN SEVERITY: 5/10
DIFFICULTY THINKING: 1
PAIN LOCATION: LEFT SHOULDER
PAIN LOCATION - PAIN QUALITY: ACHE
PAIN LOCATION - PAIN FREQUENCY: WITH ACTIVITY
DEBILITATING PAIN: 1
PAIN: 1
HIGHEST PAIN SEVERITY IN PAST 24 HOURS: 5/10

## 2022-02-22 PROCEDURE — 665999 HH PPS REVENUE DEBIT

## 2022-02-22 PROCEDURE — 665998 HH PPS REVENUE CREDIT

## 2022-02-23 ENCOUNTER — HOME CARE VISIT (OUTPATIENT)
Dept: HOME HEALTH SERVICES | Facility: HOME HEALTHCARE | Age: 87
End: 2022-02-23
Payer: MEDICARE

## 2022-02-23 VITALS
DIASTOLIC BLOOD PRESSURE: 68 MMHG | OXYGEN SATURATION: 95 % | SYSTOLIC BLOOD PRESSURE: 118 MMHG | RESPIRATION RATE: 18 BRPM | TEMPERATURE: 97.8 F | HEART RATE: 58 BPM

## 2022-02-23 VITALS
RESPIRATION RATE: 18 BRPM | OXYGEN SATURATION: 97 % | TEMPERATURE: 98.6 F | DIASTOLIC BLOOD PRESSURE: 60 MMHG | SYSTOLIC BLOOD PRESSURE: 118 MMHG | HEART RATE: 56 BPM

## 2022-02-23 PROCEDURE — G0153 HHCP-SVS OF S/L PATH,EA 15MN: HCPCS

## 2022-02-23 PROCEDURE — G0151 HHCP-SERV OF PT,EA 15 MIN: HCPCS

## 2022-02-23 PROCEDURE — 665999 HH PPS REVENUE DEBIT

## 2022-02-23 PROCEDURE — G0495 RN CARE TRAIN/EDU IN HH: HCPCS

## 2022-02-23 PROCEDURE — 665998 HH PPS REVENUE CREDIT

## 2022-02-23 ASSESSMENT — ENCOUNTER SYMPTOMS
ASSOCIATED SYMPTOMS: DENIES
PAIN LOCATION - PAIN QUALITY: ACHE
DIFFICULTY THINKING: 1
PAIN SEVERITY GOAL: 1/10
POOR JUDGMENT: 1
VOMITING: DENIES
PERSON REPORTING PAIN: PATIENT
PAIN LOCATION - EXACERBATING FACTORS: MOVEMENT
PAIN LOCATION - PAIN DURATION: DAILY
PAIN LOCATION - PAIN SEVERITY: 3/10
LIMITED RANGE OF MOTION: 1
PAIN LOCATION - PAIN FREQUENCY: INTERMITTENT
PAIN LOCATION - PAIN SEVERITY: 3/10
PAIN LOCATION - EXACERBATING FACTORS: MOVEMENT
PAIN LOCATION - PAIN QUALITY: ACHE
PERSON REPORTING PAIN: PATIENT
PAIN SEVERITY GOAL: 1/10
SUBJECTIVE PAIN PROGRESSION: WAXING AND WANING
PAIN LOCATION - PAIN FREQUENCY: WITH ACTIVITY
LOWEST PAIN SEVERITY IN PAST 24 HOURS: 0/10
MUSCLE WEAKNESS: 1
HIGHEST PAIN SEVERITY IN PAST 24 HOURS: 6/10
PAIN: 1
PAIN: 1
PAIN LOCATION: LEFT SHOULDER
PAIN LOCATION - PAIN DURATION: DAILY
PAIN LOCATION - PAIN QUALITY: ACHY
PAIN LOCATION - RELIEVING FACTORS: MEDICATION
LOWEST PAIN SEVERITY IN PAST 24 HOURS: 4/10
PAIN LOCATION - PAIN SEVERITY: 4/10
PAIN LOCATION - RELIEVING FACTORS: MEDICATION
HIGHEST PAIN SEVERITY IN PAST 24 HOURS: 4/10
PAIN LOCATION - EXACERBATING FACTORS: MOVEMENT
SUBJECTIVE PAIN PROGRESSION: WAXING AND WANING
NAUSEA: DENIES
PAIN LOCATION: BACK
PAIN LOCATION: LEFT SHOULDER
PAIN LOCATION - PAIN FREQUENCY: FREQUENT

## 2022-02-24 ENCOUNTER — HOME CARE VISIT (OUTPATIENT)
Dept: HOME HEALTH SERVICES | Facility: HOME HEALTHCARE | Age: 87
End: 2022-02-24
Payer: MEDICARE

## 2022-02-24 PROCEDURE — 665998 HH PPS REVENUE CREDIT

## 2022-02-24 PROCEDURE — 665999 HH PPS REVENUE DEBIT

## 2022-02-24 ASSESSMENT — ENCOUNTER SYMPTOMS
PAIN LOCATION - PAIN QUALITY: ACHY
PAIN LOCATION - PAIN SEVERITY: 3/10
ANGER WITHIN DEFINED LIMITS: 1
PAIN LOCATION: RIGHT SHOULDER
PAIN LOCATION - RELIEVING FACTORS: REST
DIFFICULTY THINKING: 1
PAIN LOCATION - PAIN FREQUENCY: INFREQUENT
PAIN LOCATION - PAIN DURATION: VARIES
AGGRESSION WITHIN DEFINED LIMITS: 1
PERSON REPORTING PAIN: PATIENT
PAIN: 1
PAIN LOCATION - EXACERBATING FACTORS: MOVEMENT

## 2022-02-24 NOTE — CASE COMMUNICATION
Quality Review Completed for 2/16 SOC OASIS by LEONEL Harkins RN on 2/24/2022:  Edits completed by LEONEL Harkins RN:  1.  changed from 2 to 3 per guidelines when ambulation is supervised only   2.  changed to 11 per therapy  3. Added incontinence to functional limitations, ambulate only w/assist to Safety measures, and F2F to 485 forms  4. Added HTN to care plan

## 2022-02-25 ENCOUNTER — HOME CARE VISIT (OUTPATIENT)
Dept: HOME HEALTH SERVICES | Facility: HOME HEALTHCARE | Age: 87
End: 2022-02-25
Payer: MEDICARE

## 2022-02-25 PROCEDURE — 665999 HH PPS REVENUE DEBIT

## 2022-02-25 PROCEDURE — G0493 RN CARE EA 15 MIN HH/HOSPICE: HCPCS

## 2022-02-25 PROCEDURE — 665998 HH PPS REVENUE CREDIT

## 2022-02-26 PROCEDURE — 665998 HH PPS REVENUE CREDIT

## 2022-02-26 PROCEDURE — 665999 HH PPS REVENUE DEBIT

## 2022-02-27 PROCEDURE — 665998 HH PPS REVENUE CREDIT

## 2022-02-27 PROCEDURE — 665999 HH PPS REVENUE DEBIT

## 2022-02-28 ENCOUNTER — HOME CARE VISIT (OUTPATIENT)
Dept: HOME HEALTH SERVICES | Facility: HOME HEALTHCARE | Age: 87
End: 2022-02-28
Payer: MEDICARE

## 2022-02-28 VITALS
DIASTOLIC BLOOD PRESSURE: 80 MMHG | HEART RATE: 62 BPM | TEMPERATURE: 97.9 F | SYSTOLIC BLOOD PRESSURE: 130 MMHG | RESPIRATION RATE: 18 BRPM | OXYGEN SATURATION: 97 %

## 2022-02-28 VITALS
TEMPERATURE: 98.9 F | SYSTOLIC BLOOD PRESSURE: 140 MMHG | OXYGEN SATURATION: 98 % | DIASTOLIC BLOOD PRESSURE: 70 MMHG | RESPIRATION RATE: 18 BRPM | HEART RATE: 56 BPM

## 2022-02-28 PROCEDURE — 665998 HH PPS REVENUE CREDIT

## 2022-02-28 PROCEDURE — G0180 MD CERTIFICATION HHA PATIENT: HCPCS | Performed by: INTERNAL MEDICINE

## 2022-02-28 PROCEDURE — 665999 HH PPS REVENUE DEBIT

## 2022-02-28 PROCEDURE — G0151 HHCP-SERV OF PT,EA 15 MIN: HCPCS

## 2022-02-28 ASSESSMENT — ENCOUNTER SYMPTOMS
PAIN: 1
DENIES PAIN: 1
PAIN LOCATION - PAIN FREQUENCY: CONSTANT
PAIN SEVERITY GOAL: 0/10
HIGHEST PAIN SEVERITY IN PAST 24 HOURS: 6/10
POOR JUDGMENT: 1
DIFFICULTY THINKING: 1
PAIN LOCATION - PAIN QUALITY: SHARP
LOWEST PAIN SEVERITY IN PAST 24 HOURS: 0/10
PAIN LOCATION - PAIN DURATION: DAILY
PAIN LOCATION - EXACERBATING FACTORS: MOVEMENT
PERSON REPORTING PAIN: PATIENT
NAUSEA: DENIES
PAIN LOCATION - RELIEVING FACTORS: REST, MEDICATION
PAIN LOCATION - PAIN FREQUENCY: CONSTANT
PAIN LOCATION - PAIN SEVERITY: 6/10
SUBJECTIVE PAIN PROGRESSION: UNCHANGED
HIGHEST PAIN SEVERITY IN PAST 24 HOURS: 0/10
PAIN LOCATION - RELIEVING FACTORS: REST, MEDICATION
PAIN LOCATION: RIGHT SHOULDER
VOMITING: DENIES
LOWEST PAIN SEVERITY IN PAST 24 HOURS: 2/10
MUSCLE WEAKNESS: 1
SUBJECTIVE PAIN PROGRESSION: WAXING AND WANING
PAIN LOCATION - PAIN QUALITY: SHARP
DEBILITATING PAIN: 1
PAIN SEVERITY GOAL: 2/10
PAIN LOCATION - PAIN DURATION: DAILY
PAIN LOCATION: LEFT SHOULDER
PAIN LOCATION - PAIN SEVERITY: 6/10
PAIN LOCATION - EXACERBATING FACTORS: MOVEMENT

## 2022-02-28 NOTE — CASE COMMUNICATION
I agree with changes.  Bonnie Sharp RN  ----- Message -----  From: Kiara Harkins R.N.  Sent: 2/24/2022  10:45 AM PST  To: Bonnie Sharp R.N.      Quality Review Completed for 2/16 SOC OASIS by LEONEL Harkins RN on 2/24/2022:  Edits completed by LEONEL Harkins RN:  1.  changed from 2 to 3 per guidelines when ambulation is supervised only   2.  changed to 11 per therapy  3. Added incontinence to functional limitations, ambulate onl y w/assist to Safety measures, and F2F to 485 forms  4. Added HTN to care plan

## 2022-03-01 ENCOUNTER — HOME CARE VISIT (OUTPATIENT)
Dept: HOME HEALTH SERVICES | Facility: HOME HEALTHCARE | Age: 87
End: 2022-03-01
Payer: MEDICARE

## 2022-03-01 VITALS
DIASTOLIC BLOOD PRESSURE: 50 MMHG | HEART RATE: 57 BPM | OXYGEN SATURATION: 97 % | SYSTOLIC BLOOD PRESSURE: 156 MMHG | RESPIRATION RATE: 18 BRPM | TEMPERATURE: 98.3 F

## 2022-03-01 PROCEDURE — 665999 HH PPS REVENUE DEBIT

## 2022-03-01 PROCEDURE — G0495 RN CARE TRAIN/EDU IN HH: HCPCS

## 2022-03-01 PROCEDURE — 665998 HH PPS REVENUE CREDIT

## 2022-03-01 ASSESSMENT — ENCOUNTER SYMPTOMS
PAIN LOCATION: GENERALIZED
PAIN LOCATION - PAIN DURATION: ONGOING
HIGHEST PAIN SEVERITY IN PAST 24 HOURS: 4/10
LOWEST PAIN SEVERITY IN PAST 24 HOURS: 0/10
SUBJECTIVE PAIN PROGRESSION: UNCHANGED
PAIN LOCATION - PAIN FREQUENCY: CONSTANT
PAIN: 1
PERSON REPORTING PAIN: PATIENT
MUSCLE WEAKNESS: 1
PAIN LOCATION - PAIN QUALITY: ACHE
PAIN SEVERITY GOAL: 0/10
PAIN LOCATION - PAIN SEVERITY: 4/10

## 2022-03-01 ASSESSMENT — ACTIVITIES OF DAILY LIVING (ADL): CURRENT_FUNCTION: STAND BY ASSIST

## 2022-03-01 NOTE — Clinical Note
Noted.  ----- Message -----  From: Jill Nava R.N.  Sent: 3/2/2022   4:45 PM PST  To: SELWYN Melendrez   Reports meds given by facility. Extremely upset by TV losing signal. CG arrived & tried to get it working, he will let nephjulio Kilpatrick know. Able to state how & when she takes meds in home. Has new compression stockings, refused offer to assist with putting them on.

## 2022-03-02 ENCOUNTER — HOME CARE VISIT (OUTPATIENT)
Dept: HOME HEALTH SERVICES | Facility: HOME HEALTHCARE | Age: 87
End: 2022-03-02
Payer: MEDICARE

## 2022-03-02 VITALS
RESPIRATION RATE: 17 BRPM | HEART RATE: 54 BPM | TEMPERATURE: 99.1 F | DIASTOLIC BLOOD PRESSURE: 66 MMHG | SYSTOLIC BLOOD PRESSURE: 128 MMHG | OXYGEN SATURATION: 98 %

## 2022-03-02 VITALS
DIASTOLIC BLOOD PRESSURE: 66 MMHG | RESPIRATION RATE: 17 BRPM | TEMPERATURE: 99.1 F | OXYGEN SATURATION: 98 % | SYSTOLIC BLOOD PRESSURE: 128 MMHG | HEART RATE: 54 BPM

## 2022-03-02 PROCEDURE — 665999 HH PPS REVENUE DEBIT

## 2022-03-02 PROCEDURE — G0153 HHCP-SVS OF S/L PATH,EA 15MN: HCPCS

## 2022-03-02 PROCEDURE — G0151 HHCP-SERV OF PT,EA 15 MIN: HCPCS

## 2022-03-02 PROCEDURE — 665998 HH PPS REVENUE CREDIT

## 2022-03-02 ASSESSMENT — ENCOUNTER SYMPTOMS
PAIN LOCATION - PAIN SEVERITY: 4/10
PAIN LOCATION - EXACERBATING FACTORS: MOVEMENT
PAIN LOCATION - PAIN FREQUENCY: WITH ACTIVITY
PAIN LOCATION - PAIN QUALITY: SHARP
PAIN LOCATION - PAIN FREQUENCY: CONSTANT
PAIN LOCATION - PAIN QUALITY: ACHE
PAIN: 1
PAIN LOCATION - PAIN SEVERITY: 8/10
PAIN LOCATION - RELIEVING FACTORS: TYLENOL
HIGHEST PAIN SEVERITY IN PAST 24 HOURS: 8/10
SUBJECTIVE PAIN PROGRESSION: WAXING AND WANING
PAIN LOCATION - PAIN DURATION: VARIED
PAIN LOCATION - PAIN DURATION: DAILY
PAIN LOCATION - EXACERBATING FACTORS: MOVEMENT
PAIN LOCATION - PAIN SEVERITY: 4/10
PAIN: 1
PAIN LOCATION - PAIN QUALITY: ACHE
PAIN LOCATION - PAIN DURATION: VARIED
PAIN LOCATION - RELIEVING FACTORS: REST, MEDICATION
PERSON REPORTING PAIN: PATIENT
PAIN LOCATION - PAIN SEVERITY: 8/10
PAIN LOCATION - PAIN DURATION: DAILY
PAIN LOCATION - PAIN QUALITY: SHARP
POOR JUDGMENT: 1
DIFFICULTY THINKING: 1
PAIN LOCATION - PAIN FREQUENCY: CONSTANT
PAIN LOCATION: LEFT SHOULDER
PAIN SEVERITY GOAL: 4/10
PAIN LOCATION - EXACERBATING FACTORS: MOVEMENT
PAIN LOCATION - EXACERBATING FACTORS: MOVEMENT
PAIN LOCATION - RELIEVING FACTORS: TYLENOL
PAIN LOCATION: RIGHT SHOULDER
POOR JUDGMENT: 1
PAIN LOCATION - PAIN FREQUENCY: WITH ACTIVITY
PAIN LOCATION: RIGHT SHOULDER
LOWEST PAIN SEVERITY IN PAST 24 HOURS: 0/10
PAIN SEVERITY GOAL: 2/10
DIFFICULTY THINKING: 1
PERSON REPORTING PAIN: PATIENT
PAIN LOCATION: LEFT SHOULDER
PAIN LOCATION - RELIEVING FACTORS: REST, MEDICATION

## 2022-03-02 NOTE — Clinical Note
Noted.  ----- Message -----  From: Barbi Ruby, DANIKA  Sent: 3/2/2022  12:53 PM PST  To: SELWYN Melendrez pulse 54, 8/10 bilateral shoulder pain, all other vitals within parameters

## 2022-03-02 NOTE — Clinical Note
Kirby Evans, I'm typing up the HEP up in a different way with larger print.  I am scheduled next Tues to review with patient and see how she does.  She did better with me this time than last time, but I'm not her PT jose luis.  Also she has lost the pillow that you were having her use.  She thinks the  must have done something with it.  I couldn't find it anywhere and we found another she was able to use.  Definitely still not able to get the theraband on easily, but was better than the first time with me.  With repetition she is making small gains on my end.  If there is still confusion on your end, will you please let me know and I discussed doing a co-treat with you to see if there were other cues she may need.    Thanks,  Barbi GARNICA

## 2022-03-03 PROCEDURE — 665999 HH PPS REVENUE DEBIT

## 2022-03-03 PROCEDURE — 665998 HH PPS REVENUE CREDIT

## 2022-03-03 NOTE — CASE COMMUNICATION
FYI   Reports meds given by facility. Extremely upset by TV losing signal. CG arrived & tried to get it working, he will let nephew Finesse know. Able to state how & when she takes meds in home. Has new compression stockings, refused offer to assist with putting them on. Left message on call back for positive  COVID.

## 2022-03-03 NOTE — CASE COMMUNICATION
Thank you so much. I will check her out next week again  ----- Message -----  From: Barbi Ruby SLP  Sent: 3/2/2022  12:53 PM PST  To: Cristina Jaramillo, PT      Kirby Evans, I'm typing up the HEP up in a different way with larger print.  I am scheduled next Tues to review with patient and see how she does.  She did better with me this time than last time, but I'm not her PT jose luis.  Also she has lost the pillow that you were h aving her use.  She thinks the  must have done something with it.  I couldn't find it anywhere and we found another she was able to use.  Definitely still not able to get the theraband on easily, but was better than the first time with me.  With repetition she is making small gains on my end.  If there is still confusion on your end, will you please let me know and I discussed doing a co-treat with you to see if there were other  cues she may need.    Thanks,  Barbi GARNICA

## 2022-03-04 ENCOUNTER — HOME CARE VISIT (OUTPATIENT)
Dept: HOME HEALTH SERVICES | Facility: HOME HEALTHCARE | Age: 87
End: 2022-03-04
Payer: MEDICARE

## 2022-03-04 PROCEDURE — G0493 RN CARE EA 15 MIN HH/HOSPICE: HCPCS

## 2022-03-04 PROCEDURE — 665999 HH PPS REVENUE DEBIT

## 2022-03-04 PROCEDURE — 665998 HH PPS REVENUE CREDIT

## 2022-03-05 PROCEDURE — 665998 HH PPS REVENUE CREDIT

## 2022-03-05 PROCEDURE — 665999 HH PPS REVENUE DEBIT

## 2022-03-06 PROCEDURE — 665999 HH PPS REVENUE DEBIT

## 2022-03-06 PROCEDURE — 665998 HH PPS REVENUE CREDIT

## 2022-03-06 NOTE — PROGRESS NOTES
Chief Complaint   Patient presents with   • Ankle Injury     Right ankle fracture       HISTORY OF THE PRESENT ILLNESS: Patient is a 89 y.o. female.     Patient comes in for follow-up. She has a history of hypertension, hypothyroid, reflex. She sustained a mechanical fall on December 21, 2021. She was found to have a right closed ankle fracture. She underwent open reduction internal fixation on December 22. It appears that she experienced some son downing issues while in the hospital but otherwise there were no complications. She was transferred to skilled nursing facility. She has been back in her apartment for multiple weeks. She has home health and physical therapy in place. She is ambulating with a Walker. She denies any pain.        We discussed her other chronic issues:     Reflux-stable with over-the-counter Tums or Pepcid Complete. She takes these as needed.     Hypertension-stable on amlodipine and lisinopril. Goal blood pressure is less than 140/90.     Depression-stable on sertraline. She was also given mirtazapine in the hospital. This is primarily for sleep she is not been taking this medication.     Hypothyroid-clinically euthyroid. Thyroid function test in September 2021 were normal.     Review systems:     weight loss. Current weight is 142 pounds. Appetite is normal. Previous rate was 146. Her daughter reports that she is doing better now that she is back to her assisted living.         Allergies: Morphine, Cephalexin, Codeine, Metronidazole, and Sulfa drugs    Current Outpatient Medications Ordered in Epic   Medication Sig Dispense Refill   • DORZOLAMIDE HCL-TIMOLOL MAL OP Administer 1 Drop into both eyes every morning.     • Famotidine-Ca Carb-Mag Hydrox -165 MG Chew Tab Chew 1 Tablet 1 time a day as needed (for indegestion).     • diclofenac sodium (VOLTAREN) 1 % Gel Apply 2 g topically 2 times a day as needed (for mild to moderate pain).     • Soft Lens Products (FRANCESCA SALINE) Solution  Administer 1 Drop into both eyes every day.     • Melatonin 5 MG Cap Take 1 Capsule by mouth at bedtime as needed (for insomnia, takes first).     • Ibuprofen-Acetaminophen 125-250 MG Tab Take 1-2 Tablets by mouth every 8 hours as needed (for mild to moderate pain.).     • Multiple Vitamins-Minerals (PRESERVISION AREDS 2 PO) Take 1 Tablet by mouth every day.     • calcium carbonate (TUMS CHEWY BITES) 750 MG chewable tablet Chew 1 Tablet 1 time a day as needed (for stomach upset).     • Multiple Minerals-Vitamins (CITRACAL MAXIMUM PLUS) Tab Take 1 Tablet by mouth every day.     • fluticasone (FLONASE) 50 MCG/ACT nasal spray Administer 1 Spray into affected nostril(S) every day.     • lisinopril (PRINIVIL) 20 MG Tab TAKE HALF A TABLET BY MOUTH TWICE A DAY 90 tablet 3   • amLODIPine (NORVASC) 5 MG Tab Take 1 tablet by mouth every day. 90 tablet 3   • sertraline (ZOLOFT) 25 MG tablet Take 1 tablet by mouth every day. 90 tablet 3   • levothyroxine (SYNTHROID) 75 MCG Tab TAKE 1 TABLET BY MOUTH EVERY DAY 90 tablet 3   • travoprost (TRAVATAN Z) 0.004 % Solution Administer 1 Drop into both eyes every evening. Indications: Wide-Angle Glaucoma     • polyethylene glycol 3350 (MIRALAX) Powder Take 17 g by mouth every day. Indications: Constipation       No current Epic-ordered facility-administered medications on file.       Past medical history, social history and family history were reviewed from chart today    Review of systems: Per HPI.      All others negative.     Exam: /60 (BP Location: Right arm, Patient Position: Sitting, BP Cuff Size: Adult)   Pulse (!) 43   Temp 36.9 °C (98.4 °F) (Temporal)   Resp 16   Wt 64.4 kg (142 lb)   SpO2 96%   General: Well-appearing. Well-developed. No signs of distress.  HEENT: Grossly normal. Oral cavity is pink and moist.   Neck: Supple without JVD or bruit.  Pulmonary: Clear with good breath sounds. Normal effort.  Cardiovascular: Regular. Carotid and radial pulses are  intact.  Abdomen: Soft, nontender, nondistended. Spleen and liver are not enlarged.  Neurologic: Cranial nerves II through XII are grossly normal, alert and oriented x3      Diagnosis:  1. Closed fracture of right ankle with routine healing, subsequent encounter     2. Age-related osteoporosis without current pathological fracture     3. Hypothyroidism, unspecified type     4. Essential hypertension     5. Current mild episode of major depressive disorder without prior episode (HCC)     6. Gastroesophageal reflux disease without esophagitis           89-year-old female post right ankle fracture due to mechanical fall. She has a history of osteoporosis. She was previously on Fosamax many years ago. I believe this was discontinued due to GI issues. She has a history of moderate reflux. I do not believe she is a good candidate for Reclast due to age. She received a dose of Prolia in 10/21.  I discussed with the daughter that we may want to consider additional treatment to reduce her risk.    Continue with PT. Continue with Walker.     She denies pain but if she should start to experience any discomfort I recommended Tylenol.     Continue Pepcid complete for reflux symptoms.     Blood pressure stable. No change in treatment.     Thyroid is stable. No change in treatment.     My total time spent caring for the patient on the day of the encounter was  greater than 30 minutes.   This includes obtaining history, reviewing chart, physical exam, patient education, reviewing outside records, placing orders, interpreting tests and coordinating care.

## 2022-03-07 ENCOUNTER — OFFICE VISIT (OUTPATIENT)
Dept: INTERNAL MEDICINE | Facility: IMAGING CENTER | Age: 87
End: 2022-03-07
Payer: MEDICARE

## 2022-03-07 ENCOUNTER — HOSPITAL ENCOUNTER (OUTPATIENT)
Facility: MEDICAL CENTER | Age: 87
End: 2022-03-07
Attending: INTERNAL MEDICINE
Payer: MEDICARE

## 2022-03-07 VITALS
OXYGEN SATURATION: 93 % | DIASTOLIC BLOOD PRESSURE: 80 MMHG | RESPIRATION RATE: 18 BRPM | SYSTOLIC BLOOD PRESSURE: 130 MMHG | TEMPERATURE: 97.7 F | HEART RATE: 63 BPM

## 2022-03-07 VITALS
HEIGHT: 64 IN | OXYGEN SATURATION: 98 % | WEIGHT: 143 LBS | BODY MASS INDEX: 24.41 KG/M2 | DIASTOLIC BLOOD PRESSURE: 80 MMHG | HEART RATE: 52 BPM | RESPIRATION RATE: 16 BRPM | SYSTOLIC BLOOD PRESSURE: 128 MMHG | TEMPERATURE: 97.8 F

## 2022-03-07 DIAGNOSIS — I27.20 PULMONARY HYPERTENSION (HCC): ICD-10-CM

## 2022-03-07 DIAGNOSIS — N32.81 OVERACTIVE BLADDER: ICD-10-CM

## 2022-03-07 DIAGNOSIS — F32.0 CURRENT MILD EPISODE OF MAJOR DEPRESSIVE DISORDER WITHOUT PRIOR EPISODE (HCC): ICD-10-CM

## 2022-03-07 DIAGNOSIS — E03.9 HYPOTHYROIDISM, UNSPECIFIED TYPE: ICD-10-CM

## 2022-03-07 DIAGNOSIS — Z00.00 MEDICARE ANNUAL WELLNESS VISIT, SUBSEQUENT: ICD-10-CM

## 2022-03-07 DIAGNOSIS — M80.00XD AGE-RELATED OSTEOPOROSIS WITH CURRENT PATHOLOGICAL FRACTURE WITH ROUTINE HEALING, SUBSEQUENT ENCOUNTER: ICD-10-CM

## 2022-03-07 DIAGNOSIS — I10 ESSENTIAL HYPERTENSION: ICD-10-CM

## 2022-03-07 DIAGNOSIS — E55.9 VITAMIN D DEFICIENCY: ICD-10-CM

## 2022-03-07 DIAGNOSIS — K21.9 GASTROESOPHAGEAL REFLUX DISEASE WITHOUT ESOPHAGITIS: ICD-10-CM

## 2022-03-07 DIAGNOSIS — R26.9 GAIT DISORDER: ICD-10-CM

## 2022-03-07 DIAGNOSIS — S82.891D CLOSED FRACTURE OF RIGHT ANKLE WITH ROUTINE HEALING, SUBSEQUENT ENCOUNTER: ICD-10-CM

## 2022-03-07 DIAGNOSIS — R60.0 LOCALIZED EDEMA: ICD-10-CM

## 2022-03-07 PROCEDURE — 84439 ASSAY OF FREE THYROXINE: CPT

## 2022-03-07 PROCEDURE — 84443 ASSAY THYROID STIM HORMONE: CPT

## 2022-03-07 PROCEDURE — 80053 COMPREHEN METABOLIC PANEL: CPT

## 2022-03-07 PROCEDURE — 82306 VITAMIN D 25 HYDROXY: CPT

## 2022-03-07 PROCEDURE — 665998 HH PPS REVENUE CREDIT

## 2022-03-07 PROCEDURE — 665999 HH PPS REVENUE DEBIT

## 2022-03-07 PROCEDURE — 85025 COMPLETE CBC W/AUTO DIFF WBC: CPT

## 2022-03-07 PROCEDURE — G0439 PPPS, SUBSEQ VISIT: HCPCS | Performed by: INTERNAL MEDICINE

## 2022-03-07 PROCEDURE — 82043 UR ALBUMIN QUANTITATIVE: CPT

## 2022-03-07 PROCEDURE — 81001 URINALYSIS AUTO W/SCOPE: CPT

## 2022-03-07 PROCEDURE — 82570 ASSAY OF URINE CREATININE: CPT

## 2022-03-07 ASSESSMENT — ENCOUNTER SYMPTOMS
LOWEST PAIN SEVERITY IN PAST 24 HOURS: 0/10
VOMITING: DENIES
GENERAL WELL-BEING: GOOD
PERSON REPORTING PAIN: PATIENT
HIGHEST PAIN SEVERITY IN PAST 24 HOURS: 0/10
PAIN SEVERITY GOAL: 0/10
DENIES PAIN: 1
SUBJECTIVE PAIN PROGRESSION: UNCHANGED
NAUSEA: DENIES
MUSCLE WEAKNESS: 1

## 2022-03-07 ASSESSMENT — PATIENT HEALTH QUESTIONNAIRE - PHQ9: CLINICAL INTERPRETATION OF PHQ2 SCORE: 0

## 2022-03-07 ASSESSMENT — ACTIVITIES OF DAILY LIVING (ADL): BATHING_REQUIRES_ASSISTANCE: 0

## 2022-03-07 ASSESSMENT — FIBROSIS 4 INDEX: FIB4 SCORE: 2.95

## 2022-03-07 NOTE — PROGRESS NOTES
"89 y.o. female presents for the followin. Medicare annual wellness visit, subsequent  Patient comes in for annual health risk assessment.  She considers herself in good health.  She has balance issues and has experienced multiple falls.  She recently had a fracture of her right ankle requiring open reduction and internal fixation.  She has home health physical therapy which is helping with ambulation and gait training.  No depression.  Denies cognitive issues    Colonoscopy No repeat recommended  18  FIT NEG Dexa 9/22/15  Osteoporosis   Mammo 19 Cat 2 A1c 13  5.7   [facility] ENT-Elías  Neurosurg-[facility]    2. Hypothyroidism, unspecified type  Clinically euthyroid.  She takes the medication as prescribed daily.  Synthroid 75 mcg   Patient is clinically euthyroid. Last labs:  Lab Results   Component Value Date/Time    TSHULTRASEN 2.760 2021 03:30 PM    TSHULTRASEN 2.140 2021 11:20 AM     Lab Results   Component Value Date/Time    FREET4 1.27 2021 1530    FREET4 1.51 2021 1120     3. Essential hypertension  Stable.  The patient denies any symptoms referable to her elevated blood pressure. Specifically denies chest pain, palpitations, dyspnea, orthopnea, PND or peripheral edema. Current medication regimen is as listed below. Patient denies any side effects of medication.  Amlodipine 5 mg  Lisinopril 20 mg     4. Current mild episode of major depressive disorder without prior episode (HCC)  Stable/remission  Zoloft 25 mg    5. Gastroesophageal reflux disease without esophagitis  Stable.  She also noticed that her diet plays a large role with her reflux.  She has \"spicy\" food at her assisted living.  If she avoids it then her reflux is typically well controlled.  Pepcid Complete    6. Overactive bladder  Stable.  1 or 2 episodes of nocturia.  She has noticed less urgency.  She is currently on no medications    7. Age-related " osteoporosis with current pathological fracture with routine healing, subsequent encounter  Bone density and May 2019.  Osteoporosis.  Post right ankle fracture. She was on Prolia. Due for next dose in April 2022    8.  Lower extremity edema.  She has developed lower extremity edema.  Symptoms are worse on the right.  Symptoms seem to correlate with ankle fracture.  Echocardiogram in August 2019 showed elevated pulmonary pressures but preserved left ejection fraction.    Annual Wellness Visit/Health Risk Assessment:    Past medical:  Past Medical History:   Diagnosis Date   • Arthritis    • Diverticulitis     medicated   • GERD (gastroesophageal reflux disease)     medicated   • Glaucoma    • Heart burn    • Hiatus hernia syndrome    • Hypertension     medicated   • Primary osteoarthritis of both shoulders 1/22/2019   • Unspecified urinary incontinence        Past surgical:  Past Surgical History:   Procedure Laterality Date   • ORIF, ANKLE Right 12/22/2021    Procedure: OPEN REDUCTION AND INTERNAL FIXATION, ANKLE;  Surgeon: Pop Causey M.D.;  Location: [facility];  Service: Orthopedics   • HIP ARTHROPLASTY TOTAL  6/21/2011    Performed by AMPARO CRAFT at [facility] ORS   • OTHER ORTHOPEDIC SURGERY  2000    back surgery   • GYN SURGERY  1970    hysterectomy   • CHOLECYSTECTOMY  1966    rahat   • OTHER  1954    mikey. breast bx   • OTHER ABDOMINAL SURGERY  1954    kidney tacked up ?   • OTHER  1945    tonsilectomy   • OTHER ORTHOPEDIC SURGERY      osteoporosis - medicated   • US-NEEDLE CORE BX-BREAST PANEL         Family history: relating to possible risk factors for your patient  Family History   Problem Relation Age of Onset   • Diabetes Other    • Heart Disease Other    • Lung Disease Other    • Cancer Sister        Current Providers (including home care/DME’s):   Colonoscopy No repeat recommended  2/16/18  FIT NEG Dexa 9/22/15  Osteoporosis   Mammo 5/17/19 Cat 2 A1c 1/9/13  5.7    [facility] ENT-Elías  Neurosurg-[facility]      Patient Care Team:  Mike Otero M.D. as PCP - General  Khloe Jones R.N. (Inactive) as Registered Nurse  [facility] as Home Health Provider  [facility] as Home Health Provider  SELWYN James R.N.  [facility] as Home Health Provider (Home Health)      Medications:   Current Outpatient Medications Ordered in Epic   Medication Sig Dispense Refill   • DORZOLAMIDE HCL-TIMOLOL MAL OP Administer 1 Drop into both eyes every morning.     • Famotidine-Ca Carb-Mag Hydrox -165 MG Chew Tab Chew 1 Tablet 1 time a day as needed (for indegestion).     • diclofenac sodium (VOLTAREN) 1 % Gel Apply 2 g topically 2 times a day as needed (for mild to moderate pain).     • Soft Lens Products (FRANCESCA SALINE) Solution Administer 1 Drop into both eyes every day.     • Melatonin 5 MG Cap Take 1 Capsule by mouth at bedtime as needed (for insomnia, takes first).     • Ibuprofen-Acetaminophen 125-250 MG Tab Take 1-2 Tablets by mouth every 8 hours as needed (for mild to moderate pain.).     • Multiple Vitamins-Minerals (PRESERVISION AREDS 2 PO) Take 1 Tablet by mouth every day.     • calcium carbonate (TUMS CHEWY BITES) 750 MG chewable tablet Chew 1 Tablet 1 time a day as needed (for stomach upset).     • fluticasone (FLONASE) 50 MCG/ACT nasal spray Administer 1 Spray into affected nostril(S) every day.     • lisinopril (PRINIVIL) 20 MG Tab TAKE HALF A TABLET BY MOUTH TWICE A DAY 90 tablet 3   • amLODIPine (NORVASC) 5 MG Tab Take 1 tablet by mouth every day. 90 tablet 3   • sertraline (ZOLOFT) 25 MG tablet Take 1 tablet by mouth every day. 90 tablet 3   • levothyroxine (SYNTHROID) 75 MCG Tab TAKE 1 TABLET BY MOUTH EVERY DAY 90 tablet 3   • travoprost (TRAVATAN Z) 0.004 % Solution Administer 1 Drop into both eyes every evening. Indications: Wide-Angle Glaucoma     • polyethylene glycol 3350 (MIRALAX)  Powder Take 17 g by mouth every day. Indications: Constipation       No current Epic-ordered facility-administered medications on file.       Supplements (calcium/vitamins): if not lisited in medications    No chief complaint on file.        HPI:  Gloria Patel is a 89 y.o. here for Medicare Annual Wellness Visit     Patient Active Problem List    Diagnosis Date Noted   • Age-related physical debility 01/13/2022   • Sundowning 12/27/2021   • Fracture of right ankle 12/26/2021   • Recurrent UTI 01/20/2020   • Renal cyst 09/28/2019   • Bradycardia 08/24/2019   • Primary osteoarthritis of both shoulders 01/22/2019   • TIA (transient ischemic attack) 04/28/2016   • HTN (hypertension) 04/28/2016   • Vision changes 04/28/2016   • Osteoporosis 09/08/2015   • GERD (gastroesophageal reflux disease) 05/22/2012   • Spondylolisthesis of lumbar region 05/22/2012   • Intervertebral disc protrusion 05/22/2012   • Hyperlipidemia 03/07/2012   • Hiatal hernia 11/09/2010   • Hip pain 08/24/2010   • Hypothyroid 08/24/2010   • Overactive bladder 08/24/2010       Current Outpatient Medications   Medication Sig Dispense Refill   • DORZOLAMIDE HCL-TIMOLOL MAL OP Administer 1 Drop into both eyes every morning.     • Famotidine-Ca Carb-Mag Hydrox -165 MG Chew Tab Chew 1 Tablet 1 time a day as needed (for indegestion).     • diclofenac sodium (VOLTAREN) 1 % Gel Apply 2 g topically 2 times a day as needed (for mild to moderate pain).     • Soft Lens Products (FRANCESCA SALINE) Solution Administer 1 Drop into both eyes every day.     • Melatonin 5 MG Cap Take 1 Capsule by mouth at bedtime as needed (for insomnia, takes first).     • Ibuprofen-Acetaminophen 125-250 MG Tab Take 1-2 Tablets by mouth every 8 hours as needed (for mild to moderate pain.).     • Multiple Vitamins-Minerals (PRESERVISION AREDS 2 PO) Take 1 Tablet by mouth every day.     • calcium carbonate (TUMS CHEWY BITES) 750 MG chewable tablet Chew 1 Tablet 1 time a day as  needed (for stomach upset).     • fluticasone (FLONASE) 50 MCG/ACT nasal spray Administer 1 Spray into affected nostril(S) every day.     • lisinopril (PRINIVIL) 20 MG Tab TAKE HALF A TABLET BY MOUTH TWICE A DAY 90 tablet 3   • amLODIPine (NORVASC) 5 MG Tab Take 1 tablet by mouth every day. 90 tablet 3   • sertraline (ZOLOFT) 25 MG tablet Take 1 tablet by mouth every day. 90 tablet 3   • levothyroxine (SYNTHROID) 75 MCG Tab TAKE 1 TABLET BY MOUTH EVERY DAY 90 tablet 3   • travoprost (TRAVATAN Z) 0.004 % Solution Administer 1 Drop into both eyes every evening. Indications: Wide-Angle Glaucoma     • polyethylene glycol 3350 (MIRALAX) Powder Take 17 g by mouth every day. Indications: Constipation       No current facility-administered medications for this visit.            Current supplements as per medication list.       Allergies: Morphine, Cephalexin, Codeine, Metronidazole, and Sulfa drugs    Current social contact/activities:  Social with friends and family..    She  reports that she has never smoked. She has never used smokeless tobacco. She reports current alcohol use. She reports that she does not use drugs.  Counseling given: Not Answered        DPA/Advanced Directive: Completed.  In epic      ROS:    Gait: Uses : Walker  Ostomy: No  Other tubes: no   Amputations: no   Chronic oxygen use: no   Last eye exam: Today.  She typically sees the ophthalmologist every 6 months  : Denies any urinary leakage during the last 6 months incontinence.       Screening:  Colonoscopy No repeat recommended  2/16/18  FIT NEG Dexa 9/22/15  Osteoporosis   Mammo 5/17/19 Cat 2 A1c 1/9/13  5.7   [facility] ENT-Elías  Neurosurg-[facility]      Depression Screening  Little interest or pleasure in doing things?  0 - not at all  Feeling down, depressed , or hopeless? 0 - not at all  Patient Health Questionnaire Score: 0     If depressive symptoms identified deferred to follow up visit unless  specifically addressed in assessment and plan.    Interpretation of PHQ-9 Total Score   Score Severity   1-4 No Depression   5-9 Mild Depression   10-14 Moderate Depression   15-19 Moderately Severe Depression   20-27 Severe Depression    Screening for Cognitive Impairment  Three Minute Recall (daughter, heaven, mountain) 0/3    Sg clock face with all 12 numbers and set the hands to show 10 past 11.  No    Cognitive concerns identified deferred for follow up unless specifically addressed in assessment and plan.    Fall Risk Assessment  Has the patient had two or more falls in the last year or any fall with injury in the last year?  Yes    Safety Assessment  Throw rugs on floor.  No  Handrails on all stairs.  No  Good lighting in all hallways.  Yes  Difficulty hearing.  No  Patient counseled about all safety risks that were identified.    Functional Assessment ADLs  Are there any barriers preventing you from cooking for yourself or meeting nutritional needs?  No.    Are there any barriers preventing you from driving safely or obtaining transportation?  No.    Are there any barriers preventing you from using a telephone or calling for help?  No.    Are there any barriers preventing you from shopping?  No.    Are there any barriers preventing you from taking care of your own finances?  No.    Are there any barriers preventing you from managing your medications?  No.    Are there any barriers preventing you from showering, bathing or dressing yourself?  No.    Are you currently engaging in any exercise or physical activity?  No.     What is your perception of your health?  Good.      Health Maintenance Summary          Overdue - IMM DTaP/Tdap/Td Vaccine (1 - Tdap) Overdue - never done    No completion history exists for this topic.          Overdue - IMM ZOSTER VACCINES (2 of 3) Overdue since 4/2/2008 02/06/2008  Imm Admin: Zoster Vaccine Live (ZVL) (Zostavax) - HISTORICAL DATA          Overdue - COVID-19 Vaccine  (3 - Booster for Pfizer series) Overdue since 6/30/2021 01/31/2021  Imm Admin: Pfizer SARS-CoV-2 Vaccine    01/12/2021  Imm Admin: Pfizer SARS-CoV-2 Vaccine          Annual Wellness Visit (Every 366 Days) Next due on 3/8/2023    03/07/2022  Visit Dx: Medicare annual wellness visit, subsequent    09/28/2020  Visit Dx: Medicare annual wellness visit, subsequent    02/16/2018  Visit Dx: Medicare annual wellness visit, subsequent    01/17/2017  Visit Dx: Medicare annual wellness visit, subsequent    09/08/2015  Done    Only the first 5 history entries have been loaded, but more history exists.          IMM HEP B VACCINE (Series Information) Aged Out    02/06/2008  Imm Admin: Hepatitis B Vaccine Adolescent/Pediatric          IMM PNEUMOCOCCAL VACCINE: 65+ Years (Series Information) Completed    05/18/2015  Imm Admin: Pneumococcal Conjugate Vaccine (Prevnar/PCV-13)    10/14/2005  Imm Admin: Pneumococcal polysaccharide vaccine (PPSV-23)          IMM INFLUENZA (Series Information) Completed    10/19/2021  Imm Admin: Influenza Vaccine Adult HD    09/21/2020  Imm Admin: Influenza Vaccine Adult HD    09/19/2019  Imm Admin: Influenza Vaccine Adult HD    09/13/2018  Imm Admin: Influenza Vaccine Adult HD    09/08/2017  Imm Admin: Influenza Vaccine Adult HD    Only the first 5 history entries have been loaded, but more history exists.          IMM MENINGOCOCCAL VACCINE (MCV4) (Series Information) Aged Out    No completion history exists for this topic.          Discontinued - COLORECTAL CANCER SCREENING  Discontinued    10/01/2020  OCCULT BLOOD FECES IMMUNOASSAY    02/16/2018  OCCULT BLOOD FECES IMMUNOASSAY    08/12/2010  OCCULT BLOOD X3 (STOOL)    01/08/2004  COLONOSCOPY (Done)                Patient Care Team:  Mike Otero M.D. as PCP - General  Khloe Jones R.N. (Inactive) as Registered Nurse  [facility] as Home Health Provider  [facility] as Home Health Provider  SELWYN James,  "SELWYN  [facility] as Home Health Provider (Home Health)        Social History     Tobacco Use   • Smoking status: Never Smoker   • Smokeless tobacco: Never Used   Vaping Use   • Vaping Use: Never used   Substance Use Topics   • Alcohol use: Yes     Alcohol/week: 0.0 oz     Comment: occassional wine   • Drug use: No     Family History   Problem Relation Age of Onset   • Diabetes Other    • Heart Disease Other    • Lung Disease Other    • Cancer Sister      She  has a past medical history of Arthritis, Diverticulitis, GERD (gastroesophageal reflux disease), Glaucoma, Heart burn, Hiatus hernia syndrome, Hypertension, Primary osteoarthritis of both shoulders (1/22/2019), and Unspecified urinary incontinence.   Past Surgical History:   Procedure Laterality Date   • ORIF, ANKLE Right 12/22/2021    Procedure: OPEN REDUCTION AND INTERNAL FIXATION, ANKLE;  Surgeon: Pop Causey M.D.;  Location: [facility];  Service: Orthopedics   • HIP ARTHROPLASTY TOTAL  6/21/2011    Performed by AMPARO CRAFT at [facility] ORS   • OTHER ORTHOPEDIC SURGERY  2000    back surgery   • GYN SURGERY  1970    hysterectomy   • CHOLECYSTECTOMY  1966    rahat   • OTHER  1954    mikey. breast bx   • OTHER ABDOMINAL SURGERY  1954    kidney tacked up ?   • OTHER  1945    tonsilectomy   • OTHER ORTHOPEDIC SURGERY      osteoporosis - medicated   • US-NEEDLE CORE BX-BREAST PANEL         Exam:     /80 (BP Location: Left arm, Patient Position: Sitting, BP Cuff Size: Adult)   Pulse (!) 52   Temp 36.6 °C (97.8 °F) (Temporal)   Resp 16   Ht 1.626 m (5' 4\")   Wt 64.9 kg (143 lb)   SpO2 98%  Body mass index is 24.55 kg/m².    Hearing fair.   She wears hearing aids bilaterally  Dentition good  Alert, oriented in no acute distress.  Eye contact is good, speech goal directed, affect calm  General: Well-appearing and in no distress.  HEENT: Grossly normal. Oral cavity is pink and moist.  Ears, canal and tympanic membrane " are normal.  Neck: Supple without JVD or bruit.  Pulmonary: Clear with good breath sounds. Normal effort.  Cardiovascular: Regular. Carotid and radial pulses are intact.  Abdomen: Soft, nontender, nondistended.  Liver and spleen are not enlarged.  Normal/increased bowel sounds.  Neurologic: Grossly nonfocal.  Normal gait with use of walker.  She is able to get up from a chair without assistance.    Assessment and Plan. The following treatment and monitoring plan is recommended:    1. Medicare annual wellness visit, subsequent     2. Hypothyroidism, unspecified type     3. Essential hypertension     4. Current mild episode of major depressive disorder without prior episode (HCC)     5. Pulmonary hypertension (HCC)     6. Gastroesophageal reflux disease without esophagitis     7. Overactive bladder     8. Age-related osteoporosis with current pathological fracture with routine healing, subsequent encounter     9. Localized edema     10. Closed fracture of right ankle with routine healing, subsequent encounter     11. Gait disorder         89-year-old female who is in overall good health.  Thyroid is stable.  Due for repeat labs.  No change in dose.  Blood pressure stable on current medication.  No sign of endorgan disease.  No change in treatment.  Echocardiogram with values consistent with mild pulmonary hypertension.  This may account for her lower extremity edema however her increase in symptoms seem to correlate with her recent surgery.  She will continue with compression hose.  Would like to avoid diuretics due to increased risk of fall and ongoing overactive bladder issues.  Reflux is stable with Pepcid Complete and diet modification.  Continue Prolia for osteoporosis.  Compression hose for lower extremity edema.  Continue with physical therapy for gait instability    We discussed ongoing colon cancer screening and mammography.  Patient would like to defer any further testing and I agree.      Services  suggested: No services required at this time  Health Care Screening: Age-appropriate preventive services Medicare covers discussed today and ordered if indicated.  Referrals offered: Community-based lifestyle interventions to reduce health risks and promote self-management and wellness, fall prevention, nutrition, physical activity, tobacco-use cessation, weight loss, and mental health services as per orders if indicated.    Discussion today about general wellness and lifestyle habits:    · Prevent falls and reduce trip hazards; Cautioned about securing or removing rugs.  · Have a working fire alarm and carbon monoxide detector;   · Engage in regular physical activity and social activities       Follow-up: 1 year for HRA

## 2022-03-08 ENCOUNTER — HOME CARE VISIT (OUTPATIENT)
Dept: HOME HEALTH SERVICES | Facility: HOME HEALTHCARE | Age: 87
End: 2022-03-08
Payer: MEDICARE

## 2022-03-08 VITALS
SYSTOLIC BLOOD PRESSURE: 132 MMHG | TEMPERATURE: 98.8 F | OXYGEN SATURATION: 98 % | RESPIRATION RATE: 16 BRPM | DIASTOLIC BLOOD PRESSURE: 66 MMHG | HEART RATE: 54 BPM

## 2022-03-08 LAB
25(OH)D3 SERPL-MCNC: 45 NG/ML (ref 30–100)
ALBUMIN SERPL BCP-MCNC: 4.5 G/DL (ref 3.2–4.9)
ALBUMIN/GLOB SERPL: 1.6 G/DL
ALP SERPL-CCNC: 108 U/L (ref 30–99)
ALT SERPL-CCNC: 11 U/L (ref 2–50)
ANION GAP SERPL CALC-SCNC: 10 MMOL/L (ref 7–16)
APPEARANCE UR: ABNORMAL
AST SERPL-CCNC: 21 U/L (ref 12–45)
BACTERIA #/AREA URNS HPF: ABNORMAL /HPF
BASOPHILS # BLD AUTO: 1 % (ref 0–1.8)
BASOPHILS # BLD: 0.05 K/UL (ref 0–0.12)
BILIRUB SERPL-MCNC: 0.3 MG/DL (ref 0.1–1.5)
BILIRUB UR QL STRIP.AUTO: NEGATIVE
BUN SERPL-MCNC: 16 MG/DL (ref 8–22)
CALCIUM SERPL-MCNC: 9.1 MG/DL (ref 8.5–10.5)
CHLORIDE SERPL-SCNC: 99 MMOL/L (ref 96–112)
CO2 SERPL-SCNC: 23 MMOL/L (ref 20–33)
COLOR UR: YELLOW
CREAT SERPL-MCNC: 0.73 MG/DL (ref 0.5–1.4)
CREAT UR-MCNC: 73.98 MG/DL
EOSINOPHIL # BLD AUTO: 0.26 K/UL (ref 0–0.51)
EOSINOPHIL NFR BLD: 5.1 % (ref 0–6.9)
EPI CELLS #/AREA URNS HPF: ABNORMAL /HPF
ERYTHROCYTE [DISTWIDTH] IN BLOOD BY AUTOMATED COUNT: 49.1 FL (ref 35.9–50)
GLOBULIN SER CALC-MCNC: 2.8 G/DL (ref 1.9–3.5)
GLUCOSE SERPL-MCNC: 90 MG/DL (ref 65–99)
GLUCOSE UR STRIP.AUTO-MCNC: NEGATIVE MG/DL
HCT VFR BLD AUTO: 40.6 % (ref 37–47)
HGB BLD-MCNC: 12.6 G/DL (ref 12–16)
HYALINE CASTS #/AREA URNS LPF: ABNORMAL /LPF
IMM GRANULOCYTES # BLD AUTO: 0.02 K/UL (ref 0–0.11)
IMM GRANULOCYTES NFR BLD AUTO: 0.4 % (ref 0–0.9)
KETONES UR STRIP.AUTO-MCNC: NEGATIVE MG/DL
LEUKOCYTE ESTERASE UR QL STRIP.AUTO: ABNORMAL
LYMPHOCYTES # BLD AUTO: 1.87 K/UL (ref 1–4.8)
LYMPHOCYTES NFR BLD: 36.5 % (ref 22–41)
MCH RBC QN AUTO: 26.6 PG (ref 27–33)
MCHC RBC AUTO-ENTMCNC: 31 G/DL (ref 33.6–35)
MCV RBC AUTO: 85.7 FL (ref 81.4–97.8)
MICRO URNS: ABNORMAL
MICROALBUMIN UR-MCNC: <1.2 MG/DL
MICROALBUMIN/CREAT UR: NORMAL MG/G (ref 0–30)
MONOCYTES # BLD AUTO: 0.36 K/UL (ref 0–0.85)
MONOCYTES NFR BLD AUTO: 7 % (ref 0–13.4)
NEUTROPHILS # BLD AUTO: 2.57 K/UL (ref 2–7.15)
NEUTROPHILS NFR BLD: 50 % (ref 44–72)
NITRITE UR QL STRIP.AUTO: POSITIVE
NRBC # BLD AUTO: 0 K/UL
NRBC BLD-RTO: 0 /100 WBC
PH UR STRIP.AUTO: 6 [PH] (ref 5–8)
PLATELET # BLD AUTO: 259 K/UL (ref 164–446)
PMV BLD AUTO: 9.8 FL (ref 9–12.9)
POTASSIUM SERPL-SCNC: 4.8 MMOL/L (ref 3.6–5.5)
PROT SERPL-MCNC: 7.3 G/DL (ref 6–8.2)
PROT UR QL STRIP: NEGATIVE MG/DL
RBC # BLD AUTO: 4.74 M/UL (ref 4.2–5.4)
RBC # URNS HPF: ABNORMAL /HPF
RBC UR QL AUTO: NEGATIVE
SODIUM SERPL-SCNC: 132 MMOL/L (ref 135–145)
SP GR UR STRIP.AUTO: 1.01
T4 FREE SERPL-MCNC: 1.45 NG/DL (ref 0.93–1.7)
TSH SERPL DL<=0.005 MIU/L-ACNC: 2.42 UIU/ML (ref 0.38–5.33)
UROBILINOGEN UR STRIP.AUTO-MCNC: 0.2 MG/DL
WBC # BLD AUTO: 5.1 K/UL (ref 4.8–10.8)
WBC #/AREA URNS HPF: ABNORMAL /HPF

## 2022-03-08 PROCEDURE — 665998 HH PPS REVENUE CREDIT

## 2022-03-08 PROCEDURE — G0153 HHCP-SVS OF S/L PATH,EA 15MN: HCPCS

## 2022-03-08 PROCEDURE — 665999 HH PPS REVENUE DEBIT

## 2022-03-08 ASSESSMENT — ENCOUNTER SYMPTOMS
DENIES PAIN: 1
DIFFICULTY THINKING: 1
PERSON REPORTING PAIN: PATIENT

## 2022-03-09 ENCOUNTER — HOME CARE VISIT (OUTPATIENT)
Dept: HOME HEALTH SERVICES | Facility: HOME HEALTHCARE | Age: 87
End: 2022-03-09
Payer: MEDICARE

## 2022-03-09 VITALS
DIASTOLIC BLOOD PRESSURE: 60 MMHG | HEART RATE: 52 BPM | TEMPERATURE: 98.7 F | OXYGEN SATURATION: 95 % | SYSTOLIC BLOOD PRESSURE: 114 MMHG | RESPIRATION RATE: 16 BRPM

## 2022-03-09 VITALS
HEART RATE: 52 BPM | OXYGEN SATURATION: 95 % | TEMPERATURE: 98.7 F | SYSTOLIC BLOOD PRESSURE: 114 MMHG | RESPIRATION RATE: 16 BRPM | DIASTOLIC BLOOD PRESSURE: 60 MMHG

## 2022-03-09 PROCEDURE — 665998 HH PPS REVENUE CREDIT

## 2022-03-09 PROCEDURE — G0151 HHCP-SERV OF PT,EA 15 MIN: HCPCS

## 2022-03-09 PROCEDURE — G0493 RN CARE EA 15 MIN HH/HOSPICE: HCPCS

## 2022-03-09 PROCEDURE — 665999 HH PPS REVENUE DEBIT

## 2022-03-09 ASSESSMENT — ENCOUNTER SYMPTOMS
PAIN LOCATION - PAIN SEVERITY: 3/10
PAIN LOCATION - EXACERBATING FACTORS: ROM, MOVEMENT
PAIN LOCATION - EXACERBATING FACTORS: ROM, MOVEMENT
PAIN LOCATION - PAIN DURATION: DAILY
PAIN LOCATION - RELIEVING FACTORS: REST, MEDICATION
DENIES PAIN: 1
PAIN SEVERITY GOAL: 0/10
SUBJECTIVE PAIN PROGRESSION: WAXING AND WANING
HIGHEST PAIN SEVERITY IN PAST 24 HOURS: 4/10
PERSON REPORTING PAIN: PATIENT
PAIN LOCATION - RELIEVING FACTORS: REST, MEDICATION
HIGHEST PAIN SEVERITY IN PAST 24 HOURS: 4/10
PAIN: 1
PAIN LOCATION: LEFT SHOULDER
LOWEST PAIN SEVERITY IN PAST 24 HOURS: 0/10
PAIN LOCATION - PAIN QUALITY: ACHE
PAIN LOCATION - PAIN FREQUENCY: WITH ACTIVITY
PAIN SEVERITY GOAL: 2/10
PAIN LOCATION - PAIN SEVERITY: 3/10
POOR JUDGMENT: 1
PAIN LOCATION - PAIN QUALITY: ACHE
NAUSEA: DENIES
PAIN LOCATION: RIGHT SHOULDER
VOMITING: DENIES
PAIN LOCATION - PAIN FREQUENCY: WITH ACTIVITY
DIFFICULTY THINKING: 1
PAIN LOCATION - PAIN DURATION: DAILY
MUSCLE WEAKNESS: 1
PERSON REPORTING PAIN: PATIENT
LOWEST PAIN SEVERITY IN PAST 24 HOURS: 4/10

## 2022-03-10 PROCEDURE — 665998 HH PPS REVENUE CREDIT

## 2022-03-10 PROCEDURE — 665999 HH PPS REVENUE DEBIT

## 2022-03-11 ENCOUNTER — HOME CARE VISIT (OUTPATIENT)
Dept: HOME HEALTH SERVICES | Facility: HOME HEALTHCARE | Age: 87
End: 2022-03-11
Payer: MEDICARE

## 2022-03-11 VITALS
RESPIRATION RATE: 20 BRPM | OXYGEN SATURATION: 95 % | DIASTOLIC BLOOD PRESSURE: 54 MMHG | HEART RATE: 56 BPM | SYSTOLIC BLOOD PRESSURE: 116 MMHG | TEMPERATURE: 98.5 F

## 2022-03-11 PROCEDURE — 665998 HH PPS REVENUE CREDIT

## 2022-03-11 PROCEDURE — G0151 HHCP-SERV OF PT,EA 15 MIN: HCPCS

## 2022-03-11 PROCEDURE — 665999 HH PPS REVENUE DEBIT

## 2022-03-11 ASSESSMENT — ENCOUNTER SYMPTOMS
PAIN LOCATION - PAIN QUALITY: ACHE
PAIN LOCATION - EXACERBATING FACTORS: MOVEMENT
PAIN SEVERITY GOAL: 2/10
PAIN LOCATION: RIGHT LEG
HIGHEST PAIN SEVERITY IN PAST 24 HOURS: 6/10
SUBJECTIVE PAIN PROGRESSION: WAXING AND WANING
PAIN LOCATION - PAIN FREQUENCY: WITH ACTIVITY
PAIN LOCATION - RELIEVING FACTORS: REST, MEDICATION
PAIN LOCATION - PAIN DURATION: DAILY
PAIN LOCATION - PAIN FREQUENCY: WITH ACTIVITY
PAIN LOCATION: LEFT SHOULDER
PAIN LOCATION - PAIN SEVERITY: 5/10
PAIN LOCATION - RELIEVING FACTORS: REST, MEDICATION
PAIN LOCATION - RELIEVING FACTORS: REST, MEDICATION
PAIN LOCATION - EXACERBATING FACTORS: MOVEMENT
LOWEST PAIN SEVERITY IN PAST 24 HOURS: 0/10
DEBILITATING PAIN: 1
PAIN LOCATION - PAIN SEVERITY: 5/10
PAIN LOCATION - EXACERBATING FACTORS: MOVEMENT
PAIN: 1
PERSON REPORTING PAIN: PATIENT
PAIN LOCATION - PAIN QUALITY: ACHE
DIFFICULTY THINKING: 1
PAIN LOCATION - PAIN FREQUENCY: WITH ACTIVITY
POOR JUDGMENT: 1
PAIN LOCATION - PAIN DURATION: DAILY
PAIN LOCATION: RIGHT SHOULDER
PAIN LOCATION - PAIN DURATION: DAILY
PAIN LOCATION - PAIN QUALITY: DULL
PAIN LOCATION - PAIN SEVERITY: 3/10

## 2022-03-12 PROCEDURE — 665998 HH PPS REVENUE CREDIT

## 2022-03-12 PROCEDURE — 665999 HH PPS REVENUE DEBIT

## 2022-03-13 PROCEDURE — 665999 HH PPS REVENUE DEBIT

## 2022-03-13 PROCEDURE — 665998 HH PPS REVENUE CREDIT

## 2022-03-14 ENCOUNTER — HOME CARE VISIT (OUTPATIENT)
Dept: HOME HEALTH SERVICES | Facility: HOME HEALTHCARE | Age: 87
End: 2022-03-14
Payer: MEDICARE

## 2022-03-14 VITALS
SYSTOLIC BLOOD PRESSURE: 120 MMHG | RESPIRATION RATE: 20 BRPM | HEART RATE: 56 BPM | OXYGEN SATURATION: 94 % | DIASTOLIC BLOOD PRESSURE: 58 MMHG | TEMPERATURE: 98.6 F

## 2022-03-14 PROCEDURE — 665999 HH PPS REVENUE DEBIT

## 2022-03-14 PROCEDURE — G0151 HHCP-SERV OF PT,EA 15 MIN: HCPCS

## 2022-03-14 PROCEDURE — 665998 HH PPS REVENUE CREDIT

## 2022-03-14 RX ORDER — AMLODIPINE BESYLATE 5 MG/1
5 TABLET ORAL
Qty: 90 TABLET | Refills: 3 | Status: SHIPPED | OUTPATIENT
Start: 2022-03-14 | End: 2023-03-20

## 2022-03-14 ASSESSMENT — ENCOUNTER SYMPTOMS
PAIN LOCATION: LEFT SHOULDER
PAIN LOCATION - PAIN QUALITY: ACHE
DIFFICULTY THINKING: 1
PAIN LOCATION - PAIN QUALITY: ACHE
PAIN SEVERITY GOAL: 3/10
PAIN LOCATION - PAIN SEVERITY: 5/10
SUBJECTIVE PAIN PROGRESSION: WAXING AND WANING
PAIN LOCATION - EXACERBATING FACTORS: MOVEMENT
PAIN LOCATION - PAIN DURATION: DAILY
PAIN LOCATION - PAIN DURATION: DAILY
PAIN LOCATION - RELIEVING FACTORS: REST, HEAT, MEDICATION
PAIN LOCATION - PAIN FREQUENCY: WITH ACTIVITY
HIGHEST PAIN SEVERITY IN PAST 24 HOURS: 5/10
DEBILITATING PAIN: 1
PAIN LOCATION - PAIN SEVERITY: 5/10
PAIN LOCATION: RIGHT SHOULDER
PAIN LOCATION - PAIN FREQUENCY: WITH ACTIVITY
PAIN LOCATION - EXACERBATING FACTORS: MOVEMENT
PERSON REPORTING PAIN: PATIENT
LOWEST PAIN SEVERITY IN PAST 24 HOURS: 0/10
PAIN LOCATION - RELIEVING FACTORS: REST, HEAT, MEDICATION
DENIES PAIN: 1
POOR JUDGMENT: 1

## 2022-03-15 ENCOUNTER — HOME CARE VISIT (OUTPATIENT)
Dept: HOME HEALTH SERVICES | Facility: HOME HEALTHCARE | Age: 87
End: 2022-03-15
Payer: MEDICARE

## 2022-03-15 VITALS
SYSTOLIC BLOOD PRESSURE: 120 MMHG | TEMPERATURE: 99.3 F | DIASTOLIC BLOOD PRESSURE: 62 MMHG | OXYGEN SATURATION: 97 % | HEART RATE: 46 BPM | RESPIRATION RATE: 18 BRPM

## 2022-03-15 PROCEDURE — G0153 HHCP-SVS OF S/L PATH,EA 15MN: HCPCS

## 2022-03-15 PROCEDURE — 665999 HH PPS REVENUE DEBIT

## 2022-03-15 PROCEDURE — 665998 HH PPS REVENUE CREDIT

## 2022-03-15 ASSESSMENT — ENCOUNTER SYMPTOMS
PAIN LOCATION - PAIN QUALITY: VARIES
PAIN LOCATION - PAIN DURATION: VARIES
DIFFICULTY THINKING: 1
PAIN LOCATION - RELIEVING FACTORS: MEDS
PERSON REPORTING PAIN: PATIENT
PAIN LOCATION - EXACERBATING FACTORS: MOVEMENT
PAIN LOCATION - PAIN SEVERITY: 5/10
PAIN: 1
PAIN LOCATION - PAIN FREQUENCY: INTERMITTENT

## 2022-03-16 ENCOUNTER — HOME CARE VISIT (OUTPATIENT)
Dept: HOME HEALTH SERVICES | Facility: HOME HEALTHCARE | Age: 87
End: 2022-03-16
Payer: MEDICARE

## 2022-03-16 VITALS
TEMPERATURE: 98.6 F | SYSTOLIC BLOOD PRESSURE: 115 MMHG | RESPIRATION RATE: 18 BRPM | OXYGEN SATURATION: 97 % | DIASTOLIC BLOOD PRESSURE: 75 MMHG | HEART RATE: 52 BPM

## 2022-03-16 VITALS
OXYGEN SATURATION: 97 % | SYSTOLIC BLOOD PRESSURE: 115 MMHG | TEMPERATURE: 98.6 F | RESPIRATION RATE: 18 BRPM | HEART RATE: 52 BPM | DIASTOLIC BLOOD PRESSURE: 75 MMHG

## 2022-03-16 PROCEDURE — G0493 RN CARE EA 15 MIN HH/HOSPICE: HCPCS

## 2022-03-16 PROCEDURE — 665999 HH PPS REVENUE DEBIT

## 2022-03-16 PROCEDURE — G0151 HHCP-SERV OF PT,EA 15 MIN: HCPCS

## 2022-03-16 PROCEDURE — 665998 HH PPS REVENUE CREDIT

## 2022-03-16 ASSESSMENT — ENCOUNTER SYMPTOMS
PAIN LOCATION - PAIN FREQUENCY: WITH ACTIVITY
PAIN LOCATION - PAIN QUALITY: ACHE
LOWEST PAIN SEVERITY IN PAST 24 HOURS: 4/10
PAIN LOCATION: LEFT SHOULDER
PAIN SEVERITY GOAL: 2/10
PAIN LOCATION - PAIN QUALITY: ACHE
PAIN LOCATION - PAIN FREQUENCY: WITH ACTIVITY
PAIN LOCATION - RELIEVING FACTORS: REST, HEAT, MEDICATION
NAUSEA: DENIES
HIGHEST PAIN SEVERITY IN PAST 24 HOURS: 4/10
SUBJECTIVE PAIN PROGRESSION: WAXING AND WANING
DIFFICULTY THINKING: 1
SUBJECTIVE PAIN PROGRESSION: UNCHANGED
MUSCLE WEAKNESS: 1
PAIN SEVERITY GOAL: 0/10
LOWEST PAIN SEVERITY IN PAST 24 HOURS: 0/10
HIGHEST PAIN SEVERITY IN PAST 24 HOURS: 5/10
VOMITING: DENIES
PAIN LOCATION - RELIEVING FACTORS: REST, HEAT, MEDICATION
PAIN LOCATION - PAIN DURATION: DAILY
PAIN LOCATION - EXACERBATING FACTORS: MOVEMENT
PAIN: 1
PERSON REPORTING PAIN: PATIENT
PAIN LOCATION - PAIN DURATION: DAILY
PAIN LOCATION: RIGHT SHOULDER
PAIN: 1
PAIN LOCATION - EXACERBATING FACTORS: MOVEMENT
PAIN LOCATION - PAIN SEVERITY: 4/10
POOR JUDGMENT: 1
PAIN LOCATION - PAIN SEVERITY: 4/10

## 2022-03-17 ENCOUNTER — HOME CARE VISIT (OUTPATIENT)
Dept: HOME HEALTH SERVICES | Facility: HOME HEALTHCARE | Age: 87
End: 2022-03-17
Payer: MEDICARE

## 2022-03-17 PROCEDURE — 665997 HH PPS REVENUE ADJ

## 2022-03-17 PROCEDURE — 665999 HH PPS REVENUE DEBIT

## 2022-03-17 PROCEDURE — 665998 HH PPS REVENUE CREDIT

## 2022-03-18 PROCEDURE — 665999 HH PPS REVENUE DEBIT

## 2022-03-18 PROCEDURE — 665998 HH PPS REVENUE CREDIT

## 2022-03-19 PROCEDURE — 665999 HH PPS REVENUE DEBIT

## 2022-03-19 PROCEDURE — 665998 HH PPS REVENUE CREDIT

## 2022-03-20 PROCEDURE — 665998 HH PPS REVENUE CREDIT

## 2022-03-20 PROCEDURE — 665999 HH PPS REVENUE DEBIT

## 2022-03-21 ENCOUNTER — HOME CARE VISIT (OUTPATIENT)
Dept: HOME HEALTH SERVICES | Facility: HOME HEALTHCARE | Age: 87
End: 2022-03-21
Payer: MEDICARE

## 2022-03-21 VITALS
HEART RATE: 54 BPM | SYSTOLIC BLOOD PRESSURE: 106 MMHG | DIASTOLIC BLOOD PRESSURE: 54 MMHG | OXYGEN SATURATION: 94 % | RESPIRATION RATE: 20 BRPM | TEMPERATURE: 99 F

## 2022-03-21 PROCEDURE — 665001 SOC-HOME HEALTH

## 2022-03-21 PROCEDURE — 665998 HH PPS REVENUE CREDIT

## 2022-03-21 PROCEDURE — G0151 HHCP-SERV OF PT,EA 15 MIN: HCPCS

## 2022-03-21 PROCEDURE — 665999 HH PPS REVENUE DEBIT

## 2022-03-21 ASSESSMENT — ENCOUNTER SYMPTOMS
PAIN LOCATION - RELIEVING FACTORS: REST, MEDICATION, HEAT
DIFFICULTY THINKING: 1
SUBJECTIVE PAIN PROGRESSION: WAXING AND WANING
PAIN LOCATION - EXACERBATING FACTORS: MOVEMENT
POOR JUDGMENT: 1
PAIN LOCATION - PAIN QUALITY: ACHE
PAIN LOCATION - PAIN SEVERITY: 5/10
PAIN SEVERITY GOAL: 0/10
PAIN LOCATION - RELIEVING FACTORS: REST, MEDICATION, HEAT
PAIN LOCATION: RIGHT SHOULDER
PAIN LOCATION - EXACERBATING FACTORS: MOVEMENT
HIGHEST PAIN SEVERITY IN PAST 24 HOURS: 5/10
PAIN LOCATION: LEFT SHOULDER
PERSON REPORTING PAIN: PATIENT
PAIN LOCATION - PAIN QUALITY: ACHE
PAIN: 1
PAIN LOCATION - PAIN DURATION: DAILY
PAIN LOCATION - PAIN SEVERITY: 5/10
PAIN LOCATION - PAIN FREQUENCY: WITH ACTIVITY
LOWEST PAIN SEVERITY IN PAST 24 HOURS: 0/10
PAIN LOCATION - PAIN DURATION: DAILY
PAIN LOCATION - PAIN FREQUENCY: WITH ACTIVITY

## 2022-03-22 ENCOUNTER — HOME CARE VISIT (OUTPATIENT)
Dept: HOME HEALTH SERVICES | Facility: HOME HEALTHCARE | Age: 87
End: 2022-03-22
Payer: MEDICARE

## 2022-03-22 PROCEDURE — 665998 HH PPS REVENUE CREDIT

## 2022-03-22 PROCEDURE — 665999 HH PPS REVENUE DEBIT

## 2022-03-23 ENCOUNTER — HOME CARE VISIT (OUTPATIENT)
Dept: HOME HEALTH SERVICES | Facility: HOME HEALTHCARE | Age: 87
End: 2022-03-23
Payer: MEDICARE

## 2022-03-23 VITALS
HEART RATE: 56 BPM | SYSTOLIC BLOOD PRESSURE: 100 MMHG | RESPIRATION RATE: 20 BRPM | OXYGEN SATURATION: 94 % | DIASTOLIC BLOOD PRESSURE: 60 MMHG | TEMPERATURE: 98.5 F

## 2022-03-23 PROCEDURE — 665999 HH PPS REVENUE DEBIT

## 2022-03-23 PROCEDURE — 665998 HH PPS REVENUE CREDIT

## 2022-03-23 PROCEDURE — G0493 RN CARE EA 15 MIN HH/HOSPICE: HCPCS

## 2022-03-23 PROCEDURE — G0151 HHCP-SERV OF PT,EA 15 MIN: HCPCS

## 2022-03-24 VITALS
RESPIRATION RATE: 20 BRPM | TEMPERATURE: 98.5 F | OXYGEN SATURATION: 94 % | DIASTOLIC BLOOD PRESSURE: 60 MMHG | HEART RATE: 56 BPM | SYSTOLIC BLOOD PRESSURE: 100 MMHG

## 2022-03-24 PROCEDURE — 665998 HH PPS REVENUE CREDIT

## 2022-03-24 PROCEDURE — 665999 HH PPS REVENUE DEBIT

## 2022-03-24 ASSESSMENT — ENCOUNTER SYMPTOMS
PAIN: 1
PAIN LOCATION - RELIEVING FACTORS: REST, MEDICATION, HEAT
HIGHEST PAIN SEVERITY IN PAST 24 HOURS: 0/10
PAIN LOCATION - EXACERBATING FACTORS: MOVEMENT
PAIN LOCATION - PAIN DURATION: DAILY
PAIN SEVERITY GOAL: 0/10
PERSON REPORTING PAIN: PATIENT
PAIN SEVERITY GOAL: 2/10
DIFFICULTY THINKING: 1
LOWEST PAIN SEVERITY IN PAST 24 HOURS: 0/10
PAIN LOCATION - PAIN SEVERITY: 3/10
PAIN LOCATION - PAIN QUALITY: ACHE
VOMITING: DENIES
PAIN LOCATION - PAIN FREQUENCY: FREQUENT
PAIN LOCATION - RELIEVING FACTORS: REST, MEDICATION, HEAT
DENIES PAIN: 1
POOR JUDGMENT: 1
PERSON REPORTING PAIN: PATIENT
NAUSEA: DENIES
PAIN LOCATION - PAIN FREQUENCY: FREQUENT
SUBJECTIVE PAIN PROGRESSION: WAXING AND WANING
PAIN LOCATION - EXACERBATING FACTORS: MOVEMENT
MUSCLE WEAKNESS: 1
PAIN LOCATION - PAIN QUALITY: ACHE
HIGHEST PAIN SEVERITY IN PAST 24 HOURS: 4/10
PAIN LOCATION: RIGHT SHOULDER
PAIN LOCATION: LEFT SHOULDER
LOWEST PAIN SEVERITY IN PAST 24 HOURS: 0/10
SUBJECTIVE PAIN PROGRESSION: UNCHANGED
PAIN LOCATION - PAIN SEVERITY: 3/10
PAIN LOCATION - PAIN DURATION: DAILY

## 2022-03-25 PROCEDURE — 665999 HH PPS REVENUE DEBIT

## 2022-03-25 PROCEDURE — 665998 HH PPS REVENUE CREDIT

## 2022-03-26 PROCEDURE — 665998 HH PPS REVENUE CREDIT

## 2022-03-26 PROCEDURE — 665999 HH PPS REVENUE DEBIT

## 2022-03-27 PROCEDURE — 665999 HH PPS REVENUE DEBIT

## 2022-03-27 PROCEDURE — 665998 HH PPS REVENUE CREDIT

## 2022-03-28 ENCOUNTER — HOME CARE VISIT (OUTPATIENT)
Dept: HOME HEALTH SERVICES | Facility: HOME HEALTHCARE | Age: 87
End: 2022-03-28
Payer: MEDICARE

## 2022-03-28 VITALS
RESPIRATION RATE: 16 BRPM | DIASTOLIC BLOOD PRESSURE: 52 MMHG | HEART RATE: 56 BPM | OXYGEN SATURATION: 94 % | SYSTOLIC BLOOD PRESSURE: 102 MMHG | TEMPERATURE: 99.1 F

## 2022-03-28 PROCEDURE — 665998 HH PPS REVENUE CREDIT

## 2022-03-28 PROCEDURE — G0151 HHCP-SERV OF PT,EA 15 MIN: HCPCS

## 2022-03-28 PROCEDURE — 665999 HH PPS REVENUE DEBIT

## 2022-03-28 ASSESSMENT — ENCOUNTER SYMPTOMS
POOR JUDGMENT: 1
PAIN LOCATION - EXACERBATING FACTORS: MOVEMENT
PAIN LOCATION - PAIN QUALITY: ACHE
LOWEST PAIN SEVERITY IN PAST 24 HOURS: 0/10
PAIN LOCATION - PAIN FREQUENCY: WITH ACTIVITY
DIFFICULTY THINKING: 1
PAIN LOCATION - EXACERBATING FACTORS: MOVEMENT
PAIN LOCATION - PAIN SEVERITY: 5/10
PAIN LOCATION - RELIEVING FACTORS: REST, MEDICATION, HEAT
PAIN: 1
PAIN LOCATION - PAIN DURATION: DAILY
PAIN LOCATION: RIGHT SHOULDER
PAIN LOCATION - PAIN QUALITY: ACHE
PAIN LOCATION - PAIN DURATION: DAILY
PAIN LOCATION - PAIN FREQUENCY: WITH ACTIVITY
PAIN LOCATION - PAIN SEVERITY: 5/10
PERSON REPORTING PAIN: PATIENT
HIGHEST PAIN SEVERITY IN PAST 24 HOURS: 5/10
PAIN LOCATION: LEFT SHOULDER
SUBJECTIVE PAIN PROGRESSION: WAXING AND WANING
PAIN LOCATION - RELIEVING FACTORS: REST, MEDICATION, HEAT

## 2022-03-28 NOTE — Clinical Note
Noted.  ----- Message -----  From: Cristina Jaramillo, PT  Sent: 3/28/2022   6:02 PM PDT  To: Keerthi Lindsay R.N.      HR is outside parameters at 56. Do you want us to report it each time or only if symptomatic?

## 2022-03-28 NOTE — Clinical Note
Noted.  ----- Message -----  From: Cristina Jaramillo PT  Sent: 3/29/2022   2:35 PM PDT  To: Maria Luisa Song R.N., Mike Otero M.D., *  Subject: RE:                                                Ok thanks will write order  ----- Message -----  From: Mike Otero M.D.  Sent: 3/29/2022   9:18 AM PDT  To: Cristina Jaramillo PT      Only report if symptomatic or heart rate <50  ----- Message -----  From: Cristina Jaramillo PT  Sent: 3/28/2022   6:02 PM PDT  To: Mike Otero M.D.    HR is outside parameters at 56. Do you want us to report it each time or only if symptomatic?

## 2022-03-29 PROCEDURE — 665998 HH PPS REVENUE CREDIT

## 2022-03-29 PROCEDURE — 665999 HH PPS REVENUE DEBIT

## 2022-03-29 NOTE — CASE COMMUNICATION
Ok thanks will write order  ----- Message -----  From: Mike Otero M.D.  Sent: 3/29/2022   9:18 AM PDT  To: Cristina Jaramillo PT      Only report if symptomatic or heart rate <50  ----- Message -----  From: Cristina Jaramillo, PT  Sent: 3/28/2022   6:02 PM PDT  To: Mike Otero M.D.    HR is outside parameters at 56. Do you want us to report it each time or only if symptomatic?

## 2022-03-30 ENCOUNTER — HOME CARE VISIT (OUTPATIENT)
Dept: HOME HEALTH SERVICES | Facility: HOME HEALTHCARE | Age: 87
End: 2022-03-30
Payer: MEDICARE

## 2022-03-30 VITALS
DIASTOLIC BLOOD PRESSURE: 60 MMHG | HEART RATE: 60 BPM | OXYGEN SATURATION: 94 % | RESPIRATION RATE: 18 BRPM | TEMPERATURE: 98.6 F | SYSTOLIC BLOOD PRESSURE: 110 MMHG

## 2022-03-30 VITALS
SYSTOLIC BLOOD PRESSURE: 118 MMHG | RESPIRATION RATE: 18 BRPM | DIASTOLIC BLOOD PRESSURE: 70 MMHG | HEART RATE: 60 BPM | OXYGEN SATURATION: 94 % | TEMPERATURE: 98.6 F

## 2022-03-30 PROCEDURE — 665999 HH PPS REVENUE DEBIT

## 2022-03-30 PROCEDURE — 665998 HH PPS REVENUE CREDIT

## 2022-03-30 PROCEDURE — G0151 HHCP-SERV OF PT,EA 15 MIN: HCPCS

## 2022-03-30 PROCEDURE — G0493 RN CARE EA 15 MIN HH/HOSPICE: HCPCS

## 2022-03-30 ASSESSMENT — ENCOUNTER SYMPTOMS
VOMITING: DENIES
SUBJECTIVE PAIN PROGRESSION: UNCHANGED
MUSCLE WEAKNESS: 1
SUBJECTIVE PAIN PROGRESSION: WAXING AND WANING
NAUSEA: DENIES
POOR JUDGMENT: 1
HIGHEST PAIN SEVERITY IN PAST 24 HOURS: 5/10
HIGHEST PAIN SEVERITY IN PAST 24 HOURS: 0/10
DIFFICULTY THINKING: 1
PAIN SEVERITY GOAL: 3/10
PAIN SEVERITY GOAL: 0/10
DEBILITATING PAIN: 1
PERSON REPORTING PAIN: PATIENT
DENIES PAIN: 1
THOUGHT CONTENT - SUSPICIOUS: 1
DENIES PAIN: 1
LOWEST PAIN SEVERITY IN PAST 24 HOURS: 5/10
LOWEST PAIN SEVERITY IN PAST 24 HOURS: 0/10
PERSON REPORTING PAIN: PATIENT

## 2022-03-30 ASSESSMENT — ACTIVITIES OF DAILY LIVING (ADL)
FEEDING_WITHIN_DEFINED_LIMITS: 1
BATHING_WITHIN_DEFINED_LIMITS: 1
GROOMING_WITHIN_DEFINED_LIMITS: 1

## 2022-03-31 PROCEDURE — 665999 HH PPS REVENUE DEBIT

## 2022-03-31 PROCEDURE — 665998 HH PPS REVENUE CREDIT

## 2022-04-01 PROCEDURE — 665999 HH PPS REVENUE DEBIT

## 2022-04-01 PROCEDURE — 665998 HH PPS REVENUE CREDIT

## 2022-04-02 PROCEDURE — 665998 HH PPS REVENUE CREDIT

## 2022-04-02 PROCEDURE — 665999 HH PPS REVENUE DEBIT

## 2022-04-03 PROCEDURE — 665998 HH PPS REVENUE CREDIT

## 2022-04-03 PROCEDURE — 665999 HH PPS REVENUE DEBIT

## 2022-04-04 ENCOUNTER — HOME CARE VISIT (OUTPATIENT)
Dept: HOME HEALTH SERVICES | Facility: HOME HEALTHCARE | Age: 87
End: 2022-04-04
Payer: MEDICARE

## 2022-04-04 VITALS
SYSTOLIC BLOOD PRESSURE: 110 MMHG | HEART RATE: 56 BPM | OXYGEN SATURATION: 95 % | DIASTOLIC BLOOD PRESSURE: 60 MMHG | RESPIRATION RATE: 20 BRPM | TEMPERATURE: 98.1 F

## 2022-04-04 PROCEDURE — 665999 HH PPS REVENUE DEBIT

## 2022-04-04 PROCEDURE — G0151 HHCP-SERV OF PT,EA 15 MIN: HCPCS

## 2022-04-04 PROCEDURE — 665998 HH PPS REVENUE CREDIT

## 2022-04-04 ASSESSMENT — ENCOUNTER SYMPTOMS
PAIN LOCATION - PAIN QUALITY: ACHE
SUBJECTIVE PAIN PROGRESSION: WAXING AND WANING
LOWEST PAIN SEVERITY IN PAST 24 HOURS: 0/10
POOR JUDGMENT: 1
DIFFICULTY THINKING: 1
PAIN LOCATION - PAIN QUALITY: ACHE
PAIN LOCATION - EXACERBATING FACTORS: MOVEMENT
PAIN LOCATION - RELIEVING FACTORS: REST, MEDICATION
PAIN LOCATION - PAIN DURATION: DAILY
PAIN LOCATION - PAIN FREQUENCY: WITH ACTIVITY
PAIN LOCATION - EXACERBATING FACTORS: MOVEMENT
PAIN LOCATION - PAIN SEVERITY: 3/10
PAIN: 1
PAIN LOCATION - RELIEVING FACTORS: REST, MEDICATION
PAIN LOCATION: LEFT SHOULDER
HIGHEST PAIN SEVERITY IN PAST 24 HOURS: 4/10
PERSON REPORTING PAIN: PATIENT
PAIN LOCATION - PAIN SEVERITY: 3/10
PAIN LOCATION - PAIN FREQUENCY: WITH ACTIVITY
PAIN LOCATION: RIGHT SHOULDER
PAIN LOCATION - PAIN DURATION: DAILY
PAIN SEVERITY GOAL: 2/10

## 2022-04-05 PROCEDURE — 665999 HH PPS REVENUE DEBIT

## 2022-04-05 PROCEDURE — 665998 HH PPS REVENUE CREDIT

## 2022-04-06 ENCOUNTER — HOME CARE VISIT (OUTPATIENT)
Dept: HOME HEALTH SERVICES | Facility: HOME HEALTHCARE | Age: 87
End: 2022-04-06
Payer: MEDICARE

## 2022-04-06 PROCEDURE — 665999 HH PPS REVENUE DEBIT

## 2022-04-06 PROCEDURE — G0151 HHCP-SERV OF PT,EA 15 MIN: HCPCS

## 2022-04-06 PROCEDURE — 665998 HH PPS REVENUE CREDIT

## 2022-04-07 VITALS
TEMPERATURE: 98.3 F | DIASTOLIC BLOOD PRESSURE: 64 MMHG | HEART RATE: 60 BPM | OXYGEN SATURATION: 94 % | SYSTOLIC BLOOD PRESSURE: 108 MMHG | RESPIRATION RATE: 18 BRPM

## 2022-04-07 PROCEDURE — 665998 HH PPS REVENUE CREDIT

## 2022-04-07 PROCEDURE — 665999 HH PPS REVENUE DEBIT

## 2022-04-07 ASSESSMENT — ENCOUNTER SYMPTOMS
PAIN LOCATION - PAIN FREQUENCY: INTERMITTENT
PAIN LOCATION - PAIN DURATION: DAILY
PAIN LOCATION - PAIN QUALITY: ACHE
LOWEST PAIN SEVERITY IN PAST 24 HOURS: 0/10
PAIN LOCATION: RIGHT SHOULDER
SUBJECTIVE PAIN PROGRESSION: WAXING AND WANING
PAIN LOCATION - RELIEVING FACTORS: REST, HEAT, MEDICATION
POOR JUDGMENT: 1
DIFFICULTY THINKING: 1
PAIN LOCATION - RELIEVING FACTORS: REST, HEAT, MEDICATION
PAIN LOCATION - PAIN QUALITY: ACHE
PAIN SEVERITY GOAL: 3/10
PAIN LOCATION - PAIN FREQUENCY: INTERMITTENT
PAIN LOCATION - PAIN SEVERITY: 3/10
PAIN LOCATION - EXACERBATING FACTORS: MOVEMENT
PAIN LOCATION: LEFT SHOULDER
PAIN: 1
PAIN LOCATION - PAIN SEVERITY: 3/10
PAIN LOCATION - EXACERBATING FACTORS: MOVEMENT
PERSON REPORTING PAIN: PATIENT
HIGHEST PAIN SEVERITY IN PAST 24 HOURS: 3/10
PAIN LOCATION - PAIN DURATION: DAILY

## 2022-04-08 PROCEDURE — 665998 HH PPS REVENUE CREDIT

## 2022-04-08 PROCEDURE — 665999 HH PPS REVENUE DEBIT

## 2022-04-09 PROCEDURE — 665998 HH PPS REVENUE CREDIT

## 2022-04-09 PROCEDURE — 665999 HH PPS REVENUE DEBIT

## 2022-04-10 PROCEDURE — 665998 HH PPS REVENUE CREDIT

## 2022-04-10 PROCEDURE — 665999 HH PPS REVENUE DEBIT

## 2022-04-11 ENCOUNTER — HOME CARE VISIT (OUTPATIENT)
Dept: HOME HEALTH SERVICES | Facility: HOME HEALTHCARE | Age: 87
End: 2022-04-11
Payer: MEDICARE

## 2022-04-11 VITALS
RESPIRATION RATE: 20 BRPM | SYSTOLIC BLOOD PRESSURE: 110 MMHG | HEART RATE: 60 BPM | DIASTOLIC BLOOD PRESSURE: 58 MMHG | TEMPERATURE: 98.8 F | OXYGEN SATURATION: 94 %

## 2022-04-11 PROCEDURE — 665999 HH PPS REVENUE DEBIT

## 2022-04-11 PROCEDURE — G0151 HHCP-SERV OF PT,EA 15 MIN: HCPCS

## 2022-04-11 PROCEDURE — 665998 HH PPS REVENUE CREDIT

## 2022-04-11 ASSESSMENT — ENCOUNTER SYMPTOMS
PAIN LOCATION - PAIN SEVERITY: 2/10
PAIN LOCATION - RELIEVING FACTORS: REST, HEAT, MEDICATION
PAIN LOCATION: LEFT SHOULDER
DEBILITATING PAIN: 1
PAIN: 1
DIFFICULTY THINKING: 1
PAIN LOCATION - EXACERBATING FACTORS: MOVEMENT
SUBJECTIVE PAIN PROGRESSION: WAXING AND WANING
PAIN LOCATION - PAIN DURATION: DAILY
PAIN LOCATION - PAIN FREQUENCY: WITH ACTIVITY
PAIN LOCATION - EXACERBATING FACTORS: MOVEMENT
PAIN LOCATION: RIGHT SHOULDER
PAIN LOCATION - PAIN QUALITY: ACHE
POOR JUDGMENT: 1
LOWEST PAIN SEVERITY IN PAST 24 HOURS: 0/10
PAIN LOCATION - PAIN QUALITY: ACHE
PAIN LOCATION - RELIEVING FACTORS: REST, HEAT, MEDICATION
PAIN LOCATION - PAIN SEVERITY: 2/10
HIGHEST PAIN SEVERITY IN PAST 24 HOURS: 2/10
PAIN LOCATION - PAIN FREQUENCY: WITH ACTIVITY
PAIN SEVERITY GOAL: 0/10
PAIN LOCATION - PAIN DURATION: DAILY
PERSON REPORTING PAIN: PATIENT

## 2022-04-12 PROCEDURE — 665999 HH PPS REVENUE DEBIT

## 2022-04-12 PROCEDURE — 665998 HH PPS REVENUE CREDIT

## 2022-04-12 RX ORDER — LIDOCAINE 50 MG/G
1 PATCH TOPICAL
Qty: 30 PATCH | Refills: 3 | Status: SHIPPED | OUTPATIENT
Start: 2022-04-12 | End: 2023-03-20

## 2022-04-13 ENCOUNTER — HOME CARE VISIT (OUTPATIENT)
Dept: HOME HEALTH SERVICES | Facility: HOME HEALTHCARE | Age: 87
End: 2022-04-13
Payer: MEDICARE

## 2022-04-13 PROCEDURE — 665998 HH PPS REVENUE CREDIT

## 2022-04-13 PROCEDURE — G0151 HHCP-SERV OF PT,EA 15 MIN: HCPCS

## 2022-04-13 PROCEDURE — 665999 HH PPS REVENUE DEBIT

## 2022-04-14 ENCOUNTER — HOME CARE VISIT (OUTPATIENT)
Dept: HOME HEALTH SERVICES | Facility: HOME HEALTHCARE | Age: 87
End: 2022-04-14
Payer: MEDICARE

## 2022-04-14 VITALS
TEMPERATURE: 97.7 F | RESPIRATION RATE: 20 BRPM | OXYGEN SATURATION: 94 % | SYSTOLIC BLOOD PRESSURE: 124 MMHG | HEART RATE: 56 BPM | DIASTOLIC BLOOD PRESSURE: 60 MMHG

## 2022-04-14 PROCEDURE — 665999 HH PPS REVENUE DEBIT

## 2022-04-14 PROCEDURE — 665998 HH PPS REVENUE CREDIT

## 2022-04-14 SDOH — ECONOMIC STABILITY: HOUSING INSECURITY: HOME SAFETY: DUE TO PT'S COGNITION SHE IS UNABLE TO RECALL EMERGENCY INFORMATION

## 2022-04-14 ASSESSMENT — ENCOUNTER SYMPTOMS
PAIN LOCATION - PAIN QUALITY: ACHE
PAIN LOCATION - PAIN SEVERITY: 3/10
DEBILITATING PAIN: 1
PAIN LOCATION - PAIN FREQUENCY: FREQUENT
HIGHEST PAIN SEVERITY IN PAST 24 HOURS: 3/10
PAIN: 1
DIFFICULTY THINKING: 1
PAIN LOCATION - PAIN DURATION: DAILY
PAIN LOCATION - PAIN DURATION: DAILY
PAIN SEVERITY GOAL: 0/10
PAIN LOCATION - PAIN SEVERITY: 3/10
PAIN LOCATION: RIGHT SHOULDER
PAIN LOCATION - PAIN FREQUENCY: FREQUENT
PERSON REPORTING PAIN: PATIENT
PAIN LOCATION: LEFT SHOULDER
PAIN LOCATION - EXACERBATING FACTORS: MOVEMENT
POOR JUDGMENT: 1
LOWEST PAIN SEVERITY IN PAST 24 HOURS: 0/10
SUBJECTIVE PAIN PROGRESSION: WAXING AND WANING
PAIN LOCATION - PAIN QUALITY: ACHE

## 2022-04-14 ASSESSMENT — ACTIVITIES OF DAILY LIVING (ADL): OASIS_M1830: 03

## 2022-04-14 ASSESSMENT — PATIENT HEALTH QUESTIONNAIRE - PHQ9: CLINICAL INTERPRETATION OF PHQ2 SCORE: 0

## 2022-04-15 ENCOUNTER — DOCUMENTATION (OUTPATIENT)
Dept: MEDICAL GROUP | Facility: PHYSICIAN GROUP | Age: 87
End: 2022-04-15
Payer: MEDICARE

## 2022-04-15 PROCEDURE — 665999 HH PPS REVENUE DEBIT

## 2022-04-15 PROCEDURE — 665998 HH PPS REVENUE CREDIT

## 2022-04-15 NOTE — PROGRESS NOTES
Medication chart review for [facility] services        Current medication list     Current Outpatient Medications:   •  lidocaine, 1 Patch, Transdermal, QDAY PRN  •  amLODIPine, 5 mg, Oral, QDAY  •  DORZOLAMIDE HCL-TIMOLOL MAL OP, 1 Drop, Both Eyes, QAM  •  Famotidine-Ca Carb-Mag Hydrox, 1 Tablet, Oral, QDAY PRN  •  diclofenac sodium, 2 g, Topical, BID PRN  •  Linh Saline, 1 Drop, Both Eyes, DAILY  •  Melatonin, 1 Capsule, Oral, QHS PRN  •  Ibuprofen-Acetaminophen, 1-2 Tablet, Oral, Q8HRS PRN  •  Multiple Vitamins-Minerals (PRESERVISION AREDS 2 PO), 1 Tablet, Oral, DAILY  •  Tums Chewy Bites, 1 Tablet, Oral, QDAY PRN  •  fluticasone, 1 Spray, Nasal, DAILY  •  lisinopril, TAKE HALF A TABLET BY MOUTH TWICE A DAY  •  sertraline, 25 mg, Oral, DAILY  •  levothyroxine, TAKE 1 TABLET BY MOUTH EVERY DAY  •  travoprost, 1 Drop, Both Eyes, Q EVENING  •  polyethylene glycol 3350, 17 g, Oral, DAILY    Pt recently discharged from:   Not applicable, recertification to home health  Discharge medication summary:    n/a    Allergies   Allergen Reactions   • Morphine Anaphylaxis     anaphylaxis   • Cephalexin Rash     Full body   • Codeine Vomiting and Nausea   • Metronidazole Vomiting and Nausea   • Sulfa Drugs Rash     Full body         Medications/supplements on DC summary but not on home med list   n/a    Medications/supplements on home med list but not DC summary    n/a      Labs     Lab Results   Component Value Date/Time    SODIUM 132 (L) 03/07/2022 10:50 AM    POTASSIUM 4.8 03/07/2022 10:50 AM    CHLORIDE 99 03/07/2022 10:50 AM    CO2 23 03/07/2022 10:50 AM    GLUCOSE 90 03/07/2022 10:50 AM    BUN 16 03/07/2022 10:50 AM    CREATININE 0.73 03/07/2022 10:50 AM    CREATININE 0.99 01/09/2013 07:58 AM    BUNCREATRAT 20 01/09/2013 07:58 AM    GLOMRATE >59 08/04/2010 07:50 AM     Lab Results   Component Value Date/Time    ALKPHOSPHAT 108 (H) 03/07/2022 10:50 AM    ASTSGOT 21 03/07/2022 10:50 AM    ALTSGPT 11 03/07/2022 10:50  AM    TBILIRUBIN 0.3 03/07/2022 10:50 AM    INR 1.03 12/21/2021 08:26 PM    ALBUMIN 4.5 03/07/2022 10:50 AM        Assessment and Plan:   • Received referral from [facility]. Medications reviewed, and compared with discharge summary if available.        CC   Mike Otero M.D.  [address] [facility] [address]  [facility] [address]  Fax: [phone]    Shant Coleman, PharmD, MS, BCACP, [facility]  Phone [phone] fax [phone]    This note was created using voice recognition software (Dragon). The accuracy of the dictation is limited by the abilities of the software. I have reviewed the note prior to signing, however some errors in grammar and context are still possible. If you have any questions related to this note please do not hesitate to contact our office.

## 2022-04-16 PROCEDURE — 665998 HH PPS REVENUE CREDIT

## 2022-04-16 PROCEDURE — 665999 HH PPS REVENUE DEBIT

## 2022-04-17 PROCEDURE — 665998 HH PPS REVENUE CREDIT

## 2022-04-17 PROCEDURE — 665999 HH PPS REVENUE DEBIT

## 2022-04-18 ENCOUNTER — HOME CARE VISIT (OUTPATIENT)
Dept: HOME HEALTH SERVICES | Facility: HOME HEALTHCARE | Age: 87
End: 2022-04-18
Payer: MEDICARE

## 2022-04-18 VITALS
DIASTOLIC BLOOD PRESSURE: 54 MMHG | SYSTOLIC BLOOD PRESSURE: 110 MMHG | HEART RATE: 56 BPM | RESPIRATION RATE: 20 BRPM | OXYGEN SATURATION: 94 % | TEMPERATURE: 98.1 F

## 2022-04-18 PROCEDURE — 665003 FOLLOW UP-HOME HEALTH

## 2022-04-18 PROCEDURE — G0159 HHC PT MAINT EA 15 MIN: HCPCS

## 2022-04-18 PROCEDURE — 665999 HH PPS REVENUE DEBIT

## 2022-04-18 PROCEDURE — 665998 HH PPS REVENUE CREDIT

## 2022-04-18 ASSESSMENT — ENCOUNTER SYMPTOMS
PAIN LOCATION - PAIN QUALITY: ACHE
PAIN: 1
PAIN SEVERITY GOAL: 1/10
PAIN LOCATION - PAIN FREQUENCY: WITH ACTIVITY
SUBJECTIVE PAIN PROGRESSION: WAXING AND WANING
PAIN LOCATION - PAIN SEVERITY: 2/10
LOWEST PAIN SEVERITY IN PAST 24 HOURS: 0/10
PERSON REPORTING PAIN: PATIENT
HIGHEST PAIN SEVERITY IN PAST 24 HOURS: 5/10
PAIN LOCATION - PAIN DURATION: DAILY
PAIN LOCATION - RELIEVING FACTORS: REST, MEDICATION
DIFFICULTY THINKING: 1
POOR JUDGMENT: 1
PAIN LOCATION: LEFT SHOULDER

## 2022-04-18 NOTE — CASE COMMUNICATION
Quality Review for 4.13.22 [facility]ASIS performed on by DENISE Davis RN on 4.18.2022:    Edits completed by DENISE Davis RN  1. Changed  to 4 from = per chart review  2. Changed  to 0 per = dxs  3. Added fall risk to safety measures  4. Changed  to 9  5. Filled out discharge plan per chart review

## 2022-04-19 PROCEDURE — 665998 HH PPS REVENUE CREDIT

## 2022-04-19 PROCEDURE — 665999 HH PPS REVENUE DEBIT

## 2022-04-19 NOTE — CASE COMMUNICATION
agree with changes. Thanks  ----- Message -----  From: Mena Davis R.N.  Sent: 4/18/2022   3:31 PM PDT  To: Cristina Jaramillo PT      Quality Review for 4.13.22 [facility] performed on by DENISE Davis RN on 4.18.2022:    Edits completed by DENISE Davis RN  1. Changed  to 4 from = per chart review  2. Changed  to 0 per = dxs  3. Added fall risk to safety measures  4. Changed  to 9  5. Filled out discharge plan per chart  review

## 2022-04-20 PROCEDURE — 665998 HH PPS REVENUE CREDIT

## 2022-04-20 PROCEDURE — G0179 MD RECERTIFICATION HHA PT: HCPCS | Performed by: INTERNAL MEDICINE

## 2022-04-20 PROCEDURE — 665999 HH PPS REVENUE DEBIT

## 2022-04-21 PROCEDURE — 665998 HH PPS REVENUE CREDIT

## 2022-04-21 PROCEDURE — 665999 HH PPS REVENUE DEBIT

## 2022-04-22 PROCEDURE — 665998 HH PPS REVENUE CREDIT

## 2022-04-22 PROCEDURE — 665999 HH PPS REVENUE DEBIT

## 2022-04-23 PROCEDURE — 665998 HH PPS REVENUE CREDIT

## 2022-04-23 PROCEDURE — 665999 HH PPS REVENUE DEBIT

## 2022-04-24 PROCEDURE — 665999 HH PPS REVENUE DEBIT

## 2022-04-24 PROCEDURE — 665998 HH PPS REVENUE CREDIT

## 2022-04-25 ENCOUNTER — HOME CARE VISIT (OUTPATIENT)
Dept: HOME HEALTH SERVICES | Facility: HOME HEALTHCARE | Age: 87
End: 2022-04-25
Payer: MEDICARE

## 2022-04-25 VITALS
HEART RATE: 64 BPM | TEMPERATURE: 98.1 F | DIASTOLIC BLOOD PRESSURE: 60 MMHG | SYSTOLIC BLOOD PRESSURE: 110 MMHG | RESPIRATION RATE: 18 BRPM | OXYGEN SATURATION: 92 %

## 2022-04-25 PROCEDURE — 665998 HH PPS REVENUE CREDIT

## 2022-04-25 PROCEDURE — G0157 HHC PT ASSISTANT EA 15: HCPCS | Mod: CQ

## 2022-04-25 PROCEDURE — 665999 HH PPS REVENUE DEBIT

## 2022-04-25 ASSESSMENT — ENCOUNTER SYMPTOMS
PAIN: 1
LOWEST PAIN SEVERITY IN PAST 24 HOURS: 0/10
PAIN LOCATION: BACK
PERSON REPORTING PAIN: PATIENT
PAIN SEVERITY GOAL: 0/10
HIGHEST PAIN SEVERITY IN PAST 24 HOURS: 3/10
SUBJECTIVE PAIN PROGRESSION: UNCHANGED

## 2022-04-26 PROCEDURE — 665999 HH PPS REVENUE DEBIT

## 2022-04-26 PROCEDURE — 665998 HH PPS REVENUE CREDIT

## 2022-04-26 NOTE — CASE COMMUNICATION
Nora easily fatigues with amb as she c/o UE fatiguing. She did well with standing bal act but requires sitting rest after each 1 min task. Educated her on trying to amb the halls more during the day to work on increasing her endurance Will cont with POC to increase her functional mob and ind to prevent further decline in function. S/B Gisel Boo PT

## 2022-04-27 ENCOUNTER — PATIENT MESSAGE (OUTPATIENT)
Dept: INTERNAL MEDICINE | Facility: IMAGING CENTER | Age: 87
End: 2022-04-27
Payer: MEDICARE

## 2022-04-27 DIAGNOSIS — R11.2 NAUSEA AND VOMITING, INTRACTABILITY OF VOMITING NOT SPECIFIED, UNSPECIFIED VOMITING TYPE: ICD-10-CM

## 2022-04-27 PROCEDURE — 665998 HH PPS REVENUE CREDIT

## 2022-04-27 PROCEDURE — 665999 HH PPS REVENUE DEBIT

## 2022-04-27 RX ORDER — MECLIZINE HYDROCHLORIDE 25 MG/1
25 TABLET ORAL 3 TIMES DAILY PRN
Qty: 60 TABLET | Refills: 0 | Status: SHIPPED | OUTPATIENT
Start: 2022-04-27 | End: 2023-07-05

## 2022-04-27 RX ORDER — ONDANSETRON 4 MG/1
4 TABLET, ORALLY DISINTEGRATING ORAL EVERY 8 HOURS PRN
Qty: 60 TABLET | Refills: 0 | Status: SHIPPED | OUTPATIENT
Start: 2022-04-27

## 2022-04-28 PROCEDURE — 665998 HH PPS REVENUE CREDIT

## 2022-04-28 PROCEDURE — 665999 HH PPS REVENUE DEBIT

## 2022-04-29 PROCEDURE — 665998 HH PPS REVENUE CREDIT

## 2022-04-29 PROCEDURE — 665999 HH PPS REVENUE DEBIT

## 2022-04-30 PROCEDURE — 665998 HH PPS REVENUE CREDIT

## 2022-04-30 PROCEDURE — 665999 HH PPS REVENUE DEBIT

## 2022-05-01 PROCEDURE — 665998 HH PPS REVENUE CREDIT

## 2022-05-01 PROCEDURE — 665999 HH PPS REVENUE DEBIT

## 2022-05-02 ENCOUNTER — HOME CARE VISIT (OUTPATIENT)
Dept: HOME HEALTH SERVICES | Facility: HOME HEALTHCARE | Age: 87
End: 2022-05-02
Payer: MEDICARE

## 2022-05-02 VITALS
RESPIRATION RATE: 20 BRPM | HEART RATE: 56 BPM | OXYGEN SATURATION: 93 % | TEMPERATURE: 98.3 F | DIASTOLIC BLOOD PRESSURE: 58 MMHG | SYSTOLIC BLOOD PRESSURE: 112 MMHG

## 2022-05-02 PROCEDURE — G0159 HHC PT MAINT EA 15 MIN: HCPCS

## 2022-05-02 PROCEDURE — 665998 HH PPS REVENUE CREDIT

## 2022-05-02 PROCEDURE — 665999 HH PPS REVENUE DEBIT

## 2022-05-02 ASSESSMENT — ENCOUNTER SYMPTOMS
PAIN LOCATION - PAIN SEVERITY: 2/10
PERSON REPORTING PAIN: PATIENT
DIFFICULTY THINKING: 1
SUBJECTIVE PAIN PROGRESSION: WAXING AND WANING
PAIN LOCATION - PAIN QUALITY: ACHE
PAIN LOCATION - PAIN SEVERITY: 2/10
PAIN LOCATION - PAIN DURATION: DAILY
PAIN LOCATION: RIGHT SHOULDER
POOR JUDGMENT: 1
PAIN LOCATION: LEFT SHOULDER
PAIN LOCATION - RELIEVING FACTORS: REST, MEDICATION, HEAT
PAIN LOCATION - EXACERBATING FACTORS: MOVEMENT
PAIN SEVERITY GOAL: 2/10
PAIN LOCATION - PAIN FREQUENCY: WITH ACTIVITY
PAIN LOCATION - RELIEVING FACTORS: REST, MEDICATION, HEAT
PAIN LOCATION - PAIN DURATION: DAILY
PAIN: 1
PAIN LOCATION - EXACERBATING FACTORS: MOVEMENT
PAIN LOCATION - PAIN FREQUENCY: WITH ACTIVITY
LOWEST PAIN SEVERITY IN PAST 24 HOURS: 0/10
HIGHEST PAIN SEVERITY IN PAST 24 HOURS: 4/10
PAIN LOCATION - PAIN QUALITY: ACHE

## 2022-05-03 PROCEDURE — 665999 HH PPS REVENUE DEBIT

## 2022-05-03 PROCEDURE — 665998 HH PPS REVENUE CREDIT

## 2022-05-04 PROCEDURE — 665999 HH PPS REVENUE DEBIT

## 2022-05-04 PROCEDURE — 665998 HH PPS REVENUE CREDIT

## 2022-05-05 PROCEDURE — 665999 HH PPS REVENUE DEBIT

## 2022-05-05 PROCEDURE — 665998 HH PPS REVENUE CREDIT

## 2022-05-06 PROCEDURE — 665998 HH PPS REVENUE CREDIT

## 2022-05-06 PROCEDURE — 665999 HH PPS REVENUE DEBIT

## 2022-05-07 PROCEDURE — 665998 HH PPS REVENUE CREDIT

## 2022-05-07 PROCEDURE — 665999 HH PPS REVENUE DEBIT

## 2022-05-08 PROCEDURE — 665998 HH PPS REVENUE CREDIT

## 2022-05-08 PROCEDURE — 665999 HH PPS REVENUE DEBIT

## 2022-05-09 ENCOUNTER — HOME CARE VISIT (OUTPATIENT)
Dept: HOME HEALTH SERVICES | Facility: HOME HEALTHCARE | Age: 87
End: 2022-05-09
Payer: MEDICARE

## 2022-05-09 PROCEDURE — 665998 HH PPS REVENUE CREDIT

## 2022-05-09 PROCEDURE — G0159 HHC PT MAINT EA 15 MIN: HCPCS

## 2022-05-09 PROCEDURE — 665999 HH PPS REVENUE DEBIT

## 2022-05-10 VITALS
RESPIRATION RATE: 20 BRPM | HEART RATE: 52 BPM | OXYGEN SATURATION: 97 % | SYSTOLIC BLOOD PRESSURE: 110 MMHG | DIASTOLIC BLOOD PRESSURE: 66 MMHG | TEMPERATURE: 98.6 F

## 2022-05-10 PROCEDURE — 665998 HH PPS REVENUE CREDIT

## 2022-05-10 PROCEDURE — 665999 HH PPS REVENUE DEBIT

## 2022-05-10 ASSESSMENT — ENCOUNTER SYMPTOMS
LOWEST PAIN SEVERITY IN PAST 24 HOURS: 0/10
PAIN LOCATION - EXACERBATING FACTORS: MOVEMENT
PAIN LOCATION - PAIN DURATION: DAILY
PAIN LOCATION - PAIN QUALITY: ACHE
SUBJECTIVE PAIN PROGRESSION: WAXING AND WANING
PAIN LOCATION: LEFT SHOULDER
PAIN LOCATION - PAIN SEVERITY: 3/10
PAIN LOCATION - PAIN DURATION: DAILY
PAIN: 1
PAIN LOCATION - PAIN FREQUENCY: WITH ACTIVITY
PAIN LOCATION - PAIN FREQUENCY: WITH ACTIVITY
PAIN LOCATION - PAIN QUALITY: ACHE
HIGHEST PAIN SEVERITY IN PAST 24 HOURS: 3/10
PAIN LOCATION - EXACERBATING FACTORS: MOVEMENT
PAIN LOCATION - RELIEVING FACTORS: REST, HEAT, MEDICATION
PAIN LOCATION - RELIEVING FACTORS: REST, HEAT, MEDICATION
DIFFICULTY THINKING: 1
PAIN SEVERITY GOAL: 2/10
PERSON REPORTING PAIN: PATIENT
PAIN LOCATION - PAIN SEVERITY: 3/10
PAIN LOCATION: RIGHT SHOULDER
POOR JUDGMENT: 1

## 2022-05-11 PROCEDURE — 665998 HH PPS REVENUE CREDIT

## 2022-05-11 PROCEDURE — 665999 HH PPS REVENUE DEBIT

## 2022-05-12 PROCEDURE — 665999 HH PPS REVENUE DEBIT

## 2022-05-12 PROCEDURE — 665998 HH PPS REVENUE CREDIT

## 2022-05-13 PROCEDURE — 665998 HH PPS REVENUE CREDIT

## 2022-05-13 PROCEDURE — 665999 HH PPS REVENUE DEBIT

## 2022-05-14 PROCEDURE — 665998 HH PPS REVENUE CREDIT

## 2022-05-14 PROCEDURE — 665999 HH PPS REVENUE DEBIT

## 2022-05-15 PROCEDURE — 665998 HH PPS REVENUE CREDIT

## 2022-05-15 PROCEDURE — 665999 HH PPS REVENUE DEBIT

## 2022-05-16 ENCOUNTER — HOME CARE VISIT (OUTPATIENT)
Dept: HOME HEALTH SERVICES | Facility: HOME HEALTHCARE | Age: 87
End: 2022-05-16
Payer: MEDICARE

## 2022-05-16 VITALS
SYSTOLIC BLOOD PRESSURE: 116 MMHG | TEMPERATURE: 98.4 F | OXYGEN SATURATION: 94 % | HEART RATE: 56 BPM | DIASTOLIC BLOOD PRESSURE: 64 MMHG | RESPIRATION RATE: 16 BRPM

## 2022-05-16 PROCEDURE — 665999 HH PPS REVENUE DEBIT

## 2022-05-16 PROCEDURE — G0159 HHC PT MAINT EA 15 MIN: HCPCS

## 2022-05-16 PROCEDURE — 665998 HH PPS REVENUE CREDIT

## 2022-05-16 ASSESSMENT — ENCOUNTER SYMPTOMS
PAIN: 1
DIFFICULTY THINKING: 1
SUBJECTIVE PAIN PROGRESSION: WAXING AND WANING
HIGHEST PAIN SEVERITY IN PAST 24 HOURS: 6/10
LOWEST PAIN SEVERITY IN PAST 24 HOURS: 0/10
PAIN SEVERITY GOAL: 3/10
PERSON REPORTING PAIN: PATIENT
PAIN LOCATION: LEFT SHOULDER
PAIN LOCATION - PAIN FREQUENCY: FREQUENT
POOR JUDGMENT: 1
PAIN LOCATION - PAIN SEVERITY: 4/10
PAIN LOCATION - PAIN QUALITY: ACHE, THROB

## 2022-05-17 PROCEDURE — 665998 HH PPS REVENUE CREDIT

## 2022-05-17 PROCEDURE — 665999 HH PPS REVENUE DEBIT

## 2022-05-18 PROCEDURE — 665998 HH PPS REVENUE CREDIT

## 2022-05-18 PROCEDURE — 665999 HH PPS REVENUE DEBIT

## 2022-05-19 PROCEDURE — 665998 HH PPS REVENUE CREDIT

## 2022-05-19 PROCEDURE — 665999 HH PPS REVENUE DEBIT

## 2022-05-20 PROCEDURE — 665998 HH PPS REVENUE CREDIT

## 2022-05-20 PROCEDURE — 665999 HH PPS REVENUE DEBIT

## 2022-05-21 PROCEDURE — 665999 HH PPS REVENUE DEBIT

## 2022-05-21 PROCEDURE — 665998 HH PPS REVENUE CREDIT

## 2022-05-22 PROCEDURE — 665999 HH PPS REVENUE DEBIT

## 2022-05-22 PROCEDURE — 665998 HH PPS REVENUE CREDIT

## 2022-05-23 ENCOUNTER — HOME CARE VISIT (OUTPATIENT)
Dept: HOME HEALTH SERVICES | Facility: HOME HEALTHCARE | Age: 87
End: 2022-05-23
Payer: MEDICARE

## 2022-05-23 PROCEDURE — 665003 FOLLOW UP-HOME HEALTH

## 2022-05-23 PROCEDURE — G0159 HHC PT MAINT EA 15 MIN: HCPCS

## 2022-05-23 PROCEDURE — 665999 HH PPS REVENUE DEBIT

## 2022-05-23 PROCEDURE — 665998 HH PPS REVENUE CREDIT

## 2022-05-24 VITALS
OXYGEN SATURATION: 98 % | HEART RATE: 56 BPM | DIASTOLIC BLOOD PRESSURE: 70 MMHG | SYSTOLIC BLOOD PRESSURE: 120 MMHG | RESPIRATION RATE: 20 BRPM | TEMPERATURE: 98 F

## 2022-05-24 PROCEDURE — 665999 HH PPS REVENUE DEBIT

## 2022-05-24 PROCEDURE — 665998 HH PPS REVENUE CREDIT

## 2022-05-24 ASSESSMENT — ENCOUNTER SYMPTOMS
SUBJECTIVE PAIN PROGRESSION: WAXING AND WANING
PERSON REPORTING PAIN: PATIENT
PAIN LOCATION - PAIN SEVERITY: 6/10
PAIN LOCATION - EXACERBATING FACTORS: MOVEMENT
PAIN: 1
PAIN SEVERITY GOAL: 3/10
PAIN LOCATION - PAIN SEVERITY: 5/10
PAIN LOCATION - PAIN QUALITY: ACHE
PAIN LOCATION - PAIN FREQUENCY: WITH ACTIVITY
PAIN LOCATION: RIGHT SHOULDER
PAIN LOCATION - PAIN FREQUENCY: WITH ACTIVITY
HIGHEST PAIN SEVERITY IN PAST 24 HOURS: 6/10
PAIN LOCATION - EXACERBATING FACTORS: MOVEMENT
POOR JUDGMENT: 1
DIFFICULTY THINKING: 1
LOWEST PAIN SEVERITY IN PAST 24 HOURS: 0/10
PAIN LOCATION: LEFT SHOULDER
PAIN LOCATION - PAIN QUALITY: ACHE

## 2022-05-25 DIAGNOSIS — I10 ESSENTIAL HYPERTENSION: ICD-10-CM

## 2022-05-25 PROCEDURE — 665999 HH PPS REVENUE DEBIT

## 2022-05-25 PROCEDURE — 665998 HH PPS REVENUE CREDIT

## 2022-05-25 RX ORDER — LISINOPRIL 20 MG/1
TABLET ORAL
Qty: 90 TABLET | Refills: 3 | Status: SHIPPED | OUTPATIENT
Start: 2022-05-25 | End: 2023-05-17

## 2022-05-26 PROCEDURE — 665998 HH PPS REVENUE CREDIT

## 2022-05-26 PROCEDURE — 665999 HH PPS REVENUE DEBIT

## 2022-05-27 PROCEDURE — 665998 HH PPS REVENUE CREDIT

## 2022-05-27 PROCEDURE — 665999 HH PPS REVENUE DEBIT

## 2022-05-28 PROCEDURE — 665999 HH PPS REVENUE DEBIT

## 2022-05-28 PROCEDURE — 665998 HH PPS REVENUE CREDIT

## 2022-05-29 PROCEDURE — 665998 HH PPS REVENUE CREDIT

## 2022-05-29 PROCEDURE — 665999 HH PPS REVENUE DEBIT

## 2022-05-30 PROCEDURE — 665998 HH PPS REVENUE CREDIT

## 2022-05-30 PROCEDURE — 665999 HH PPS REVENUE DEBIT

## 2022-05-31 PROCEDURE — 665999 HH PPS REVENUE DEBIT

## 2022-05-31 PROCEDURE — 665998 HH PPS REVENUE CREDIT

## 2022-05-31 RX ORDER — SERTRALINE HYDROCHLORIDE 25 MG/1
25 TABLET, FILM COATED ORAL DAILY
Qty: 90 TABLET | Refills: 3 | Status: SHIPPED | OUTPATIENT
Start: 2022-05-31 | End: 2023-03-20 | Stop reason: SDUPTHER

## 2022-05-31 RX ORDER — LEVOTHYROXINE SODIUM 0.07 MG/1
75 TABLET ORAL
Qty: 90 TABLET | Refills: 3 | Status: SHIPPED | OUTPATIENT
Start: 2022-05-31 | End: 2023-05-26

## 2022-06-01 ENCOUNTER — HOME CARE VISIT (OUTPATIENT)
Dept: HOME HEALTH SERVICES | Facility: HOME HEALTHCARE | Age: 87
End: 2022-06-01
Payer: MEDICARE

## 2022-06-01 PROCEDURE — 665999 HH PPS REVENUE DEBIT

## 2022-06-01 PROCEDURE — 665998 HH PPS REVENUE CREDIT

## 2022-06-01 PROCEDURE — G0159 HHC PT MAINT EA 15 MIN: HCPCS

## 2022-06-02 VITALS
RESPIRATION RATE: 20 BRPM | TEMPERATURE: 98.1 F | SYSTOLIC BLOOD PRESSURE: 124 MMHG | DIASTOLIC BLOOD PRESSURE: 56 MMHG | HEART RATE: 56 BPM | OXYGEN SATURATION: 96 %

## 2022-06-02 PROCEDURE — 665999 HH PPS REVENUE DEBIT

## 2022-06-02 PROCEDURE — 665998 HH PPS REVENUE CREDIT

## 2022-06-02 ASSESSMENT — ENCOUNTER SYMPTOMS
PAIN LOCATION - PAIN QUALITY: ACHE
PAIN LOCATION - PAIN FREQUENCY: WITH ACTIVITY
DIFFICULTY THINKING: 1
PAIN LOCATION - PAIN FREQUENCY: WITH ACTIVITY
PAIN LOCATION - EXACERBATING FACTORS: MOVEMENT
PAIN: 1
DEBILITATING PAIN: 1
PAIN LOCATION - PAIN SEVERITY: 3/10
POOR JUDGMENT: 1
PAIN LOCATION - RELIEVING FACTORS: MEDICATION, REST, HEAT
SUBJECTIVE PAIN PROGRESSION: WAXING AND WANING
PAIN SEVERITY GOAL: 2/10
PAIN LOCATION: LEFT SHOULDER
PAIN LOCATION - EXACERBATING FACTORS: MOVEMENT
PAIN LOCATION - RELIEVING FACTORS: MEDICATION, REST, HEAT
PAIN LOCATION - PAIN DURATION: DAILY
PAIN LOCATION - PAIN QUALITY: ACHE
PERSON REPORTING PAIN: PATIENT
PAIN LOCATION - PAIN SEVERITY: 3/10
LOWEST PAIN SEVERITY IN PAST 24 HOURS: 0/10
PAIN LOCATION: RIGHT SHOULDER
PAIN LOCATION - PAIN DURATION: DAILY
HIGHEST PAIN SEVERITY IN PAST 24 HOURS: 5/10

## 2022-06-03 PROCEDURE — 665999 HH PPS REVENUE DEBIT

## 2022-06-03 PROCEDURE — 665998 HH PPS REVENUE CREDIT

## 2022-06-04 PROCEDURE — 665998 HH PPS REVENUE CREDIT

## 2022-06-04 PROCEDURE — 665999 HH PPS REVENUE DEBIT

## 2022-06-05 PROCEDURE — 665999 HH PPS REVENUE DEBIT

## 2022-06-05 PROCEDURE — 665998 HH PPS REVENUE CREDIT

## 2022-06-06 ENCOUNTER — HOME CARE VISIT (OUTPATIENT)
Dept: HOME HEALTH SERVICES | Facility: HOME HEALTHCARE | Age: 87
End: 2022-06-06
Payer: MEDICARE

## 2022-06-06 VITALS
DIASTOLIC BLOOD PRESSURE: 62 MMHG | TEMPERATURE: 98.3 F | HEART RATE: 52 BPM | RESPIRATION RATE: 20 BRPM | OXYGEN SATURATION: 95 % | SYSTOLIC BLOOD PRESSURE: 100 MMHG

## 2022-06-06 PROCEDURE — G0159 HHC PT MAINT EA 15 MIN: HCPCS

## 2022-06-06 PROCEDURE — 665998 HH PPS REVENUE CREDIT

## 2022-06-06 PROCEDURE — 665999 HH PPS REVENUE DEBIT

## 2022-06-06 ASSESSMENT — ENCOUNTER SYMPTOMS
SUBJECTIVE PAIN PROGRESSION: WAXING AND WANING
PAIN LOCATION - PAIN QUALITY: ACHE
POOR JUDGMENT: 1
PAIN LOCATION - PAIN QUALITY: ACHE
PAIN: 1
DIFFICULTY THINKING: 1
PAIN LOCATION - PAIN DURATION: DAILY
PAIN LOCATION - PAIN FREQUENCY: FREQUENT
PAIN LOCATION: RIGHT SHOULDER
PAIN SEVERITY GOAL: 3/10
PAIN LOCATION - PAIN SEVERITY: 4/10
PERSON REPORTING PAIN: PATIENT
PAIN LOCATION - PAIN SEVERITY: 5/10
LOWEST PAIN SEVERITY IN PAST 24 HOURS: 0/10
PAIN LOCATION - PAIN DURATION: DAILY
PAIN LOCATION - PAIN FREQUENCY: FREQUENT
PAIN LOCATION: LEFT SHOULDER

## 2022-06-07 PROCEDURE — 665999 HH PPS REVENUE DEBIT

## 2022-06-07 PROCEDURE — 665998 HH PPS REVENUE CREDIT

## 2022-06-08 PROCEDURE — 665998 HH PPS REVENUE CREDIT

## 2022-06-08 PROCEDURE — 665999 HH PPS REVENUE DEBIT

## 2022-06-09 PROCEDURE — 665999 HH PPS REVENUE DEBIT

## 2022-06-09 PROCEDURE — 665998 HH PPS REVENUE CREDIT

## 2022-06-10 PROCEDURE — 665999 HH PPS REVENUE DEBIT

## 2022-06-10 PROCEDURE — 665998 HH PPS REVENUE CREDIT

## 2022-06-11 PROCEDURE — 665999 HH PPS REVENUE DEBIT

## 2022-06-11 PROCEDURE — 665998 HH PPS REVENUE CREDIT

## 2022-06-12 PROCEDURE — 665998 HH PPS REVENUE CREDIT

## 2022-06-12 PROCEDURE — 665999 HH PPS REVENUE DEBIT

## 2022-06-13 ENCOUNTER — OFFICE VISIT (OUTPATIENT)
Dept: INTERNAL MEDICINE | Facility: IMAGING CENTER | Age: 87
End: 2022-06-13
Payer: MEDICARE

## 2022-06-13 ENCOUNTER — HOME CARE VISIT (OUTPATIENT)
Dept: HOME HEALTH SERVICES | Facility: HOME HEALTHCARE | Age: 87
End: 2022-06-13
Payer: MEDICARE

## 2022-06-13 ENCOUNTER — HOSPITAL ENCOUNTER (OUTPATIENT)
Facility: MEDICAL CENTER | Age: 87
End: 2022-06-13
Attending: INTERNAL MEDICINE
Payer: MEDICARE

## 2022-06-13 VITALS
OXYGEN SATURATION: 94 % | TEMPERATURE: 98.4 F | DIASTOLIC BLOOD PRESSURE: 72 MMHG | HEART RATE: 72 BPM | RESPIRATION RATE: 20 BRPM | SYSTOLIC BLOOD PRESSURE: 130 MMHG

## 2022-06-13 VITALS
BODY MASS INDEX: 24.03 KG/M2 | OXYGEN SATURATION: 96 % | HEART RATE: 70 BPM | TEMPERATURE: 98.1 F | SYSTOLIC BLOOD PRESSURE: 130 MMHG | RESPIRATION RATE: 16 BRPM | DIASTOLIC BLOOD PRESSURE: 70 MMHG | WEIGHT: 140 LBS

## 2022-06-13 DIAGNOSIS — R82.90 ABNORMAL URINALYSIS: ICD-10-CM

## 2022-06-13 DIAGNOSIS — D48.5 NEOPLASM OF UNCERTAIN BEHAVIOR OF SKIN: ICD-10-CM

## 2022-06-13 DIAGNOSIS — N30.00 ACUTE CYSTITIS WITHOUT HEMATURIA: ICD-10-CM

## 2022-06-13 DIAGNOSIS — R35.0 URINARY FREQUENCY: ICD-10-CM

## 2022-06-13 PROCEDURE — 87077 CULTURE AEROBIC IDENTIFY: CPT

## 2022-06-13 PROCEDURE — 665998 HH PPS REVENUE CREDIT

## 2022-06-13 PROCEDURE — 665999 HH PPS REVENUE DEBIT

## 2022-06-13 PROCEDURE — 81002 URINALYSIS NONAUTO W/O SCOPE: CPT | Performed by: INTERNAL MEDICINE

## 2022-06-13 PROCEDURE — 87186 SC STD MICRODIL/AGAR DIL: CPT

## 2022-06-13 PROCEDURE — 99214 OFFICE O/P EST MOD 30 MIN: CPT | Performed by: INTERNAL MEDICINE

## 2022-06-13 PROCEDURE — G0159 HHC PT MAINT EA 15 MIN: HCPCS

## 2022-06-13 PROCEDURE — 87086 URINE CULTURE/COLONY COUNT: CPT

## 2022-06-13 RX ORDER — NITROFURANTOIN MACROCRYSTALS 100 MG/1
100 CAPSULE ORAL
Qty: 14 CAPSULE | Refills: 0 | Status: SHIPPED | OUTPATIENT
Start: 2022-06-13 | End: 2022-06-20

## 2022-06-13 ASSESSMENT — ACTIVITIES OF DAILY LIVING (ADL): OASIS_M1830: 03

## 2022-06-13 ASSESSMENT — PATIENT HEALTH QUESTIONNAIRE - PHQ9
5. POOR APPETITE OR OVEREATING: 0 - NOT AT ALL
CLINICAL INTERPRETATION OF PHQ2 SCORE: 1
SUM OF ALL RESPONSES TO PHQ QUESTIONS 1-9: 2

## 2022-06-13 ASSESSMENT — FIBROSIS 4 INDEX: FIB4 SCORE: 2.37

## 2022-06-13 ASSESSMENT — ENCOUNTER SYMPTOMS
PAIN LOCATION - PAIN DURATION: DAILY
PAIN LOCATION: LEFT SHOULDER
PAIN LOCATION - PAIN DURATION: DAILY
PAIN LOCATION - PAIN FREQUENCY: FREQUENT
PAIN LOCATION - PAIN QUALITY: ACHE
PAIN LOCATION - RELIEVING FACTORS: MEDICATION
SUBJECTIVE PAIN PROGRESSION: WAXING AND WANING
PERSON REPORTING PAIN: PATIENT
PAIN LOCATION - PAIN SEVERITY: 5/10
PAIN LOCATION - PAIN QUALITY: ACHE
PAIN SEVERITY GOAL: 2/10
PAIN LOCATION - EXACERBATING FACTORS: MOVEMENT
PAIN: 1
POOR JUDGMENT: 1
PAIN LOCATION - PAIN FREQUENCY: FREQUENT
PAIN LOCATION - RELIEVING FACTORS: MEDICATION, HEAT
PAIN LOCATION - PAIN SEVERITY: 5/10
LOWEST PAIN SEVERITY IN PAST 24 HOURS: 0/10
PAIN LOCATION: BACK
HIGHEST PAIN SEVERITY IN PAST 24 HOURS: 5/10
PAIN LOCATION - EXACERBATING FACTORS: MOVEMENT
DIFFICULTY THINKING: 1

## 2022-06-13 NOTE — PROGRESS NOTES
"Chief Complaint   Patient presents with   • Illness       HISTORY OF THE PRESENT ILLNESS: Patient is a 89 y.o. female.     Patient comes in with her niece for evaluation of not feeling well.  Patient reports over the last few days that she has noticed an increase in urinary frequency.  She has a distant history of urinary tract infections.  Her last was in 2019.    Over the weekend she ate a bag of cherries.  She developed diarrhea.  This has resolved.  Her urinary symptoms and malaise began soon after.    She also has multiple pink, scaly lesions on her left arm.  She reports that they \"burn\".  No new topical medication, lotions or other.  No other new medications.    Allergies: Morphine, Cephalexin, Codeine, Metronidazole, and Sulfa drugs    Current Outpatient Medications Ordered in Epic   Medication Sig Dispense Refill   • nitrofurantoin macro crystals (MACRODANTIN) 100 MG Cap Take 1 Capsule by mouth two times a day may change times on alt/days for 7 days. 14 Capsule 0   • levothyroxine (SYNTHROID) 75 MCG Tab Take 1 Tablet by mouth every day. 90 Tablet 3   • sertraline (ZOLOFT) 25 MG tablet Take 1 Tablet by mouth every day. 90 Tablet 3   • lisinopril (PRINIVIL) 20 MG Tab TAKE 1/2 TABLET BY MOUTH TWICE A DAY 90 Tablet 3   • ACETAMINOPHEN 8 HOUR PO Take 650 mg by mouth 2 times a day as needed (moderate pain). do not exceed 2500 mg toatal in a day     • Apoaequorin 10 MG Cap Take 10 mg by mouth every day.     • Pediatric Vitamins (MULTIVITAMIN GUMMIES CHILDRENS PO) Take 1 Dose by mouth every day.     • meclizine (ANTIVERT) 25 MG Tab Take 1 Tablet by mouth 3 times a day as needed (for dizziness). 60 Tablet 0   • ondansetron (ZOFRAN ODT) 4 MG TABLET DISPERSIBLE Take 1 Tablet by mouth every 8 hours as needed for Nausea. 60 Tablet 0   • lidocaine (LIDODERM) 5 % Patch Place 1 Patch on the skin 1 time a day as needed. 1 patch to area of pain as needed for moderate pain  Indications: pain 30 Patch 3   • amLODIPine " (NORVASC) 5 MG Tab TAKE 1 TABLET BY MOUTH EVERY DAY 90 Tablet 3   • DORZOLAMIDE HCL-TIMOLOL MAL OP Administer 1 Drop into both eyes every morning.     • Famotidine-Ca Carb-Mag Hydrox -165 MG Chew Tab Chew 1 Tablet 1 time a day as needed (for indegestion).     • diclofenac sodium (VOLTAREN) 1 % Gel Apply 2 g topically 2 times a day as needed (for mild to moderate pain).     • Soft Lens Products (FRANCESCA SALINE) Solution Administer 1 Drop into both eyes every day.     • Melatonin 5 MG Cap Take 1 Capsule by mouth at bedtime as needed (for insomnia, takes first).     • Ibuprofen-Acetaminophen 125-250 MG Tab Take 1-2 Tablets by mouth every 8 hours as needed (for mild to moderate pain.).     • Multiple Vitamins-Minerals (PRESERVISION AREDS 2 PO) Take 1 Tablet by mouth every day.     • calcium carbonate (TUMS CHEWY BITES) 750 MG chewable tablet Chew 1 Tablet 1 time a day as needed (for stomach upset).     • fluticasone (FLONASE) 50 MCG/ACT nasal spray Administer 1 Spray into affected nostril(S) every day.     • travoprost (TRAVATAN Z) 0.004 % Solution Administer 1 Drop into both eyes every evening. Indications: Wide-Angle Glaucoma     • polyethylene glycol 3350 (MIRALAX) Powder Take 17 g by mouth every day. Indications: Constipation       No current Epic-ordered facility-administered medications on file.       Past medical history, social history and family history were reviewed from chart today    Review of systems: Per HPI.      All others negative.     Exam: /70 (BP Location: Left arm, Patient Position: Sitting, BP Cuff Size: Adult)   Pulse 70   Temp 36.7 °C (98.1 °F) (Temporal)   Resp 16   Wt 63.5 kg (140 lb)   SpO2 96%   General: Well-appearing. Well-developed. No signs of distress.  HEENT: Grossly normal. Oral cavity is pink and moist.   Neck: Supple without JVD or bruit.  Pulmonary: Clear with good breath sounds. Normal effort.  Cardiovascular: Regular. Carotid and radial pulses are intact.  Abdomen:  Soft, nontender, nondistended. Spleen and liver are not enlarged.  Neurologic: Cranial nerves II through XII are grossly normal, alert and oriented x3  Skin: Multiple pink, scaly lesions on the left upper arm.  I count approximately 4 separate lesions.  There are well demarcated    Diagnosis:  1. Acute cystitis without hematuria     2. Urinary frequency  POCT Urinalysis   3. Abnormal urinalysis  URINE CULTURE(NEW)   4. Neoplasm of uncertain behavior of skin         Suspect patient developed acute cystitis after recent diarrhea.  Encouraged her not to eat a bag of cherries as this will likely cause diarrhea.    Medications:  Macrobid for presumed infection.  Cultures pending  Trial of hydrocortisone cream for the skin lesion/rash on the arm    Laboratory:  Send urine sample for culture.    Imaging:  No imaging    Referrals:  No referrals    My total time spent caring for the patient on the day of the encounter was  greater than 30 minutes.   This includes obtaining history, reviewing chart, physical exam, patient education, reviewing outside records, placing orders, interpreting tests and coordinating care.

## 2022-06-14 ENCOUNTER — DOCUMENTATION (OUTPATIENT)
Dept: VASCULAR LAB | Facility: MEDICAL CENTER | Age: 87
End: 2022-06-14
Payer: MEDICARE

## 2022-06-14 PROCEDURE — 665998 HH PPS REVENUE CREDIT

## 2022-06-14 PROCEDURE — 665999 HH PPS REVENUE DEBIT

## 2022-06-14 NOTE — PROGRESS NOTES
Medication chart review for [facility] services        Current medication list     Current Outpatient Medications:   •  nitrofurantoin macro crystals, 100 mg, Oral, BID ALT/TIMES  •  levothyroxine, 75 mcg, Oral, QDAY  •  sertraline, 25 mg, Oral, DAILY  •  lisinopril, TAKE 1/2 TABLET BY MOUTH TWICE A DAY  •  ACETAMINOPHEN 8 HOUR PO, 650 mg, Oral, BID PRN  •  Apoaequorin, 10 mg, Oral, DAILY  •  Pediatric Vitamins (MULTIVITAMIN GUMMIES CHILDRENS PO), 1 Dose, Oral, DAILY  •  meclizine, 25 mg, Oral, TID PRN  •  ondansetron, 4 mg, Oral, Q8HRS PRN  •  lidocaine, 1 Patch, Transdermal, QDAY PRN  •  amLODIPine, 5 mg, Oral, QDAY  •  DORZOLAMIDE HCL-TIMOLOL MAL OP, 1 Drop, Both Eyes, QAM  •  Famotidine-Ca Carb-Mag Hydrox, 1 Tablet, Oral, QDAY PRN  •  diclofenac sodium, 2 g, Topical, BID PRN  •  Linh Saline, 1 Drop, Both Eyes, DAILY  •  Melatonin, 1 Capsule, Oral, QHS PRN  •  Ibuprofen-Acetaminophen, 1-2 Tablet, Oral, Q8HRS PRN  •  Multiple Vitamins-Minerals (PRESERVISION AREDS 2 PO), 1 Tablet, Oral, DAILY  •  Tums Chewy Bites, 1 Tablet, Oral, QDAY PRN  •  fluticasone, 1 Spray, Nasal, DAILY  •  travoprost, 1 Drop, Both Eyes, Q EVENING  •  polyethylene glycol 3350, 17 g, Oral, DAILY    Pt recently discharged from:   N/a      Allergies   Allergen Reactions   • Morphine Anaphylaxis     anaphylaxis   • Cephalexin Rash     Full body   • Codeine Vomiting and Nausea   • Metronidazole Vomiting and Nausea   • Sulfa Drugs Rash     Full body       Labs     Lab Results   Component Value Date/Time    SODIUM 132 (L) 03/07/2022 10:50 AM    POTASSIUM 4.8 03/07/2022 10:50 AM    CHLORIDE 99 03/07/2022 10:50 AM    CO2 23 03/07/2022 10:50 AM    GLUCOSE 90 03/07/2022 10:50 AM    BUN 16 03/07/2022 10:50 AM    CREATININE 0.73 03/07/2022 10:50 AM    CREATININE 0.99 01/09/2013 07:58 AM    BUNCREATRAT 20 01/09/2013 07:58 AM    GLOMRATE >59 08/04/2010 07:50 AM     Lab Results   Component Value Date/Time    ALKPHOSPHAT 108 (H) 03/07/2022 10:50 AM     ASTSGOT 21 03/07/2022 10:50 AM    ALTSGPT 11 03/07/2022 10:50 AM    TBILIRUBIN 0.3 03/07/2022 10:50 AM    INR 1.03 12/21/2021 08:26 PM    ALBUMIN 4.5 03/07/2022 10:50 AM        Assessment and Plan:   • Received referral from [facility]. Medications reviewed, and compared with discharge summary if available.        CC   Mike Otero M.D.  [address]  [place] [address]  Fax: [phone]    [facility]  Phone [phone] fax [phone]    This note was created using voice recognition software (Dragon). The accuracy of the dictation is limited by the abilities of the software. I have reviewed the note prior to signing, however some errors in grammar and context are still possible. If you have any questions related to this note please do not hesitate to contact our office.

## 2022-06-15 PROCEDURE — 665999 HH PPS REVENUE DEBIT

## 2022-06-15 PROCEDURE — 665998 HH PPS REVENUE CREDIT

## 2022-06-16 PROCEDURE — 665998 HH PPS REVENUE CREDIT

## 2022-06-16 PROCEDURE — 665999 HH PPS REVENUE DEBIT

## 2022-06-16 PROCEDURE — 665003 FOLLOW UP-HOME HEALTH

## 2022-06-17 PROCEDURE — 665998 HH PPS REVENUE CREDIT

## 2022-06-17 PROCEDURE — 665999 HH PPS REVENUE DEBIT

## 2022-06-18 PROCEDURE — 665998 HH PPS REVENUE CREDIT

## 2022-06-18 PROCEDURE — 665999 HH PPS REVENUE DEBIT

## 2022-06-19 PROCEDURE — 665998 HH PPS REVENUE CREDIT

## 2022-06-19 PROCEDURE — 665999 HH PPS REVENUE DEBIT

## 2022-06-20 ENCOUNTER — HOME CARE VISIT (OUTPATIENT)
Dept: HOME HEALTH SERVICES | Facility: HOME HEALTHCARE | Age: 87
End: 2022-06-20
Payer: MEDICARE

## 2022-06-20 PROCEDURE — 665999 HH PPS REVENUE DEBIT

## 2022-06-20 PROCEDURE — 665998 HH PPS REVENUE CREDIT

## 2022-06-20 NOTE — Clinical Note
Noted.  ----- Message -----  From: Cristina Jaramillo, PT  Sent: 6/20/2022   5:37 PM PDT  To: Maria Luisa Song R.N., *      Pt not home for her scheduled P.T. maintenance visit as [facility] staff reports she went to lunch with 1 of her daughters after medical appointment. Visit rescheduled to 1:30 on 6/22/22.

## 2022-06-21 ENCOUNTER — HOME CARE VISIT (OUTPATIENT)
Dept: HOME HEALTH SERVICES | Facility: HOME HEALTHCARE | Age: 87
End: 2022-06-21
Payer: MEDICARE

## 2022-06-21 PROCEDURE — 665998 HH PPS REVENUE CREDIT

## 2022-06-21 PROCEDURE — 665999 HH PPS REVENUE DEBIT

## 2022-06-21 NOTE — CASE COMMUNICATION
Quality Review Completed for 6/13 [facility] OAS[facility] by LEONEL Harkins, RN on 6/21/2022:  Edits completed by LEONEL Harkins RN:  1. Flu response is NO, this episode does span previous flu data collection times  2. Patient needs supervision at times for ambulation and transfers per narrative, and she is a very high fall risk with the [facility]-10 9, changed , 1810, to 1,  to 2 and  and   to 3 due to safety with mobility/transfers  3. No  = allowed due to private insurance, changed  to none of the above  4. No = allowed, changed  to normal vision  5. No = allowed, changed  to 8

## 2022-06-21 NOTE — CASE COMMUNICATION
Pt not home for her scheduled P.T. maintenance visit as [facility] staff reports she went to lunch with 1 of her daughters after medical appointment. Visit rescheduled to 1:30 on 6/22/22.

## 2022-06-22 ENCOUNTER — HOME CARE VISIT (OUTPATIENT)
Dept: HOME HEALTH SERVICES | Facility: HOME HEALTHCARE | Age: 87
End: 2022-06-22
Payer: MEDICARE

## 2022-06-22 VITALS
DIASTOLIC BLOOD PRESSURE: 52 MMHG | HEART RATE: 68 BPM | TEMPERATURE: 98.9 F | RESPIRATION RATE: 20 BRPM | OXYGEN SATURATION: 92 % | SYSTOLIC BLOOD PRESSURE: 100 MMHG

## 2022-06-22 PROCEDURE — G0159 HHC PT MAINT EA 15 MIN: HCPCS

## 2022-06-22 PROCEDURE — 665003 FOLLOW UP-HOME HEALTH

## 2022-06-22 PROCEDURE — 665999 HH PPS REVENUE DEBIT

## 2022-06-22 PROCEDURE — 665998 HH PPS REVENUE CREDIT

## 2022-06-22 ASSESSMENT — ENCOUNTER SYMPTOMS
PAIN LOCATION - EXACERBATING FACTORS: MOVEMENT
PAIN LOCATION - PAIN QUALITY: ACHE
DIFFICULTY THINKING: 1
PAIN: 1
HIGHEST PAIN SEVERITY IN PAST 24 HOURS: 6/10
PAIN LOCATION - PAIN DURATION: DAILY
POOR JUDGMENT: 1
PAIN LOCATION - PAIN FREQUENCY: WITH ACTIVITY
LOWEST PAIN SEVERITY IN PAST 24 HOURS: 0/10
SUBJECTIVE PAIN PROGRESSION: WAXING AND WANING
PAIN LOCATION - PAIN SEVERITY: 3/10
PERSON REPORTING PAIN: PATIENT
PAIN LOCATION: LEFT SHOULDER
PAIN LOCATION - PAIN QUALITY: ACHE
PAIN LOCATION - EXACERBATING FACTORS: MOVEMENT
PAIN LOCATION: RIGHT SHOULDER
PAIN SEVERITY GOAL: 3/10
PAIN LOCATION - PAIN DURATION: DAILY
PAIN LOCATION - PAIN SEVERITY: 3/10
PAIN LOCATION - PAIN FREQUENCY: WITH ACTIVITY

## 2022-06-22 NOTE — CASE COMMUNICATION
with changes. Thanks  ----- Message -----  From: Kiara Harkins R.N.  Sent: 6/21/2022  12:06 PM PDT  To: Cristina Jaramillo PT      Quality Review Completed for 6/13 [facility] by LEONEL Harkins, RN on 6/21/2022:  Edits completed by LEONEL Harkins, RN:  1. Flu response is NO, this episode does span previous flu data collection times  2. Patient needs supervision at times for ambulation and transfers per narrative, and she is a very high fall  risk with the [facility]-10 9, changed , 1810, to 1,  to 2 and  and   to 3 due to safety with mobility/transfers  3. No = allowed due to private insurance, changed  to none of the above  4. No = allowed, changed  to normal vision  5. No = allowed, changed  to 8

## 2022-06-23 PROCEDURE — 665998 HH PPS REVENUE CREDIT

## 2022-06-23 PROCEDURE — G0179 MD RECERTIFICATION HHA PT: HCPCS | Performed by: INTERNAL MEDICINE

## 2022-06-23 PROCEDURE — 665999 HH PPS REVENUE DEBIT

## 2022-06-24 PROCEDURE — 665998 HH PPS REVENUE CREDIT

## 2022-06-24 PROCEDURE — 665999 HH PPS REVENUE DEBIT

## 2022-06-25 PROCEDURE — 665998 HH PPS REVENUE CREDIT

## 2022-06-25 PROCEDURE — 665999 HH PPS REVENUE DEBIT

## 2022-06-26 PROCEDURE — 665999 HH PPS REVENUE DEBIT

## 2022-06-26 PROCEDURE — 665998 HH PPS REVENUE CREDIT

## 2022-06-27 ENCOUNTER — TELEPHONE (OUTPATIENT)
Dept: INTERNAL MEDICINE | Facility: IMAGING CENTER | Age: 87
End: 2022-06-27
Payer: MEDICARE

## 2022-06-27 ENCOUNTER — HOME CARE VISIT (OUTPATIENT)
Dept: HOME HEALTH SERVICES | Facility: HOME HEALTHCARE | Age: 87
End: 2022-06-27
Payer: MEDICARE

## 2022-06-27 VITALS
HEART RATE: 56 BPM | TEMPERATURE: 98.7 F | RESPIRATION RATE: 20 BRPM | DIASTOLIC BLOOD PRESSURE: 64 MMHG | SYSTOLIC BLOOD PRESSURE: 132 MMHG | OXYGEN SATURATION: 95 %

## 2022-06-27 DIAGNOSIS — R11.0 NAUSEA: ICD-10-CM

## 2022-06-27 DIAGNOSIS — R53.83 FATIGUE, UNSPECIFIED TYPE: ICD-10-CM

## 2022-06-27 DIAGNOSIS — R39.15 URINARY URGENCY: ICD-10-CM

## 2022-06-27 PROCEDURE — 665998 HH PPS REVENUE CREDIT

## 2022-06-27 PROCEDURE — G0159 HHC PT MAINT EA 15 MIN: HCPCS

## 2022-06-27 PROCEDURE — 665999 HH PPS REVENUE DEBIT

## 2022-06-27 ASSESSMENT — ENCOUNTER SYMPTOMS
PAIN LOCATION - PAIN FREQUENCY: WITH ACTIVITY
DEBILITATING PAIN: 1
SUBJECTIVE PAIN PROGRESSION: WAXING AND WANING
DIFFICULTY THINKING: 1
PAIN SEVERITY GOAL: 2/10
PAIN LOCATION - PAIN FREQUENCY: WITH ACTIVITY
POOR JUDGMENT: 1
PAIN LOCATION - PAIN QUALITY: ACHE
LOWEST PAIN SEVERITY IN PAST 24 HOURS: 0/10
DENIES PAIN: 1
PAIN LOCATION - EXACERBATING FACTORS: MOVEMENT
PAIN LOCATION - PAIN DURATION: DAILY
PAIN LOCATION: RIGHT SHOULDER
PAIN LOCATION - PAIN SEVERITY: 3/10
PAIN LOCATION - RELIEVING FACTORS: REST, MEDICATION, HEAT
PAIN LOCATION - PAIN SEVERITY: 3/10
PAIN LOCATION - PAIN QUALITY: ACHE
HIGHEST PAIN SEVERITY IN PAST 24 HOURS: 5/10
PAIN LOCATION: LEFT SHOULDER
PERSON REPORTING PAIN: PATIENT
PAIN LOCATION - PAIN DURATION: DAILY
PAIN LOCATION - EXACERBATING FACTORS: MOVEMENT
PAIN LOCATION - RELIEVING FACTORS: REST, MEDICATION, HEAT

## 2022-06-28 ENCOUNTER — HOSPITAL ENCOUNTER (OUTPATIENT)
Facility: MEDICAL CENTER | Age: 87
End: 2022-06-28
Attending: INTERNAL MEDICINE
Payer: MEDICARE

## 2022-06-28 ENCOUNTER — HOME CARE VISIT (OUTPATIENT)
Dept: HOME HEALTH SERVICES | Facility: HOME HEALTHCARE | Age: 87
End: 2022-06-28
Payer: MEDICARE

## 2022-06-28 VITALS
RESPIRATION RATE: 18 BRPM | TEMPERATURE: 98.8 F | DIASTOLIC BLOOD PRESSURE: 65 MMHG | OXYGEN SATURATION: 96 % | HEART RATE: 60 BPM | SYSTOLIC BLOOD PRESSURE: 110 MMHG

## 2022-06-28 LAB
APPEARANCE UR: CLEAR
BACTERIA #/AREA URNS HPF: NEGATIVE /HPF
BILIRUB UR QL STRIP.AUTO: NEGATIVE
COLOR UR: YELLOW
EPI CELLS #/AREA URNS HPF: ABNORMAL /HPF
GLUCOSE UR STRIP.AUTO-MCNC: NEGATIVE MG/DL
HYALINE CASTS #/AREA URNS LPF: ABNORMAL /LPF
KETONES UR STRIP.AUTO-MCNC: NEGATIVE MG/DL
LEUKOCYTE ESTERASE UR QL STRIP.AUTO: ABNORMAL
MICRO URNS: ABNORMAL
MUCOUS THREADS #/AREA URNS HPF: ABNORMAL /HPF
NITRITE UR QL STRIP.AUTO: NEGATIVE
PH UR STRIP.AUTO: 7 [PH] (ref 5–8)
PROT UR QL STRIP: NEGATIVE MG/DL
RBC # URNS HPF: ABNORMAL /HPF
RBC UR QL AUTO: NEGATIVE
SP GR UR STRIP.AUTO: 1.02
UROBILINOGEN UR STRIP.AUTO-MCNC: 1 MG/DL
WBC #/AREA URNS HPF: ABNORMAL /HPF
YEAST BUDDING URNS QL: PRESENT /HPF
YEAST HYPHAE #/AREA URNS HPF: PRESENT /HPF

## 2022-06-28 PROCEDURE — 665998 HH PPS REVENUE CREDIT

## 2022-06-28 PROCEDURE — 665999 HH PPS REVENUE DEBIT

## 2022-06-28 PROCEDURE — 81001 URINALYSIS AUTO W/SCOPE: CPT

## 2022-06-28 PROCEDURE — G0299 HHS/HOSPICE OF RN EA 15 MIN: HCPCS

## 2022-06-28 ASSESSMENT — ENCOUNTER SYMPTOMS
PERSON REPORTING PAIN: PATIENT
VOMITING: DENIES
MUSCLE WEAKNESS: 1
HIGHEST PAIN SEVERITY IN PAST 24 HOURS: 3/10
PAIN SEVERITY GOAL: 0/10
SUBJECTIVE PAIN PROGRESSION: UNCHANGED
NAUSEA: DENIES
LOWEST PAIN SEVERITY IN PAST 24 HOURS: 3/10
PAIN: 1

## 2022-06-29 PROCEDURE — 665999 HH PPS REVENUE DEBIT

## 2022-06-29 PROCEDURE — 665998 HH PPS REVENUE CREDIT

## 2022-06-30 PROCEDURE — 665998 HH PPS REVENUE CREDIT

## 2022-06-30 PROCEDURE — 665999 HH PPS REVENUE DEBIT

## 2022-07-01 PROCEDURE — 665998 HH PPS REVENUE CREDIT

## 2022-07-01 PROCEDURE — 665999 HH PPS REVENUE DEBIT

## 2022-07-02 PROCEDURE — 665998 HH PPS REVENUE CREDIT

## 2022-07-02 PROCEDURE — 665999 HH PPS REVENUE DEBIT

## 2022-07-03 PROCEDURE — 665999 HH PPS REVENUE DEBIT

## 2022-07-03 PROCEDURE — 665998 HH PPS REVENUE CREDIT

## 2022-07-04 ENCOUNTER — HOME CARE VISIT (OUTPATIENT)
Dept: HOME HEALTH SERVICES | Facility: HOME HEALTHCARE | Age: 87
End: 2022-07-04
Payer: MEDICARE

## 2022-07-04 PROCEDURE — G0159 HHC PT MAINT EA 15 MIN: HCPCS

## 2022-07-04 PROCEDURE — 665998 HH PPS REVENUE CREDIT

## 2022-07-04 PROCEDURE — 665999 HH PPS REVENUE DEBIT

## 2022-07-05 VITALS
OXYGEN SATURATION: 97 % | DIASTOLIC BLOOD PRESSURE: 70 MMHG | TEMPERATURE: 97.9 F | RESPIRATION RATE: 18 BRPM | SYSTOLIC BLOOD PRESSURE: 130 MMHG | HEART RATE: 52 BPM

## 2022-07-05 PROCEDURE — 665999 HH PPS REVENUE DEBIT

## 2022-07-05 PROCEDURE — 665998 HH PPS REVENUE CREDIT

## 2022-07-05 ASSESSMENT — ENCOUNTER SYMPTOMS
PAIN LOCATION - PAIN QUALITY: ACHE
POOR JUDGMENT: 1
PAIN: B SHOULDERS
PAIN SEVERITY GOAL: 2/10
PAIN LOCATION - PAIN SEVERITY: 3/10
DEBILITATING PAIN: 1
PAIN LOCATION - RELIEVING FACTORS: REST, HEAT, MEDICATION
PERSON REPORTING PAIN: PATIENT
PAIN LOCATION - PAIN DURATION: DAILY
DIFFICULTY THINKING: 1
PAIN LOCATION - PAIN FREQUENCY: WITH ACTIVITY
PAIN: 1
LOWEST PAIN SEVERITY IN PAST 24 HOURS: 2/10
HIGHEST PAIN SEVERITY IN PAST 24 HOURS: 3/10
PAIN LOCATION: RIGHT SHOULDER
SUBJECTIVE PAIN PROGRESSION: WAXING AND WANING

## 2022-07-06 PROCEDURE — 665999 HH PPS REVENUE DEBIT

## 2022-07-06 PROCEDURE — 665998 HH PPS REVENUE CREDIT

## 2022-07-07 PROCEDURE — 665998 HH PPS REVENUE CREDIT

## 2022-07-07 PROCEDURE — 665999 HH PPS REVENUE DEBIT

## 2022-07-08 PROCEDURE — 665998 HH PPS REVENUE CREDIT

## 2022-07-08 PROCEDURE — 665999 HH PPS REVENUE DEBIT

## 2022-07-09 PROCEDURE — 665998 HH PPS REVENUE CREDIT

## 2022-07-09 PROCEDURE — 665999 HH PPS REVENUE DEBIT

## 2022-07-10 PROCEDURE — 665999 HH PPS REVENUE DEBIT

## 2022-07-10 PROCEDURE — 665998 HH PPS REVENUE CREDIT

## 2022-07-11 ENCOUNTER — HOME CARE VISIT (OUTPATIENT)
Dept: HOME HEALTH SERVICES | Facility: HOME HEALTHCARE | Age: 87
End: 2022-07-11
Payer: MEDICARE

## 2022-07-11 VITALS
OXYGEN SATURATION: 97 % | DIASTOLIC BLOOD PRESSURE: 54 MMHG | HEART RATE: 60 BPM | TEMPERATURE: 98 F | SYSTOLIC BLOOD PRESSURE: 110 MMHG | RESPIRATION RATE: 16 BRPM

## 2022-07-11 PROCEDURE — 665999 HH PPS REVENUE DEBIT

## 2022-07-11 PROCEDURE — G0159 HHC PT MAINT EA 15 MIN: HCPCS

## 2022-07-11 PROCEDURE — 665998 HH PPS REVENUE CREDIT

## 2022-07-11 ASSESSMENT — ENCOUNTER SYMPTOMS
SUBJECTIVE PAIN PROGRESSION: WAXING AND WANING
PAIN LOCATION - PAIN DURATION: DAILY
PAIN LOCATION - PAIN FREQUENCY: WITH ACTIVITY
POOR JUDGMENT: 1
PAIN LOCATION: LEFT SHOULDER
PERSON REPORTING PAIN: PATIENT
PAIN LOCATION: RIGHT SHOULDER
DIFFICULTY THINKING: 1
PAIN LOCATION - PAIN DURATION: DAILY
PAIN LOCATION - PAIN SEVERITY: 4/10
PAIN LOCATION - EXACERBATING FACTORS: MOVEMENT
PAIN LOCATION - RELIEVING FACTORS: REST, MEDICATION
HIGHEST PAIN SEVERITY IN PAST 24 HOURS: 5/10
PAIN LOCATION - EXACERBATING FACTORS: MOVEMENT
PAIN LOCATION - PAIN QUALITY: ACHE
DEBILITATING PAIN: 1
PAIN LOCATION - PAIN FREQUENCY: WITH ACTIVITY
LOWEST PAIN SEVERITY IN PAST 24 HOURS: 0/10
PAIN: 1
PAIN LOCATION - PAIN QUALITY: ACHE
PAIN LOCATION - RELIEVING FACTORS: REST, MEDICATION
PAIN LOCATION - PAIN SEVERITY: 5/10

## 2022-07-11 NOTE — Clinical Note
Physical therapy reassessment performed 7/11/2022 and I will continue to follow for maintenance physical therapy services 1 time a week for 4 weeks and then recertify

## 2022-07-11 NOTE — Clinical Note
Noted.  ----- Message -----  From: Cristina Jaramillo, PT  Sent: 7/11/2022   6:08 PM PDT  To: Maria Luisa Song R.N., Mike Otero M.D., *      Physical therapy reassessment performed 7/11/2022 and I will continue to follow for maintenance physical therapy services 1 time a week for 4 weeks and then recertify

## 2022-07-12 PROCEDURE — 665998 HH PPS REVENUE CREDIT

## 2022-07-12 PROCEDURE — 665999 HH PPS REVENUE DEBIT

## 2022-07-12 NOTE — HOME HEALTH
PHYSICAL THERAPY REASSESSMENT AND PLAN OF CARE     ·       Patient:  Gloria Patel     ·       Patient continues to maintain function/balance/activity tolerance with Maintenance P.T. services. She has not had any falls since maintenance services began but did have a UTI recently. Pt lives in assisted living but there are no group exercise classes to attend plus has significant cognitive deficits and pt would most likely decline without continued P.T. maintenance services. Pt agrees to continue maintenance services and therapist agrees pt would benefit.      ·       Skilled Therapeutic Need: Decreased activity tolerance, LE weakness, Balance control, Generalized deconditioning, Gait training, Fall prevention, Pain control and Poor safety awareness     Recommend skilled HHPT to address deficits and improve function-report sent to MD     ·       Frequency:   1 week 4,  Effective 7/17/2022     ·       Goals: Effective 7/17/2022 - Patient will continue to ambulate at least 300 feet using 4WW requiring more than supervision on all surfaces in 4 visits.  Patient will continue to to demonstrate at least fair immediate standing balance in 4 visits. Patient will continue to be independent in use of heating pad to assist with pain management in 4 visits. Caregivers will continue to be independent in home safety/fall prevention measures in 4 visits    Does the patient get SOB with Maximum exertion?  ambulation of 125 feet    How often (if at all) does pain interfere with patient's movements?  daily but not constantly

## 2022-07-13 PROCEDURE — 665999 HH PPS REVENUE DEBIT

## 2022-07-13 PROCEDURE — 665998 HH PPS REVENUE CREDIT

## 2022-07-14 PROCEDURE — 665999 HH PPS REVENUE DEBIT

## 2022-07-14 PROCEDURE — 665998 HH PPS REVENUE CREDIT

## 2022-07-15 PROCEDURE — 665999 HH PPS REVENUE DEBIT

## 2022-07-15 PROCEDURE — 665998 HH PPS REVENUE CREDIT

## 2022-07-16 PROCEDURE — 665998 HH PPS REVENUE CREDIT

## 2022-07-16 PROCEDURE — 665999 HH PPS REVENUE DEBIT

## 2022-07-17 PROCEDURE — 665998 HH PPS REVENUE CREDIT

## 2022-07-17 PROCEDURE — 665999 HH PPS REVENUE DEBIT

## 2022-07-18 ENCOUNTER — HOME CARE VISIT (OUTPATIENT)
Dept: HOME HEALTH SERVICES | Facility: HOME HEALTHCARE | Age: 87
End: 2022-07-18
Payer: MEDICARE

## 2022-07-18 VITALS
HEART RATE: 64 BPM | TEMPERATURE: 98.4 F | OXYGEN SATURATION: 95 % | SYSTOLIC BLOOD PRESSURE: 110 MMHG | RESPIRATION RATE: 18 BRPM | DIASTOLIC BLOOD PRESSURE: 58 MMHG

## 2022-07-18 PROCEDURE — 665999 HH PPS REVENUE DEBIT

## 2022-07-18 PROCEDURE — G0159 HHC PT MAINT EA 15 MIN: HCPCS

## 2022-07-18 PROCEDURE — 665003 FOLLOW UP-HOME HEALTH

## 2022-07-18 PROCEDURE — 665998 HH PPS REVENUE CREDIT

## 2022-07-18 ASSESSMENT — ENCOUNTER SYMPTOMS
PERSON REPORTING PAIN: PATIENT
PAIN LOCATION: RIGHT SHOULDER
PAIN LOCATION - PAIN SEVERITY: 5/10
PAIN LOCATION - PAIN DURATION: DAILY
PAIN LOCATION - PAIN DURATION: DAILY
PAIN LOCATION - RELIEVING FACTORS: REST, MEDICATION, HEAT
SUBJECTIVE PAIN PROGRESSION: WAXING AND WANING
PAIN LOCATION - PAIN SEVERITY: 3/10
POOR JUDGMENT: 1
PAIN LOCATION: LEFT SHOULDER
LOWEST PAIN SEVERITY IN PAST 24 HOURS: 0/10
PAIN: 1
PAIN LOCATION - PAIN QUALITY: ACHE
PAIN LOCATION - RELIEVING FACTORS: REST, MEDICATION, HEAT
PAIN LOCATION - PAIN FREQUENCY: WITH ACTIVITY
PAIN LOCATION - PAIN QUALITY: ACHE
DIFFICULTY THINKING: 1
HIGHEST PAIN SEVERITY IN PAST 24 HOURS: 6/10
PAIN SEVERITY GOAL: 2/10
PAIN LOCATION - PAIN FREQUENCY: WITH ACTIVITY

## 2022-07-19 PROCEDURE — 665998 HH PPS REVENUE CREDIT

## 2022-07-19 PROCEDURE — 665999 HH PPS REVENUE DEBIT

## 2022-07-20 PROCEDURE — 665999 HH PPS REVENUE DEBIT

## 2022-07-20 PROCEDURE — 665998 HH PPS REVENUE CREDIT

## 2022-07-21 PROCEDURE — 665999 HH PPS REVENUE DEBIT

## 2022-07-21 PROCEDURE — 665998 HH PPS REVENUE CREDIT

## 2022-07-22 PROCEDURE — 665998 HH PPS REVENUE CREDIT

## 2022-07-22 PROCEDURE — 665999 HH PPS REVENUE DEBIT

## 2022-07-23 PROCEDURE — 665999 HH PPS REVENUE DEBIT

## 2022-07-23 PROCEDURE — 665998 HH PPS REVENUE CREDIT

## 2022-07-24 PROCEDURE — 665998 HH PPS REVENUE CREDIT

## 2022-07-24 PROCEDURE — 665999 HH PPS REVENUE DEBIT

## 2022-07-25 ENCOUNTER — HOME CARE VISIT (OUTPATIENT)
Dept: HOME HEALTH SERVICES | Facility: HOME HEALTHCARE | Age: 87
End: 2022-07-25
Payer: MEDICARE

## 2022-07-25 VITALS
SYSTOLIC BLOOD PRESSURE: 102 MMHG | DIASTOLIC BLOOD PRESSURE: 58 MMHG | HEART RATE: 56 BPM | OXYGEN SATURATION: 96 % | TEMPERATURE: 98.4 F | RESPIRATION RATE: 20 BRPM

## 2022-07-25 PROCEDURE — 665999 HH PPS REVENUE DEBIT

## 2022-07-25 PROCEDURE — G0159 HHC PT MAINT EA 15 MIN: HCPCS

## 2022-07-25 PROCEDURE — 665998 HH PPS REVENUE CREDIT

## 2022-07-25 ASSESSMENT — ENCOUNTER SYMPTOMS
PAIN LOCATION - PAIN QUALITY: ACHE
PAIN LOCATION: LEFT SHOULDER
DEBILITATING PAIN: 1
PAIN SEVERITY GOAL: 3/10
DIFFICULTY THINKING: 1
LOWEST PAIN SEVERITY IN PAST 24 HOURS: 0/10
PAIN LOCATION - RELIEVING FACTORS: MEDICATION, HEAT
POOR JUDGMENT: 1
HIGHEST PAIN SEVERITY IN PAST 24 HOURS: 5/10
PAIN LOCATION - PAIN FREQUENCY: FREQUENT
PERSON REPORTING PAIN: PATIENT
PAIN LOCATION - PAIN SEVERITY: 5/10
PAIN: 1
SUBJECTIVE PAIN PROGRESSION: WAXING AND WANING
PAIN LOCATION - PAIN DURATION: DAILY

## 2022-07-26 PROCEDURE — 665998 HH PPS REVENUE CREDIT

## 2022-07-26 PROCEDURE — 665999 HH PPS REVENUE DEBIT

## 2022-07-27 PROCEDURE — 665999 HH PPS REVENUE DEBIT

## 2022-07-27 PROCEDURE — 665998 HH PPS REVENUE CREDIT

## 2022-07-28 PROCEDURE — 665998 HH PPS REVENUE CREDIT

## 2022-07-28 PROCEDURE — 665999 HH PPS REVENUE DEBIT

## 2022-07-29 PROCEDURE — 665998 HH PPS REVENUE CREDIT

## 2022-07-29 PROCEDURE — 665999 HH PPS REVENUE DEBIT

## 2022-07-30 PROCEDURE — 665999 HH PPS REVENUE DEBIT

## 2022-07-30 PROCEDURE — 665998 HH PPS REVENUE CREDIT

## 2022-07-31 PROCEDURE — 665998 HH PPS REVENUE CREDIT

## 2022-07-31 PROCEDURE — 665999 HH PPS REVENUE DEBIT

## 2022-08-01 ENCOUNTER — HOME CARE VISIT (OUTPATIENT)
Dept: HOME HEALTH SERVICES | Facility: HOME HEALTHCARE | Age: 87
End: 2022-08-01
Payer: MEDICARE

## 2022-08-01 ENCOUNTER — OFFICE VISIT (OUTPATIENT)
Dept: INTERNAL MEDICINE | Facility: IMAGING CENTER | Age: 87
End: 2022-08-01
Payer: MEDICARE

## 2022-08-01 VITALS
RESPIRATION RATE: 16 BRPM | TEMPERATURE: 99.1 F | SYSTOLIC BLOOD PRESSURE: 106 MMHG | HEART RATE: 56 BPM | OXYGEN SATURATION: 93 % | DIASTOLIC BLOOD PRESSURE: 52 MMHG

## 2022-08-01 DIAGNOSIS — I10 ESSENTIAL HYPERTENSION: ICD-10-CM

## 2022-08-01 DIAGNOSIS — F32.5 MAJOR DEPRESSIVE DISORDER WITH SINGLE EPISODE, IN FULL REMISSION (HCC): ICD-10-CM

## 2022-08-01 DIAGNOSIS — E03.9 HYPOTHYROIDISM, UNSPECIFIED TYPE: ICD-10-CM

## 2022-08-01 DIAGNOSIS — N32.81 OVERACTIVE BLADDER: ICD-10-CM

## 2022-08-01 PROCEDURE — 665998 HH PPS REVENUE CREDIT

## 2022-08-01 PROCEDURE — 665999 HH PPS REVENUE DEBIT

## 2022-08-01 PROCEDURE — G0159 HHC PT MAINT EA 15 MIN: HCPCS

## 2022-08-01 PROCEDURE — 99336 PR DOMICIL/REST-HOME VISIT EST PAT, MOD-HIGH*: CPT | Performed by: INTERNAL MEDICINE

## 2022-08-01 ASSESSMENT — ENCOUNTER SYMPTOMS
PAIN LOCATION - PAIN DURATION: DAILY
PAIN LOCATION - PAIN QUALITY: ACHE
POOR JUDGMENT: 1
HIGHEST PAIN SEVERITY IN PAST 24 HOURS: 7/10
PAIN: 1
LOWEST PAIN SEVERITY IN PAST 24 HOURS: 0/10
PAIN LOCATION - PAIN FREQUENCY: WITH ACTIVITY
PAIN LOCATION - RELIEVING FACTORS: REST, HEAT, MEDICATION
PAIN SEVERITY GOAL: 3/10
PERSON REPORTING PAIN: PATIENT
SUBJECTIVE PAIN PROGRESSION: WAXING AND WANING
PAIN LOCATION: LEFT SHOULDER
PAIN LOCATION - EXACERBATING FACTORS: MOVEMENT
PAIN LOCATION - PAIN SEVERITY: 6/10
DIFFICULTY THINKING: 1

## 2022-08-02 VITALS
OXYGEN SATURATION: 95 % | DIASTOLIC BLOOD PRESSURE: 70 MMHG | TEMPERATURE: 98 F | RESPIRATION RATE: 14 BRPM | HEART RATE: 55 BPM | SYSTOLIC BLOOD PRESSURE: 125 MMHG

## 2022-08-02 PROCEDURE — 665999 HH PPS REVENUE DEBIT

## 2022-08-02 PROCEDURE — 665998 HH PPS REVENUE CREDIT

## 2022-08-02 NOTE — PROGRESS NOTES
Chief Complaint   Patient presents with   • Hypertension       HISTORY OF THE PRESENT ILLNESS: Patient is a 89 y.o. female.     Patient was seen at her assisted living facility, [facility].  She was seen in her apartment.  The reason for the follow-up was to assess her living situation, ability to perform activities of daily living and follow-up on chronic issues.    The patient answered her door.  She is using her walker for ambulation.  We then sat in her living room.  We discussed that she is involved in the exercise class at the facility.  She also has continued home health who is working on gait training and balance.  She has found this helpful.  She is experienced no falls.  She has found that she can walk to the dining area more easily.    She is compliant with her medications.  Her prescription medications are kept locked and given by the staff.  She has as needed medications that she has access to.  We reviewed those including vitamins, stomach acid suppression and Tylenol for pain relief.    We discussed her overactive bladder issues.  She reports this is been stable on her medications.    She has not been monitoring her blood pressure but is compliant with her amlodipine and lisinopril.    Denies any hyper or hypothyroid symptoms.    She reports her depression is stable.  She is content with her current living situation.    Allergies: Morphine, Cephalexin, Codeine, Metronidazole, and Sulfa drugs    Current Outpatient Medications Ordered in Epic   Medication Sig Dispense Refill   • levothyroxine (SYNTHROID) 75 MCG Tab Take 1 Tablet by mouth every day. 90 Tablet 3   • sertraline (ZOLOFT) 25 MG tablet Take 1 Tablet by mouth every day. 90 Tablet 3   • lisinopril (PRINIVIL) 20 MG Tab TAKE 1/2 TABLET BY MOUTH TWICE A DAY 90 Tablet 3   • ACETAMINOPHEN 8 HOUR PO Take 650 mg by mouth 2 times a day as needed (moderate pain). do not exceed 2500 mg toatal in a day     • Apoaequorin 10 MG Cap Take 10 mg by mouth  every day.     • Pediatric Vitamins (MULTIVITAMIN GUMMIES CHILDRENS PO) Take 1 Dose by mouth every day.     • meclizine (ANTIVERT) 25 MG Tab Take 1 Tablet by mouth 3 times a day as needed (for dizziness). 60 Tablet 0   • ondansetron (ZOFRAN ODT) 4 MG TABLET DISPERSIBLE Take 1 Tablet by mouth every 8 hours as needed for Nausea. 60 Tablet 0   • lidocaine (LIDODERM) 5 % Patch Place 1 Patch on the skin 1 time a day as needed. 1 patch to area of pain as needed for moderate pain  Indications: pain 30 Patch 3   • amLODIPine (NORVASC) 5 MG Tab TAKE 1 TABLET BY MOUTH EVERY DAY 90 Tablet 3   • DORZOLAMIDE HCL-TIMOLOL MAL OP Administer 1 Drop into both eyes every morning.     • Famotidine-Ca Carb-Mag Hydrox -165 MG Chew Tab Chew 1 Tablet 1 time a day as needed (for indegestion).     • diclofenac sodium (VOLTAREN) 1 % Gel Apply 2 g topically 2 times a day as needed (for mild to moderate pain).     • Soft Lens Products (FRANCESCA SALINE) Solution Administer 1 Drop into both eyes every day.     • Melatonin 5 MG Cap Take 1 Capsule by mouth at bedtime as needed (for insomnia, takes first).     • Ibuprofen-Acetaminophen 125-250 MG Tab Take 1-2 Tablets by mouth every 8 hours as needed (for mild to moderate pain.).     • Multiple Vitamins-Minerals (PRESERVISION AREDS 2 PO) Take 1 Tablet by mouth every day.     • calcium carbonate (TUMS CHEWY BITES) 750 MG chewable tablet Chew 1 Tablet 1 time a day as needed (for stomach upset).     • fluticasone (FLONASE) 50 MCG/ACT nasal spray Administer 1 Spray into affected nostril(S) every day.     • travoprost (TRAVATAN Z) 0.004 % Solution Administer 1 Drop into both eyes every evening. Indications: Wide-Angle Glaucoma     • polyethylene glycol 3350 (MIRALAX) Powder Take 17 g by mouth every day. Indications: Constipation       No current Epic-ordered facility-administered medications on file.       Past medical history, social history and family history were reviewed from chart today    Review  of systems: Per HPI.       All others negative.     Exam: /70   Pulse (!) 55   Temp 36.7 °C (98 °F)   Resp 14   SpO2 95%   General: Well-appearing. Well-developed. No signs of distress.  HEENT: Grossly normal. Oral cavity is pink and moist.   Neck: Supple without JVD or bruit.  Pulmonary: Clear with good breath sounds. Normal effort.  Cardiovascular: Regular. Carotid and radial pulses are intact.  Abdomen: Soft, nontender, nondistended. Spleen and liver are not enlarged.  Neurologic: Cranial nerves II through XII are grossly normal, alert and oriented x3      Diagnosis:  1. Essential hypertension     2. Hypothyroidism, unspecified type     3. Overactive bladder     4. Major depressive disorder with single episode, in full remission (HCC)         Overall patient is doing well.  Blood pressure is at goal.  Thyroid issues appear stable.  Bladder issues are stable.  Depression appears to remain in remission.    She is able to perform her activities of daily living.  She typically has someone near her when she is showering.  She is walking with her walker.  No falls.  I am happy to see that home health continues and she is working on balance and gait training.  She has not had any falls recently.

## 2022-08-03 PROCEDURE — 665998 HH PPS REVENUE CREDIT

## 2022-08-03 PROCEDURE — 665999 HH PPS REVENUE DEBIT

## 2022-08-04 PROCEDURE — 665999 HH PPS REVENUE DEBIT

## 2022-08-04 PROCEDURE — 665998 HH PPS REVENUE CREDIT

## 2022-08-05 PROCEDURE — 665999 HH PPS REVENUE DEBIT

## 2022-08-05 PROCEDURE — 665998 HH PPS REVENUE CREDIT

## 2022-08-06 PROCEDURE — 665999 HH PPS REVENUE DEBIT

## 2022-08-06 PROCEDURE — 665998 HH PPS REVENUE CREDIT

## 2022-08-07 PROCEDURE — 665999 HH PPS REVENUE DEBIT

## 2022-08-07 PROCEDURE — 665998 HH PPS REVENUE CREDIT

## 2022-08-08 PROCEDURE — 665999 HH PPS REVENUE DEBIT

## 2022-08-08 PROCEDURE — 665998 HH PPS REVENUE CREDIT

## 2022-08-09 PROCEDURE — 665999 HH PPS REVENUE DEBIT

## 2022-08-09 PROCEDURE — 665998 HH PPS REVENUE CREDIT

## 2022-08-10 PROCEDURE — 665999 HH PPS REVENUE DEBIT

## 2022-08-10 PROCEDURE — 665998 HH PPS REVENUE CREDIT

## 2022-08-11 PROCEDURE — 665999 HH PPS REVENUE DEBIT

## 2022-08-11 PROCEDURE — 665998 HH PPS REVENUE CREDIT

## 2022-08-12 ENCOUNTER — HOME CARE VISIT (OUTPATIENT)
Dept: HOME HEALTH SERVICES | Facility: HOME HEALTHCARE | Age: 87
End: 2022-08-12
Payer: MEDICARE

## 2022-08-12 PROCEDURE — G0159 HHC PT MAINT EA 15 MIN: HCPCS

## 2022-08-12 PROCEDURE — 665999 HH PPS REVENUE DEBIT

## 2022-08-12 PROCEDURE — 665998 HH PPS REVENUE CREDIT

## 2022-08-12 NOTE — Clinical Note
Noted.  ----- Message -----  From: Cristina Jaramillo, PT  Sent: 8/13/2022   7:21 AM PDT  To: Maria Luisa Song R.N., Boo Hernandez, *      P.T. recertification for maintenance P.T. on 8/12/2022 and I will follow 1 time a week for  8 weeks. Further insurance auth may be requiried

## 2022-08-12 NOTE — Clinical Note
P.T. recertification for maintenance P.T. on 8/12/2022 and I will follow 1 time a week for  8 weeks. Further insurance auth may be requiried

## 2022-08-13 VITALS
RESPIRATION RATE: 18 BRPM | SYSTOLIC BLOOD PRESSURE: 120 MMHG | TEMPERATURE: 99.5 F | OXYGEN SATURATION: 94 % | DIASTOLIC BLOOD PRESSURE: 60 MMHG | HEART RATE: 56 BPM

## 2022-08-13 PROCEDURE — 665998 HH PPS REVENUE CREDIT

## 2022-08-13 PROCEDURE — 665999 HH PPS REVENUE DEBIT

## 2022-08-13 ASSESSMENT — ENCOUNTER SYMPTOMS
PAIN SEVERITY GOAL: 3/10
PAIN LOCATION: LEFT SHOULDER
PAIN LOCATION - PAIN SEVERITY: 6/10
PAIN LOCATION - PAIN FREQUENCY: CONSTANT
SUBJECTIVE PAIN PROGRESSION: WAXING AND WANING
PAIN LOCATION - RELIEVING FACTORS: REST, MEDICATION
POOR JUDGMENT: 1
PAIN LOCATION - EXACERBATING FACTORS: MOVEMENT
PAIN: 1
PAIN LOCATION - PAIN QUALITY: ACHE
LOWEST PAIN SEVERITY IN PAST 24 HOURS: 4/10
HIGHEST PAIN SEVERITY IN PAST 24 HOURS: 6/10
PERSON REPORTING PAIN: PATIENT
DIFFICULTY THINKING: 1
PAIN LOCATION - PAIN DURATION: DAILY

## 2022-08-13 ASSESSMENT — PATIENT HEALTH QUESTIONNAIRE - PHQ9
CLINICAL INTERPRETATION OF PHQ2 SCORE: 1
SUM OF ALL RESPONSES TO PHQ QUESTIONS 1-9: 2
5. POOR APPETITE OR OVEREATING: 0 - NOT AT ALL

## 2022-08-13 ASSESSMENT — ACTIVITIES OF DAILY LIVING (ADL): OASIS_M1830: 03

## 2022-08-13 NOTE — HOME HEALTH
Recertification of Care to Home Health for maintenance physical therapy services.    Current clinical summary, reason for continued home health services, new issues identified: Patient is preserving her functional mobility, balance, activity tolerance, pain control with maintenance physical therapy services.  She has not had a fall or another UTI during this certification.  Patient continues to express desire to have maintenance physical therapy services.  Since maintenance services are being very effective for this patient therapist agrees she would continue to benefit.   Frequency:   1 week 8,  Effective 8/15/2022     ·       Goals: Effective 8/15/2022 -  Patient will continue to ambulate at least 300 feet using 4WW requiring more than supervision on all surfaces in 8 visits.  Patient will continue to to demonstrate at least fair immediate standing balance in 8 visits.  Patient will continue to be independent in use of heating pad to assist with pain management in 8 visits.  Caregivers will continue to be independent in home safety/fall prevention measures in 8 visits.  Does the patient get SOB with  exertion?  No shortness of breath has been noted  How often (if at all) does pain interfere with patient's movements?  On a daily but not constant basis limits function and sleep      LIVING SITUATION AND CAREGIVING:   Homebound Status: cognitive/communication deficits, difficulty with ambulation/transfers, needs assistance to leave home, needs assistive devices to ambulate, unable to manage stairs, unsafe to drive or leave residence without assistance, unsteady gait, weakness and fatigue, debiltating pain and impaired thoughts/judgment  Type of Home: Assisted living facility  Lives with: Alone  Receives Assistance: For some ADLs, medication management and whenever ambulating outside her apartment  Unresolved Safety Concerns: Decreased safety awareness    Does the patient have an Advanced Directive?    Yes, copy  provided or photographed for chart    Does the patient have a POLST?     Yes completed POLST is in the home and visible, photographed for chart and DNR entered as a signed order in EPIC.     REASON FOR HOME HEALTH SUPPORTING INFORMATION:  Physical therapy to assess and teach the following but not limited to:    - fall prevention, hydration and home safety    CURRENT LEVEL OF FUNCTION:   Ambulation and Mobility: Independent in her apartment and supervised whenever exiting her apartment.   Patient Equipment: walker for ambulation.   Transferring: Independent except assisted with shower and car transfers  Bathing: Shower   Dressing: Independent except to apply her pants, compression socks and shoes  Special equipment used: 4 WW, grab bars, shower chair    How noticeably Short of Breath is the patient during the OASIS walk or other activity?     NA    MEDICATION MANAGEMENT:  Patient uses pharmacy:   administered by another person  Medication issues:na

## 2022-08-14 PROCEDURE — 665999 HH PPS REVENUE DEBIT

## 2022-08-14 PROCEDURE — 665998 HH PPS REVENUE CREDIT

## 2022-08-15 ENCOUNTER — HOME CARE VISIT (OUTPATIENT)
Dept: HOME HEALTH SERVICES | Facility: HOME HEALTHCARE | Age: 87
End: 2022-08-15
Payer: MEDICARE

## 2022-08-15 VITALS
HEART RATE: 56 BPM | RESPIRATION RATE: 20 BRPM | DIASTOLIC BLOOD PRESSURE: 54 MMHG | SYSTOLIC BLOOD PRESSURE: 116 MMHG | TEMPERATURE: 98.1 F | OXYGEN SATURATION: 96 %

## 2022-08-15 PROCEDURE — G0159 HHC PT MAINT EA 15 MIN: HCPCS

## 2022-08-15 PROCEDURE — 665999 HH PPS REVENUE DEBIT

## 2022-08-15 PROCEDURE — 665998 HH PPS REVENUE CREDIT

## 2022-08-15 PROCEDURE — 665003 FOLLOW UP-HOME HEALTH

## 2022-08-15 ASSESSMENT — ENCOUNTER SYMPTOMS
PAIN: 1
DIFFICULTY THINKING: 1
PERSON REPORTING PAIN: PATIENT
PAIN LOCATION - EXACERBATING FACTORS: MOVEMENT
PAIN LOCATION - RELIEVING FACTORS: REST, MEDICATION, HEAT
PAIN LOCATION - PAIN FREQUENCY: FREQUENT
LOWEST PAIN SEVERITY IN PAST 24 HOURS: 0/10
SUBJECTIVE PAIN PROGRESSION: WAXING AND WANING
DEBILITATING PAIN: 1
POOR JUDGMENT: 1
HIGHEST PAIN SEVERITY IN PAST 24 HOURS: 6/10
PAIN LOCATION - PAIN QUALITY: ACHE
PAIN SEVERITY GOAL: 2/10
PAIN LOCATION - PAIN SEVERITY: 5/10
PAIN LOCATION: LEFT SHOULDER
PAIN LOCATION - PAIN DURATION: DAILY

## 2022-08-16 ENCOUNTER — HOME CARE VISIT (OUTPATIENT)
Dept: HOME HEALTH SERVICES | Facility: HOME HEALTHCARE | Age: 87
End: 2022-08-16
Payer: MEDICARE

## 2022-08-16 PROCEDURE — 665998 HH PPS REVENUE CREDIT

## 2022-08-16 PROCEDURE — 665999 HH PPS REVENUE DEBIT

## 2022-08-16 NOTE — CASE COMMUNICATION
Quality Review for 8.12.22 [facility] OASIS performed on by DENISE Davis RN on 8.16.2022:    Edits completed by DENISE Davis RN:  1. Changed  to 4 from = sign per chart review  2. Changed  to 0 per chart review  3. Changed  to 0 from = per narrative  4. Added fall risk to safety measures  5. Changed  to 8 from = sign

## 2022-08-16 NOTE — CASE COMMUNICATION
Agree with changes. Thanks for the help. Hopefully I will get better  ----- Message -----  From: Mena Davis R.N.  Sent: 8/16/2022   2:12 PM PDT  To: Cristina Jaramillo PT      Quality Review for 8.12.22 [facility] OASIS performed on by DENISE Davis RN on 8.16.2022:    Edits completed by DENISE Davis RN:  1. Changed  to 4 from = sign per chart review  2. Changed  to 0 per chart review  3. Changed  to 0 from = per narrative  4.  Added fall risk to safety measures  5. Changed  to 8 from = sign

## 2022-08-17 ENCOUNTER — DOCUMENTATION (OUTPATIENT)
Dept: VASCULAR LAB | Facility: MEDICAL CENTER | Age: 87
End: 2022-08-17
Payer: MEDICARE

## 2022-08-17 PROCEDURE — 665998 HH PPS REVENUE CREDIT

## 2022-08-17 PROCEDURE — 665999 HH PPS REVENUE DEBIT

## 2022-08-17 NOTE — PROGRESS NOTES
[facility]    Received referral from [facility] to review patient's medication list.    Med list reviewed and reconciled.  No clinically significant DDI identified.  Allergies reviewed.    Yaritza Reece, CharbelD

## 2022-08-18 PROCEDURE — 665998 HH PPS REVENUE CREDIT

## 2022-08-18 PROCEDURE — G0179 MD RECERTIFICATION HHA PT: HCPCS | Performed by: INTERNAL MEDICINE

## 2022-08-18 PROCEDURE — 665999 HH PPS REVENUE DEBIT

## 2022-08-19 PROCEDURE — 665999 HH PPS REVENUE DEBIT

## 2022-08-19 PROCEDURE — 665998 HH PPS REVENUE CREDIT

## 2022-08-20 PROCEDURE — 665998 HH PPS REVENUE CREDIT

## 2022-08-20 PROCEDURE — 665999 HH PPS REVENUE DEBIT

## 2022-08-21 PROCEDURE — 665999 HH PPS REVENUE DEBIT

## 2022-08-21 PROCEDURE — 665998 HH PPS REVENUE CREDIT

## 2022-08-22 ENCOUNTER — HOME CARE VISIT (OUTPATIENT)
Dept: HOME HEALTH SERVICES | Facility: HOME HEALTHCARE | Age: 87
End: 2022-08-22
Payer: MEDICARE

## 2022-08-22 VITALS
SYSTOLIC BLOOD PRESSURE: 116 MMHG | RESPIRATION RATE: 16 BRPM | DIASTOLIC BLOOD PRESSURE: 60 MMHG | HEART RATE: 56 BPM | TEMPERATURE: 98.8 F | OXYGEN SATURATION: 95 %

## 2022-08-22 PROCEDURE — G0159 HHC PT MAINT EA 15 MIN: HCPCS

## 2022-08-22 PROCEDURE — 665999 HH PPS REVENUE DEBIT

## 2022-08-22 PROCEDURE — 665998 HH PPS REVENUE CREDIT

## 2022-08-22 ASSESSMENT — ENCOUNTER SYMPTOMS
SUBJECTIVE PAIN PROGRESSION: WAXING AND WANING
PAIN LOCATION: LEFT SHOULDER
PAIN LOCATION - PAIN SEVERITY: 6/10
HIGHEST PAIN SEVERITY IN PAST 24 HOURS: 6/10
LOWEST PAIN SEVERITY IN PAST 24 HOURS: 0/10
PAIN LOCATION - PAIN QUALITY: ACHE
PAIN LOCATION - PAIN DURATION: DAILY
POOR JUDGMENT: 1
PAIN: 1
PAIN LOCATION - PAIN FREQUENCY: INTERMITTENT
PAIN SEVERITY GOAL: 2/10
PAIN LOCATION: RIGHT SHOULDER
PAIN LOCATION - PAIN DURATION: DAILY
PAIN LOCATION - PAIN QUALITY: ACHE
PAIN LOCATION - PAIN FREQUENCY: INTERMITTENT
PAIN LOCATION - PAIN SEVERITY: 4/10
PERSON REPORTING PAIN: PATIENT
DIFFICULTY THINKING: 1

## 2022-08-23 PROCEDURE — 665998 HH PPS REVENUE CREDIT

## 2022-08-23 PROCEDURE — 665999 HH PPS REVENUE DEBIT

## 2022-08-24 PROCEDURE — 665998 HH PPS REVENUE CREDIT

## 2022-08-24 PROCEDURE — 665999 HH PPS REVENUE DEBIT

## 2022-08-25 PROCEDURE — 665998 HH PPS REVENUE CREDIT

## 2022-08-25 PROCEDURE — 665999 HH PPS REVENUE DEBIT

## 2022-08-26 PROCEDURE — 665999 HH PPS REVENUE DEBIT

## 2022-08-26 PROCEDURE — 665998 HH PPS REVENUE CREDIT

## 2022-08-27 PROCEDURE — 665998 HH PPS REVENUE CREDIT

## 2022-08-27 PROCEDURE — 665999 HH PPS REVENUE DEBIT

## 2022-08-28 PROCEDURE — 665999 HH PPS REVENUE DEBIT

## 2022-08-28 PROCEDURE — 665998 HH PPS REVENUE CREDIT

## 2022-08-29 ENCOUNTER — HOME CARE VISIT (OUTPATIENT)
Dept: HOME HEALTH SERVICES | Facility: HOME HEALTHCARE | Age: 87
End: 2022-08-29
Payer: MEDICARE

## 2022-08-29 VITALS
DIASTOLIC BLOOD PRESSURE: 52 MMHG | TEMPERATURE: 98.7 F | OXYGEN SATURATION: 93 % | SYSTOLIC BLOOD PRESSURE: 110 MMHG | HEART RATE: 60 BPM | RESPIRATION RATE: 18 BRPM

## 2022-08-29 PROCEDURE — 665999 HH PPS REVENUE DEBIT

## 2022-08-29 PROCEDURE — 665998 HH PPS REVENUE CREDIT

## 2022-08-29 PROCEDURE — G0159 HHC PT MAINT EA 15 MIN: HCPCS

## 2022-08-29 ASSESSMENT — ENCOUNTER SYMPTOMS
PAIN LOCATION - RELIEVING FACTORS: REST, MEDICATION, HEAT
LOWEST PAIN SEVERITY IN PAST 24 HOURS: 0/10
SUBJECTIVE PAIN PROGRESSION: WAXING AND WANING
PAIN LOCATION - PAIN FREQUENCY: FREQUENT
PAIN: 1
PAIN SEVERITY GOAL: 3/10
PERSON REPORTING PAIN: PATIENT
DIFFICULTY THINKING: 1
PAIN LOCATION - EXACERBATING FACTORS: MOVEMENT
PAIN LOCATION: LEFT SHOULDER
PAIN LOCATION - PAIN DURATION: DAILY
PAIN LOCATION - PAIN QUALITY: ACHE,SHARP
PAIN LOCATION - PAIN SEVERITY: 6/10
POOR JUDGMENT: 1
HIGHEST PAIN SEVERITY IN PAST 24 HOURS: 6/10

## 2022-08-30 PROCEDURE — 665999 HH PPS REVENUE DEBIT

## 2022-08-30 PROCEDURE — 665998 HH PPS REVENUE CREDIT

## 2022-08-31 PROCEDURE — 665998 HH PPS REVENUE CREDIT

## 2022-08-31 PROCEDURE — 665999 HH PPS REVENUE DEBIT

## 2022-09-01 PROCEDURE — 665998 HH PPS REVENUE CREDIT

## 2022-09-01 PROCEDURE — 665999 HH PPS REVENUE DEBIT

## 2022-09-02 PROCEDURE — 665999 HH PPS REVENUE DEBIT

## 2022-09-02 PROCEDURE — 665998 HH PPS REVENUE CREDIT

## 2022-09-03 PROCEDURE — 665999 HH PPS REVENUE DEBIT

## 2022-09-03 PROCEDURE — 665998 HH PPS REVENUE CREDIT

## 2022-09-04 PROCEDURE — 665999 HH PPS REVENUE DEBIT

## 2022-09-04 PROCEDURE — 665998 HH PPS REVENUE CREDIT

## 2022-09-05 ENCOUNTER — HOME CARE VISIT (OUTPATIENT)
Dept: HOME HEALTH SERVICES | Facility: HOME HEALTHCARE | Age: 87
End: 2022-09-05
Payer: MEDICARE

## 2022-09-05 PROCEDURE — 665998 HH PPS REVENUE CREDIT

## 2022-09-05 PROCEDURE — G0159 HHC PT MAINT EA 15 MIN: HCPCS

## 2022-09-05 PROCEDURE — 665999 HH PPS REVENUE DEBIT

## 2022-09-05 NOTE — Clinical Note
PHYSICAL THERAPY REASSESSMENT AND PLAN OF CARE     ·       Patient:  Gloria Patel     ·       Patient is preserving her functional mobility and skills with maintenance physical therapy services.  She has not had a fall in the last 4 weeks.  She continues to have significant risk of decline in she does not participate in maintenance physical therapy services.  Patient is agreeable to continue physical therapy maintenance services.  Therapist agrees she would continue to benefit      ·       Skilled Therapeutic Need: Decreased activity tolerance, Balance control, Generalized deconditioning, Gait training, Fall prevention, Pain control and Poor safety awareness     Recommend skilled HHPT to address deficits and improve function-report sent to MD     ·       Frequency:   1 week 4,  Effective 9/12/2022     ·       Goals: Remain as written on last recertification on 8/12/2022    How often (if at all) does pain interfere with patient's movements?  Pain limits sleep and function on a daily but not constant basis.

## 2022-09-05 NOTE — Clinical Note
Noted.  ----- Message -----  From: Cristina Jaramillo, PT  Sent: 9/6/2022   6:15 AM PDT  To: Maria Luisa Song R.N., Boo Hernandez, *      PHYSICAL THERAPY REASSESSMENT AND PLAN OF CARE     ·       Patient:  Gloria Patel     ·       Patient is preserving her functional mobility and skills with maintenance physical therapy services.  She has not had a fall in the last 4 weeks.  She continues to have significant risk of decline in she does not participate in maintenance physical therapy services.  Patient is agreeable to continue physical therapy maintenance services.  Therapist agrees she would continue to benefit      ·       Skilled Therapeutic Need: Decreased activity tolerance, Balance control, Generalized deconditioning, Gait training, Fall prevention, Pain control and Poor safety awareness     Recommend skilled HHPT to address deficits and improve function-report sent to MD     ·       Frequency:   1 week 4,  Effective 9/12/2022     ·       Goals: Remain as written on last recertification on 8/12/2022    How often (if at all) does pain interfere with patient's movements?  Pain limits sleep and function on a daily but not constant basis.

## 2022-09-06 VITALS
HEART RATE: 56 BPM | DIASTOLIC BLOOD PRESSURE: 58 MMHG | SYSTOLIC BLOOD PRESSURE: 100 MMHG | TEMPERATURE: 97.8 F | RESPIRATION RATE: 16 BRPM | OXYGEN SATURATION: 96 %

## 2022-09-06 PROCEDURE — 665999 HH PPS REVENUE DEBIT

## 2022-09-06 PROCEDURE — 665998 HH PPS REVENUE CREDIT

## 2022-09-06 ASSESSMENT — ENCOUNTER SYMPTOMS
SUBJECTIVE PAIN PROGRESSION: WAXING AND WANING
PERSON REPORTING PAIN: PATIENT
PAIN LOCATION - PAIN FREQUENCY: CONSTANT
PAIN LOCATION - RELIEVING FACTORS: REST, MEDICATION
PAIN LOCATION - PAIN DURATION: DAILY
POOR JUDGMENT: 1
HIGHEST PAIN SEVERITY IN PAST 24 HOURS: 6/10
PAIN LOCATION - PAIN QUALITY: ACHE
PAIN SEVERITY GOAL: 2/10
PAIN: 1
PAIN LOCATION - PAIN SEVERITY: 6/10
DIFFICULTY THINKING: 1
LOWEST PAIN SEVERITY IN PAST 24 HOURS: 3/10
PAIN LOCATION: LEFT SHOULDER
DEBILITATING PAIN: 1
PAIN LOCATION - EXACERBATING FACTORS: MOVEMENT

## 2022-09-07 PROCEDURE — 665999 HH PPS REVENUE DEBIT

## 2022-09-07 PROCEDURE — 665998 HH PPS REVENUE CREDIT

## 2022-09-08 PROCEDURE — 665998 HH PPS REVENUE CREDIT

## 2022-09-08 PROCEDURE — 665999 HH PPS REVENUE DEBIT

## 2022-09-09 PROCEDURE — 665998 HH PPS REVENUE CREDIT

## 2022-09-09 PROCEDURE — 665999 HH PPS REVENUE DEBIT

## 2022-09-10 PROCEDURE — 665999 HH PPS REVENUE DEBIT

## 2022-09-10 PROCEDURE — 665998 HH PPS REVENUE CREDIT

## 2022-09-11 PROCEDURE — 665998 HH PPS REVENUE CREDIT

## 2022-09-11 PROCEDURE — 665999 HH PPS REVENUE DEBIT

## 2022-09-12 ENCOUNTER — HOME CARE VISIT (OUTPATIENT)
Dept: HOME HEALTH SERVICES | Facility: HOME HEALTHCARE | Age: 87
End: 2022-09-12
Payer: MEDICARE

## 2022-09-12 VITALS
SYSTOLIC BLOOD PRESSURE: 92 MMHG | HEART RATE: 56 BPM | RESPIRATION RATE: 20 BRPM | DIASTOLIC BLOOD PRESSURE: 50 MMHG | TEMPERATURE: 98.1 F | OXYGEN SATURATION: 95 %

## 2022-09-12 PROCEDURE — 665999 HH PPS REVENUE DEBIT

## 2022-09-12 PROCEDURE — 665998 HH PPS REVENUE CREDIT

## 2022-09-12 PROCEDURE — G0159 HHC PT MAINT EA 15 MIN: HCPCS

## 2022-09-12 ASSESSMENT — ENCOUNTER SYMPTOMS
PAIN LOCATION: LEFT SHOULDER
DEBILITATING PAIN: 1
PAIN LOCATION - EXACERBATING FACTORS: MOVEMENT
PAIN LOCATION - PAIN FREQUENCY: FREQUENT
DIFFICULTY THINKING: 1
LOWEST PAIN SEVERITY IN PAST 24 HOURS: 0/10
PAIN LOCATION - RELIEVING FACTORS: REST, MEDICATION, HEAT
PERSON REPORTING PAIN: PATIENT
PAIN LOCATION - PAIN SEVERITY: 3/10
PAIN: 1
PAIN LOCATION - PAIN DURATION: DAILY
HIGHEST PAIN SEVERITY IN PAST 24 HOURS: 6/10
POOR JUDGMENT: 1
SUBJECTIVE PAIN PROGRESSION: WAXING AND WANING
PAIN LOCATION - PAIN QUALITY: ACHE

## 2022-09-13 PROCEDURE — 665999 HH PPS REVENUE DEBIT

## 2022-09-13 PROCEDURE — 665998 HH PPS REVENUE CREDIT

## 2022-09-14 DIAGNOSIS — R20.0 HAND NUMBNESS: ICD-10-CM

## 2022-09-14 PROCEDURE — 665998 HH PPS REVENUE CREDIT

## 2022-09-14 PROCEDURE — 665999 HH PPS REVENUE DEBIT

## 2022-09-15 PROCEDURE — 665999 HH PPS REVENUE DEBIT

## 2022-09-15 PROCEDURE — 665998 HH PPS REVENUE CREDIT

## 2022-09-16 PROCEDURE — 665999 HH PPS REVENUE DEBIT

## 2022-09-16 PROCEDURE — 665998 HH PPS REVENUE CREDIT

## 2022-09-17 PROCEDURE — 665998 HH PPS REVENUE CREDIT

## 2022-09-17 PROCEDURE — 665999 HH PPS REVENUE DEBIT

## 2022-09-18 PROCEDURE — 665998 HH PPS REVENUE CREDIT

## 2022-09-18 PROCEDURE — 665999 HH PPS REVENUE DEBIT

## 2022-09-19 PROCEDURE — 665999 HH PPS REVENUE DEBIT

## 2022-09-19 PROCEDURE — 665998 HH PPS REVENUE CREDIT

## 2022-09-20 ENCOUNTER — HOME CARE VISIT (OUTPATIENT)
Dept: HOME HEALTH SERVICES | Facility: HOME HEALTHCARE | Age: 87
End: 2022-09-20
Payer: MEDICARE

## 2022-09-20 VITALS
SYSTOLIC BLOOD PRESSURE: 92 MMHG | OXYGEN SATURATION: 94 % | TEMPERATURE: 99.1 F | RESPIRATION RATE: 18 BRPM | HEART RATE: 54 BPM | DIASTOLIC BLOOD PRESSURE: 52 MMHG

## 2022-09-20 PROCEDURE — 665999 HH PPS REVENUE DEBIT

## 2022-09-20 PROCEDURE — G0159 HHC PT MAINT EA 15 MIN: HCPCS

## 2022-09-20 PROCEDURE — 665003 FOLLOW UP-HOME HEALTH

## 2022-09-20 PROCEDURE — 665998 HH PPS REVENUE CREDIT

## 2022-09-20 ASSESSMENT — ENCOUNTER SYMPTOMS
HIGHEST PAIN SEVERITY IN PAST 24 HOURS: 8/10
PAIN LOCATION - PAIN FREQUENCY: FREQUENT
PERSON REPORTING PAIN: PATIENT
PAIN: 1
PAIN LOCATION - PAIN SEVERITY: 6/10
LOWEST PAIN SEVERITY IN PAST 24 HOURS: 0/10
SUBJECTIVE PAIN PROGRESSION: UNCHANGED
PAIN SEVERITY GOAL: 3/10
PAIN LOCATION - PAIN DURATION: CHRONIC
PAIN LOCATION - PAIN QUALITY: ACHY
PAIN LOCATION: LEFT SHOULDER

## 2022-09-21 PROCEDURE — 665999 HH PPS REVENUE DEBIT

## 2022-09-21 PROCEDURE — 665998 HH PPS REVENUE CREDIT

## 2022-09-22 PROCEDURE — 665998 HH PPS REVENUE CREDIT

## 2022-09-22 PROCEDURE — 665999 HH PPS REVENUE DEBIT

## 2022-09-23 PROCEDURE — 665998 HH PPS REVENUE CREDIT

## 2022-09-23 PROCEDURE — 665999 HH PPS REVENUE DEBIT

## 2022-09-24 PROCEDURE — 665999 HH PPS REVENUE DEBIT

## 2022-09-24 PROCEDURE — 665998 HH PPS REVENUE CREDIT

## 2022-09-25 PROCEDURE — 665998 HH PPS REVENUE CREDIT

## 2022-09-25 PROCEDURE — 665999 HH PPS REVENUE DEBIT

## 2022-09-26 ENCOUNTER — HOME CARE VISIT (OUTPATIENT)
Dept: HOME HEALTH SERVICES | Facility: HOME HEALTHCARE | Age: 87
End: 2022-09-26
Payer: MEDICARE

## 2022-09-26 VITALS
RESPIRATION RATE: 16 BRPM | TEMPERATURE: 98.4 F | SYSTOLIC BLOOD PRESSURE: 112 MMHG | OXYGEN SATURATION: 95 % | HEART RATE: 60 BPM | DIASTOLIC BLOOD PRESSURE: 80 MMHG

## 2022-09-26 PROCEDURE — G0159 HHC PT MAINT EA 15 MIN: HCPCS

## 2022-09-26 PROCEDURE — 665999 HH PPS REVENUE DEBIT

## 2022-09-26 PROCEDURE — 665998 HH PPS REVENUE CREDIT

## 2022-09-26 ASSESSMENT — ENCOUNTER SYMPTOMS
SUBJECTIVE PAIN PROGRESSION: WAXING AND WANING
PAIN SEVERITY GOAL: 3/10
DIFFICULTY THINKING: 1
DEBILITATING PAIN: 1
PAIN LOCATION - PAIN SEVERITY: 6/10
PAIN LOCATION: LEFT SHOULDER
PAIN LOCATION - RELIEVING FACTORS: REST, MEDICATION, HEAT
LOWEST PAIN SEVERITY IN PAST 24 HOURS: 0/10
PAIN LOCATION - PAIN DURATION: DAILY
PERSON REPORTING PAIN: PATIENT
PAIN LOCATION - PAIN QUALITY: ACHE
POOR JUDGMENT: 1
HIGHEST PAIN SEVERITY IN PAST 24 HOURS: 6/10
PAIN LOCATION - PAIN FREQUENCY: WITH ACTIVITY
PAIN: 1

## 2022-09-27 PROCEDURE — 665999 HH PPS REVENUE DEBIT

## 2022-09-27 PROCEDURE — 665998 HH PPS REVENUE CREDIT

## 2022-09-28 PROCEDURE — 665998 HH PPS REVENUE CREDIT

## 2022-09-28 PROCEDURE — 665999 HH PPS REVENUE DEBIT

## 2022-09-29 PROCEDURE — 665999 HH PPS REVENUE DEBIT

## 2022-09-29 PROCEDURE — 665998 HH PPS REVENUE CREDIT

## 2022-09-30 PROCEDURE — 665998 HH PPS REVENUE CREDIT

## 2022-09-30 PROCEDURE — 665999 HH PPS REVENUE DEBIT

## 2022-10-01 PROCEDURE — 665999 HH PPS REVENUE DEBIT

## 2022-10-01 PROCEDURE — 665998 HH PPS REVENUE CREDIT

## 2022-10-02 PROCEDURE — 665998 HH PPS REVENUE CREDIT

## 2022-10-02 PROCEDURE — 665999 HH PPS REVENUE DEBIT

## 2022-10-03 ENCOUNTER — HOME CARE VISIT (OUTPATIENT)
Dept: HOME HEALTH SERVICES | Facility: HOME HEALTHCARE | Age: 87
End: 2022-10-03
Payer: MEDICARE

## 2022-10-03 VITALS
DIASTOLIC BLOOD PRESSURE: 80 MMHG | TEMPERATURE: 97.8 F | RESPIRATION RATE: 16 BRPM | OXYGEN SATURATION: 95 % | HEART RATE: 56 BPM | SYSTOLIC BLOOD PRESSURE: 118 MMHG

## 2022-10-03 PROCEDURE — 665999 HH PPS REVENUE DEBIT

## 2022-10-03 PROCEDURE — 665998 HH PPS REVENUE CREDIT

## 2022-10-03 PROCEDURE — G0159 HHC PT MAINT EA 15 MIN: HCPCS

## 2022-10-03 ASSESSMENT — ENCOUNTER SYMPTOMS
PERSON REPORTING PAIN: PATIENT
PAIN LOCATION - PAIN FREQUENCY: WITH ACTIVITY
SUBJECTIVE PAIN PROGRESSION: WAXING AND WANING
LOWEST PAIN SEVERITY IN PAST 24 HOURS: 0/10
PAIN LOCATION - PAIN DURATION: DAILY
PAIN LOCATION - PAIN SEVERITY: 6/10
PAIN LOCATION - PAIN QUALITY: ACHE
PAIN SEVERITY GOAL: 3/10
DIFFICULTY THINKING: 1
HIGHEST PAIN SEVERITY IN PAST 24 HOURS: 6/10
POOR JUDGMENT: 1
PAIN: 1
PAIN LOCATION - EXACERBATING FACTORS: MOVEMENT
PAIN LOCATION - RELIEVING FACTORS: REST, MEDICATION
PAIN LOCATION: LEFT SHOULDER
DEBILITATING PAIN: 1

## 2022-10-03 NOTE — Clinical Note
Noted.  ----- Message -----  From: Cristina Jaramillo, PT  Sent: 10/4/2022   8:40 AM PDT  To: Maria Luisa Song R.N., Boo Hernandez, *      P.T. evaluation performed 10/3/2022 and I will see 1 week 1 to recertify pt for maintenance P.T. Tx

## 2022-10-03 NOTE — Clinical Note
Physical therapy reassessment performed 10/3/2022 I will continue maintenance physical therapy services 1 time a week for 4 weeks effective 10/9/2020.  Further insurance authorization may be required.

## 2022-10-04 PROCEDURE — 665999 HH PPS REVENUE DEBIT

## 2022-10-04 PROCEDURE — 665998 HH PPS REVENUE CREDIT

## 2022-10-04 NOTE — HOME HEALTH
PHYSICAL THERAPY REASSESSMENT AND PLAN OF CARE     ·       Patient: Gloria Patel     ·       Patient is sustaining her functional mobility, balance, activity tolerance, functional lower extremity strength and pain management with maintenance physical therapy services.  Patient feels that maintenance physical therapy services are helping her and would like to continue maintenance physical therapy services.  Therapist continues to assess patient and she would be at increased risk for decline if maintenance physical therapy services ended.      ·       Skilled Therapeutic Need: Decreased activity tolerance, Balance control, Gait training, Fall prevention, Pain control and Poor safety awareness     Recommend skilled HHPT to address deficits and improve function-report sent to MD     ·       Frequency:   1 week 1 to recertify pt into maintenance P.T.,  Effective 10/9/2022     ·       Goals: Goals remain unchanged for maintenance physical therapy services.  Please see last recertification for further details.    Does the patient get SOB with  exertion?  With ambulation over 150 feet or greater depending on fatigue    How often (if at all) does pain interfere with patient's movements?  Pain limits function and sleep on a daily but not constant basis.

## 2022-10-05 PROCEDURE — 665999 HH PPS REVENUE DEBIT

## 2022-10-05 PROCEDURE — 665998 HH PPS REVENUE CREDIT

## 2022-10-06 PROCEDURE — 665998 HH PPS REVENUE CREDIT

## 2022-10-06 PROCEDURE — 665999 HH PPS REVENUE DEBIT

## 2022-10-07 PROCEDURE — 665998 HH PPS REVENUE CREDIT

## 2022-10-07 PROCEDURE — 665999 HH PPS REVENUE DEBIT

## 2022-10-08 PROCEDURE — 665998 HH PPS REVENUE CREDIT

## 2022-10-08 PROCEDURE — 665999 HH PPS REVENUE DEBIT

## 2022-10-09 PROCEDURE — 665999 HH PPS REVENUE DEBIT

## 2022-10-09 PROCEDURE — 665998 HH PPS REVENUE CREDIT

## 2022-10-10 ENCOUNTER — HOME CARE VISIT (OUTPATIENT)
Dept: HOME HEALTH SERVICES | Facility: HOME HEALTHCARE | Age: 87
End: 2022-10-10
Payer: MEDICARE

## 2022-10-10 VITALS
HEART RATE: 52 BPM | TEMPERATURE: 98.7 F | DIASTOLIC BLOOD PRESSURE: 60 MMHG | OXYGEN SATURATION: 96 % | RESPIRATION RATE: 20 BRPM | SYSTOLIC BLOOD PRESSURE: 122 MMHG

## 2022-10-10 PROCEDURE — G0159 HHC PT MAINT EA 15 MIN: HCPCS

## 2022-10-10 PROCEDURE — 665998 HH PPS REVENUE CREDIT

## 2022-10-10 PROCEDURE — 665999 HH PPS REVENUE DEBIT

## 2022-10-10 ASSESSMENT — ENCOUNTER SYMPTOMS
HIGHEST PAIN SEVERITY IN PAST 24 HOURS: 8/10
PAIN LOCATION - PAIN DURATION: DAILY
PERSON REPORTING PAIN: PATIENT
PAIN LOCATION: LEFT SHOULDER
SUBJECTIVE PAIN PROGRESSION: WAXING AND WANING
PAIN LOCATION - PAIN SEVERITY: 6/10
PAIN LOCATION - PAIN QUALITY: ACHE
LOWEST PAIN SEVERITY IN PAST 24 HOURS: 0/10
PAIN LOCATION - RELIEVING FACTORS: REST, MEDICATION, HEAT
PAIN LOCATION - EXACERBATING FACTORS: MOVEMENT
PAIN SEVERITY GOAL: 3/10
PAIN: 1
DIFFICULTY THINKING: 1
POOR JUDGMENT: 1
PAIN LOCATION - PAIN FREQUENCY: FREQUENT
DEBILITATING PAIN: 1

## 2022-10-10 NOTE — Clinical Note
"""Call if vision decreases or RD warning signs increases/RD warnings given.  No signs of retinal tear Physical therapy recertification for maintenance physical therapy was performed 10/10/2022 and I will continue to follow 1 time a week for 8 weeks.  Further insurance authorization may be required

## 2022-10-10 NOTE — Clinical Note
Noted.  ----- Message -----  From: Cristina Jaramillo, PT  Sent: 10/11/2022   6:01 PM PDT  To: Maria Luisa Song R.N., Boo Hernandez, *      Physical therapy recertification for maintenance physical therapy was performed 10/10/2022 and I will continue to follow 1 time a week for 8 weeks.  Further insurance authorization may be required

## 2022-10-11 PROCEDURE — 665999 HH PPS REVENUE DEBIT

## 2022-10-11 PROCEDURE — 665998 HH PPS REVENUE CREDIT

## 2022-10-11 ASSESSMENT — ACTIVITIES OF DAILY LIVING (ADL): OASIS_M1830: 03

## 2022-10-11 ASSESSMENT — PATIENT HEALTH QUESTIONNAIRE - PHQ9: CLINICAL INTERPRETATION OF PHQ2 SCORE: 0

## 2022-10-12 PROCEDURE — 665999 HH PPS REVENUE DEBIT

## 2022-10-12 PROCEDURE — 665998 HH PPS REVENUE CREDIT

## 2022-10-12 NOTE — HOME HEALTH
Recertification of Care to Home Health for maintenance physical therapy services.  Current clinical summary, reason for continued home health services, new issues identified: Patient is preserving her functional mobility, balance, activity tolerance, pain control with maintenance physical therapy services. She has not had a fall or another UTI during this certification. Patient continues to express desire to have maintenance physical therapy services. Since maintenance services are being very effective for this patient therapist agrees she would continue to benefit and would likely decline without this service.  Frequency: 1 week 8, Effective 10/16/2022  · Goals: Effective 10/16/2022 - Patient will continue to ambulate at least 300 feet using 4WW requiring more than supervision on all surfaces in 8 visits. Patient will continue to to demonstrate at least fair immediate standing balance in 8 visits. Patient will continue to be independent in use of heating pad to assist with pain management in 8 visits. Caregivers will continue to be independent in home safety/fall prevention measures in 8 visits.  Does the patient get SOB with exertion? No shortness of breath has been noted  How often (if at all) does pain interfere with patient's movements? On a daily but not constant basis limits function and sleep  LIVING SITUATION AND CAREGIVING:  Homebound Status: cognitive/communication deficits, difficulty with ambulation/transfers, needs assistance to leave home, needs assistive devices to ambulate, unable to manage stairs, unsafe to drive or leave residence without assistance, unsteady gait, weakness and fatigue, debiltating pain and impaired thoughts/judgment  Type of Home: Assisted living facility  Lives with: Alone  Receives Assistance: For some ADLs, medication management and whenever ambulating outside her apartment  Unresolved Safety Concerns: Decreased safety awareness  Does the patient have an  Advanced Directive?  Yes, copy provided or photographed for chart  Does the patient have a POLST?  Yes completed POLST is in the home and visible, photographed for chart and DNR entered as a signed order in EPIC.  REASON FOR HOME HEALTH SUPPORTING INFORMATION:  Physical therapy to assess and teach the following but not limited to:  - fall prevention, hydration and home safety  CURRENT LEVEL OF FUNCTION:  Ambulation and Mobility: Independent in her apartment and supervised whenever exiting her apartment.  Patient Equipment: walker for ambulation.  Transferring: Independent except assisted with shower and car transfers  Bathing: Shower  Dressing: Independent except to apply her pants, compression socks and shoes  Special equipment used: 4 WW, grab bars, shower chair  How noticeably Short of Breath is the patient during the OASIS walk or other activity?  NA  MEDICATION MANAGEMENT:  Patient uses pharmacy:  administered by another person  Medication issues:na

## 2022-10-13 PROCEDURE — 665999 HH PPS REVENUE DEBIT

## 2022-10-13 PROCEDURE — 665998 HH PPS REVENUE CREDIT

## 2022-10-14 PROCEDURE — 665999 HH PPS REVENUE DEBIT

## 2022-10-14 PROCEDURE — 665998 HH PPS REVENUE CREDIT

## 2022-10-15 PROCEDURE — 665999 HH PPS REVENUE DEBIT

## 2022-10-15 PROCEDURE — 665998 HH PPS REVENUE CREDIT

## 2022-10-16 PROCEDURE — 665998 HH PPS REVENUE CREDIT

## 2022-10-16 PROCEDURE — 665999 HH PPS REVENUE DEBIT

## 2022-10-17 ENCOUNTER — HOME CARE VISIT (OUTPATIENT)
Dept: HOME HEALTH SERVICES | Facility: HOME HEALTHCARE | Age: 87
End: 2022-10-17
Payer: MEDICARE

## 2022-10-17 VITALS
RESPIRATION RATE: 20 BRPM | OXYGEN SATURATION: 95 % | TEMPERATURE: 98.2 F | HEART RATE: 56 BPM | DIASTOLIC BLOOD PRESSURE: 52 MMHG | SYSTOLIC BLOOD PRESSURE: 114 MMHG

## 2022-10-17 PROCEDURE — 665998 HH PPS REVENUE CREDIT

## 2022-10-17 PROCEDURE — 665999 HH PPS REVENUE DEBIT

## 2022-10-17 PROCEDURE — 665003 FOLLOW UP-HOME HEALTH

## 2022-10-17 PROCEDURE — G0159 HHC PT MAINT EA 15 MIN: HCPCS

## 2022-10-17 ASSESSMENT — ENCOUNTER SYMPTOMS
PAIN LOCATION - PAIN SEVERITY: 6/10
HIGHEST PAIN SEVERITY IN PAST 24 HOURS: 7/10
PAIN LOCATION: LEFT SHOULDER
PAIN SEVERITY GOAL: 3/10
DEBILITATING PAIN: 1
POOR JUDGMENT: 1
SUBJECTIVE PAIN PROGRESSION: WAXING AND WANING
PAIN: 1
PERSON REPORTING PAIN: PATIENT
PAIN LOCATION - RELIEVING FACTORS: REST, MEDICATION, HEAT
PAIN LOCATION - PAIN FREQUENCY: WITH ACTIVITY
PAIN LOCATION - EXACERBATING FACTORS: MOVEMENT
PAIN LOCATION - PAIN QUALITY: ACHE
DIFFICULTY THINKING: 1
PAIN LOCATION - PAIN DURATION: DAILY

## 2022-10-18 ENCOUNTER — HOME CARE VISIT (OUTPATIENT)
Dept: HOME HEALTH SERVICES | Facility: HOME HEALTHCARE | Age: 87
End: 2022-10-18
Payer: MEDICARE

## 2022-10-18 PROCEDURE — 665998 HH PPS REVENUE CREDIT

## 2022-10-18 PROCEDURE — 665999 HH PPS REVENUE DEBIT

## 2022-10-18 NOTE — CASE COMMUNICATION
Quality Review for Recertification OASIS by JENNI Elizondo RN on  October 18, 2022    Edits completed by JENNI Elizondo RN:  1.  is None of the above, patient is not receiving and IV, parenteral or enteral therapies at home  2.  is 8 per care plan therapy sets.  3.  is normal vision per chart review

## 2022-10-18 NOTE — Clinical Note
agree with changes. Thanks  ----- Message -----  From: Aby Elizondo R.N.  Sent: 10/18/2022   1:44 PM PDT  To: Cristina Jaramillo, PT      Quality Review for Recertification OASIS by JENNI Elizondo, RN on  October 18, 2022    Edits completed by JENNI Elizondo RN:  1.  is None of the above, patient is not receiving and IV, parenteral or enteral therapies at home  2.  is 8 per care plan therapy sets.  3.  is normal vision per chart review

## 2022-10-19 PROCEDURE — 665999 HH PPS REVENUE DEBIT

## 2022-10-19 PROCEDURE — 665998 HH PPS REVENUE CREDIT

## 2022-10-20 PROCEDURE — 665998 HH PPS REVENUE CREDIT

## 2022-10-20 PROCEDURE — 665999 HH PPS REVENUE DEBIT

## 2022-10-21 PROCEDURE — G0179 MD RECERTIFICATION HHA PT: HCPCS | Performed by: INTERNAL MEDICINE

## 2022-10-21 PROCEDURE — 665998 HH PPS REVENUE CREDIT

## 2022-10-21 PROCEDURE — 665999 HH PPS REVENUE DEBIT

## 2022-10-22 ENCOUNTER — OFFICE VISIT (OUTPATIENT)
Dept: URGENT CARE | Facility: CLINIC | Age: 87
End: 2022-10-22
Payer: MEDICARE

## 2022-10-22 VITALS
BODY MASS INDEX: 26.5 KG/M2 | HEART RATE: 78 BPM | RESPIRATION RATE: 14 BRPM | WEIGHT: 135 LBS | SYSTOLIC BLOOD PRESSURE: 106 MMHG | HEIGHT: 60 IN | OXYGEN SATURATION: 95 % | TEMPERATURE: 97.8 F | DIASTOLIC BLOOD PRESSURE: 74 MMHG

## 2022-10-22 DIAGNOSIS — J01.40 ACUTE NON-RECURRENT PANSINUSITIS: ICD-10-CM

## 2022-10-22 PROCEDURE — 665999 HH PPS REVENUE DEBIT

## 2022-10-22 PROCEDURE — 99213 OFFICE O/P EST LOW 20 MIN: CPT | Performed by: STUDENT IN AN ORGANIZED HEALTH CARE EDUCATION/TRAINING PROGRAM

## 2022-10-22 PROCEDURE — 665998 HH PPS REVENUE CREDIT

## 2022-10-22 RX ORDER — DOXYCYCLINE 100 MG/1
100 CAPSULE ORAL 2 TIMES DAILY
Qty: 10 CAPSULE | Refills: 0 | Status: SHIPPED | OUTPATIENT
Start: 2022-10-22 | End: 2023-03-05 | Stop reason: SDUPTHER

## 2022-10-22 ASSESSMENT — FIBROSIS 4 INDEX: FIB4 SCORE: 2.4

## 2022-10-23 PROCEDURE — 665999 HH PPS REVENUE DEBIT

## 2022-10-23 PROCEDURE — 665998 HH PPS REVENUE CREDIT

## 2022-10-23 NOTE — PROGRESS NOTES
Subjective:   Gloria Patel is a 89 y.o. female who presents for Cough (X 3 days, cough, chest congestion )      HPI:  Pleasant 89-year-old female presents to clinic for 3 days of increased nasal congestion, sinus pressure, and dry cough.  She states that over the past 3 days she has noticed postnasal drip which she believes is causing her cough.  She does deal with allergies that have been going on for over a week.  No recent sick contacts or recent travel.  She has not used anything for symptoms at this time.  She is able to maintain adequate oral intake of fluids and solids.  She states that she feels all around very good other than the congestion in her head.  She denies fever, chills, diaphoresis, rash, ear pain, ear discharge, sore throat, eye discharge, eye redness, chest pain, palpitations, lower leg swelling, lower leg pain, sputum production, shortness of breath, wheezing, nausea, vomiting, abdominal pain, diarrhea, dizziness, or headache.    Medications:    ACETAMINOPHEN 8 HOUR PO  amLODIPine Tabs  Apoaequorin Caps  diclofenac sodium Gel  DORZOLAMIDE HCL-TIMOLOL MAL OP  doxycycline  Famotidine-Ca Carb-Mag Hydrox Chew  fluticasone  Ibuprofen-Acetaminophen Tabs  levothyroxine Tabs  lidocaine Ptch  lisinopril Tabs  meclizine Tabs  Melatonin Caps  MULTIVITAMIN GUMMIES CHILDRENS PO  ondansetron Tbdp  polyethylene glycol 3350 Powd  PRESERVISION AREDS 2 PO  Linh Saline Soln  sertraline  travoprost Soln  Tums Chewy Bites Chew    Allergies: Morphine, Cephalexin, Codeine, Metronidazole, and Sulfa drugs    Problem List: Gloria Patel does not have any pertinent problems on file.    Surgical History:  Past Surgical History:   Procedure Laterality Date    ORIF, ANKLE Right 12/22/2021    Procedure: OPEN REDUCTION AND INTERNAL FIXATION, ANKLE;  Surgeon: Pop Causey M.D.;  Location: SURGERY [facility];  Service: Orthopedics    HIP ARTHROPLASTY TOTAL  6/21/2011    Performed by AMPARO CRAFT at SURGERY  [facility]    OTHER ORTHOPEDIC SURGERY  2000    back surgery    GYN SURGERY  1970    hysterectomy    CHOLECYSTECTOMY  1966    rahat    OTHER  1954    mikey. breast bx    OTHER ABDOMINAL SURGERY  1954    kidney tacked up ?    OTHER  1945    tonsilectomy    OTHER ORTHOPEDIC SURGERY      osteoporosis - medicated    US-NEEDLE CORE BX-BREAST PANEL         Past Social Hx: Gloria Patel  reports that she has never smoked. She has never used smokeless tobacco. She reports current alcohol use. She reports that she does not use drugs.     Past Family Hx:  Gloria Patel family history includes Cancer in her sister; Diabetes in an other family member; Heart Disease in an other family member; Lung Disease in an other family member.     Problem list, medications, and allergies reviewed by myself today in Epic.     Objective:     /74 (BP Location: Left arm, Patient Position: Sitting, BP Cuff Size: Adult long)   Pulse 78   Temp 36.6 °C (97.8 °F) (Temporal)   Resp 14   Ht 1.524 m (5')   Wt 61.2 kg (135 lb)   SpO2 92%   BMI 26.37 kg/m²     Physical Exam  Vitals reviewed.   Constitutional:       General: She is not in acute distress.     Appearance: Normal appearance.   HENT:      Head: Normocephalic.      Right Ear: Tympanic membrane, ear canal and external ear normal.      Left Ear: Tympanic membrane, ear canal and external ear normal.      Nose: Congestion present. No rhinorrhea.      Right Sinus: Maxillary sinus tenderness and frontal sinus tenderness present.      Left Sinus: Maxillary sinus tenderness and frontal sinus tenderness present.      Mouth/Throat:      Mouth: Mucous membranes are moist.      Pharynx: No oropharyngeal exudate or posterior oropharyngeal erythema.   Eyes:      Conjunctiva/sclera: Conjunctivae normal.      Pupils: Pupils are equal, round, and reactive to light.   Cardiovascular:      Rate and Rhythm: Normal rate and regular rhythm.      Pulses: Normal pulses.      Heart sounds:  Normal heart sounds. No murmur heard.  Pulmonary:      Effort: Pulmonary effort is normal. No respiratory distress.      Breath sounds: Normal breath sounds. No stridor. No wheezing, rhonchi or rales.   Musculoskeletal:      Cervical back: Normal range of motion.   Lymphadenopathy:      Cervical: No cervical adenopathy.   Skin:     General: Skin is warm and dry.      Capillary Refill: Capillary refill takes less than 2 seconds.      Findings: No erythema, lesion or rash.   Neurological:      General: No focal deficit present.      Mental Status: She is alert and oriented to person, place, and time.       Assessment/Plan:     Diagnosis and associated orders:     1. Acute non-recurrent pansinusitis  doxycycline (MONODOX) 100 MG capsule         Comments/MDM:     Patient's presentation physical exam findings are consistent with acute nonrecurrent pansinusitis.  Her symptoms could also be due to URI.  She does report that she has been dealing with allergies prior to worsening of her symptoms over the past 3 days which is more consistent with sinus infection due to allergic rhinitis.    Her vitals are all within normal limits and she is afebrile.  Pulmonary exam shows no abnormal findings suggestive of pneumonia.  No rales, rhonchi, or wheezing.  No signs of respiratory distress.  Given her constellation of symptoms and worsening over the past 3 days, we will treat with doxycycline.  Patient was educated on use this medication the possible side effects including allergic reaction.  Patient denies known allergy to this medication.  She was advised to wear sunscreen while out in the sun of this medication due to increased chance of sunburns.  Strict ED/return precautions were given.  Patient was advised that if her symptoms worsen for any reason despite being on antibiotic that she should read present to the urgent care for immediate reevaluation.  She is to present to the ED for any serious worsening of symptoms.          Differential diagnosis, natural history, supportive care, and indications for immediate follow-up discussed.    Advised the patient to follow-up with the primary care physician for recheck, reevaluation, and consideration of further management.    Please note that this dictation was created using voice recognition software. I have made a reasonable attempt to correct obvious errors, but I expect that there are errors of grammar and possibly content that I did not discover before finalizing the note.    Electronically signed by Chino Greenberg PA-C.

## 2022-10-24 ENCOUNTER — PATIENT MESSAGE (OUTPATIENT)
Dept: INTERNAL MEDICINE | Facility: IMAGING CENTER | Age: 87
End: 2022-10-24
Payer: MEDICARE

## 2022-10-24 ENCOUNTER — HOME CARE VISIT (OUTPATIENT)
Dept: HOME HEALTH SERVICES | Facility: HOME HEALTHCARE | Age: 87
End: 2022-10-24
Payer: MEDICARE

## 2022-10-24 PROCEDURE — 665999 HH PPS REVENUE DEBIT

## 2022-10-24 PROCEDURE — 665998 HH PPS REVENUE CREDIT

## 2022-10-24 NOTE — PATIENT COMMUNICATION
Patient tested positive for COVID 19 at her home facility.  Requesting antiviral.  Informed of potential of rebound COVID.

## 2022-10-25 PROCEDURE — 665998 HH PPS REVENUE CREDIT

## 2022-10-25 PROCEDURE — 665999 HH PPS REVENUE DEBIT

## 2022-10-26 PROCEDURE — 665999 HH PPS REVENUE DEBIT

## 2022-10-26 PROCEDURE — 665998 HH PPS REVENUE CREDIT

## 2022-10-27 PROCEDURE — 665998 HH PPS REVENUE CREDIT

## 2022-10-27 PROCEDURE — 665999 HH PPS REVENUE DEBIT

## 2022-10-28 PROCEDURE — 665998 HH PPS REVENUE CREDIT

## 2022-10-28 PROCEDURE — 665999 HH PPS REVENUE DEBIT

## 2022-10-29 PROCEDURE — 665998 HH PPS REVENUE CREDIT

## 2022-10-29 PROCEDURE — 665999 HH PPS REVENUE DEBIT

## 2022-10-30 PROCEDURE — 665999 HH PPS REVENUE DEBIT

## 2022-10-30 PROCEDURE — 665998 HH PPS REVENUE CREDIT

## 2022-10-31 ENCOUNTER — HOME CARE VISIT (OUTPATIENT)
Dept: HOME HEALTH SERVICES | Facility: HOME HEALTHCARE | Age: 87
End: 2022-10-31
Payer: MEDICARE

## 2022-10-31 PROCEDURE — 665999 HH PPS REVENUE DEBIT

## 2022-10-31 PROCEDURE — 665998 HH PPS REVENUE CREDIT

## 2022-11-01 PROCEDURE — 665999 HH PPS REVENUE DEBIT

## 2022-11-01 PROCEDURE — 665998 HH PPS REVENUE CREDIT

## 2022-11-02 PROCEDURE — 665999 HH PPS REVENUE DEBIT

## 2022-11-02 PROCEDURE — 665998 HH PPS REVENUE CREDIT

## 2022-11-03 PROCEDURE — 665999 HH PPS REVENUE DEBIT

## 2022-11-03 PROCEDURE — 665998 HH PPS REVENUE CREDIT

## 2022-11-04 PROCEDURE — 665998 HH PPS REVENUE CREDIT

## 2022-11-04 PROCEDURE — 665999 HH PPS REVENUE DEBIT

## 2022-11-05 PROCEDURE — 665998 HH PPS REVENUE CREDIT

## 2022-11-05 PROCEDURE — 665999 HH PPS REVENUE DEBIT

## 2022-11-06 PROCEDURE — 665998 HH PPS REVENUE CREDIT

## 2022-11-06 PROCEDURE — 665999 HH PPS REVENUE DEBIT

## 2022-11-07 PROCEDURE — 665998 HH PPS REVENUE CREDIT

## 2022-11-07 PROCEDURE — 665999 HH PPS REVENUE DEBIT

## 2022-11-08 PROCEDURE — 665998 HH PPS REVENUE CREDIT

## 2022-11-08 PROCEDURE — 665999 HH PPS REVENUE DEBIT

## 2022-11-09 ENCOUNTER — HOME CARE VISIT (OUTPATIENT)
Dept: HOME HEALTH SERVICES | Facility: HOME HEALTHCARE | Age: 87
End: 2022-11-09
Payer: MEDICARE

## 2022-11-09 VITALS
TEMPERATURE: 98.9 F | HEART RATE: 56 BPM | DIASTOLIC BLOOD PRESSURE: 50 MMHG | OXYGEN SATURATION: 94 % | RESPIRATION RATE: 18 BRPM | SYSTOLIC BLOOD PRESSURE: 124 MMHG

## 2022-11-09 PROCEDURE — 665999 HH PPS REVENUE DEBIT

## 2022-11-09 PROCEDURE — G0159 HHC PT MAINT EA 15 MIN: HCPCS

## 2022-11-09 PROCEDURE — 665998 HH PPS REVENUE CREDIT

## 2022-11-09 ASSESSMENT — ENCOUNTER SYMPTOMS
PAIN LOCATION - PAIN QUALITY: ACHE
PAIN LOCATION - RELIEVING FACTORS: REST, MEDICATION
PAIN LOCATION - PAIN FREQUENCY: WITH ACTIVITY
SUBJECTIVE PAIN PROGRESSION: WAXING AND WANING
PAIN LOCATION - PAIN DURATION: DAILY
PAIN LOCATION: LEFT SHOULDER
DIFFICULTY THINKING: 1
HIGHEST PAIN SEVERITY IN PAST 24 HOURS: 7/10
POOR JUDGMENT: 1
PERSON REPORTING PAIN: PATIENT
LOWEST PAIN SEVERITY IN PAST 24 HOURS: 0/10
PAIN SEVERITY GOAL: 3/10
PAIN: 1
PAIN LOCATION - PAIN SEVERITY: 5/10

## 2022-11-09 NOTE — Clinical Note
Noted.  ----- Message -----  From: Cristina Jaramillo, PT  Sent: 11/9/2022   1:58 PM PST  To: Maria Luisa Song R.N., Boo Hernandez, *      P.T. reassessment performed 11/9/2022 and I will continue to follow on maintenance P.T. 1 week 5. May need further insurance auth.

## 2022-11-09 NOTE — HOME HEALTH
PHYSICAL THERAPY REASSESSMENT AND PLAN OF CARE · Patient: Gloria Patel · Patient is preserving her functional mobility and skills with maintenance physical therapy services. She has not had a fall in the last 4 weeks. Pt missed 2 visits due to having COVID and not feeling up to P.T. Tx. She continues to have significant risk of decline if she does not participate in maintenance physical therapy services. Patient is agreeable to continue physical therapy maintenance services. Therapist agrees she would continue to benefit · Maintenance Therapeutic Need: Decreased activity tolerance, Balance control, Generalized deconditioning, Gait training, Fall prevention, Pain control and Poor safety awareness. Recommend skilled HHPT to address deficits and maintain function-report sent to MD · Frequency: 1 week 5, Effective 11/13 /2022 · Goals: Remain as written on last recertification effective on 10/14/2022  with new goal date of 12/12/2022.  How often (if at all) does pain interfere with patient's movements? Pain limits sleep and function on a daily but not constant basis.

## 2022-11-09 NOTE — Clinical Note
P.T. reassessment performed 11/9/2022 and I will continue to follow on maintenance P.T. 1 week 5. May need further insurance auth.

## 2022-11-10 PROCEDURE — 665999 HH PPS REVENUE DEBIT

## 2022-11-10 PROCEDURE — 665998 HH PPS REVENUE CREDIT

## 2022-11-11 PROCEDURE — 665999 HH PPS REVENUE DEBIT

## 2022-11-11 PROCEDURE — 665998 HH PPS REVENUE CREDIT

## 2022-11-12 PROCEDURE — 665998 HH PPS REVENUE CREDIT

## 2022-11-12 PROCEDURE — 665999 HH PPS REVENUE DEBIT

## 2022-11-13 PROCEDURE — 665998 HH PPS REVENUE CREDIT

## 2022-11-13 PROCEDURE — 665999 HH PPS REVENUE DEBIT

## 2022-11-14 PROCEDURE — 665999 HH PPS REVENUE DEBIT

## 2022-11-14 PROCEDURE — 665998 HH PPS REVENUE CREDIT

## 2022-11-15 PROCEDURE — 665998 HH PPS REVENUE CREDIT

## 2022-11-15 PROCEDURE — 665999 HH PPS REVENUE DEBIT

## 2022-11-16 ENCOUNTER — HOME CARE VISIT (OUTPATIENT)
Dept: HOME HEALTH SERVICES | Facility: HOME HEALTHCARE | Age: 87
End: 2022-11-16
Payer: MEDICARE

## 2022-11-16 VITALS
TEMPERATURE: 99.6 F | DIASTOLIC BLOOD PRESSURE: 54 MMHG | OXYGEN SATURATION: 94 % | SYSTOLIC BLOOD PRESSURE: 110 MMHG | RESPIRATION RATE: 20 BRPM | HEART RATE: 56 BPM

## 2022-11-16 PROCEDURE — 665003 FOLLOW UP-HOME HEALTH

## 2022-11-16 PROCEDURE — 665998 HH PPS REVENUE CREDIT

## 2022-11-16 PROCEDURE — 665999 HH PPS REVENUE DEBIT

## 2022-11-16 PROCEDURE — G0159 HHC PT MAINT EA 15 MIN: HCPCS

## 2022-11-16 ASSESSMENT — ENCOUNTER SYMPTOMS
PAIN: 1
SUBJECTIVE PAIN PROGRESSION: WAXING AND WANING
PAIN LOCATION - EXACERBATING FACTORS: MOVEMENT
PAIN LOCATION - PAIN QUALITY: ACHE
PAIN LOCATION - RELIEVING FACTORS: REST, MEDICATION
POOR JUDGMENT: 1
PAIN LOCATION - PAIN SEVERITY: 5/10
PERSON REPORTING PAIN: PATIENT
PAIN LOCATION: LEFT SHOULDER
PAIN LOCATION - PAIN FREQUENCY: FREQUENT
HIGHEST PAIN SEVERITY IN PAST 24 HOURS: 5/10
PAIN LOCATION - PAIN DURATION: DAILY
PAIN: PAIN LIMITS SLEEP AND FUNCTION DAILY NOT CONSTANT
DIFFICULTY THINKING: 1
LOWEST PAIN SEVERITY IN PAST 24 HOURS: 0/10
PAIN SEVERITY GOAL: 3/10

## 2022-11-17 PROCEDURE — 665998 HH PPS REVENUE CREDIT

## 2022-11-17 PROCEDURE — 665999 HH PPS REVENUE DEBIT

## 2022-11-18 PROCEDURE — 665999 HH PPS REVENUE DEBIT

## 2022-11-18 PROCEDURE — 665998 HH PPS REVENUE CREDIT

## 2022-11-19 PROCEDURE — 665999 HH PPS REVENUE DEBIT

## 2022-11-19 PROCEDURE — 665998 HH PPS REVENUE CREDIT

## 2022-11-20 PROCEDURE — 665998 HH PPS REVENUE CREDIT

## 2022-11-20 PROCEDURE — 665999 HH PPS REVENUE DEBIT

## 2022-11-21 ENCOUNTER — HOME CARE VISIT (OUTPATIENT)
Dept: HOME HEALTH SERVICES | Facility: HOME HEALTHCARE | Age: 87
End: 2022-11-21
Payer: MEDICARE

## 2022-11-21 VITALS
OXYGEN SATURATION: 97 % | RESPIRATION RATE: 18 BRPM | SYSTOLIC BLOOD PRESSURE: 130 MMHG | HEART RATE: 56 BPM | TEMPERATURE: 99 F | DIASTOLIC BLOOD PRESSURE: 62 MMHG

## 2022-11-21 PROCEDURE — 665998 HH PPS REVENUE CREDIT

## 2022-11-21 PROCEDURE — 665999 HH PPS REVENUE DEBIT

## 2022-11-21 PROCEDURE — G0159 HHC PT MAINT EA 15 MIN: HCPCS

## 2022-11-21 ASSESSMENT — ENCOUNTER SYMPTOMS
POOR JUDGMENT: 1
PAIN LOCATION - RELIEVING FACTORS: REST, HEAT, MEDICATION
HIGHEST PAIN SEVERITY IN PAST 24 HOURS: 5/10
DENIES PAIN: 1
SUBJECTIVE PAIN PROGRESSION: WAXING AND WANING
PAIN LOCATION - PAIN QUALITY: ACHE
PAIN SEVERITY GOAL: 2/10
PAIN LOCATION - PAIN SEVERITY: 5/10
PAIN LOCATION - PAIN FREQUENCY: FREQUENT
PAIN LOCATION - EXACERBATING FACTORS: MOVEMENT
PERSON REPORTING PAIN: PATIENT
DEBILITATING PAIN: 1
PAIN LOCATION - PAIN DURATION: DAILY
LOWEST PAIN SEVERITY IN PAST 24 HOURS: 0/10
PAIN LOCATION: LEFT SHOULDER
DIFFICULTY THINKING: 1

## 2022-11-22 PROCEDURE — 665998 HH PPS REVENUE CREDIT

## 2022-11-22 PROCEDURE — 665999 HH PPS REVENUE DEBIT

## 2022-11-23 PROCEDURE — 665999 HH PPS REVENUE DEBIT

## 2022-11-23 PROCEDURE — 665998 HH PPS REVENUE CREDIT

## 2022-11-23 RX ORDER — FLUTICASONE PROPIONATE 50 MCG
1 SPRAY, SUSPENSION (ML) NASAL DAILY
Qty: 16 G | Refills: 3 | Status: SHIPPED | OUTPATIENT
Start: 2022-11-23 | End: 2023-02-14

## 2022-11-24 PROCEDURE — 665999 HH PPS REVENUE DEBIT

## 2022-11-24 PROCEDURE — 665998 HH PPS REVENUE CREDIT

## 2022-11-25 PROCEDURE — 665998 HH PPS REVENUE CREDIT

## 2022-11-25 PROCEDURE — 665999 HH PPS REVENUE DEBIT

## 2022-11-26 PROCEDURE — 665998 HH PPS REVENUE CREDIT

## 2022-11-26 PROCEDURE — 665999 HH PPS REVENUE DEBIT

## 2022-11-27 PROCEDURE — 665998 HH PPS REVENUE CREDIT

## 2022-11-27 PROCEDURE — 665999 HH PPS REVENUE DEBIT

## 2022-11-28 PROCEDURE — 665998 HH PPS REVENUE CREDIT

## 2022-11-28 PROCEDURE — 665999 HH PPS REVENUE DEBIT

## 2022-11-29 PROCEDURE — 665998 HH PPS REVENUE CREDIT

## 2022-11-29 PROCEDURE — 665999 HH PPS REVENUE DEBIT

## 2022-11-30 ENCOUNTER — HOME CARE VISIT (OUTPATIENT)
Dept: HOME HEALTH SERVICES | Facility: HOME HEALTHCARE | Age: 87
End: 2022-11-30
Payer: MEDICARE

## 2022-11-30 VITALS
HEART RATE: 57 BPM | OXYGEN SATURATION: 93 % | DIASTOLIC BLOOD PRESSURE: 58 MMHG | TEMPERATURE: 98.9 F | RESPIRATION RATE: 16 BRPM | SYSTOLIC BLOOD PRESSURE: 112 MMHG

## 2022-11-30 PROCEDURE — 665998 HH PPS REVENUE CREDIT

## 2022-11-30 PROCEDURE — 665999 HH PPS REVENUE DEBIT

## 2022-11-30 PROCEDURE — G0159 HHC PT MAINT EA 15 MIN: HCPCS

## 2022-11-30 ASSESSMENT — ENCOUNTER SYMPTOMS
POOR JUDGMENT: 1
PAIN LOCATION - PAIN DURATION: DAILY
PAIN LOCATION - RELIEVING FACTORS: MEDICATION, REST, HEAT
LOWEST PAIN SEVERITY IN PAST 24 HOURS: 0/10
PAIN LOCATION - PAIN QUALITY: ACHE
PAIN LOCATION: LEFT SHOULDER
PAIN LOCATION - PAIN SEVERITY: 5/10
SUBJECTIVE PAIN PROGRESSION: WAXING AND WANING
PAIN SEVERITY GOAL: 2/10
PERSON REPORTING PAIN: PATIENT
HIGHEST PAIN SEVERITY IN PAST 24 HOURS: 5/10
DIFFICULTY THINKING: 1
PAIN LOCATION - PAIN FREQUENCY: WITH ACTIVITY
PAIN: 1

## 2022-12-01 PROCEDURE — 665999 HH PPS REVENUE DEBIT

## 2022-12-01 PROCEDURE — 665998 HH PPS REVENUE CREDIT

## 2022-12-02 PROCEDURE — 665998 HH PPS REVENUE CREDIT

## 2022-12-02 PROCEDURE — 665999 HH PPS REVENUE DEBIT

## 2022-12-03 PROCEDURE — 665999 HH PPS REVENUE DEBIT

## 2022-12-03 PROCEDURE — 665998 HH PPS REVENUE CREDIT

## 2022-12-04 PROCEDURE — 665999 HH PPS REVENUE DEBIT

## 2022-12-04 PROCEDURE — 665998 HH PPS REVENUE CREDIT

## 2022-12-05 PROCEDURE — 665999 HH PPS REVENUE DEBIT

## 2022-12-05 PROCEDURE — 665998 HH PPS REVENUE CREDIT

## 2022-12-06 PROCEDURE — 665999 HH PPS REVENUE DEBIT

## 2022-12-06 PROCEDURE — 665998 HH PPS REVENUE CREDIT

## 2022-12-07 PROCEDURE — 665999 HH PPS REVENUE DEBIT

## 2022-12-07 PROCEDURE — 665998 HH PPS REVENUE CREDIT

## 2022-12-08 ENCOUNTER — HOME CARE VISIT (OUTPATIENT)
Dept: HOME HEALTH SERVICES | Facility: HOME HEALTHCARE | Age: 87
End: 2022-12-08
Payer: MEDICARE

## 2022-12-08 VITALS
RESPIRATION RATE: 20 BRPM | DIASTOLIC BLOOD PRESSURE: 60 MMHG | SYSTOLIC BLOOD PRESSURE: 124 MMHG | OXYGEN SATURATION: 95 % | HEART RATE: 60 BPM | TEMPERATURE: 98.2 F

## 2022-12-08 PROCEDURE — 665999 HH PPS REVENUE DEBIT

## 2022-12-08 PROCEDURE — 665998 HH PPS REVENUE CREDIT

## 2022-12-08 PROCEDURE — G0151 HHCP-SERV OF PT,EA 15 MIN: HCPCS

## 2022-12-08 ASSESSMENT — ENCOUNTER SYMPTOMS
PAIN LOCATION - PAIN SEVERITY: 5/10
PAIN: 1
PAIN LOCATION - RELIEVING FACTORS: REST, HEAT, MEDICATION
PAIN LOCATION - PAIN QUALITY: ACHE
PAIN LOCATION - PAIN FREQUENCY: FREQUENT
DEBILITATING PAIN: 1
DIFFICULTY THINKING: 1
POOR JUDGMENT: 1
PAIN SEVERITY GOAL: 3/10
LOWEST PAIN SEVERITY IN PAST 24 HOURS: 0/10
PAIN LOCATION - EXACERBATING FACTORS: MOVEMENT
HIGHEST PAIN SEVERITY IN PAST 24 HOURS: 5/10
PAIN LOCATION - PAIN DURATION: DAILY
PERSON REPORTING PAIN: PATIENT
SUBJECTIVE PAIN PROGRESSION: WAXING AND WANING
PAIN LOCATION: LEFT SHOULDER

## 2022-12-08 ASSESSMENT — ACTIVITIES OF DAILY LIVING (ADL): OASIS_M1830: 03

## 2022-12-08 ASSESSMENT — PATIENT HEALTH QUESTIONNAIRE - PHQ9: CLINICAL INTERPRETATION OF PHQ2 SCORE: 0

## 2022-12-08 NOTE — Clinical Note
Physical therapy recertification performed on 12/8/2022.  I will follow for maintenance physical therapy 1 time a week for 9 weeks effective 12/13/2022 as patient continues to maintain function and skills.  Further insurance authorization may be required.  Thank you

## 2022-12-08 NOTE — Clinical Note
Noted.  ----- Message -----  From: Cristina Jaramillo, PT  Sent: 12/8/2022   6:32 PM PST  To: Maria Luisa Song R.N., Boo Hernandez, *      Physical therapy recertification performed on 12/8/2022.  I will follow for maintenance physical therapy 1 time a week for 9 weeks effective 12/13/2022 as patient continues to maintain function and skills.  Further insurance authorization may be required.  Thank you

## 2022-12-08 NOTE — Clinical Note
Noted  ----- Message -----  From: Cristina Jaramillo PT  Sent: 12/13/2022   4:17 AM PST  To: Keerthi Lindsay R.N.  Subject: RE:                                                She went to PCP last week and I have not seen her since  ----- Message -----  From: Keerthi Lindsay R.N.  Sent: 12/11/2022  12:00 AM PST  To: Cristina Jaramillo PT  Subject: RE:                                                Does she need a nursing visit Cristina?  ----- Message -----  From: Cristina Jaramillo PT  Sent: 12/8/2022   6:32 PM PST  To: Boo Hernandez, Mike Otero M.D., *      Pt has a small area that is mildly irritated on R lateral foot. Please assess

## 2022-12-08 NOTE — Clinical Note
Does she need a nursing visit Cristina?  ----- Message -----  From: Cristina Jaramillo PT  Sent: 12/8/2022   6:32 PM PST  To: Mike Gomez M.D., *      Pt has a small area that is mildly irritated on R lateral foot. Please assess

## 2022-12-09 ENCOUNTER — DOCUMENTATION (OUTPATIENT)
Dept: MEDICAL GROUP | Facility: PHYSICIAN GROUP | Age: 87
End: 2022-12-09

## 2022-12-09 ENCOUNTER — OFFICE VISIT (OUTPATIENT)
Dept: INTERNAL MEDICINE | Facility: IMAGING CENTER | Age: 87
End: 2022-12-09
Payer: MEDICARE

## 2022-12-09 VITALS
TEMPERATURE: 98.9 F | BODY MASS INDEX: 24.8 KG/M2 | OXYGEN SATURATION: 95 % | HEART RATE: 60 BPM | DIASTOLIC BLOOD PRESSURE: 70 MMHG | RESPIRATION RATE: 16 BRPM | SYSTOLIC BLOOD PRESSURE: 124 MMHG | WEIGHT: 127 LBS

## 2022-12-09 DIAGNOSIS — G56.01 CARPAL TUNNEL SYNDROME OF RIGHT WRIST: ICD-10-CM

## 2022-12-09 DIAGNOSIS — D48.5 NEOPLASM OF UNCERTAIN BEHAVIOR OF SKIN: ICD-10-CM

## 2022-12-09 DIAGNOSIS — J06.9 UPPER RESPIRATORY TRACT INFECTION, UNSPECIFIED TYPE: ICD-10-CM

## 2022-12-09 PROCEDURE — 99213 OFFICE O/P EST LOW 20 MIN: CPT | Performed by: INTERNAL MEDICINE

## 2022-12-09 PROCEDURE — 665999 HH PPS REVENUE DEBIT

## 2022-12-09 PROCEDURE — 665998 HH PPS REVENUE CREDIT

## 2022-12-09 ASSESSMENT — FIBROSIS 4 INDEX: FIB4 SCORE: 2.4

## 2022-12-09 NOTE — PROGRESS NOTES
Medication chart review for [facility] services    Received referral from [facility].   Medications reviewed  compared with discharge summary if available.    Current medication list per [facility]     Current Outpatient Medications:     fluticasone, 1 Spray, Nasal, DAILY    CORICIDIN, 1 Tablet, Oral, Q4HRS PRN    Mucinex Fast-Max Congest Cough, 20 mL, Oral, Q4HRS PRN    levothyroxine, 75 mcg, Oral, QDAY    sertraline, 25 mg, Oral, DAILY    lisinopril, TAKE 1/2 TABLET BY MOUTH TWICE A DAY    ACETAMINOPHEN 8 HOUR PO, 650 mg, Oral, BID PRN    Apoaequorin, 10 mg, Oral, DAILY    Pediatric Vitamins (MULTIVITAMIN GUMMIES CHILDRENS PO), 1 Dose, Oral, DAILY    meclizine, 25 mg, Oral, TID PRN    ondansetron, 4 mg, Oral, Q8HRS PRN    lidocaine, 1 Patch, Transdermal, QDAY PRN    amLODIPine, 5 mg, Oral, QDAY    DORZOLAMIDE HCL-TIMOLOL MAL OP, 1 Drop, Both Eyes, QAM    Famotidine-Ca Carb-Mag Hydrox, 1 Tablet, Oral, QDAY PRN    diclofenac sodium, 2 g, Topical, BID PRN    Linh Saline, 1 Drop, Both Eyes, DAILY    Melatonin, 1 Capsule, Oral, QHS PRN    Ibuprofen-Acetaminophen, 1-2 Tablet, Oral, Q8HRS PRN    Multiple Vitamins-Minerals (PRESERVISION AREDS 2 PO), 1 Tablet, Oral, DAILY    Tums Chewy Bites, 1 Tablet, Oral, QDAY PRN    travoprost, 1 Drop, Both Eyes, Q EVENING    polyethylene glycol 3350, 17 g, Oral, DAILY    Location of hospital, and discharge summary date, if applicable:     Allergies   Allergen Reactions    Morphine Anaphylaxis     anaphylaxis    Cephalexin Rash     Full body    Codeine Vomiting and Nausea    Metronidazole Vomiting and Nausea    Sulfa Drugs Rash     Full body       Labs     Lab Results   Component Value Date/Time    SODIUM 132 (L) 03/07/2022 10:50 AM    POTASSIUM 4.8 03/07/2022 10:50 AM    CHLORIDE 99 03/07/2022 10:50 AM    CO2 23 03/07/2022 10:50 AM    GLUCOSE 90 03/07/2022 10:50 AM    BUN 16 03/07/2022 10:50 AM    CREATININE 0.73 03/07/2022 10:50 AM    CREATININE 0.99 01/09/2013 07:58 AM     BUNCREATRAT 20 01/09/2013 07:58 AM    GLOMRATE >59 08/04/2010 07:50 AM     Lab Results   Component Value Date/Time    ALKPHOSPHAT 108 (H) 03/07/2022 10:50 AM    ASTSGOT 21 03/07/2022 10:50 AM    ALTSGPT 11 03/07/2022 10:50 AM    TBILIRUBIN 0.3 03/07/2022 10:50 AM    INR 1.03 12/21/2021 08:26 PM    ALBUMIN 4.5 03/07/2022 10:50 AM        Assessment for clinically significant drug interactions, drug omissions/additions, duplicative therapies.            CC   Mike Otero M.D.  [address] [facility] [address]  [facility] [address]  Fax: [phone]    [facility]  Phone [phone] fax [phone]    This note was created using voice recognition software (Dragon). The accuracy of the dictation is limited by the abilities of the software. I have reviewed the note prior to signing, however some errors in grammar and context are still possible. If you have any questions related to this note please do not hesitate to contact our office.

## 2022-12-09 NOTE — HOME HEALTH
Recertification of Care to Home Health for maintenance physical therapy services. Current clinical summary, reason for continued home health services, new issues identified: Patient is preserving her functional mobility, balance, activity tolerance, pain control with maintenance physical therapy services. She has not had a fall or another UTI during this certification but may have a sinus infection which limited her participation in the last 3 visits. Patient continues to express desire to have maintenance physical therapy services. Since maintenance services are being very effective for this patient and she is at risk for decline without maintenance services, therapist agrees she would continue to benefit and would likely decline without this service. Frequency: 1 week 9, Effective 12/13/2022 · Goals: Effective 12/13/2022 - Patient will continue to ambulate at least 300 feet using 4WW requiring more than supervision on all surfaces in 9 visits. Patient will continue to to demonstrate at least fair immediate standing balance in 9 visits. Patient will continue to be independent in use of heating pad to assist with pain management in 9 visits. Caregivers will continue to be independent in home safety/fall prevention measures in 9 visits. Does the patient get SOB with exertion? No shortness of breath has been noted How often (if at all) does pain interfere with patient's movements? On a daily but not constant basis limits function and sleep LIVING SITUATION AND CAREGIVING: Homebound Status: cognitive/communication deficits, difficulty with ambulation/transfers, needs assistance to leave home, needs assistive devices to ambulate, unable to manage stairs, unsafe to drive or leave residence without assistance, unsteady gait, weakness and fatigue, debiltating pain and impaired thoughts/judgment Type of Home: Assisted living facility Lives with: Alone Receives Assistance: For some ADLs, medication management and whenever  ambulating outside her apartment Unresolved Safety Concerns: Decreased safety awareness Does the patient have an Advanced Directive? Yes, copy provided or photographed for chart Does the patient have a POLST? Yes completed POLST is in the home and visible, photographed for chart and DNR entered as a signed order in EPIC. REASON FOR HOME HEALTH SUPPORTING INFORMATION: Physical therapy to assess and teach the following but not limited to: - fall prevention, hydration and home safety CURRENT LEVEL OF FUNCTION: Ambulation and Mobility: Independent in her apartment and supervised whenever exiting her apartment. Patient Equipment: walker for ambulation. Transferring: Independent except assisted with shower and car transfers Bathing: Shower Dressing: Independent except to apply her pants, compression socks and shoes Special equipment used: 4 WW, grab bars, shower chair How noticeably Short of Breath is the patient during the OASIS walk or other activity? NA MEDICATION MANAGEMENT: Patient medications administered by another person Medication issues:na

## 2022-12-10 PROCEDURE — 665998 HH PPS REVENUE CREDIT

## 2022-12-10 PROCEDURE — 665999 HH PPS REVENUE DEBIT

## 2022-12-11 PROCEDURE — 665998 HH PPS REVENUE CREDIT

## 2022-12-11 PROCEDURE — 665999 HH PPS REVENUE DEBIT

## 2022-12-12 PROCEDURE — 665999 HH PPS REVENUE DEBIT

## 2022-12-12 PROCEDURE — 665998 HH PPS REVENUE CREDIT

## 2022-12-13 PROCEDURE — 665998 HH PPS REVENUE CREDIT

## 2022-12-13 PROCEDURE — 665999 HH PPS REVENUE DEBIT

## 2022-12-14 ENCOUNTER — HOME CARE VISIT (OUTPATIENT)
Dept: HOME HEALTH SERVICES | Facility: HOME HEALTHCARE | Age: 87
End: 2022-12-14
Payer: MEDICARE

## 2022-12-14 VITALS
OXYGEN SATURATION: 94 % | RESPIRATION RATE: 20 BRPM | SYSTOLIC BLOOD PRESSURE: 116 MMHG | HEART RATE: 52 BPM | TEMPERATURE: 98.8 F | DIASTOLIC BLOOD PRESSURE: 70 MMHG

## 2022-12-14 PROCEDURE — 665003 FOLLOW UP-HOME HEALTH

## 2022-12-14 PROCEDURE — 665999 HH PPS REVENUE DEBIT

## 2022-12-14 PROCEDURE — G0151 HHCP-SERV OF PT,EA 15 MIN: HCPCS

## 2022-12-14 PROCEDURE — 665998 HH PPS REVENUE CREDIT

## 2022-12-14 ASSESSMENT — ENCOUNTER SYMPTOMS
PERSON REPORTING PAIN: PATIENT
PAIN SEVERITY GOAL: 3/10
PAIN LOCATION - PAIN SEVERITY: 5/10
PAIN: 1
PAIN: PAIN LIMITS SLEEP AND FUNCTION DAILY NOT CONSTANT
DIFFICULTY THINKING: 1
HIGHEST PAIN SEVERITY IN PAST 24 HOURS: 5/10
POOR JUDGMENT: 1
PAIN LOCATION - RELIEVING FACTORS: REST, MEDICATION
PAIN LOCATION - PAIN DURATION: DAILY
PAIN LOCATION: LEFT SHOULDER
SUBJECTIVE PAIN PROGRESSION: WAXING AND WANING
PAIN LOCATION - PAIN QUALITY: ACHE
PAIN LOCATION - PAIN FREQUENCY: WITH ACTIVITY
LOWEST PAIN SEVERITY IN PAST 24 HOURS: 0/10
PAIN LOCATION - EXACERBATING FACTORS: MOVEMENT

## 2022-12-15 PROCEDURE — 665999 HH PPS REVENUE DEBIT

## 2022-12-15 PROCEDURE — 665998 HH PPS REVENUE CREDIT

## 2022-12-16 PROCEDURE — 665999 HH PPS REVENUE DEBIT

## 2022-12-16 PROCEDURE — 665998 HH PPS REVENUE CREDIT

## 2022-12-17 PROCEDURE — 665999 HH PPS REVENUE DEBIT

## 2022-12-17 PROCEDURE — 665998 HH PPS REVENUE CREDIT

## 2022-12-18 PROCEDURE — 665998 HH PPS REVENUE CREDIT

## 2022-12-18 PROCEDURE — 665999 HH PPS REVENUE DEBIT

## 2022-12-19 ENCOUNTER — HOME CARE VISIT (OUTPATIENT)
Dept: HOME HEALTH SERVICES | Facility: HOME HEALTHCARE | Age: 87
End: 2022-12-19
Payer: MEDICARE

## 2022-12-19 PROCEDURE — 665998 HH PPS REVENUE CREDIT

## 2022-12-19 PROCEDURE — 665999 HH PPS REVENUE DEBIT

## 2022-12-19 NOTE — Clinical Note
agree with changes . Thanks for help  ----- Message -----  From: Mena Davis R.N.  Sent: 12/19/2022   4:38 PM PST  To: Cristina Jaramillo PT      Quality Review for 12.8.22 [facility] OASIS performed on by DENISE Davis RN on 12.19.2022:     Edits completed by DENISE Davis RN:  1.  and  dx coding updated per chart review.   2. Changed  to 4 from =sign  3. Changed  to 0 per chart review from = sign  4. Changed  to 0 per narrative from = sign  5. Changed M220 to 9 from = sign

## 2022-12-20 PROCEDURE — 665999 HH PPS REVENUE DEBIT

## 2022-12-20 PROCEDURE — 665998 HH PPS REVENUE CREDIT

## 2022-12-20 NOTE — CASE COMMUNICATION
Quality Review for 12.8.22 [facility] OASIS performed on by DENISE Davis RN on 12.19.2022:     Edits completed by DENISE Davis RN:  1.  and  dx coding updated per chart review.   2. Changed  to 4 from =sign  3. Changed  to 0 per chart review from = sign  4. Changed  to 0 per narrative from = sign  5. Changed M220 to 9 from = sign

## 2022-12-21 ENCOUNTER — HOME CARE VISIT (OUTPATIENT)
Dept: HOME HEALTH SERVICES | Facility: HOME HEALTHCARE | Age: 87
End: 2022-12-21
Payer: MEDICARE

## 2022-12-21 VITALS
HEART RATE: 52 BPM | DIASTOLIC BLOOD PRESSURE: 66 MMHG | SYSTOLIC BLOOD PRESSURE: 130 MMHG | RESPIRATION RATE: 20 BRPM | OXYGEN SATURATION: 95 % | TEMPERATURE: 98.6 F

## 2022-12-21 PROCEDURE — G0179 MD RECERTIFICATION HHA PT: HCPCS | Performed by: INTERNAL MEDICINE

## 2022-12-21 PROCEDURE — G0159 HHC PT MAINT EA 15 MIN: HCPCS

## 2022-12-21 PROCEDURE — 665998 HH PPS REVENUE CREDIT

## 2022-12-21 PROCEDURE — 665999 HH PPS REVENUE DEBIT

## 2022-12-21 ASSESSMENT — ENCOUNTER SYMPTOMS
PERSON REPORTING PAIN: PATIENT
SUBJECTIVE PAIN PROGRESSION: WAXING AND WANING
PAIN LOCATION - PAIN SEVERITY: 6/10
PAIN LOCATION - RELIEVING FACTORS: REST, MEDICATION
PAIN LOCATION - PAIN QUALITY: ACHE
PAIN SEVERITY GOAL: 3/10
POOR JUDGMENT: 1
PAIN LOCATION - EXACERBATING FACTORS: MOVEMENT
PAIN LOCATION - PAIN DURATION: DAILY
HIGHEST PAIN SEVERITY IN PAST 24 HOURS: 7/10
DIFFICULTY THINKING: 1
PAIN: 1
PAIN LOCATION: LEFT SHOULDER
PAIN LOCATION - PAIN FREQUENCY: FREQUENT
LOWEST PAIN SEVERITY IN PAST 24 HOURS: 0/10

## 2022-12-22 PROCEDURE — 665998 HH PPS REVENUE CREDIT

## 2022-12-22 PROCEDURE — 665999 HH PPS REVENUE DEBIT

## 2022-12-23 PROCEDURE — 665999 HH PPS REVENUE DEBIT

## 2022-12-23 PROCEDURE — 665998 HH PPS REVENUE CREDIT

## 2022-12-24 PROCEDURE — 665999 HH PPS REVENUE DEBIT

## 2022-12-24 PROCEDURE — 665998 HH PPS REVENUE CREDIT

## 2022-12-25 PROCEDURE — 665998 HH PPS REVENUE CREDIT

## 2022-12-25 PROCEDURE — 665999 HH PPS REVENUE DEBIT

## 2022-12-26 PROCEDURE — 665999 HH PPS REVENUE DEBIT

## 2022-12-26 PROCEDURE — 665998 HH PPS REVENUE CREDIT

## 2022-12-27 PROCEDURE — 665998 HH PPS REVENUE CREDIT

## 2022-12-27 PROCEDURE — 665999 HH PPS REVENUE DEBIT

## 2022-12-28 ENCOUNTER — HOME CARE VISIT (OUTPATIENT)
Dept: HOME HEALTH SERVICES | Facility: HOME HEALTHCARE | Age: 87
End: 2022-12-28
Payer: MEDICARE

## 2022-12-28 VITALS
RESPIRATION RATE: 20 BRPM | SYSTOLIC BLOOD PRESSURE: 116 MMHG | OXYGEN SATURATION: 95 % | DIASTOLIC BLOOD PRESSURE: 58 MMHG | HEART RATE: 60 BPM | TEMPERATURE: 98.5 F

## 2022-12-28 PROCEDURE — 665999 HH PPS REVENUE DEBIT

## 2022-12-28 PROCEDURE — G0151 HHCP-SERV OF PT,EA 15 MIN: HCPCS

## 2022-12-28 PROCEDURE — 665998 HH PPS REVENUE CREDIT

## 2022-12-28 ASSESSMENT — ENCOUNTER SYMPTOMS
POOR JUDGMENT: 1
PAIN LOCATION - PAIN SEVERITY: 5/10
PAIN LOCATION - RELIEVING FACTORS: REST, MEDICATION
LOWEST PAIN SEVERITY IN PAST 24 HOURS: 0/10
PAIN LOCATION - PAIN QUALITY: ACHE
PAIN LOCATION - PAIN FREQUENCY: FREQUENT
PAIN LOCATION - EXACERBATING FACTORS: MOVEMENT
HIGHEST PAIN SEVERITY IN PAST 24 HOURS: 6/10
PAIN LOCATION: LEFT SHOULDER
PAIN LOCATION - PAIN DURATION: DAILY
PERSON REPORTING PAIN: PATIENT
PAIN: 1
SUBJECTIVE PAIN PROGRESSION: WAXING AND WANING
DIFFICULTY THINKING: 1
PAIN SEVERITY GOAL: 3/10

## 2022-12-29 PROCEDURE — 665999 HH PPS REVENUE DEBIT

## 2022-12-29 PROCEDURE — 665998 HH PPS REVENUE CREDIT

## 2022-12-30 PROCEDURE — 665998 HH PPS REVENUE CREDIT

## 2022-12-30 PROCEDURE — 665999 HH PPS REVENUE DEBIT

## 2022-12-31 PROBLEM — M19.041 OSTEOARTHRITIS OF RIGHT HAND: Status: ACTIVE | Noted: 2022-10-10

## 2022-12-31 PROBLEM — G56.01 CARPAL TUNNEL SYNDROME OF RIGHT WRIST: Status: ACTIVE | Noted: 2022-10-10

## 2022-12-31 PROCEDURE — 665998 HH PPS REVENUE CREDIT

## 2022-12-31 PROCEDURE — 665999 HH PPS REVENUE DEBIT

## 2022-12-31 NOTE — PROGRESS NOTES
Chief Complaint   Patient presents with    URI       HISTORY OF THE PRESENT ILLNESS: Patient is a 89 y.o. female.      Patient comes in with complaint of upper respiratory symptoms.  She is here with her daughter today.  She reports that she is feeling better.  She had cough with sputum.  No dyspnea or wheezing.  Symptoms have mostly resolved.    We also discussed 2 other issues:    She complains of right hand pain and numbness.  Symptoms seem worse when she is sleeping.  She can awaken with her hand numb.  She has a history of severe arthritis of the shoulders but she reports this symptom is different.    She has a skin lesion on her right bicep.  Some bleeding and irregularity.    Allergies: Morphine, Cephalexin, Codeine, Metronidazole, and Sulfa drugs    Current Outpatient Medications Ordered in Epic   Medication Sig Dispense Refill    fluticasone (FLONASE) 50 MCG/ACT nasal spray Administer 1 Spray into affected nostril(S) every day. 16 g 3    Chlorpheniramine-APAP (CORICIDIN) 2-325 MG Tab Take 1 Tablet by mouth every four hours as needed (cold symptoms). take only for cold symptoms; do NOT take additional tylenol      Phenylephrine-DM-GG (MUCINEX FAST-MAX CONGEST COUGH) 2.5-5-100 MG/5ML Liquid Take 20 mL by mouth every four hours as needed (congestion and cough). take as needed for congestion and cough only      levothyroxine (SYNTHROID) 75 MCG Tab Take 1 Tablet by mouth every day. 90 Tablet 3    sertraline (ZOLOFT) 25 MG tablet Take 1 Tablet by mouth every day. 90 Tablet 3    lisinopril (PRINIVIL) 20 MG Tab TAKE 1/2 TABLET BY MOUTH TWICE A DAY 90 Tablet 3    ACETAMINOPHEN 8 HOUR PO Take 650 mg by mouth 2 times a day as needed (moderate pain). do not exceed 2500 mg toatal in a day      Apoaequorin 10 MG Cap Take 10 mg by mouth every day.      Pediatric Vitamins (MULTIVITAMIN GUMMIES CHILDRENS PO) Take 1 Dose by mouth every day.      meclizine (ANTIVERT) 25 MG Tab Take 1 Tablet by mouth 3 times a day as needed  (for dizziness). 60 Tablet 0    ondansetron (ZOFRAN ODT) 4 MG TABLET DISPERSIBLE Take 1 Tablet by mouth every 8 hours as needed for Nausea. 60 Tablet 0    lidocaine (LIDODERM) 5 % Patch Place 1 Patch on the skin 1 time a day as needed. 1 patch to area of pain as needed for moderate pain  Indications: pain 30 Patch 3    amLODIPine (NORVASC) 5 MG Tab TAKE 1 TABLET BY MOUTH EVERY DAY 90 Tablet 3    DORZOLAMIDE HCL-TIMOLOL MAL OP Administer 1 Drop into both eyes every morning.      Famotidine-Ca Carb-Mag Hydrox -165 MG Chew Tab Chew 1 Tablet 1 time a day as needed (for indegestion).      diclofenac sodium (VOLTAREN) 1 % Gel Apply 2 g topically 2 times a day as needed (for mild to moderate pain).      Soft Lens Products (FRANCESCA SALINE) Solution Administer 1 Drop into both eyes every day.      Melatonin 5 MG Cap Take 1 Capsule by mouth at bedtime as needed (for insomnia, takes first).      Ibuprofen-Acetaminophen 125-250 MG Tab Take 1-2 Tablets by mouth every 8 hours as needed (for mild to moderate pain.).      Multiple Vitamins-Minerals (PRESERVISION AREDS 2 PO) Take 1 Tablet by mouth every day.      calcium carbonate (TUMS CHEWY BITES) 750 MG chewable tablet Chew 1 Tablet 1 time a day as needed (for stomach upset).      travoprost (TRAVATAN Z) 0.004 % Solution Administer 1 Drop into both eyes every evening. Indications: Wide-Angle Glaucoma      polyethylene glycol 3350 (MIRALAX) Powder Take 17 g by mouth every day. Indications: Constipation       No current Epic-ordered facility-administered medications on file.       Past medical history, social history and family history were reviewed from chart today    Review of systems: Per HPI.    All others negative.     Exam: /70 (BP Location: Left arm, Patient Position: Sitting, BP Cuff Size: Adult)   Pulse 60   Temp 37.2 °C (98.9 °F) (Temporal)   Resp 16   Wt 57.6 kg (127 lb)   SpO2 95%   General: Well-appearing. Well-developed. No signs of distress.  HEENT:  Grossly normal. Oral cavity is pink and moist.   Neck: Supple without JVD or bruit.  Pulmonary: Clear with good breath sounds. Normal effort.  Cardiovascular: Regular. Carotid and radial pulses are intact.  Abdomen: Soft, nontender, nondistended. Spleen and liver are not enlarged.  Neurologic: Positive Tinel's on the right wrist  Skin: Slightly raised, pink, irregular plaque on her right bicep    Diagnosis:  1. Upper respiratory tract infection, unspecified type        2. Carpal tunnel syndrome of right wrist        3. Neoplasm of uncertain behavior of skin  Referral to Dermatology        Upper respiratory infection is resolving.  Symptomatic treatment only.  Encouraged her to get carpal tunnel brace and wear nightly.  If this does not improve her symptoms she  May want to consider wearing 24/7.  Consider referral to orthopedics.  Refer to Derm for skin issues    My total time spent caring for the patient on the day of the encounter was  greater than 20 minutes.   This includes obtaining history, reviewing chart, physical exam, patient education, reviewing outside records, placing orders, interpreting tests and coordinating care.

## 2023-01-01 PROCEDURE — 665998 HH PPS REVENUE CREDIT

## 2023-01-01 PROCEDURE — 665999 HH PPS REVENUE DEBIT

## 2023-01-02 ENCOUNTER — HOME CARE VISIT (OUTPATIENT)
Dept: HOME HEALTH SERVICES | Facility: HOME HEALTHCARE | Age: 88
End: 2023-01-02
Payer: MEDICARE

## 2023-01-02 PROCEDURE — 665999 HH PPS REVENUE DEBIT

## 2023-01-02 PROCEDURE — 665998 HH PPS REVENUE CREDIT

## 2023-01-03 PROCEDURE — 665998 HH PPS REVENUE CREDIT

## 2023-01-03 PROCEDURE — 665999 HH PPS REVENUE DEBIT

## 2023-01-04 ENCOUNTER — HOME CARE VISIT (OUTPATIENT)
Dept: HOME HEALTH SERVICES | Facility: HOME HEALTHCARE | Age: 88
End: 2023-01-04
Payer: MEDICARE

## 2023-01-04 VITALS
RESPIRATION RATE: 16 BRPM | TEMPERATURE: 98.5 F | DIASTOLIC BLOOD PRESSURE: 54 MMHG | OXYGEN SATURATION: 95 % | SYSTOLIC BLOOD PRESSURE: 90 MMHG | HEART RATE: 60 BPM

## 2023-01-04 PROCEDURE — 665999 HH PPS REVENUE DEBIT

## 2023-01-04 PROCEDURE — G0159 HHC PT MAINT EA 15 MIN: HCPCS

## 2023-01-04 PROCEDURE — 665998 HH PPS REVENUE CREDIT

## 2023-01-04 ASSESSMENT — ENCOUNTER SYMPTOMS
DEBILITATING PAIN: 1
PERSON REPORTING PAIN: PATIENT
PAIN: 1
PAIN LOCATION - PAIN FREQUENCY: FREQUENT
PAIN LOCATION - PAIN DURATION: DAILY
PAIN LOCATION - PAIN SEVERITY: 6/10
PAIN LOCATION - RELIEVING FACTORS: REST, MEDICATION, HEAT
HIGHEST PAIN SEVERITY IN PAST 24 HOURS: 6/10
DIFFICULTY THINKING: 1
PAIN LOCATION - PAIN QUALITY: ACHE
POOR JUDGMENT: 1
PAIN LOCATION - EXACERBATING FACTORS: ACTIVITY, MOVEMENT
SUBJECTIVE PAIN PROGRESSION: WAXING AND WANING
PAIN LOCATION: LEFT SHOULDER
LOWEST PAIN SEVERITY IN PAST 24 HOURS: 0/10

## 2023-01-04 NOTE — Clinical Note
Noted.  ----- Message -----  From: Cristina Jaramillo, PT  Sent: 1/4/2023   7:30 PM PST  To: Maria Luisa Song R.N., Mike Otero M.D., *      Physical therapy reassessment performed 1/4/2023 and I will continue to follow for maintenance physical therapy 1 time a week for 5 weeks and then recertify.

## 2023-01-04 NOTE — Clinical Note
Physical therapy reassessment performed 1/4/2023 and I will continue to follow for maintenance physical therapy 1 time a week for 5 weeks and then recertify.

## 2023-01-05 PROCEDURE — 665999 HH PPS REVENUE DEBIT

## 2023-01-05 PROCEDURE — 665998 HH PPS REVENUE CREDIT

## 2023-01-05 NOTE — HOME HEALTH
PHYSICAL THERAPY REASSESSMENT AND PLAN OF CARE · Patient: Gloria Patel · Patient is preserving her functional mobility and skills with maintenance physical therapy services. She has not had a fall in the last 4 weeks. She continues to have significant risk of decline if she does not participate in maintenance physical therapy services. Patient is agreeable to continue physical therapy maintenance services. Therapist agrees she would continue to benefit · Maintenance Therapeutic Need: Decreased activity tolerance, Balance control, Generalized deconditioning, Gait training, Fall prevention, Pain control and Poor safety awareness. Recommend skilled HHPT to address deficits and maintain function-report sent to MD · Frequency: 1 week 5, Effective 1/8/2023 · Goals: Remain as written with same goal date on last recertification effective on 12/8/2022  How often (if at all) does pain interfere with patient's movements? Pain limits sleep and function on a daily but not constant basis.

## 2023-01-06 PROCEDURE — 665998 HH PPS REVENUE CREDIT

## 2023-01-06 PROCEDURE — 665999 HH PPS REVENUE DEBIT

## 2023-01-07 PROCEDURE — 665999 HH PPS REVENUE DEBIT

## 2023-01-07 PROCEDURE — 665998 HH PPS REVENUE CREDIT

## 2023-01-08 PROCEDURE — 665998 HH PPS REVENUE CREDIT

## 2023-01-08 PROCEDURE — 665999 HH PPS REVENUE DEBIT

## 2023-01-09 PROCEDURE — 665999 HH PPS REVENUE DEBIT

## 2023-01-09 PROCEDURE — 665998 HH PPS REVENUE CREDIT

## 2023-01-10 PROCEDURE — 665999 HH PPS REVENUE DEBIT

## 2023-01-10 PROCEDURE — 665998 HH PPS REVENUE CREDIT

## 2023-01-11 ENCOUNTER — HOME CARE VISIT (OUTPATIENT)
Dept: HOME HEALTH SERVICES | Facility: HOME HEALTHCARE | Age: 88
End: 2023-01-11
Payer: MEDICARE

## 2023-01-11 PROCEDURE — 665999 HH PPS REVENUE DEBIT

## 2023-01-11 PROCEDURE — 665997 HH PPS REVENUE ADJ

## 2023-01-11 PROCEDURE — 665998 HH PPS REVENUE CREDIT

## 2023-01-12 PROCEDURE — 665998 HH PPS REVENUE CREDIT

## 2023-01-12 PROCEDURE — 665999 HH PPS REVENUE DEBIT

## 2023-01-13 PROCEDURE — 665999 HH PPS REVENUE DEBIT

## 2023-01-13 PROCEDURE — 665998 HH PPS REVENUE CREDIT

## 2023-01-14 PROCEDURE — 665998 HH PPS REVENUE CREDIT

## 2023-01-14 PROCEDURE — 665999 HH PPS REVENUE DEBIT

## 2023-01-15 PROCEDURE — 665999 HH PPS REVENUE DEBIT

## 2023-01-15 PROCEDURE — 665998 HH PPS REVENUE CREDIT

## 2023-01-16 PROCEDURE — 665998 HH PPS REVENUE CREDIT

## 2023-01-16 PROCEDURE — 665999 HH PPS REVENUE DEBIT

## 2023-01-17 PROCEDURE — 665998 HH PPS REVENUE CREDIT

## 2023-01-17 PROCEDURE — 665999 HH PPS REVENUE DEBIT

## 2023-01-18 ENCOUNTER — HOME CARE VISIT (OUTPATIENT)
Dept: HOME HEALTH SERVICES | Facility: HOME HEALTHCARE | Age: 88
End: 2023-01-18
Payer: MEDICARE

## 2023-01-18 VITALS
RESPIRATION RATE: 18 BRPM | TEMPERATURE: 98.6 F | HEART RATE: 56 BPM | OXYGEN SATURATION: 96 % | SYSTOLIC BLOOD PRESSURE: 120 MMHG | DIASTOLIC BLOOD PRESSURE: 54 MMHG

## 2023-01-18 PROCEDURE — G0159 HHC PT MAINT EA 15 MIN: HCPCS

## 2023-01-18 PROCEDURE — 665003 FOLLOW UP-HOME HEALTH

## 2023-01-18 PROCEDURE — 665999 HH PPS REVENUE DEBIT

## 2023-01-18 PROCEDURE — 665998 HH PPS REVENUE CREDIT

## 2023-01-18 ASSESSMENT — ENCOUNTER SYMPTOMS
DIFFICULTY THINKING: 1
PERSON REPORTING PAIN: PATIENT
PAIN LOCATION - PAIN FREQUENCY: FREQUENT
PAIN SEVERITY GOAL: 3/10
PAIN LOCATION - PAIN DURATION: DAILY
PAIN LOCATION - PAIN QUALITY: ACHE
PAIN LOCATION - RELIEVING FACTORS: MEDICATION, REST
PAIN LOCATION: LEFT SHOULDER
PAIN: PAIN LIMITS FUNCTION AND SLEEP DAILY NOT CONSTANT
LOWEST PAIN SEVERITY IN PAST 24 HOURS: 0/10
PAIN LOCATION - PAIN SEVERITY: 6/10
POOR JUDGMENT: 1
PAIN: 1
SUBJECTIVE PAIN PROGRESSION: WAXING AND WANING
PAIN LOCATION - EXACERBATING FACTORS: MOVEMENT
HIGHEST PAIN SEVERITY IN PAST 24 HOURS: 6/10

## 2023-01-19 PROCEDURE — 665998 HH PPS REVENUE CREDIT

## 2023-01-19 PROCEDURE — 665999 HH PPS REVENUE DEBIT

## 2023-01-20 PROCEDURE — 665998 HH PPS REVENUE CREDIT

## 2023-01-20 PROCEDURE — 665999 HH PPS REVENUE DEBIT

## 2023-01-21 PROCEDURE — 665998 HH PPS REVENUE CREDIT

## 2023-01-21 PROCEDURE — 665999 HH PPS REVENUE DEBIT

## 2023-01-22 PROCEDURE — 665999 HH PPS REVENUE DEBIT

## 2023-01-22 PROCEDURE — 665998 HH PPS REVENUE CREDIT

## 2023-01-23 PROCEDURE — 665998 HH PPS REVENUE CREDIT

## 2023-01-23 PROCEDURE — 665999 HH PPS REVENUE DEBIT

## 2023-01-24 PROCEDURE — 665999 HH PPS REVENUE DEBIT

## 2023-01-24 PROCEDURE — 665998 HH PPS REVENUE CREDIT

## 2023-01-25 ENCOUNTER — HOME CARE VISIT (OUTPATIENT)
Dept: HOME HEALTH SERVICES | Facility: HOME HEALTHCARE | Age: 88
End: 2023-01-25
Payer: MEDICARE

## 2023-01-25 VITALS
OXYGEN SATURATION: 95 % | SYSTOLIC BLOOD PRESSURE: 134 MMHG | HEART RATE: 64 BPM | DIASTOLIC BLOOD PRESSURE: 62 MMHG | TEMPERATURE: 98.8 F | RESPIRATION RATE: 20 BRPM

## 2023-01-25 PROCEDURE — 665999 HH PPS REVENUE DEBIT

## 2023-01-25 PROCEDURE — 665998 HH PPS REVENUE CREDIT

## 2023-01-25 PROCEDURE — G0159 HHC PT MAINT EA 15 MIN: HCPCS

## 2023-01-25 ASSESSMENT — ENCOUNTER SYMPTOMS
POOR JUDGMENT: 1
PAIN LOCATION - PAIN SEVERITY: 6/10
LOWEST PAIN SEVERITY IN PAST 24 HOURS: 0/10
PAIN LOCATION - EXACERBATING FACTORS: MOVEMENT
HIGHEST PAIN SEVERITY IN PAST 24 HOURS: 6/10
PAIN LOCATION: LEFT SHOULDER
PAIN LOCATION - PAIN DURATION: DAILY
PAIN LOCATION - PAIN QUALITY: ACHE
PAIN LOCATION - PAIN FREQUENCY: WITH ACTIVITY
PAIN: 1
SUBJECTIVE PAIN PROGRESSION: WAXING AND WANING
PAIN: PAIN LIMITS SLEEP AND FUNCTION DAILY NOT CONSTANT
PAIN SEVERITY GOAL: 3/10
DIFFICULTY THINKING: 1
PERSON REPORTING PAIN: PATIENT

## 2023-01-26 PROCEDURE — 665999 HH PPS REVENUE DEBIT

## 2023-01-26 PROCEDURE — 665998 HH PPS REVENUE CREDIT

## 2023-01-27 PROCEDURE — 665999 HH PPS REVENUE DEBIT

## 2023-01-27 PROCEDURE — 665998 HH PPS REVENUE CREDIT

## 2023-01-28 PROCEDURE — 665998 HH PPS REVENUE CREDIT

## 2023-01-28 PROCEDURE — 665999 HH PPS REVENUE DEBIT

## 2023-01-29 PROCEDURE — 665998 HH PPS REVENUE CREDIT

## 2023-01-29 PROCEDURE — 665999 HH PPS REVENUE DEBIT

## 2023-01-30 PROCEDURE — 665999 HH PPS REVENUE DEBIT

## 2023-01-30 PROCEDURE — 665998 HH PPS REVENUE CREDIT

## 2023-01-31 PROCEDURE — 665998 HH PPS REVENUE CREDIT

## 2023-01-31 PROCEDURE — 665999 HH PPS REVENUE DEBIT

## 2023-02-01 ENCOUNTER — HOME CARE VISIT (OUTPATIENT)
Dept: HOME HEALTH SERVICES | Facility: HOME HEALTHCARE | Age: 88
End: 2023-02-01
Payer: MEDICARE

## 2023-02-01 VITALS
RESPIRATION RATE: 20 BRPM | TEMPERATURE: 98.8 F | SYSTOLIC BLOOD PRESSURE: 94 MMHG | DIASTOLIC BLOOD PRESSURE: 64 MMHG | OXYGEN SATURATION: 99 % | HEART RATE: 64 BPM

## 2023-02-01 PROCEDURE — 665998 HH PPS REVENUE CREDIT

## 2023-02-01 PROCEDURE — G0159 HHC PT MAINT EA 15 MIN: HCPCS

## 2023-02-01 PROCEDURE — 665999 HH PPS REVENUE DEBIT

## 2023-02-01 ASSESSMENT — ENCOUNTER SYMPTOMS
PERSON REPORTING PAIN: PATIENT
HIGHEST PAIN SEVERITY IN PAST 24 HOURS: 8/10
PAIN LOCATION - PAIN DURATION: DAILY
PAIN LOCATION - PAIN FREQUENCY: FREQUENT
PAIN LOCATION: LEFT ELBOW
PAIN LOCATION - EXACERBATING FACTORS: MOVEMENT, PRESSURE
PAIN LOCATION - PAIN SEVERITY: 6/10
PAIN: 1
PAIN LOCATION - EXACERBATING FACTORS: MOVEMENT
PAIN LOCATION: LEFT SHOULDER
PAIN LOCATION - PAIN FREQUENCY: WITH ACTIVITY
LOWEST PAIN SEVERITY IN PAST 24 HOURS: 3/10
PAIN LOCATION - RELIEVING FACTORS: REST, MEDICATION
SUBJECTIVE PAIN PROGRESSION: WAXING AND WANING
PAIN LOCATION - RELIEVING FACTORS: REST, MEDICATION
PAIN LOCATION - PAIN QUALITY: DULL
PAIN LOCATION - PAIN QUALITY: ACHE
PAIN LOCATION - PAIN SEVERITY: 7/10
PAIN SEVERITY GOAL: 0/10
PAIN LOCATION - PAIN DURATION: DAILY

## 2023-02-01 NOTE — Clinical Note
Pt reports sudden onset without injury of L forearm pain along shaft of ulna.  There is firm swelling along the shaft which is tender to palpation.  Surrounding area is edematous, reddened and warm to touch compared to right.  Girth measurements were taken 7 cm below olecranon process and on the right was 19 cm  and left was 21.5 cm.  Please advise    Physical therapy will perform recertification for maintenance physical therapy on 2/8/2023.

## 2023-02-01 NOTE — Clinical Note
Noted.  ----- Message -----  From: Cristina Jaramillo PT  Sent: 2/1/2023   7:01 PM PST  To: Mike Otero M.D., *      Pt reports sudden onset without injury of L forearm pain along shaft of ulna.  There is firm swelling along the shaft which is tender to palpation.  Surrounding area is edematous, reddened and warm to touch compared to right.  Girth measurements were taken 7 cm below olecranon process and on the right was 19 cm  and left was 21.5 cm.  Please advise    Physical therapy will perform recertification for maintenance physical therapy on 2/8/2023.

## 2023-02-01 NOTE — Clinical Note
Ok thank you  ----- Message -----  From: Mike Otero M.D.  Sent: 2/7/2023   8:34 AM PST  To: Cristina Jaramillo PT      Patient was seen in the office on 2/2/2023.  I felt it was most likely a hematoma from trauma.  We agreed to monitor for 2 weeks.  If it does not resolve or is getting worse then further work-up.  ----- Message -----  From: Cristina Jaramillo PT  Sent: 2/1/2023   7:01 PM PST  To: Mike Otero M.D., *    Pt reports sudden onset without injury of L forearm pain along shaft of ulna.  There is firm swelling along the shaft which is tender to palpation.  Surrounding area is edematous, reddened and warm to touch compared to right.  Girth measurements were taken 7 cm below olecranon process and on the right was 19 cm  and left was 21.5 cm.  Please advise    Physical therapy will perform recertification for maintenance physical therapy on 2/8/2023.

## 2023-02-02 ENCOUNTER — OFFICE VISIT (OUTPATIENT)
Dept: INTERNAL MEDICINE | Facility: IMAGING CENTER | Age: 88
End: 2023-02-02
Payer: MEDICARE

## 2023-02-02 VITALS
HEART RATE: 50 BPM | RESPIRATION RATE: 16 BRPM | DIASTOLIC BLOOD PRESSURE: 78 MMHG | TEMPERATURE: 98.3 F | OXYGEN SATURATION: 97 % | SYSTOLIC BLOOD PRESSURE: 124 MMHG

## 2023-02-02 DIAGNOSIS — R22.32 SUBCUTANEOUS MASS OF LEFT FOREARM: ICD-10-CM

## 2023-02-02 DIAGNOSIS — F32.0 MAJOR DEPRESSIVE DISORDER, SINGLE EPISODE, MILD (HCC): ICD-10-CM

## 2023-02-02 DIAGNOSIS — I27.20 PULMONARY HYPERTENSION, UNSPECIFIED (HCC): ICD-10-CM

## 2023-02-02 PROCEDURE — 665998 HH PPS REVENUE CREDIT

## 2023-02-02 PROCEDURE — 665999 HH PPS REVENUE DEBIT

## 2023-02-02 PROCEDURE — 99213 OFFICE O/P EST LOW 20 MIN: CPT | Performed by: INTERNAL MEDICINE

## 2023-02-02 NOTE — HOME HEALTH
PHYSICAL THERAPY REASSESSMENT AND PLAN OF CARE · Patient: Gloria Patel · Patient is preserving her functional mobility and skills with maintenance physical therapy services. She has not had a fall in the last 4 weeks. She continues to have significant risk of decline if she does not participate in maintenance physical therapy services. Patient is agreeable to continue physical therapy maintenance services. Therapist agrees she would continue to benefit · Maintenance Therapeutic Need: Decreased activity tolerance, Balance control, Generalized deconditioning, Gait training, Fall prevention, Pain control and Poor safety awareness. Recommend skilled HHPT to address deficits and maintain function-report sent to MD · Frequency: 1 week 1 and recertify next visit, Effective 2/5//2023 · Goals: Remain as written with same goal date on last recertification effective on 12/8/2022 How often (if at all) does pain interfere with patient's movements? Pain limits sleep and function on a daily but not constant basis.

## 2023-02-02 NOTE — PROGRESS NOTES
Chief Complaint   Patient presents with    Arm Pain       HISTORY OF THE PRESENT ILLNESS: Patient is a 89 y.o. female.     Patient with 2 weeks of left forearm pain.  She has a lump on the arm.  She does not remember any specific trauma or other event.  It is tender to touch.  She feels that it is warm.  No erythema.  No other masses.  No other constitutional symptoms.    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 Continue with current defined treatment plan: Continue Zoloft.  Annual depression screening.  Diagnosis: I27.20 - Pulmonary hypertension, unspecified (HCC)  Assessment and plan: Chronic/stable.  Patient is asymptomatic.  No dyspnea.  No lower extremity swelling.  Echocardiogram in August 2019 showed elevated pulmonary pressures but preserved left ejection fraction.  Last edited 02/02/23 11:49 PST by Mike Otero M.D.           Allergies: Morphine, Cephalexin, Codeine, Metronidazole, and Sulfa drugs    Current Outpatient Medications Ordered in Epic   Medication Sig Dispense Refill    aspirin (ASPIRIN 81) 81 MG EC tablet Take 81 mg by mouth every day. Indications: prevention per pt      Cholecalciferol (VITAMIN D3) 1.25 MG (56702 UT) Tab Take 1.25 mg by mouth every day. Indications: supplement      fluticasone (FLONASE) 50 MCG/ACT nasal spray Administer 1 Spray into affected nostril(S) every day. 16 g 3    Chlorpheniramine-APAP (CORICIDIN) 2-325 MG Tab Take 1 Tablet by mouth every four hours as needed (cold symptoms). take only for cold symptoms; do NOT take additional tylenol  Indications: Cold Symptoms      Phenylephrine-DM-GG (MUCINEX FAST-MAX CONGEST COUGH) 2.5-5-100 MG/5ML Liquid Take 20 mL by mouth every four hours as needed (congestion and cough). take as needed for congestion and cough only  Indications: Cough      levothyroxine (SYNTHROID) 75 MCG Tab Take 1  Tablet by mouth every day. 90 Tablet 3    sertraline (ZOLOFT) 25 MG tablet Take 1 Tablet by mouth every day. 90 Tablet 3    lisinopril (PRINIVIL) 20 MG Tab TAKE 1/2 TABLET BY MOUTH TWICE A DAY 90 Tablet 3    ACETAMINOPHEN 8 HOUR PO Take 650 mg by mouth 2 times a day as needed (moderate pain). do not exceed 2500 mg toatal in a day  Indications: Pain      Apoaequorin 10 MG Cap Take 10 mg by mouth every day. Indications: memory loss      Pediatric Vitamins (MULTIVITAMIN GUMMIES CHILDRENS PO) Take 1 Dose by mouth every day. Indications: supplement      meclizine (ANTIVERT) 25 MG Tab Take 1 Tablet by mouth 3 times a day as needed (for dizziness). 60 Tablet 0    ondansetron (ZOFRAN ODT) 4 MG TABLET DISPERSIBLE Take 1 Tablet by mouth every 8 hours as needed for Nausea. 60 Tablet 0    lidocaine (LIDODERM) 5 % Patch Place 1 Patch on the skin 1 time a day as needed. 1 patch to area of pain as needed for moderate pain  Indications: pain 30 Patch 3    amLODIPine (NORVASC) 5 MG Tab TAKE 1 TABLET BY MOUTH EVERY DAY 90 Tablet 3    DORZOLAMIDE HCL-TIMOLOL MAL OP Administer 1 Drop into both eyes every morning. Indications: Glaucoma      Famotidine-Ca Carb-Mag Hydrox -165 MG Chew Tab Chew 1 Tablet 1 time a day as needed (for indegestion). Indications: Heartburn      diclofenac sodium (VOLTAREN) 1 % Gel Apply 2 g topically 2 times a day as needed (for mild to moderate pain). Indications: Joint Damage causing Pain and Loss of Function      Soft Lens Products (FRANCESCA SALINE) Solution Administer 1 Drop into both eyes every day. Indications: supplement      Melatonin 5 MG Cap Take 1 Capsule by mouth at bedtime as needed (for insomnia, takes first). Indications: Trouble Sleeping      Multiple Vitamins-Minerals (PRESERVISION AREDS 2 PO) Take 1 Tablet by mouth every day. Indications: supplement      calcium carbonate (TUMS CHEWY BITES) 750 MG chewable tablet Chew 1 Tablet 1 time a day as needed (for stomach upset). Indications: Heartburn       travoprost (TRAVATAN Z) 0.004 % Solution Administer 1 Drop into both eyes every evening. Indications: Wide-Angle Glaucoma      polyethylene glycol 3350 (MIRALAX) Powder Take 17 g by mouth every day. Indications: Constipation       No current Epic-ordered facility-administered medications on file.       Past medical history, social history and family history were reviewed from chart today    Review of systems: Per HPI.    All others negative.     Exam: /78 (BP Location: Right arm, Patient Position: Sitting, BP Cuff Size: Adult)   Pulse (!) 50   Temp 36.8 °C (98.3 °F) (Temporal)   Resp 16   SpO2 97%   General: Well-nourished, well-developed. No change in appearance. No distress.  HEENT: Normocephalic.  Pulmonary: Clear. Normal effort.  Cardiovascular: Regular   Abdomen: Normal appearing. Soft, nontender, nondistended.   Neurologic: Cranial nerves II through XII are grossly intact, alert and oriented x3  Extremities: Left forearm with 5 x 2 cm subcutaneous, firm, no mobile mass.  It is tender.  It is slightly warm compared to the right.    Diagnosis:  1. Subcutaneous mass of left forearm        2. Major depressive disorder, single episode, mild (HCC)        3. Pulmonary hypertension, unspecified (HCC)            Suspect subcutaneous mass is hematoma.  At this time I recommend monitoring.  She can apply heat.  If it persists for 2 additional weeks or if it gets worse then I recommend x-ray of the forearm as well as ultrasound.    Depression stable on Zoloft.    Pulmonary hypertension noted on echocardiogram but she is asymptomatic.    Medications:  No new medications    Laboratory:  The laboratory today    Imaging:  X-ray and ultrasound of forearm if symptoms persist    Referrals:  Discussed the patient may need general surgical evaluation    My total time spent caring for the patient on the day of the encounter was  greater than 30 minutes.   This includes obtaining history, reviewing chart, physical  exam, patient education, reviewing outside records, placing orders, interpreting tests and coordinating care.

## 2023-02-03 PROCEDURE — 665998 HH PPS REVENUE CREDIT

## 2023-02-03 PROCEDURE — 665999 HH PPS REVENUE DEBIT

## 2023-02-04 PROCEDURE — 665998 HH PPS REVENUE CREDIT

## 2023-02-04 PROCEDURE — 665999 HH PPS REVENUE DEBIT

## 2023-02-05 PROCEDURE — 665999 HH PPS REVENUE DEBIT

## 2023-02-05 PROCEDURE — 665998 HH PPS REVENUE CREDIT

## 2023-02-06 PROCEDURE — 665998 HH PPS REVENUE CREDIT

## 2023-02-06 PROCEDURE — 665999 HH PPS REVENUE DEBIT

## 2023-02-07 PROCEDURE — 665999 HH PPS REVENUE DEBIT

## 2023-02-07 PROCEDURE — 665998 HH PPS REVENUE CREDIT

## 2023-02-08 ENCOUNTER — APPOINTMENT (RX ONLY)
Dept: URBAN - METROPOLITAN AREA CLINIC 6 | Facility: CLINIC | Age: 88
Setting detail: DERMATOLOGY
End: 2023-02-08

## 2023-02-08 ENCOUNTER — HOME CARE VISIT (OUTPATIENT)
Dept: HOME HEALTH SERVICES | Facility: HOME HEALTHCARE | Age: 88
End: 2023-02-08
Payer: MEDICARE

## 2023-02-08 DIAGNOSIS — F42.4 EXCORIATION (SKIN-PICKING) DISORDER: ICD-10-CM

## 2023-02-08 PROCEDURE — 665999 HH PPS REVENUE DEBIT

## 2023-02-08 PROCEDURE — ? COUNSELING

## 2023-02-08 PROCEDURE — 99202 OFFICE O/P NEW SF 15 MIN: CPT

## 2023-02-08 PROCEDURE — ? DIAGNOSIS COMMENT

## 2023-02-08 PROCEDURE — 665998 HH PPS REVENUE CREDIT

## 2023-02-08 ASSESSMENT — LOCATION DETAILED DESCRIPTION DERM: LOCATION DETAILED: RIGHT LATERAL PROXIMAL UPPER ARM

## 2023-02-08 ASSESSMENT — LOCATION ZONE DERM: LOCATION ZONE: ARM

## 2023-02-08 ASSESSMENT — LOCATION SIMPLE DESCRIPTION DERM: LOCATION SIMPLE: RIGHT UPPER ARM

## 2023-02-08 NOTE — HPI: SKIN LESION
Is This A New Presentation, Or A Follow-Up?: Skin Lesion
What Type Of Note Output Would You Prefer (Optional)?: Bullet Format
How Severe Is Your Skin Lesion?: moderate
Has Your Skin Lesion Been Treated?: not been treated
Additional History: New patient.

## 2023-02-08 NOTE — PROCEDURE: DIAGNOSIS COMMENT
Comment: Almost healed. Advised leaving it uncovered throughout daytime, and Vaseline with band aid at bedtime.\\nFollow up if unresolved in 4 weeks.
Detail Level: Simple
Render Risk Assessment In Note?: no

## 2023-02-09 ENCOUNTER — HOME CARE VISIT (OUTPATIENT)
Dept: HOME HEALTH SERVICES | Facility: HOME HEALTHCARE | Age: 88
End: 2023-02-09
Payer: MEDICARE

## 2023-02-09 VITALS
TEMPERATURE: 98.5 F | OXYGEN SATURATION: 96 % | HEART RATE: 56 BPM | SYSTOLIC BLOOD PRESSURE: 112 MMHG | DIASTOLIC BLOOD PRESSURE: 62 MMHG | RESPIRATION RATE: 20 BRPM

## 2023-02-09 PROCEDURE — 665999 HH PPS REVENUE DEBIT

## 2023-02-09 PROCEDURE — G0151 HHCP-SERV OF PT,EA 15 MIN: HCPCS

## 2023-02-09 PROCEDURE — 665998 HH PPS REVENUE CREDIT

## 2023-02-09 ASSESSMENT — ENCOUNTER SYMPTOMS
POOR JUDGMENT: 1
PAIN LOCATION - PAIN DURATION: DAILY
PAIN LOCATION - PAIN QUALITY: ACHE
DIFFICULTY THINKING: 1
DEBILITATING PAIN: 1
PAIN: 1
PERSON REPORTING PAIN: PATIENT
PAIN LOCATION - PAIN SEVERITY: 4/10
PAIN LOCATION - RELIEVING FACTORS: REST, MEDICATION
PAIN SEVERITY GOAL: 4/10
PAIN LOCATION - PAIN FREQUENCY: WITH ACTIVITY
PAIN LOCATION - EXACERBATING FACTORS: MOVEMENT
HIGHEST PAIN SEVERITY IN PAST 24 HOURS: 6/10
LOWEST PAIN SEVERITY IN PAST 24 HOURS: 0/10
SUBJECTIVE PAIN PROGRESSION: WAXING AND WANING
PAIN LOCATION: LEFT SHOULDER

## 2023-02-09 ASSESSMENT — ACTIVITIES OF DAILY LIVING (ADL): OASIS_M1830: 03

## 2023-02-09 NOTE — Clinical Note
Noted.  ----- Message -----  From: Cristina Jaramillo, PT  Sent: 2/9/2023   6:49 PM PST  To: Maria Luisa Song R.N., Boo Hernandez, *      Recertification for physical therapy maintenance program performed 2/9/2023.  I will continue to follow for maintenance 1 time a week for 9 weeks and then recertify if appropriate.

## 2023-02-09 NOTE — Clinical Note
thanks for responding. Have a great day and weekend  ----- Message -----  From: Mike Otero M.D.  Sent: 2/10/2023   6:59 AM PST  To: Cristina Jaramillo, PT      Great! Thanks for the follow-up and continued care.     Mike Otero M.D.    ----- Message -----  From: Cristina Jaramillo, PT  Sent: 2/9/2023   6:49 PM PST  To: Maria Luisa Song R.N., Boo Hernandez, *    Recertification for physical therapy maintenance program performed 2/9/2023.  I will continue to follow for maintenance 1 time a week for 9 weeks and then recertify if appropriate.

## 2023-02-09 NOTE — Clinical Note
Recertification for physical therapy maintenance program performed 2/9/2023.  I will continue to follow for maintenance 1 time a week for 9 weeks and then recertify if appropriate.

## 2023-02-10 PROCEDURE — 665999 HH PPS REVENUE DEBIT

## 2023-02-10 PROCEDURE — 665998 HH PPS REVENUE CREDIT

## 2023-02-10 NOTE — HOME HEALTH
Recertification of Care to Home Health for maintenance physical therapy services. Current clinical summary, reason for continued home health services, new issues identified: Patient is preserving her functional mobility, balance, activity tolerance, pain control with maintenance physical therapy services. She has not had a fall or another UTI during this certification. Patient continues to express desire to have maintenance physical therapy services. Since maintenance services are being very effective for this patient and she is at risk for decline without maintenance services, therapist agrees she would continue to benefit and would likely decline without this service. Frequency: 1 week 9, Effective 2/11/2023 · Goals: Effective 2/11/2023 - Patient will continue to ambulate at least 300 feet using 4WW requiring more than supervision on all surfaces in 9 visits. Patient will continue to to demonstrate at least fair immediate standing balance in 9 visits. Patient will continue to be independent in use of heating pad to assist with pain management in 9 visits. Caregivers will continue to be independent in home safety/fall prevention measures in 9 visits. Does the patient get SOB with exertion? No shortness of breath has been noted How often (if at all) does pain interfere with patient's movements? On a daily but not constant basis limits function and sleep LIVING SITUATION AND CAREGIVING: Homebound Status: cognitive/communication deficits, difficulty with ambulation/transfers, needs assistance to leave home, needs assistive devices to ambulate, unable to manage stairs, unsafe to drive or leave residence without assistance, unsteady gait, weakness and fatigue, debiltating pain and impaired thoughts/judgment Type of Home: Assisted living facility Lives with: Alone Receives Assistance: For some ADLs, medication management and whenever ambulating outside her apartment Unresolved Safety Concerns: Decreased safety awareness  Does the patient have an Advanced Directive? Yes, copy provided or photographed for chart Does the patient have a POLST? Yes completed POLST is in the home and visible, photographed for chart and DNR entered as a signed order in EPIC. REASON FOR HOME HEALTH SUPPORTING INFORMATION: Physical therapy to assess and teach the following but not limited to: - fall prevention, hydration and home safety CURRENT LEVEL OF FUNCTION: Ambulation and Mobility: Independent in her apartment and supervised whenever exiting her apartment. Patient Equipment: walker for ambulation. Transferring: Independent except assisted with shower and car transfers Bathing: Shower Dressing:Assisted for shower and Independent dressing except to apply her pants, compression socks and shoes Special equipment used: 4 WW, grab bars, shower chair How noticeably Short of Breath is the patient during the OASIS walk or other activity? NA MEDICATION MANAGEMENT: Patient medications administered by another person Medication issues:na

## 2023-02-11 PROCEDURE — 665999 HH PPS REVENUE DEBIT

## 2023-02-11 PROCEDURE — 665998 HH PPS REVENUE CREDIT

## 2023-02-12 PROCEDURE — 665998 HH PPS REVENUE CREDIT

## 2023-02-12 PROCEDURE — 665999 HH PPS REVENUE DEBIT

## 2023-02-13 PROCEDURE — 665998 HH PPS REVENUE CREDIT

## 2023-02-13 PROCEDURE — 665999 HH PPS REVENUE DEBIT

## 2023-02-14 PROCEDURE — 665999 HH PPS REVENUE DEBIT

## 2023-02-14 PROCEDURE — 665998 HH PPS REVENUE CREDIT

## 2023-02-14 RX ORDER — FLUTICASONE PROPIONATE 50 MCG
1 SPRAY, SUSPENSION (ML) NASAL DAILY
Qty: 48 ML | Refills: 1 | Status: SHIPPED | OUTPATIENT
Start: 2023-02-14 | End: 2023-03-28

## 2023-02-15 ENCOUNTER — HOME CARE VISIT (OUTPATIENT)
Dept: HOME HEALTH SERVICES | Facility: HOME HEALTHCARE | Age: 88
End: 2023-02-15
Payer: MEDICARE

## 2023-02-15 VITALS
SYSTOLIC BLOOD PRESSURE: 102 MMHG | DIASTOLIC BLOOD PRESSURE: 52 MMHG | TEMPERATURE: 98.9 F | HEART RATE: 68 BPM | OXYGEN SATURATION: 94 % | RESPIRATION RATE: 20 BRPM

## 2023-02-15 PROCEDURE — 665998 HH PPS REVENUE CREDIT

## 2023-02-15 PROCEDURE — 665999 HH PPS REVENUE DEBIT

## 2023-02-15 PROCEDURE — G0159 HHC PT MAINT EA 15 MIN: HCPCS

## 2023-02-15 PROCEDURE — 665003 FOLLOW UP-HOME HEALTH

## 2023-02-15 ASSESSMENT — ENCOUNTER SYMPTOMS
PAIN: 1
PAIN LOCATION - PAIN QUALITY: ACHE
PAIN LOCATION - RELIEVING FACTORS: REST, MEDICATION
DEBILITATING PAIN: 1
PAIN SEVERITY GOAL: 4/10
SUBJECTIVE PAIN PROGRESSION: WAXING AND WANING
PERSON REPORTING PAIN: PATIENT
PAIN LOCATION - EXACERBATING FACTORS: MOVEMENT
HIGHEST PAIN SEVERITY IN PAST 24 HOURS: 7/10
DIFFICULTY THINKING: 1
PAIN LOCATION - PAIN SEVERITY: 6/10
PAIN: PAIN LIMITS SLEEP AND FUNCTION DAILY NOT CONSTANT
LOWEST PAIN SEVERITY IN PAST 24 HOURS: 0/10
PAIN LOCATION: LEFT SHOULDER
POOR JUDGMENT: 1
PAIN LOCATION - PAIN DURATION: DAILY
PAIN LOCATION - PAIN FREQUENCY: FREQUENT

## 2023-02-16 ENCOUNTER — DOCUMENTATION (OUTPATIENT)
Dept: MEDICAL GROUP | Facility: PHYSICIAN GROUP | Age: 88
End: 2023-02-16
Payer: MEDICARE

## 2023-02-16 ENCOUNTER — HOME CARE VISIT (OUTPATIENT)
Dept: HOME HEALTH SERVICES | Facility: HOME HEALTHCARE | Age: 88
End: 2023-02-16
Payer: MEDICARE

## 2023-02-16 PROCEDURE — 665998 HH PPS REVENUE CREDIT

## 2023-02-16 PROCEDURE — 665999 HH PPS REVENUE DEBIT

## 2023-02-16 NOTE — CASE COMMUNICATION
Quality Review for 2.9.23 Rece OASIS performed on by DENISE Davis RN on 2.16.2023:    Edits completed by DENISE Davis RN:  1.  and  dx coding updated per chart review.   2. Sent case comm to pharm to review meds

## 2023-02-16 NOTE — Clinical Note
agree with changes. Thank you  ----- Message -----  From: Mena Davis R.N.  Sent: 2/16/2023   2:04 PM PST  To: Cristina Jaramillo PT      Quality Review for 2.9.23 Recert OASIS performed on by DENISE Davis RN on 2.16.2023:    Edits completed by DENISE Davis RN:  1.  and  dx coding updated per chart review.   2. Sent case comm to pharm to review meds

## 2023-02-16 NOTE — PROGRESS NOTES
Medication chart review for [facility] services    Received referral from [facility].   Medications reviewed  compared with discharge summary if available.    Current medication list per [facility]     Current Outpatient Medications:     fluticasone, 1 Spray, Nasal, DAILY    aspirin, 81 mg, Oral, DAILY    Vitamin D3, 1.25 mg, Oral, DAILY    CORICIDIN, 1 Tablet, Oral, Q4HRS PRN    Mucinex Fast-Max Congest Cough, 20 mL, Oral, Q4HRS PRN    levothyroxine, 75 mcg, Oral, QDAY    sertraline, 25 mg, Oral, DAILY    lisinopril, TAKE 1/2 TABLET BY MOUTH TWICE A DAY    ACETAMINOPHEN 8 HOUR PO, 650 mg, Oral, BID PRN    Apoaequorin, 10 mg, Oral, DAILY    Pediatric Vitamins (MULTIVITAMIN GUMMIES CHILDRENS PO), 1 Dose, Oral, DAILY    meclizine, 25 mg, Oral, TID PRN    ondansetron, 4 mg, Oral, Q8HRS PRN    lidocaine, 1 Patch, Transdermal, QDAY PRN    amLODIPine, 5 mg, Oral, QDAY    DORZOLAMIDE HCL-TIMOLOL MAL OP, 1 Drop, Both Eyes, QAM    Famotidine-Ca Carb-Mag Hydrox, 1 Tablet, Oral, QDAY PRN    diclofenac sodium, 2 g, Topical, BID PRN    Linh Saline, 1 Drop, Both Eyes, DAILY    Melatonin, 1 Capsule, Oral, QHS PRN    Multiple Vitamins-Minerals (PRESERVISION AREDS 2 PO), 1 Tablet, Oral, DAILY    Tums Chewy Bites, 1 Tablet, Oral, QDAY PRN    travoprost, 1 Drop, Both Eyes, Q EVENING    polyethylene glycol 3350, 17 g, Oral, DAILY    Location of hospital, and discharge summary date, if applicable:       Allergies   Allergen Reactions    Morphine Anaphylaxis     anaphylaxis    Cephalexin Rash     Full body    Codeine Vomiting and Nausea    Metronidazole Vomiting and Nausea    Sulfa Drugs Rash     Full body       Labs     Lab Results   Component Value Date/Time    SODIUM 132 (L) 03/07/2022 10:50 AM    POTASSIUM 4.8 03/07/2022 10:50 AM    CHLORIDE 99 03/07/2022 10:50 AM    CO2 23 03/07/2022 10:50 AM    GLUCOSE 90 03/07/2022 10:50 AM    BUN 16 03/07/2022 10:50 AM    CREATININE 0.73 03/07/2022 10:50 AM    CREATININE 0.99 01/09/2013  07:58 AM    BUNCREATRAT 20 01/09/2013 07:58 AM    GLOMRATE >59 08/04/2010 07:50 AM     Lab Results   Component Value Date/Time    ALKPHOSPHAT 108 (H) 03/07/2022 10:50 AM    ASTSGOT 21 03/07/2022 10:50 AM    ALTSGPT 11 03/07/2022 10:50 AM    TBILIRUBIN 0.3 03/07/2022 10:50 AM    INR 1.03 12/21/2021 08:26 PM    ALBUMIN 4.5 03/07/2022 10:50 AM        Assessment for clinically significant drug interactions, drug omissions/additions, duplicative therapies.            CC   Mike Otero M.D.  [address] [facility] [address]  [facility] [address]  Fax: [phone]    [facility]  Phone [phone] fax [phone]    This note was created using voice recognition software (Dragon). The accuracy of the dictation is limited by the abilities of the software. I have reviewed the note prior to signing, however some errors in grammar and context are still possible. If you have any questions related to this note please do not hesitate to contact our office.

## 2023-02-17 PROCEDURE — G0179 MD RECERTIFICATION HHA PT: HCPCS | Performed by: INTERNAL MEDICINE

## 2023-02-17 PROCEDURE — 665999 HH PPS REVENUE DEBIT

## 2023-02-17 PROCEDURE — 665998 HH PPS REVENUE CREDIT

## 2023-02-17 ASSESSMENT — PATIENT HEALTH QUESTIONNAIRE - PHQ9
SUM OF ALL RESPONSES TO PHQ QUESTIONS 1-9: 2
CLINICAL INTERPRETATION OF PHQ2 SCORE: 1
5. POOR APPETITE OR OVEREATING: 0 - NOT AT ALL

## 2023-02-18 PROCEDURE — 665998 HH PPS REVENUE CREDIT

## 2023-02-18 PROCEDURE — 665999 HH PPS REVENUE DEBIT

## 2023-02-19 PROCEDURE — 665998 HH PPS REVENUE CREDIT

## 2023-02-19 PROCEDURE — 665999 HH PPS REVENUE DEBIT

## 2023-02-20 PROCEDURE — 665999 HH PPS REVENUE DEBIT

## 2023-02-20 PROCEDURE — 665998 HH PPS REVENUE CREDIT

## 2023-02-21 PROCEDURE — 665998 HH PPS REVENUE CREDIT

## 2023-02-21 PROCEDURE — 665999 HH PPS REVENUE DEBIT

## 2023-02-22 ENCOUNTER — HOME CARE VISIT (OUTPATIENT)
Dept: HOME HEALTH SERVICES | Facility: HOME HEALTHCARE | Age: 88
End: 2023-02-22
Payer: MEDICARE

## 2023-02-22 PROCEDURE — G0159 HHC PT MAINT EA 15 MIN: HCPCS

## 2023-02-22 PROCEDURE — 665999 HH PPS REVENUE DEBIT

## 2023-02-22 PROCEDURE — 665998 HH PPS REVENUE CREDIT

## 2023-02-22 ASSESSMENT — ENCOUNTER SYMPTOMS
PAIN LOCATION - PAIN QUALITY: ACHE
PERSON REPORTING PAIN: PATIENT
PAIN LOCATION - RELIEVING FACTORS: MEDICATION, REST
PAIN: PAIN LIMITS SLEEP AND FUNCTION DAILY NOT CONSTANT
LOWEST PAIN SEVERITY IN PAST 24 HOURS: 0/10
DIFFICULTY THINKING: 1
PAIN LOCATION - PAIN SEVERITY: 6/10
HIGHEST PAIN SEVERITY IN PAST 24 HOURS: 6/10
PAIN LOCATION - PAIN FREQUENCY: FREQUENT
PAIN SEVERITY GOAL: 4/10
POOR JUDGMENT: 1
PAIN LOCATION: LEFT SHOULDER
PAIN LOCATION - EXACERBATING FACTORS: MOVEMENT
PAIN LOCATION - PAIN DURATION: DAILY
PAIN: 1
SUBJECTIVE PAIN PROGRESSION: WAXING AND WANING
DEBILITATING PAIN: 1

## 2023-02-23 ENCOUNTER — HOSPITAL ENCOUNTER (OUTPATIENT)
Facility: MEDICAL CENTER | Age: 88
End: 2023-02-23
Attending: INTERNAL MEDICINE
Payer: MEDICARE

## 2023-02-23 ENCOUNTER — NON-PROVIDER VISIT (OUTPATIENT)
Dept: INTERNAL MEDICINE | Facility: IMAGING CENTER | Age: 88
End: 2023-02-23
Payer: MEDICARE

## 2023-02-23 DIAGNOSIS — I10 ESSENTIAL HYPERTENSION: ICD-10-CM

## 2023-02-23 DIAGNOSIS — E03.9 HYPOTHYROIDISM, UNSPECIFIED TYPE: ICD-10-CM

## 2023-02-23 DIAGNOSIS — E78.00 PURE HYPERCHOLESTEROLEMIA: ICD-10-CM

## 2023-02-23 LAB
ALBUMIN SERPL BCP-MCNC: 4.3 G/DL (ref 3.2–4.9)
ALBUMIN/GLOB SERPL: 1.5 G/DL
ALP SERPL-CCNC: 86 U/L (ref 30–99)
ALT SERPL-CCNC: 12 U/L (ref 2–50)
ANION GAP SERPL CALC-SCNC: 9 MMOL/L (ref 7–16)
AST SERPL-CCNC: 16 U/L (ref 12–45)
BASOPHILS # BLD AUTO: 0.5 % (ref 0–1.8)
BASOPHILS # BLD: 0.02 K/UL (ref 0–0.12)
BILIRUB SERPL-MCNC: 0.3 MG/DL (ref 0.1–1.5)
BUN SERPL-MCNC: 15 MG/DL (ref 8–22)
CALCIUM ALBUM COR SERPL-MCNC: 8.3 MG/DL (ref 8.5–10.5)
CALCIUM SERPL-MCNC: 8.5 MG/DL (ref 8.5–10.5)
CHLORIDE SERPL-SCNC: 103 MMOL/L (ref 96–112)
CHOLEST SERPL-MCNC: 147 MG/DL (ref 100–199)
CO2 SERPL-SCNC: 24 MMOL/L (ref 20–33)
CREAT SERPL-MCNC: 0.64 MG/DL (ref 0.5–1.4)
EOSINOPHIL # BLD AUTO: 0.09 K/UL (ref 0–0.51)
EOSINOPHIL NFR BLD: 2.2 % (ref 0–6.9)
ERYTHROCYTE [DISTWIDTH] IN BLOOD BY AUTOMATED COUNT: 52.2 FL (ref 35.9–50)
GFR SERPLBLD CREATININE-BSD FMLA CKD-EPI: 80 ML/MIN/1.73 M 2
GLOBULIN SER CALC-MCNC: 2.8 G/DL (ref 1.9–3.5)
GLUCOSE SERPL-MCNC: 113 MG/DL (ref 65–99)
HCT VFR BLD AUTO: 37.9 % (ref 37–47)
HDLC SERPL-MCNC: 49 MG/DL
HGB BLD-MCNC: 11.9 G/DL (ref 12–16)
IMM GRANULOCYTES # BLD AUTO: 0.01 K/UL (ref 0–0.11)
IMM GRANULOCYTES NFR BLD AUTO: 0.2 % (ref 0–0.9)
LDLC SERPL CALC-MCNC: 69 MG/DL
LYMPHOCYTES # BLD AUTO: 2.17 K/UL (ref 1–4.8)
LYMPHOCYTES NFR BLD: 53.1 % (ref 22–41)
MCH RBC QN AUTO: 27.9 PG (ref 27–33)
MCHC RBC AUTO-ENTMCNC: 31.4 G/DL (ref 33.6–35)
MCV RBC AUTO: 89 FL (ref 81.4–97.8)
MONOCYTES # BLD AUTO: 0.21 K/UL (ref 0–0.85)
MONOCYTES NFR BLD AUTO: 5.1 % (ref 0–13.4)
NEUTROPHILS # BLD AUTO: 1.59 K/UL (ref 2–7.15)
NEUTROPHILS NFR BLD: 38.9 % (ref 44–72)
NRBC # BLD AUTO: 0 K/UL
NRBC BLD-RTO: 0 /100 WBC
PLATELET # BLD AUTO: 227 K/UL (ref 164–446)
PMV BLD AUTO: 9.8 FL (ref 9–12.9)
POTASSIUM SERPL-SCNC: 4.8 MMOL/L (ref 3.6–5.5)
PROT SERPL-MCNC: 7.1 G/DL (ref 6–8.2)
RBC # BLD AUTO: 4.26 M/UL (ref 4.2–5.4)
SODIUM SERPL-SCNC: 136 MMOL/L (ref 135–145)
T4 FREE SERPL-MCNC: 1.49 NG/DL (ref 0.93–1.7)
TRIGL SERPL-MCNC: 143 MG/DL (ref 0–149)
TSH SERPL DL<=0.005 MIU/L-ACNC: 1.43 UIU/ML (ref 0.38–5.33)
WBC # BLD AUTO: 4.1 K/UL (ref 4.8–10.8)

## 2023-02-23 PROCEDURE — 80061 LIPID PANEL: CPT

## 2023-02-23 PROCEDURE — 85025 COMPLETE CBC W/AUTO DIFF WBC: CPT

## 2023-02-23 PROCEDURE — 80053 COMPREHEN METABOLIC PANEL: CPT

## 2023-02-23 PROCEDURE — 84443 ASSAY THYROID STIM HORMONE: CPT

## 2023-02-23 PROCEDURE — 84439 ASSAY OF FREE THYROXINE: CPT

## 2023-02-23 PROCEDURE — 665999 HH PPS REVENUE DEBIT

## 2023-02-23 PROCEDURE — 665998 HH PPS REVENUE CREDIT

## 2023-02-24 PROCEDURE — 665998 HH PPS REVENUE CREDIT

## 2023-02-24 PROCEDURE — 665999 HH PPS REVENUE DEBIT

## 2023-02-25 PROCEDURE — 665998 HH PPS REVENUE CREDIT

## 2023-02-25 PROCEDURE — 665999 HH PPS REVENUE DEBIT

## 2023-02-26 PROCEDURE — 665998 HH PPS REVENUE CREDIT

## 2023-02-26 PROCEDURE — 665999 HH PPS REVENUE DEBIT

## 2023-02-27 PROCEDURE — 665999 HH PPS REVENUE DEBIT

## 2023-02-27 PROCEDURE — 665998 HH PPS REVENUE CREDIT

## 2023-02-28 PROCEDURE — 665998 HH PPS REVENUE CREDIT

## 2023-02-28 PROCEDURE — 665999 HH PPS REVENUE DEBIT

## 2023-03-01 ENCOUNTER — HOME CARE VISIT (OUTPATIENT)
Dept: HOME HEALTH SERVICES | Facility: HOME HEALTHCARE | Age: 88
End: 2023-03-01
Payer: MEDICARE

## 2023-03-01 VITALS
OXYGEN SATURATION: 93 % | SYSTOLIC BLOOD PRESSURE: 90 MMHG | DIASTOLIC BLOOD PRESSURE: 58 MMHG | HEART RATE: 64 BPM | TEMPERATURE: 98.3 F

## 2023-03-01 PROCEDURE — 665998 HH PPS REVENUE CREDIT

## 2023-03-01 PROCEDURE — G0151 HHCP-SERV OF PT,EA 15 MIN: HCPCS

## 2023-03-01 PROCEDURE — 665999 HH PPS REVENUE DEBIT

## 2023-03-01 ASSESSMENT — ENCOUNTER SYMPTOMS
PAIN LOCATION - PAIN FREQUENCY: FREQUENT
PAIN: 1
PAIN: PAIN LIMITS FUNCTION AND SLEEP DAILY NOT CONSTANT
SUBJECTIVE PAIN PROGRESSION: WAXING AND WANING
PAIN LOCATION: LEFT SHOULDER
PAIN SEVERITY GOAL: 4/10
PERSON REPORTING PAIN: PATIENT
HIGHEST PAIN SEVERITY IN PAST 24 HOURS: 7/10
LOWEST PAIN SEVERITY IN PAST 24 HOURS: 0/10
PAIN LOCATION - PAIN SEVERITY: 6/10
PAIN LOCATION - EXACERBATING FACTORS: MOVEMENT
POOR JUDGMENT: 1
PAIN LOCATION - PAIN DURATION: DAILY
DIFFICULTY THINKING: 1
PAIN LOCATION - PAIN QUALITY: ACHE

## 2023-03-02 PROCEDURE — 665999 HH PPS REVENUE DEBIT

## 2023-03-02 PROCEDURE — 665998 HH PPS REVENUE CREDIT

## 2023-03-03 PROCEDURE — 665998 HH PPS REVENUE CREDIT

## 2023-03-03 PROCEDURE — 665999 HH PPS REVENUE DEBIT

## 2023-03-04 PROCEDURE — 665998 HH PPS REVENUE CREDIT

## 2023-03-04 PROCEDURE — 665999 HH PPS REVENUE DEBIT

## 2023-03-05 DIAGNOSIS — J01.40 ACUTE NON-RECURRENT PANSINUSITIS: ICD-10-CM

## 2023-03-05 PROCEDURE — 665999 HH PPS REVENUE DEBIT

## 2023-03-05 PROCEDURE — 665998 HH PPS REVENUE CREDIT

## 2023-03-05 RX ORDER — DOXYCYCLINE 100 MG/1
100 CAPSULE ORAL 2 TIMES DAILY
Qty: 10 CAPSULE | Refills: 0 | Status: SHIPPED | OUTPATIENT
Start: 2023-03-05 | End: 2023-04-25 | Stop reason: SDUPTHER

## 2023-03-06 ENCOUNTER — HOME CARE VISIT (OUTPATIENT)
Dept: HOME HEALTH SERVICES | Facility: HOME HEALTHCARE | Age: 88
End: 2023-03-06
Payer: MEDICARE

## 2023-03-06 VITALS
RESPIRATION RATE: 20 BRPM | HEART RATE: 56 BPM | TEMPERATURE: 98.3 F | OXYGEN SATURATION: 95 % | SYSTOLIC BLOOD PRESSURE: 122 MMHG | DIASTOLIC BLOOD PRESSURE: 62 MMHG

## 2023-03-06 PROCEDURE — 665999 HH PPS REVENUE DEBIT

## 2023-03-06 PROCEDURE — G0159 HHC PT MAINT EA 15 MIN: HCPCS

## 2023-03-06 PROCEDURE — 665998 HH PPS REVENUE CREDIT

## 2023-03-06 ASSESSMENT — ENCOUNTER SYMPTOMS
PERSON REPORTING PAIN: PATIENT
HIGHEST PAIN SEVERITY IN PAST 24 HOURS: 5/10
PAIN LOCATION: LEFT SHOULDER
PAIN: PAIN LIMITS SLEEP AND FUNCTION DAILY NOT CONSTANT
SUBJECTIVE PAIN PROGRESSION: WAXING AND WANING
PAIN LOCATION - PAIN QUALITY: ACHE
DEBILITATING PAIN: 1
DIFFICULTY THINKING: 1
POOR JUDGMENT: 1
LOWEST PAIN SEVERITY IN PAST 24 HOURS: 0/10
PAIN LOCATION - PAIN DURATION: DAILY
PAIN: 1
PAIN LOCATION - RELIEVING FACTORS: REST, HEAT, MEDICATION
PAIN LOCATION - PAIN SEVERITY: 4/10
PAIN LOCATION - EXACERBATING FACTORS: MOVEMENT
PAIN LOCATION - PAIN FREQUENCY: FREQUENT

## 2023-03-06 NOTE — Clinical Note
she seemed her usual self so up to you. Due to her dementia fluid intake always an issue.  ----- Message -----  From: Keerthi Lindsay R.N.  Sent: 3/7/2023   8:52 AM PST  To: Cristina Jaramillo PT  Subject: RE:                                                Noted, do you think she needs a nursing visit at this time due to UTI  ----- Message -----  From: Cristina Jaramillo PT  Sent: 3/6/2023   6:43 PM PST  To: Maria Luisa Song R.N., Mike Otero M.D., *      Pt is being treated for UTI.   P.T. reassessment performed 3/6/2023 and I will continue maintenance P.T.      PHYSICAL THERAPY REASSESSMENT AND PLAN OF CARE · Patient: Gloria Patel · Patient is preserving her functional mobility and skills with maintenance physical therapy services. She has not had a fall in the last 4 weeks. She does have a current UTI. She continues to have significant risk of decline if she does not participate in maintenance physical therapy services. Patient is agreeable to continue physical therapy maintenance services. Therapist agrees she would continue to benefit · Maintenance Physical Therapy Need: Decreased activity tolerance, Balance control, Generalized deconditioning, Gait training, Fall prevention, Pain control and Poor safety awareness. Recommend skilled HHPT to address deficits and maintain function-report sent to MD · Frequency: 1 week 4 and recertify on 4/10/23, Effective 3/13/2023 · Goals: Remain as written with same goal date on last recertification effective on 2/11/2023.  How often (if at all) does pain interfere with patient's movements? Pain limits sleep and function on a daily but not constant basis.

## 2023-03-06 NOTE — Clinical Note
Pt is being treated for UTI.   P.T. reassessment performed 3/6/2023 and I will continue maintenance P.T.      PHYSICAL THERAPY REASSESSMENT AND PLAN OF CARE · Patient: Gloria Patel · Patient is preserving her functional mobility and skills with maintenance physical therapy services. She has not had a fall in the last 4 weeks. She does have a current UTI. She continues to have significant risk of decline if she does not participate in maintenance physical therapy services. Patient is agreeable to continue physical therapy maintenance services. Therapist agrees she would continue to benefit · Maintenance Physical Therapy Need: Decreased activity tolerance, Balance control, Generalized deconditioning, Gait training, Fall prevention, Pain control and Poor safety awareness. Recommend skilled HHPT to address deficits and maintain function-report sent to MD · Frequency: 1 week 4 and recertify on 4/10/23, Effective 3/13/2023 · Goals: Remain as written with same goal date on last recertification effective on 2/11/2023.  How often (if at all) does pain interfere with patient's movements? Pain limits sleep and function on a daily but not constant basis.

## 2023-03-06 NOTE — Clinical Note
Noted, do you think she needs a nursing visit at this time due to UTI  ----- Message -----  From: Cristina Jaramillo, PT  Sent: 3/6/2023   6:43 PM PST  To: Maria Luisa Song R.N., Mike Otero M.D., *      Pt is being treated for UTI.   P.T. reassessment performed 3/6/2023 and I will continue maintenance P.T.      PHYSICAL THERAPY REASSESSMENT AND PLAN OF CARE · Patient: Gloria Patel · Patient is preserving her functional mobility and skills with maintenance physical therapy services. She has not had a fall in the last 4 weeks. She does have a current UTI. She continues to have significant risk of decline if she does not participate in maintenance physical therapy services. Patient is agreeable to continue physical therapy maintenance services. Therapist agrees she would continue to benefit · Maintenance Physical Therapy Need: Decreased activity tolerance, Balance control, Generalized deconditioning, Gait training, Fall prevention, Pain control and Poor safety awareness. Recommend skilled HHPT to address deficits and maintain function-report sent to MD · Frequency: 1 week 4 and recertify on 4/10/23, Effective 3/13/2023 · Goals: Remain as written with same goal date on last recertification effective on 2/11/2023.  How often (if at all) does pain interfere with patient's movements? Pain limits sleep and function on a daily but not constant basis.

## 2023-03-06 NOTE — Clinical Note
Noted.  ----- Message -----  From: Cristina Jaramillo PT  Sent: 3/7/2023   3:18 PM PST  To: Keerthi Lindsay R.N.  Subject: RE:                                                she seemed her usual self so up to you. Due to her dementia fluid intake always an issue.  ----- Message -----  From: Keerthi Lindsay R.N.  Sent: 3/7/2023   8:52 AM PST  To: Cristina Jaramillo PT  Subject: RE:                                                Noted, do you think she needs a nursing visit at this time due to UTI  ----- Message -----  From: Cristina Jaramillo PT  Sent: 3/6/2023   6:43 PM PST  To: Maria Luisa Song R.N., Mike Otero M.D., *      Pt is being treated for UTI.   P.T. reassessment performed 3/6/2023 and I will continue maintenance P.T.      PHYSICAL THERAPY REASSESSMENT AND PLAN OF CARE · Patient: Gloria Patel · Patient is preserving her functional mobility and skills with maintenance physical therapy services. She has not had a fall in the last 4 weeks. She does have a current UTI. She continues to have significant risk of decline if she does not participate in maintenance physical therapy services. Patient is agreeable to continue physical therapy maintenance services. Therapist agrees she would continue to benefit · Maintenance Physical Therapy Need: Decreased activity tolerance, Balance control, Generalized deconditioning, Gait training, Fall prevention, Pain control and Poor safety awareness. Recommend skilled HHPT to address deficits and maintain function-report sent to MD · Frequency: 1 week 4 and recertify on 4/10/23, Effective 3/13/2023 · Goals: Remain as written with same goal date on last recertification effective on 2/11/2023.  How often (if at all) does pain interfere with patient's movements? Pain limits sleep and function on a daily but not constant basis.

## 2023-03-07 PROCEDURE — 665999 HH PPS REVENUE DEBIT

## 2023-03-07 PROCEDURE — 665998 HH PPS REVENUE CREDIT

## 2023-03-07 NOTE — HOME HEALTH
PHYSICAL THERAPY REASSESSMENT AND PLAN OF CARE · Patient: Gloria Patel · Patient is preserving her functional mobility and skills with maintenance physical therapy services. She has not had a fall in the last 4 weeks. She does have a current UTI. She continues to have significant risk of decline if she does not participate in maintenance physical therapy services. Patient is agreeable to continue physical therapy maintenance services. Therapist agrees she would continue to benefit · Maintenance Physical Therapy Need: Decreased activity tolerance, Balance control, Generalized deconditioning, Gait training, Fall prevention, Pain control and Poor safety awareness. Recommend skilled HHPT to address deficits and maintain function-report sent to MD · Frequency: 1 week 4 and recertify on 4/10/23, Effective 3/13/2023 · Goals: Remain as written with same goal date on last recertification effective on 2/11/2023.  How often (if at all) does pain interfere with patient's movements? Pain limits sleep and function on a daily but not constant basis.

## 2023-03-08 PROCEDURE — 665999 HH PPS REVENUE DEBIT

## 2023-03-08 PROCEDURE — 665998 HH PPS REVENUE CREDIT

## 2023-03-09 PROCEDURE — 665998 HH PPS REVENUE CREDIT

## 2023-03-09 PROCEDURE — 665999 HH PPS REVENUE DEBIT

## 2023-03-10 PROCEDURE — 665999 HH PPS REVENUE DEBIT

## 2023-03-10 PROCEDURE — 665998 HH PPS REVENUE CREDIT

## 2023-03-11 PROCEDURE — 665999 HH PPS REVENUE DEBIT

## 2023-03-11 PROCEDURE — 665998 HH PPS REVENUE CREDIT

## 2023-03-12 PROCEDURE — 665998 HH PPS REVENUE CREDIT

## 2023-03-12 PROCEDURE — 665999 HH PPS REVENUE DEBIT

## 2023-03-13 ENCOUNTER — HOME CARE VISIT (OUTPATIENT)
Dept: HOME HEALTH SERVICES | Facility: HOME HEALTHCARE | Age: 88
End: 2023-03-13
Payer: MEDICARE

## 2023-03-13 VITALS
OXYGEN SATURATION: 95 % | SYSTOLIC BLOOD PRESSURE: 100 MMHG | HEART RATE: 60 BPM | RESPIRATION RATE: 18 BRPM | TEMPERATURE: 98.5 F | DIASTOLIC BLOOD PRESSURE: 50 MMHG

## 2023-03-13 PROCEDURE — 665998 HH PPS REVENUE CREDIT

## 2023-03-13 PROCEDURE — G0159 HHC PT MAINT EA 15 MIN: HCPCS

## 2023-03-13 PROCEDURE — 665999 HH PPS REVENUE DEBIT

## 2023-03-13 PROCEDURE — 665003 FOLLOW UP-HOME HEALTH

## 2023-03-13 ASSESSMENT — ENCOUNTER SYMPTOMS
PAIN LOCATION - PAIN QUALITY: ACHE
PAIN LOCATION: LEFT SHOULDER
PAIN SEVERITY GOAL: 2/10
PAIN LOCATION - PAIN SEVERITY: 5/10
PAIN LOCATION - PAIN DURATION: DAILY
PAIN LOCATION - RELIEVING FACTORS: REST, MEDICATION
POOR JUDGMENT: 1
LOWEST PAIN SEVERITY IN PAST 24 HOURS: 0/10
DIFFICULTY THINKING: 1
HIGHEST PAIN SEVERITY IN PAST 24 HOURS: 6/10
PAIN LOCATION - PAIN FREQUENCY: INTERMITTENT
SUBJECTIVE PAIN PROGRESSION: WAXING AND WANING
PAIN LOCATION - EXACERBATING FACTORS: MOVEMENT
PAIN: 1
DEBILITATING PAIN: 1
PERSON REPORTING PAIN: PATIENT

## 2023-03-14 PROCEDURE — 665998 HH PPS REVENUE CREDIT

## 2023-03-14 PROCEDURE — 665999 HH PPS REVENUE DEBIT

## 2023-03-15 PROCEDURE — 665999 HH PPS REVENUE DEBIT

## 2023-03-15 PROCEDURE — 665998 HH PPS REVENUE CREDIT

## 2023-03-16 PROCEDURE — 665998 HH PPS REVENUE CREDIT

## 2023-03-16 PROCEDURE — 665999 HH PPS REVENUE DEBIT

## 2023-03-17 PROCEDURE — 665999 HH PPS REVENUE DEBIT

## 2023-03-17 PROCEDURE — 665998 HH PPS REVENUE CREDIT

## 2023-03-18 PROCEDURE — 665998 HH PPS REVENUE CREDIT

## 2023-03-18 PROCEDURE — 665999 HH PPS REVENUE DEBIT

## 2023-03-19 PROCEDURE — 665998 HH PPS REVENUE CREDIT

## 2023-03-19 PROCEDURE — 665999 HH PPS REVENUE DEBIT

## 2023-03-20 ENCOUNTER — OFFICE VISIT (OUTPATIENT)
Dept: INTERNAL MEDICINE | Facility: IMAGING CENTER | Age: 88
End: 2023-03-20
Payer: MEDICARE

## 2023-03-20 ENCOUNTER — HOSPITAL ENCOUNTER (OUTPATIENT)
Facility: MEDICAL CENTER | Age: 88
End: 2023-03-20
Attending: INTERNAL MEDICINE
Payer: MEDICARE

## 2023-03-20 VITALS
DIASTOLIC BLOOD PRESSURE: 70 MMHG | OXYGEN SATURATION: 94 % | BODY MASS INDEX: 24.02 KG/M2 | WEIGHT: 123 LBS | HEART RATE: 55 BPM | SYSTOLIC BLOOD PRESSURE: 130 MMHG | RESPIRATION RATE: 16 BRPM | TEMPERATURE: 97.6 F

## 2023-03-20 DIAGNOSIS — E03.9 HYPOTHYROIDISM, UNSPECIFIED TYPE: ICD-10-CM

## 2023-03-20 DIAGNOSIS — I27.20 PULMONARY HYPERTENSION, UNSPECIFIED (HCC): ICD-10-CM

## 2023-03-20 DIAGNOSIS — I10 ESSENTIAL HYPERTENSION: ICD-10-CM

## 2023-03-20 DIAGNOSIS — Z00.00 MEDICARE ANNUAL WELLNESS VISIT, SUBSEQUENT: ICD-10-CM

## 2023-03-20 DIAGNOSIS — R63.4 WEIGHT LOSS: ICD-10-CM

## 2023-03-20 DIAGNOSIS — G31.84 MCI (MILD COGNITIVE IMPAIRMENT): ICD-10-CM

## 2023-03-20 DIAGNOSIS — F32.0 MAJOR DEPRESSIVE DISORDER, SINGLE EPISODE, MILD (HCC): ICD-10-CM

## 2023-03-20 DIAGNOSIS — D64.9 ANEMIA, UNSPECIFIED TYPE: ICD-10-CM

## 2023-03-20 DIAGNOSIS — E78.00 PURE HYPERCHOLESTEROLEMIA: ICD-10-CM

## 2023-03-20 DIAGNOSIS — M80.00XD AGE-RELATED OSTEOPOROSIS WITH CURRENT PATHOLOGICAL FRACTURE WITH ROUTINE HEALING, SUBSEQUENT ENCOUNTER: ICD-10-CM

## 2023-03-20 LAB
APPEARANCE UR: ABNORMAL
BACTERIA #/AREA URNS HPF: ABNORMAL /HPF
BILIRUB UR QL STRIP.AUTO: NEGATIVE
COLOR UR: YELLOW
EPI CELLS #/AREA URNS HPF: ABNORMAL /HPF
GLUCOSE UR STRIP.AUTO-MCNC: NEGATIVE MG/DL
HYALINE CASTS #/AREA URNS LPF: ABNORMAL /LPF
KETONES UR STRIP.AUTO-MCNC: NEGATIVE MG/DL
LEUKOCYTE ESTERASE UR QL STRIP.AUTO: ABNORMAL
MICRO URNS: ABNORMAL
NITRITE UR QL STRIP.AUTO: POSITIVE
PH UR STRIP.AUTO: 7 [PH] (ref 5–8)
PROT UR QL STRIP: NEGATIVE MG/DL
RBC # URNS HPF: ABNORMAL /HPF
RBC UR QL AUTO: NEGATIVE
SP GR UR STRIP.AUTO: 1.01
UROBILINOGEN UR STRIP.AUTO-MCNC: 0.2 MG/DL
WBC #/AREA URNS HPF: ABNORMAL /HPF

## 2023-03-20 PROCEDURE — G0439 PPPS, SUBSEQ VISIT: HCPCS | Performed by: INTERNAL MEDICINE

## 2023-03-20 PROCEDURE — 665999 HH PPS REVENUE DEBIT

## 2023-03-20 PROCEDURE — 87186 SC STD MICRODIL/AGAR DIL: CPT

## 2023-03-20 PROCEDURE — 665998 HH PPS REVENUE CREDIT

## 2023-03-20 PROCEDURE — 82043 UR ALBUMIN QUANTITATIVE: CPT

## 2023-03-20 PROCEDURE — 82570 ASSAY OF URINE CREATININE: CPT

## 2023-03-20 PROCEDURE — 87086 URINE CULTURE/COLONY COUNT: CPT

## 2023-03-20 PROCEDURE — 87077 CULTURE AEROBIC IDENTIFY: CPT

## 2023-03-20 PROCEDURE — 81001 URINALYSIS AUTO W/SCOPE: CPT

## 2023-03-20 RX ORDER — AMLODIPINE BESYLATE 5 MG/1
5 TABLET ORAL
Qty: 90 TABLET | Refills: 3 | Status: SHIPPED | OUTPATIENT
Start: 2023-03-20 | End: 2023-12-26

## 2023-03-20 ASSESSMENT — FIBROSIS 4 INDEX: FIB4 SCORE: 1.99

## 2023-03-20 ASSESSMENT — ACTIVITIES OF DAILY LIVING (ADL): BATHING_REQUIRES_ASSISTANCE: 0

## 2023-03-20 ASSESSMENT — ENCOUNTER SYMPTOMS: GENERAL WELL-BEING: FAIR

## 2023-03-21 LAB
CREAT UR-MCNC: 92.99 MG/DL
MICROALBUMIN UR-MCNC: <1.2 MG/DL
MICROALBUMIN/CREAT UR: NORMAL MG/G (ref 0–30)

## 2023-03-21 PROCEDURE — 665999 HH PPS REVENUE DEBIT

## 2023-03-21 PROCEDURE — 665998 HH PPS REVENUE CREDIT

## 2023-03-21 NOTE — PROGRESS NOTES
89 y.o. female presents for the followin. Medicare annual wellness visit, subsequent  Patient comes in for annual health risk assessment, physical review laboratory. She considers herself in fair health.  No balance issues. She has depression discussed below. Denies any cognitive issues but this is also discussed below.    2. Essential hypertension  Stable. Blood pressure is at goal. She is compliant with lisinopril and amlodipine. No side effects.    3. Hypothyroidism, unspecified type  Stable. Patient is clinically euthyroid. Last labs:  Lab Results   Component Value Date/Time    TSHULTRASEN 1.430 2023 09:00 AM    TSHULTRASEN 2.420 2022 10:50 AM     Lab Results   Component Value Date/Time    FREET4 1.49 2023 0900    FREET4 1.45 2022 1050     4. Pure hypercholesterolemia  Stable. LDL cholesterol is at goal.  Lab Results   Component Value Date/Time    CHOLSTRLTOT 147 2023 09:00 AM    LDL 69 2023 09:00 AM    HDL 49 2023 09:00 AM    TRIGLYCERIDE 143 2023 09:00 AM       5. Major depressive disorder, single episode, mild (HCC)  Worse. Patient is becoming more socially isolated. She has been on sertraline 25 mg. Initially this seemed helpful. No suicidal thoughts or other.    6. Pulmonary hypertension, unspecified (HCC)  Stable. No symptoms. Right ventricular pressure was estimated at 44 mm MercuryIn  on echocardiogram    7. MCI (mild cognitive impairment)  worse. Patients word recall was 0/3. She was unable to draw clock. She appears to have good insight regarding her condition. She is friendly and appropriate.    8. Weight loss  Worse. Weight is down 4 pounds. She does not like the food at the assisted living that she resides at.    9. Anemia, unspecified type  Hemoglobin is 11.9 which is stable overall. She had her iron evaluated in  and it was normal. It has always been normal in the past. No melena or rectal bleeding.    10. Age-related osteoporosis with  current pathological fracture with routine healing, subsequent encounter  Stable. She continues to receive Prolia every six months      Annual Wellness Visit/Health Risk Assessment:    Past medical:  Past Medical History:   Diagnosis Date    Arthritis     Diverticulitis     medicated    GERD (gastroesophageal reflux disease)     medicated    Glaucoma     Heart burn     Hiatus hernia syndrome     Hypertension     medicated    Primary osteoarthritis of both shoulders 1/22/2019    Unspecified urinary incontinence        Past surgical:  Past Surgical History:   Procedure Laterality Date    ORIF, ANKLE Right 12/22/2021    Procedure: OPEN REDUCTION AND INTERNAL FIXATION, ANKLE;  Surgeon: Pop Causey M.D.;  Location: SURGERY [facility];  Service: Orthopedics    HIP ARTHROPLASTY TOTAL  6/21/2011    Performed by AMPARO CRAFT at SURGERY [facility] ORS    OTHER ORTHOPEDIC SURGERY  2000    back surgery    GYN SURGERY  1970    hysterectomy    CHOLECYSTECTOMY  1966    rahat    OTHER  1954    mikey. breast bx    OTHER ABDOMINAL SURGERY  1954    kidney tacked up ?    OTHER  1945    tonsilectomy    OTHER ORTHOPEDIC SURGERY      osteoporosis - medicated    US-NEEDLE CORE BX-BREAST PANEL         Family history: relating to possible risk factors for your patient  Family History   Problem Relation Age of Onset    Diabetes Other     Heart Disease Other     Lung Disease Other     Cancer Sister        Current Providers (including home care/DME’s):   Colonoscopy No repeat recommended  2/16/18  FIT NEG Dexa 9/22/15  Osteoporosis   Mammo 5/17/19 Cat 2 A1c 1/9/13  5.7   -[facility] ENT-Elías  [facility]    Patient Care Team:  Mike Otero M.D. as PCP - General  Khloe Jones R.N. (Inactive) as Registered Nurse  [facility] as Home Health Provider  [facility] as Home Health Provider  SELWYN James R.N.  [facility] as Home Health Provider  (Home Health)      Medications:   Current Outpatient Medications Ordered in Epic   Medication Sig Dispense Refill    amLODIPine (NORVASC) 5 MG Tab TAKE 1 TABLET BY MOUTH EVERY DAY 90 Tablet 3    sertraline (ZOLOFT) 50 MG Tab Take 1 Tablet by mouth every day. Indications: Major Depressive Disorder 90 Tablet 3    fluticasone (FLONASE) 50 MCG/ACT nasal spray ADMINISTER 1 SPRAY INTO AFFECTED NOSTRIL(S) EVERY DAY. 48 mL 1    levothyroxine (SYNTHROID) 75 MCG Tab Take 1 Tablet by mouth every day. 90 Tablet 3    lisinopril (PRINIVIL) 20 MG Tab TAKE 1/2 TABLET BY MOUTH TWICE A DAY 90 Tablet 3    Cholecalciferol (VITAMIN D3) 1.25 MG (97391 UT) Tab Take 1.25 mg by mouth every day. Indications: supplement      Chlorpheniramine-APAP (CORICIDIN) 2-325 MG Tab Take 1 Tablet by mouth every four hours as needed (cold symptoms). take only for cold symptoms; do NOT take additional tylenol  Indications: Cold Symptoms      ACETAMINOPHEN 8 HOUR PO Take 650 mg by mouth 2 times a day as needed (moderate pain). do not exceed 2500 mg toatal in a day  Indications: Pain      Apoaequorin 10 MG Cap Take 10 mg by mouth every day. Indications: memory loss      Pediatric Vitamins (MULTIVITAMIN GUMMIES CHILDRENS PO) Take 1 Dose by mouth every day. Indications: supplement      meclizine (ANTIVERT) 25 MG Tab Take 1 Tablet by mouth 3 times a day as needed (for dizziness). 60 Tablet 0    ondansetron (ZOFRAN ODT) 4 MG TABLET DISPERSIBLE Take 1 Tablet by mouth every 8 hours as needed for Nausea. 60 Tablet 0    DORZOLAMIDE HCL-TIMOLOL MAL OP Administer 1 Drop into both eyes every morning. Indications: Glaucoma      Famotidine-Ca Carb-Mag Hydrox -165 MG Chew Tab Chew 1 Tablet 1 time a day as needed (for indegestion). Indications: Heartburn      diclofenac sodium (VOLTAREN) 1 % Gel Apply 2 g topically 2 times a day as needed (for mild to moderate pain). Indications: Joint Damage causing Pain and Loss of Function      Soft Lens Products (FRANCESCA SALINE)  Solution Administer 1 Drop into both eyes every day. Indications: supplement      Melatonin 5 MG Cap Take 1 Capsule by mouth at bedtime as needed (for insomnia, takes first). 5 to 10 mg as needed for sleep  Indications: Trouble Sleeping      Multiple Vitamins-Minerals (PRESERVISION AREDS 2 PO) Take 1 Tablet by mouth every day. Indications: supplement      calcium carbonate (TUMS CHEWY BITES) 750 MG chewable tablet Chew 1 Tablet 1 time a day as needed (for stomach upset). Indications: Heartburn      travoprost (TRAVATAN Z) 0.004 % Solution Administer 1 Drop into both eyes every evening. Indications: Wide-Angle Glaucoma      polyethylene glycol 3350 (MIRALAX) Powder Take 17 g by mouth every day. Indications: Constipation       No current Epic-ordered facility-administered medications on file.       Supplements (calcium/vitamins): if not lisited in medications    Chief Complaint   Patient presents with    Medicare Annual Wellness         HPI:  Gloria Patel is a 89 y.o. here for Medicare Annual Wellness Visit     Patient Active Problem List    Diagnosis Date Noted    Major depressive disorder, single episode, mild (HCC) 02/02/2023    Pulmonary hypertension, unspecified (HCC) 02/02/2023    Carpal tunnel syndrome of right wrist 10/10/2022    Osteoarthritis of right hand 10/10/2022    Age-related physical debility 01/13/2022    Sundowning 12/27/2021    Fracture of right ankle 12/26/2021    Recurrent UTI 01/20/2020    Renal cyst 09/28/2019    Bradycardia 08/24/2019    Primary osteoarthritis of both shoulders 01/22/2019    TIA (transient ischemic attack) 04/28/2016    HTN (hypertension) 04/28/2016    Vision changes 04/28/2016    Osteoporosis 09/08/2015    GERD (gastroesophageal reflux disease) 05/22/2012    Spondylolisthesis of lumbar region 05/22/2012    Intervertebral disc protrusion 05/22/2012    Hyperlipidemia 03/07/2012    Hiatal hernia 11/09/2010    Hip pain 08/24/2010    Hypothyroid 08/24/2010    Overactive  bladder 08/24/2010       Current Outpatient Medications   Medication Sig Dispense Refill    amLODIPine (NORVASC) 5 MG Tab TAKE 1 TABLET BY MOUTH EVERY DAY 90 Tablet 3    sertraline (ZOLOFT) 50 MG Tab Take 1 Tablet by mouth every day. Indications: Major Depressive Disorder 90 Tablet 3    fluticasone (FLONASE) 50 MCG/ACT nasal spray ADMINISTER 1 SPRAY INTO AFFECTED NOSTRIL(S) EVERY DAY. 48 mL 1    levothyroxine (SYNTHROID) 75 MCG Tab Take 1 Tablet by mouth every day. 90 Tablet 3    lisinopril (PRINIVIL) 20 MG Tab TAKE 1/2 TABLET BY MOUTH TWICE A DAY 90 Tablet 3    Cholecalciferol (VITAMIN D3) 1.25 MG (13428 UT) Tab Take 1.25 mg by mouth every day. Indications: supplement      Chlorpheniramine-APAP (CORICIDIN) 2-325 MG Tab Take 1 Tablet by mouth every four hours as needed (cold symptoms). take only for cold symptoms; do NOT take additional tylenol  Indications: Cold Symptoms      ACETAMINOPHEN 8 HOUR PO Take 650 mg by mouth 2 times a day as needed (moderate pain). do not exceed 2500 mg toatal in a day  Indications: Pain      Apoaequorin 10 MG Cap Take 10 mg by mouth every day. Indications: memory loss      Pediatric Vitamins (MULTIVITAMIN GUMMIES CHILDRENS PO) Take 1 Dose by mouth every day. Indications: supplement      meclizine (ANTIVERT) 25 MG Tab Take 1 Tablet by mouth 3 times a day as needed (for dizziness). 60 Tablet 0    ondansetron (ZOFRAN ODT) 4 MG TABLET DISPERSIBLE Take 1 Tablet by mouth every 8 hours as needed for Nausea. 60 Tablet 0    DORZOLAMIDE HCL-TIMOLOL MAL OP Administer 1 Drop into both eyes every morning. Indications: Glaucoma      Famotidine-Ca Carb-Mag Hydrox -165 MG Chew Tab Chew 1 Tablet 1 time a day as needed (for indegestion). Indications: Heartburn      diclofenac sodium (VOLTAREN) 1 % Gel Apply 2 g topically 2 times a day as needed (for mild to moderate pain). Indications: Joint Damage causing Pain and Loss of Function      Soft Lens Products (FRANCESCA SALINE) Solution Administer 1 Drop  into both eyes every day. Indications: supplement      Melatonin 5 MG Cap Take 1 Capsule by mouth at bedtime as needed (for insomnia, takes first). 5 to 10 mg as needed for sleep  Indications: Trouble Sleeping      Multiple Vitamins-Minerals (PRESERVISION AREDS 2 PO) Take 1 Tablet by mouth every day. Indications: supplement      calcium carbonate (TUMS CHEWY BITES) 750 MG chewable tablet Chew 1 Tablet 1 time a day as needed (for stomach upset). Indications: Heartburn      travoprost (TRAVATAN Z) 0.004 % Solution Administer 1 Drop into both eyes every evening. Indications: Wide-Angle Glaucoma      polyethylene glycol 3350 (MIRALAX) Powder Take 17 g by mouth every day. Indications: Constipation       No current facility-administered medications for this visit.            Current supplements as per medication list.       Allergies: Morphine, Cephalexin, Codeine, Metronidazole, and Sulfa drugs    Current social contact/activities: The majority of her social interactions with her daughters..    She  reports that she has never smoked. She has never used smokeless tobacco. She reports current alcohol use. She reports that she does not use drugs.  Counseling given: Not Answered        DPA/Advanced Directive:  Completed. In epic      ROS:    Gait: Uses : Walker  Ostomy: No  Other tubes: no   Amputations: no   Chronic oxygen use: no   Last eye exam: Six months ago   : Reports urinary leakage during the last 6 months that has somewhat interfered with their daily activities or sleep. incontinence.       Screening:  Colonoscopy No repeat recommended  2/16/18  FIT NEG Dexa 9/22/15  Osteoporosis   Mammo 5/17/19 Cat 2 A1c 1/9/13  5.7   -[facility]-Elías  Neurosurg-[facility]    Depression Screening  Little interest or pleasure in doing things?     Feeling down, depressed , or hopeless?    Trouble falling or staying asleep, or sleeping too much?     Feeling tired or having little  energy?     Poor appetite or overeating?     Feeling bad about yourself - or that you are a failure or have let yourself or your family down?    Trouble concentrating on things, such as reading the newspaper or watching television?    Moving or speaking so slowly that other people could have noticed.  Or the opposite - being so fidgety or restless that you have been moving around a lot more than usual?     Thoughts that you would be better off dead, or of hurting yourself?     Patient Health Questionnaire Score:      If depressive symptoms identified deferred to follow up visit unless specifically addressed in assessment and plan.    Interpretation of PHQ-9 Total Score   Score Severity   1-4 No Depression   5-9 Mild Depression   10-14 Moderate Depression   15-19 Moderately Severe Depression   20-27 Severe Depression    Screening for Cognitive Impairment  Three Minute Recall (daughter, heaven, mountain) 0/3    Sg clock face with all 12 numbers and set the hands to show 10 past 11.  No    Cognitive concerns identified deferred for follow up unless specifically addressed in assessment and plan.    Fall Risk Assessment  Has the patient had two or more falls in the last year or any fall with injury in the last year?  No    Safety Assessment  Throw rugs on floor.  No  Handrails on all stairs.  No  Good lighting in all hallways.  Yes  Difficulty hearing.  No  Patient counseled about all safety risks that were identified.    Functional Assessment ADLs  Are there any barriers preventing you from cooking for yourself or meeting nutritional needs?  No.    Are there any barriers preventing you from driving safely or obtaining transportation?  No.    Are there any barriers preventing you from using a telephone or calling for help?  No    Are there any barriers preventing you from shopping?  No.    Are there any barriers preventing you from taking care of your own finances?  No    Are there any barriers preventing you from  managing your medications?  No    Are there any barriers preventing you from showering, bathing or dressing yourself? No    Are you currently engaging in any exercise or physical activity?  No.    What is your perception of your health? Fair    Advance Care Planning  Do you have an Advance Directive, Living Will, Durable Power of , or POLST? Yes        POLST is on file      Health Maintenance Summary            Overdue - IMM DTaP/Tdap/Td Vaccine (1 - Tdap) Overdue - never done      No completion history exists for this topic.              Overdue - IMM ZOSTER VACCINES (2 of 3) Overdue since 4/2/2008 02/06/2008  Imm Admin: Zoster Vaccine Live (ZVL) (Zostavax) - HISTORICAL DATA              Overdue - BONE DENSITY (Every 5 Years) Overdue since 9/22/2020 09/22/2015  DS-BONE DENSITY STUDY (DEXA)    08/28/2012  DS-BONE DENSITY STUDY (DEXA)    08/11/2010  DS-BONE DENSITY STUDY (DEXA)    12/16/2008  DS-BONE DENSITY STUDY (DEXA)    12/11/2006  DS-BONE DENSITY STUDY (DEXA)              Overdue - COVID-19 Vaccine (5 - Booster for Pfizer series) Overdue since 9/8/2022 07/14/2022  Imm Admin: PFIZER JACQUES CAP SARS-COV-2 VACCINATION (12+)    11/12/2021  Imm Admin: PFIZER PURPLE CAP SARS-COV-2 VACCINATION (12+)    01/31/2021  Imm Admin: PFIZER PURPLE CAP SARS-COV-2 VACCINATION (12+)    01/12/2021  Imm Admin: PFIZER PURPLE CAP SARS-COV-2 VACCINATION (12+)              Annual Wellness Visit (Every 366 Days) Next due on 3/20/2024      03/20/2023  Visit Dx: Medicare annual wellness visit, subsequent    03/07/2022  Visit Dx: Medicare annual wellness visit, subsequent    09/28/2020  Visit Dx: Medicare annual wellness visit, subsequent    02/16/2018  Visit Dx: Medicare annual wellness visit, subsequent    01/17/2017  Visit Dx: Medicare annual wellness visit, subsequent    Only the first 5 history entries have been loaded, but more history exists.              IMM PNEUMOCOCCAL VACCINE: 65+ Years (Series Information)  Completed      05/18/2015  Imm Admin: Pneumococcal Conjugate Vaccine (Prevnar/PCV-13)    10/14/2005  Imm Admin: Pneumococcal polysaccharide vaccine (PPSV-23)              IMM INFLUENZA (Series Information) Completed      11/01/2022  Imm Admin: Influenza Vaccine Adult HD    10/19/2021  Imm Admin: Influenza Vaccine Adult HD    09/21/2020  Imm Admin: Influenza Vaccine Adult HD    09/19/2019  Imm Admin: Influenza Vaccine Adult HD    09/13/2018  Imm Admin: Influenza Vaccine Adult HD    Only the first 5 history entries have been loaded, but more history exists.              IMM MENINGOCOCCAL ACWY VACCINE (Series Information) Aged Out      No completion history exists for this topic.              Discontinued - MAMMOGRAM  Discontinued        Frequency changed to Never automatically (Topic No Longer Applies)    05/17/2019  MA-SCREENING MAMMO BILAT W/TOMOSYNTHESIS W/CAD    05/16/2018  MA-MAMMO SCREENING BILAT W/LESTER W/CAD    02/27/2017  MA-MAMMO SCREENING BILAT W/LESTER W/CAD    12/29/2014  MA-SCREENING MAMMOGRAM W/ CAD    Only the first 5 history entries have been loaded, but more history exists.              Discontinued - COLORECTAL CANCER SCREENING  Discontinued        Frequency changed to Never automatically (Topic No Longer Applies)    10/01/2020  OCCULT BLOOD FECES IMMUNOASSAY    02/16/2018  OCCULT BLOOD FECES IMMUNOASSAY    08/12/2010  OCCULT BLOOD X3 (STOOL)    01/08/2004  COLONOSCOPY (Done)              Discontinued - IMM HEP B VACCINE  Discontinued      02/06/2008  Imm Admin: Hepatitis B Vaccine Adolescent/Pediatric                    Patient Care Team:  Mike Otero M.D. as PCP - General  Khloe Jones R.N. (Inactive) as Registered Nurse  [facility] as Home Health Provider  [facility] as Home Health Provider  SELWYN James R.N.  [facility] as Home Health Provider (Home Health)      Social History     Tobacco Use    Smoking status: Never    Smokeless tobacco: Never    Vaping Use    Vaping Use: Never used   Substance Use Topics    Alcohol use: Yes     Alcohol/week: 0.0 oz     Comment: occassional wine    Drug use: No     Family History   Problem Relation Age of Onset    Diabetes Other     Heart Disease Other     Lung Disease Other     Cancer Sister      She  has a past medical history of Arthritis, Diverticulitis, GERD (gastroesophageal reflux disease), Glaucoma, Heart burn, Hiatus hernia syndrome, Hypertension, Primary osteoarthritis of both shoulders (1/22/2019), and Unspecified urinary incontinence.   Past Surgical History:   Procedure Laterality Date    ORIF, ANKLE Right 12/22/2021    Procedure: OPEN REDUCTION AND INTERNAL FIXATION, ANKLE;  Surgeon: Pop Causey M.D.;  Location: [facility];  Service: Orthopedics    HIP ARTHROPLASTY TOTAL  6/21/2011    Performed by AMPARO CRAFT at SURGERY [facility] ORS    OTHER ORTHOPEDIC SURGERY  2000    back surgery    GYN SURGERY  1970    hysterectomy    CHOLECYSTECTOMY  1966    rahat    OTHER  1954    mikey. breast bx    OTHER ABDOMINAL SURGERY  1954    kidney tacked up ?    OTHER  1945    tonsilectomy    OTHER ORTHOPEDIC SURGERY      osteoporosis - medicated    US-NEEDLE CORE BX-BREAST PANEL         Exam:     /70 (BP Location: Left arm, Patient Position: Sitting, BP Cuff Size: Adult)   Pulse (!) 55   Temp 36.4 °C (97.6 °F) (Temporal)   Resp 16   Wt 55.8 kg (123 lb)   SpO2 94%  Body mass index is 24.02 kg/m².    Hearing fair.    Dentition good  Alert, oriented in no acute distress.  Eye contact is good, speech goal directed, affect calm  General: Well-appearing. Well-developed. No signs of distress.  HEENT: Grossly normal. Oral cavity is pink and moist.  Neck: Supple without JVD or bruit.  Pulmonary: Clear with good breath sounds. Normal effort.  Cardiovascular: Regular. Carotid and radial pulses are intact.  Abdomen: Soft, nontender, nondistended. Spleen and liver are not enlarged.  Neurologic: Cranial  nerves II through XII are grossly normal, alert and oriented x3      Assessment and Plan. The following treatment and monitoring plan is recommended:    1. Medicare annual wellness visit, subsequent        2. Essential hypertension        3. Hypothyroidism, unspecified type        4. Pure hypercholesterolemia        5. Major depressive disorder, single episode, mild (HCC)        6. Pulmonary hypertension, unspecified (HCC)        7. MCI (mild cognitive impairment)        8. Weight loss        9. Anemia, unspecified type        10. Age-related osteoporosis with current pathological fracture with routine healing, subsequent encounter            89-year-old female. Overall I think she's doing well physically and mentally. She is experiencing more symptoms worrisome for depression. We agreed upon increasing her sertraline from 25 mg to 50 mg.    She has ongoing arthritis issues and I encouraged her to be more liberal with Volteren use. She should discontinue lidocaine patches the adhesive is beginning to cause skin irritation.    Discussed importance of increased caloric intake. Recommended that she have one handful of nuts twice daily. She could also supplement with ensure or other nutrition drink but she has refused these in the past.    Other chronic issues are stable. No change in treatment.    Services suggested: No services required at this time  Health Care Screening: Age-appropriate preventive services Medicare covers discussed today and ordered if indicated.  Referrals offered: Community-based lifestyle interventions to reduce health risks and promote self-management and wellness, fall prevention, nutrition, physical activity, tobacco-use cessation, weight loss, and mental health services as per orders if indicated.    Discussion today about general wellness and lifestyle habits:    Prevent falls and reduce trip hazards; Cautioned about securing or removing rugs.  Have a working fire alarm and carbon monoxide  detector;   Engage in regular physical activity and social activities       Follow-up: 3 months                Non-Graft Cartilage Fenestration Text: The cartilage was fenestrated with a 2mm punch biopsy to help facilitate healing.

## 2023-03-22 PROCEDURE — 665999 HH PPS REVENUE DEBIT

## 2023-03-22 PROCEDURE — 665998 HH PPS REVENUE CREDIT

## 2023-03-23 PROCEDURE — 665998 HH PPS REVENUE CREDIT

## 2023-03-23 PROCEDURE — 665999 HH PPS REVENUE DEBIT

## 2023-03-23 RX ORDER — AZITHROMYCIN 250 MG/1
TABLET, FILM COATED ORAL
Qty: 6 TABLET | Refills: 0 | Status: SHIPPED
Start: 2023-03-23 | End: 2023-03-28

## 2023-03-24 ENCOUNTER — HOME CARE VISIT (OUTPATIENT)
Dept: HOME HEALTH SERVICES | Facility: HOME HEALTHCARE | Age: 88
End: 2023-03-24
Payer: MEDICARE

## 2023-03-24 PROCEDURE — G0159 HHC PT MAINT EA 15 MIN: HCPCS

## 2023-03-24 PROCEDURE — 665999 HH PPS REVENUE DEBIT

## 2023-03-24 PROCEDURE — 665998 HH PPS REVENUE CREDIT

## 2023-03-25 PROCEDURE — 665998 HH PPS REVENUE CREDIT

## 2023-03-25 PROCEDURE — 665999 HH PPS REVENUE DEBIT

## 2023-03-26 VITALS
HEART RATE: 68 BPM | OXYGEN SATURATION: 95 % | RESPIRATION RATE: 18 BRPM | TEMPERATURE: 98.3 F | SYSTOLIC BLOOD PRESSURE: 100 MMHG | DIASTOLIC BLOOD PRESSURE: 50 MMHG

## 2023-03-26 PROCEDURE — 665998 HH PPS REVENUE CREDIT

## 2023-03-26 PROCEDURE — 665999 HH PPS REVENUE DEBIT

## 2023-03-26 ASSESSMENT — ENCOUNTER SYMPTOMS
PERSON REPORTING PAIN: PATIENT
PAIN LOCATION - PAIN DURATION: DAILY
SUBJECTIVE PAIN PROGRESSION: WAXING AND WANING
PAIN LOCATION - PAIN QUALITY: ACHE
PAIN LOCATION - PAIN FREQUENCY: FREQUENT
LOWEST PAIN SEVERITY IN PAST 24 HOURS: 0/10
DEBILITATING PAIN: 1
PAIN SEVERITY GOAL: 3/10
PAIN LOCATION - PAIN SEVERITY: 4/10
PAIN LOCATION: LEFT SHOULDER
PAIN: PAIN LIMITS FUNCTION AND SLEEP DAILY NOT CONSTANT
PAIN LOCATION - RELIEVING FACTORS: REST, MEDICATION
DIFFICULTY THINKING: 1
PAIN: 1
HIGHEST PAIN SEVERITY IN PAST 24 HOURS: 7/10
POOR JUDGMENT: 1
PAIN LOCATION - EXACERBATING FACTORS: MOVEMENT

## 2023-03-27 ENCOUNTER — HOME CARE VISIT (OUTPATIENT)
Dept: HOME HEALTH SERVICES | Facility: HOME HEALTHCARE | Age: 88
End: 2023-03-27
Payer: MEDICARE

## 2023-03-27 VITALS
RESPIRATION RATE: 22 BRPM | SYSTOLIC BLOOD PRESSURE: 100 MMHG | HEART RATE: 64 BPM | TEMPERATURE: 98.3 F | OXYGEN SATURATION: 97 % | DIASTOLIC BLOOD PRESSURE: 52 MMHG

## 2023-03-27 PROCEDURE — 665998 HH PPS REVENUE CREDIT

## 2023-03-27 PROCEDURE — G0159 HHC PT MAINT EA 15 MIN: HCPCS

## 2023-03-27 PROCEDURE — 665999 HH PPS REVENUE DEBIT

## 2023-03-27 ASSESSMENT — ENCOUNTER SYMPTOMS
PAIN LOCATION - PAIN SEVERITY: 4/10
PERSON REPORTING PAIN: PATIENT
HIGHEST PAIN SEVERITY IN PAST 24 HOURS: 5/10
PAIN: 1
SUBJECTIVE PAIN PROGRESSION: WAXING AND WANING
PAIN LOCATION - EXACERBATING FACTORS: MOVEMENT
PAIN LOCATION - PAIN DURATION: DAILY
PAIN LOCATION: LEFT SHOULDER
PAIN LOCATION - PAIN FREQUENCY: FREQUENT
PAIN LOCATION - PAIN QUALITY: ACHE
PAIN LOCATION - RELIEVING FACTORS: REST, MEDICATION
LOWEST PAIN SEVERITY IN PAST 24 HOURS: 0/10
PAIN SEVERITY GOAL: 3/10
PAIN: PAIN LIMITS SLEEP AND FUNCTION DAILY NOT CONSTANT
DEBILITATING PAIN: 1
POOR JUDGMENT: 1
DIFFICULTY THINKING: 1

## 2023-03-28 ENCOUNTER — HOME CARE VISIT (OUTPATIENT)
Dept: HOME HEALTH SERVICES | Facility: HOME HEALTHCARE | Age: 88
End: 2023-03-28
Payer: MEDICARE

## 2023-03-28 PROCEDURE — 665999 HH PPS REVENUE DEBIT

## 2023-03-28 PROCEDURE — 665998 HH PPS REVENUE CREDIT

## 2023-03-28 PROCEDURE — G0299 HHS/HOSPICE OF RN EA 15 MIN: HCPCS

## 2023-03-29 VITALS
HEART RATE: 52 BPM | RESPIRATION RATE: 18 BRPM | OXYGEN SATURATION: 96 % | SYSTOLIC BLOOD PRESSURE: 110 MMHG | DIASTOLIC BLOOD PRESSURE: 60 MMHG | TEMPERATURE: 98.4 F

## 2023-03-29 PROCEDURE — 665999 HH PPS REVENUE DEBIT

## 2023-03-29 PROCEDURE — 665998 HH PPS REVENUE CREDIT

## 2023-03-29 ASSESSMENT — ENCOUNTER SYMPTOMS
PAIN SEVERITY GOAL: 0/10
LAST BOWEL MOVEMENT: 66561
MUSCLE WEAKNESS: 1
HIGHEST PAIN SEVERITY IN PAST 24 HOURS: 4/10
STOOL FREQUENCY: DAILY
PAIN: 1
VOMITING: DENIES
NAUSEA: DENIES
BOWEL PATTERN NORMAL: 1
SUBJECTIVE PAIN PROGRESSION: UNCHANGED
LOWEST PAIN SEVERITY IN PAST 24 HOURS: 4/10

## 2023-03-30 PROCEDURE — 665999 HH PPS REVENUE DEBIT

## 2023-03-30 PROCEDURE — 665998 HH PPS REVENUE CREDIT

## 2023-03-31 PROCEDURE — 665998 HH PPS REVENUE CREDIT

## 2023-03-31 PROCEDURE — 665999 HH PPS REVENUE DEBIT

## 2023-04-01 PROCEDURE — 665999 HH PPS REVENUE DEBIT

## 2023-04-01 PROCEDURE — 665998 HH PPS REVENUE CREDIT

## 2023-04-02 PROCEDURE — 665998 HH PPS REVENUE CREDIT

## 2023-04-02 PROCEDURE — 665999 HH PPS REVENUE DEBIT

## 2023-04-03 ENCOUNTER — HOME CARE VISIT (OUTPATIENT)
Dept: HOME HEALTH SERVICES | Facility: HOME HEALTHCARE | Age: 88
End: 2023-04-03
Payer: MEDICARE

## 2023-04-03 VITALS
TEMPERATURE: 98.2 F | DIASTOLIC BLOOD PRESSURE: 60 MMHG | HEART RATE: 64 BPM | OXYGEN SATURATION: 96 % | RESPIRATION RATE: 24 BRPM | SYSTOLIC BLOOD PRESSURE: 102 MMHG

## 2023-04-03 PROCEDURE — G0159 HHC PT MAINT EA 15 MIN: HCPCS

## 2023-04-03 PROCEDURE — 665999 HH PPS REVENUE DEBIT

## 2023-04-03 PROCEDURE — 665998 HH PPS REVENUE CREDIT

## 2023-04-03 ASSESSMENT — ENCOUNTER SYMPTOMS
PERSON REPORTING PAIN: PATIENT
HIGHEST PAIN SEVERITY IN PAST 24 HOURS: 7/10
LOWEST PAIN SEVERITY IN PAST 24 HOURS: 0/10
POOR JUDGMENT: 1
PAIN LOCATION - RELIEVING FACTORS: REST, MEDICATION
PAIN LOCATION: LEFT SHOULDER
PAIN LOCATION - PAIN FREQUENCY: FREQUENT
PAIN LOCATION - PAIN DURATION: DAILY
PAIN: 1
SUBJECTIVE PAIN PROGRESSION: WAXING AND WANING
PAIN SEVERITY GOAL: 3/10
PAIN LOCATION - EXACERBATING FACTORS: MOVEMENT
PAIN LOCATION - PAIN SEVERITY: 5/10
DIFFICULTY THINKING: 1
PAIN: PAIN LIMITS FUNCTION AND SLEEP DAILY NOT CONSTANT
PAIN LOCATION - PAIN QUALITY: ACHE

## 2023-04-03 NOTE — Clinical Note
Noted.  ----- Message -----  From: Cristina Jaramillo, PT  Sent: 4/3/2023   6:09 PM PDT  To: Maria Luisa Song R.N., Boo Hernandez, *      P.T. reassessment performed 4/3/23 and will see 1 time for 1 week then recertify for continued maintenance P.T.

## 2023-04-03 NOTE — Clinical Note
P.T. reassessment performed 4/3/23 and will see 1 time for 1 week then recertify for continued maintenance P.T.

## 2023-04-04 PROCEDURE — 665999 HH PPS REVENUE DEBIT

## 2023-04-04 PROCEDURE — 665998 HH PPS REVENUE CREDIT

## 2023-04-04 NOTE — HOME HEALTH
PHYSICAL THERAPY REASSESSMENT AND PLAN OF CARE · Patient: Gloria Patel · Patient is preserving her functional mobility and skills with maintenance physical therapy services. She has not had a fall in the last 8 weeks. She did have a recurring UTI but now is off antibiotics. She continues to have significant risk of decline if she does not participate in maintenance physical therapy services with primary focus on balance and activity tolerance.  She lives in an assisted living facility but current exercise classes that she can participate in and staff does not have the professional skills to provide skilled physical therapy.  Patient is agreeable to continue physical therapy maintenance services. Therapist agrees she would continue to benefit · Maintenance Physical Therapy Need: Decreased activity tolerance, Balance control, Generalized deconditioning, Gait training, Fall prevention, Pain control and Poor safety awareness. Recommend skilled HHPT to address deficits and maintain function-report sent to MD · Frequency: 1 week 1 and recertify on 4/10/23, Effective 3/13/2023 · Goals: Remain as written with same goal date on last recertification effective on 2/11/2023. How often (if at all) does pain interfere with patient's movements? Pain limits sleep and function on a daily but not constant basis.

## 2023-04-05 PROCEDURE — 665998 HH PPS REVENUE CREDIT

## 2023-04-05 PROCEDURE — 665999 HH PPS REVENUE DEBIT

## 2023-04-06 PROCEDURE — 665999 HH PPS REVENUE DEBIT

## 2023-04-06 PROCEDURE — 665998 HH PPS REVENUE CREDIT

## 2023-04-07 PROCEDURE — 665998 HH PPS REVENUE CREDIT

## 2023-04-07 PROCEDURE — 665999 HH PPS REVENUE DEBIT

## 2023-04-08 PROCEDURE — 665998 HH PPS REVENUE CREDIT

## 2023-04-08 PROCEDURE — 665999 HH PPS REVENUE DEBIT

## 2023-04-09 PROCEDURE — 665998 HH PPS REVENUE CREDIT

## 2023-04-09 PROCEDURE — 665999 HH PPS REVENUE DEBIT

## 2023-04-10 ENCOUNTER — HOME CARE VISIT (OUTPATIENT)
Dept: HOME HEALTH SERVICES | Facility: HOME HEALTHCARE | Age: 88
End: 2023-04-10
Payer: MEDICARE

## 2023-04-10 PROCEDURE — G0151 HHCP-SERV OF PT,EA 15 MIN: HCPCS

## 2023-04-10 PROCEDURE — 665998 HH PPS REVENUE CREDIT

## 2023-04-10 PROCEDURE — 665999 HH PPS REVENUE DEBIT

## 2023-04-10 NOTE — Clinical Note
Noted.  ----- Message -----  From: Cristina Jaramillo, PT  Sent: 4/11/2023   6:25 AM PDT  To: Mike Gomez M.D., *      P.T. re certification performed 4/10/2023  and I will continue maintenance 1 time for 8 weeks. Further insurance auth may be required

## 2023-04-10 NOTE — Clinical Note
P.T. re certification performed 4/10/2023  and I will continue maintenance 1 time for 8 weeks. Further insurance auth may be required

## 2023-04-11 ENCOUNTER — DOCUMENTATION (OUTPATIENT)
Dept: CARDIOLOGY | Facility: MEDICAL CENTER | Age: 88
End: 2023-04-11
Payer: MEDICARE

## 2023-04-11 VITALS
DIASTOLIC BLOOD PRESSURE: 60 MMHG | HEART RATE: 56 BPM | TEMPERATURE: 98.1 F | SYSTOLIC BLOOD PRESSURE: 106 MMHG | RESPIRATION RATE: 20 BRPM | OXYGEN SATURATION: 100 %

## 2023-04-11 PROCEDURE — 665998 HH PPS REVENUE CREDIT

## 2023-04-11 PROCEDURE — 665999 HH PPS REVENUE DEBIT

## 2023-04-11 ASSESSMENT — ENCOUNTER SYMPTOMS
PERSON REPORTING PAIN: PATIENT
PAIN SEVERITY GOAL: 2/10
PAIN LOCATION - PAIN FREQUENCY: FREQUENT
HIGHEST PAIN SEVERITY IN PAST 24 HOURS: 5/10
PAIN LOCATION: LEFT SHOULDER
PAIN LOCATION - RELIEVING FACTORS: REST, MEDICATION
PAIN LOCATION - PAIN QUALITY: ACHE
PAIN LOCATION - PAIN SEVERITY: 4/10
DIFFICULTY THINKING: 1
DEBILITATING PAIN: 1
PAIN LOCATION - EXACERBATING FACTORS: MOVEMENT
LOWEST PAIN SEVERITY IN PAST 24 HOURS: 0/10
PAIN LOCATION - PAIN DURATION: DAILY
POOR JUDGMENT: 1
SUBJECTIVE PAIN PROGRESSION: WAXING AND WANING
PAIN: 1
PAIN: PAIN LIMITS SLEEP AND FUNCTION DAILY NOT CONSTANT

## 2023-04-11 ASSESSMENT — ACTIVITIES OF DAILY LIVING (ADL): OASIS_M1830: 03

## 2023-04-11 NOTE — HOME HEALTH
Recertification of Care to Home Health for maintenance physical therapy services. Current clinical summary, reason for continued home health services, new issues identified: Patient is preserving her functional mobility, balance, activity tolerance, pain control with maintenance physical therapy services. She has not had a fall, ER visit or hospitalization  but had recurrent UTI during this certification. Patient continues to express desire to have maintenance physical therapy services.  Though patient lives in an senior care there is no exercise class that she can participate in and staff does not assist patient with exercise programs.  In addition patient has cognitive deficits and problems with initiation and follow-through.  Since maintenance services are being very effective for this patient and she is at risk for decline without maintenance services, therapist agrees she would continue to benefit and would likely decline without this service.  Primary focus is on balance reeducation and activity tolerance maintenance.  Frequency: 1 week 8, Effective 4/16/2023 · Goals: Effective 4/16/2023 - Patient will continue to ambulate at least 300 feet using 4WW requiring more than supervision on all surfaces in 8 visits. Patient will continue to to demonstrate at least fair immediate standing balance in 8 visits. Patient will continue to be independent in use of heating pad to assist with pain management in 8 visits. Caregivers will continue to be independent in home safety/fall prevention measures in 8 visits.  Patient will remain safe through 8 visits.   Does the patient get SOB with exertion? No shortness of breath has been noted How often (if at all) does pain interfere with patient's movements? On a daily but not constant basis limits function and sleep LIVING SITUATION AND CAREGIVING: Homebound Status: cognitive deficits, difficulty with ambulation/transfers, needs assistance to leave home, needs assistive devices to  ambulate, unable to manage stairs, unsafe to drive or leave residence without assistance, unsteady gait, weakness and fatigue, debiltating pain and impaired thoughts/judgment Type of Home: Assisted living facility Lives with: Alone Receives Assistance: For some ADLs, medication management and whenever ambulating outside her apartment Unresolved Safety Concerns: Decreased safety awareness Does the patient have an Advanced Directive? Yes, copy provided or photographed for chart Does the patient have a POLST? Yes completed POLST is in the home and visible, photographed for chart and DNR entered as a signed order in EPIC. REASON FOR HOME HEALTH SUPPORTING INFORMATION: Physical therapy to assess and teach the following but not limited to: - fall prevention, hydration and home safety CURRENT LEVEL OF FUNCTION: Ambulation and Mobility: Independent in her apartment and supervised whenever exiting her apartment. Patient Equipment: walker for ambulation. Transferring: Independent except assisted with shower and car transfers Bathing: Shower Dressing:Assisted for shower and Independent dressing except to apply her pants, compression socks and shoes Special equipment used: 4WW, grab bars, shower chair How noticeably Short of Breath is the patient during the OASIS walk or other activity? NA MEDICATION MANAGEMENT: Patient medications administered by another person Medication issues:na

## 2023-04-11 NOTE — PROGRESS NOTES
Medication chart review for [facility] services    Received referral from [facility].   Medications reviewed  compared with discharge summary if available.    Current medication list per [facility]     Current Outpatient Medications:     meloxicam, 7.5 mg, Oral, DAILY    amLODIPine, 5 mg, Oral, QDAY    sertraline, 50 mg, Oral, DAILY    Vitamin D3, 1.25 mg, Oral, DAILY    CORICIDIN, 1 Tablet, Oral, Q4HRS PRN    levothyroxine, 75 mcg, Oral, QDAY    lisinopril, TAKE 1/2 TABLET BY MOUTH TWICE A DAY    ACETAMINOPHEN 8 HOUR PO, 650 mg, Oral, BID PRN    Apoaequorin, 10 mg, Oral, DAILY    Pediatric Vitamins (MULTIVITAMIN GUMMIES CHILDRENS PO), 1 Dose, Oral, DAILY    meclizine, 25 mg, Oral, TID PRN    ondansetron, 4 mg, Oral, Q8HRS PRN    DORZOLAMIDE HCL-TIMOLOL MAL OP, 1 Drop, Both Eyes, QAM    Famotidine-Ca Carb-Mag Hydrox, 1 Tablet, Oral, QDAY PRN    diclofenac sodium, 2 g, Topical, BID PRN    Linh Saline, 1 Drop, Both Eyes, DAILY    Melatonin, 1 Capsule, Oral, QHS PRN    Multiple Vitamins-Minerals (PRESERVISION AREDS 2 PO), 1 Tablet, Oral, DAILY    Tums Chewy Bites, 1 Tablet, Oral, QDAY PRN    travoprost, 1 Drop, Both Eyes, Q EVENING    polyethylene glycol 3350, 17 g, Oral, DAILY    Location of hospital, and discharge summary date, if applicable:     Allergies   Allergen Reactions    Morphine Anaphylaxis     anaphylaxis    Cephalexin Rash     Full body    Codeine Vomiting and Nausea    Metronidazole Vomiting and Nausea    Sulfa Drugs Rash     Full body       Labs     Lab Results   Component Value Date/Time    SODIUM 136 02/23/2023 09:00 AM    POTASSIUM 4.8 02/23/2023 09:00 AM    CHLORIDE 103 02/23/2023 09:00 AM    CO2 24 02/23/2023 09:00 AM    GLUCOSE 113 (H) 02/23/2023 09:00 AM    BUN 15 02/23/2023 09:00 AM    CREATININE 0.64 02/23/2023 09:00 AM    CREATININE 0.99 01/09/2013 07:58 AM    BUNCREATRAT 20 01/09/2013 07:58 AM    GLOMRATE >59 08/04/2010 07:50 AM     Lab Results   Component Value Date/Time     ALKPHOSPHAT 86 02/23/2023 09:00 AM    ASTSGOT 16 02/23/2023 09:00 AM    ALTSGPT 12 02/23/2023 09:00 AM    TBILIRUBIN 0.3 02/23/2023 09:00 AM    INR 1.03 12/21/2021 08:26 PM    ALBUMIN 4.3 02/23/2023 09:00 AM        Assessment for clinically significant drug interactions, drug omissions/additions, duplicative therapies.            CC   Mike Otero M.D.  [address] [facility] [address]  [facility] [address]  Fax: [phone]    [facility]  Phone [phone] fax [phone]    This note was created using voice recognition software (Dragon). The accuracy of the dictation is limited by the abilities of the software. I have reviewed the note prior to signing, however some errors in grammar and context are still possible. If you have any questions related to this note please do not hesitate to contact our office.

## 2023-04-12 PROCEDURE — 665998 HH PPS REVENUE CREDIT

## 2023-04-12 PROCEDURE — 665999 HH PPS REVENUE DEBIT

## 2023-04-13 PROCEDURE — 665999 HH PPS REVENUE DEBIT

## 2023-04-13 PROCEDURE — 665998 HH PPS REVENUE CREDIT

## 2023-04-14 PROCEDURE — 665998 HH PPS REVENUE CREDIT

## 2023-04-14 PROCEDURE — 665999 HH PPS REVENUE DEBIT

## 2023-04-15 PROCEDURE — 665999 HH PPS REVENUE DEBIT

## 2023-04-15 PROCEDURE — 665998 HH PPS REVENUE CREDIT

## 2023-04-16 PROCEDURE — 665998 HH PPS REVENUE CREDIT

## 2023-04-16 PROCEDURE — 665999 HH PPS REVENUE DEBIT

## 2023-04-17 ENCOUNTER — HOME CARE VISIT (OUTPATIENT)
Dept: HOME HEALTH SERVICES | Facility: HOME HEALTHCARE | Age: 88
End: 2023-04-17
Payer: MEDICARE

## 2023-04-17 VITALS
RESPIRATION RATE: 24 BRPM | SYSTOLIC BLOOD PRESSURE: 110 MMHG | TEMPERATURE: 98.2 F | OXYGEN SATURATION: 97 % | HEART RATE: 68 BPM | DIASTOLIC BLOOD PRESSURE: 60 MMHG

## 2023-04-17 PROCEDURE — 665999 HH PPS REVENUE DEBIT

## 2023-04-17 PROCEDURE — G0159 HHC PT MAINT EA 15 MIN: HCPCS

## 2023-04-17 PROCEDURE — 665003 FOLLOW UP-HOME HEALTH

## 2023-04-17 PROCEDURE — 665998 HH PPS REVENUE CREDIT

## 2023-04-17 ASSESSMENT — ENCOUNTER SYMPTOMS
PAIN LOCATION - EXACERBATING FACTORS: MOVEMENT
DIFFICULTY THINKING: 1
PAIN LOCATION - PAIN QUALITY: ACHE
PAIN LOCATION - PAIN DURATION: DAILY
DEBILITATING PAIN: 1
PERSON REPORTING PAIN: PATIENT
PAIN LOCATION - PAIN FREQUENCY: FREQUENT
POOR JUDGMENT: 1
PAIN: 1
PAIN: PAIN LIMITS FUNCTION AND SLEEP DAILY NOT CONSTANT
PAIN LOCATION - RELIEVING FACTORS: REST, MEDICATION
PAIN SEVERITY GOAL: 0/10
HIGHEST PAIN SEVERITY IN PAST 24 HOURS: 4/10
PAIN LOCATION: LEFT SHOULDER
SUBJECTIVE PAIN PROGRESSION: WAXING AND WANING
LOWEST PAIN SEVERITY IN PAST 24 HOURS: 0/10
PAIN LOCATION - PAIN SEVERITY: 3/10

## 2023-04-17 NOTE — CASE COMMUNICATION
Quality Review for Recertification OASIS by JENNI Elizondo, RN on  April 17, 2023       Edits completed by JENNI Elizondo RN:  1.  and  diagnosis coding updated per chart review.  2. Flu vaccine is yes, received from another healthcare provider as part of the episode of care was within the flu reporting window since the last LUIS CARLOS/SOC

## 2023-04-17 NOTE — Clinical Note
agree with changes . Thanks  ----- Message -----  From: Aby Elizondo R.N.  Sent: 4/17/2023   3:04 PM PDT  To: Cristina Jaramillo, PT      Quality Review for Recertification OASIS by JENNI Elizondo, RN on  April 17, 2023       Edits completed by JENNI Elizondo, RN:  1.  and  diagnosis coding updated per chart review.  2. Flu vaccine is yes, received from another healthcare provider as part of the episode of care was within the flu reporting window since the last LUIS CARLOS/SOC

## 2023-04-18 PROCEDURE — 665999 HH PPS REVENUE DEBIT

## 2023-04-18 PROCEDURE — 665998 HH PPS REVENUE CREDIT

## 2023-04-18 PROCEDURE — G0179 MD RECERTIFICATION HHA PT: HCPCS | Performed by: INTERNAL MEDICINE

## 2023-04-18 NOTE — PROGRESS NOTES
Labs noted, will have scanned in.  a1c 7.5 Ok to cancel cmp and lipids from next labs, but will still get a1c as the one from his well screening is too early (less than 3 months from the last)   Seen by MD, for discharged today, family member at bedside. IV line removed.

## 2023-04-19 PROCEDURE — 665998 HH PPS REVENUE CREDIT

## 2023-04-19 PROCEDURE — 665999 HH PPS REVENUE DEBIT

## 2023-04-20 PROCEDURE — 665998 HH PPS REVENUE CREDIT

## 2023-04-20 PROCEDURE — 665999 HH PPS REVENUE DEBIT

## 2023-04-21 PROCEDURE — 665998 HH PPS REVENUE CREDIT

## 2023-04-21 PROCEDURE — 665999 HH PPS REVENUE DEBIT

## 2023-04-22 PROCEDURE — 665999 HH PPS REVENUE DEBIT

## 2023-04-22 PROCEDURE — 665998 HH PPS REVENUE CREDIT

## 2023-04-23 PROCEDURE — 665998 HH PPS REVENUE CREDIT

## 2023-04-23 PROCEDURE — 665999 HH PPS REVENUE DEBIT

## 2023-04-24 PROCEDURE — 665999 HH PPS REVENUE DEBIT

## 2023-04-24 PROCEDURE — 665998 HH PPS REVENUE CREDIT

## 2023-04-25 ENCOUNTER — HOME CARE VISIT (OUTPATIENT)
Dept: HOME HEALTH SERVICES | Facility: HOME HEALTHCARE | Age: 88
End: 2023-04-25
Payer: MEDICARE

## 2023-04-25 VITALS
OXYGEN SATURATION: 92 % | TEMPERATURE: 97.9 F | HEART RATE: 56 BPM | RESPIRATION RATE: 24 BRPM | DIASTOLIC BLOOD PRESSURE: 60 MMHG | SYSTOLIC BLOOD PRESSURE: 104 MMHG

## 2023-04-25 DIAGNOSIS — J01.40 ACUTE NON-RECURRENT PANSINUSITIS: ICD-10-CM

## 2023-04-25 PROCEDURE — 665999 HH PPS REVENUE DEBIT

## 2023-04-25 PROCEDURE — 665998 HH PPS REVENUE CREDIT

## 2023-04-25 PROCEDURE — G0159 HHC PT MAINT EA 15 MIN: HCPCS

## 2023-04-25 RX ORDER — DOXYCYCLINE 100 MG/1
100 CAPSULE ORAL 2 TIMES DAILY
Qty: 10 CAPSULE | Refills: 0 | Status: SHIPPED | OUTPATIENT
Start: 2023-04-25 | End: 2023-04-30

## 2023-04-25 ASSESSMENT — ENCOUNTER SYMPTOMS
PAIN LOCATION - PAIN SEVERITY: 3/10
PAIN LOCATION - EXACERBATING FACTORS: MOVEMENT
SUBJECTIVE PAIN PROGRESSION: WAXING AND WANING
HIGHEST PAIN SEVERITY IN PAST 24 HOURS: 4/10
PERSON REPORTING PAIN: PATIENT
PAIN LOCATION - RELIEVING FACTORS: REST, MEDICATION
PAIN: 1
LOWEST PAIN SEVERITY IN PAST 24 HOURS: 0/10
PAIN LOCATION - PAIN DURATION: DAILY
POOR JUDGMENT: 1
PAIN LOCATION: LEFT SHOULDER
PAIN SEVERITY GOAL: 2/10
DIFFICULTY THINKING: 1
PAIN LOCATION - PAIN FREQUENCY: FREQUENT
PAIN: PAIN LIMITS FUNCTION AND SLEEP DAILY NOT CONSTANT
PAIN LOCATION - PAIN QUALITY: ACHE

## 2023-04-26 PROCEDURE — 665999 HH PPS REVENUE DEBIT

## 2023-04-26 PROCEDURE — 665998 HH PPS REVENUE CREDIT

## 2023-04-27 ENCOUNTER — HOME CARE VISIT (OUTPATIENT)
Dept: HOME HEALTH SERVICES | Facility: HOME HEALTHCARE | Age: 88
End: 2023-04-27
Payer: MEDICARE

## 2023-04-27 PROCEDURE — 665998 HH PPS REVENUE CREDIT

## 2023-04-27 PROCEDURE — 665999 HH PPS REVENUE DEBIT

## 2023-04-28 PROCEDURE — 665999 HH PPS REVENUE DEBIT

## 2023-04-28 PROCEDURE — 665998 HH PPS REVENUE CREDIT

## 2023-04-29 PROCEDURE — 665998 HH PPS REVENUE CREDIT

## 2023-04-29 PROCEDURE — 665999 HH PPS REVENUE DEBIT

## 2023-04-30 PROCEDURE — 665999 HH PPS REVENUE DEBIT

## 2023-04-30 PROCEDURE — 665998 HH PPS REVENUE CREDIT

## 2023-05-01 ENCOUNTER — HOME CARE VISIT (OUTPATIENT)
Dept: HOME HEALTH SERVICES | Facility: HOME HEALTHCARE | Age: 88
End: 2023-05-01
Payer: MEDICARE

## 2023-05-01 PROCEDURE — 665998 HH PPS REVENUE CREDIT

## 2023-05-01 PROCEDURE — 665999 HH PPS REVENUE DEBIT

## 2023-05-02 PROCEDURE — 665999 HH PPS REVENUE DEBIT

## 2023-05-02 PROCEDURE — 665998 HH PPS REVENUE CREDIT

## 2023-05-03 PROCEDURE — 665999 HH PPS REVENUE DEBIT

## 2023-05-03 PROCEDURE — 665998 HH PPS REVENUE CREDIT

## 2023-05-04 PROCEDURE — 665998 HH PPS REVENUE CREDIT

## 2023-05-04 PROCEDURE — 665999 HH PPS REVENUE DEBIT

## 2023-05-05 PROCEDURE — 665999 HH PPS REVENUE DEBIT

## 2023-05-05 PROCEDURE — 665998 HH PPS REVENUE CREDIT

## 2023-05-06 PROCEDURE — 665999 HH PPS REVENUE DEBIT

## 2023-05-06 PROCEDURE — 665998 HH PPS REVENUE CREDIT

## 2023-05-07 PROCEDURE — 665998 HH PPS REVENUE CREDIT

## 2023-05-07 PROCEDURE — 665999 HH PPS REVENUE DEBIT

## 2023-05-08 PROCEDURE — 665999 HH PPS REVENUE DEBIT

## 2023-05-08 PROCEDURE — 665998 HH PPS REVENUE CREDIT

## 2023-05-09 PROCEDURE — 665999 HH PPS REVENUE DEBIT

## 2023-05-09 PROCEDURE — 665998 HH PPS REVENUE CREDIT

## 2023-05-10 PROCEDURE — 665998 HH PPS REVENUE CREDIT

## 2023-05-10 PROCEDURE — 665999 HH PPS REVENUE DEBIT

## 2023-05-11 ENCOUNTER — HOME CARE VISIT (OUTPATIENT)
Dept: HOME HEALTH SERVICES | Facility: HOME HEALTHCARE | Age: 88
End: 2023-05-11
Payer: MEDICARE

## 2023-05-11 VITALS
TEMPERATURE: 97.7 F | SYSTOLIC BLOOD PRESSURE: 96 MMHG | OXYGEN SATURATION: 96 % | DIASTOLIC BLOOD PRESSURE: 52 MMHG | HEART RATE: 60 BPM | RESPIRATION RATE: 20 BRPM

## 2023-05-11 PROCEDURE — G0159 HHC PT MAINT EA 15 MIN: HCPCS

## 2023-05-11 PROCEDURE — 665999 HH PPS REVENUE DEBIT

## 2023-05-11 PROCEDURE — 665998 HH PPS REVENUE CREDIT

## 2023-05-11 ASSESSMENT — ENCOUNTER SYMPTOMS
SUBJECTIVE PAIN PROGRESSION: WAXING AND WANING
LOWEST PAIN SEVERITY IN PAST 24 HOURS: 0/10
PAIN LOCATION - PAIN DURATION: DAILY
POOR JUDGMENT: 1
DIFFICULTY THINKING: 1
HIGHEST PAIN SEVERITY IN PAST 24 HOURS: 5/10
PAIN LOCATION - PAIN FREQUENCY: FREQUENT
PAIN: 1
DEBILITATING PAIN: 1
PAIN LOCATION - PAIN QUALITY: ACHE
PAIN LOCATION - PAIN SEVERITY: 5/10
PERSON REPORTING PAIN: PATIENT
PAIN LOCATION: LEFT SHOULDER
PAIN LOCATION - EXACERBATING FACTORS: MOVEMENT

## 2023-05-11 NOTE — Clinical Note
Pt is taking Advil dual action which has a major interaction with serotonin. Please review    Pt has 4 new medications including antibiotic for recurrent UTI and above  - she will need education from SN. Thanks    Physical therapy reassessment performed 5/11/2023 and will continue maintenance physical therapy for 4 weeks and then recertify if appropriate.

## 2023-05-11 NOTE — Clinical Note
PRN visits added and set for tomorrow.  ----- Message -----  From: Cristina Jaramillo, PT  Sent: 5/11/2023   6:54 PM PDT  To: Maria Luisa Song R.N.; Mike Otero M.D.; *      Pt is taking Advil dual action which has a major interaction with serotonin. Please review    Pt has 4 new medications including antibiotic for recurrent UTI and above  - she will need education from . Thanks    Physical therapy reassessment performed 5/11/2023 and will continue maintenance physical therapy for 4 weeks and then recertify if appropriate.

## 2023-05-12 PROCEDURE — 665998 HH PPS REVENUE CREDIT

## 2023-05-12 PROCEDURE — 665999 HH PPS REVENUE DEBIT

## 2023-05-12 NOTE — HOME HEALTH
PHYSICAL THERAPY REASSESSMENT AND PLAN OF CARE · Patient: Gloria Patel · Patient is preserving her functional mobility and skills with maintenance physical therapy services. Though she lives in assisted living, staff are unable to assist pt in her HEP and are not skilled to perform balance re education so further maintenance physical therapy is indicated. She has not had a fall in the last 4 weeks. She does have a current UTI once again and started antibiotics. She continues to have significant risk of decline if she does not participate in maintenance physical therapy services. Patient is agreeable to continue physical therapy maintenance services. Therapist agrees she would continue to benefit · Maintenance Physical Therapy Need: Decreased activity tolerance, Balance control, Generalized deconditioning, Gait training, Fall prevention, Pain control and Poor safety awareness. Recommend skilled HHPT to address deficits and maintain function-report sent to MD · Frequency: 1 week for 4 weeks and probably recertify , Effective 5/14/2023 · Goals: Remain as written with same goal date on last recertification effective on 4/10/2023. How often (if at all) does pain interfere with patient's movements? Pain limits sleep and function on a daily but not constant basis.

## 2023-05-13 PROCEDURE — 665998 HH PPS REVENUE CREDIT

## 2023-05-13 PROCEDURE — 665999 HH PPS REVENUE DEBIT

## 2023-05-14 PROCEDURE — 665999 HH PPS REVENUE DEBIT

## 2023-05-14 PROCEDURE — 665998 HH PPS REVENUE CREDIT

## 2023-05-15 ENCOUNTER — HOME CARE VISIT (OUTPATIENT)
Dept: HOME HEALTH SERVICES | Facility: HOME HEALTHCARE | Age: 88
End: 2023-05-15
Payer: MEDICARE

## 2023-05-15 VITALS
SYSTOLIC BLOOD PRESSURE: 120 MMHG | HEART RATE: 56 BPM | TEMPERATURE: 97.5 F | OXYGEN SATURATION: 94 % | RESPIRATION RATE: 20 BRPM | DIASTOLIC BLOOD PRESSURE: 60 MMHG

## 2023-05-15 PROCEDURE — G0151 HHCP-SERV OF PT,EA 15 MIN: HCPCS

## 2023-05-15 PROCEDURE — 665003 FOLLOW UP-HOME HEALTH

## 2023-05-15 PROCEDURE — 665999 HH PPS REVENUE DEBIT

## 2023-05-15 PROCEDURE — 665998 HH PPS REVENUE CREDIT

## 2023-05-15 ASSESSMENT — ENCOUNTER SYMPTOMS
DIFFICULTY THINKING: 1
HIGHEST PAIN SEVERITY IN PAST 24 HOURS: 7/10
POOR JUDGMENT: 1
PAIN SEVERITY GOAL: 2/10
SUBJECTIVE PAIN PROGRESSION: WAXING AND WANING
PAIN: 1
PAIN LOCATION - PAIN QUALITY: ACHE
LOWEST PAIN SEVERITY IN PAST 24 HOURS: 0/10
PAIN LOCATION - PAIN DURATION: DAILY
PAIN LOCATION - PAIN FREQUENCY: FREQUENT
PAIN LOCATION - PAIN SEVERITY: 3/10
PAIN LOCATION - EXACERBATING FACTORS: MOVEMENT
PAIN LOCATION: RIGHT SHOULDER
PAIN: PAIN LIMITS SLEEP AND FUNCTION DAILY NOT CONSTANT
PAIN LOCATION - RELIEVING FACTORS: REST, MEDICATION
PERSON REPORTING PAIN: PATIENT

## 2023-05-16 PROCEDURE — 665999 HH PPS REVENUE DEBIT

## 2023-05-16 PROCEDURE — 665998 HH PPS REVENUE CREDIT

## 2023-05-17 DIAGNOSIS — I10 ESSENTIAL HYPERTENSION: ICD-10-CM

## 2023-05-17 PROCEDURE — 665999 HH PPS REVENUE DEBIT

## 2023-05-17 PROCEDURE — 665998 HH PPS REVENUE CREDIT

## 2023-05-17 RX ORDER — LISINOPRIL 20 MG/1
TABLET ORAL
Qty: 90 TABLET | Refills: 3 | Status: SHIPPED | OUTPATIENT
Start: 2023-05-17

## 2023-05-18 PROCEDURE — 665999 HH PPS REVENUE DEBIT

## 2023-05-18 PROCEDURE — 665998 HH PPS REVENUE CREDIT

## 2023-05-19 PROCEDURE — 665998 HH PPS REVENUE CREDIT

## 2023-05-19 PROCEDURE — 665999 HH PPS REVENUE DEBIT

## 2023-05-20 PROCEDURE — 665999 HH PPS REVENUE DEBIT

## 2023-05-20 PROCEDURE — 665998 HH PPS REVENUE CREDIT

## 2023-05-21 PROCEDURE — 665998 HH PPS REVENUE CREDIT

## 2023-05-21 PROCEDURE — 665999 HH PPS REVENUE DEBIT

## 2023-05-22 ENCOUNTER — HOME CARE VISIT (OUTPATIENT)
Dept: HOME HEALTH SERVICES | Facility: HOME HEALTHCARE | Age: 88
End: 2023-05-22
Payer: MEDICARE

## 2023-05-22 VITALS
DIASTOLIC BLOOD PRESSURE: 60 MMHG | OXYGEN SATURATION: 97 % | TEMPERATURE: 97.8 F | RESPIRATION RATE: 20 BRPM | HEART RATE: 56 BPM | SYSTOLIC BLOOD PRESSURE: 110 MMHG

## 2023-05-22 PROCEDURE — 665998 HH PPS REVENUE CREDIT

## 2023-05-22 PROCEDURE — G0159 HHC PT MAINT EA 15 MIN: HCPCS

## 2023-05-22 PROCEDURE — 665999 HH PPS REVENUE DEBIT

## 2023-05-22 ASSESSMENT — ENCOUNTER SYMPTOMS
DEBILITATING PAIN: 1
PAIN LOCATION - PAIN SEVERITY: 6/10
LOWEST PAIN SEVERITY IN PAST 24 HOURS: 0/10
PAIN: PAIN LIMITS SLEEP AND FUNCTION DAILY NOT CONSTANT
PAIN: 1
PAIN LOCATION - RELIEVING FACTORS: REST, MEDICATION
PAIN LOCATION - PAIN DURATION: DAILY
POOR JUDGMENT: 1
PAIN LOCATION - PAIN QUALITY: ACHE
HIGHEST PAIN SEVERITY IN PAST 24 HOURS: 7/10
SUBJECTIVE PAIN PROGRESSION: WAXING AND WANING
PAIN LOCATION: LEFT SHOULDER
PAIN LOCATION - PAIN FREQUENCY: FREQUENT
DIFFICULTY THINKING: 1
PERSON REPORTING PAIN: PATIENT
PAIN LOCATION - EXACERBATING FACTORS: MOVEMENT

## 2023-05-23 PROCEDURE — 665998 HH PPS REVENUE CREDIT

## 2023-05-23 PROCEDURE — 665999 HH PPS REVENUE DEBIT

## 2023-05-24 ENCOUNTER — HOME CARE VISIT (OUTPATIENT)
Dept: HOME HEALTH SERVICES | Facility: HOME HEALTHCARE | Age: 88
End: 2023-05-24
Payer: MEDICARE

## 2023-05-24 PROCEDURE — 665998 HH PPS REVENUE CREDIT

## 2023-05-24 PROCEDURE — 665999 HH PPS REVENUE DEBIT

## 2023-05-25 ENCOUNTER — OFFICE VISIT (OUTPATIENT)
Dept: INTERNAL MEDICINE | Facility: IMAGING CENTER | Age: 88
End: 2023-05-25
Payer: MEDICARE

## 2023-05-25 ENCOUNTER — HOSPITAL ENCOUNTER (OUTPATIENT)
Facility: MEDICAL CENTER | Age: 88
End: 2023-05-25
Attending: INTERNAL MEDICINE
Payer: MEDICARE

## 2023-05-25 VITALS
HEART RATE: 68 BPM | OXYGEN SATURATION: 95 % | SYSTOLIC BLOOD PRESSURE: 110 MMHG | TEMPERATURE: 97.8 F | RESPIRATION RATE: 16 BRPM | DIASTOLIC BLOOD PRESSURE: 72 MMHG | WEIGHT: 123 LBS | BODY MASS INDEX: 24.02 KG/M2

## 2023-05-25 DIAGNOSIS — R30.0 DYSURIA: ICD-10-CM

## 2023-05-25 DIAGNOSIS — R82.90 ABNORMAL URINALYSIS: ICD-10-CM

## 2023-05-25 DIAGNOSIS — Z91.09 ENVIRONMENTAL ALLERGIES: ICD-10-CM

## 2023-05-25 LAB
APPEARANCE UR: NORMAL
BILIRUB UR STRIP-MCNC: NORMAL MG/DL
COLOR UR AUTO: NORMAL
GLUCOSE UR STRIP.AUTO-MCNC: NORMAL MG/DL
KETONES UR STRIP.AUTO-MCNC: NORMAL MG/DL
LEUKOCYTE ESTERASE UR QL STRIP.AUTO: NORMAL
NITRITE UR QL STRIP.AUTO: POSITIVE
PH UR STRIP.AUTO: 5 [PH] (ref 5–8)
PROT UR QL STRIP: NORMAL MG/DL
RBC UR QL AUTO: NORMAL
SP GR UR STRIP.AUTO: 1.02
UROBILINOGEN UR STRIP-MCNC: NORMAL MG/DL

## 2023-05-25 PROCEDURE — 87186 SC STD MICRODIL/AGAR DIL: CPT

## 2023-05-25 PROCEDURE — 3074F SYST BP LT 130 MM HG: CPT | Performed by: INTERNAL MEDICINE

## 2023-05-25 PROCEDURE — 87077 CULTURE AEROBIC IDENTIFY: CPT

## 2023-05-25 PROCEDURE — 87086 URINE CULTURE/COLONY COUNT: CPT

## 2023-05-25 PROCEDURE — 665998 HH PPS REVENUE CREDIT

## 2023-05-25 PROCEDURE — 99213 OFFICE O/P EST LOW 20 MIN: CPT | Performed by: INTERNAL MEDICINE

## 2023-05-25 PROCEDURE — 81002 URINALYSIS NONAUTO W/O SCOPE: CPT | Performed by: INTERNAL MEDICINE

## 2023-05-25 PROCEDURE — 665999 HH PPS REVENUE DEBIT

## 2023-05-25 PROCEDURE — 3078F DIAST BP <80 MM HG: CPT | Performed by: INTERNAL MEDICINE

## 2023-05-25 RX ORDER — AZITHROMYCIN 250 MG/1
TABLET, FILM COATED ORAL
Qty: 6 TABLET | Refills: 0 | Status: SHIPPED | OUTPATIENT
Start: 2023-05-25 | End: 2023-06-07

## 2023-05-25 ASSESSMENT — FIBROSIS 4 INDEX: FIB4 SCORE: 1.99

## 2023-05-26 PROCEDURE — 665998 HH PPS REVENUE CREDIT

## 2023-05-26 PROCEDURE — 665999 HH PPS REVENUE DEBIT

## 2023-05-26 RX ORDER — LEVOTHYROXINE SODIUM 0.07 MG/1
75 TABLET ORAL
Qty: 90 TABLET | Refills: 3 | Status: SHIPPED | OUTPATIENT
Start: 2023-05-26 | End: 2024-02-20

## 2023-05-26 NOTE — PROGRESS NOTES
Chief Complaint   Patient presents with    Dysuria       HISTORY OF THE PRESENT ILLNESS: Patient is a 89 y.o. female.     Patient comes in complaining of dysuria. Urine analysis in the office was positive. She is a history of urinary tract infection.    Patient also complains of insomnia issue. She reports that she will wake up in the middle of the night and has difficulty going back to sleep.    Allergies: Morphine, Cephalexin, Codeine, Metronidazole, and Sulfa drugs    Current Outpatient Medications Ordered in Epic   Medication Sig Dispense Refill    azithromycin (ZITHROMAX) 250 MG Tab Take 2 tabs on day one and 1 tabs on days 2-5  Indications: UTI 6 Tablet 0    lisinopril (PRINIVIL) 20 MG Tab TAKE 1/2 TABLET BY MOUTH TWICE A DAY 90 Tablet 3    Ibuprofen-Acetaminophen 125-250 MG Tab Take 2 Tablets by mouth 1 time a day as needed (pain). do not take if taking tylenol  Indications: pain      Wgclq-Aasiw-Jtfxlpx-Pramoxine (NEOSPORIN + PAIN RELIEF MAX ST) 1 % Ointment 1 Dose by Percutaneous route 1 time a day as needed (itching). Indications: itching      Camphor-Eucalyptus-Menthol (VICKS VAPORUB) 4.7-1.2-2.6 % Ointment 1 Dose by Other route 1 time a day as needed (congestion). Indications: nasal congestion      meloxicam (MOBIC) 7.5 MG Tab Take 7.5 mg by mouth every day. Indications: Joint Damage causing Pain and Loss of Function      amLODIPine (NORVASC) 5 MG Tab TAKE 1 TABLET BY MOUTH EVERY DAY 90 Tablet 3    sertraline (ZOLOFT) 50 MG Tab Take 1 Tablet by mouth every day. Indications: Major Depressive Disorder 90 Tablet 3    Cholecalciferol (VITAMIN D3) 1.25 MG (28704 UT) Tab Take 1.25 mg by mouth every day. Indications: supplement      Chlorpheniramine-APAP (CORICIDIN) 2-325 MG Tab Take 1 Tablet by mouth every four hours as needed (cold symptoms). take only for cold symptoms; do NOT take additional tylenol  Indications: Cold Symptoms      levothyroxine (SYNTHROID) 75 MCG Tab Take 1 Tablet by mouth every day. 90  Tablet 3    ACETAMINOPHEN 8 HOUR PO Take 650 mg by mouth 2 times a day as needed (moderate pain). do not exceed 2500 mg toatal in a day  Indications: Pain      Apoaequorin 10 MG Cap Take 10 mg by mouth every day. Indications: memory loss      Pediatric Vitamins (MULTIVITAMIN GUMMIES CHILDRENS PO) Take 1 Dose by mouth every day. Indications: supplement      meclizine (ANTIVERT) 25 MG Tab Take 1 Tablet by mouth 3 times a day as needed (for dizziness). 60 Tablet 0    ondansetron (ZOFRAN ODT) 4 MG TABLET DISPERSIBLE Take 1 Tablet by mouth every 8 hours as needed for Nausea. 60 Tablet 0    DORZOLAMIDE HCL-TIMOLOL MAL OP Administer 1 Drop into both eyes every morning. Indications: Glaucoma      Famotidine-Ca Carb-Mag Hydrox -165 MG Chew Tab Chew 1 Tablet 1 time a day as needed (for indegestion). take if tums is not effective  Indications: Heartburn      diclofenac sodium (VOLTAREN) 1 % Gel Apply 2 g topically 2 times a day as needed (for mild to moderate pain). Indications: Joint Damage causing Pain and Loss of Function      Soft Lens Products (FRANCESCA SALINE) Solution Administer 1 Drop into both eyes every day. Indications: supplement      Melatonin 5 MG Cap Take 1 Capsule by mouth at bedtime as needed (for insomnia, takes first). 5 to 10 mg as needed for sleep  Indications: Trouble Sleeping      Multiple Vitamins-Minerals (PRESERVISION AREDS 2 PO) Take 1 Tablet by mouth every day. Indications: supplement      calcium carbonate (TUMS CHEWY BITES) 750 MG chewable tablet Chew 1 Tablet 1 time a day as needed (for stomach upset). Indications: Heartburn      travoprost (TRAVATAN Z) 0.004 % Solution Administer 1 Drop into both eyes every evening. Indications: Wide-Angle Glaucoma      polyethylene glycol 3350 (MIRALAX) Powder Take 17 g by mouth every day. Indications: Constipation       No current Epic-ordered facility-administered medications on file.       Past medical history, social history and family history were  reviewed from chart today    Review of systems: Per HPI.    All others negative.     Exam: /72 (BP Location: Left arm, Patient Position: Sitting, BP Cuff Size: Adult)   Pulse 68   Temp 36.6 °C (97.8 °F) (Temporal)   Resp 16   Wt 55.8 kg (123 lb)   SpO2 95%   General: Well-appearing. Well-developed. No signs of distress.  HEENT: Grossly normal. Oral cavity is pink and moist. Moderate nasal edema with clear discharge  Neck: Supple without JVD or bruit.  Pulmonary: Clear with good breath sounds. Normal effort.  Cardiovascular: Regular. Carotid and radial pulses are intact.  Abdomen: Soft, nontender, nondistended. Spleen and liver are not enlarged.  Neurologic: Cranial nerves II through XII are grossly normal, alert and oriented x3      Diagnosis:  1. Dysuria  POCT Urinalysis      2. Abnormal urinalysis  URINE CULTURE(NEW)      3. Environmental allergies            Azithromycin for presumed urinary tract infection. She did well with this previously.  Recommend that she resume loratadine for allergies.  We discussed no specific treatment for her insomnia issues today.    My total time spent caring for the patient on the day of the encounter was  greater than 20 minutes.   This includes obtaining history, reviewing chart, physical exam, patient education, reviewing outside records, placing orders, interpreting tests and coordinating care.

## 2023-05-27 PROCEDURE — 665998 HH PPS REVENUE CREDIT

## 2023-05-27 PROCEDURE — 665999 HH PPS REVENUE DEBIT

## 2023-05-28 PROCEDURE — 665999 HH PPS REVENUE DEBIT

## 2023-05-28 PROCEDURE — 665998 HH PPS REVENUE CREDIT

## 2023-05-29 ENCOUNTER — HOME CARE VISIT (OUTPATIENT)
Dept: HOME HEALTH SERVICES | Facility: HOME HEALTHCARE | Age: 88
End: 2023-05-29
Payer: MEDICARE

## 2023-05-29 VITALS
SYSTOLIC BLOOD PRESSURE: 92 MMHG | RESPIRATION RATE: 24 BRPM | DIASTOLIC BLOOD PRESSURE: 62 MMHG | TEMPERATURE: 97.4 F | OXYGEN SATURATION: 96 % | HEART RATE: 56 BPM

## 2023-05-29 PROCEDURE — G0159 HHC PT MAINT EA 15 MIN: HCPCS

## 2023-05-29 PROCEDURE — 665998 HH PPS REVENUE CREDIT

## 2023-05-29 PROCEDURE — 665999 HH PPS REVENUE DEBIT

## 2023-05-29 ASSESSMENT — ENCOUNTER SYMPTOMS
PAIN: PAIN LIMITS FUNCTION AND SLEEP DAILY NOT CONSTANT
PAIN LOCATION: LEFT SHOULDER
PERSON REPORTING PAIN: PATIENT
DIFFICULTY THINKING: 1
POOR JUDGMENT: 1
HIGHEST PAIN SEVERITY IN PAST 24 HOURS: 5/10
LOWEST PAIN SEVERITY IN PAST 24 HOURS: 0/10
DENIES PAIN: 1
PAIN SEVERITY GOAL: 3/10
SUBJECTIVE PAIN PROGRESSION: WAXING AND WANING
DEBILITATING PAIN: 1

## 2023-05-30 PROCEDURE — 665999 HH PPS REVENUE DEBIT

## 2023-05-30 PROCEDURE — 665998 HH PPS REVENUE CREDIT

## 2023-05-31 PROCEDURE — 665998 HH PPS REVENUE CREDIT

## 2023-05-31 PROCEDURE — 665999 HH PPS REVENUE DEBIT

## 2023-06-01 PROCEDURE — 665999 HH PPS REVENUE DEBIT

## 2023-06-01 PROCEDURE — 665998 HH PPS REVENUE CREDIT

## 2023-06-02 PROCEDURE — 665998 HH PPS REVENUE CREDIT

## 2023-06-02 PROCEDURE — 665999 HH PPS REVENUE DEBIT

## 2023-06-03 PROCEDURE — 665998 HH PPS REVENUE CREDIT

## 2023-06-03 PROCEDURE — 665999 HH PPS REVENUE DEBIT

## 2023-06-04 PROCEDURE — 665999 HH PPS REVENUE DEBIT

## 2023-06-04 PROCEDURE — 665998 HH PPS REVENUE CREDIT

## 2023-06-05 PROCEDURE — 665999 HH PPS REVENUE DEBIT

## 2023-06-05 PROCEDURE — 665998 HH PPS REVENUE CREDIT

## 2023-06-06 PROCEDURE — 665999 HH PPS REVENUE DEBIT

## 2023-06-06 PROCEDURE — 665998 HH PPS REVENUE CREDIT

## 2023-06-07 ENCOUNTER — HOME CARE VISIT (OUTPATIENT)
Dept: HOME HEALTH SERVICES | Facility: HOME HEALTHCARE | Age: 88
End: 2023-06-07
Payer: MEDICARE

## 2023-06-07 VITALS
RESPIRATION RATE: 20 BRPM | OXYGEN SATURATION: 94 % | TEMPERATURE: 98.2 F | SYSTOLIC BLOOD PRESSURE: 110 MMHG | DIASTOLIC BLOOD PRESSURE: 56 MMHG | HEART RATE: 64 BPM

## 2023-06-07 PROCEDURE — G0151 HHCP-SERV OF PT,EA 15 MIN: HCPCS

## 2023-06-07 PROCEDURE — 665998 HH PPS REVENUE CREDIT

## 2023-06-07 PROCEDURE — 665999 HH PPS REVENUE DEBIT

## 2023-06-07 ASSESSMENT — ENCOUNTER SYMPTOMS
PAIN LOCATION - RELIEVING FACTORS: REST, MEDICATION
PAIN LOCATION: LEFT SHOULDER
DEBILITATING PAIN: 1
PAIN: 1
LOWEST PAIN SEVERITY IN PAST 24 HOURS: 0/10
PAIN LOCATION - PAIN DURATION: DAILY
PERSON REPORTING PAIN: PATIENT
PAIN SEVERITY GOAL: 3/10
HIGHEST PAIN SEVERITY IN PAST 24 HOURS: 6/10
PAIN LOCATION - PAIN QUALITY: ACHE
DIFFICULTY THINKING: 1
SUBJECTIVE PAIN PROGRESSION: WAXING AND WANING
PAIN LOCATION - PAIN SEVERITY: 2/10
PAIN LOCATION - EXACERBATING FACTORS: MOVEMENT
PAIN LOCATION - PAIN FREQUENCY: FREQUENT
POOR JUDGMENT: 1

## 2023-06-07 ASSESSMENT — ACTIVITIES OF DAILY LIVING (ADL): OASIS_M1830: 03

## 2023-06-07 ASSESSMENT — PATIENT HEALTH QUESTIONNAIRE - PHQ9: CLINICAL INTERPRETATION OF PHQ2 SCORE: 0

## 2023-06-07 NOTE — Clinical Note
Noted.  ----- Message -----  From: Cristina Jaramillo, PT  Sent: 6/7/2023   1:54 PM PDT  To: Boo Hernandez; Mike Otero M.D.; *      P.T. maintenance recertification performed 6/7/2023 and I will follow 1 week 9. Further insurance Auth may be required. Thanks

## 2023-06-07 NOTE — Clinical Note
P.T. maintenance recertification performed 6/7/2023 and I will follow 1 week 9. Further insurance Auth may be required. Thanks

## 2023-06-07 NOTE — HOME HEALTH
Recertification of Care to Home Health for maintenance physical therapy services. Current clinical summary, reason for continued home health services, new issues identified: Patient is preserving her functional mobility, balance, activity tolerance, pain control with maintenance physical therapy services. She has not had a fall, ER visit or hospitalization but had recurrent UTI during this certification once again. Patient continues to express desire to have maintenance physical therapy services. Though patient lives in an [facility] there is no exercise class that she can participate in and staff does not assist patient with exercise programs. In addition patient has cognitive deficits and problems with initiation and follow-through. Since maintenance services are being very effective for this patient and she is at risk for decline without maintenance services, therapist agrees she would continue to benefit and would likely decline without this service. Primary focus is on balance reeducation and response to activity tolerance maintenance which are skilled therapy services. Frequency: 1 week 9, Effective 6/12/2023 · Goals: Effective 6/12/2023 - Patient will continue to ambulate at least 300 feet using 4WW requiring more than supervision on all surfaces in 9 visits. Patient will continue to to demonstrate at least fair immediate standing balance in 9 visits. Patient will continue to be independent in use of heating pad to assist with pain management in 9 visits. Caregivers will continue to be independent in home safety/fall prevention measures in 9 visits. Patient will remain safe through 9 visits. Vital signs will remain within defined parameters in 9 visits.  Inclusion of patient/caregiver in plan of care development in 9 visits.     Does the patient get SOB with exertion? No shortness of breath has been noted How often (if at all) does pain interfere with patient's movements? On a daily but not constant basis limits  function and sleep LIVING SITUATION AND CAREGIVING: Homebound Status: cognitive deficits, difficulty with ambulation/transfers, needs assistance to leave home, needs assistive devices to ambulate, unable to manage stairs, unsafe to drive or leave residence without assistance, unsteady gait, weakness and fatigue, debiltating pain and impaired thoughts/judgment Type of Home: Assisted living facility Lives with: Alone Receives Assistance: For some ADLs, medication management and whenever ambulating outside her apartment Unresolved Safety Concerns: Decreased safety awareness Does the patient have an Advanced Directive? Yes, copy provided or photographed for chart Does the patient have a POLST? Yes completed POLST is in the home and visible, photographed for chart and DNR entered as a signed order in EPIC. REASON FOR HOME HEALTH SUPPORTING INFORMATION: Physical therapy to assess and teach the following but not limited to: - fall prevention, hydration and home safety CURRENT LEVEL OF FUNCTION: Ambulation and Mobility: Independent in her apartment and supervised whenever exiting her apartment. Patient Equipment: walker for ambulation. Transferring: Independent except assisted with shower and car transfers Bathing: Shower Dressing:Assisted for shower and Independent dressing except to apply her pants, compression socks and shoes Special equipment used: 4WW, grab bars, shower chair How noticeably Short of Breath is the patient during the OASIS walk or other activity? NA MEDICATION MANAGEMENT: Patient medications administered by another person Medication issues:na

## 2023-06-08 ENCOUNTER — DOCUMENTATION (OUTPATIENT)
Dept: MEDICAL GROUP | Facility: PHYSICIAN GROUP | Age: 88
End: 2023-06-08
Payer: MEDICARE

## 2023-06-08 PROCEDURE — 665998 HH PPS REVENUE CREDIT

## 2023-06-08 PROCEDURE — 665999 HH PPS REVENUE DEBIT

## 2023-06-08 NOTE — ED NOTES
Patient resting in stretcher, no needs. Daughter at bedside     Azithromycin Counseling:  I discussed with the patient the risks of azithromycin including but not limited to GI upset, allergic reaction, drug rash, diarrhea, and yeast infections.

## 2023-06-08 NOTE — PROGRESS NOTES
Medication chart review for [facility] services    Received referral from [facility].   Medications reviewed  compared with discharge summary if available.  Discharge summary date, if applicable:   N/a    Current medication list per [facility]     Medication list one, patient is currently taking    Current Outpatient Medications:     Theraworx Relief, 1 Dose, Topical, BID PRN    levothyroxine, 75 mcg, Oral, QDAY    lisinopril, TAKE 1/2 TABLET BY MOUTH TWICE A DAY    Ibuprofen-Acetaminophen, 2 Tablet, Oral, QDAY PRN    Neosporin + Pain Relief Max St, 1 Dose, Percutaneous, QDAY PRN    Vicks VapoRub, 1 Dose, Other, QDAY PRN    meloxicam, 7.5 mg, Oral, DAILY    amLODIPine, 5 mg, Oral, QDAY    sertraline, 50 mg, Oral, DAILY    Vitamin D3, 1.25 mg, Oral, DAILY    CORICIDIN, 1 Tablet, Oral, Q4HRS PRN    ACETAMINOPHEN 8 HOUR PO, 650 mg, Oral, BID PRN    Apoaequorin, 10 mg, Oral, DAILY    Pediatric Vitamins (MULTIVITAMIN GUMMIES CHILDRENS PO), 1 Dose, Oral, DAILY    meclizine, 25 mg, Oral, TID PRN    ondansetron, 4 mg, Oral, Q8HRS PRN    DORZOLAMIDE HCL-TIMOLOL MAL OP, 1 Drop, Both Eyes, QAM    Famotidine-Ca Carb-Mag Hydrox, 1 Tablet, Oral, QDAY PRN    diclofenac sodium, 2 g, Topical, BID PRN    Linh Saline, 1 Drop, Both Eyes, DAILY    Melatonin, 1 Capsule, Oral, QHS PRN    Multiple Vitamins-Minerals (PRESERVISION AREDS 2 PO), 1 Tablet, Oral, DAILY    Tums Chewy Bites, 1 Tablet, Oral, QDAY PRN    travoprost, 1 Drop, Both Eyes, Q EVENING    polyethylene glycol 3350, 17 g, Oral, DAILY      Medication list two, drugs that the patient has been prescribed or recommended to take by their healthcare provider on discharge summary  N/a    Allergies   Allergen Reactions    Morphine Anaphylaxis     anaphylaxis    Cephalexin Rash     Full body    Codeine Vomiting and Nausea    Metronidazole Vomiting and Nausea    Sulfa Drugs Rash     Full body       Labs     Lab Results   Component Value Date/Time    SODIUM 136 02/23/2023  "09:00 AM    POTASSIUM 4.8 02/23/2023 09:00 AM    CHLORIDE 103 02/23/2023 09:00 AM    CO2 24 02/23/2023 09:00 AM    GLUCOSE 113 (H) 02/23/2023 09:00 AM    BUN 15 02/23/2023 09:00 AM    CREATININE 0.64 02/23/2023 09:00 AM    CREATININE 0.99 01/09/2013 07:58 AM    BUNCREATRAT 20 01/09/2013 07:58 AM    GLOMRATE >59 08/04/2010 07:50 AM     Lab Results   Component Value Date/Time    ALKPHOSPHAT 86 02/23/2023 09:00 AM    ASTSGOT 16 02/23/2023 09:00 AM    ALTSGPT 12 02/23/2023 09:00 AM    TBILIRUBIN 0.3 02/23/2023 09:00 AM    INR 1.03 12/21/2021 08:26 PM    ALBUMIN 4.3 02/23/2023 09:00 AM        Assessment for clinically significant drug interactions, drug omissions/additions, duplicative therapies.      High  Allergy/Contraindication: DORZOLAMIDE HCL-TIMOLOL MAL OP  Reactions: Rash. Reaction type: Systemic. User documented allergy severity: Low.  Cross-Sensitive Class Match with SULFA DRUGS.  \"Full body\"  DetailsDORZOLAMIDE HCL-TIMOLOL MAL OPPatient reported medication. Active.   High  Drug-Drug: sertraline and meloxicam  Toxic effects may be increased with concurrent administration of NSAIDs and Selective Serotonin Reuptake Inhibitors. The risk of upper gastrointestinal bleeding may be increased. Patients taking both drugs concurrently should be educated about the signs and symptoms of GI bleeding.  DetailsDon't Show This Warning Again  meloxicam (MOBIC) 7.5 MG TabPatient reported medication. Active.   sertraline (ZOLOFT) 50 MG TabPrescription. Active.   High  Drug-Drug: Ibuprofen-Acetaminophen and sertraline  Toxic effects may be increased with concurrent administration of ibuprofen and Selective Serotonin Reuptake Inhibitors. The risk of upper gastrointestinal bleeding may be increased. Patients taking both drugs concurrently should be educated about the signs and symptoms of GI bleeding.  DetailsDon't Show This Warning Again  Ibuprofen-Acetaminophen 125-250 MG TabPatient reported medication. Active.   sertraline " (ZOLOFT) 50 MG TabPrescription. Active.   High  High Dose: Vitamin D3, 1.25 mg, Oral, DAILY  Daily dose of 1.25 mg exceeds recommended maximum of 0.1786 mg by 600%  Frequency of 1 doses/day exceeds recommended maximum of 0.1429 doses/day  DetailsCholecalciferol (VITAMIN D3) 1.25 MG (91761 UT) TabPatient reported medication. Active.   High  Age/Sex Warning: meclizine  Not recommended for Geriatric Patients  Last overridden by: Khloe Jones R.N. on Apr 27, 2022 2:37 PM     DetailsDon't Show This Warning Again  meclizine (ANTIVERT) 25 MG TabPrescription. Active.   Medium  Duplicate Therapy: CORICIDIN, ACETAMINOPHEN 8 HOUR PO, Ibuprofen-Acetaminophen  ACETAMINOPHEN. No Abuse/Dependency Potential.  DetailsIbuprofen-Acetaminophen 125-250 MG Tab, 1 TIME DAILY PRN, painPatient reported medication. Active.   Chlorpheniramine-APAP (CORICIDIN) 2-325 MG Tab, EVERY 4 HOURS PRN, cold symptomsPatient reported medication. Active.   ACETAMINOPHEN 8 HOUR PO, 2 TIMES DAILY PRN, moderate painPatient reported medication. Active.   Medium  Duplicate Therapy: PRESERVISION AREDS 2 PO, MULTIVITAMIN GUMMIES CHILDRENS PO  MULTIVITAMINS. No Abuse/Dependency Potential.  DetailsPediatric Vitamins (MULTIVITAMIN GUMMIES CHILDRENS PO), DAILYPatient reported medication. Active.   Multiple Vitamins-Minerals (PRESERVISION AREDS 2 PO), DAILYPatient reported medication. Active.       CC   Mike Otero M.D.  [address] [facility] [address]  [facility] [address]  Fax: [phone]    [facility]  Phone [phone] fax [phone]    This note was created using voice recognition software (Dragon). The accuracy of the dictation is limited by the abilities of the software. I have reviewed the note prior to signing, however some errors in grammar and context are still possible. If you have any questions related to this note please do not hesitate to contact our office.

## 2023-06-09 PROCEDURE — 665998 HH PPS REVENUE CREDIT

## 2023-06-09 PROCEDURE — 665999 HH PPS REVENUE DEBIT

## 2023-06-10 PROCEDURE — 665999 HH PPS REVENUE DEBIT

## 2023-06-10 PROCEDURE — 665998 HH PPS REVENUE CREDIT

## 2023-06-11 PROCEDURE — 665999 HH PPS REVENUE DEBIT

## 2023-06-11 PROCEDURE — 665998 HH PPS REVENUE CREDIT

## 2023-06-12 ENCOUNTER — HOME CARE VISIT (OUTPATIENT)
Dept: HOME HEALTH SERVICES | Facility: HOME HEALTHCARE | Age: 88
End: 2023-06-12
Payer: MEDICARE

## 2023-06-12 VITALS
RESPIRATION RATE: 20 BRPM | HEART RATE: 56 BPM | DIASTOLIC BLOOD PRESSURE: 64 MMHG | OXYGEN SATURATION: 99 % | SYSTOLIC BLOOD PRESSURE: 150 MMHG | TEMPERATURE: 98.4 F

## 2023-06-12 PROCEDURE — 665999 HH PPS REVENUE DEBIT

## 2023-06-12 PROCEDURE — 665003 FOLLOW UP-HOME HEALTH

## 2023-06-12 PROCEDURE — 665998 HH PPS REVENUE CREDIT

## 2023-06-12 PROCEDURE — G0159 HHC PT MAINT EA 15 MIN: HCPCS

## 2023-06-12 ASSESSMENT — ENCOUNTER SYMPTOMS
PAIN: 1
PAIN LOCATION - EXACERBATING FACTORS: MOVEMENT
PAIN LOCATION - PAIN SEVERITY: 5/10
PAIN LOCATION - PAIN DURATION: DAILY
PAIN LOCATION - RELIEVING FACTORS: REST, MEDICATION
PAIN LOCATION - PAIN QUALITY: ACHE, SHARP
PAIN LOCATION - PAIN FREQUENCY: FREQUENT
PERSON REPORTING PAIN: PATIENT
DIFFICULTY THINKING: 1
HIGHEST PAIN SEVERITY IN PAST 24 HOURS: 6/10
SUBJECTIVE PAIN PROGRESSION: WAXING AND WANING
POOR JUDGMENT: 1
PAIN LOCATION: LEFT SHOULDER
LOWEST PAIN SEVERITY IN PAST 24 HOURS: 0/10
PAIN SEVERITY GOAL: 3/10

## 2023-06-13 ENCOUNTER — HOME CARE VISIT (OUTPATIENT)
Dept: HOME HEALTH SERVICES | Facility: HOME HEALTHCARE | Age: 88
End: 2023-06-13
Payer: MEDICARE

## 2023-06-13 PROCEDURE — 665999 HH PPS REVENUE DEBIT

## 2023-06-13 PROCEDURE — 665998 HH PPS REVENUE CREDIT

## 2023-06-13 NOTE — CASE COMMUNICATION
Quality Review for Recertification OASIS by JENNI Elizondo RN on  June 13, 2023       Edits completed by JENNI Elizondo RN:  1.  and  diagnosis coding updated per chart review.

## 2023-06-13 NOTE — Clinical Note
agree with changes. Thanks  ----- Message -----  From: Aby Elizondo R.N.  Sent: 6/13/2023   8:19 AM PDT  To: Cristina Jaramillo, PT      Quality Review for Recertification OASIS by JENNI Elizondo RN on  June 13, 2023       Edits completed by JENNI Elizondo RN:  1.  and  diagnosis coding updated per chart review.

## 2023-06-14 PROCEDURE — 665999 HH PPS REVENUE DEBIT

## 2023-06-14 PROCEDURE — 665998 HH PPS REVENUE CREDIT

## 2023-06-15 ENCOUNTER — HOSPITAL ENCOUNTER (OUTPATIENT)
Facility: MEDICAL CENTER | Age: 88
End: 2023-06-15
Attending: INTERNAL MEDICINE
Payer: MEDICARE

## 2023-06-15 ENCOUNTER — OFFICE VISIT (OUTPATIENT)
Dept: INTERNAL MEDICINE | Facility: IMAGING CENTER | Age: 88
End: 2023-06-15
Payer: MEDICARE

## 2023-06-15 VITALS
SYSTOLIC BLOOD PRESSURE: 140 MMHG | RESPIRATION RATE: 14 BRPM | WEIGHT: 123 LBS | OXYGEN SATURATION: 98 % | BODY MASS INDEX: 24.02 KG/M2 | HEART RATE: 50 BPM | DIASTOLIC BLOOD PRESSURE: 76 MMHG | TEMPERATURE: 98.7 F

## 2023-06-15 DIAGNOSIS — R82.90 ABNORMAL URINALYSIS: ICD-10-CM

## 2023-06-15 DIAGNOSIS — R35.0 URINARY FREQUENCY: ICD-10-CM

## 2023-06-15 DIAGNOSIS — J01.00 ACUTE NON-RECURRENT MAXILLARY SINUSITIS: ICD-10-CM

## 2023-06-15 LAB
APPEARANCE UR: CLEAR
BILIRUB UR STRIP-MCNC: NEGATIVE MG/DL
COLOR UR AUTO: YELLOW
GLUCOSE UR STRIP.AUTO-MCNC: NEGATIVE MG/DL
KETONES UR STRIP.AUTO-MCNC: NEGATIVE MG/DL
LEUKOCYTE ESTERASE UR QL STRIP.AUTO: NORMAL
NITRITE UR QL STRIP.AUTO: POSITIVE
PH UR STRIP.AUTO: 6 [PH] (ref 5–8)
PROT UR QL STRIP: NEGATIVE MG/DL
RBC UR QL AUTO: NEGATIVE
SP GR UR STRIP.AUTO: 1.02
UROBILINOGEN UR STRIP-MCNC: 0.2 MG/DL

## 2023-06-15 PROCEDURE — 87186 SC STD MICRODIL/AGAR DIL: CPT

## 2023-06-15 PROCEDURE — 665998 HH PPS REVENUE CREDIT

## 2023-06-15 PROCEDURE — 665999 HH PPS REVENUE DEBIT

## 2023-06-15 PROCEDURE — 87077 CULTURE AEROBIC IDENTIFY: CPT

## 2023-06-15 PROCEDURE — 99214 OFFICE O/P EST MOD 30 MIN: CPT | Performed by: INTERNAL MEDICINE

## 2023-06-15 PROCEDURE — 81002 URINALYSIS NONAUTO W/O SCOPE: CPT | Performed by: INTERNAL MEDICINE

## 2023-06-15 PROCEDURE — 3078F DIAST BP <80 MM HG: CPT | Performed by: INTERNAL MEDICINE

## 2023-06-15 PROCEDURE — 3077F SYST BP >= 140 MM HG: CPT | Performed by: INTERNAL MEDICINE

## 2023-06-15 PROCEDURE — 87086 URINE CULTURE/COLONY COUNT: CPT

## 2023-06-15 RX ORDER — DOXYCYCLINE HYCLATE 100 MG
100 TABLET ORAL 2 TIMES DAILY
Qty: 20 TABLET | Refills: 0 | Status: SHIPPED | OUTPATIENT
Start: 2023-06-15 | End: 2023-06-22 | Stop reason: SDUPTHER

## 2023-06-15 ASSESSMENT — FIBROSIS 4 INDEX: FIB4 SCORE: 1.99

## 2023-06-15 NOTE — PROGRESS NOTES
Chief Complaint   Patient presents with    Ear Fullness    Nasal Congestion       HISTORY OF THE PRESENT ILLNESS: Patient is a 89 y.o. female.     Patient comes in with her daughter complaining of not feeling well.  She has ongoing urinary frequency.  Her urine analysis in the office today shows a probable ongoing infection with positive leukocytes and nitrates.  She was recently treated with azithromycin.  She had done well with this in the past.  She feels that she was doing better when she was on the antibiotic.  She also complains of left maxillary pain and ear congestion.  Nasal discharge is clear.  No fever or chills.    Allergies: Morphine, Cephalexin, Codeine, Metronidazole, and Sulfa drugs    Current Outpatient Medications Ordered in Epic   Medication Sig Dispense Refill    doxycycline (VIBRAMYCIN) 100 MG Tab Take 1 Tablet by mouth 2 times a day. 20 Tablet 0    Homeopathic Products (THERAWORX RELIEF) Foam Apply 1 Dose topically 2 times a day as needed (for pain). spary on painful shoulder(s) as needed for pain in AM and PM ; use first  Indications: pain      levothyroxine (SYNTHROID) 75 MCG Tab TAKE 1 TABLET BY MOUTH EVERY DAY 90 Tablet 3    lisinopril (PRINIVIL) 20 MG Tab TAKE 1/2 TABLET BY MOUTH TWICE A DAY 90 Tablet 3    Ibuprofen-Acetaminophen 125-250 MG Tab Take 2 Tablets by mouth 1 time a day as needed (pain). do not take if taking tylenol  Indications: pain      Ogoqh-Vioza-Iwapyfy-Pramoxine (NEOSPORIN + PAIN RELIEF MAX ST) 1 % Ointment 1 Dose by Percutaneous route 1 time a day as needed (itching). Indications: itching      Camphor-Eucalyptus-Menthol (VICKS VAPORUB) 4.7-1.2-2.6 % Ointment 1 Dose by Other route 1 time a day as needed (congestion). Indications: nasal congestion      meloxicam (MOBIC) 7.5 MG Tab Take 7.5 mg by mouth every day. Indications: Joint Damage causing Pain and Loss of Function      amLODIPine (NORVASC) 5 MG Tab TAKE 1 TABLET BY MOUTH EVERY DAY 90 Tablet 3    sertraline (ZOLOFT)  50 MG Tab Take 1 Tablet by mouth every day. Indications: Major Depressive Disorder 90 Tablet 3    Cholecalciferol (VITAMIN D3) 1.25 MG (87248 UT) Tab Take 1.25 mg by mouth every day. Indications: supplement      Chlorpheniramine-APAP (CORICIDIN) 2-325 MG Tab Take 1 Tablet by mouth every four hours as needed (cold symptoms). take only for cold symptoms; do NOT take additional tylenol  Indications: Cold Symptoms      ACETAMINOPHEN 8 HOUR PO Take 650 mg by mouth 2 times a day as needed (moderate pain). do not exceed 2500 mg toatal in a day  Indications: Pain      Apoaequorin 10 MG Cap Take 10 mg by mouth every day. Indications: memory loss      Pediatric Vitamins (MULTIVITAMIN GUMMIES CHILDRENS PO) Take 1 Dose by mouth every day. Indications: supplement      meclizine (ANTIVERT) 25 MG Tab Take 1 Tablet by mouth 3 times a day as needed (for dizziness). 60 Tablet 0    ondansetron (ZOFRAN ODT) 4 MG TABLET DISPERSIBLE Take 1 Tablet by mouth every 8 hours as needed for Nausea. 60 Tablet 0    DORZOLAMIDE HCL-TIMOLOL MAL OP Administer 1 Drop into both eyes every morning. Indications: Glaucoma      Famotidine-Ca Carb-Mag Hydrox -165 MG Chew Tab Chew 1 Tablet 1 time a day as needed (for indegestion). take if tums is not effective  Indications: Heartburn      diclofenac sodium (VOLTAREN) 1 % Gel Apply 2 g topically 2 times a day as needed (for mild to moderate pain). Indications: Joint Damage causing Pain and Loss of Function      Soft Lens Products (FRANCESCA SALINE) Solution Administer 1 Drop into both eyes every day. Indications: supplement      Melatonin 5 MG Cap Take 1 Capsule by mouth at bedtime as needed (for insomnia, takes first). 5 to 10 mg as needed for sleep  Indications: Trouble Sleeping      Multiple Vitamins-Minerals (PRESERVISION AREDS 2 PO) Take 1 Tablet by mouth every day. Indications: supplement      calcium carbonate (TUMS CHEWY BITES) 750 MG chewable tablet Chew 1 Tablet 1 time a day as needed (for stomach  upset). Indications: Heartburn      travoprost (TRAVATAN Z) 0.004 % Solution Administer 1 Drop into both eyes every evening. Indications: Wide-Angle Glaucoma      polyethylene glycol 3350 (MIRALAX) Powder Take 17 g by mouth every day. Indications: Constipation       No current Epic-ordered facility-administered medications on file.       Past medical history, social history and family history were reviewed from chart today    Review of systems: Per HPI.    All others negative.     Exam: BP (!) 140/76 (BP Location: Left arm, Patient Position: Sitting, BP Cuff Size: Adult)   Pulse (!) 50   Temp 37.1 °C (98.7 °F) (Temporal)   Resp 14   Wt 55.8 kg (123 lb)   SpO2 98%   General: Well-appearing. Well-developed. No signs of distress.  HEENT: Nasal cavities are edematous with yellow discharge. Posterior oral cavity is injected.  The left ear canal is normal however the tympanic membrane shows air-fluid levels.  Pulmonary: Clear with good breath sounds. Normal effort.  Cardiovascular: Regular. Carotid and radial pulses are intact.  Abdomen: Soft, nontender, nondistended. Spleen and liver are not enlarged.  Neurologic: Cranial nerves II through XII are grossly normal, alert and oriented x3      Diagnosis:  1. Acute non-recurrent maxillary sinusitis        2. Abnormal urinalysis  POCT Urinalysis    URINE CULTURE(NEW)      3. Urinary frequency  POCT Urinalysis    URINE CULTURE(NEW)        Ongoing UTI?  Probable left maxillary sinus infection.    Doxycycline  Continue with Flonase    Repeat urine analysis when she is off antibiotic to ensure clearing of infection.  Urine sent for culture but unknown if there is quantity sufficient    My total time spent caring for the patient on the day of the encounter was  greater than 30 minutes.   This includes obtaining history, reviewing chart, physical exam, patient education, reviewing outside records, placing orders, interpreting tests and coordinating care.

## 2023-06-16 PROCEDURE — 665999 HH PPS REVENUE DEBIT

## 2023-06-16 PROCEDURE — 665998 HH PPS REVENUE CREDIT

## 2023-06-17 PROCEDURE — 665998 HH PPS REVENUE CREDIT

## 2023-06-17 PROCEDURE — 665999 HH PPS REVENUE DEBIT

## 2023-06-18 PROCEDURE — 665998 HH PPS REVENUE CREDIT

## 2023-06-18 PROCEDURE — 665999 HH PPS REVENUE DEBIT

## 2023-06-19 ENCOUNTER — HOME CARE VISIT (OUTPATIENT)
Dept: HOME HEALTH SERVICES | Facility: HOME HEALTHCARE | Age: 88
End: 2023-06-19
Payer: MEDICARE

## 2023-06-19 PROCEDURE — 665998 HH PPS REVENUE CREDIT

## 2023-06-19 PROCEDURE — G0159 HHC PT MAINT EA 15 MIN: HCPCS

## 2023-06-19 PROCEDURE — G0179 MD RECERTIFICATION HHA PT: HCPCS | Performed by: INTERNAL MEDICINE

## 2023-06-19 PROCEDURE — 665999 HH PPS REVENUE DEBIT

## 2023-06-20 VITALS
SYSTOLIC BLOOD PRESSURE: 104 MMHG | HEART RATE: 60 BPM | TEMPERATURE: 98.4 F | DIASTOLIC BLOOD PRESSURE: 56 MMHG | RESPIRATION RATE: 22 BRPM | OXYGEN SATURATION: 92 %

## 2023-06-20 PROCEDURE — 665999 HH PPS REVENUE DEBIT

## 2023-06-20 PROCEDURE — 665998 HH PPS REVENUE CREDIT

## 2023-06-20 ASSESSMENT — ENCOUNTER SYMPTOMS
PAIN LOCATION: LEFT SHOULDER
PERSON REPORTING PAIN: PATIENT
PAIN LOCATION - PAIN FREQUENCY: FREQUENT
LOWEST PAIN SEVERITY IN PAST 24 HOURS: 0/10
PAIN LOCATION - EXACERBATING FACTORS: MOVEMENT
POOR JUDGMENT: 1
SUBJECTIVE PAIN PROGRESSION: WAXING AND WANING
DIFFICULTY THINKING: 1
PAIN LOCATION - RELIEVING FACTORS: REST, MEDICATION
PAIN: PAIN LIMITS FUNCTION AND SLEEP DAILY NOT CONSTANT
PAIN LOCATION - PAIN SEVERITY: 5/10
HIGHEST PAIN SEVERITY IN PAST 24 HOURS: 7/10
PAIN LOCATION - PAIN QUALITY: ACHE
PAIN: 1

## 2023-06-21 PROCEDURE — 665998 HH PPS REVENUE CREDIT

## 2023-06-21 PROCEDURE — 665999 HH PPS REVENUE DEBIT

## 2023-06-22 PROCEDURE — 665999 HH PPS REVENUE DEBIT

## 2023-06-22 PROCEDURE — 665998 HH PPS REVENUE CREDIT

## 2023-06-22 RX ORDER — DOXYCYCLINE HYCLATE 100 MG
100 TABLET ORAL 2 TIMES DAILY
Qty: 20 TABLET | Refills: 0 | Status: SHIPPED | OUTPATIENT
Start: 2023-06-22 | End: 2023-07-08 | Stop reason: SDUPTHER

## 2023-06-23 PROCEDURE — 665998 HH PPS REVENUE CREDIT

## 2023-06-23 PROCEDURE — 665999 HH PPS REVENUE DEBIT

## 2023-06-24 PROCEDURE — 665999 HH PPS REVENUE DEBIT

## 2023-06-24 PROCEDURE — 665998 HH PPS REVENUE CREDIT

## 2023-06-25 PROCEDURE — 665998 HH PPS REVENUE CREDIT

## 2023-06-25 PROCEDURE — 665999 HH PPS REVENUE DEBIT

## 2023-06-26 ENCOUNTER — HOME CARE VISIT (OUTPATIENT)
Dept: HOME HEALTH SERVICES | Facility: HOME HEALTHCARE | Age: 88
End: 2023-06-26
Payer: MEDICARE

## 2023-06-26 VITALS
TEMPERATURE: 98.6 F | OXYGEN SATURATION: 97 % | SYSTOLIC BLOOD PRESSURE: 108 MMHG | RESPIRATION RATE: 24 BRPM | DIASTOLIC BLOOD PRESSURE: 58 MMHG | HEART RATE: 56 BPM

## 2023-06-26 PROCEDURE — G0159 HHC PT MAINT EA 15 MIN: HCPCS

## 2023-06-26 PROCEDURE — 665999 HH PPS REVENUE DEBIT

## 2023-06-26 PROCEDURE — 665998 HH PPS REVENUE CREDIT

## 2023-06-26 ASSESSMENT — ENCOUNTER SYMPTOMS
PAIN LOCATION - PAIN FREQUENCY: FREQUENT
POOR JUDGMENT: 1
PAIN LOCATION - PAIN SEVERITY: 6/10
HIGHEST PAIN SEVERITY IN PAST 24 HOURS: 6/10
PAIN LOCATION: LEFT SHOULDER
PAIN: PAIN LIMITS SLEEP AND FUNCTION DAILY NOT CONSTANT
PAIN LOCATION - PAIN QUALITY: ACHE
PAIN: 1
SUBJECTIVE PAIN PROGRESSION: WAXING AND WANING
PAIN LOCATION - EXACERBATING FACTORS: MOVEMENT
PAIN LOCATION - PAIN DURATION: DAILY
PERSON REPORTING PAIN: PATIENT
LOWEST PAIN SEVERITY IN PAST 24 HOURS: 0/10
PAIN LOCATION - RELIEVING FACTORS: REST, MEDICATION
DIFFICULTY THINKING: 1

## 2023-06-27 PROCEDURE — 665998 HH PPS REVENUE CREDIT

## 2023-06-27 PROCEDURE — 665999 HH PPS REVENUE DEBIT

## 2023-06-28 PROCEDURE — 665998 HH PPS REVENUE CREDIT

## 2023-06-28 PROCEDURE — 665999 HH PPS REVENUE DEBIT

## 2023-06-29 ENCOUNTER — TELEPHONE (OUTPATIENT)
Dept: INTERNAL MEDICINE | Facility: IMAGING CENTER | Age: 88
End: 2023-06-29

## 2023-06-29 ENCOUNTER — OFFICE VISIT (OUTPATIENT)
Dept: INTERNAL MEDICINE | Facility: IMAGING CENTER | Age: 88
End: 2023-06-29
Payer: MEDICARE

## 2023-06-29 ENCOUNTER — HOSPITAL ENCOUNTER (OUTPATIENT)
Facility: MEDICAL CENTER | Age: 88
End: 2023-06-29
Attending: INTERNAL MEDICINE
Payer: MEDICARE

## 2023-06-29 VITALS
OXYGEN SATURATION: 100 % | DIASTOLIC BLOOD PRESSURE: 74 MMHG | HEART RATE: 50 BPM | SYSTOLIC BLOOD PRESSURE: 130 MMHG | TEMPERATURE: 98.8 F | WEIGHT: 122 LBS | RESPIRATION RATE: 14 BRPM | BODY MASS INDEX: 23.83 KG/M2

## 2023-06-29 DIAGNOSIS — R00.1 BRADYCARDIA: ICD-10-CM

## 2023-06-29 DIAGNOSIS — R11.0 NAUSEA: ICD-10-CM

## 2023-06-29 DIAGNOSIS — N39.0 RECURRENT UTI: ICD-10-CM

## 2023-06-29 DIAGNOSIS — R53.83 FATIGUE, UNSPECIFIED TYPE: ICD-10-CM

## 2023-06-29 DIAGNOSIS — I10 PRIMARY HYPERTENSION: ICD-10-CM

## 2023-06-29 DIAGNOSIS — R54 FRAIL ELDERLY: ICD-10-CM

## 2023-06-29 DIAGNOSIS — R39.15 URINARY URGENCY: ICD-10-CM

## 2023-06-29 DIAGNOSIS — R49.0 HOARSE VOICE QUALITY: ICD-10-CM

## 2023-06-29 LAB
APPEARANCE UR: CLEAR
BACTERIA #/AREA URNS HPF: ABNORMAL /HPF
BILIRUB UR QL STRIP.AUTO: NEGATIVE
COLOR UR: YELLOW
EPI CELLS #/AREA URNS HPF: ABNORMAL /HPF
GLUCOSE UR STRIP.AUTO-MCNC: NEGATIVE MG/DL
HYALINE CASTS #/AREA URNS LPF: ABNORMAL /LPF
KETONES UR STRIP.AUTO-MCNC: NEGATIVE MG/DL
LEUKOCYTE ESTERASE UR QL STRIP.AUTO: NEGATIVE
MICRO URNS: ABNORMAL
NITRITE UR QL STRIP.AUTO: POSITIVE
PH UR STRIP.AUTO: 5.5 [PH] (ref 5–8)
PROT UR QL STRIP: NEGATIVE MG/DL
RBC # URNS HPF: ABNORMAL /HPF
RBC UR QL AUTO: NEGATIVE
SP GR UR STRIP.AUTO: 1.01
UROBILINOGEN UR STRIP.AUTO-MCNC: 0.2 MG/DL
WBC #/AREA URNS HPF: ABNORMAL /HPF

## 2023-06-29 PROCEDURE — 87077 CULTURE AEROBIC IDENTIFY: CPT | Mod: 91

## 2023-06-29 PROCEDURE — 3075F SYST BP GE 130 - 139MM HG: CPT | Performed by: INTERNAL MEDICINE

## 2023-06-29 PROCEDURE — 81001 URINALYSIS AUTO W/SCOPE: CPT

## 2023-06-29 PROCEDURE — 99214 OFFICE O/P EST MOD 30 MIN: CPT | Performed by: INTERNAL MEDICINE

## 2023-06-29 PROCEDURE — 93000 ELECTROCARDIOGRAM COMPLETE: CPT | Performed by: INTERNAL MEDICINE

## 2023-06-29 PROCEDURE — 665998 HH PPS REVENUE CREDIT

## 2023-06-29 PROCEDURE — 87086 URINE CULTURE/COLONY COUNT: CPT

## 2023-06-29 PROCEDURE — 3078F DIAST BP <80 MM HG: CPT | Performed by: INTERNAL MEDICINE

## 2023-06-29 PROCEDURE — 87186 SC STD MICRODIL/AGAR DIL: CPT

## 2023-06-29 PROCEDURE — 665999 HH PPS REVENUE DEBIT

## 2023-06-29 ASSESSMENT — FIBROSIS 4 INDEX: FIB4 SCORE: 1.99

## 2023-06-29 NOTE — PROGRESS NOTES
Chief Complaint   Patient presents with    Follow-Up    Hypertension       HISTORY OF THE PRESENT ILLNESS: Patient is a 89 y.o. female.     Patient comes in with her daughter for follow-up.  She has a hoarse voice today.  She reports that she woke up feeling okay but her symptoms began after taking Coricidin.  She has been experiencing some postnasal drip.  She was recently treated for a sinus infection.  She feels that this is improved.    We also discussed her recurrent urinary tract infections.  She has had multiple infections over the last 3 months.  She has been treated with antibiotics.  She reports that her symptoms only somewhat improved.  She experiences some nocturnal incontinence but no symptoms during the day.    She has history of hypertension which has been stable on her current medications.    We reviewed her most recent labs.    EKG:  EKG interpreted by myself.  Bradycardia but sinus rhythm.  Left axis deviation.  Normal intervals.  Nonspecific ST changes    Allergies: Morphine, Cephalexin, Codeine, Metronidazole, and Sulfa drugs    Current Outpatient Medications Ordered in Epic   Medication Sig Dispense Refill    doxycycline (VIBRAMYCIN) 100 MG Tab Take 1 Tablet by mouth 2 times a day. take 2 times a day for 10 days by mouth  Indications: UTI 20 Tablet 0    Homeopathic Products (THERAWORX RELIEF) Foam Apply 1 Dose topically 2 times a day as needed (for pain). spary on painful shoulder(s) as needed for pain in AM and PM ; use first  Indications: pain      levothyroxine (SYNTHROID) 75 MCG Tab TAKE 1 TABLET BY MOUTH EVERY DAY 90 Tablet 3    lisinopril (PRINIVIL) 20 MG Tab TAKE 1/2 TABLET BY MOUTH TWICE A DAY 90 Tablet 3    Ibuprofen-Acetaminophen 125-250 MG Tab Take 2 Tablets by mouth 1 time a day as needed (pain). do not take if taking tylenol  Indications: pain      Qpwwb-Lmayj-Rymybph-Pramoxine (NEOSPORIN + PAIN RELIEF MAX ST) 1 % Ointment 1 Dose by Percutaneous route 1 time a day as needed  (itching). Indications: itching      Camphor-Eucalyptus-Menthol (VICKS VAPORUB) 4.7-1.2-2.6 % Ointment 1 Dose by Other route 1 time a day as needed (congestion). Indications: nasal congestion      meloxicam (MOBIC) 7.5 MG Tab Take 7.5 mg by mouth every day. Indications: Joint Damage causing Pain and Loss of Function      amLODIPine (NORVASC) 5 MG Tab TAKE 1 TABLET BY MOUTH EVERY DAY 90 Tablet 3    sertraline (ZOLOFT) 50 MG Tab Take 1 Tablet by mouth every day. Indications: Major Depressive Disorder 90 Tablet 3    Cholecalciferol (VITAMIN D3) 1.25 MG (93569 UT) Tab Take 1.25 mg by mouth every day. Indications: supplement      Chlorpheniramine-APAP (CORICIDIN) 2-325 MG Tab Take 1 Tablet by mouth every four hours as needed (cold symptoms). take only for cold symptoms; do NOT take additional tylenol  Indications: Cold Symptoms      ACETAMINOPHEN 8 HOUR PO Take 650 mg by mouth 2 times a day as needed (moderate pain). do not exceed 2500 mg toatal in a day  Indications: Pain      Apoaequorin 10 MG Cap Take 10 mg by mouth every day. Indications: memory loss      Pediatric Vitamins (MULTIVITAMIN GUMMIES CHILDRENS PO) Take 1 Dose by mouth every day. Indications: supplement      meclizine (ANTIVERT) 25 MG Tab Take 1 Tablet by mouth 3 times a day as needed (for dizziness). 60 Tablet 0    ondansetron (ZOFRAN ODT) 4 MG TABLET DISPERSIBLE Take 1 Tablet by mouth every 8 hours as needed for Nausea. 60 Tablet 0    DORZOLAMIDE HCL-TIMOLOL MAL OP Administer 1 Drop into both eyes every morning. Indications: Glaucoma      Famotidine-Ca Carb-Mag Hydrox -165 MG Chew Tab Chew 1 Tablet 1 time a day as needed (for indegestion). take if tums is not effective  Indications: Heartburn      diclofenac sodium (VOLTAREN) 1 % Gel Apply 2 g topically 2 times a day as needed (for mild to moderate pain). Indications: Joint Damage causing Pain and Loss of Function      Soft Lens Products (FRANCESCA SALINE) Solution Administer 1 Drop into both eyes every  day. Indications: supplement      Melatonin 5 MG Cap Take 1 Capsule by mouth at bedtime as needed (for insomnia, takes first). 5 to 10 mg as needed for sleep  Indications: Trouble Sleeping      Multiple Vitamins-Minerals (PRESERVISION AREDS 2 PO) Take 1 Tablet by mouth every day. Indications: supplement      calcium carbonate (TUMS CHEWY BITES) 750 MG chewable tablet Chew 1 Tablet 1 time a day as needed (for stomach upset). Indications: Heartburn      travoprost (TRAVATAN Z) 0.004 % Solution Administer 1 Drop into both eyes every evening. Indications: Wide-Angle Glaucoma      polyethylene glycol 3350 (MIRALAX) Powder Take 17 g by mouth every day. Indications: Constipation       No current Epic-ordered facility-administered medications on file.       Past medical history, social history and family history were reviewed from chart today    Review of systems: Per HPI.    All others negative.     Exam: /74 (BP Location: Left arm, Patient Position: Sitting, BP Cuff Size: Adult)   Pulse (!) 50   Temp 37.1 °C (98.8 °F) (Temporal)   Resp 14   Wt 55.3 kg (122 lb)   SpO2 100%   General: Well-appearing. Well-developed. No signs of distress.  HEENT: Grossly normal. Oral cavity is pink and moist.   Neck: Supple without JVD or bruit.  Pulmonary: Clear with good breath sounds. Normal effort.  Cardiovascular: Regular. Carotid and radial pulses are intact.  Abdomen: Soft, nontender, nondistended. Spleen and liver are not enlarged.  Neurologic: Cranial nerves II through XII are grossly normal, alert and oriented x3      Diagnosis:  1. Primary hypertension        2. Recurrent UTI        3. Hoarse voice quality        4. Frail elderly        5. Bradycardia  EKG - Clinic Performed          89-year-old female.  She appears to be slowing down.  She is not as active.  Her appetite remains good.  Her only ambulation is to the dining room and back from her apartment.  She has home PT which is also helpful.  With the help she has  been able to continue with her activities of daily living.    We discussed her recurrent urinary tract infection.  I think this is likely due to her incontinence.  There may also be a hygiene issue which was reviewed with home health.  We will recheck her urine today.  If she has ongoing infection consider trial of topical estrogen.  Also consider prophylactic antibiotic.    Monitor her voice for additional illness symptoms.  Her pulmonary exam is unremarkable.  Her saturations are good.  It may be related to her antihistamine.    Medications:  No new medications today    Laboratory:  Repeat urine analysis    Imaging:  No imaging today    Referrals:  No referrals    My total time spent caring for the patient on the day of the encounter was  greater than 30 minutes.   This includes obtaining history, reviewing chart, physical exam, patient education, reviewing outside records, placing orders, interpreting tests and coordinating care.

## 2023-06-29 NOTE — TELEPHONE ENCOUNTER
Information obtained from daughter Atiya who is not with her mother at time of call. Acute onset of left sided abdominal pain after lunch today.  Patient only able to have a small bowel movement.  She reports slight nausea, no vomiting.  History of diverticulitis as well as intestinal obstruction per daughter.  Suggest 1 dose of Miralax now.  Observe.  If pain increased over the next 60-90 minutes, or patient starts vomiting, I recommend evaluation in the ER.  Daughter understands & agrees.  Of note - patient was seen by Dr Otero at 1100 today for a routine visit and was asymptomatic.

## 2023-06-30 PROCEDURE — 665998 HH PPS REVENUE CREDIT

## 2023-06-30 PROCEDURE — 665999 HH PPS REVENUE DEBIT

## 2023-07-01 PROCEDURE — 665998 HH PPS REVENUE CREDIT

## 2023-07-01 PROCEDURE — 665999 HH PPS REVENUE DEBIT

## 2023-07-02 PROCEDURE — 665999 HH PPS REVENUE DEBIT

## 2023-07-02 PROCEDURE — 665998 HH PPS REVENUE CREDIT

## 2023-07-03 ENCOUNTER — HOME CARE VISIT (OUTPATIENT)
Dept: HOME HEALTH SERVICES | Facility: HOME HEALTHCARE | Age: 88
End: 2023-07-03
Payer: MEDICARE

## 2023-07-03 PROCEDURE — 665999 HH PPS REVENUE DEBIT

## 2023-07-03 PROCEDURE — 665998 HH PPS REVENUE CREDIT

## 2023-07-04 PROCEDURE — 665998 HH PPS REVENUE CREDIT

## 2023-07-04 PROCEDURE — 665999 HH PPS REVENUE DEBIT

## 2023-07-05 PROCEDURE — 665998 HH PPS REVENUE CREDIT

## 2023-07-05 PROCEDURE — 665999 HH PPS REVENUE DEBIT

## 2023-07-06 ENCOUNTER — APPOINTMENT (OUTPATIENT)
Dept: INTERNAL MEDICINE | Facility: IMAGING CENTER | Age: 88
End: 2023-07-06
Payer: MEDICARE

## 2023-07-06 PROCEDURE — 665999 HH PPS REVENUE DEBIT

## 2023-07-06 PROCEDURE — 665998 HH PPS REVENUE CREDIT

## 2023-07-07 PROCEDURE — 665999 HH PPS REVENUE DEBIT

## 2023-07-07 PROCEDURE — 665998 HH PPS REVENUE CREDIT

## 2023-07-08 PROCEDURE — 665998 HH PPS REVENUE CREDIT

## 2023-07-08 PROCEDURE — 665999 HH PPS REVENUE DEBIT

## 2023-07-08 RX ORDER — DOXYCYCLINE HYCLATE 100 MG
100 TABLET ORAL DAILY
Qty: 30 TABLET | Refills: 6 | Status: SHIPPED | OUTPATIENT
Start: 2023-07-08 | End: 2023-07-19 | Stop reason: SDUPTHER

## 2023-07-09 PROCEDURE — 665999 HH PPS REVENUE DEBIT

## 2023-07-09 PROCEDURE — 665998 HH PPS REVENUE CREDIT

## 2023-07-10 ENCOUNTER — HOME CARE VISIT (OUTPATIENT)
Dept: HOME HEALTH SERVICES | Facility: HOME HEALTHCARE | Age: 88
End: 2023-07-10
Payer: MEDICARE

## 2023-07-10 VITALS
HEART RATE: 56 BPM | TEMPERATURE: 98.7 F | SYSTOLIC BLOOD PRESSURE: 120 MMHG | RESPIRATION RATE: 20 BRPM | OXYGEN SATURATION: 93 % | DIASTOLIC BLOOD PRESSURE: 60 MMHG

## 2023-07-10 PROCEDURE — 665998 HH PPS REVENUE CREDIT

## 2023-07-10 PROCEDURE — G0159 HHC PT MAINT EA 15 MIN: HCPCS

## 2023-07-10 PROCEDURE — 665999 HH PPS REVENUE DEBIT

## 2023-07-10 ASSESSMENT — ENCOUNTER SYMPTOMS
PAIN LOCATION - PAIN QUALITY: ACHE
PAIN LOCATION: RIGHT SHOULDER
PAIN SEVERITY GOAL: 0/10
PAIN: PAIN LIMITS FUNCTION AND SLEEP DAILY NOT CONSTANT
LOWEST PAIN SEVERITY IN PAST 24 HOURS: 0/10
PAIN LOCATION - PAIN DURATION: DAILY
POOR JUDGMENT: 1
HIGHEST PAIN SEVERITY IN PAST 24 HOURS: 7/10
PAIN LOCATION - RELIEVING FACTORS: REST, MEDICATION
PERSON REPORTING PAIN: PATIENT
PAIN: 1
SUBJECTIVE PAIN PROGRESSION: WAXING AND WANING
PAIN LOCATION - PAIN SEVERITY: 5/10
DIFFICULTY THINKING: 1
PAIN LOCATION - PAIN FREQUENCY: FREQUENT

## 2023-07-10 NOTE — Clinical Note
Noted I will call her thank you.  ----- Message -----  From: Cristina Jaramillo, PT  Sent: 7/10/2023   8:16 PM PDT  To: Mike Otero M.D.; *      P.T. reassessment performed 7/10/2023 and I will continue maintenance P.T. 1 week 4.    Pt is taking a nighttime mucinex also so may need education

## 2023-07-10 NOTE — Clinical Note
P.T. reassessment performed 7/10/2023 and I will continue maintenance P.T. 1 week 4.    Pt is taking a nighttime mucinex also so may need education

## 2023-07-10 NOTE — Clinical Note
Done and Noted.  ----- Message -----  From: Cristina Jaramillo, PT  Sent: 7/10/2023   8:16 PM PDT  To: Mike Otero M.D.; *      P.T. reassessment performed 7/10/2023 and I will continue maintenance P.T. 1 week 4.    Pt is taking a nighttime mucinex also so may need education

## 2023-07-11 ENCOUNTER — HOME CARE VISIT (OUTPATIENT)
Dept: HOME HEALTH SERVICES | Facility: HOME HEALTHCARE | Age: 88
End: 2023-07-11
Payer: MEDICARE

## 2023-07-11 PROCEDURE — 665998 HH PPS REVENUE CREDIT

## 2023-07-11 PROCEDURE — 665999 HH PPS REVENUE DEBIT

## 2023-07-11 NOTE — HOME HEALTH
PHYSICAL THERAPY REASSESSMENT AND PLAN OF CARE · Patient: Gloria Patel · Patient is preserving her functional mobility and skills with maintenance physical therapy services. Though she lives in assisted living, staff are unable to assist pt in her HEP and are not skilled to perform balance re education so further maintenance physical therapy is indicated. She has not had a fall in the last 4 weeks. She has had recurrent UTI and started antibiotics.  Therapist contacted PCP to see if a urology assessment was appropriate but did not hear back.  She continues to have significant risk of decline if she does not participate in maintenance physical therapy services. Patient is agreeable to continue physical therapy maintenance services. Therapist agrees she would continue to benefit · Maintenance Physical Therapy Need: Decreased activity tolerance, Balance control, Generalized deconditioning, Gait training, Fall prevention, Pain control and Poor safety awareness. Recommend skilled HHPT to address deficits and maintain function-report sent to MD · Frequency: 1 week for 4 weeks and probably recertify , Effective 7/10/2023 · Goals: Remain as written with same goal date on last recertification. How often (if at all) does pain interfere with patient's movements? Pain limits sleep and function on a daily but not constant basis.

## 2023-07-12 PROCEDURE — 665999 HH PPS REVENUE DEBIT

## 2023-07-12 PROCEDURE — 665998 HH PPS REVENUE CREDIT

## 2023-07-13 PROCEDURE — 665998 HH PPS REVENUE CREDIT

## 2023-07-13 PROCEDURE — 665999 HH PPS REVENUE DEBIT

## 2023-07-14 PROCEDURE — 665998 HH PPS REVENUE CREDIT

## 2023-07-14 PROCEDURE — 665999 HH PPS REVENUE DEBIT

## 2023-07-15 PROCEDURE — 665999 HH PPS REVENUE DEBIT

## 2023-07-15 PROCEDURE — 665998 HH PPS REVENUE CREDIT

## 2023-07-16 PROCEDURE — 665999 HH PPS REVENUE DEBIT

## 2023-07-16 PROCEDURE — 665998 HH PPS REVENUE CREDIT

## 2023-07-17 ENCOUNTER — HOME CARE VISIT (OUTPATIENT)
Dept: HOME HEALTH SERVICES | Facility: HOME HEALTHCARE | Age: 88
End: 2023-07-17
Payer: MEDICARE

## 2023-07-17 VITALS
SYSTOLIC BLOOD PRESSURE: 120 MMHG | OXYGEN SATURATION: 96 % | HEART RATE: 24 BPM | RESPIRATION RATE: 24 BRPM | DIASTOLIC BLOOD PRESSURE: 54 MMHG | TEMPERATURE: 98.5 F

## 2023-07-17 PROCEDURE — 665999 HH PPS REVENUE DEBIT

## 2023-07-17 PROCEDURE — 665998 HH PPS REVENUE CREDIT

## 2023-07-17 PROCEDURE — G0159 HHC PT MAINT EA 15 MIN: HCPCS

## 2023-07-17 PROCEDURE — 665003 FOLLOW UP-HOME HEALTH

## 2023-07-17 ASSESSMENT — ENCOUNTER SYMPTOMS
PAIN LOCATION - EXACERBATING FACTORS: MOVEMENT
PAIN SEVERITY GOAL: 3/10
LOWEST PAIN SEVERITY IN PAST 24 HOURS: 0/10
SUBJECTIVE PAIN PROGRESSION: WAXING AND WANING
DIFFICULTY THINKING: 1
POOR JUDGMENT: 1
HIGHEST PAIN SEVERITY IN PAST 24 HOURS: 7/10
PERSON REPORTING PAIN: PATIENT
PAIN: 1
PAIN LOCATION - PAIN DURATION: DAILY
PAIN: PAIN LIMITS SLEEP AND FUNCTION DAILY NOT CONSTANT
PAIN LOCATION: RIGHT SHOULDER
PAIN LOCATION - PAIN SEVERITY: 5/10
PAIN LOCATION - PAIN QUALITY: ACHE
PAIN LOCATION - PAIN FREQUENCY: FREQUENT

## 2023-07-18 PROCEDURE — 665998 HH PPS REVENUE CREDIT

## 2023-07-18 PROCEDURE — 665999 HH PPS REVENUE DEBIT

## 2023-07-19 PROCEDURE — 665998 HH PPS REVENUE CREDIT

## 2023-07-19 PROCEDURE — 665999 HH PPS REVENUE DEBIT

## 2023-07-19 RX ORDER — DOXYCYCLINE HYCLATE 100 MG
100 TABLET ORAL DAILY
Qty: 30 TABLET | Refills: 6 | Status: SHIPPED | OUTPATIENT
Start: 2023-07-19 | End: 2023-08-09

## 2023-07-20 PROCEDURE — 665998 HH PPS REVENUE CREDIT

## 2023-07-20 PROCEDURE — 665999 HH PPS REVENUE DEBIT

## 2023-07-21 PROCEDURE — 665998 HH PPS REVENUE CREDIT

## 2023-07-21 PROCEDURE — 665999 HH PPS REVENUE DEBIT

## 2023-07-22 PROCEDURE — 665999 HH PPS REVENUE DEBIT

## 2023-07-22 PROCEDURE — 665998 HH PPS REVENUE CREDIT

## 2023-07-23 PROCEDURE — 665998 HH PPS REVENUE CREDIT

## 2023-07-23 PROCEDURE — 665999 HH PPS REVENUE DEBIT

## 2023-07-24 ENCOUNTER — HOME CARE VISIT (OUTPATIENT)
Dept: HOME HEALTH SERVICES | Facility: HOME HEALTHCARE | Age: 88
End: 2023-07-24
Payer: MEDICARE

## 2023-07-24 VITALS
OXYGEN SATURATION: 96 % | TEMPERATURE: 98.4 F | HEART RATE: 60 BPM | DIASTOLIC BLOOD PRESSURE: 60 MMHG | SYSTOLIC BLOOD PRESSURE: 104 MMHG | RESPIRATION RATE: 20 BRPM

## 2023-07-24 PROCEDURE — G0159 HHC PT MAINT EA 15 MIN: HCPCS

## 2023-07-24 PROCEDURE — 665998 HH PPS REVENUE CREDIT

## 2023-07-24 PROCEDURE — 665999 HH PPS REVENUE DEBIT

## 2023-07-24 ASSESSMENT — ENCOUNTER SYMPTOMS
PAIN LOCATION: LEFT SHOULDER
PAIN: PAIN LIMITS SLEEP AND FUNCTION DAILY NOT CONSTANT
PAIN: 1
SUBJECTIVE PAIN PROGRESSION: WAXING AND WANING
PAIN LOCATION - RELIEVING FACTORS: REST, MEDICATION, HEAT
HIGHEST PAIN SEVERITY IN PAST 24 HOURS: 7/10
PAIN LOCATION - PAIN QUALITY: ACHE
PAIN SEVERITY GOAL: 2/10
PAIN LOCATION - EXACERBATING FACTORS: MOVEMENT
PAIN LOCATION - PAIN SEVERITY: 5/10
PERSON REPORTING PAIN: PATIENT
POOR JUDGMENT: 1
PAIN LOCATION - PAIN DURATION: DAILY
PAIN LOCATION - PAIN FREQUENCY: FREQUENT
DIFFICULTY THINKING: 1

## 2023-07-25 PROCEDURE — 665998 HH PPS REVENUE CREDIT

## 2023-07-25 PROCEDURE — 665999 HH PPS REVENUE DEBIT

## 2023-07-26 PROCEDURE — 665999 HH PPS REVENUE DEBIT

## 2023-07-26 PROCEDURE — 665998 HH PPS REVENUE CREDIT

## 2023-07-27 PROCEDURE — 665998 HH PPS REVENUE CREDIT

## 2023-07-27 PROCEDURE — 665999 HH PPS REVENUE DEBIT

## 2023-07-28 PROCEDURE — 665999 HH PPS REVENUE DEBIT

## 2023-07-28 PROCEDURE — 665998 HH PPS REVENUE CREDIT

## 2023-07-29 PROCEDURE — 665998 HH PPS REVENUE CREDIT

## 2023-07-29 PROCEDURE — 665999 HH PPS REVENUE DEBIT

## 2023-07-30 PROCEDURE — 665998 HH PPS REVENUE CREDIT

## 2023-07-30 PROCEDURE — 665999 HH PPS REVENUE DEBIT

## 2023-07-31 ENCOUNTER — HOME CARE VISIT (OUTPATIENT)
Dept: HOME HEALTH SERVICES | Facility: HOME HEALTHCARE | Age: 88
End: 2023-07-31
Payer: MEDICARE

## 2023-07-31 VITALS
RESPIRATION RATE: 22 BRPM | HEART RATE: 60 BPM | TEMPERATURE: 98.8 F | DIASTOLIC BLOOD PRESSURE: 56 MMHG | SYSTOLIC BLOOD PRESSURE: 104 MMHG | OXYGEN SATURATION: 97 %

## 2023-07-31 PROCEDURE — G0159 HHC PT MAINT EA 15 MIN: HCPCS

## 2023-07-31 PROCEDURE — 665999 HH PPS REVENUE DEBIT

## 2023-07-31 PROCEDURE — 665998 HH PPS REVENUE CREDIT

## 2023-07-31 ASSESSMENT — ENCOUNTER SYMPTOMS
DIFFICULTY THINKING: 1
HIGHEST PAIN SEVERITY IN PAST 24 HOURS: 7/10
PAIN LOCATION - EXACERBATING FACTORS: MOVEMENT
LOWEST PAIN SEVERITY IN PAST 24 HOURS: 2/10
PAIN LOCATION - RELIEVING FACTORS: REST, MEDICATION, HEAT
PAIN LOCATION - PAIN FREQUENCY: CONSTANT
PAIN LOCATION - PAIN DURATION: DAILY
SUBJECTIVE PAIN PROGRESSION: WAXING AND WANING
PAIN LOCATION: LEFT SHOULDER
PAIN LOCATION - PAIN QUALITY: ACHE
DEBILITATING PAIN: 1
PERSON REPORTING PAIN: PATIENT
POOR JUDGMENT: 1
PAIN: 1
PAIN LOCATION - PAIN SEVERITY: 5/10

## 2023-07-31 NOTE — Clinical Note
Pt is reporting that pain in B shoulders is at least 4/10 constantly and frequently 7 or higher. Is there any other medication she can take to help?    P.T. reassessment performed 7/31/23 for maintenance and I will continue 1 time a week and recertify next week.

## 2023-07-31 NOTE — Clinical Note
Noted.  ----- Message -----  From: Cristina Jaramillo, PT  Sent: 7/31/2023   5:40 PM PDT  To: Mike Otero M.D.; *      Pt is reporting that pain in B shoulders is at least 4/10 constantly and frequently 7 or higher. Is there any other medication she can take to help?    P.T. reassessment performed 7/31/23 for maintenance and I will continue 1 time a week and recertify next week.

## 2023-08-01 PROCEDURE — 665998 HH PPS REVENUE CREDIT

## 2023-08-01 PROCEDURE — 665999 HH PPS REVENUE DEBIT

## 2023-08-01 NOTE — HOME HEALTH
PHYSICAL THERAPY REASSESSMENT AND PLAN OF CARE · Patient: Gloria Patel · Patient is preserving her functional mobility and skills with maintenance physical therapy services. Though she lives in assisted living, staff are unable to assist pt in her HEP and are not skilled to perform balance re education so further maintenance physical therapy is indicated. She has not had a fall in the last 4 weeks. She has had recurrent UTI and started prophylactic antibiotics.  In addition her bilateral shoulder pain appears to be worse and limiting maintenance physical therapy treatment.  Therapist has numerous times suggested that she take Tylenol as soon as she gets out of bed to decrease her pain sooner and allow her to participate but patient continues to forget due to her cognitive deficits.  Therapist contacted PCP about bilateral shoulder pain worsening. She continues to have significant risk of decline if she does not participate in maintenance physical therapy services. Patient is agreeable to continue physical therapy maintenance services. Therapist agrees she would continue to benefit · Maintenance Physical Therapy Need: Decreased activity tolerance, Balance control, Generalized deconditioning, Gait training, Fall prevention, Pain control and Poor safety awareness. Recommend skilled HHPT to address deficits and maintain function-report sent to MD · Frequency: 1 week for 1 week and recertify , Effective 8/6/2023 · Goals: Remain as written with same goal date on last recertification. How often (if at all) does pain interfere with patient's movements? Pain limits sleep and function on a daily but not constant basis

## 2023-08-02 PROCEDURE — 665999 HH PPS REVENUE DEBIT

## 2023-08-02 PROCEDURE — 665998 HH PPS REVENUE CREDIT

## 2023-08-03 PROCEDURE — 665998 HH PPS REVENUE CREDIT

## 2023-08-03 PROCEDURE — 665999 HH PPS REVENUE DEBIT

## 2023-08-04 PROCEDURE — 665998 HH PPS REVENUE CREDIT

## 2023-08-04 PROCEDURE — 665999 HH PPS REVENUE DEBIT

## 2023-08-05 PROCEDURE — 665999 HH PPS REVENUE DEBIT

## 2023-08-05 PROCEDURE — 665998 HH PPS REVENUE CREDIT

## 2023-08-06 PROCEDURE — 665998 HH PPS REVENUE CREDIT

## 2023-08-06 PROCEDURE — 665999 HH PPS REVENUE DEBIT

## 2023-08-07 ENCOUNTER — HOME CARE VISIT (OUTPATIENT)
Dept: HOME HEALTH SERVICES | Facility: HOME HEALTHCARE | Age: 88
End: 2023-08-07
Payer: MEDICARE

## 2023-08-07 VITALS
HEART RATE: 64 BPM | DIASTOLIC BLOOD PRESSURE: 60 MMHG | TEMPERATURE: 99 F | RESPIRATION RATE: 16 BRPM | SYSTOLIC BLOOD PRESSURE: 118 MMHG | OXYGEN SATURATION: 98 %

## 2023-08-07 PROCEDURE — G0151 HHCP-SERV OF PT,EA 15 MIN: HCPCS

## 2023-08-07 PROCEDURE — 665999 HH PPS REVENUE DEBIT

## 2023-08-07 PROCEDURE — 665998 HH PPS REVENUE CREDIT

## 2023-08-07 ASSESSMENT — PATIENT HEALTH QUESTIONNAIRE - PHQ9
CLINICAL INTERPRETATION OF PHQ2 SCORE: 2
5. POOR APPETITE OR OVEREATING: 0 - NOT AT ALL
SUM OF ALL RESPONSES TO PHQ QUESTIONS 1-9: 2

## 2023-08-07 ASSESSMENT — ENCOUNTER SYMPTOMS
PAIN LOCATION - PAIN QUALITY: ACHE
PAIN LOCATION - PAIN FREQUENCY: CONSTANT
PERSON REPORTING PAIN: PATIENT
PAIN LOCATION - PAIN DURATION: DAILY
PAIN LOCATION: LEFT SHOULDER
HIGHEST PAIN SEVERITY IN PAST 24 HOURS: 7/10
DIFFICULTY THINKING: 1
DEBILITATING PAIN: 1
SUBJECTIVE PAIN PROGRESSION: WAXING AND WANING
POOR JUDGMENT: 1
PAIN LOCATION - RELIEVING FACTORS: REST, MEDICATION
PAIN: 1
LOWEST PAIN SEVERITY IN PAST 24 HOURS: 2/10
PAIN LOCATION - EXACERBATING FACTORS: MOVEMENT
PAIN LOCATION - PAIN SEVERITY: 6/10

## 2023-08-07 ASSESSMENT — ACTIVITIES OF DAILY LIVING (ADL): OASIS_M1830: 03

## 2023-08-07 NOTE — Clinical Note
P.T. recertification performed for maintenance P.T. services and I will continue 2 times  a week for 8 weeks. Further insurance auth may be required.    Pt has been experiencing consistent and increased B shoulder pain which is limiting participation in Tx. Would you consider a referral to a pain clinic such as [facility] since the orthopedic referral did not benefit patient by decreasing pain? Thanks

## 2023-08-07 NOTE — Clinical Note
Noted.  ----- Message -----  From: Cristina Jaramillo, PT  Sent: 8/7/2023   6:21 PM PDT  To: Boo Hernandez; Mike Otero M.D.; *      P.T. recertification performed for maintenance P.T. services and I will continue 2 times  a week for 8 weeks. Further insurance auth may be required.    Pt has been experiencing consistent and increased B shoulder pain which is limiting participation in Tx. Would you consider a referral to a pain clinic such as [facility] since the orthopedic referral did not benefit patient by decreasing pain? Thanks

## 2023-08-08 ENCOUNTER — OFFICE VISIT (OUTPATIENT)
Dept: INTERNAL MEDICINE | Facility: IMAGING CENTER | Age: 88
End: 2023-08-08
Payer: MEDICARE

## 2023-08-08 ENCOUNTER — HOSPITAL ENCOUNTER (OUTPATIENT)
Facility: MEDICAL CENTER | Age: 88
End: 2023-08-08
Attending: INTERNAL MEDICINE
Payer: MEDICARE

## 2023-08-08 ENCOUNTER — DOCUMENTATION (OUTPATIENT)
Dept: CARDIOLOGY | Facility: MEDICAL CENTER | Age: 88
End: 2023-08-08
Payer: MEDICARE

## 2023-08-08 VITALS
SYSTOLIC BLOOD PRESSURE: 146 MMHG | DIASTOLIC BLOOD PRESSURE: 70 MMHG | HEART RATE: 53 BPM | BODY MASS INDEX: 23.83 KG/M2 | WEIGHT: 122 LBS | TEMPERATURE: 99.3 F | RESPIRATION RATE: 14 BRPM | OXYGEN SATURATION: 99 %

## 2023-08-08 DIAGNOSIS — N39.0 RECURRENT UTI: ICD-10-CM

## 2023-08-08 DIAGNOSIS — R53.81 MALAISE: ICD-10-CM

## 2023-08-08 LAB
APPEARANCE UR: NORMAL
BILIRUB UR STRIP-MCNC: NEGATIVE MG/DL
COLOR UR AUTO: YELLOW
GLUCOSE UR STRIP.AUTO-MCNC: NEGATIVE MG/DL
KETONES UR STRIP.AUTO-MCNC: NORMAL MG/DL
LEUKOCYTE ESTERASE UR QL STRIP.AUTO: NORMAL
NITRITE UR QL STRIP.AUTO: POSITIVE
PH UR STRIP.AUTO: 5.5 [PH] (ref 5–8)
PROT UR QL STRIP: NEGATIVE MG/DL
RBC UR QL AUTO: NEGATIVE
SP GR UR STRIP.AUTO: 1.02
UROBILINOGEN UR STRIP-MCNC: 0.2 MG/DL

## 2023-08-08 PROCEDURE — 665999 HH PPS REVENUE DEBIT

## 2023-08-08 PROCEDURE — 99213 OFFICE O/P EST LOW 20 MIN: CPT | Performed by: INTERNAL MEDICINE

## 2023-08-08 PROCEDURE — 85652 RBC SED RATE AUTOMATED: CPT

## 2023-08-08 PROCEDURE — 85007 BL SMEAR W/DIFF WBC COUNT: CPT

## 2023-08-08 PROCEDURE — 665998 HH PPS REVENUE CREDIT

## 2023-08-08 PROCEDURE — 85025 COMPLETE CBC W/AUTO DIFF WBC: CPT

## 2023-08-08 PROCEDURE — 3078F DIAST BP <80 MM HG: CPT | Performed by: INTERNAL MEDICINE

## 2023-08-08 PROCEDURE — 81002 URINALYSIS NONAUTO W/O SCOPE: CPT | Performed by: INTERNAL MEDICINE

## 2023-08-08 PROCEDURE — 84443 ASSAY THYROID STIM HORMONE: CPT

## 2023-08-08 PROCEDURE — 80053 COMPREHEN METABOLIC PANEL: CPT

## 2023-08-08 PROCEDURE — 3077F SYST BP >= 140 MM HG: CPT | Performed by: INTERNAL MEDICINE

## 2023-08-08 RX ORDER — NITROFURANTOIN 25; 75 MG/1; MG/1
100 CAPSULE ORAL 2 TIMES DAILY
Qty: 14 CAPSULE | Refills: 0 | Status: SHIPPED | OUTPATIENT
Start: 2023-08-08 | End: 2023-08-10

## 2023-08-08 ASSESSMENT — FIBROSIS 4 INDEX: FIB4 SCORE: 2.01

## 2023-08-08 NOTE — PROGRESS NOTES
"Chief Complaint   Patient presents with    Follow-Up    Malaise       HISTORY OF THE PRESENT ILLNESS: Patient is a 89 y.o. female.     Patient comes in complaining of malaise.  She is also told her family that she feels like \"ice is in my veins\".  She however denies fever or chills.  No cough.  She has urinary frequency.  She has a history of recurrent urinary tract infections.  She has had 5 positive cultures for E. coli over the last 6 months.  She has been treated with multiple antibiotics without significant improvement.    She also complains of bilateral shoulder pain.  She has a history of osteoarthritis.  She has failed injections.    Allergies: Morphine, Cephalexin, Codeine, Metronidazole, and Sulfa drugs    Current Outpatient Medications Ordered in Epic   Medication Sig Dispense Refill    nitrofurantoin (MACROBID) 100 MG Cap Take 1 Capsule by mouth 2 times a day. 14 Capsule 0    doxycycline (VIBRAMYCIN) 100 MG Tab Take 1 Tablet by mouth every day. Indications: UTI 30 Tablet 6    Phenyleph-Triprolidine-DM-APAP (MUCINEX NIGHT SEV COLD/FLU MAX) 10-2.5- MG/20ML Solution Take 20 mL by mouth at bedtime as needed (coughing). Indications: cough      PE-Triprolidine-DM-GG-APAP (MUCINEX FAST-MAX CNG/CGH/CD/FL PO) Take 1 Capful by mouth every four hours as needed (cough/congestion). PRN   Indications: cough      Homeopathic Products (THERAWORX RELIEF) Foam Apply 1 Dose topically 2 times a day as needed (for pain). spary on painful shoulder(s) as needed for pain in AM and PM ; use first  Indications: pain      levothyroxine (SYNTHROID) 75 MCG Tab TAKE 1 TABLET BY MOUTH EVERY DAY 90 Tablet 3    lisinopril (PRINIVIL) 20 MG Tab TAKE 1/2 TABLET BY MOUTH TWICE A DAY 90 Tablet 3    Ibuprofen-Acetaminophen 125-250 MG Tab Take 2 Tablets by mouth 1 time a day as needed (pain). do not take if taking tylenol  Indications: pain      Lbabx-Oxkju-Wfemmyg-Pramoxine (NEOSPORIN + PAIN RELIEF MAX ST) 1 % Ointment 1 Dose by " Percutaneous route 1 time a day as needed (itching). Indications: itching      Camphor-Eucalyptus-Menthol (VICKS VAPORUB) 4.7-1.2-2.6 % Ointment 1 Dose by Other route 1 time a day as needed (congestion). Not taking. Changed to Mucinex  Indications: nasal congestion      amLODIPine (NORVASC) 5 MG Tab TAKE 1 TABLET BY MOUTH EVERY DAY 90 Tablet 3    sertraline (ZOLOFT) 50 MG Tab Take 1 Tablet by mouth every day. Indications: Major Depressive Disorder 90 Tablet 3    Cholecalciferol (VITAMIN D3) 1.25 MG (40721 UT) Tab Take 1.25 mg by mouth every day. Indications: supplement      ACETAMINOPHEN 8 HOUR PO Take 650 mg by mouth 2 times a day as needed (moderate pain). do not exceed 2500 mg toatal in a day;  pt taking 1 to 2 tabs up to 3 times a day  Indications: Pain      Apoaequorin 10 MG Cap Take 10 mg by mouth every day. Indications: memory loss      Pediatric Vitamins (MULTIVITAMIN GUMMIES CHILDRENS PO) Take 1 Dose by mouth every day. Indications: supplement      ondansetron (ZOFRAN ODT) 4 MG TABLET DISPERSIBLE Take 1 Tablet by mouth every 8 hours as needed for Nausea. 60 Tablet 0    DORZOLAMIDE HCL-TIMOLOL MAL OP Administer 1 Drop into both eyes every morning. Indications: Glaucoma      Famotidine-Ca Carb-Mag Hydrox -165 MG Chew Tab Chew 1 Tablet 1 time a day as needed (for indegestion). take if tums is not effective  Indications: Heartburn      diclofenac sodium (VOLTAREN) 1 % Gel Apply 2 g topically 2 times a day as needed (for mild to moderate pain). Indications: Joint Damage causing Pain and Loss of Function      Soft Lens Products (FRANCESCA SALINE) Solution Administer 1 Drop into both eyes every day. Indications: supplement      Melatonin 5 MG Cap Take 1 Capsule by mouth at bedtime as needed (for insomnia, takes first). 5 to 10 mg as needed for sleep  Indications: Trouble Sleeping      Multiple Vitamins-Minerals (PRESERVISION AREDS 2 PO) Take 1 Tablet by mouth every day. Indications: supplement      calcium  carbonate (TUMS CHEWY BITES) 750 MG chewable tablet Chew 1 Tablet 1 time a day as needed (for stomach upset). Indications: Heartburn      travoprost (TRAVATAN Z) 0.004 % Solution Administer 1 Drop into both eyes every evening. Indications: Wide-Angle Glaucoma      polyethylene glycol 3350 (MIRALAX) Powder Take 17 g by mouth every day. Indications: Constipation       No current Epic-ordered facility-administered medications on file.       Past medical history, social history and family history were reviewed from chart today    Review of systems: Per HPI.    All others negative.     Exam: BP (!) 146/70 (BP Location: Left arm, Patient Position: Sitting, BP Cuff Size: Adult)   Pulse (!) 53   Temp 37.4 °C (99.3 °F) (Temporal)   Resp 14   Wt 55.3 kg (122 lb)   SpO2 99%   General: Well-appearing. Well-developed. No signs of distress.  HEENT: Grossly normal. Oral cavity is pink and moist.   Neck: Supple without JVD or bruit.  Pulmonary: Clear with good breath sounds. Normal effort.  Cardiovascular: Regular. Carotid and radial pulses are intact.  Abdomen: Soft, nontender, nondistended. Spleen and liver are not enlarged.  Neurologic: Cranial nerves II through XII are grossly normal, alert and oriented x3      Diagnosis:  1. Malaise  Comp Metabolic Panel    CBC WITH DIFFERENTIAL    Sed Rate      2. Recurrent UTI  Comp Metabolic Panel    CBC WITH DIFFERENTIAL    Sed Rate          Trial of Macrobid for urinary tract infectio she was on this medication in the past.  She has not been on it again for unclear reasons.  She has not responded to doxycycline.  She has not responded to azithromycin.  If she does not respond to Macrobid then consider referral to urology for evaluation.  Possibly has stone or some other source of infection.    Update laboratory due to ongoing symptoms.  I would like to rule out other secondary causes.      My total time spent caring for the patient on the day of the encounter was  greater than 20  minutes.   This includes obtaining history, reviewing chart, physical exam, patient education, reviewing outside records, placing orders, interpreting tests and coordinating care.

## 2023-08-08 NOTE — PROGRESS NOTES
Medication chart review for [facility] services    Received referral from [facility].   Medications reviewed  compared with discharge summary if available.  Discharge summary date, if applicable:   N/a    Current medication list per [facility]     Medication list one, patient is currently taking    Current Outpatient Medications:     doxycycline, 100 mg, Oral, DAILY    Mucinex Night Sev Cold/Flu Max, 20 mL, Oral, HS PRN    PE-Triprolidine-DM-GG-APAP (MUCINEX FAST-MAX CNG/CGH/CD/FL PO), 1 Capful, Oral, Q4HRS PRN    Theraworx Relief, 1 Dose, Topical, BID PRN    levothyroxine, 75 mcg, Oral, QDAY    lisinopril, TAKE 1/2 TABLET BY MOUTH TWICE A DAY    Ibuprofen-Acetaminophen, 2 Tablet, Oral, QDAY PRN    Neosporin + Pain Relief Max St, 1 Dose, Percutaneous, QDAY PRN    Vicks VapoRub, 1 Dose, Other, QDAY PRN    amLODIPine, 5 mg, Oral, QDAY    sertraline, 50 mg, Oral, DAILY    Vitamin D3, 1.25 mg, Oral, DAILY    ACETAMINOPHEN 8 HOUR PO, 650 mg, Oral, BID PRN    Apoaequorin, 10 mg, Oral, DAILY    Pediatric Vitamins (MULTIVITAMIN GUMMIES CHILDRENS PO), 1 Dose, Oral, DAILY    ondansetron, 4 mg, Oral, Q8HRS PRN    DORZOLAMIDE HCL-TIMOLOL MAL OP, 1 Drop, Both Eyes, QAM    Famotidine-Ca Carb-Mag Hydrox, 1 Tablet, Oral, QDAY PRN    diclofenac sodium, 2 g, Topical, BID PRN    Linh Saline, 1 Drop, Both Eyes, DAILY    Melatonin, 1 Capsule, Oral, QHS PRN    Multiple Vitamins-Minerals (PRESERVISION AREDS 2 PO), 1 Tablet, Oral, DAILY    Tums Chewy Bites, 1 Tablet, Oral, QDAY PRN    travoprost, 1 Drop, Both Eyes, Q EVENING    polyethylene glycol 3350, 17 g, Oral, DAILY      Medication list two, drugs that the patient has been prescribed or recommended to take by their healthcare provider on discharge summary  N/a    Allergies   Allergen Reactions    Morphine Anaphylaxis     anaphylaxis    Cephalexin Rash     Full body    Codeine Vomiting and Nausea    Metronidazole Vomiting and Nausea    Sulfa Drugs Rash     Full body        Labs     Lab Results   Component Value Date/Time    SODIUM 136 02/23/2023 09:00 AM    POTASSIUM 4.8 02/23/2023 09:00 AM    CHLORIDE 103 02/23/2023 09:00 AM    CO2 24 02/23/2023 09:00 AM    GLUCOSE 113 (H) 02/23/2023 09:00 AM    BUN 15 02/23/2023 09:00 AM    CREATININE 0.64 02/23/2023 09:00 AM    CREATININE 0.99 01/09/2013 07:58 AM    BUNCREATRAT 20 01/09/2013 07:58 AM    GLOMRATE >59 08/04/2010 07:50 AM     Lab Results   Component Value Date/Time    ALKPHOSPHAT 86 02/23/2023 09:00 AM    ASTSGOT 16 02/23/2023 09:00 AM    ALTSGPT 12 02/23/2023 09:00 AM    TBILIRUBIN 0.3 02/23/2023 09:00 AM    INR 1.03 12/21/2021 08:26 PM    ALBUMIN 4.3 02/23/2023 09:00 AM        Assessment for clinically significant drug interactions, drug omissions/additions, duplicative therapies.            CC   Mike Otero M.D.  [address]  [facility] [address]  Fax: [phone]    [facility]  Phone [phone] fax [phone]    This note was created using voice recognition software (Dragon). The accuracy of the dictation is limited by the abilities of the software. I have reviewed the note prior to signing, however some errors in grammar and context are still possible. If you have any questions related to this note please do not hesitate to contact our office.

## 2023-08-08 NOTE — HOME HEALTH
Recertification of Care to Home Health for maintenance physical therapy services. Current clinical summary, reason for continued home health services, new issues identified: Patient is preserving her functional mobility, balance, activity tolerance, pain control with maintenance physical therapy services. She has not had a fall, ER visit or hospitalization but had recurrent UTI during this certification and now is on prophylactic antibiotics.  In addition she has been having increasing bilateral shoulder pain which is limiting her participation in treatment.  Therapist has reached out to PCP requesting a referral to pain management since previous referral to orthopedic surgeon did not benefit patient.  Staff of [facility] did call 911 last night due to pain and patient's difficulty breathing but there were no significant findings so no transportation to the hospital.  Patient continues to express desire to have maintenance physical therapy services. Though patient lives in an [facility] there is no exercise class that she can participate in and staff does not assist patient with exercise programs. In addition patient has cognitive deficits and problems with initiation and follow-through. Since maintenance services are being very effective for this patient and she is at risk for decline without maintenance services, therapist agrees she would continue to benefit and would likely decline without this service. Primary focus is on balance reeducation and response to activity tolerance maintenance which are skilled therapy services. Frequency: 1 week 8, Effective 8/13/2023 · Goals: Effective 8/13/2023 - Patient will continue to ambulate at least 300 feet using 4WW requiring no more than supervision on all surfaces in 8 visits. Patient will continue to to demonstrate at least fair immediate standing balance in 8 visits. Patient will continue to be independent in use of heating pad to assist with pain management in 8  visits. Caregivers will continue to be independent in home safety/fall prevention measures in 8 visits. Patient will remain safe through 8 visits. Vital signs will remain within defined parameters in 8 visits. Inclusion of patient/caregiver in plan of care development in 8 visits.  Does the patient get SOB with exertion? No shortness of breath has been noted How often (if at all) does pain interfere with patient's movements? On a daily but not constant basis limits function and sleep LIVING SITUATION AND CAREGIVING: Homebound Status: cognitive deficits, difficulty with ambulation/transfers, needs assistance to leave home, needs assistive devices to ambulate, unable to manage stairs, unsafe to drive or leave residence without assistance, unsteady gait, weakness and fatigue, debiltating pain and impaired thoughts/judgment Type of Home: Assisted living facility Lives Alone  but Receives Assistance: For some ADLs, medication management and whenever ambulating outside her apartment Unresolved Safety Concerns: Decreased safety awareness Does the patient have an Advanced Directive? Yes, copy provided or photographed for chart Does the patient have a POLST? Yes completed POLST is in the home and visible, photographed for chart and DNR entered as a signed order in EPIC. REASON FOR HOME HEALTH SUPPORTING INFORMATION: Physical therapy to assess and teach the following but not limited to: - fall prevention, hydration and home safety CURRENT LEVEL OF FUNCTION: Ambulation and Mobility: Independent in her apartment and supervised whenever exiting her apartment. Patient Equipment: walker for ambulation. Transferring: Independent except assisted with shower and car transfers Bathing: Shower Dressing:Assisted for shower and Independent dressing except to apply her pants, compression socks and shoes Special equipment used: 4WW, grab bars, shower chair How noticeably Short of Breath is the patient during the OASIS walk or other  activity? NA    MEDICATION MANAGEMENT: Patient medications administered by another person    Medication issues:  na

## 2023-08-09 ENCOUNTER — APPOINTMENT (OUTPATIENT)
Dept: RADIOLOGY | Facility: MEDICAL CENTER | Age: 88
End: 2023-08-09
Attending: EMERGENCY MEDICINE
Payer: MEDICARE

## 2023-08-09 ENCOUNTER — HOSPITAL ENCOUNTER (EMERGENCY)
Facility: MEDICAL CENTER | Age: 88
End: 2023-08-09
Attending: EMERGENCY MEDICINE
Payer: MEDICARE

## 2023-08-09 ENCOUNTER — TELEPHONE (OUTPATIENT)
Dept: INTERNAL MEDICINE | Facility: IMAGING CENTER | Age: 88
End: 2023-08-09

## 2023-08-09 VITALS
RESPIRATION RATE: 16 BRPM | DIASTOLIC BLOOD PRESSURE: 67 MMHG | WEIGHT: 145 LBS | HEIGHT: 64 IN | HEART RATE: 64 BPM | BODY MASS INDEX: 24.75 KG/M2 | OXYGEN SATURATION: 96 % | TEMPERATURE: 97.8 F | SYSTOLIC BLOOD PRESSURE: 158 MMHG

## 2023-08-09 DIAGNOSIS — N28.1 RENAL CYST: ICD-10-CM

## 2023-08-09 DIAGNOSIS — K44.9 HIATAL HERNIA: ICD-10-CM

## 2023-08-09 DIAGNOSIS — R06.02 SHORTNESS OF BREATH: ICD-10-CM

## 2023-08-09 LAB
ALBUMIN SERPL BCP-MCNC: 3.9 G/DL (ref 3.2–4.9)
ALBUMIN SERPL BCP-MCNC: 4.1 G/DL (ref 3.2–4.9)
ALBUMIN/GLOB SERPL: 1.3 G/DL
ALBUMIN/GLOB SERPL: 1.3 G/DL
ALP SERPL-CCNC: 108 U/L (ref 30–99)
ALP SERPL-CCNC: 112 U/L (ref 30–99)
ALT SERPL-CCNC: 14 U/L (ref 2–50)
ALT SERPL-CCNC: 16 U/L (ref 2–50)
ANION GAP SERPL CALC-SCNC: 9 MMOL/L (ref 7–16)
ANION GAP SERPL CALC-SCNC: 9 MMOL/L (ref 7–16)
ANISOCYTOSIS BLD QL SMEAR: ABNORMAL
ANISOCYTOSIS BLD QL SMEAR: ABNORMAL
AST SERPL-CCNC: 18 U/L (ref 12–45)
AST SERPL-CCNC: 23 U/L (ref 12–45)
BASOPHILS # BLD AUTO: 0 % (ref 0–1.8)
BASOPHILS # BLD AUTO: 0 % (ref 0–1.8)
BASOPHILS # BLD: 0 K/UL (ref 0–0.12)
BASOPHILS # BLD: 0 K/UL (ref 0–0.12)
BILIRUB SERPL-MCNC: 0.2 MG/DL (ref 0.1–1.5)
BILIRUB SERPL-MCNC: 0.4 MG/DL (ref 0.1–1.5)
BUN SERPL-MCNC: 22 MG/DL (ref 8–22)
BUN SERPL-MCNC: 26 MG/DL (ref 8–22)
CALCIUM ALBUM COR SERPL-MCNC: 8.7 MG/DL (ref 8.5–10.5)
CALCIUM ALBUM COR SERPL-MCNC: 8.8 MG/DL (ref 8.5–10.5)
CALCIUM SERPL-MCNC: 8.7 MG/DL (ref 8.5–10.5)
CALCIUM SERPL-MCNC: 8.8 MG/DL (ref 8.5–10.5)
CHLORIDE SERPL-SCNC: 101 MMOL/L (ref 96–112)
CHLORIDE SERPL-SCNC: 102 MMOL/L (ref 96–112)
CO2 SERPL-SCNC: 23 MMOL/L (ref 20–33)
CO2 SERPL-SCNC: 24 MMOL/L (ref 20–33)
CREAT SERPL-MCNC: 0.8 MG/DL (ref 0.5–1.4)
CREAT SERPL-MCNC: 1.07 MG/DL (ref 0.5–1.4)
EKG IMPRESSION: NORMAL
EOSINOPHIL # BLD AUTO: 0 K/UL (ref 0–0.51)
EOSINOPHIL # BLD AUTO: 0 K/UL (ref 0–0.51)
EOSINOPHIL NFR BLD: 0 % (ref 0–6.9)
EOSINOPHIL NFR BLD: 0 % (ref 0–6.9)
ERYTHROCYTE [DISTWIDTH] IN BLOOD BY AUTOMATED COUNT: 51.5 FL (ref 35.9–50)
ERYTHROCYTE [DISTWIDTH] IN BLOOD BY AUTOMATED COUNT: 54.2 FL (ref 35.9–50)
ERYTHROCYTE [SEDIMENTATION RATE] IN BLOOD BY WESTERGREN METHOD: 35 MM/HOUR (ref 0–25)
GFR SERPLBLD CREATININE-BSD FMLA CKD-EPI: 47 ML/MIN/1.73 M 2
GFR SERPLBLD CREATININE-BSD FMLA CKD-EPI: 66 ML/MIN/1.73 M 2
GLOBULIN SER CALC-MCNC: 3.1 G/DL (ref 1.9–3.5)
GLOBULIN SER CALC-MCNC: 3.1 G/DL (ref 1.9–3.5)
GLUCOSE SERPL-MCNC: 110 MG/DL (ref 65–99)
GLUCOSE SERPL-MCNC: 112 MG/DL (ref 65–99)
HCT VFR BLD AUTO: 32.6 % (ref 37–47)
HCT VFR BLD AUTO: 33.7 % (ref 37–47)
HGB BLD-MCNC: 10.3 G/DL (ref 12–16)
HGB BLD-MCNC: 10.7 G/DL (ref 12–16)
LYMPHOCYTES # BLD AUTO: 1.34 K/UL (ref 1–4.8)
LYMPHOCYTES # BLD AUTO: 1.84 K/UL (ref 1–4.8)
LYMPHOCYTES NFR BLD: 51.7 % (ref 22–41)
LYMPHOCYTES NFR BLD: 52.6 % (ref 22–41)
MACROCYTES BLD QL SMEAR: ABNORMAL
MANUAL DIFF BLD: NORMAL
MANUAL DIFF BLD: NORMAL
MCH RBC QN AUTO: 28.2 PG (ref 27–33)
MCH RBC QN AUTO: 28.7 PG (ref 27–33)
MCHC RBC AUTO-ENTMCNC: 31.6 G/DL (ref 32.2–35.5)
MCHC RBC AUTO-ENTMCNC: 31.8 G/DL (ref 32.2–35.5)
MCV RBC AUTO: 88.7 FL (ref 81.4–97.8)
MCV RBC AUTO: 90.8 FL (ref 81.4–97.8)
MONOCYTES # BLD AUTO: 0.09 K/UL (ref 0–0.85)
MONOCYTES # BLD AUTO: 0.14 K/UL (ref 0–0.85)
MONOCYTES NFR BLD AUTO: 2.6 % (ref 0–13.4)
MONOCYTES NFR BLD AUTO: 5.3 % (ref 0–13.4)
MORPHOLOGY BLD-IMP: NORMAL
MORPHOLOGY BLD-IMP: NORMAL
MYELOCYTES NFR BLD MANUAL: 0.9 %
NEUTROPHILS # BLD AUTO: 1.12 K/UL (ref 1.82–7.42)
NEUTROPHILS # BLD AUTO: 1.54 K/UL (ref 1.82–7.42)
NEUTROPHILS NFR BLD: 42.1 % (ref 44–72)
NEUTROPHILS NFR BLD: 43 % (ref 44–72)
NEUTS BAND NFR BLD MANUAL: 1.8 % (ref 0–10)
NRBC # BLD AUTO: 0 K/UL
NRBC # BLD AUTO: 0 K/UL
NRBC BLD-RTO: 0 /100 WBC (ref 0–0.2)
NRBC BLD-RTO: 0 /100 WBC (ref 0–0.2)
NT-PROBNP SERPL IA-MCNC: 540 PG/ML (ref 0–125)
OVALOCYTES BLD QL SMEAR: NORMAL
OVALOCYTES BLD QL SMEAR: NORMAL
PLATELET # BLD AUTO: 188 K/UL (ref 164–446)
PLATELET # BLD AUTO: 240 K/UL (ref 164–446)
PLATELET BLD QL SMEAR: NORMAL
PLATELET BLD QL SMEAR: NORMAL
PMV BLD AUTO: 8.9 FL (ref 9–12.9)
PMV BLD AUTO: 9.7 FL (ref 9–12.9)
POIKILOCYTOSIS BLD QL SMEAR: NORMAL
POTASSIUM SERPL-SCNC: 4.3 MMOL/L (ref 3.6–5.5)
POTASSIUM SERPL-SCNC: 4.7 MMOL/L (ref 3.6–5.5)
PROT SERPL-MCNC: 7 G/DL (ref 6–8.2)
PROT SERPL-MCNC: 7.2 G/DL (ref 6–8.2)
RBC # BLD AUTO: 3.59 M/UL (ref 4.2–5.4)
RBC # BLD AUTO: 3.8 M/UL (ref 4.2–5.4)
RBC BLD AUTO: PRESENT
RBC BLD AUTO: PRESENT
SODIUM SERPL-SCNC: 133 MMOL/L (ref 135–145)
SODIUM SERPL-SCNC: 135 MMOL/L (ref 135–145)
TROPONIN T SERPL-MCNC: 24 NG/L (ref 6–19)
TSH SERPL DL<=0.005 MIU/L-ACNC: 1.85 UIU/ML (ref 0.38–5.33)
WBC # BLD AUTO: 2.6 K/UL (ref 4.8–10.8)
WBC # BLD AUTO: 3.5 K/UL (ref 4.8–10.8)

## 2023-08-09 PROCEDURE — 665998 HH PPS REVENUE CREDIT

## 2023-08-09 PROCEDURE — 36415 COLL VENOUS BLD VENIPUNCTURE: CPT

## 2023-08-09 PROCEDURE — 85025 COMPLETE CBC W/AUTO DIFF WBC: CPT

## 2023-08-09 PROCEDURE — 71260 CT THORAX DX C+: CPT

## 2023-08-09 PROCEDURE — 84484 ASSAY OF TROPONIN QUANT: CPT

## 2023-08-09 PROCEDURE — 665999 HH PPS REVENUE DEBIT

## 2023-08-09 PROCEDURE — 71045 X-RAY EXAM CHEST 1 VIEW: CPT

## 2023-08-09 PROCEDURE — 93971 EXTREMITY STUDY: CPT | Mod: LT

## 2023-08-09 PROCEDURE — 700117 HCHG RX CONTRAST REV CODE 255: Performed by: EMERGENCY MEDICINE

## 2023-08-09 PROCEDURE — 99284 EMERGENCY DEPT VISIT MOD MDM: CPT

## 2023-08-09 PROCEDURE — 85007 BL SMEAR W/DIFF WBC COUNT: CPT

## 2023-08-09 PROCEDURE — 93005 ELECTROCARDIOGRAM TRACING: CPT | Performed by: EMERGENCY MEDICINE

## 2023-08-09 PROCEDURE — 93971 EXTREMITY STUDY: CPT | Mod: 26,LT | Performed by: INTERNAL MEDICINE

## 2023-08-09 PROCEDURE — 80053 COMPREHEN METABOLIC PANEL: CPT

## 2023-08-09 PROCEDURE — 83880 ASSAY OF NATRIURETIC PEPTIDE: CPT

## 2023-08-09 RX ADMIN — IOHEXOL 80 ML: 350 INJECTION, SOLUTION INTRAVENOUS at 13:02

## 2023-08-09 ASSESSMENT — FIBROSIS 4 INDEX: FIB4 SCORE: 2.37

## 2023-08-09 NOTE — LETTER
August 9, 2023        Gloriateddy Patel  [address]  [facility] [address]        Current Outpatient Medications Ordered in Epic   Medication Sig Dispense Refill    nitrofurantoin (MACROBID) 100 MG Cap Take 1 Capsule by mouth 2 times a day. 14 Capsule 0    Phenyleph-Triprolidine-DM-APAP (MUCINEX NIGHT SEV COLD/FLU MAX) 10-2.5- MG/20ML Solution Take 20 mL by mouth at bedtime as needed (coughing). Indications: cough      PE-Triprolidine-DM-GG-APAP (MUCINEX FAST-MAX CNG/CGH/CD/FL PO) Take 1 Capful by mouth every four hours as needed (cough/congestion). PRN   Indications: cough      Homeopathic Products (THERAWORX RELIEF) Foam Apply 1 Dose topically 2 times a day as needed (for pain). spary on painful shoulder(s) as needed for pain in AM and PM ; use first  Indications: pain      levothyroxine (SYNTHROID) 75 MCG Tab TAKE 1 TABLET BY MOUTH EVERY DAY 90 Tablet 3    lisinopril (PRINIVIL) 20 MG Tab TAKE 1/2 TABLET BY MOUTH TWICE A DAY 90 Tablet 3    Ibuprofen-Acetaminophen 125-250 MG Tab Take 2 Tablets by mouth 1 time a day as needed (pain). do not take if taking tylenol  Indications: pain      Ukcdr-Vwnjl-Kavfnyx-Pramoxine (NEOSPORIN + PAIN RELIEF MAX ST) 1 % Ointment 1 Dose by Percutaneous route 1 time a day as needed (itching). Indications: itching      Camphor-Eucalyptus-Menthol (VICKS VAPORUB) 4.7-1.2-2.6 % Ointment 1 Dose by Other route 1 time a day as needed (congestion). Not taking. Changed to Mucinex  Indications: nasal congestion      amLODIPine (NORVASC) 5 MG Tab TAKE 1 TABLET BY MOUTH EVERY DAY 90 Tablet 3    sertraline (ZOLOFT) 50 MG Tab Take 1 Tablet by mouth every day. Indications: Major Depressive Disorder 90 Tablet 3    Cholecalciferol (VITAMIN D3) 1.25 MG (13110 UT) Tab Take 1.25 mg by mouth every day. Indications: supplement      ACETAMINOPHEN 8 HOUR PO Take 650 mg by mouth 2 times a day as needed (moderate pain). do not exceed 2500 mg toatal in a day;  pt taking 1 to 2 tabs up to 3 times a  day  Indications: Pain      Apoaequorin 10 MG Cap Take 10 mg by mouth every day. Indications: memory loss      Pediatric Vitamins (MULTIVITAMIN GUMMIES CHILDRENS PO) Take 1 Dose by mouth every day. Indications: supplement      ondansetron (ZOFRAN ODT) 4 MG TABLET DISPERSIBLE Take 1 Tablet by mouth every 8 hours as needed for Nausea. 60 Tablet 0    DORZOLAMIDE HCL-TIMOLOL MAL OP Administer 1 Drop into both eyes every morning. Indications: Glaucoma      Famotidine-Ca Carb-Mag Hydrox -165 MG Chew Tab Chew 1 Tablet 1 time a day as needed (for indegestion). take if tums is not effective  Indications: Heartburn      diclofenac sodium (VOLTAREN) 1 % Gel Apply 2 g topically 2 times a day as needed (for mild to moderate pain). Indications: Joint Damage causing Pain and Loss of Function      Soft Lens Products (FRANCESCA SALINE) Solution Administer 1 Drop into both eyes every day. Indications: supplement      Melatonin 5 MG Cap Take 1 Capsule by mouth at bedtime as needed (for insomnia, takes first). 5 to 10 mg as needed for sleep  Indications: Trouble Sleeping      Multiple Vitamins-Minerals (PRESERVISION AREDS 2 PO) Take 1 Tablet by mouth every day. Indications: supplement      calcium carbonate (TUMS CHEWY BITES) 750 MG chewable tablet Chew 1 Tablet 1 time a day as needed (for stomach upset). Indications: Heartburn      travoprost (TRAVATAN Z) 0.004 % Solution Administer 1 Drop into both eyes every evening. Indications: Wide-Angle Glaucoma      polyethylene glycol 3350 (MIRALAX) Powder Take 17 g by mouth every day. Indications: Constipation       No current Epic-ordered facility-administered medications on file.

## 2023-08-09 NOTE — DISCHARGE INSTRUCTIONS
Please follow-up with your primary care physician for complete recheck.  Call today to review your emergency department visit and to schedule any additional testing that may be needed.  Return to the emergency department if you develop any new or worsening symptoms including worsening shortness of breath, chest pain, lightheadedness, or if you have any further concerns.    You are found to have a renal cyst and you will need to follow-up with her primary care physician for surveillance of this as this may be cancer.

## 2023-08-09 NOTE — ED NOTES
Bedside report given CHETAN Bustamante. At this time pt is resting in bed with even and unlabored breaths. No distress noted and pt is on monitoring devices. Pt and daughter at bedside updated regarding status of test results and waiting for further orders. This RN removed from care.

## 2023-08-09 NOTE — ED NOTES
Bedside report received from Carey BENTON. Pt resting comfortably and aware of POC with call light available and in reach. Pt on RA. Fall precautions in place and appropriate for pt. Pt reports no needs at this time. Appropriate equipment in room.

## 2023-08-09 NOTE — ED TRIAGE NOTES
"Chief Complaint   Patient presents with    Other     Pt BIBA from [facility] for c/o bilateral leg numbness and SOB, onset this AM at approx. 0500. Pt arrives AAOx3, GCS 14. .        BP (!) 157/67   Pulse 61   Temp 36.3 °C (97.4 °F) (Temporal)   Resp 20   Ht 1.626 m (5' 4\")   Wt 65.8 kg (145 lb)   SpO2 95%   BMI 24.89 kg/m²     "

## 2023-08-09 NOTE — ED NOTES
Pt discharged to home. Pt was given follow up instructions. All lines removed prior to discharge. Pt verbalized understanding of all instructions for discharge and is ambulatory out of ED with steady gait. Aox4

## 2023-08-09 NOTE — DISCHARGE SUMMARY
"  ED Observation Discharge Summary    Patient:Gloria Patel  Patient : [id number]  Patient MRN: [id number]  Patient PCP: Mike Otero M.D.    Admit Date: 2023  Discharge Date and Time: 23 1:29 PM  Discharge Diagnosis: Shortness of breath, hiatal hernia, renal cyst  Discharge Attending: Getachew Black D.O.  Discharge Service: ED Observation    ED Course  Gloria is a 89 y.o. female who was evaluated at [facility], increased work of breathing.  The patient had significant workup including CT chest abdomen pelvis negative for acute abnormality.  She does have a hiatal hernia the patient knew about, she does know about her renal cyst as well.  Patient would like to go home.  She has no evidence of pneumonia, hypoxia, endorgan damage, catastrophic condition here in the emergency department.  The patient was signed out to me by Dr. Carter who did a thorough evaluation and workup and I do agree with the plan.  The patient had a slightly elevated troponin of 24, BNP of 540 doubly she has any cardiac event such as myocardial infarction, acute coronary syndrome, congestive heart failure, pulm embolism, aortic dissection, ankle aneurysm.  I do believe the patient is amenable for discharge at this point.    Discharge Exam:  BP (!) 165/74   Pulse 61   Temp 36.3 °C (97.4 °F) (Temporal)   Resp 20   Ht 1.626 m (5' 4\")   Wt 65.8 kg (145 lb)   SpO2 96%   BMI 24.89 kg/m² .    Constitutional: Awake and alert. Nontoxic  HENT:  Grossly normal  Eyes: Grossly normal  Neck: Normal range of motion  Cardiovascular: Normal heart rate   Thorax & Lungs: No respiratory distress  Abdomen: Nontender  Skin:  No pathologic rash.   Extremities: Well perfused  Psychiatric: Anxious affect    Labs  Results for orders placed or performed during the hospital encounter of 23   CBC WITH DIFFERENTIAL   Result Value Ref Range    WBC 2.6 (L) 4.8 - 10.8 K/uL    RBC 3.80 (L) 4.20 - 5.40 M/uL    Hemoglobin 10.7 (L) 12.0 " - 16.0 g/dL    Hematocrit 33.7 (L) 37.0 - 47.0 %    MCV 88.7 81.4 - 97.8 fL    MCH 28.2 27.0 - 33.0 pg    MCHC 31.8 (L) 32.2 - 35.5 g/dL    RDW 51.5 (H) 35.9 - 50.0 fL    Platelet Count 188 164 - 446 K/uL    MPV 8.9 (L) 9.0 - 12.9 fL    Neutrophils-Polys 43.00 (L) 44.00 - 72.00 %    Lymphocytes 51.70 (H) 22.00 - 41.00 %    Monocytes 5.30 0.00 - 13.40 %    Eosinophils 0.00 0.00 - 6.90 %    Basophils 0.00 0.00 - 1.80 %    Nucleated RBC 0.00 0.00 - 0.20 /100 WBC    Neutrophils (Absolute) 1.12 (L) 1.82 - 7.42 K/uL    Lymphs (Absolute) 1.34 1.00 - 4.80 K/uL    Monos (Absolute) 0.14 0.00 - 0.85 K/uL    Eos (Absolute) 0.00 0.00 - 0.51 K/uL    Baso (Absolute) 0.00 0.00 - 0.12 K/uL    NRBC (Absolute) 0.00 K/uL    Anisocytosis 1+     Macrocytosis 1+    CMP   Result Value Ref Range    Sodium 135 135 - 145 mmol/L    Potassium 4.3 3.6 - 5.5 mmol/L    Chloride 102 96 - 112 mmol/L    Co2 24 20 - 33 mmol/L    Anion Gap 9.0 7.0 - 16.0    Glucose 110 (H) 65 - 99 mg/dL    Bun 22 8 - 22 mg/dL    Creatinine 0.80 0.50 - 1.40 mg/dL    Calcium 8.8 8.5 - 10.5 mg/dL    Correct Calcium 8.7 8.5 - 10.5 mg/dL    AST(SGOT) 18 12 - 45 U/L    ALT(SGPT) 14 2 - 50 U/L    Alkaline Phosphatase 108 (H) 30 - 99 U/L    Total Bilirubin 0.4 0.1 - 1.5 mg/dL    Albumin 4.1 3.2 - 4.9 g/dL    Total Protein 7.2 6.0 - 8.2 g/dL    Globulin 3.1 1.9 - 3.5 g/dL    A-G Ratio 1.3 g/dL   proBrain Natriuretic Peptide, NT   Result Value Ref Range    NT-proBNP 540 (H) 0 - 125 pg/mL   TROPONIN   Result Value Ref Range    Troponin T 24 (H) 6 - 19 ng/L   ESTIMATED GFR   Result Value Ref Range    GFR (CKD-EPI) 66 >60 mL/min/1.73 m 2   DIFFERENTIAL MANUAL   Result Value Ref Range    Manual Diff Status PERFORMED    PERIPHERAL SMEAR REVIEW   Result Value Ref Range    Peripheral Smear Review see below    PLATELET ESTIMATE   Result Value Ref Range    Plt Estimation Normal    MORPHOLOGY   Result Value Ref Range    RBC Morphology Present     Poikilocytosis 1+     Ovalocytes 1+    EKG    Result Value Ref Range    Report       [facility] Emergency Dept.    Test Date:  2023  Pt Name:    MARY BRAND              Department: ER  MRN:        [id number]                      Room:       RD 04  Gender:     Female                       Technician: [id number]  :        [id number]                   Requested By:CORNEL DIAZ  Order #:    [id number]                    Reading MD: CORNEL DIAZ MD    Measurements  Intervals                                Axis  Rate:       61                           P:          14  ME:         69                           QRS:        -42  QRSD:       105                          T:          -9  QT:         447  QTc:        451    Interpretive Statements    Rate 61, sinus rhythm, artifact present, no ST segment elevation or  depression, no significant changes compared to prior EKG dated 2021.  Electronically Signed On 2023 12:14:46 PDT by CORNEL DIAZ MD       *Note: Due to a large number of results and/or encounters for the requested time period, some results have not been displayed. A complete set of results can be found in Results Review.       Radiology  CT-CHEST (THORAX) WITH   Final Result      1.  Large hiatal hernia redemonstrated with adjacent atelectasis.   2.  No evidence of   3.  Large simple appearing RIGHT renal cyst redemonstrated         US-EXTREMITY VENOUS LOWER UNILAT LEFT   Final Result      DX-CHEST-PORTABLE (1 VIEW)   Final Result      Left basilar atelectasis versus consolidation. Pneumonia can be considered in the appropriate clinical settings.          Medications:     My final assessment includes as of breath, however hernia, renal cyst  Upon Reevaluation, the patient's condition has: Improved; and will be discharged.    Patient discharged from ED Observation status at 1315 (Time) 23 (Date).     Total time spent on this ED Observation discharge encounter is > 30 Minutes    Electronically signed by:  Getachew Black D.O., 8/9/2023 1:29 PM

## 2023-08-09 NOTE — ED NOTES
Bedside report received from Abigail LARSON RN. Upon shift change pt is resting in bed with even and unlabored breaths, in no apparent distress. PurWick placed on pt as she said she needed to go to the bathroom, pt tolerated well. Pt on monitoring devices and fall precautions in place.

## 2023-08-09 NOTE — ED PROVIDER NOTES
Emergency Physician Note    Chief Concern:  Chief Complaint   Patient presents with    Other     Pt BIBA from [facility] for c/o bilateral leg numbness and SOB, onset this AM at approx. 0500. Pt arrives AAOx3, GCS 14. .          Limitation to History:  Select: Patient does not remember the specific timeline of her symptoms.    HPI/ROS   Outside Historians:   Family Member     External Records Reviewed:  Outpatient Notes Ms. Patel was seen in internal medicine clinic yesterday.  Internal medicine physician note reviewed from yesterday, 8/8/2023.  She presented with concern of malaise.  She did report increased urinary frequency with 5 positive cultures for E. coli over the last 6 months.  Noted she has been treated with antibiotics without improvement.  She also reported bilateral shoulder pain.  Trial of Macrobid was given for urinary tract infection.  Noted that she had not previously responded to doxycycline or azithromycin.  Outpatient lab work was ordered.    HPI:  Gloria Patel is a 89 y.o. female who presents to the emergency department today for evaluation of shortness of breath, as well as some paresthesias to the lower extremities.  She states that she woke up this morning feeling very short of breath.  Her family member states that she calls her regularly, and over the past 2 months she has reported some shortness of breath.  This follows a viral upper respiratory illness that she had around that time.  However, patient states that she called paramedics today because she had worsening shortness of breath.  Family member states that she has called paramedics 3 times in the last 1 to 2 weeks for varying complaints.  Patient states that she does not remember the exact timeline of her shortness of breath, is uncertain as to when the symptoms began.  She is not entirely certain of her medications or past medical history as well.  On review of systems, feeling member states that she does  have some recurrent and chronic left lower extremity swelling which has been present for years, unchanged at this time.  She has no prior history of DVT nor pulmonary embolism, is not currently on any anticoagulation antiplatelet agents.    Triage note indicates concern for bilateral leg numbness.  On further discussion she describes this as a cold sensation to both feet, sensation does not involve the entire lower extremity, she reports no associated pain, has not noticed any skin coloration changes.  She does not report any complete sensory deficit, states the feeling seems somewhat diminished when her feet feel cold.    PAST MEDICAL HISTORY  Past Medical History:   Diagnosis Date    Arthritis     Diverticulitis     medicated    GERD (gastroesophageal reflux disease)     medicated    Glaucoma     Heart burn     Hiatus hernia syndrome     Hypertension     medicated    Primary osteoarthritis of both shoulders 1/22/2019    Unspecified urinary incontinence        SURGICAL HISTORY  Past Surgical History:   Procedure Laterality Date    ORIF, ANKLE Right 12/22/2021    Procedure: OPEN REDUCTION AND INTERNAL FIXATION, ANKLE;  Surgeon: Pop Causey M.D.;  Location: [facility];  Service: Orthopedics    HIP ARTHROPLASTY TOTAL  6/21/2011    Performed by AMPARO CRAFT at [facility] ORS    OTHER ORTHOPEDIC SURGERY  2000    back surgery    GYN SURGERY  1970    hysterectomy    CHOLECYSTECTOMY  1966    rahat    OTHER  1954    mikey. breast bx    OTHER ABDOMINAL SURGERY  1954    kidney tacked up ?    OTHER  1945    tonsilectomy    OTHER ORTHOPEDIC SURGERY      osteoporosis - medicated    US-NEEDLE CORE BX-BREAST PANEL         FAMILY HISTORY  Family History   Problem Relation Age of Onset    Diabetes Other     Heart Disease Other     Lung Disease Other     Cancer Sister        SOCIAL HISTORY   reports that she has never smoked. She has never used smokeless tobacco. She reports current alcohol use. She reports  that she does not use drugs.    CURRENT MEDICATIONS  Discharge Medication List as of 8/9/2023  1:33 PM        CONTINUE these medications which have NOT CHANGED    Details   nitrofurantoin (MACROBID) 100 MG Cap Take 1 Capsule by mouth 2 times a day., Disp-14 Capsule, R-0, Normal      Phenyleph-Triprolidine-DM-APAP (MUCINEX NIGHT SEV COLD/FLU MAX) 10-2.5- MG/20ML Solution Take 20 mL by mouth at bedtime as needed (coughing). Indications: cough, Historical Med      PE-Triprolidine-DM-GG-APAP (MUCINEX FAST-MAX CNG/CGH/CD/FL PO) Take 1 Capful by mouth every four hours as needed (cough/congestion). PRN   Indications: cough, Historical Med      Homeopathic Products (THERAWORX RELIEF) Foam Apply 1 Dose topically 2 times a day as needed (for pain). spary on painful shoulder(s) as needed for pain in AM and PM ; use first  Indications: pain, Historical Med      levothyroxine (SYNTHROID) 75 MCG Tab TAKE 1 TABLET BY MOUTH EVERY DAY, Disp-90 Tablet, R-3, Normal      lisinopril (PRINIVIL) 20 MG Tab TAKE 1/2 TABLET BY MOUTH TWICE A DAY, Disp-90 Tablet, R-3, Normal      Ibuprofen-Acetaminophen 125-250 MG Tab Take 2 Tablets by mouth 1 time a day as needed (pain). do not take if taking tylenol  Indications: pain, Historical Med      Inbqo-Abdro-Whbnnkd-Pramoxine (NEOSPORIN + PAIN RELIEF MAX ST) 1 % Ointment 1 Dose by Percutaneous route 1 time a day as needed (itching). Indications: itching, Historical Med      Camphor-Eucalyptus-Menthol (VICKS VAPORUB) 4.7-1.2-2.6 % Ointment 1 Dose by Other route 1 time a day as needed (congestion). Not taking. Changed to Mucinex  Indications: nasal congestion, Historical Med      amLODIPine (NORVASC) 5 MG Tab TAKE 1 TABLET BY MOUTH EVERY DAY, Disp-90 Tablet, R-3, Normal      sertraline (ZOLOFT) 50 MG Tab Take 1 Tablet by mouth every day. Indications: Major Depressive Disorder, Disp-90 Tablet, R-3, Print Plain Paper      Cholecalciferol (VITAMIN D3) 1.25 MG (52097 UT) Tab Take 1.25 mg by mouth  every day. Indications: supplementsupplementHistorical Med      ACETAMINOPHEN 8 HOUR PO Take 650 mg by mouth 2 times a day as needed (moderate pain). do not exceed 2500 mg toatal in a day;  pt taking 1 to 2 tabs up to 3 times a day  Indications: Pain, Historical Med      Apoaequorin 10 MG Cap Take 10 mg by mouth every day. Indications: memory loss, Historical Med      Pediatric Vitamins (MULTIVITAMIN GUMMIES CHILDRENS PO) Take 1 Dose by mouth every day. Indications: supplement, Historical Med      ondansetron (ZOFRAN ODT) 4 MG TABLET DISPERSIBLE Take 1 Tablet by mouth every 8 hours as needed for Nausea., Disp-60 Tablet, R-0, Normal      DORZOLAMIDE HCL-TIMOLOL MAL OP Administer 1 Drop into both eyes every morning. Indications: Glaucoma, Historical Med      Famotidine-Ca Carb-Mag Hydrox -165 MG Chew Tab Chew 1 Tablet 1 time a day as needed (for indegestion). take if tums is not effective  Indications: Heartburn, Historical Med      diclofenac sodium (VOLTAREN) 1 % Gel Apply 2 g topically 2 times a day as needed (for mild to moderate pain). Indications: Joint Damage causing Pain and Loss of Function, Historical Med      Soft Lens Products (FRANCESCA SALINE) Solution Administer 1 Drop into both eyes every day. Indications: supplement, Historical Med      Melatonin 5 MG Cap Take 1 Capsule by mouth at bedtime as needed (for insomnia, takes first). 5 to 10 mg as needed for sleep  Indications: Trouble Sleeping, Historical Med      Multiple Vitamins-Minerals (PRESERVISION AREDS 2 PO) Take 1 Tablet by mouth every day. Indications: supplementsupplementHistorical Med      calcium carbonate (TUMS CHEWY BITES) 750 MG chewable tablet Chew 1 Tablet 1 time a day as needed (for stomach upset). Indications: Heartburn, Historical Med      travoprost (TRAVATAN Z) 0.004 % Solution Administer 1 Drop into both eyes every evening. Indications: Wide-Angle Glaucoma, Historical Med      polyethylene glycol 3350 (MIRALAX) Powder Take 17 g by  "mouth every day. Indications: Constipation, Historical Med             ALLERGIES  Morphine, Cephalexin, Codeine, Metronidazole, and Sulfa drugs    PHYSICAL EXAM  Vital Signs: BP (!) 158/76   Pulse (!) 59   Temp 36.3 °C (97.4 °F) (Temporal)   Resp 20   Ht 1.626 m (5' 4\")   Wt 65.8 kg (145 lb)   SpO2 94%   BMI 24.89 kg/m²     Constitutional: Alert, no acute distress  HENT: Normocephalic, atraumatic.  Cardiovascular: Cardiac murmur present, regular rate and rhythm, no tachycardia  Pulmonary: Breath sounds quiet and equal bilaterally  Abdomen: Soft, non tender, no peritoneal signs.  Skin: Warm, dry, no rashes or lesions  Musculoskeletal: Very minimal left lower extremity edema, most notable around the ankles, trace pretibial edema, distal extremities are warm and well-perfused.  DP pulse is not palpable, but is present on Doppler examination bilaterally.  Neurologic: Alert, oriented, normal motor function, no speech deficits  Psychiatric: Calm and cooperative    Diagnostic Studies & Procedures    Labs:  All labs reviewed by me as noted below.  I reviewed the outpatient labs that were drawn yesterday.  CBC showed a white blood count of 3.5, normal baseline appears to be between 4 and 7.  Hemoglobin is 10.3, slightly below prior values, baseline appears to be 11-12.  Platelet count is within normal limits.  1% bands resulted.  ESR was elevated to 35.    EK Lead EKG interpreted by me as noted below  Results for orders placed or performed during the hospital encounter of 23   EKG   Result Value Ref Range    Report       [facility] Emergency Dept.    Test Date:  2023  Pt Name:    MARY BRAND              Department: ER  MRN:        [id number]                      Room:       RD 04  Gender:     Female                       Technician: [id number]  :        [id number]                   Requested By:CORNEL DIAZ  Order #:    [id number]                    Reading MD: CORNEL DIAZ, " MD    Measurements  Intervals                                Axis  Rate:       61                           P:          14  OR:         69                           QRS:        -42  QRSD:       105                          T:          -9  QT:         447  QTc:        451    Interpretive Statements    Rate 61, sinus rhythm, artifact present, no ST segment elevation or  depression, no significant changes compared to prior EKG dated 12/22/2021.  Electronically Signed On 08- 12:14:46 PDT by CORNEL DIAZ MD       *Note: Due to a large number of results and/or encounters for the requested time period, some results have not been displayed. A complete set of results can be found in Results Review.     Radiology:  The attending Emergency Physician has independently interpreted the following imaging:  I independently reviewed the chest x-ray image, cardiomegaly present not significantly changed from prior, left costophrenic angle is not clearly visualized.    CT-CHEST (THORAX) WITH   Final Result      1.  Large hiatal hernia redemonstrated with adjacent atelectasis.   2.  No evidence of   3.  Large simple appearing RIGHT renal cyst redemonstrated         US-EXTREMITY VENOUS LOWER UNILAT LEFT   Final Result      DX-CHEST-PORTABLE (1 VIEW)   Final Result      Left basilar atelectasis versus consolidation. Pneumonia can be considered in the appropriate clinical settings.          Course and Medical Decision Making    ED Observation Status? Yes; Differential diagnosis includes severe or life threatening conditions including: Metabolic derangement including electrolyte derangement, intrathoracic mass, fluid overload.  Due to diagnostic uncertainty at this time, patient placed in observation status at 8:05 AM, 08/09/2023.     Observation plan is as follows: Ongoing telemetry and pulse oximetry monitoring, serial reassessments, supportive care as needed    Upon Reevaluation, the patient's condition has: Improved; and will  be discharged.    Discharged from ED observation at 12:32 pm, 8/10/23    Initial Assessment and Plan  Ms. Patel presents to the emergency department today for evaluation of shortness of breath, intermittent over the past 2 months, worsening overnight.  On arrival to the emergency department she is in no respiratory distress, she has no hypoxia, room air oxygen saturation is 95% on my initial assessment.  She does have trace edema to the left lower extremity, no significant unilateral edema.  Due to history of unilateral edema, I do have some concern for DVT.    On laboratory evaluation her white blood count is slightly decreased at 2.6, hemoglobin is unchanged from labs that were drawn yesterday, platelet count is within normal limits.  No bands resulted on differential today at this time.  She has no significant abnormalities on CMP.  proBNP is mildly elevated to 540, high-sensitivity troponin is just above normal reference range at 24.    Left lower extremity ultrasound is negative for DVT.    Chest x-ray demonstrates left basilar atelectasis versus consolidation.    Given steadily worsening symptoms, with abnormal chest x-ray, CT of the thorax was ordered. No acute process was identified. There was a hiatal hernia, and a renal cyst unchanged from prior imaging.     She remained well appearing throughout her stay in the emergency department, no worsening symptoms or vital signs during observation and monitoring. At this time, I do not believe she requires any further emergent nor inpatient diagnostics nor treatment, and can be discharged home with close outpatient follow up. She is counseled to call her primary care physician to review her emergency department visit at the opening of business hours. Return precautions were discussed with the patient, and provided in written form with the patient's discharge instructions.       Additional Problems and Disposition    Escalation of care considered, and ultimately  not performed: Hospitalization was initially considered, however given reassuring labwork, imaging, and absence of worsening symptoms during ED observation, I believe she can be safely discharged with close follow up and return precautions.     Disposition:  Discharge in stable condition    FINAL IMPRESSION   1. Shortness of breath    2. Hiatal hernia    3. Renal cyst        Landon GERBER (Scribe), am scribing for, and in the presence of, Maura Carter M.D..    Electronically signed by: Landon Reid (Scribe), 8/9/2023    Maura GERBER M.D. personally performed the services described in this documentation, as scribed by Landon Reid in my presence, and it is both accurate and complete.    The note accurately reflects work and decisions made by me.  Maura Carter M.D.  8/13/2023  8:58 AM

## 2023-08-10 PROCEDURE — 665999 HH PPS REVENUE DEBIT

## 2023-08-10 PROCEDURE — 665998 HH PPS REVENUE CREDIT

## 2023-08-10 RX ORDER — CIPROFLOXACIN 250 MG/1
250 TABLET, FILM COATED ORAL 2 TIMES DAILY
Qty: 14 TABLET | Refills: 0 | Status: SHIPPED | OUTPATIENT
Start: 2023-08-10 | End: 2023-08-15

## 2023-08-11 PROCEDURE — 665999 HH PPS REVENUE DEBIT

## 2023-08-11 PROCEDURE — 665998 HH PPS REVENUE CREDIT

## 2023-08-11 RX ORDER — NITROFURANTOIN 25; 75 MG/1; MG/1
100 CAPSULE ORAL 2 TIMES DAILY
Qty: 14 CAPSULE | Refills: 0 | Status: SHIPPED | OUTPATIENT
Start: 2023-08-11 | End: 2023-10-05 | Stop reason: SDUPTHER

## 2023-08-12 PROCEDURE — 665998 HH PPS REVENUE CREDIT

## 2023-08-12 PROCEDURE — 665999 HH PPS REVENUE DEBIT

## 2023-08-13 PROCEDURE — 665998 HH PPS REVENUE CREDIT

## 2023-08-13 PROCEDURE — 665999 HH PPS REVENUE DEBIT

## 2023-08-14 ENCOUNTER — HOME CARE VISIT (OUTPATIENT)
Dept: HOME HEALTH SERVICES | Facility: HOME HEALTHCARE | Age: 88
End: 2023-08-14
Payer: MEDICARE

## 2023-08-14 PROCEDURE — 665999 HH PPS REVENUE DEBIT

## 2023-08-14 PROCEDURE — 665003 FOLLOW UP-HOME HEALTH

## 2023-08-14 PROCEDURE — 665998 HH PPS REVENUE CREDIT

## 2023-08-14 PROCEDURE — G0159 HHC PT MAINT EA 15 MIN: HCPCS

## 2023-08-14 ASSESSMENT — ENCOUNTER SYMPTOMS
PAIN LOCATION - EXACERBATING FACTORS: MOVEMENT
PAIN SEVERITY GOAL: 3/10
PAIN LOCATION - PAIN FREQUENCY: CONSTANT
PAIN LOCATION - PAIN SEVERITY: 5/10
PAIN LOCATION: BACK
LOWEST PAIN SEVERITY IN PAST 24 HOURS: 0/10
PAIN LOCATION - PAIN FREQUENCY: FREQUENT
PAIN LOCATION - PAIN QUALITY: ACHE
PAIN LOCATION - PAIN DURATION: LESS THAN DAILY
PAIN LOCATION - RELIEVING FACTORS: REST, MEDICATION
PAIN: 1
PAIN LOCATION - PAIN DURATION: DAILY
PERSON REPORTING PAIN: PATIENT
PAIN LOCATION - PAIN QUALITY: SHARP
DIFFICULTY THINKING: 1
DEBILITATING PAIN: 1
PAIN LOCATION - RELIEVING FACTORS: REST, HEAT, MEDICATION
HIGHEST PAIN SEVERITY IN PAST 24 HOURS: 6/10
PAIN LOCATION: LEFT SHOULDER
SUBJECTIVE PAIN PROGRESSION: WAXING AND WANING
POOR JUDGMENT: 1
PAIN LOCATION - PAIN SEVERITY: 4/10

## 2023-08-15 PROCEDURE — 665998 HH PPS REVENUE CREDIT

## 2023-08-15 PROCEDURE — 665999 HH PPS REVENUE DEBIT

## 2023-08-16 PROCEDURE — 665999 HH PPS REVENUE DEBIT

## 2023-08-16 PROCEDURE — 665998 HH PPS REVENUE CREDIT

## 2023-08-17 PROCEDURE — 665999 HH PPS REVENUE DEBIT

## 2023-08-17 PROCEDURE — 665998 HH PPS REVENUE CREDIT

## 2023-08-18 PROCEDURE — 665998 HH PPS REVENUE CREDIT

## 2023-08-18 PROCEDURE — 665999 HH PPS REVENUE DEBIT

## 2023-08-19 PROCEDURE — 665999 HH PPS REVENUE DEBIT

## 2023-08-19 PROCEDURE — 665998 HH PPS REVENUE CREDIT

## 2023-08-20 PROCEDURE — 665999 HH PPS REVENUE DEBIT

## 2023-08-20 PROCEDURE — 665998 HH PPS REVENUE CREDIT

## 2023-08-21 ENCOUNTER — HOME CARE VISIT (OUTPATIENT)
Dept: HOME HEALTH SERVICES | Facility: HOME HEALTHCARE | Age: 88
End: 2023-08-21
Payer: MEDICARE

## 2023-08-21 VITALS
HEART RATE: 56 BPM | OXYGEN SATURATION: 96 % | RESPIRATION RATE: 18 BRPM | TEMPERATURE: 98.6 F | SYSTOLIC BLOOD PRESSURE: 118 MMHG | DIASTOLIC BLOOD PRESSURE: 64 MMHG

## 2023-08-21 PROCEDURE — 665999 HH PPS REVENUE DEBIT

## 2023-08-21 PROCEDURE — 665998 HH PPS REVENUE CREDIT

## 2023-08-21 PROCEDURE — G0159 HHC PT MAINT EA 15 MIN: HCPCS

## 2023-08-21 ASSESSMENT — ENCOUNTER SYMPTOMS
PAIN LOCATION - PAIN FREQUENCY: FREQUENT
PAIN LOCATION - RELIEVING FACTORS: MEDICATION, REST
DIFFICULTY THINKING: 1
PAIN LOCATION - PAIN DURATION: DAILY
PAIN SEVERITY GOAL: 2/10
PAIN LOCATION - PAIN QUALITY: ACHE
PAIN LOCATION - PAIN SEVERITY: 5/10
PERSON REPORTING PAIN: PATIENT
PAIN: PAIN LIMITS FUNCTION AND SLEEP DAILY NOT CONSTANT
SUBJECTIVE PAIN PROGRESSION: WAXING AND WANING
HIGHEST PAIN SEVERITY IN PAST 24 HOURS: 6/10
PAIN LOCATION - EXACERBATING FACTORS: MOVEMENT
DEBILITATING PAIN: 1
PAIN: 1
POOR JUDGMENT: 1
PAIN LOCATION: LEFT SHOULDER
LOWEST PAIN SEVERITY IN PAST 24 HOURS: 0/10

## 2023-08-21 NOTE — Clinical Note
agree with changes. I always forget that diet is what they are suppose to do as opposed to what they actually do. Thanks  ----- Message -----  From: Mena Davis R.N.  Sent: 8/21/2023   6:50 AM PDT  To: Cristina Jaramillo, PT      Quality Review for 8.7.23 Recert OASIS performed on by DENISE Davis RN on 8.21.2023:     Edits completed by DENISE Davis RN:  1.  and  dx coding updated per chart review.   2. Added low Na diet to nutritional requirements, pt has HTN

## 2023-08-21 NOTE — CASE COMMUNICATION
Quality Review for 8.7.23 Recert OASIS performed on by DENISE Davis RN on 8.21.2023:     Edits completed by DENISE Davis RN:  1.  and  dx coding updated per chart review.   2. Added low Na diet to nutritional requirements, pt has HTN

## 2023-08-22 PROCEDURE — 665998 HH PPS REVENUE CREDIT

## 2023-08-22 PROCEDURE — 665999 HH PPS REVENUE DEBIT

## 2023-08-23 PROCEDURE — 665999 HH PPS REVENUE DEBIT

## 2023-08-23 PROCEDURE — 665998 HH PPS REVENUE CREDIT

## 2023-08-24 PROCEDURE — 665998 HH PPS REVENUE CREDIT

## 2023-08-24 PROCEDURE — 665999 HH PPS REVENUE DEBIT

## 2023-08-25 PROCEDURE — 665999 HH PPS REVENUE DEBIT

## 2023-08-25 PROCEDURE — 665998 HH PPS REVENUE CREDIT

## 2023-08-26 PROCEDURE — 665998 HH PPS REVENUE CREDIT

## 2023-08-26 PROCEDURE — 665999 HH PPS REVENUE DEBIT

## 2023-08-27 PROCEDURE — 665998 HH PPS REVENUE CREDIT

## 2023-08-27 PROCEDURE — G0179 MD RECERTIFICATION HHA PT: HCPCS | Performed by: INTERNAL MEDICINE

## 2023-08-27 PROCEDURE — 665999 HH PPS REVENUE DEBIT

## 2023-08-28 ENCOUNTER — HOME CARE VISIT (OUTPATIENT)
Dept: HOME HEALTH SERVICES | Facility: HOME HEALTHCARE | Age: 88
End: 2023-08-28
Payer: MEDICARE

## 2023-08-28 VITALS
OXYGEN SATURATION: 97 % | TEMPERATURE: 98.7 F | RESPIRATION RATE: 60 BRPM | HEART RATE: 60 BPM | DIASTOLIC BLOOD PRESSURE: 60 MMHG | SYSTOLIC BLOOD PRESSURE: 120 MMHG

## 2023-08-28 PROCEDURE — 665999 HH PPS REVENUE DEBIT

## 2023-08-28 PROCEDURE — 665998 HH PPS REVENUE CREDIT

## 2023-08-28 PROCEDURE — G0159 HHC PT MAINT EA 15 MIN: HCPCS

## 2023-08-28 ASSESSMENT — ENCOUNTER SYMPTOMS
HIGHEST PAIN SEVERITY IN PAST 24 HOURS: 7/10
PAIN: 1
POOR JUDGMENT: 1
PAIN LOCATION - PAIN QUALITY: ACHE
PAIN LOCATION - PAIN FREQUENCY: FREQUENT
DIFFICULTY THINKING: 1
DEBILITATING PAIN: 1
PAIN LOCATION - RELIEVING FACTORS: REST, MEDICATION
PERSON REPORTING PAIN: PATIENT
PAIN SEVERITY GOAL: 3/10
PAIN LOCATION - PAIN SEVERITY: 5/10
LOWEST PAIN SEVERITY IN PAST 24 HOURS: 0/10
PAIN LOCATION - EXACERBATING FACTORS: MOVEMENT
SUBJECTIVE PAIN PROGRESSION: WAXING AND WANING
PAIN LOCATION - PAIN DURATION: DAILY
PAIN LOCATION: LEFT SHOULDER

## 2023-08-29 ENCOUNTER — HOSPITAL ENCOUNTER (EMERGENCY)
Facility: MEDICAL CENTER | Age: 88
End: 2023-08-29
Attending: EMERGENCY MEDICINE
Payer: MEDICARE

## 2023-08-29 ENCOUNTER — APPOINTMENT (OUTPATIENT)
Dept: RADIOLOGY | Facility: MEDICAL CENTER | Age: 88
End: 2023-08-29
Attending: EMERGENCY MEDICINE
Payer: MEDICARE

## 2023-08-29 VITALS
TEMPERATURE: 97.3 F | OXYGEN SATURATION: 92 % | WEIGHT: 145 LBS | DIASTOLIC BLOOD PRESSURE: 68 MMHG | HEIGHT: 64 IN | RESPIRATION RATE: 18 BRPM | HEART RATE: 52 BPM | SYSTOLIC BLOOD PRESSURE: 155 MMHG | BODY MASS INDEX: 24.75 KG/M2

## 2023-08-29 DIAGNOSIS — J18.9 PNEUMONIA OF LEFT LOWER LOBE DUE TO INFECTIOUS ORGANISM: ICD-10-CM

## 2023-08-29 DIAGNOSIS — R06.00 DYSPNEA, UNSPECIFIED TYPE: ICD-10-CM

## 2023-08-29 LAB
ALBUMIN SERPL BCP-MCNC: 4 G/DL (ref 3.2–4.9)
ALBUMIN/GLOB SERPL: 1.3 G/DL
ALP SERPL-CCNC: 110 U/L (ref 30–99)
ALT SERPL-CCNC: 19 U/L (ref 2–50)
ANION GAP SERPL CALC-SCNC: 11 MMOL/L (ref 7–16)
AST SERPL-CCNC: 21 U/L (ref 12–45)
BASOPHILS # BLD AUTO: 0.4 % (ref 0–1.8)
BASOPHILS # BLD: 0.01 K/UL (ref 0–0.12)
BILIRUB SERPL-MCNC: 0.3 MG/DL (ref 0.1–1.5)
BUN SERPL-MCNC: 16 MG/DL (ref 8–22)
CALCIUM ALBUM COR SERPL-MCNC: 8.8 MG/DL (ref 8.5–10.5)
CALCIUM SERPL-MCNC: 8.8 MG/DL (ref 8.5–10.5)
CHLORIDE SERPL-SCNC: 102 MMOL/L (ref 96–112)
CO2 SERPL-SCNC: 20 MMOL/L (ref 20–33)
CREAT SERPL-MCNC: 0.72 MG/DL (ref 0.5–1.4)
EKG IMPRESSION: NORMAL
EOSINOPHIL # BLD AUTO: 0.01 K/UL (ref 0–0.51)
EOSINOPHIL NFR BLD: 0.4 % (ref 0–6.9)
ERYTHROCYTE [DISTWIDTH] IN BLOOD BY AUTOMATED COUNT: 53.1 FL (ref 35.9–50)
GFR SERPLBLD CREATININE-BSD FMLA CKD-EPI: 75 ML/MIN/1.73 M 2
GLOBULIN SER CALC-MCNC: 3.1 G/DL (ref 1.9–3.5)
GLUCOSE SERPL-MCNC: 109 MG/DL (ref 65–99)
HCT VFR BLD AUTO: 31.2 % (ref 37–47)
HGB BLD-MCNC: 10.2 G/DL (ref 12–16)
IMM GRANULOCYTES # BLD AUTO: 0.02 K/UL (ref 0–0.11)
IMM GRANULOCYTES NFR BLD AUTO: 0.7 % (ref 0–0.9)
LYMPHOCYTES # BLD AUTO: 1.6 K/UL (ref 1–4.8)
LYMPHOCYTES NFR BLD: 59.5 % (ref 22–41)
MCH RBC QN AUTO: 28.9 PG (ref 27–33)
MCHC RBC AUTO-ENTMCNC: 32.7 G/DL (ref 32.2–35.5)
MCV RBC AUTO: 88.4 FL (ref 81.4–97.8)
MONOCYTES # BLD AUTO: 0.13 K/UL (ref 0–0.85)
MONOCYTES NFR BLD AUTO: 4.8 % (ref 0–13.4)
NEUTROPHILS # BLD AUTO: 0.92 K/UL (ref 1.82–7.42)
NEUTROPHILS NFR BLD: 34.2 % (ref 44–72)
NRBC # BLD AUTO: 0 K/UL
NRBC BLD-RTO: 0 /100 WBC (ref 0–0.2)
NT-PROBNP SERPL IA-MCNC: 661 PG/ML (ref 0–125)
PLATELET # BLD AUTO: 179 K/UL (ref 164–446)
PMV BLD AUTO: 9.6 FL (ref 9–12.9)
POTASSIUM SERPL-SCNC: 4.3 MMOL/L (ref 3.6–5.5)
PROT SERPL-MCNC: 7.1 G/DL (ref 6–8.2)
RBC # BLD AUTO: 3.53 M/UL (ref 4.2–5.4)
SODIUM SERPL-SCNC: 133 MMOL/L (ref 135–145)
TROPONIN T SERPL-MCNC: 20 NG/L (ref 6–19)
WBC # BLD AUTO: 2.7 K/UL (ref 4.8–10.8)

## 2023-08-29 PROCEDURE — 36415 COLL VENOUS BLD VENIPUNCTURE: CPT

## 2023-08-29 PROCEDURE — 665998 HH PPS REVENUE CREDIT

## 2023-08-29 PROCEDURE — 85025 COMPLETE CBC W/AUTO DIFF WBC: CPT

## 2023-08-29 PROCEDURE — 93005 ELECTROCARDIOGRAM TRACING: CPT | Performed by: EMERGENCY MEDICINE

## 2023-08-29 PROCEDURE — 665999 HH PPS REVENUE DEBIT

## 2023-08-29 PROCEDURE — 99285 EMERGENCY DEPT VISIT HI MDM: CPT

## 2023-08-29 PROCEDURE — 80053 COMPREHEN METABOLIC PANEL: CPT

## 2023-08-29 PROCEDURE — 83880 ASSAY OF NATRIURETIC PEPTIDE: CPT

## 2023-08-29 PROCEDURE — 84484 ASSAY OF TROPONIN QUANT: CPT

## 2023-08-29 PROCEDURE — 71045 X-RAY EXAM CHEST 1 VIEW: CPT

## 2023-08-29 RX ORDER — AZITHROMYCIN 250 MG/1
TABLET, FILM COATED ORAL
Qty: 6 TABLET | Refills: 0 | Status: ACTIVE | OUTPATIENT
Start: 2023-08-29 | End: 2023-09-03

## 2023-08-29 RX ORDER — AMOXICILLIN AND CLAVULANATE POTASSIUM 875; 125 MG/1; MG/1
1 TABLET, FILM COATED ORAL 2 TIMES DAILY
Qty: 10 TABLET | Refills: 0 | Status: ACTIVE | OUTPATIENT
Start: 2023-08-29 | End: 2023-09-03

## 2023-08-29 ASSESSMENT — FIBROSIS 4 INDEX: FIB4 SCORE: 2.53

## 2023-08-29 NOTE — ED NOTES
Pt changed into gown, IV started, labs sent, connected to continuous monitoring, call light within reach

## 2023-08-29 NOTE — ED TRIAGE NOTES
"Chief Complaint   Patient presents with    Shortness of Breath     Pt states shortness of breath started 2 hrs ago, denies chest pain    Epigastric Pain     Pt states she feels a \"burning feeling\" in her stomach, states she was here last week for similar issue, hx of GERD     Pt A+O x 4, answering all questions appropriately   "

## 2023-08-29 NOTE — ED PROVIDER NOTES
"ED Provider Note    CHIEF COMPLAINT  Chief Complaint   Patient presents with    Shortness of Breath     Pt states shortness of breath started 2 hrs ago, denies chest pain    Epigastric Pain     Pt states she feels a \"burning feeling\" in her stomach, states she was here last week for similar issue, hx of GERD       EXTERNAL RECORDS REVIEWED  Inpatient Notes recent ER visit on 8/9/2023 reviewed.  Patient brought in for dyspnea at that time.  Work-up at that time was largely benign.  She was diagnosed with a hiatal hernia as well as a renal cyst in addition to her dyspnea.    HPI/ROS  LIMITATION TO HISTORY   Select: : None  OUTSIDE HISTORIAN(S):  None    Gloria Patel is a 89 y.o. female who presents to the emerge department with brief episode of midepigastric pain and dyspnea.  Now resolved.  No current complaints.  Past medical history as document below.  Currently living at assisted living facility.  She does have a documented history of GERD on chart review.  Again currently she is asymptomatic.    PAST MEDICAL HISTORY   has a past medical history of Arthritis, Diverticulitis, GERD (gastroesophageal reflux disease), Glaucoma, Heart burn, Hiatus hernia syndrome, Hypertension, Primary osteoarthritis of both shoulders (1/22/2019), and Unspecified urinary incontinence.    SURGICAL HISTORY   has a past surgical history that includes other (1945); other (1954); other orthopedic surgery; other orthopedic surgery (2000); cholecystectomy (1966); other abdominal surgery (1954); gyn surgery (1970); hip arthroplasty total (6/21/2011); us-needle core bx-breast panel; and orif, ankle (Right, 12/22/2021).    FAMILY HISTORY  Family History   Problem Relation Age of Onset    Diabetes Other     Heart Disease Other     Lung Disease Other     Cancer Sister        SOCIAL HISTORY  Social History     Tobacco Use    Smoking status: Never    Smokeless tobacco: Never   Vaping Use    Vaping Use: Never used   Substance and Sexual Activity " "   Alcohol use: Yes     Alcohol/week: 0.0 oz     Comment: occassional wine    Drug use: No    Sexual activity: Not on file       CURRENT MEDICATIONS  Home Medications    **Home medications have not yet been reviewed for this encounter**         ALLERGIES  Allergies   Allergen Reactions    Morphine Anaphylaxis     anaphylaxis    Cephalexin Rash     Full body    Codeine Vomiting and Nausea    Metronidazole Vomiting and Nausea    Sulfa Drugs Rash     Full body       PHYSICAL EXAM  VITAL SIGNS: BP (!) 155/68   Pulse (!) 52   Temp 36.4 °C (97.6 °F) (Temporal)   Resp (!) 21   Ht 1.626 m (5' 4\")   Wt 65.8 kg (145 lb)   SpO2 92%   BMI 24.89 kg/m²      Pulse ox interpretation: I interpret this pulse ox as normal.  Constitutional: Alert in no apparent distress.  HENT: No signs of trauma, Bilateral external ears normal, Nose normal.   Eyes: Pupils are equal and reactive  Neck: Normal range of motion, No tenderness, Supple  Cardiovascular: Regular rate and rhythm, no murmurs.   Thorax & Lungs: Normal breath sounds, No respiratory distress, No wheezing, No chest tenderness.   Abdomen: Bowel sounds normal, Soft, No tenderness  Skin: Warm, Dry, No erythema, No rash.   Extremities: Intact distal pulses  Musculoskeletal: Good range of motion in all major joints. No tenderness to palpation or major deformities noted.   Neurologic: Alert , Normal motor function, Normal sensory function, No focal deficits noted.   Psychiatric: Affect normal, Judgment normal, Mood normal.       DIAGNOSTIC STUDIES / PROCEDURES      LABS  Results for orders placed or performed during the hospital encounter of 08/29/23   CBC WITH DIFFERENTIAL   Result Value Ref Range    WBC 2.7 (L) 4.8 - 10.8 K/uL    RBC 3.53 (L) 4.20 - 5.40 M/uL    Hemoglobin 10.2 (L) 12.0 - 16.0 g/dL    Hematocrit 31.2 (L) 37.0 - 47.0 %    MCV 88.4 81.4 - 97.8 fL    MCH 28.9 27.0 - 33.0 pg    MCHC 32.7 32.2 - 35.5 g/dL    RDW 53.1 (H) 35.9 - 50.0 fL    Platelet Count 179 164 - 446 " K/uL    MPV 9.6 9.0 - 12.9 fL    Neutrophils-Polys 34.20 (L) 44.00 - 72.00 %    Lymphocytes 59.50 (H) 22.00 - 41.00 %    Monocytes 4.80 0.00 - 13.40 %    Eosinophils 0.40 0.00 - 6.90 %    Basophils 0.40 0.00 - 1.80 %    Immature Granulocytes 0.70 0.00 - 0.90 %    Nucleated RBC 0.00 0.00 - 0.20 /100 WBC    Neutrophils (Absolute) 0.92 (L) 1.82 - 7.42 K/uL    Lymphs (Absolute) 1.60 1.00 - 4.80 K/uL    Monos (Absolute) 0.13 0.00 - 0.85 K/uL    Eos (Absolute) 0.01 0.00 - 0.51 K/uL    Baso (Absolute) 0.01 0.00 - 0.12 K/uL    Immature Granulocytes (abs) 0.02 0.00 - 0.11 K/uL    NRBC (Absolute) 0.00 K/uL   COMP METABOLIC PANEL   Result Value Ref Range    Sodium 133 (L) 135 - 145 mmol/L    Potassium 4.3 3.6 - 5.5 mmol/L    Chloride 102 96 - 112 mmol/L    Co2 20 20 - 33 mmol/L    Anion Gap 11.0 7.0 - 16.0    Glucose 109 (H) 65 - 99 mg/dL    Bun 16 8 - 22 mg/dL    Creatinine 0.72 0.50 - 1.40 mg/dL    Calcium 8.8 8.5 - 10.5 mg/dL    Correct Calcium 8.8 8.5 - 10.5 mg/dL    AST(SGOT) 21 12 - 45 U/L    ALT(SGPT) 19 2 - 50 U/L    Alkaline Phosphatase 110 (H) 30 - 99 U/L    Total Bilirubin 0.3 0.1 - 1.5 mg/dL    Albumin 4.0 3.2 - 4.9 g/dL    Total Protein 7.1 6.0 - 8.2 g/dL    Globulin 3.1 1.9 - 3.5 g/dL    A-G Ratio 1.3 g/dL   TROPONIN   Result Value Ref Range    Troponin T 20 (H) 6 - 19 ng/L   proBrain Natriuretic Peptide, NT   Result Value Ref Range    NT-proBNP 661 (H) 0 - 125 pg/mL   ESTIMATED GFR   Result Value Ref Range    GFR (CKD-EPI) 75 >60 mL/min/1.73 m 2   EKG (NOW)   Result Value Ref Range    Report       [facility] Emergency Dept.    Test Date:  2023  Pt Name:    MARY BRAND              Department: ER  MRN:        [id number]                      Room:        09  Gender:     Female                       Technician: [id number]  :        [id number]                   Requested By:CHIARA SHELDON  Order #:    [id number]                    Reading MD:    Measurements  Intervals                                 Axis  Rate:       56                           P:          0  AR:         205                          QRS:        -40  QRSD:       104                          T:          6  QT:         458  QTc:        443    Interpretive Statements  Sinus bradycardia  Left anterior fascicular block  Anterior infarct, old  Borderline T abnormalities, inferior leads  Compared to ECG 08/09/2023 07:56:08  Left anterior fascicular block now present  Myocardial infarct finding now present  T-wave abnormality now present       *Note: Due to a large number of results and/or encounters for the requested time period, some results have not been displayed. A complete set of results can be found in Results Review.   EKG interpreted by myself as above      RADIOLOGY  I have independently interpreted the diagnostic imaging associated with this visit and am waiting the final reading from the radiologist.   My preliminary interpretation is as follows: Cardiomegaly with possible left effusion  Radiologist interpretation:   DX-CHEST-PORTABLE (1 VIEW)   Final Result         1.  Hazy left lung base opacities favoring infiltrates   2.  Small left pleural effusion   3.  Cardiomegaly   4.  Atherosclerosis            COURSE & MEDICAL DECISION MAKING    ED Observation Status? No; Patient does not meet criteria for ED Observation.     INITIAL ASSESSMENT, COURSE AND PLAN  Care Narrative: Patient presenting emerged department with episodic dyspnea.  Currently feeling well.  Work-up will be completed to include cardiopulmonary etiology as well as additional metabolic work-up with hematologic evaluation  DISPOSITION AND DISCUSSIONS  I have discussed management of the patient with the following physicians and NIRMALA's: None    Discussion of management with other hospitals or appropriate source(s): Pharmacy for medication verification      Escalation of care considered, and ultimately not performed:acute inpatient care management, however at this time, the  patient is most appropriate for outpatient management    Barriers to care at this time, including but not limited to:  None .     Decision tools and prescription drugs considered including, but not limited to: Antibiotics will add empiric antibiotics .    89-year-old presenting to the ER department with brief period of dyspnea earlier today.  Patient appears well.  Ongoing telemetry and cardiopulmonary monitoring including pulse ox have been benign.  No respiratory distress or hypoxia.  X-ray imaging showing minimal left-sided effusion and possibility of infiltrate.  For this reason patient will be started on antibiotic therapy to include azithromycin and Augmentin.  On chart review the patient has had recent work-up.  At that time she did have elevated troponin and BNP which is nearly the same today.  Do not believe there is any significant clinical decline from this perspective.  I do not believe that she needs further ongoing inpatient ACS rule out.  Again we will treat primarily for infectious etiology.  She can have further consideration of diuretics given the small effusion is identified and slightly elevated BNP again on today's work-up.  At this point we will defer for further outpatient care and work-up by PCP.  Will return here to the ER with any change or worsening.      FINAL DIAGNOSIS  1. Dyspnea, unspecified type    2. Pneumonia of left lower lobe due to infectious organism           Electronically signed by: Lion Romero M.D., 8/29/2023 5:03 AM

## 2023-08-29 NOTE — ED NOTES
Pt & pt's daughter understands DC paperwork and prescriptions, IV removed, pt wheeled to ER lobby for DC

## 2023-08-30 PROCEDURE — 665999 HH PPS REVENUE DEBIT

## 2023-08-30 PROCEDURE — 665998 HH PPS REVENUE CREDIT

## 2023-08-31 PROCEDURE — 665999 HH PPS REVENUE DEBIT

## 2023-08-31 PROCEDURE — 665998 HH PPS REVENUE CREDIT

## 2023-09-01 PROCEDURE — 665998 HH PPS REVENUE CREDIT

## 2023-09-01 PROCEDURE — 665999 HH PPS REVENUE DEBIT

## 2023-09-02 PROCEDURE — 665998 HH PPS REVENUE CREDIT

## 2023-09-02 PROCEDURE — 665999 HH PPS REVENUE DEBIT

## 2023-09-03 PROCEDURE — 665999 HH PPS REVENUE DEBIT

## 2023-09-03 PROCEDURE — 665998 HH PPS REVENUE CREDIT

## 2023-09-04 ENCOUNTER — HOME CARE VISIT (OUTPATIENT)
Dept: HOME HEALTH SERVICES | Facility: HOME HEALTHCARE | Age: 88
End: 2023-09-04
Payer: MEDICARE

## 2023-09-04 VITALS
SYSTOLIC BLOOD PRESSURE: 126 MMHG | TEMPERATURE: 98.7 F | HEART RATE: 64 BPM | DIASTOLIC BLOOD PRESSURE: 60 MMHG | RESPIRATION RATE: 20 BRPM | OXYGEN SATURATION: 92 %

## 2023-09-04 PROCEDURE — G0151 HHCP-SERV OF PT,EA 15 MIN: HCPCS

## 2023-09-04 PROCEDURE — 665998 HH PPS REVENUE CREDIT

## 2023-09-04 PROCEDURE — 665999 HH PPS REVENUE DEBIT

## 2023-09-04 ASSESSMENT — ENCOUNTER SYMPTOMS
POOR JUDGMENT: 1
PAIN LOCATION - RELIEVING FACTORS: REST, HEAT, MEDICATION
PAIN: 1
LOWEST PAIN SEVERITY IN PAST 24 HOURS: 0/10
SUBJECTIVE PAIN PROGRESSION: WAXING AND WANING
PAIN SEVERITY GOAL: 3/10
PAIN LOCATION - PAIN QUALITY: ACHE
PAIN LOCATION - EXACERBATING FACTORS: MOVEMENT
PAIN LOCATION - PAIN SEVERITY: 4/10
PAIN LOCATION - PAIN FREQUENCY: FREQUENT
PAIN LOCATION - PAIN DURATION: DAILY
DEBILITATING PAIN: 1
PAIN LOCATION: LEFT SHOULDER
DIFFICULTY THINKING: 1
HIGHEST PAIN SEVERITY IN PAST 24 HOURS: 7/10
PERSON REPORTING PAIN: PATIENT

## 2023-09-04 NOTE — Clinical Note
P.T. reassessment performed 9/4/2023 and I will continue home care maintenance P.T. Tx until end of certification and probably recertify.

## 2023-09-04 NOTE — Clinical Note
Noted.  ----- Message -----  From: Cristina Jaramillo, PT  Sent: 9/4/2023  11:00 AM PDT  To: Mike Otero M.D.; *      P.T. reassessment performed 9/4/2023 and I will continue home care maintenance P.T. Tx until end of certification and probably recertify.

## 2023-09-04 NOTE — HOME HEALTH
PHYSICAL THERAPY REASSESSMENT AND PLAN OF CARE · Patient: Gloria Patel · Patient is preserving her functional mobility and skills with maintenance physical therapy services. Though she lives in assisted living, staff are unable to assist pt in her HEP and are not skilled to perform balance re education so further maintenance physical therapy is indicated. She has not had a fall in the last 4 weeks. She has had recurrent UTI but on antibiotics currently. In addition her bilateral shoulder pain appears to be worse and limiting maintenance physical therapy treatment. Therapist has numerous times suggested that she take Tylenol as soon as she gets out of bed to decrease her pain sooner and allow her to participate but patient continues to forget on regular basis (though she took medication earlier so participated more readily) due to her cognitive deficits. Therapist contacted PCP in past  about bilateral shoulder pain worsening. She continues to have significant risk of decline if she does not participate in maintenance physical therapy services. Patient is agreeable to continue physical therapy maintenance services. Therapist agrees she would continue to benefit · Maintenance Physical Therapy Need: Decreased activity tolerance, Balance control, Generalized deconditioning, Gait training, Fall prevention, Pain control and Poor safety awareness. Recommend skilled HHPT to address deficits and maintain function-report sent to MD · Frequency: 1 week for 4 week and probably recertify , Effective 9/11/2023 · Goals: Remain as written with same goal date on last recertification. How often (if at all) does pain interfere with patient's movements? Pain limits sleep and function on a daily but not constant basis

## 2023-09-05 PROCEDURE — 665998 HH PPS REVENUE CREDIT

## 2023-09-05 PROCEDURE — 665999 HH PPS REVENUE DEBIT

## 2023-09-06 PROCEDURE — 665999 HH PPS REVENUE DEBIT

## 2023-09-06 PROCEDURE — 665998 HH PPS REVENUE CREDIT

## 2023-09-07 PROCEDURE — 665998 HH PPS REVENUE CREDIT

## 2023-09-07 PROCEDURE — 665999 HH PPS REVENUE DEBIT

## 2023-09-08 PROCEDURE — 665998 HH PPS REVENUE CREDIT

## 2023-09-08 PROCEDURE — 665999 HH PPS REVENUE DEBIT

## 2023-09-09 PROCEDURE — 665999 HH PPS REVENUE DEBIT

## 2023-09-09 PROCEDURE — 665998 HH PPS REVENUE CREDIT

## 2023-09-10 PROCEDURE — 665999 HH PPS REVENUE DEBIT

## 2023-09-10 PROCEDURE — 665998 HH PPS REVENUE CREDIT

## 2023-09-11 ENCOUNTER — HOME CARE VISIT (OUTPATIENT)
Dept: HOME HEALTH SERVICES | Facility: HOME HEALTHCARE | Age: 88
End: 2023-09-11
Payer: MEDICARE

## 2023-09-11 PROCEDURE — 665003 FOLLOW UP-HOME HEALTH

## 2023-09-11 PROCEDURE — 665999 HH PPS REVENUE DEBIT

## 2023-09-11 PROCEDURE — 665998 HH PPS REVENUE CREDIT

## 2023-09-11 PROCEDURE — G0159 HHC PT MAINT EA 15 MIN: HCPCS

## 2023-09-12 VITALS
DIASTOLIC BLOOD PRESSURE: 50 MMHG | SYSTOLIC BLOOD PRESSURE: 120 MMHG | OXYGEN SATURATION: 96 % | HEART RATE: 68 BPM | RESPIRATION RATE: 24 BRPM | TEMPERATURE: 98.5 F

## 2023-09-12 PROCEDURE — 665999 HH PPS REVENUE DEBIT

## 2023-09-12 PROCEDURE — 665998 HH PPS REVENUE CREDIT

## 2023-09-12 ASSESSMENT — ENCOUNTER SYMPTOMS
PAIN LOCATION: LEFT SHOULDER
DIFFICULTY THINKING: 1
DEBILITATING PAIN: 1
POOR JUDGMENT: 1
SUBJECTIVE PAIN PROGRESSION: WAXING AND WANING
PAIN: 1
PAIN LOCATION - EXACERBATING FACTORS: MOVEMENT
PAIN LOCATION - RELIEVING FACTORS: REST, MEDICATION, HEAT
PAIN LOCATION - PAIN SEVERITY: 5/10
PERSON REPORTING PAIN: PATIENT
PAIN SEVERITY GOAL: 3/10
PAIN LOCATION - PAIN DURATION: DAILY
LOWEST PAIN SEVERITY IN PAST 24 HOURS: 0/10
PAIN LOCATION - PAIN FREQUENCY: FREQUENT
HIGHEST PAIN SEVERITY IN PAST 24 HOURS: 6/10
PAIN LOCATION - PAIN QUALITY: ACHE

## 2023-09-13 ENCOUNTER — OFFICE VISIT (OUTPATIENT)
Dept: INTERNAL MEDICINE | Facility: IMAGING CENTER | Age: 88
End: 2023-09-13
Payer: MEDICARE

## 2023-09-13 ENCOUNTER — HOSPITAL ENCOUNTER (OUTPATIENT)
Facility: MEDICAL CENTER | Age: 88
End: 2023-09-13
Attending: INTERNAL MEDICINE
Payer: MEDICARE

## 2023-09-13 VITALS
TEMPERATURE: 97.6 F | HEART RATE: 60 BPM | DIASTOLIC BLOOD PRESSURE: 68 MMHG | SYSTOLIC BLOOD PRESSURE: 120 MMHG | OXYGEN SATURATION: 99 % | BODY MASS INDEX: 20.94 KG/M2 | WEIGHT: 122 LBS | RESPIRATION RATE: 16 BRPM

## 2023-09-13 DIAGNOSIS — E03.9 HYPOTHYROIDISM, UNSPECIFIED TYPE: ICD-10-CM

## 2023-09-13 DIAGNOSIS — F32.0 MAJOR DEPRESSIVE DISORDER, SINGLE EPISODE, MILD (HCC): ICD-10-CM

## 2023-09-13 DIAGNOSIS — D64.9 ANEMIA, UNSPECIFIED TYPE: ICD-10-CM

## 2023-09-13 DIAGNOSIS — I10 PRIMARY HYPERTENSION: ICD-10-CM

## 2023-09-13 DIAGNOSIS — Z23 NEED FOR INFLUENZA VACCINATION: ICD-10-CM

## 2023-09-13 DIAGNOSIS — N39.0 RECURRENT UTI: ICD-10-CM

## 2023-09-13 DIAGNOSIS — M19.012 PRIMARY OSTEOARTHRITIS OF BOTH SHOULDERS: ICD-10-CM

## 2023-09-13 DIAGNOSIS — D72.818 OTHER DECREASED WHITE BLOOD CELL (WBC) COUNT: ICD-10-CM

## 2023-09-13 DIAGNOSIS — R82.90 ABNORMAL URINALYSIS: ICD-10-CM

## 2023-09-13 DIAGNOSIS — M19.011 PRIMARY OSTEOARTHRITIS OF BOTH SHOULDERS: ICD-10-CM

## 2023-09-13 LAB
APPEARANCE UR: NORMAL
BILIRUB UR STRIP-MCNC: NORMAL MG/DL
COLOR UR AUTO: YELLOW
FERRITIN SERPL-MCNC: 117 NG/ML (ref 10–291)
GLUCOSE UR STRIP.AUTO-MCNC: NORMAL MG/DL
IRON SATN MFR SERPL: 36 % (ref 15–55)
IRON SERPL-MCNC: 72 UG/DL (ref 40–170)
KETONES UR STRIP.AUTO-MCNC: NORMAL MG/DL
LEUKOCYTE ESTERASE UR QL STRIP.AUTO: NORMAL
NITRITE UR QL STRIP.AUTO: POSITIVE
PH UR STRIP.AUTO: 6 [PH] (ref 5–8)
PROT UR QL STRIP: NORMAL MG/DL
RBC UR QL AUTO: NORMAL
SP GR UR STRIP.AUTO: 1.01
TIBC SERPL-MCNC: 200 UG/DL (ref 250–450)
UIBC SERPL-MCNC: 128 UG/DL (ref 110–370)
UROBILINOGEN UR STRIP-MCNC: NORMAL MG/DL

## 2023-09-13 PROCEDURE — 84165 PROTEIN E-PHORESIS SERUM: CPT

## 2023-09-13 PROCEDURE — 84155 ASSAY OF PROTEIN SERUM: CPT

## 2023-09-13 PROCEDURE — 83540 ASSAY OF IRON: CPT

## 2023-09-13 PROCEDURE — 87086 URINE CULTURE/COLONY COUNT: CPT

## 2023-09-13 PROCEDURE — 665998 HH PPS REVENUE CREDIT

## 2023-09-13 PROCEDURE — 99215 OFFICE O/P EST HI 40 MIN: CPT | Mod: 25 | Performed by: INTERNAL MEDICINE

## 2023-09-13 PROCEDURE — 82728 ASSAY OF FERRITIN: CPT

## 2023-09-13 PROCEDURE — 82784 ASSAY IGA/IGD/IGG/IGM EACH: CPT

## 2023-09-13 PROCEDURE — 3078F DIAST BP <80 MM HG: CPT | Performed by: INTERNAL MEDICINE

## 2023-09-13 PROCEDURE — G0008 ADMIN INFLUENZA VIRUS VAC: HCPCS | Performed by: INTERNAL MEDICINE

## 2023-09-13 PROCEDURE — 3074F SYST BP LT 130 MM HG: CPT | Performed by: INTERNAL MEDICINE

## 2023-09-13 PROCEDURE — 87077 CULTURE AEROBIC IDENTIFY: CPT

## 2023-09-13 PROCEDURE — 665999 HH PPS REVENUE DEBIT

## 2023-09-13 PROCEDURE — 81002 URINALYSIS NONAUTO W/O SCOPE: CPT | Performed by: INTERNAL MEDICINE

## 2023-09-13 PROCEDURE — 86334 IMMUNOFIX E-PHORESIS SERUM: CPT

## 2023-09-13 PROCEDURE — 90662 IIV NO PRSV INCREASED AG IM: CPT | Performed by: INTERNAL MEDICINE

## 2023-09-13 PROCEDURE — 87186 SC STD MICRODIL/AGAR DIL: CPT

## 2023-09-13 PROCEDURE — 83550 IRON BINDING TEST: CPT

## 2023-09-13 ASSESSMENT — FIBROSIS 4 INDEX: FIB4 SCORE: 2.66

## 2023-09-13 NOTE — PROGRESS NOTES
Chief Complaint   Patient presents with    Hospital Follow-up     Chronic issues    Hypertension         HISTORY OF THE PRESENT ILLNESS: Patient is a 89 y.o. female.     Patient comes in today with her daughter Atiya.  Patient has been seen in the emergency room twice within the last month.  Both cases were for shortness of breath.  The initial visit was also for chest pain.  Her cardiac evaluation was negative.  Her second evaluation showed a left lower lung infiltrate concerning for pneumonia.  She was treated with antibiotics.  She denies cough, fever, dyspnea or other.    The patient's daughter reports that she has been calling more often.  Patient denies feeling lonely or depressed.  She is currently on sertraline.  Her daughter is concerned that the patient does not seem very happy.  She has arranged for companions but the patient tends to disregard.    Patient complains of bilateral shoulder pain.  She has a history of osteoarthritis.  She was on ibuprofen/Tylenol but a friend at her assisted living recommended that she be on Tylenol only.    Patient has a history of hypertension and hyperlipidemia.  These have been stable on current medications.    We discussed recent labs.  Her white blood cell count is trending lower.  She also continues to be anemic.    Allergies: Morphine, Cephalexin, Codeine, Metronidazole, and Sulfa drugs    Current Outpatient Medications Ordered in Epic   Medication Sig Dispense Refill    Lidocaine (HM LIDOCAINE PATCH) 4 % Patch 1 Application by Percutaneous route 2 times a day. apply to painful areas 2 times a day as neede for moderate pain  Indications: Mild to Moderate Pain      levothyroxine (SYNTHROID) 75 MCG Tab TAKE 1 TABLET BY MOUTH EVERY DAY 90 Tablet 3    lisinopril (PRINIVIL) 20 MG Tab TAKE 1/2 TABLET BY MOUTH TWICE A DAY 90 Tablet 3    Ibuprofen-Acetaminophen 125-250 MG Tab Take 2 Tablets by mouth 1 time a day as needed (pain). do not take if taking tylenol  Indications:  pain      amLODIPine (NORVASC) 5 MG Tab TAKE 1 TABLET BY MOUTH EVERY DAY 90 Tablet 3    sertraline (ZOLOFT) 50 MG Tab Take 1 Tablet by mouth every day. Indications: Major Depressive Disorder 90 Tablet 3    Cholecalciferol (VITAMIN D3) 1.25 MG (67643 UT) Tab Take 1.25 mg by mouth every day. Indications: supplement      ACETAMINOPHEN 8 HOUR PO Take 650 mg by mouth 3 times a day as needed (pain). do not exceed 2500 mg toatal in a day;  pt taking 2 tabs up to 3 times a day as needed for pain  Indications: Pain      Apoaequorin 10 MG Cap Take 10 mg by mouth every day. Indications: memory loss      Pediatric Vitamins (MULTIVITAMIN GUMMIES CHILDRENS PO) Take 1 Dose by mouth every day. Indications: supplement      ondansetron (ZOFRAN ODT) 4 MG TABLET DISPERSIBLE Take 1 Tablet by mouth every 8 hours as needed for Nausea. 60 Tablet 0    DORZOLAMIDE HCL-TIMOLOL MAL OP Administer 1 Drop into both eyes every morning. Indications: Glaucoma      Famotidine-Ca Carb-Mag Hydrox -165 MG Chew Tab Chew 1 Tablet 1 time a day as needed (for indegestion). take if tums is not effective  Indications: Heartburn      diclofenac sodium (VOLTAREN) 1 % Gel Apply 2 g topically 2 times a day as needed (for mild to moderate pain). Indications: Joint Damage causing Pain and Loss of Function      Soft Lens Products (FRANCESCA SALINE) Solution Administer 1 Drop into both eyes every day. Indications: supplement      Melatonin 5 MG Cap Take 1 Capsule by mouth at bedtime as needed (for insomnia, takes first).  10 mg as needed for sleep  Indications: Trouble Sleeping      Multiple Vitamins-Minerals (PRESERVISION AREDS 2 PO) Take 1 Tablet by mouth every day. Indications: supplement      calcium carbonate (TUMS CHEWY BITES) 750 MG chewable tablet Chew 1 Tablet 1 time a day as needed (for stomach upset). Indications: Heartburn      travoprost (TRAVATAN Z) 0.004 % Solution Administer 1 Drop into both eyes every evening. Indications: Wide-Angle Glaucoma       polyethylene glycol 3350 (MIRALAX) Powder Take 17 g by mouth every day. Indications: Constipation       No current Epic-ordered facility-administered medications on file.       Past medical history, social history and family history were reviewed from chart today    Review of systems: Per HPI.    All others negative.     Exam: /68 (BP Location: Left arm, Patient Position: Sitting, BP Cuff Size: Adult)   Pulse 60   Temp 36.4 °C (97.6 °F) (Temporal)   Resp 16   Wt 55.3 kg (122 lb)   SpO2 99%   General: Hard of hearing but friendly.  Some mild confusion about issues but overall she is cognitively intact and remains part of the discussion regarding her health.  HEENT: Grossly normal. Oral cavity is pink and moist.   Pulmonary: Clear with good breath sounds. Normal effort.  Cardiovascular: Regular. Carotid and radial pulses are intact.  Abdomen: Soft, nontender, nondistended. Spleen and liver are not enlarged.  Neurologic: Cranial nerves II through XII are grossly normal, alert and oriented x3      Diagnosis:  1. Primary hypertension        2. Hypothyroidism, unspecified type        3. Recurrent UTI  POCT Urinalysis      4. Major depressive disorder, single episode, mild (HCC)        5. Primary osteoarthritis of both shoulders        6. Abnormal urinalysis  URINE CULTURE(NEW)      7. Other decreased white blood cell (WBC) count  SPEP W/REFLEX TO URIEL, A, G, M      8. Anemia, unspecified type  SPEP W/REFLEX TO URIEL, A, G, M    IRON/TOTAL IRON BIND    FERRITIN      9. Need for influenza vaccination  INFLUENZA VACCINE, HIGH DOSE (65+ ONLY)          Patient comes in.  We discussed multiple issues.  Her chronic issues including thyroid and blood pressure are stable.  We discussed her recurrent abnormal urinary findings.  She has had multiple urine cultures that been positive.  She is currently asymptomatic.  She denies frequency, dysuria, gynecologic irritation, fever or other.  No significant change in cognition.   At this point I recommend monitoring only as I suspect she is either chronically colonized or we are not catching a clean sample.    I suspect she has ongoing depressive issues.  She remains active at her assisted living.  She continues to eat with the other residents.  She is on sertraline and we have agreed not to change her medication.  I encouraged her daughter to reach out to [employer] to see if some type of companionship that would be agreeable to the patient is available.  They may also offer additional activities.    We discussed her shoulders.  I recommended that she resume ibuprofen/Tylenol.  Recommended 2 tablets 3 times daily.    Patient has ongoing low white blood cell count and anemia.  I am concerned for possible myelodysplastic issue including fibrosis or possible myeloproliferative disorder.  We will check SPEP and iron studies.  We discussed that she may need hematology consultation however it is unclear if she would want further work-up.    Influenza vaccination given today.    My total time spent caring for the patient on the day of the encounter was  greater than 42 minutes.   This includes obtaining history, reviewing chart, physical exam, patient education, reviewing outside records, placing orders, interpreting tests and coordinating care.

## 2023-09-13 NOTE — LETTER
September 13, 2023        Gloria Patel   [address]  [facility] [address]        Dear Gloria:    Here are your current medications;      Current Outpatient Medications:     Lidocaine, 1 Application, Percutaneous, BID    levothyroxine, 75 mcg, Oral, QDAY    lisinopril, TAKE 1/2 TABLET BY MOUTH TWICE A DAY    Ibuprofen-Acetaminophen, 2 Tablet, Oral, QDAY PRN    amLODIPine, 5 mg, Oral, QDAY    sertraline, 50 mg, Oral, DAILY    Vitamin D3, 1.25 mg, Oral, DAILY    ACETAMINOPHEN 8 HOUR PO, 650 mg, Oral, TID PRN    Apoaequorin, 10 mg, Oral, DAILY    Pediatric Vitamins (MULTIVITAMIN GUMMIES CHILDRENS PO), 1 Dose, Oral, DAILY    ondansetron, 4 mg, Oral, Q8HRS PRN    DORZOLAMIDE HCL-TIMOLOL MAL OP, 1 Drop, Both Eyes, QAM    Famotidine-Ca Carb-Mag Hydrox, 1 Tablet, Oral, QDAY PRN    diclofenac sodium, 2 g, Topical, BID PRN    Linh Saline, 1 Drop, Both Eyes, DAILY    Melatonin, 1 Capsule, Oral, QHS PRN    Multiple Vitamins-Minerals (PRESERVISION AREDS 2 PO), 1 Tablet, Oral, DAILY    Tums Chewy Bites, 1 Tablet, Oral, QDAY PRN    travoprost, 1 Drop, Both Eyes, Q EVENING    polyethylene glycol 3350, 17 g, Oral, DAILY      If you have any questions or concerns, please don't hesitate to call.        Sincerely,        Mike Otero M.D.    Electronically Signed

## 2023-09-14 PROCEDURE — 665998 HH PPS REVENUE CREDIT

## 2023-09-14 PROCEDURE — 665999 HH PPS REVENUE DEBIT

## 2023-09-15 PROCEDURE — 665998 HH PPS REVENUE CREDIT

## 2023-09-15 PROCEDURE — 665999 HH PPS REVENUE DEBIT

## 2023-09-16 PROCEDURE — 665998 HH PPS REVENUE CREDIT

## 2023-09-16 PROCEDURE — 665999 HH PPS REVENUE DEBIT

## 2023-09-17 LAB
ALBUMIN SERPL ELPH-MCNC: 3.82 G/DL (ref 3.75–5.01)
ALPHA1 GLOB SERPL ELPH-MCNC: 0.36 G/DL (ref 0.19–0.46)
ALPHA2 GLOB SERPL ELPH-MCNC: 0.89 G/DL (ref 0.48–1.05)
B-GLOBULIN SERPL ELPH-MCNC: 0.7 G/DL (ref 0.48–1.1)
GAMMA GLOB SERPL ELPH-MCNC: 1.12 G/DL (ref 0.62–1.51)
IGA SERPL-MCNC: 174 MG/DL (ref 68–408)
IGG SERPL-MCNC: 1120 MG/DL (ref 768–1632)
IGM SERPL-MCNC: 278 MG/DL (ref 35–263)
INTERPRETATION SERPL IFE-IMP: NORMAL
MONOCLON BAND OBS SERPL: NORMAL
MONOCLONAL PROTEIN NL11656: NORMAL G/DL
PATHOLOGY STUDY: NORMAL
PROT SERPL-MCNC: 6.9 G/DL (ref 6.3–8.2)

## 2023-09-17 PROCEDURE — 665999 HH PPS REVENUE DEBIT

## 2023-09-17 PROCEDURE — 665998 HH PPS REVENUE CREDIT

## 2023-09-18 ENCOUNTER — HOME CARE VISIT (OUTPATIENT)
Dept: HOME HEALTH SERVICES | Facility: HOME HEALTHCARE | Age: 88
End: 2023-09-18
Payer: MEDICARE

## 2023-09-18 PROCEDURE — 665999 HH PPS REVENUE DEBIT

## 2023-09-18 PROCEDURE — 665998 HH PPS REVENUE CREDIT

## 2023-09-18 PROCEDURE — G0159 HHC PT MAINT EA 15 MIN: HCPCS

## 2023-09-19 VITALS
SYSTOLIC BLOOD PRESSURE: 100 MMHG | HEART RATE: 60 BPM | OXYGEN SATURATION: 98 % | RESPIRATION RATE: 18 BRPM | DIASTOLIC BLOOD PRESSURE: 50 MMHG | TEMPERATURE: 98.5 F

## 2023-09-19 PROCEDURE — 665998 HH PPS REVENUE CREDIT

## 2023-09-19 PROCEDURE — 665999 HH PPS REVENUE DEBIT

## 2023-09-19 ASSESSMENT — ENCOUNTER SYMPTOMS
DEBILITATING PAIN: 1
PAIN LOCATION - PAIN QUALITY: ACHE
PAIN: 1
PAIN LOCATION - PAIN FREQUENCY: FREQUENT
PAIN: PAIN LIMITS FUNCTION AND SLEEP DAILY NOT CONSTANT
PAIN LOCATION - RELIEVING FACTORS: REST, MEDICATION
POOR JUDGMENT: 1
PAIN SEVERITY GOAL: 3/10
HIGHEST PAIN SEVERITY IN PAST 24 HOURS: 7/10
SUBJECTIVE PAIN PROGRESSION: WAXING AND WANING
PAIN LOCATION: LEFT SHOULDER
PERSON REPORTING PAIN: PATIENT
DIFFICULTY THINKING: 1
PAIN LOCATION - EXACERBATING FACTORS: MOVEMENT
PAIN LOCATION - PAIN SEVERITY: 6/10
LOWEST PAIN SEVERITY IN PAST 24 HOURS: 0/10

## 2023-09-20 PROCEDURE — 665998 HH PPS REVENUE CREDIT

## 2023-09-20 PROCEDURE — 665999 HH PPS REVENUE DEBIT

## 2023-09-21 PROCEDURE — 665999 HH PPS REVENUE DEBIT

## 2023-09-21 PROCEDURE — 665998 HH PPS REVENUE CREDIT

## 2023-09-22 PROCEDURE — 665999 HH PPS REVENUE DEBIT

## 2023-09-22 PROCEDURE — 665998 HH PPS REVENUE CREDIT

## 2023-09-23 PROCEDURE — 665998 HH PPS REVENUE CREDIT

## 2023-09-23 PROCEDURE — 665999 HH PPS REVENUE DEBIT

## 2023-09-24 PROCEDURE — 665999 HH PPS REVENUE DEBIT

## 2023-09-24 PROCEDURE — 665998 HH PPS REVENUE CREDIT

## 2023-09-25 ENCOUNTER — HOME CARE VISIT (OUTPATIENT)
Dept: HOME HEALTH SERVICES | Facility: HOME HEALTHCARE | Age: 88
End: 2023-09-25
Payer: MEDICARE

## 2023-09-25 VITALS
DIASTOLIC BLOOD PRESSURE: 60 MMHG | SYSTOLIC BLOOD PRESSURE: 120 MMHG | OXYGEN SATURATION: 95 % | HEART RATE: 60 BPM | RESPIRATION RATE: 20 BRPM | TEMPERATURE: 98.6 F

## 2023-09-25 PROCEDURE — 665999 HH PPS REVENUE DEBIT

## 2023-09-25 PROCEDURE — G0159 HHC PT MAINT EA 15 MIN: HCPCS

## 2023-09-25 PROCEDURE — 665998 HH PPS REVENUE CREDIT

## 2023-09-25 ASSESSMENT — ENCOUNTER SYMPTOMS
PAIN: 1
HIGHEST PAIN SEVERITY IN PAST 24 HOURS: 6/10
SUBJECTIVE PAIN PROGRESSION: WAXING AND WANING
PAIN LOCATION - PAIN DURATION: DAILY
PAIN LOCATION - PAIN QUALITY: ACHE
DIFFICULTY THINKING: 1
PAIN SEVERITY GOAL: 3/10
PAIN LOCATION - PAIN SEVERITY: 6/10
PERSON REPORTING PAIN: PATIENT
DEBILITATING PAIN: 1
PAIN LOCATION - RELIEVING FACTORS: REST, MEDICATIONS
PAIN LOCATION: LEFT SHOULDER
LOWEST PAIN SEVERITY IN PAST 24 HOURS: 0/10
PAIN LOCATION - PAIN FREQUENCY: FREQUENT
PAIN LOCATION - EXACERBATING FACTORS: MOVEMENT

## 2023-09-26 PROCEDURE — 665998 HH PPS REVENUE CREDIT

## 2023-09-26 PROCEDURE — 665999 HH PPS REVENUE DEBIT

## 2023-09-27 PROCEDURE — 665999 HH PPS REVENUE DEBIT

## 2023-09-27 PROCEDURE — 665998 HH PPS REVENUE CREDIT

## 2023-09-28 PROCEDURE — 665999 HH PPS REVENUE DEBIT

## 2023-09-28 PROCEDURE — 665998 HH PPS REVENUE CREDIT

## 2023-09-29 PROCEDURE — 665998 HH PPS REVENUE CREDIT

## 2023-09-29 PROCEDURE — 665999 HH PPS REVENUE DEBIT

## 2023-09-30 PROCEDURE — 665999 HH PPS REVENUE DEBIT

## 2023-09-30 PROCEDURE — 665998 HH PPS REVENUE CREDIT

## 2023-10-01 PROCEDURE — 665998 HH PPS REVENUE CREDIT

## 2023-10-01 PROCEDURE — 665999 HH PPS REVENUE DEBIT

## 2023-10-02 PROCEDURE — 665999 HH PPS REVENUE DEBIT

## 2023-10-02 PROCEDURE — 665998 HH PPS REVENUE CREDIT

## 2023-10-03 PROCEDURE — 665998 HH PPS REVENUE CREDIT

## 2023-10-03 PROCEDURE — 665999 HH PPS REVENUE DEBIT

## 2023-10-04 ENCOUNTER — HOME CARE VISIT (OUTPATIENT)
Dept: HOME HEALTH SERVICES | Facility: HOME HEALTHCARE | Age: 88
End: 2023-10-04
Payer: MEDICARE

## 2023-10-04 PROCEDURE — 665999 HH PPS REVENUE DEBIT

## 2023-10-04 PROCEDURE — 665998 HH PPS REVENUE CREDIT

## 2023-10-05 ENCOUNTER — PATIENT MESSAGE (OUTPATIENT)
Dept: INTERNAL MEDICINE | Facility: IMAGING CENTER | Age: 88
End: 2023-10-05
Payer: MEDICARE

## 2023-10-05 ENCOUNTER — HOME CARE VISIT (OUTPATIENT)
Dept: HOME HEALTH SERVICES | Facility: HOME HEALTHCARE | Age: 88
End: 2023-10-05
Payer: MEDICARE

## 2023-10-05 VITALS
HEART RATE: 48 BPM | OXYGEN SATURATION: 96 % | TEMPERATURE: 98.2 F | DIASTOLIC BLOOD PRESSURE: 60 MMHG | SYSTOLIC BLOOD PRESSURE: 148 MMHG | RESPIRATION RATE: 20 BRPM

## 2023-10-05 PROCEDURE — G0151 HHCP-SERV OF PT,EA 15 MIN: HCPCS

## 2023-10-05 PROCEDURE — 665999 HH PPS REVENUE DEBIT

## 2023-10-05 PROCEDURE — 665998 HH PPS REVENUE CREDIT

## 2023-10-05 RX ORDER — NITROFURANTOIN 25; 75 MG/1; MG/1
100 CAPSULE ORAL 2 TIMES DAILY
Qty: 14 CAPSULE | Refills: 0 | Status: SHIPPED | OUTPATIENT
Start: 2023-10-05 | End: 2023-11-10 | Stop reason: SDUPTHER

## 2023-10-05 ASSESSMENT — ENCOUNTER SYMPTOMS
PAIN: 1
PERSON REPORTING PAIN: PATIENT
DEPRESSED MOOD: 1
NAUSEA: DENIES
VOMITING: DENIES

## 2023-10-05 ASSESSMENT — PATIENT HEALTH QUESTIONNAIRE - PHQ9: CLINICAL INTERPRETATION OF PHQ2 SCORE: 0

## 2023-10-05 ASSESSMENT — ACTIVITIES OF DAILY LIVING (ADL)
AMBULATION_DISTANCE/DURATION_TOLERATED: 50 FT
TRANSPORTATION COMMENTS: PT REQUIRES ASSIST AND ASSISTIVE DEVICE TO LEAVE HOME; FALL RISK
AMBULATION ASSISTANCE ON FLAT SURFACES: 1
OASIS_M1830: 03

## 2023-10-06 ENCOUNTER — HOME CARE VISIT (OUTPATIENT)
Dept: HOME HEALTH SERVICES | Facility: HOME HEALTHCARE | Age: 88
End: 2023-10-06
Payer: MEDICARE

## 2023-10-06 PROCEDURE — 665999 HH PPS REVENUE DEBIT

## 2023-10-06 PROCEDURE — 665998 HH PPS REVENUE CREDIT

## 2023-10-07 PROCEDURE — 665999 HH PPS REVENUE DEBIT

## 2023-10-07 PROCEDURE — 665998 HH PPS REVENUE CREDIT

## 2023-10-08 PROCEDURE — 665999 HH PPS REVENUE DEBIT

## 2023-10-08 PROCEDURE — 665998 HH PPS REVENUE CREDIT

## 2023-10-09 ENCOUNTER — DOCUMENTATION (OUTPATIENT)
Dept: VASCULAR LAB | Facility: MEDICAL CENTER | Age: 88
End: 2023-10-09

## 2023-10-09 PROCEDURE — G0179 MD RECERTIFICATION HHA PT: HCPCS | Performed by: INTERNAL MEDICINE

## 2023-10-09 PROCEDURE — 665999 HH PPS REVENUE DEBIT

## 2023-10-09 PROCEDURE — 665998 HH PPS REVENUE CREDIT

## 2023-10-10 PROCEDURE — 665998 HH PPS REVENUE CREDIT

## 2023-10-10 PROCEDURE — 665999 HH PPS REVENUE DEBIT

## 2023-10-10 NOTE — PROGRESS NOTES
Medication chart review for [facility] services    Received referral from [facility].   Medications reviewed  compared with discharge summary if available.  Discharge summary date, if applicable:   8/29/23    Current medication list per [facility]     Medication list one, patient is currently taking    Current Outpatient Medications:     nitrofurantoin, 100 mg, Oral, BID    bacitracin-neomycin-polymyxin, 1 Each, Topical, QDAY PRN    Lidocaine, 1 Application, Percutaneous, BID    levothyroxine, 75 mcg, Oral, QDAY    lisinopril, TAKE 1/2 TABLET BY MOUTH TWICE A DAY    Ibuprofen-Acetaminophen, 2 Tablet, Oral, TID PRN    amLODIPine, 5 mg, Oral, QDAY    sertraline, 50 mg, Oral, DAILY    Vitamin D3, 1.25 mg, Oral, DAILY    ACETAMINOPHEN 8 HOUR PO, 650 mg, Oral, TID PRN    Apoaequorin, 10 mg, Oral, DAILY    Pediatric Vitamins (MULTIVITAMIN GUMMIES CHILDRENS PO), 1 Dose, Oral, DAILY    ondansetron, 4 mg, Oral, Q8HRS PRN    DORZOLAMIDE HCL-TIMOLOL MAL OP, 1 Drop, Both Eyes, QAM    Famotidine-Ca Carb-Mag Hydrox, 1 Tablet, Oral, QDAY PRN    diclofenac sodium, 2 g, Topical, BID PRN    Linh Saline, 1 Drop, Both Eyes, DAILY    Melatonin, 1 Capsule, Oral, QHS PRN    Multiple Vitamins-Minerals (PRESERVISION AREDS 2 PO), 1 Tablet, Oral, DAILY    Tums Chewy Bites, 1 Tablet, Oral, QDAY PRN    travoprost, 1 Drop, Both Eyes, Q EVENING    polyethylene glycol 3350, 17 g, Oral, DAILY      Medication list two, drugs that the patient has been prescribed or recommended to take by their healthcare provider on discharge summary  N/a    Allergies   Allergen Reactions    Morphine Anaphylaxis     anaphylaxis    Cephalexin Rash     Full body    Codeine Vomiting and Nausea    Metronidazole Vomiting and Nausea    Sulfa Drugs Rash     Full body       Labs     Lab Results   Component Value Date/Time    SODIUM 133 (L) 08/29/2023 04:06 AM    POTASSIUM 4.3 08/29/2023 04:06 AM    CHLORIDE 102 08/29/2023 04:06 AM    CO2 20 08/29/2023 04:06 AM     GLUCOSE 109 (H) 08/29/2023 04:06 AM    BUN 16 08/29/2023 04:06 AM    CREATININE 0.72 08/29/2023 04:06 AM    CREATININE 0.99 01/09/2013 07:58 AM    BUNCREATRAT 20 01/09/2013 07:58 AM    GLOMRATE >59 08/04/2010 07:50 AM     Lab Results   Component Value Date/Time    ALKPHOSPHAT 110 (H) 08/29/2023 04:06 AM    ASTSGOT 21 08/29/2023 04:06 AM    ALTSGPT 19 08/29/2023 04:06 AM    TBILIRUBIN 0.3 08/29/2023 04:06 AM    INR 1.03 12/21/2021 08:26 PM    ALBUMIN 3.82 09/13/2023 10:15 AM        Assessment for clinically significant drug interactions, drug omissions/additions, duplicative therapies.            CC   Mike Otero M.D.  [address] [facility] [address]  [facility] [address]  Fax: [phone]    [facility]  Phone [phone] fax [phone]    This note was created using voice recognition software (Dragon). The accuracy of the dictation is limited by the abilities of the software. I have reviewed the note prior to signing, however some errors in grammar and context are still possible. If you have any questions related to this note please do not hesitate to contact our office.

## 2023-10-11 PROCEDURE — 665999 HH PPS REVENUE DEBIT

## 2023-10-11 PROCEDURE — 665998 HH PPS REVENUE CREDIT

## 2023-10-12 ENCOUNTER — HOME CARE VISIT (OUTPATIENT)
Dept: HOME HEALTH SERVICES | Facility: HOME HEALTHCARE | Age: 88
End: 2023-10-12
Payer: MEDICARE

## 2023-10-12 PROCEDURE — 665998 HH PPS REVENUE CREDIT

## 2023-10-12 PROCEDURE — 665999 HH PPS REVENUE DEBIT

## 2023-10-12 NOTE — Clinical Note
I agree with changes.  ----- Message -----  From: Kiara Harkins R.N.  Sent: 10/12/2023  10:50 AM PDT  To: Jill Moseley PT      Quality Review Completed for 10/5 [facility] by LEONEL Harkins RN on 10/12/2023:     Edits completed by LEONEL Harkins RN:     1.  and  diagnosis coding verified per chart review  2. Section A Sepsis screen answered No

## 2023-10-12 NOTE — CASE COMMUNICATION
Quality Review Completed for 10/5 Rece OASIS by LEONEL Harkins RN on 10/12/2023:     Edits completed by LEONEL Harkins RN:     1.  and  diagnosis coding verified per chart review  2. Section A Sepsis screen answered No

## 2023-10-13 ENCOUNTER — HOME CARE VISIT (OUTPATIENT)
Dept: HOME HEALTH SERVICES | Facility: HOME HEALTHCARE | Age: 88
End: 2023-10-13
Payer: MEDICARE

## 2023-10-13 PROCEDURE — 665998 HH PPS REVENUE CREDIT

## 2023-10-13 PROCEDURE — 665003 FOLLOW UP-HOME HEALTH

## 2023-10-13 PROCEDURE — 665999 HH PPS REVENUE DEBIT

## 2023-10-13 PROCEDURE — G0159 HHC PT MAINT EA 15 MIN: HCPCS

## 2023-10-14 VITALS
RESPIRATION RATE: 20 BRPM | DIASTOLIC BLOOD PRESSURE: 68 MMHG | TEMPERATURE: 99.6 F | SYSTOLIC BLOOD PRESSURE: 120 MMHG | HEART RATE: 56 BPM | OXYGEN SATURATION: 96 %

## 2023-10-14 PROCEDURE — 665998 HH PPS REVENUE CREDIT

## 2023-10-14 PROCEDURE — 665999 HH PPS REVENUE DEBIT

## 2023-10-14 ASSESSMENT — ENCOUNTER SYMPTOMS
PAIN LOCATION - RELIEVING FACTORS: REST, MEDICATION
PERSON REPORTING PAIN: PATIENT
DIFFICULTY THINKING: 1
PAIN LOCATION - EXACERBATING FACTORS: MOVEMENT
PAIN: 1
PAIN LOCATION - PAIN DURATION: DAILY
PAIN LOCATION: LEFT SHOULDER
POOR JUDGMENT: 1
PAIN LOCATION - PAIN SEVERITY: 6/10
DEBILITATING PAIN: 1
SUBJECTIVE PAIN PROGRESSION: WAXING AND WANING
HIGHEST PAIN SEVERITY IN PAST 24 HOURS: 7/10
PAIN LOCATION - PAIN QUALITY: ACHE
PAIN LOCATION - PAIN FREQUENCY: FREQUENT
LOWEST PAIN SEVERITY IN PAST 24 HOURS: 0/10

## 2023-10-15 PROCEDURE — 665999 HH PPS REVENUE DEBIT

## 2023-10-15 PROCEDURE — 665998 HH PPS REVENUE CREDIT

## 2023-10-16 ENCOUNTER — TELEPHONE (OUTPATIENT)
Dept: INTERNAL MEDICINE | Facility: IMAGING CENTER | Age: 88
End: 2023-10-16
Payer: MEDICARE

## 2023-10-16 ENCOUNTER — HOME CARE VISIT (OUTPATIENT)
Dept: HOME HEALTH SERVICES | Facility: HOME HEALTHCARE | Age: 88
End: 2023-10-16
Payer: MEDICARE

## 2023-10-16 PROCEDURE — 665999 HH PPS REVENUE DEBIT

## 2023-10-16 PROCEDURE — G0159 HHC PT MAINT EA 15 MIN: HCPCS

## 2023-10-16 PROCEDURE — 665998 HH PPS REVENUE CREDIT

## 2023-10-16 NOTE — TELEPHONE ENCOUNTER
Cristina BELL from [facility] reports continued right later upper arm dry, flaky, red spot about the size of the top of a pencil.  Gloria put neosporin on it and the flakiness is gone but redness remains.  Gloria states that it is 6/10 on pain scale.  Continue to monitor, if unchanged call office for an appointment.

## 2023-10-17 VITALS
DIASTOLIC BLOOD PRESSURE: 60 MMHG | TEMPERATURE: 99 F | HEART RATE: 56 BPM | OXYGEN SATURATION: 97 % | SYSTOLIC BLOOD PRESSURE: 142 MMHG | RESPIRATION RATE: 24 BRPM

## 2023-10-17 PROCEDURE — 665999 HH PPS REVENUE DEBIT

## 2023-10-17 PROCEDURE — 665998 HH PPS REVENUE CREDIT

## 2023-10-17 ASSESSMENT — ENCOUNTER SYMPTOMS
PAIN LOCATION: LEFT SHOULDER
PAIN LOCATION - RELIEVING FACTORS: NO PRESSURE
PAIN: 1
DIFFICULTY THINKING: 1
PAIN LOCATION - PAIN FREQUENCY: INTERMITTENT
PAIN LOCATION - PAIN SEVERITY: 6/10
HIGHEST PAIN SEVERITY IN PAST 24 HOURS: 7/10
PAIN LOCATION: RIGHT ARM
PAIN SEVERITY GOAL: 3/10
PAIN LOCATION - PAIN DURATION: DAILY
PAIN LOCATION - EXACERBATING FACTORS: MOVEMENT
PAIN LOCATION - RELIEVING FACTORS: REST, MEDICATION
PAIN LOCATION - PAIN QUALITY: BURNING
PERSON REPORTING PAIN: PATIENT
SUBJECTIVE PAIN PROGRESSION: WAXING AND WANING
POOR JUDGMENT: 1
DEBILITATING PAIN: 1
PAIN LOCATION - PAIN SEVERITY: 5/10
PAIN LOCATION - PAIN QUALITY: ACHE
PAIN LOCATION - PAIN DURATION: DAILY
PAIN LOCATION - PAIN FREQUENCY: FREQUENT
LOWEST PAIN SEVERITY IN PAST 24 HOURS: 0/10

## 2023-10-18 PROCEDURE — 665998 HH PPS REVENUE CREDIT

## 2023-10-18 PROCEDURE — 665999 HH PPS REVENUE DEBIT

## 2023-10-19 ENCOUNTER — APPOINTMENT (OUTPATIENT)
Dept: INTERNAL MEDICINE | Facility: IMAGING CENTER | Age: 88
End: 2023-10-19
Payer: MEDICARE

## 2023-10-19 PROCEDURE — 665998 HH PPS REVENUE CREDIT

## 2023-10-19 PROCEDURE — 665999 HH PPS REVENUE DEBIT

## 2023-10-20 PROCEDURE — 665998 HH PPS REVENUE CREDIT

## 2023-10-20 PROCEDURE — 665999 HH PPS REVENUE DEBIT

## 2023-10-21 PROCEDURE — 665999 HH PPS REVENUE DEBIT

## 2023-10-21 PROCEDURE — 665998 HH PPS REVENUE CREDIT

## 2023-10-22 PROCEDURE — 665999 HH PPS REVENUE DEBIT

## 2023-10-22 PROCEDURE — 665998 HH PPS REVENUE CREDIT

## 2023-10-23 ENCOUNTER — HOME CARE VISIT (OUTPATIENT)
Dept: HOME HEALTH SERVICES | Facility: HOME HEALTHCARE | Age: 88
End: 2023-10-23
Payer: MEDICARE

## 2023-10-23 PROCEDURE — G0151 HHCP-SERV OF PT,EA 15 MIN: HCPCS

## 2023-10-23 PROCEDURE — 665999 HH PPS REVENUE DEBIT

## 2023-10-23 PROCEDURE — 665998 HH PPS REVENUE CREDIT

## 2023-10-24 VITALS
OXYGEN SATURATION: 94 % | HEART RATE: 60 BPM | TEMPERATURE: 98.3 F | SYSTOLIC BLOOD PRESSURE: 150 MMHG | DIASTOLIC BLOOD PRESSURE: 60 MMHG | RESPIRATION RATE: 18 BRPM

## 2023-10-24 PROCEDURE — 665998 HH PPS REVENUE CREDIT

## 2023-10-24 PROCEDURE — 665999 HH PPS REVENUE DEBIT

## 2023-10-24 ASSESSMENT — ENCOUNTER SYMPTOMS
PAIN LOCATION - PAIN DURATION: DAILY
PAIN LOCATION - EXACERBATING FACTORS: MOVEMENT
PAIN LOCATION: LEFT SHOULDER
SUBJECTIVE PAIN PROGRESSION: WAXING AND WANING
PAIN LOCATION - RELIEVING FACTORS: REST, MEDICATION
PERSON REPORTING PAIN: PATIENT
DIFFICULTY THINKING: 1
PAIN LOCATION - PAIN QUALITY: ACHE
HIGHEST PAIN SEVERITY IN PAST 24 HOURS: 6/10
PAIN LOCATION - PAIN SEVERITY: 6/10
DEBILITATING PAIN: 1
PAIN: PAIN LIMITS FUNCTION AND SLEEP DAILY NOT CONSTANT
POOR JUDGMENT: 1
PAIN: 1
LOWEST PAIN SEVERITY IN PAST 24 HOURS: 0/10
PAIN LOCATION - PAIN FREQUENCY: FREQUENT

## 2023-10-25 PROCEDURE — 665998 HH PPS REVENUE CREDIT

## 2023-10-25 PROCEDURE — 665999 HH PPS REVENUE DEBIT

## 2023-10-26 PROCEDURE — 665999 HH PPS REVENUE DEBIT

## 2023-10-26 PROCEDURE — 665998 HH PPS REVENUE CREDIT

## 2023-10-27 PROCEDURE — 665998 HH PPS REVENUE CREDIT

## 2023-10-27 PROCEDURE — 665999 HH PPS REVENUE DEBIT

## 2023-10-28 PROCEDURE — 665998 HH PPS REVENUE CREDIT

## 2023-10-28 PROCEDURE — 665999 HH PPS REVENUE DEBIT

## 2023-10-29 PROCEDURE — 665998 HH PPS REVENUE CREDIT

## 2023-10-29 PROCEDURE — 665999 HH PPS REVENUE DEBIT

## 2023-10-30 ENCOUNTER — HOME CARE VISIT (OUTPATIENT)
Dept: HOME HEALTH SERVICES | Facility: HOME HEALTHCARE | Age: 88
End: 2023-10-30
Payer: MEDICARE

## 2023-10-30 VITALS
DIASTOLIC BLOOD PRESSURE: 64 MMHG | TEMPERATURE: 99.3 F | HEART RATE: 56 BPM | RESPIRATION RATE: 22 BRPM | OXYGEN SATURATION: 98 % | SYSTOLIC BLOOD PRESSURE: 100 MMHG

## 2023-10-30 PROCEDURE — 665999 HH PPS REVENUE DEBIT

## 2023-10-30 PROCEDURE — G0151 HHCP-SERV OF PT,EA 15 MIN: HCPCS

## 2023-10-30 PROCEDURE — 665998 HH PPS REVENUE CREDIT

## 2023-10-30 ASSESSMENT — ENCOUNTER SYMPTOMS
PERSON REPORTING PAIN: PATIENT
HIGHEST PAIN SEVERITY IN PAST 24 HOURS: 8/10
PAIN LOCATION - PAIN DURATION: DAILY
PAIN SEVERITY GOAL: 4/10
POOR JUDGMENT: 1
PAIN: PAIN LIMITS SLEEP AND FUNCTION DAILY NOT CONSTANT
PAIN: 1
PAIN LOCATION - PAIN QUALITY: ACHE
PAIN LOCATION - PAIN DURATION: DAILY
PAIN LOCATION - PAIN SEVERITY: 4/10
LOWEST PAIN SEVERITY IN PAST 24 HOURS: 0/10
PAIN LOCATION - PAIN FREQUENCY: FREQUENT
PAIN LOCATION - PAIN FREQUENCY: FREQUENT
PAIN LOCATION - EXACERBATING FACTORS: MOVEMENT
PAIN LOCATION: LEFT SHOULDER
PAIN LOCATION: LEFT LEG
PAIN LOCATION - RELIEVING FACTORS: MEDICATION; REST
PAIN LOCATION - PAIN SEVERITY: 8/10
DEBILITATING PAIN: 1
DIFFICULTY THINKING: 1
PAIN LOCATION - RELIEVING FACTORS: MEDICATION; REST
PAIN LOCATION - PAIN QUALITY: ACHE
SUBJECTIVE PAIN PROGRESSION: WAXING AND WANING
PAIN LOCATION - EXACERBATING FACTORS: MOVEMENT, WEIGHT BEARING

## 2023-10-30 NOTE — Clinical Note
Therapist assessed L lower leg 8/10 pain and due to cognitive deficits pt is a poor historian giving conflicting information about pain. Initially she stated it was in L posterior calf; then achilles tendon and then lateral ankle. Upon assessment to does not have swelling, increased warmth or hardness to L calf. Homans - bilaterally. Pt is tender diffusely over L lower leg > R especially over L achilles belly/tendon, anterior tibialis belly/tendon and lateral ankle ligaments. Gait pattern is mildly antalgic L LE.  She denies injury. PCP notified.    Physical therapy reassessment performed today and I will continue maintenance physical therapy 1 time a week for 5 weeks and then most likely recertify into maintenance physical therapy

## 2023-10-30 NOTE — Clinical Note
Noted.  ----- Message -----  From: Cristina Jaramillo, PT  Sent: 10/30/2023   7:33 PM PDT  To: Mike Otero M.D.; *      Therapist assessed L lower leg 8/10 pain and due to cognitive deficits pt is a poor historian giving conflicting information about pain. Initially she stated it was in L posterior calf; then achilles tendon and then lateral ankle. Upon assessment to does not have swelling, increased warmth or hardness to L calf. Homans - bilaterally. Pt is tender diffusely over L lower leg > R especially over L achilles belly/tendon, anterior tibialis belly/tendon and lateral ankle ligaments. Gait pattern is mildly antalgic L LE.  She denies injury. PCP notified.    Physical therapy reassessment performed today and I will continue maintenance physical therapy 1 time a week for 5 weeks and then most likely recertify into maintenance physical therapy

## 2023-10-31 PROCEDURE — 665998 HH PPS REVENUE CREDIT

## 2023-10-31 PROCEDURE — 665999 HH PPS REVENUE DEBIT

## 2023-10-31 NOTE — HOME HEALTH
PHYSICAL THERAPY REASSESSMENT AND PLAN OF CARE · Patient: Gloria Patel · Patient is preserving her functional mobility and skills with maintenance physical therapy services. Though she lives in assisted living, staff are unable to assist pt in her HEP and are not skilled to perform balance re education so further maintenance physical therapy is indicated. She has not had a fall in the last 4 weeks. She reported today L lower leg 8/10 pain without injury. No S/S of DVT present but she is tender diffusely over achilles tendon/belly, lateral ankle ligaments and anterior tibialis belly/tendon. Vitals fine.  PCP notified. In addition her bilateral shoulder pain appears to be somewhat limiting maintenance physical therapy treatment. Therapist contacted PCP in past about bilateral shoulder pain. She continues to have significant risk of decline if she does not participate in maintenance physical therapy services. Patient is agreeable to continue physical therapy maintenance services. Therapist agrees she would continue to benefit · Maintenance Physical Therapy Need: Decreased activity tolerance, Balance control, Generalized deconditioning, Gait training, Fall prevention, Pain control and Poor safety awareness. Recommend skilled HHPT to address deficits and maintain function-report sent to MD · Frequency: 1 week for 4 week and probably recertify , Effective 11/5/2023 · Goals: Remain as written with same goal date on last recertification. How often (if at all) does pain interfere with patient's movements? Pain limits sleep and function on a daily but not constant basis

## 2023-11-01 PROCEDURE — 665998 HH PPS REVENUE CREDIT

## 2023-11-01 PROCEDURE — 665999 HH PPS REVENUE DEBIT

## 2023-11-02 PROCEDURE — 665998 HH PPS REVENUE CREDIT

## 2023-11-02 PROCEDURE — 665999 HH PPS REVENUE DEBIT

## 2023-11-03 PROCEDURE — 665999 HH PPS REVENUE DEBIT

## 2023-11-03 PROCEDURE — 665998 HH PPS REVENUE CREDIT

## 2023-11-04 PROCEDURE — 665998 HH PPS REVENUE CREDIT

## 2023-11-04 PROCEDURE — 665999 HH PPS REVENUE DEBIT

## 2023-11-05 ENCOUNTER — HOME CARE VISIT (OUTPATIENT)
Dept: HOME HEALTH SERVICES | Facility: HOME HEALTHCARE | Age: 88
End: 2023-11-05
Payer: MEDICARE

## 2023-11-05 PROCEDURE — 665998 HH PPS REVENUE CREDIT

## 2023-11-05 PROCEDURE — 665999 HH PPS REVENUE DEBIT

## 2023-11-06 PROCEDURE — 665999 HH PPS REVENUE DEBIT

## 2023-11-06 PROCEDURE — 665998 HH PPS REVENUE CREDIT

## 2023-11-07 PROCEDURE — 665998 HH PPS REVENUE CREDIT

## 2023-11-07 PROCEDURE — 665999 HH PPS REVENUE DEBIT

## 2023-11-08 ENCOUNTER — HOME CARE VISIT (OUTPATIENT)
Dept: HOME HEALTH SERVICES | Facility: HOME HEALTHCARE | Age: 88
End: 2023-11-08
Payer: MEDICARE

## 2023-11-08 VITALS
RESPIRATION RATE: 24 BRPM | TEMPERATURE: 99.7 F | OXYGEN SATURATION: 96 % | SYSTOLIC BLOOD PRESSURE: 98 MMHG | DIASTOLIC BLOOD PRESSURE: 52 MMHG | HEART RATE: 56 BPM

## 2023-11-08 PROCEDURE — 665999 HH PPS REVENUE DEBIT

## 2023-11-08 PROCEDURE — 665003 FOLLOW UP-HOME HEALTH

## 2023-11-08 PROCEDURE — G0159 HHC PT MAINT EA 15 MIN: HCPCS

## 2023-11-08 PROCEDURE — 665998 HH PPS REVENUE CREDIT

## 2023-11-08 ASSESSMENT — ENCOUNTER SYMPTOMS
POOR JUDGMENT: 1
PAIN LOCATION - PAIN FREQUENCY: WITH ACTIVITY
HIGHEST PAIN SEVERITY IN PAST 24 HOURS: 5/10
DIFFICULTY THINKING: 1
DEBILITATING PAIN: 1
PAIN LOCATION - PAIN SEVERITY: 3/10
PERSON REPORTING PAIN: PATIENT
PAIN SEVERITY GOAL: 2/10
PAIN LOCATION - PAIN DURATION: DAILY
SUBJECTIVE PAIN PROGRESSION: WAXING AND WANING
PAIN: 1
PAIN LOCATION - EXACERBATING FACTORS: MOVEMENT
PAIN LOCATION: RIGHT SHOULDER
LOWEST PAIN SEVERITY IN PAST 24 HOURS: 0/10
PAIN LOCATION - PAIN QUALITY: ACHE
PAIN LOCATION - RELIEVING FACTORS: REST, MEDICATION

## 2023-11-09 PROCEDURE — 665999 HH PPS REVENUE DEBIT

## 2023-11-09 PROCEDURE — 665998 HH PPS REVENUE CREDIT

## 2023-11-10 PROCEDURE — 665998 HH PPS REVENUE CREDIT

## 2023-11-10 PROCEDURE — 665999 HH PPS REVENUE DEBIT

## 2023-11-10 RX ORDER — NITROFURANTOIN 25; 75 MG/1; MG/1
100 CAPSULE ORAL 2 TIMES DAILY
Qty: 14 CAPSULE | Refills: 0 | Status: SHIPPED | OUTPATIENT
Start: 2023-11-10 | End: 2023-11-17

## 2023-11-11 PROCEDURE — 665998 HH PPS REVENUE CREDIT

## 2023-11-11 PROCEDURE — 665999 HH PPS REVENUE DEBIT

## 2023-11-12 PROCEDURE — 665998 HH PPS REVENUE CREDIT

## 2023-11-12 PROCEDURE — 665999 HH PPS REVENUE DEBIT

## 2023-11-13 ENCOUNTER — HOME CARE VISIT (OUTPATIENT)
Dept: HOME HEALTH SERVICES | Facility: HOME HEALTHCARE | Age: 88
End: 2023-11-13
Payer: MEDICARE

## 2023-11-13 VITALS
HEART RATE: 60 BPM | DIASTOLIC BLOOD PRESSURE: 60 MMHG | RESPIRATION RATE: 20 BRPM | OXYGEN SATURATION: 98 % | TEMPERATURE: 99 F | SYSTOLIC BLOOD PRESSURE: 100 MMHG

## 2023-11-13 PROCEDURE — 665999 HH PPS REVENUE DEBIT

## 2023-11-13 PROCEDURE — 665998 HH PPS REVENUE CREDIT

## 2023-11-13 PROCEDURE — G0159 HHC PT MAINT EA 15 MIN: HCPCS

## 2023-11-13 ASSESSMENT — ENCOUNTER SYMPTOMS
DEBILITATING PAIN: 1
PAIN LOCATION - EXACERBATING FACTORS: MOVEMENT
PAIN LOCATION - RELIEVING FACTORS: REST, MEDICATION
PAIN LOCATION - PAIN DURATION: DAILY
SUBJECTIVE PAIN PROGRESSION: WAXING AND WANING
PAIN: 1
PAIN LOCATION - PAIN FREQUENCY: FREQUENT
HIGHEST PAIN SEVERITY IN PAST 24 HOURS: 7/10
PAIN LOCATION - PAIN SEVERITY: 4/10
PERSON REPORTING PAIN: PATIENT
POOR JUDGMENT: 1
PAIN SEVERITY GOAL: 3/10
LOWEST PAIN SEVERITY IN PAST 24 HOURS: 0/10
PAIN LOCATION - PAIN QUALITY: ACHE
PAIN LOCATION: LEFT SHOULDER
DIFFICULTY THINKING: 1

## 2023-11-14 PROCEDURE — 665999 HH PPS REVENUE DEBIT

## 2023-11-14 PROCEDURE — 665998 HH PPS REVENUE CREDIT

## 2023-11-15 PROCEDURE — 665999 HH PPS REVENUE DEBIT

## 2023-11-15 PROCEDURE — 665998 HH PPS REVENUE CREDIT

## 2023-11-16 PROCEDURE — 665999 HH PPS REVENUE DEBIT

## 2023-11-16 PROCEDURE — 665998 HH PPS REVENUE CREDIT

## 2023-11-17 PROCEDURE — 665998 HH PPS REVENUE CREDIT

## 2023-11-17 PROCEDURE — 665999 HH PPS REVENUE DEBIT

## 2023-11-18 PROCEDURE — 665998 HH PPS REVENUE CREDIT

## 2023-11-18 PROCEDURE — 665999 HH PPS REVENUE DEBIT

## 2023-11-19 PROCEDURE — 665999 HH PPS REVENUE DEBIT

## 2023-11-19 PROCEDURE — 665998 HH PPS REVENUE CREDIT

## 2023-11-20 ENCOUNTER — HOME CARE VISIT (OUTPATIENT)
Dept: HOME HEALTH SERVICES | Facility: HOME HEALTHCARE | Age: 88
End: 2023-11-20
Payer: MEDICARE

## 2023-11-20 VITALS
TEMPERATURE: 99.3 F | HEART RATE: 60 BPM | RESPIRATION RATE: 20 BRPM | SYSTOLIC BLOOD PRESSURE: 102 MMHG | OXYGEN SATURATION: 20 % | DIASTOLIC BLOOD PRESSURE: 60 MMHG

## 2023-11-20 PROCEDURE — 665999 HH PPS REVENUE DEBIT

## 2023-11-20 PROCEDURE — G0151 HHCP-SERV OF PT,EA 15 MIN: HCPCS

## 2023-11-20 PROCEDURE — 665998 HH PPS REVENUE CREDIT

## 2023-11-20 ASSESSMENT — ENCOUNTER SYMPTOMS
PAIN LOCATION - PAIN DURATION: DAILY
LOWEST PAIN SEVERITY IN PAST 24 HOURS: 0/10
PERSON REPORTING PAIN: PATIENT
PAIN LOCATION - PAIN FREQUENCY: FREQUENT
PAIN SEVERITY GOAL: 2/10
PAIN: PAIN LIMITS FUNCTION AND SLEEP DAILY NOT CONSTANT
POOR JUDGMENT: 1
PAIN LOCATION - PAIN QUALITY: ACHE
PAIN LOCATION - PAIN SEVERITY: 4/10
PAIN LOCATION - EXACERBATING FACTORS: MOVEMENT
PAIN: 1
DIFFICULTY THINKING: 1
PAIN LOCATION - RELIEVING FACTORS: REST, MEDICATION, HEAT
PAIN LOCATION: LEFT SHOULDER
HIGHEST PAIN SEVERITY IN PAST 24 HOURS: 8/10
SUBJECTIVE PAIN PROGRESSION: WAXING AND WANING

## 2023-11-21 ENCOUNTER — PATIENT MESSAGE (OUTPATIENT)
Dept: INTERNAL MEDICINE | Facility: IMAGING CENTER | Age: 88
End: 2023-11-21
Payer: MEDICARE

## 2023-11-21 ENCOUNTER — APPOINTMENT (RX ONLY)
Dept: URBAN - METROPOLITAN AREA CLINIC 6 | Facility: CLINIC | Age: 88
Setting detail: DERMATOLOGY
End: 2023-11-21

## 2023-11-21 DIAGNOSIS — L82.1 OTHER SEBORRHEIC KERATOSIS: ICD-10-CM

## 2023-11-21 DIAGNOSIS — F42.4 EXCORIATION (SKIN-PICKING) DISORDER: ICD-10-CM

## 2023-11-21 PROCEDURE — 665998 HH PPS REVENUE CREDIT

## 2023-11-21 PROCEDURE — ? COUNSELING

## 2023-11-21 PROCEDURE — 99213 OFFICE O/P EST LOW 20 MIN: CPT

## 2023-11-21 PROCEDURE — 665999 HH PPS REVENUE DEBIT

## 2023-11-21 RX ORDER — MECLIZINE HYDROCHLORIDE 25 MG/1
25 TABLET ORAL 3 TIMES DAILY PRN
Qty: 60 TABLET | Refills: 0 | Status: ON HOLD | OUTPATIENT
Start: 2023-11-21 | End: 2024-03-06

## 2023-11-21 ASSESSMENT — LOCATION SIMPLE DESCRIPTION DERM
LOCATION SIMPLE: LEFT SHOULDER
LOCATION SIMPLE: RIGHT FOOT

## 2023-11-21 ASSESSMENT — LOCATION ZONE DERM
LOCATION ZONE: FEET
LOCATION ZONE: ARM

## 2023-11-21 ASSESSMENT — LOCATION DETAILED DESCRIPTION DERM
LOCATION DETAILED: LEFT POSTERIOR SHOULDER
LOCATION DETAILED: RIGHT LATERAL DORSAL FOOT

## 2023-11-21 NOTE — PROCEDURE: MIPS QUALITY
Quality 130: Documentation Of Current Medications In The Medical Record: Current Medications Documented
Detail Level: Detailed
Quality 226: Preventive Care And Screening: Tobacco Use: Screening And Cessation Intervention: Patient screened for tobacco use and is an ex/non-smoker
Quality 111:Pneumonia Vaccination Status For Older Adults: Patient did not receive any pneumococcal conjugate or polysaccharide vaccine on or after their 60th birthday and before the end of the measurement period

## 2023-11-22 PROCEDURE — 665998 HH PPS REVENUE CREDIT

## 2023-11-22 PROCEDURE — 665999 HH PPS REVENUE DEBIT

## 2023-11-23 PROCEDURE — 665998 HH PPS REVENUE CREDIT

## 2023-11-23 PROCEDURE — 665999 HH PPS REVENUE DEBIT

## 2023-11-24 PROCEDURE — 665998 HH PPS REVENUE CREDIT

## 2023-11-24 PROCEDURE — 665999 HH PPS REVENUE DEBIT

## 2023-11-25 PROCEDURE — 665999 HH PPS REVENUE DEBIT

## 2023-11-25 PROCEDURE — 665998 HH PPS REVENUE CREDIT

## 2023-11-26 PROCEDURE — 665999 HH PPS REVENUE DEBIT

## 2023-11-26 PROCEDURE — 665998 HH PPS REVENUE CREDIT

## 2023-11-27 ENCOUNTER — HOME CARE VISIT (OUTPATIENT)
Dept: HOME HEALTH SERVICES | Facility: HOME HEALTHCARE | Age: 88
End: 2023-11-27
Payer: MEDICARE

## 2023-11-27 VITALS
TEMPERATURE: 99.1 F | SYSTOLIC BLOOD PRESSURE: 104 MMHG | HEART RATE: 56 BPM | DIASTOLIC BLOOD PRESSURE: 58 MMHG | OXYGEN SATURATION: 95 % | RESPIRATION RATE: 18 BRPM

## 2023-11-27 PROCEDURE — 665999 HH PPS REVENUE DEBIT

## 2023-11-27 PROCEDURE — G0151 HHCP-SERV OF PT,EA 15 MIN: HCPCS

## 2023-11-27 PROCEDURE — 665998 HH PPS REVENUE CREDIT

## 2023-11-27 ASSESSMENT — ENCOUNTER SYMPTOMS
PERSON REPORTING PAIN: PATIENT
HIGHEST PAIN SEVERITY IN PAST 24 HOURS: 6/10
SUBJECTIVE PAIN PROGRESSION: WAXING AND WANING
LOWEST PAIN SEVERITY IN PAST 24 HOURS: 0/10
PAIN SEVERITY GOAL: 3/10
DEBILITATING PAIN: 1
DIFFICULTY THINKING: 1
PAIN LOCATION - PAIN DURATION: DAILY
PAIN LOCATION - PAIN FREQUENCY: FREQUENT
PAIN: 1
PAIN LOCATION - PAIN QUALITY: ACHE
PAIN LOCATION - EXACERBATING FACTORS: MOVEMENT, FIRST THING IN AM
PAIN: PAIN LIMITS SLEEP AND FUNCTION DAILY NOT CONSTANT
PAIN LOCATION: LEFT SHOULDER
POOR JUDGMENT: 1
PAIN LOCATION - PAIN SEVERITY: 3/10

## 2023-11-28 PROCEDURE — 665998 HH PPS REVENUE CREDIT

## 2023-11-28 PROCEDURE — 665999 HH PPS REVENUE DEBIT

## 2023-11-28 NOTE — HOME HEALTH
PHYSICAL THERAPY REASSESSMENT AND PLAN OF CARE · Patient: Gloria Patel · Patient is preserving her functional mobility and skills with maintenance physical therapy services. Though she lives in assisted living, staff are unable to assist pt in her HEP and are not skilled to perform balance re education so further maintenance physical therapy is indicated. She had a fall without injury in the last 4 weeks. She she reports that she has a sore on her right foot.  When therapist assessed therapist pencil sized open area on the lateral dorsum patient's foot.  She denies any injury does not know how it started.  It was macerated and was covered with a Band-Aid and patient reports also Neosporin therapist reported fall and new opening to PCP and skilled nursing.  In addition her bilateral shoulder pain appears to be somewhat limiting maintenance physical therapy treatment. Therapist previously contacted PCP about bilateral shoulder pain. She continues to have significant risk of decline if she does not participate in maintenance physical therapy services. Patient is agreeable to continue physical therapy maintenance services. Therapist agrees she would continue to benefit · Maintenance Physical Therapy Need: Decreased activity tolerance, Balance control, Generalized deconditioning, Gait training, Fall prevention, Pain control and Poor safety awareness. Recommend skilled HHPT to address deficits and maintain function-report sent to MD · Frequency: 1 week for 1 week and recertify , Effective 11/27/2023 · Goals: Remain as written with same goal date on last recertification. How often (if at all) does pain interfere with patient's movements? Pain limits sleep and function on a daily but not constant basis

## 2023-11-28 NOTE — HOME HEALTH
Falls Template         Date & Time of fall: 11/25/2023 2:30 pm           Cause of fall:  Mechanical           Location:  Living Room           Was the fall witnessed?                  If yes, by who? No            Actions taken by patient:  Pt was bending down into refrigerator trying to bring a carton on melon out to eat when then the lid losed off container spilling all over floor causing pt to lose balance falling to the L side onto floor. Pt was unable to stand on own so used the emergency call button and staff assisted her up. She had no pain so returned to normal function. Has no signs of injury during today's visit.          Any new injury?                   If yes, what kind?  No            Any recent medication changes?    Yes, Details: a topical over the counter pain medication            Did patient have appropriate DME available?  Yes                 If no, please explain:  yes and she had 4WW with her            Actions Taken: Notified care team, Notified MD and Informed RN supervisor to schedule follow-up visit

## 2023-11-29 PROCEDURE — 665999 HH PPS REVENUE DEBIT

## 2023-11-29 PROCEDURE — 665998 HH PPS REVENUE CREDIT

## 2023-11-30 PROCEDURE — 665998 HH PPS REVENUE CREDIT

## 2023-11-30 PROCEDURE — 665999 HH PPS REVENUE DEBIT

## 2023-12-01 ENCOUNTER — HOME CARE VISIT (OUTPATIENT)
Dept: HOME HEALTH SERVICES | Facility: HOME HEALTHCARE | Age: 88
End: 2023-12-01
Payer: MEDICARE

## 2023-12-01 VITALS
RESPIRATION RATE: 18 BRPM | DIASTOLIC BLOOD PRESSURE: 80 MMHG | SYSTOLIC BLOOD PRESSURE: 100 MMHG | TEMPERATURE: 98.2 F | HEART RATE: 57 BPM | OXYGEN SATURATION: 94 %

## 2023-12-01 PROCEDURE — 665999 HH PPS REVENUE DEBIT

## 2023-12-01 PROCEDURE — G0299 HHS/HOSPICE OF RN EA 15 MIN: HCPCS

## 2023-12-01 PROCEDURE — 665998 HH PPS REVENUE CREDIT

## 2023-12-01 ASSESSMENT — ENCOUNTER SYMPTOMS
VOMITING: DENIES
MUSCLE WEAKNESS: 1
PERSON REPORTING PAIN: PATIENT
STOOL FREQUENCY: LESS THAN DAILY
NAUSEA: DENIES
BOWEL PATTERN NORMAL: 1
LOWEST PAIN SEVERITY IN PAST 24 HOURS: 0/10
LAST BOWEL MOVEMENT: 66809
SUBJECTIVE PAIN PROGRESSION: UNCHANGED
PAIN SEVERITY GOAL: 0/10
HIGHEST PAIN SEVERITY IN PAST 24 HOURS: 0/10
DENIES PAIN: 1

## 2023-12-02 PROCEDURE — 665998 HH PPS REVENUE CREDIT

## 2023-12-02 PROCEDURE — 665999 HH PPS REVENUE DEBIT

## 2023-12-03 PROCEDURE — 665998 HH PPS REVENUE CREDIT

## 2023-12-03 PROCEDURE — 665999 HH PPS REVENUE DEBIT

## 2023-12-04 ENCOUNTER — HOME CARE VISIT (OUTPATIENT)
Dept: HOME HEALTH SERVICES | Facility: HOME HEALTHCARE | Age: 88
End: 2023-12-04
Payer: MEDICARE

## 2023-12-04 PROCEDURE — 665999 HH PPS REVENUE DEBIT

## 2023-12-04 PROCEDURE — 665998 HH PPS REVENUE CREDIT

## 2023-12-04 PROCEDURE — G0151 HHCP-SERV OF PT,EA 15 MIN: HCPCS

## 2023-12-04 NOTE — Clinical Note
Noted.  ----- Message -----  From: Cristina Jaramillo, PT  Sent: 12/5/2023   3:38 AM PST  To: Boo Hernandez; Mike Otero M.D.; *      P.T. recertification for maintenance P.T. performed 12/4/2023 and I will continue 1 time a week for 8 weeks. Further insurance auth may be required

## 2023-12-04 NOTE — Clinical Note
P.T. recertification for maintenance P.T. performed 12/4/2023 and I will continue 1 time a week for 8 weeks. Further insurance auth may be required

## 2023-12-05 VITALS
OXYGEN SATURATION: 96 % | HEART RATE: 60 BPM | DIASTOLIC BLOOD PRESSURE: 60 MMHG | TEMPERATURE: 99 F | RESPIRATION RATE: 20 BRPM | SYSTOLIC BLOOD PRESSURE: 104 MMHG

## 2023-12-05 PROCEDURE — G0179 MD RECERTIFICATION HHA PT: HCPCS | Performed by: INTERNAL MEDICINE

## 2023-12-05 PROCEDURE — 665998 HH PPS REVENUE CREDIT

## 2023-12-05 PROCEDURE — 665999 HH PPS REVENUE DEBIT

## 2023-12-05 ASSESSMENT — ENCOUNTER SYMPTOMS
HIGHEST PAIN SEVERITY IN PAST 24 HOURS: 6/10
PAIN LOCATION: LEFT SHOULDER
PAIN: 1
SUBJECTIVE PAIN PROGRESSION: WAXING AND WANING
PAIN LOCATION - EXACERBATING FACTORS: MOVEMENT
PAIN LOCATION - PAIN SEVERITY: 5/10
PAIN SEVERITY GOAL: 3/10
LOWEST PAIN SEVERITY IN PAST 24 HOURS: 0/10
PAIN LOCATION - RELIEVING FACTORS: REST, MEDICATION
PAIN LOCATION - PAIN DURATION: DAILY
DIFFICULTY THINKING: 1
PERSON REPORTING PAIN: PATIENT
POOR JUDGMENT: 1
PAIN LOCATION - PAIN FREQUENCY: FREQUENT
PAIN LOCATION - PAIN QUALITY: ACHE
DEBILITATING PAIN: 1

## 2023-12-05 ASSESSMENT — PATIENT HEALTH QUESTIONNAIRE - PHQ9
CLINICAL INTERPRETATION OF PHQ2 SCORE: 2
SUM OF ALL RESPONSES TO PHQ QUESTIONS 1-9: 2
5. POOR APPETITE OR OVEREATING: 0 - NOT AT ALL

## 2023-12-05 ASSESSMENT — ACTIVITIES OF DAILY LIVING (ADL): OASIS_M1830: 03

## 2023-12-05 NOTE — HOME HEALTH
Recertification of Care to Home Health for maintenance physical therapy services. Current clinical summary, reason for continued home health services, new issues identified: Patient is preserving her functional mobility, balance, activity tolerance, pain control with maintenance physical therapy services. She has not had a fall, ER visit or hospitalization but had recurrent UTI during this certification plus had new onset of pain and went to [facility] for assessment and injection. In addition she continues to have bilateral shoulder pain which is limiting her participation in treatment. . Patient continues to express desire to have maintenance physical therapy services. Though patient lives in an [facility] there is no exercise class that she can participate in and staff does not assist patient with exercise programs. In addition patient has cognitive deficits and problems with initiation and follow-through. Since maintenance services are being very effective for this patient and she is at risk for decline without maintenance services, therapist agrees she would continue to benefit and would likely decline without this service. Primary focus is on balance reeducation and response to activity tolerance maintenance which are skilled therapy services. Frequency: 1 week 8, Effective 12/10/2023 · . Does the patient get SOB with exertion? No shortness of breath has been noted     How often (if at all) does pain interfere with patient's movements? On a daily but not constant basis limits function and sleep       LIVING SITUATION AND CAREGIVING: Homebound Status: cognitive deficits, difficulty with ambulation/transfers, needs assistance to leave home, needs assistive devices to ambulate, unable to manage stairs, unsafe to drive or leave residence without assistance, unsteady gait, weakness and fatigue, debiltating pain and impaired thoughts/judgment        Type of Home: Assisted living facility     Lives Alone but Receives  Assistance: For some ADLs, medication management and whenever ambulating outside her apartment     Unresolved Safety Concerns: Decreased safety awareness    Does the patient have an Advanced Directive? Yes, copy provided or photographed for chart     Does the patient have a POLST? Yes completed POLST is in the home and visible, photographed for chart and DNR entered as a signed order in EPIC.         REASON FOR HOME HEALTH SUPPORTING INFORMATION: Physical therapy to assess and teach the following but not limited to: - fall prevention, hydration and home safety        CURRENT LEVEL OF FUNCTION: Ambulation and Mobility: Independent in her apartment and supervised whenever exiting her apartment. Patient Equipment: walker for ambulation. Transferring: Independent except assisted with shower and car transfers Bathing: Shower Dressing:Assisted for shower and Independent dressing except to apply her pants, compression socks and shoes Special equipment used: 4WW, grab bars, shower chair     How noticeably Short of Breath is the patient during the OASIS walk or other activity? NA       MEDICATION MANAGEMENT: Patient medications administered by another person Medication issues: na

## 2023-12-06 PROCEDURE — 665999 HH PPS REVENUE DEBIT

## 2023-12-06 PROCEDURE — 665998 HH PPS REVENUE CREDIT

## 2023-12-07 ENCOUNTER — DOCUMENTATION (OUTPATIENT)
Dept: MEDICAL GROUP | Facility: PHYSICIAN GROUP | Age: 88
End: 2023-12-07
Payer: MEDICARE

## 2023-12-07 PROCEDURE — 665998 HH PPS REVENUE CREDIT

## 2023-12-07 PROCEDURE — 665999 HH PPS REVENUE DEBIT

## 2023-12-07 NOTE — PROGRESS NOTES
Medication chart review for [facility] services    Received referral from [facility].   Medications reviewed  compared with discharge summary if available.  Discharge summary date, if applicable:   8/29/23    Current medication list per [facility]     Medication list one, patient is currently taking    Current Outpatient Medications:     Theraworx Relief, 1 Application, Topical, 4X/DAY PRN    meclizine, 25 mg, Oral, TID PRN    bacitracin-neomycin-polymyxin, 1 Each, Topical, QDAY PRN    lidocaine, 1 Application, Percutaneous, BID    levothyroxine, 75 mcg, Oral, QDAY    lisinopril, TAKE 1/2 TABLET BY MOUTH TWICE A DAY    Ibuprofen-Acetaminophen, 2 Tablet, Oral, TID PRN    amLODIPine, 5 mg, Oral, QDAY    sertraline, 50 mg, Oral, DAILY    Vitamin D3, 1.25 mg, Oral, DAILY    ACETAMINOPHEN 8 HOUR PO, 650 mg, Oral, TID PRN    Apoaequorin, 10 mg, Oral, DAILY    Pediatric Vitamins (MULTIVITAMIN GUMMIES CHILDRENS PO), 1 Dose, Oral, DAILY    ondansetron, 4 mg, Oral, Q8HRS PRN    DORZOLAMIDE HCL-TIMOLOL MAL OP, 1 Drop, Both Eyes, QAM    Famotidine-Ca Carb-Mag Hydrox, 1 Tablet, Oral, QDAY PRN    diclofenac sodium, 2 g, Topical, BID PRN    Linh Saline, 1 Drop, Both Eyes, DAILY    Melatonin, 1 Capsule, Oral, QHS PRN    Multiple Vitamins-Minerals (PRESERVISION AREDS 2 PO), 1 Tablet, Oral, DAILY    Tums Chewy Bites, 1 Tablet, Oral, QDAY PRN    travoprost, 1 Drop, Both Eyes, Q EVENING    polyethylene glycol 3350, 17 g, Oral, DAILY      Medication list two, drugs that the patient has been prescribed or recommended to take by their healthcare provider on discharge summary  N/a    Allergies   Allergen Reactions    Morphine Anaphylaxis     anaphylaxis    Cephalexin Rash     Full body    Codeine Vomiting and Nausea    Metronidazole Vomiting and Nausea    Sulfa Drugs Rash     Full body       Labs     Lab Results   Component Value Date/Time    SODIUM 133 (L) 08/29/2023 04:06 AM    POTASSIUM 4.3 08/29/2023 04:06 AM    CHLORIDE 102  08/29/2023 04:06 AM    CO2 20 08/29/2023 04:06 AM    GLUCOSE 109 (H) 08/29/2023 04:06 AM    BUN 16 08/29/2023 04:06 AM    CREATININE 0.72 08/29/2023 04:06 AM    CREATININE 0.99 01/09/2013 07:58 AM    BUNCREATRAT 20 01/09/2013 07:58 AM    GLOMRATE >59 08/04/2010 07:50 AM     Lab Results   Component Value Date/Time    ALKPHOSPHAT 110 (H) 08/29/2023 04:06 AM    ASTSGOT 21 08/29/2023 04:06 AM    ALTSGPT 19 08/29/2023 04:06 AM    TBILIRUBIN 0.3 08/29/2023 04:06 AM    INR 1.03 12/21/2021 08:26 PM    ALBUMIN 3.82 09/13/2023 10:15 AM        Assessment for clinically significant drug interactions, drug omissions/additions, duplicative therapies.            CC   Mike Otero M.D.  [address] [facility] [facility] [address]  [place] [address]  Fax: [phone]    [facility]  Phone [phone] fax [phone]    This note was created using voice recognition software (Dragon). The accuracy of the dictation is limited by the abilities of the software. I have reviewed the note prior to signing, however some errors in grammar and context are still possible. If you have any questions related to this note please do not hesitate to contact our office.

## 2023-12-08 PROCEDURE — 665998 HH PPS REVENUE CREDIT

## 2023-12-08 PROCEDURE — 665999 HH PPS REVENUE DEBIT

## 2023-12-09 ENCOUNTER — HOME CARE VISIT (OUTPATIENT)
Dept: HOME HEALTH SERVICES | Facility: HOME HEALTHCARE | Age: 88
End: 2023-12-09
Payer: MEDICARE

## 2023-12-09 PROCEDURE — 665003 FOLLOW UP-HOME HEALTH

## 2023-12-09 PROCEDURE — G0299 HHS/HOSPICE OF RN EA 15 MIN: HCPCS

## 2023-12-09 PROCEDURE — 665999 HH PPS REVENUE DEBIT

## 2023-12-09 PROCEDURE — 665998 HH PPS REVENUE CREDIT

## 2023-12-10 VITALS
DIASTOLIC BLOOD PRESSURE: 62 MMHG | HEART RATE: 56 BPM | SYSTOLIC BLOOD PRESSURE: 112 MMHG | OXYGEN SATURATION: 96 % | TEMPERATURE: 98.1 F | RESPIRATION RATE: 18 BRPM

## 2023-12-10 PROCEDURE — 665998 HH PPS REVENUE CREDIT

## 2023-12-10 PROCEDURE — 665999 HH PPS REVENUE DEBIT

## 2023-12-10 ASSESSMENT — ENCOUNTER SYMPTOMS
PAIN: 1
PAIN LOCATION - PAIN FREQUENCY: FREQUENT
LOWER EXTREMITY EDEMA: 1
HIGHEST PAIN SEVERITY IN PAST 24 HOURS: 4/10
DRY SKIN: 1
MUSCLE WEAKNESS: 1
LIMITED RANGE OF MOTION: 1
LOWEST PAIN SEVERITY IN PAST 24 HOURS: 2/10
ASSOCIATED SYMPTOMS: DENIES
FATIGUES EASILY: 1
STOOL FREQUENCY: LESS THAN DAILY
BOWEL PATTERN NORMAL: 1
PAIN LOCATION - RELIEVING FACTORS: PAIN MED
LAST BOWEL MOVEMENT: 66816
PAIN LOCATION - PAIN QUALITY: ACHY
SUBJECTIVE PAIN PROGRESSION: WAXING AND WANING
PAIN LOCATION - PAIN DURATION: DAILY
PAIN SEVERITY GOAL: 0/10
PAIN LOCATION - PAIN SEVERITY: 4/10
PERSON REPORTING PAIN: PATIENT
PAIN LOCATION - EXACERBATING FACTORS: MOVEMENT

## 2023-12-11 ENCOUNTER — HOME CARE VISIT (OUTPATIENT)
Dept: HOME HEALTH SERVICES | Facility: HOME HEALTHCARE | Age: 88
End: 2023-12-11
Payer: MEDICARE

## 2023-12-11 VITALS
HEART RATE: 60 BPM | RESPIRATION RATE: 60 BRPM | OXYGEN SATURATION: 96 % | DIASTOLIC BLOOD PRESSURE: 50 MMHG | SYSTOLIC BLOOD PRESSURE: 130 MMHG | TEMPERATURE: 99 F

## 2023-12-11 PROCEDURE — G0299 HHS/HOSPICE OF RN EA 15 MIN: HCPCS

## 2023-12-11 PROCEDURE — 665998 HH PPS REVENUE CREDIT

## 2023-12-11 PROCEDURE — 665999 HH PPS REVENUE DEBIT

## 2023-12-11 PROCEDURE — G0159 HHC PT MAINT EA 15 MIN: HCPCS

## 2023-12-11 ASSESSMENT — ENCOUNTER SYMPTOMS
VOMITING: DENIES
HIGHEST PAIN SEVERITY IN PAST 24 HOURS: 5/10
NAUSEA: DENIES
LOWEST PAIN SEVERITY IN PAST 24 HOURS: 5/10
SUBJECTIVE PAIN PROGRESSION: UNCHANGED
LAST BOWEL MOVEMENT: 66819
MUSCLE WEAKNESS: 1
PAIN: 1
PAIN SEVERITY GOAL: 0/10
BOWEL PATTERN NORMAL: 1
STOOL FREQUENCY: LESS THAN DAILY

## 2023-12-12 ENCOUNTER — HOME CARE VISIT (OUTPATIENT)
Dept: HOME HEALTH SERVICES | Facility: HOME HEALTHCARE | Age: 88
End: 2023-12-12
Payer: MEDICARE

## 2023-12-12 VITALS
TEMPERATURE: 99 F | SYSTOLIC BLOOD PRESSURE: 130 MMHG | HEART RATE: 60 BPM | RESPIRATION RATE: 22 BRPM | DIASTOLIC BLOOD PRESSURE: 50 MMHG | OXYGEN SATURATION: 96 %

## 2023-12-12 PROCEDURE — 665998 HH PPS REVENUE CREDIT

## 2023-12-12 PROCEDURE — 665999 HH PPS REVENUE DEBIT

## 2023-12-12 ASSESSMENT — ENCOUNTER SYMPTOMS
HIGHEST PAIN SEVERITY IN PAST 24 HOURS: 7/10
POOR JUDGMENT: 1
DEBILITATING PAIN: 1
PERSON REPORTING PAIN: PATIENT
PAIN SEVERITY GOAL: 2/10
PAIN LOCATION - PAIN FREQUENCY: FREQUENT
PAIN LOCATION - EXACERBATING FACTORS: MOVEMENT
PAIN LOCATION - PAIN DURATION: DAILY
PAIN LOCATION - PAIN SEVERITY: 5/10
PAIN LOCATION - RELIEVING FACTORS: REST, MEDICATION
PAIN LOCATION: LEFT SHOULDER
PAIN: 1
SUBJECTIVE PAIN PROGRESSION: WAXING AND WANING
LOWEST PAIN SEVERITY IN PAST 24 HOURS: 0/10
DIFFICULTY THINKING: 1
PAIN LOCATION - PAIN QUALITY: ACHE

## 2023-12-12 NOTE — Clinical Note
"Agree with changes. I must have gotten \"resolve happy\". I put them back in. Thanks  ----- Message -----  From: Aby Elizondo R.N.  Sent: 12/12/2023   9:37 AM PST  To: Cristina Jaramillo, PT      Quality Review for Recertification OASIS by JENNI Elizondo RN on  December 12, 2023     Edits completed by JENNI Elizondo RN:  1.  and  diagnosis coding updated per chart review.  2. Unresolved monitoring VS on care plan as this should be on the care plan while the patient is on service.  "

## 2023-12-12 NOTE — CASE COMMUNICATION
Quality Review for Recertification OASIS by JENNI Elizondo RN on  December 12, 2023     Edits completed by JENNI Elizondo RN:  1.  and  diagnosis coding updated per chart review.  2. Unresolved monitoring VS on care plan as this should be on the care plan while the patient is on service.

## 2023-12-12 NOTE — Clinical Note
agree with changes . Changes made. Thanks  ----- Message -----  From: Aby lEizondo R.N.  Sent: 12/12/2023   9:37 AM PST  To: Cristina Jaramillo, PT      Quality Review for Recertification OASIS by JENNI Elizondo, CHETAN on  December 12, 2023     Edits completed by JENNI Elizondo RN:  1.  and  diagnosis coding updated per chart review.  2. Unresolved monitoring VS on care plan as this should be on the care plan while the patient is on service.

## 2023-12-13 PROCEDURE — 665999 HH PPS REVENUE DEBIT

## 2023-12-13 PROCEDURE — 665998 HH PPS REVENUE CREDIT

## 2023-12-14 ENCOUNTER — HOME CARE VISIT (OUTPATIENT)
Dept: HOME HEALTH SERVICES | Facility: HOME HEALTHCARE | Age: 88
End: 2023-12-14
Payer: MEDICARE

## 2023-12-14 PROCEDURE — G0299 HHS/HOSPICE OF RN EA 15 MIN: HCPCS

## 2023-12-14 PROCEDURE — 665999 HH PPS REVENUE DEBIT

## 2023-12-14 PROCEDURE — 665998 HH PPS REVENUE CREDIT

## 2023-12-15 PROCEDURE — 665999 HH PPS REVENUE DEBIT

## 2023-12-15 PROCEDURE — 665998 HH PPS REVENUE CREDIT

## 2023-12-16 PROCEDURE — 665999 HH PPS REVENUE DEBIT

## 2023-12-16 PROCEDURE — 665998 HH PPS REVENUE CREDIT

## 2023-12-17 VITALS
RESPIRATION RATE: 60 BRPM | HEART RATE: 60 BPM | TEMPERATURE: 99.2 F | SYSTOLIC BLOOD PRESSURE: 122 MMHG | DIASTOLIC BLOOD PRESSURE: 60 MMHG | OXYGEN SATURATION: 92 %

## 2023-12-17 PROCEDURE — 665998 HH PPS REVENUE CREDIT

## 2023-12-17 PROCEDURE — 665999 HH PPS REVENUE DEBIT

## 2023-12-17 ASSESSMENT — ENCOUNTER SYMPTOMS
DENIES PAIN: 1
MUSCLE WEAKNESS: 1
SKIN LESIONS: 1
LAST BOWEL MOVEMENT: 66822
NAUSEA: PT DENIES
PERSON REPORTING PAIN: PATIENT
VOMITING: PT DENIES

## 2023-12-17 ASSESSMENT — ACTIVITIES OF DAILY LIVING (ADL)
CURRENT_FUNCTION: STAND BY ASSIST
AMBULATION ASSISTANCE: STAND BY ASSIST

## 2023-12-18 ENCOUNTER — HOME CARE VISIT (OUTPATIENT)
Dept: HOME HEALTH SERVICES | Facility: HOME HEALTHCARE | Age: 88
End: 2023-12-18
Payer: MEDICARE

## 2023-12-18 VITALS
SYSTOLIC BLOOD PRESSURE: 100 MMHG | TEMPERATURE: 98.9 F | DIASTOLIC BLOOD PRESSURE: 50 MMHG | OXYGEN SATURATION: 98 % | RESPIRATION RATE: 24 BRPM | HEART RATE: 98 BPM

## 2023-12-18 PROCEDURE — 665998 HH PPS REVENUE CREDIT

## 2023-12-18 PROCEDURE — G0159 HHC PT MAINT EA 15 MIN: HCPCS

## 2023-12-18 PROCEDURE — 665999 HH PPS REVENUE DEBIT

## 2023-12-18 ASSESSMENT — ENCOUNTER SYMPTOMS
PAIN LOCATION - PAIN DURATION: DAILY
SUBJECTIVE PAIN PROGRESSION: WAXING AND WANING
NAUSEA: 1
LOWEST PAIN SEVERITY IN PAST 24 HOURS: 0/10
PAIN LOCATION - PAIN QUALITY: ACHE
PAIN LOCATION - PAIN FREQUENCY: FREQUENT
PAIN LOCATION - RELIEVING FACTORS: REST, MEDICATION
DENIES PAIN: 1
DIFFICULTY THINKING: 1
PAIN LOCATION - PAIN SEVERITY: 5/10
PAIN LOCATION - EXACERBATING FACTORS: MOVEMENT
HIGHEST PAIN SEVERITY IN PAST 24 HOURS: 7/10
PAIN SEVERITY GOAL: 2/10
PAIN: PAIN LIMITS FUNCTION AND SLEEP DAILY NOT CONSTANT
PERSON REPORTING PAIN: PATIENT
PAIN LOCATION: LEFT SHOULDER
POOR JUDGMENT: 1
DEBILITATING PAIN: 1

## 2023-12-19 ENCOUNTER — HOME CARE VISIT (OUTPATIENT)
Dept: HOME HEALTH SERVICES | Facility: HOME HEALTHCARE | Age: 88
End: 2023-12-19
Payer: MEDICARE

## 2023-12-19 VITALS
HEART RATE: 56 BPM | DIASTOLIC BLOOD PRESSURE: 65 MMHG | SYSTOLIC BLOOD PRESSURE: 120 MMHG | TEMPERATURE: 98.6 F | RESPIRATION RATE: 18 BRPM | OXYGEN SATURATION: 96 %

## 2023-12-19 PROCEDURE — 665998 HH PPS REVENUE CREDIT

## 2023-12-19 PROCEDURE — 665999 HH PPS REVENUE DEBIT

## 2023-12-19 PROCEDURE — G0299 HHS/HOSPICE OF RN EA 15 MIN: HCPCS

## 2023-12-19 ASSESSMENT — ENCOUNTER SYMPTOMS
LAST BOWEL MOVEMENT: 66827
HIGHEST PAIN SEVERITY IN PAST 24 HOURS: 0/10
BOWEL PATTERN NORMAL: 1
STOOL FREQUENCY: LESS THAN DAILY
MUSCLE WEAKNESS: 1
DENIES PAIN: 1
SUBJECTIVE PAIN PROGRESSION: UNCHANGED
VOMITING: DENIES
PAIN SEVERITY GOAL: 0/10
PERSON REPORTING PAIN: PATIENT
LOWEST PAIN SEVERITY IN PAST 24 HOURS: 0/10
NAUSEA: DENIES

## 2023-12-20 ENCOUNTER — HOME CARE VISIT (OUTPATIENT)
Dept: HOME HEALTH SERVICES | Facility: HOME HEALTHCARE | Age: 88
End: 2023-12-20
Payer: MEDICARE

## 2023-12-20 PROCEDURE — 665999 HH PPS REVENUE DEBIT

## 2023-12-20 PROCEDURE — 665998 HH PPS REVENUE CREDIT

## 2023-12-21 PROCEDURE — 665998 HH PPS REVENUE CREDIT

## 2023-12-21 PROCEDURE — 665999 HH PPS REVENUE DEBIT

## 2023-12-22 ENCOUNTER — HOME CARE VISIT (OUTPATIENT)
Dept: HOME HEALTH SERVICES | Facility: HOME HEALTHCARE | Age: 88
End: 2023-12-22
Payer: MEDICARE

## 2023-12-22 PROCEDURE — 665999 HH PPS REVENUE DEBIT

## 2023-12-22 PROCEDURE — G0299 HHS/HOSPICE OF RN EA 15 MIN: HCPCS

## 2023-12-22 PROCEDURE — 665998 HH PPS REVENUE CREDIT

## 2023-12-23 VITALS
HEART RATE: 58 BPM | RESPIRATION RATE: 18 BRPM | OXYGEN SATURATION: 94 % | TEMPERATURE: 98.6 F | SYSTOLIC BLOOD PRESSURE: 118 MMHG | DIASTOLIC BLOOD PRESSURE: 60 MMHG

## 2023-12-23 PROCEDURE — 665999 HH PPS REVENUE DEBIT

## 2023-12-23 PROCEDURE — 665998 HH PPS REVENUE CREDIT

## 2023-12-23 ASSESSMENT — ENCOUNTER SYMPTOMS
LIMITED RANGE OF MOTION: 1
PERSON REPORTING PAIN: PATIENT
BOWEL PATTERN NORMAL: 1
LAST BOWEL MOVEMENT: 66830
FATIGUES EASILY: 1
MUSCLE WEAKNESS: 1
ARTHRALGIAS: 1
DRY SKIN: 1
STOOL FREQUENCY: LESS THAN DAILY
DENIES PAIN: 1

## 2023-12-24 PROCEDURE — 665998 HH PPS REVENUE CREDIT

## 2023-12-24 PROCEDURE — 665999 HH PPS REVENUE DEBIT

## 2023-12-25 PROCEDURE — 665998 HH PPS REVENUE CREDIT

## 2023-12-25 PROCEDURE — 665999 HH PPS REVENUE DEBIT

## 2023-12-26 ENCOUNTER — HOME CARE VISIT (OUTPATIENT)
Dept: HOME HEALTH SERVICES | Facility: HOME HEALTHCARE | Age: 88
End: 2023-12-26
Payer: MEDICARE

## 2023-12-26 VITALS
TEMPERATURE: 98.6 F | DIASTOLIC BLOOD PRESSURE: 65 MMHG | SYSTOLIC BLOOD PRESSURE: 110 MMHG | OXYGEN SATURATION: 96 % | HEART RATE: 68 BPM | RESPIRATION RATE: 18 BRPM

## 2023-12-26 PROCEDURE — G0299 HHS/HOSPICE OF RN EA 15 MIN: HCPCS

## 2023-12-26 PROCEDURE — 665998 HH PPS REVENUE CREDIT

## 2023-12-26 PROCEDURE — 665999 HH PPS REVENUE DEBIT

## 2023-12-26 RX ORDER — AMLODIPINE BESYLATE 5 MG/1
5 TABLET ORAL
Qty: 90 TABLET | Refills: 3 | Status: SHIPPED | OUTPATIENT
Start: 2023-12-26

## 2023-12-26 ASSESSMENT — ENCOUNTER SYMPTOMS
LOWEST PAIN SEVERITY IN PAST 24 HOURS: 0/10
NAUSEA: DENIES
LAST BOWEL MOVEMENT: 66832
BOWEL PATTERN NORMAL: 1
VOMITING: DENIES
STOOL FREQUENCY: LESS THAN DAILY
SUBJECTIVE PAIN PROGRESSION: UNCHANGED
PAIN SEVERITY GOAL: 0/10
DENIES PAIN: 1
MUSCLE WEAKNESS: 1
HIGHEST PAIN SEVERITY IN PAST 24 HOURS: 0/10

## 2023-12-27 PROCEDURE — 665999 HH PPS REVENUE DEBIT

## 2023-12-27 PROCEDURE — 665998 HH PPS REVENUE CREDIT

## 2023-12-28 ENCOUNTER — HOME CARE VISIT (OUTPATIENT)
Dept: HOME HEALTH SERVICES | Facility: HOME HEALTHCARE | Age: 88
End: 2023-12-28
Payer: MEDICARE

## 2023-12-28 ENCOUNTER — OFFICE VISIT (OUTPATIENT)
Dept: INTERNAL MEDICINE | Facility: IMAGING CENTER | Age: 88
End: 2023-12-28
Payer: MEDICARE

## 2023-12-28 VITALS
WEIGHT: 124 LBS | SYSTOLIC BLOOD PRESSURE: 118 MMHG | OXYGEN SATURATION: 96 % | TEMPERATURE: 97.7 F | DIASTOLIC BLOOD PRESSURE: 70 MMHG | RESPIRATION RATE: 12 BRPM | BODY MASS INDEX: 21.28 KG/M2 | HEART RATE: 60 BPM

## 2023-12-28 DIAGNOSIS — E03.9 HYPOTHYROIDISM, UNSPECIFIED TYPE: ICD-10-CM

## 2023-12-28 DIAGNOSIS — I10 PRIMARY HYPERTENSION: ICD-10-CM

## 2023-12-28 DIAGNOSIS — M19.011 PRIMARY OSTEOARTHRITIS OF BOTH SHOULDERS: ICD-10-CM

## 2023-12-28 DIAGNOSIS — M19.012 PRIMARY OSTEOARTHRITIS OF BOTH SHOULDERS: ICD-10-CM

## 2023-12-28 DIAGNOSIS — R14.0 ABDOMINAL BLOATING: ICD-10-CM

## 2023-12-28 PROCEDURE — 665998 HH PPS REVENUE CREDIT

## 2023-12-28 PROCEDURE — 665999 HH PPS REVENUE DEBIT

## 2023-12-28 PROCEDURE — 99214 OFFICE O/P EST MOD 30 MIN: CPT | Performed by: INTERNAL MEDICINE

## 2023-12-28 PROCEDURE — 3078F DIAST BP <80 MM HG: CPT | Performed by: INTERNAL MEDICINE

## 2023-12-28 PROCEDURE — 3074F SYST BP LT 130 MM HG: CPT | Performed by: INTERNAL MEDICINE

## 2023-12-28 ASSESSMENT — FIBROSIS 4 INDEX: FIB4 SCORE: 2.66

## 2023-12-28 NOTE — PROGRESS NOTES
Chief Complaint   Patient presents with    Follow-Up     Chronic issues       HISTORY OF THE PRESENT ILLNESS: Patient is a 89 y.o. female.     Patient comes in with her daughter for follow-up.  Overall her health issues have been stable.  She remains socially active at her assisted living.  She ambulates with a walker.  No falls or near falls.  She had recent evaluation with orthopedics for left ankle pain.  She received an injection which she thinks may have helped the pain but not swelling.  She has dependent swelling in that leg.    Chronic issues including thyroid and hypertension been stable on current medications including amlodipine and lisinopril for hypertension.    She is clinically euthyroid on Synthroid 75 mcg.    Allergies: Morphine, Cephalexin, Codeine, Metronidazole, and Sulfa drugs    Current Outpatient Medications Ordered in Epic   Medication Sig Dispense Refill    levothyroxine (SYNTHROID) 75 MCG Tab TAKE 1 TABLET BY MOUTH EVERY DAY 90 Tablet 3    lisinopril (PRINIVIL) 20 MG Tab TAKE 1/2 TABLET BY MOUTH TWICE A DAY 90 Tablet 3    Ibuprofen-Acetaminophen 125-250 MG Tab Take 2 Tablets by mouth 3 times a day as needed (pain). take up to 3 times a day  Indications: pain      Cholecalciferol (VITAMIN D3) 1.25 MG (64399 UT) Tab Take 1.25 mg by mouth every day. Indications: supplement      ACETAMINOPHEN 8 HOUR PO Take 650 mg by mouth 3 times a day as needed (pain). do not exceed 2500 mg toatal in a day;  take only if ibuprofen does not relieve pain only  Indications: Pain      Apoaequorin 10 MG Cap Take 10 mg by mouth every day. Indications: memory loss      amLODIPine (NORVASC) 5 MG Tab TAKE 1 TABLET BY MOUTH EVERY DAY 90 Tablet 3    sertraline (ZOLOFT) 50 MG Tab TAKE 1 TABLET BY MOUTH EVERY DAY 90 Tablet 3    Homeopathic Products (THERAWORX RELIEF) Foam Apply 1 Application topically 4 times a day as needed (pain in shoulders). apply to both shoulders as needed for pain after showers  Indications: pain  in shoulders      meclizine (ANTIVERT) 25 MG Tab Take 1 Tablet by mouth 3 times a day as needed (for dizziness). Indications: Sensation of Spinning or Whirling 60 Tablet 0    bacitracin-neomycin-polymyxin (NEOSPORIN) 400-5-5000 Ointment Apply 1 Each topically 1 time a day as needed (itching). aplly to R arm whwere itching as meeded daily  Indications: itching      Lidocaine (HM LIDOCAINE PATCH) 4 % Patch 1 Application by Percutaneous route 2 times a day. apply to painful areas 2 times a day as neede for moderate pain  Indications: Mild to Moderate Pain      Pediatric Vitamins (MULTIVITAMIN GUMMIES CHILDRENS PO) Take 1 Dose by mouth every day. Indications: supplement      ondansetron (ZOFRAN ODT) 4 MG TABLET DISPERSIBLE Take 1 Tablet by mouth every 8 hours as needed for Nausea. 60 Tablet 0    DORZOLAMIDE HCL-TIMOLOL MAL OP Administer 1 Drop into both eyes every morning. Indications: Glaucoma      Famotidine-Ca Carb-Mag Hydrox -165 MG Chew Tab Chew 1 Tablet 1 time a day as needed (for indegestion). take if tums is not effective  Indications: Heartburn      diclofenac sodium (VOLTAREN) 1 % Gel Apply 2 g topically 2 times a day as needed (for mild to moderate pain). Indications: Joint Damage causing Pain and Loss of Function      Soft Lens Products (FRANCESCA SALINE) Solution Administer 1 Drop into both eyes every day. Indications: supplement      Melatonin 5 MG Cap Take 1 Capsule by mouth at bedtime as needed (for insomnia, takes first).  10 mg as needed for sleep  Indications: Trouble Sleeping      Multiple Vitamins-Minerals (PRESERVISION AREDS 2 PO) Take 1 Tablet by mouth every day. Indications: supplement      calcium carbonate (TUMS CHEWY BITES) 750 MG chewable tablet Chew 1 Tablet 1 time a day as needed (for stomach upset). Indications: Heartburn      travoprost (TRAVATAN Z) 0.004 % Solution Administer 1 Drop into both eyes every evening. Indications: Wide-Angle Glaucoma      polyethylene glycol 3350 (MIRALAX)  Powder Take 17 g by mouth every day. Indications: Constipation       No current Epic-ordered facility-administered medications on file.       Past medical history, social history and family history were reviewed from chart today    Review of systems: Per HPI.  Daughter reports memory is getting worse.  Skin lesion on right upper arm.  All others negative.     Exam: /70 (BP Location: Left arm, Patient Position: Sitting, BP Cuff Size: Adult)   Pulse 60   Temp 36.5 °C (97.7 °F) (Temporal)   Resp 12   Wt 56.2 kg (124 lb)   SpO2 96%   General: Well-appearing. Well-developed. No signs of distress.  HEENT: Grossly normal. Oral cavity is pink and moist.   Neck: Supple without JVD or bruit.  Pulmonary: Clear with good breath sounds. Normal effort.  Cardiovascular: Regular. Carotid and radial pulses are intact.  Abdomen: Soft, nontender, nondistended. Spleen and liver are not enlarged.  Neurologic: Cranial nerves II through XII are grossly normal, alert and oriented x3  Skin: Patient has fleshy, scaly nodular lesion on right upper arm    Diagnosis:  1. Primary hypertension        2. Hypothyroidism, unspecified type        3. Primary osteoarthritis of both shoulders        4. Abdominal bloating            Chronic issues remained stable.  Unclear what is causing abdominal bloating on the right side.  Suspect either gas or constipation.  Exam is unremarkable today.  We discussed colonoscopy but I do not recommend based on her age.  No change in medications currently.  Continue 3-month follow-up.      My total time spent caring for the patient on the day of the encounter was  greater than 30 minutes.   This includes obtaining history, reviewing chart, physical exam, patient education, reviewing outside records, placing orders, interpreting tests and coordinating care.    Portions of this note were completed using voice recognition software (Dragon Naturally speaking software) . Occasional transcription errors may have  escaped proof reading. I have made every reasonable attempt to correct obvious errors, but I expect that there are errors of grammar and possibly content that I did not discover before finalizing the note.

## 2023-12-29 PROCEDURE — 665998 HH PPS REVENUE CREDIT

## 2023-12-29 PROCEDURE — 665999 HH PPS REVENUE DEBIT

## 2023-12-30 PROCEDURE — 665999 HH PPS REVENUE DEBIT

## 2023-12-30 PROCEDURE — 665998 HH PPS REVENUE CREDIT

## 2023-12-31 PROCEDURE — 665999 HH PPS REVENUE DEBIT

## 2023-12-31 PROCEDURE — 665998 HH PPS REVENUE CREDIT

## 2024-01-01 ENCOUNTER — HOME CARE VISIT (OUTPATIENT)
Dept: HOME HEALTH SERVICES | Facility: HOME HEALTHCARE | Age: 89
End: 2024-01-01
Payer: MEDICARE

## 2024-01-01 VITALS
HEART RATE: 60 BPM | RESPIRATION RATE: 24 BRPM | OXYGEN SATURATION: 95 % | SYSTOLIC BLOOD PRESSURE: 120 MMHG | DIASTOLIC BLOOD PRESSURE: 52 MMHG | TEMPERATURE: 99.1 F

## 2024-01-01 PROCEDURE — 665999 HH PPS REVENUE DEBIT

## 2024-01-01 PROCEDURE — G0151 HHCP-SERV OF PT,EA 15 MIN: HCPCS

## 2024-01-01 PROCEDURE — 665998 HH PPS REVENUE CREDIT

## 2024-01-01 ASSESSMENT — ENCOUNTER SYMPTOMS
PAIN LOCATION - PAIN QUALITY: ACHE, SHARP
PAIN SEVERITY GOAL: 3/10
PAIN: 1
HIGHEST PAIN SEVERITY IN PAST 24 HOURS: 7/10
PAIN LOCATION - PAIN SEVERITY: 5/10
POOR JUDGMENT: 1
DIFFICULTY THINKING: 1
LOWEST PAIN SEVERITY IN PAST 24 HOURS: 0/10
SUBJECTIVE PAIN PROGRESSION: WAXING AND WANING
PAIN LOCATION - PAIN FREQUENCY: FREQUENT
PAIN LOCATION: LEFT SHOULDER
PERSON REPORTING PAIN: PATIENT
PAIN: PAIN LIMITS FUNCTION AND SLEEP DAILY NOT CONSTANT
PAIN LOCATION - EXACERBATING FACTORS: MOVEMENT
PAIN LOCATION - PAIN DURATION: DAILY

## 2024-01-01 NOTE — Clinical Note
Noted.  ----- Message -----  From: Cristina Jaramillo, PT  Sent: 1/1/2024   5:45 PM PST  To: Maria Luisa Song R.N.; Boo Hernandez; *      Physical therapy reassessment performed 1/1/2024 and I will continue maintenance physical therapy 1 time a week for 4 weeks and then most likely recertify.

## 2024-01-01 NOTE — Clinical Note
Physical therapy reassessment performed 1/1/2024 and I will continue maintenance physical therapy 1 time a week for 4 weeks and then most likely recertify.

## 2024-01-02 ENCOUNTER — HOME CARE VISIT (OUTPATIENT)
Dept: HOME HEALTH SERVICES | Facility: HOME HEALTHCARE | Age: 89
End: 2024-01-02
Payer: MEDICARE

## 2024-01-02 VITALS
HEART RATE: 52 BPM | TEMPERATURE: 98.2 F | OXYGEN SATURATION: 99 % | SYSTOLIC BLOOD PRESSURE: 140 MMHG | RESPIRATION RATE: 18 BRPM | DIASTOLIC BLOOD PRESSURE: 75 MMHG

## 2024-01-02 PROCEDURE — 665998 HH PPS REVENUE CREDIT

## 2024-01-02 PROCEDURE — 665999 HH PPS REVENUE DEBIT

## 2024-01-02 PROCEDURE — G0299 HHS/HOSPICE OF RN EA 15 MIN: HCPCS

## 2024-01-02 ASSESSMENT — ENCOUNTER SYMPTOMS
DENIES PAIN: 1
PERSON REPORTING PAIN: PATIENT
SUBJECTIVE PAIN PROGRESSION: UNCHANGED
LAST BOWEL MOVEMENT: 66841
BOWEL PATTERN NORMAL: 1
MUSCLE WEAKNESS: 1
NAUSEA: DENIES
LOWEST PAIN SEVERITY IN PAST 24 HOURS: 0/10
VOMITING: DENIES
PAIN SEVERITY GOAL: 0/10
STOOL FREQUENCY: LESS THAN DAILY
HIGHEST PAIN SEVERITY IN PAST 24 HOURS: 0/10

## 2024-01-02 NOTE — HOME HEALTH
PHYSICAL THERAPY REASSESSMENT AND PLAN OF CARE · Patient: Gloria Patel · Patient is preserving her functional mobility and skills with maintenance physical therapy services. Though she lives in assisted living, staff are unable to assist pt in her HEP and are not skilled to perform balance re education so further maintenance physical therapy is indicated. She did not have a fall  in the last 4 weeks. She still has a sore on her right foot. SN has been addressing lesion. In addition her bilateral shoulder pain continues to be somewhat limiting maintenance physical therapy treatment. Therapist previously contacted PCP about bilateral shoulder pain. She continues to have significant risk of decline if she does not participate in maintenance physical therapy services. Patient is agreeable to continue physical therapy maintenance services. Therapist agrees she would continue to benefit · Maintenance Physical Therapy Need: Decreased activity tolerance, Balance control, Generalized deconditioning, Gait training, Fall prevention, Pain control and Poor safety awareness. Recommend skilled HHPT to address deficits and maintain function-report sent to MD · Frequency: 1 week for 4 weeks and recertify , Effective 1/7/2023 · Goals: Remain as written with same goal date on last recertification. How often (if at all) does pain interfere with patient's movements? Pain limits sleep and function on a daily but not constant basis

## 2024-01-03 PROCEDURE — 665999 HH PPS REVENUE DEBIT

## 2024-01-03 PROCEDURE — 665998 HH PPS REVENUE CREDIT

## 2024-01-04 ENCOUNTER — HOME CARE VISIT (OUTPATIENT)
Dept: HOME HEALTH SERVICES | Facility: HOME HEALTHCARE | Age: 89
End: 2024-01-04
Payer: MEDICARE

## 2024-01-04 VITALS
OXYGEN SATURATION: 96 % | SYSTOLIC BLOOD PRESSURE: 130 MMHG | HEART RATE: 58 BPM | DIASTOLIC BLOOD PRESSURE: 70 MMHG | RESPIRATION RATE: 20 BRPM | TEMPERATURE: 98.4 F

## 2024-01-04 PROCEDURE — G0299 HHS/HOSPICE OF RN EA 15 MIN: HCPCS

## 2024-01-04 PROCEDURE — 665998 HH PPS REVENUE CREDIT

## 2024-01-04 PROCEDURE — 665999 HH PPS REVENUE DEBIT

## 2024-01-04 ASSESSMENT — ENCOUNTER SYMPTOMS
MUSCLE WEAKNESS: 1
NAUSEA: DENIES
LOWEST PAIN SEVERITY IN PAST 24 HOURS: 0/10
VOMITING: DENIES
STOOL FREQUENCY: LESS THAN DAILY
DENIES PAIN: 1
HIGHEST PAIN SEVERITY IN PAST 24 HOURS: 0/10
PAIN SEVERITY GOAL: 0/10
LAST BOWEL MOVEMENT: 66843
BOWEL PATTERN NORMAL: 1
PERSON REPORTING PAIN: PATIENT
SUBJECTIVE PAIN PROGRESSION: UNCHANGED

## 2024-01-05 PROCEDURE — 665999 HH PPS REVENUE DEBIT

## 2024-01-05 PROCEDURE — 665998 HH PPS REVENUE CREDIT

## 2024-01-06 PROCEDURE — 665998 HH PPS REVENUE CREDIT

## 2024-01-06 PROCEDURE — 665999 HH PPS REVENUE DEBIT

## 2024-01-06 PROCEDURE — 665997 HH PPS REVENUE ADJ

## 2024-01-07 PROCEDURE — 665998 HH PPS REVENUE CREDIT

## 2024-01-07 PROCEDURE — 665999 HH PPS REVENUE DEBIT

## 2024-01-08 ENCOUNTER — HOME CARE VISIT (OUTPATIENT)
Dept: HOME HEALTH SERVICES | Facility: HOME HEALTHCARE | Age: 89
End: 2024-01-08
Payer: MEDICARE

## 2024-01-08 VITALS
TEMPERATURE: 99.1 F | RESPIRATION RATE: 20 BRPM | DIASTOLIC BLOOD PRESSURE: 58 MMHG | OXYGEN SATURATION: 98 % | SYSTOLIC BLOOD PRESSURE: 134 MMHG | HEART RATE: 56 BPM

## 2024-01-08 PROCEDURE — 665998 HH PPS REVENUE CREDIT

## 2024-01-08 PROCEDURE — G0159 HHC PT MAINT EA 15 MIN: HCPCS

## 2024-01-08 PROCEDURE — 665003 FOLLOW UP-HOME HEALTH

## 2024-01-08 PROCEDURE — 665999 HH PPS REVENUE DEBIT

## 2024-01-08 ASSESSMENT — ENCOUNTER SYMPTOMS
PAIN LOCATION - PAIN QUALITY: ACHE
PAIN SEVERITY GOAL: 2/10
PAIN LOCATION - EXACERBATING FACTORS: MOVEMENT
LOWEST PAIN SEVERITY IN PAST 24 HOURS: 0/10
PAIN: PAIN LIMITS SLEEP AND FUNCTION DAILY NOT CONSTANT
PAIN: 1
SUBJECTIVE PAIN PROGRESSION: WAXING AND WANING
PAIN LOCATION: LEFT SHOULDER
POOR JUDGMENT: 1
PAIN LOCATION - PAIN SEVERITY: 6/10
PAIN LOCATION - PAIN DURATION: DAILY
HIGHEST PAIN SEVERITY IN PAST 24 HOURS: 6/10
DIFFICULTY THINKING: 1
PAIN LOCATION - PAIN FREQUENCY: FREQUENT
PERSON REPORTING PAIN: PATIENT

## 2024-01-09 PROCEDURE — 665999 HH PPS REVENUE DEBIT

## 2024-01-09 PROCEDURE — 665998 HH PPS REVENUE CREDIT

## 2024-01-10 PROCEDURE — 665999 HH PPS REVENUE DEBIT

## 2024-01-10 PROCEDURE — 665998 HH PPS REVENUE CREDIT

## 2024-01-11 PROCEDURE — 665998 HH PPS REVENUE CREDIT

## 2024-01-11 PROCEDURE — 665999 HH PPS REVENUE DEBIT

## 2024-01-12 PROCEDURE — 665999 HH PPS REVENUE DEBIT

## 2024-01-12 PROCEDURE — 665998 HH PPS REVENUE CREDIT

## 2024-01-13 PROCEDURE — 665998 HH PPS REVENUE CREDIT

## 2024-01-13 PROCEDURE — 665999 HH PPS REVENUE DEBIT

## 2024-01-14 PROCEDURE — 665998 HH PPS REVENUE CREDIT

## 2024-01-14 PROCEDURE — 665999 HH PPS REVENUE DEBIT

## 2024-01-15 ENCOUNTER — HOME CARE VISIT (OUTPATIENT)
Dept: HOME HEALTH SERVICES | Facility: HOME HEALTHCARE | Age: 89
End: 2024-01-15
Payer: MEDICARE

## 2024-01-15 VITALS
SYSTOLIC BLOOD PRESSURE: 130 MMHG | DIASTOLIC BLOOD PRESSURE: 54 MMHG | TEMPERATURE: 99.1 F | RESPIRATION RATE: 20 BRPM | HEART RATE: 56 BPM | OXYGEN SATURATION: 97 %

## 2024-01-15 PROCEDURE — 665998 HH PPS REVENUE CREDIT

## 2024-01-15 PROCEDURE — 665999 HH PPS REVENUE DEBIT

## 2024-01-15 PROCEDURE — G0151 HHCP-SERV OF PT,EA 15 MIN: HCPCS

## 2024-01-15 ASSESSMENT — ENCOUNTER SYMPTOMS
PAIN: 1
PERSON REPORTING PAIN: PATIENT
PAIN LOCATION - PAIN DURATION: DAILY
PAIN LOCATION: LEFT SHOULDER
SUBJECTIVE PAIN PROGRESSION: UNCHANGED
PAIN LOCATION - PAIN FREQUENCY: FREQUENT
DIFFICULTY THINKING: 1
POOR JUDGMENT: 1
HIGHEST PAIN SEVERITY IN PAST 24 HOURS: 7/10
LOWEST PAIN SEVERITY IN PAST 24 HOURS: 0/10
PAIN LOCATION - EXACERBATING FACTORS: MOVEMENT
PAIN LOCATION - PAIN QUALITY: ACHE
PAIN LOCATION - PAIN SEVERITY: 6/10

## 2024-01-16 PROCEDURE — 665998 HH PPS REVENUE CREDIT

## 2024-01-16 PROCEDURE — 665999 HH PPS REVENUE DEBIT

## 2024-01-17 PROCEDURE — 665999 HH PPS REVENUE DEBIT

## 2024-01-17 PROCEDURE — 665998 HH PPS REVENUE CREDIT

## 2024-01-18 PROCEDURE — 665998 HH PPS REVENUE CREDIT

## 2024-01-18 PROCEDURE — 665999 HH PPS REVENUE DEBIT

## 2024-01-19 PROCEDURE — 665998 HH PPS REVENUE CREDIT

## 2024-01-19 PROCEDURE — 665999 HH PPS REVENUE DEBIT

## 2024-01-20 PROCEDURE — 665998 HH PPS REVENUE CREDIT

## 2024-01-20 PROCEDURE — 665999 HH PPS REVENUE DEBIT

## 2024-01-21 PROCEDURE — 665999 HH PPS REVENUE DEBIT

## 2024-01-21 PROCEDURE — 665998 HH PPS REVENUE CREDIT

## 2024-01-22 ENCOUNTER — HOME CARE VISIT (OUTPATIENT)
Dept: HOME HEALTH SERVICES | Facility: HOME HEALTHCARE | Age: 89
End: 2024-01-22
Payer: MEDICARE

## 2024-01-22 VITALS
HEART RATE: 56 BPM | RESPIRATION RATE: 20 BRPM | OXYGEN SATURATION: 95 % | SYSTOLIC BLOOD PRESSURE: 124 MMHG | DIASTOLIC BLOOD PRESSURE: 50 MMHG | TEMPERATURE: 99.4 F

## 2024-01-22 PROCEDURE — 665998 HH PPS REVENUE CREDIT

## 2024-01-22 PROCEDURE — 665999 HH PPS REVENUE DEBIT

## 2024-01-22 PROCEDURE — G0159 HHC PT MAINT EA 15 MIN: HCPCS

## 2024-01-22 ASSESSMENT — ENCOUNTER SYMPTOMS
PAIN LOCATION - PAIN FREQUENCY: FREQUENT
PAIN LOCATION - RELIEVING FACTORS: REST, HEAT, MEDICATION
PAIN LOCATION - PAIN QUALITY: ACHE
DIFFICULTY THINKING: 1
SUBJECTIVE PAIN PROGRESSION: WAXING AND WANING
PAIN SEVERITY GOAL: 3/10
PAIN LOCATION - EXACERBATING FACTORS: MOVEMENT
POOR JUDGMENT: 1
PAIN LOCATION: LEFT SHOULDER
PAIN: 1
PAIN LOCATION - PAIN SEVERITY: 6/10
LOWEST PAIN SEVERITY IN PAST 24 HOURS: 0/10
PAIN LOCATION - PAIN DURATION: DAILY
HIGHEST PAIN SEVERITY IN PAST 24 HOURS: 7/10
PERSON REPORTING PAIN: PATIENT
PAIN: PAIN LIMITS FUNCTION AND SLEEP DAILY NOT CONSTANT

## 2024-01-23 PROCEDURE — 665999 HH PPS REVENUE DEBIT

## 2024-01-23 PROCEDURE — 665998 HH PPS REVENUE CREDIT

## 2024-01-24 PROCEDURE — 665999 HH PPS REVENUE DEBIT

## 2024-01-24 PROCEDURE — 665998 HH PPS REVENUE CREDIT

## 2024-01-25 PROCEDURE — 665999 HH PPS REVENUE DEBIT

## 2024-01-25 PROCEDURE — 665998 HH PPS REVENUE CREDIT

## 2024-01-26 PROCEDURE — 665999 HH PPS REVENUE DEBIT

## 2024-01-26 PROCEDURE — 665998 HH PPS REVENUE CREDIT

## 2024-01-27 PROCEDURE — 665999 HH PPS REVENUE DEBIT

## 2024-01-27 PROCEDURE — 665998 HH PPS REVENUE CREDIT

## 2024-01-28 PROCEDURE — 665999 HH PPS REVENUE DEBIT

## 2024-01-28 PROCEDURE — 665998 HH PPS REVENUE CREDIT

## 2024-01-29 ENCOUNTER — HOME CARE VISIT (OUTPATIENT)
Dept: HOME HEALTH SERVICES | Facility: HOME HEALTHCARE | Age: 89
End: 2024-01-29
Payer: MEDICARE

## 2024-01-29 VITALS
TEMPERATURE: 99.3 F | OXYGEN SATURATION: 96 % | SYSTOLIC BLOOD PRESSURE: 118 MMHG | DIASTOLIC BLOOD PRESSURE: 64 MMHG | HEART RATE: 52 BPM | RESPIRATION RATE: 18 BRPM

## 2024-01-29 PROCEDURE — 665999 HH PPS REVENUE DEBIT

## 2024-01-29 PROCEDURE — 665998 HH PPS REVENUE CREDIT

## 2024-01-29 PROCEDURE — G0151 HHCP-SERV OF PT,EA 15 MIN: HCPCS

## 2024-01-29 ASSESSMENT — ENCOUNTER SYMPTOMS
PERSON REPORTING PAIN: PATIENT
PAIN: PAIN LIMITS FUNCTION AND SLEEP DAILY NOT CONSTANT
PAIN: 1
PAIN LOCATION: RIGHT SHOULDER
PAIN LOCATION - PAIN QUALITY: ACHE
LOWEST PAIN SEVERITY IN PAST 24 HOURS: 0/10
PAIN SEVERITY GOAL: 3/10
PAIN LOCATION - PAIN SEVERITY: 6/10
PAIN LOCATION - RELIEVING FACTORS: REST, HEAT, MEDICATION
PAIN LOCATION - PAIN FREQUENCY: FREQUENT
PAIN LOCATION - PAIN QUALITY: ACHE
PAIN LOCATION - EXACERBATING FACTORS: MOVEMENT, WEIGHTBEARING
SUBJECTIVE PAIN PROGRESSION: WAXING AND WANING
PAIN LOCATION: LEFT SHOULDER
PAIN LOCATION - PAIN DURATION: DAILY
PAIN LOCATION - PAIN SEVERITY: 6/10
PAIN LOCATION - PAIN DURATION: DAILY
PAIN LOCATION - EXACERBATING FACTORS: MOVEMENT, WEIGHTBEARING
PAIN LOCATION - PAIN FREQUENCY: FREQUENT
POOR JUDGMENT: 1
DIFFICULTY THINKING: 1
HIGHEST PAIN SEVERITY IN PAST 24 HOURS: 6/10
PAIN LOCATION - RELIEVING FACTORS: REST, HEAT, MEDICATION

## 2024-01-29 NOTE — Clinical Note
Noted.  ----- Message -----  From: Cristina Jaramillo, PT  Sent: 1/29/2024   1:50 PM PST  To: Maria Luisa Song R.N.; Boo Hernandez; *      PHYSICAL THERAPY REASSESSMENT AND PLAN OF CARE · Patient: Gloria Patel · Patient is preserving her functional mobility and skills with maintenance physical therapy services. Though she lives in assisted living, staff are unable to assist pt in her HEP and are not skilled to perform balance re education so further maintenance physical therapy is indicated. She did not have a fall in the last 4 weeks. She no longer has a sore on her right foot. In addition her bilateral shoulder pain continues to be somewhat limiting maintenance physical therapy treatment. Therapist previously contacted PCP about bilateral shoulder pain. She continues to have significant risk of decline if she does not participate in maintenance physical therapy services. Patient is agreeable to continue physical therapy maintenance services. Therapist agrees she would continue to benefit · Maintenance Physical Therapy Need: Decreased activity tolerance, Balance control, Generalized deconditioning, Gait training, Fall prevention, Pain control and Poor safety awareness. Recommend skilled HHPT to address deficits and maintain function-report sent to MD · Frequency: 1 week for 1 weeks and recertify , Effective 2/5/2024 · Goals: Remain as written with same goal date on last recertification. How often (if at all) does pain interfere with patient's movements? Pain limits sleep and function on a daily but not constant basis

## 2024-01-29 NOTE — Clinical Note
PHYSICAL THERAPY REASSESSMENT AND PLAN OF CARE · Patient: Gloria Patel · Patient is preserving her functional mobility and skills with maintenance physical therapy services. Though she lives in assisted living, staff are unable to assist pt in her HEP and are not skilled to perform balance re education so further maintenance physical therapy is indicated. She did not have a fall in the last 4 weeks. She no longer has a sore on her right foot. In addition her bilateral shoulder pain continues to be somewhat limiting maintenance physical therapy treatment. Therapist previously contacted PCP about bilateral shoulder pain. She continues to have significant risk of decline if she does not participate in maintenance physical therapy services. Patient is agreeable to continue physical therapy maintenance services. Therapist agrees she would continue to benefit · Maintenance Physical Therapy Need: Decreased activity tolerance, Balance control, Generalized deconditioning, Gait training, Fall prevention, Pain control and Poor safety awareness. Recommend skilled HHPT to address deficits and maintain function-report sent to MD · Frequency: 1 week for 1 weeks and recertify , Effective 2/5/2024 · Goals: Remain as written with same goal date on last recertification. How often (if at all) does pain interfere with patient's movements? Pain limits sleep and function on a daily but not constant basis

## 2024-01-30 PROCEDURE — 665998 HH PPS REVENUE CREDIT

## 2024-01-30 PROCEDURE — 665999 HH PPS REVENUE DEBIT

## 2024-01-31 PROCEDURE — 665998 HH PPS REVENUE CREDIT

## 2024-01-31 PROCEDURE — 665999 HH PPS REVENUE DEBIT

## 2024-02-01 PROCEDURE — 665998 HH PPS REVENUE CREDIT

## 2024-02-01 PROCEDURE — 665999 HH PPS REVENUE DEBIT

## 2024-02-02 PROCEDURE — 665998 HH PPS REVENUE CREDIT

## 2024-02-02 PROCEDURE — 665999 HH PPS REVENUE DEBIT

## 2024-02-03 PROCEDURE — 665998 HH PPS REVENUE CREDIT

## 2024-02-03 PROCEDURE — 665999 HH PPS REVENUE DEBIT

## 2024-02-04 PROCEDURE — 665999 HH PPS REVENUE DEBIT

## 2024-02-04 PROCEDURE — 665998 HH PPS REVENUE CREDIT

## 2024-02-05 ENCOUNTER — HOME CARE VISIT (OUTPATIENT)
Dept: HOME HEALTH SERVICES | Facility: HOME HEALTHCARE | Age: 89
End: 2024-02-05
Payer: MEDICARE

## 2024-02-05 VITALS
HEART RATE: 64 BPM | RESPIRATION RATE: 18 BRPM | OXYGEN SATURATION: 95 % | SYSTOLIC BLOOD PRESSURE: 126 MMHG | TEMPERATURE: 98.3 F | DIASTOLIC BLOOD PRESSURE: 58 MMHG

## 2024-02-05 PROCEDURE — 665998 HH PPS REVENUE CREDIT

## 2024-02-05 PROCEDURE — G0151 HHCP-SERV OF PT,EA 15 MIN: HCPCS

## 2024-02-05 PROCEDURE — 665999 HH PPS REVENUE DEBIT

## 2024-02-05 ASSESSMENT — ENCOUNTER SYMPTOMS
PAIN LOCATION - PAIN FREQUENCY: FREQUENT
PAIN LOCATION: RIGHT SHOULDER
DIFFICULTY THINKING: 1
PAIN: 1
LOWEST PAIN SEVERITY IN PAST 24 HOURS: 0/10
PAIN LOCATION - PAIN DURATION: DAILY
PAIN LOCATION - PAIN QUALITY: ACHE
PAIN LOCATION - PAIN QUALITY: ACHE
SUBJECTIVE PAIN PROGRESSION: WAXING AND WANING
HIGHEST PAIN SEVERITY IN PAST 24 HOURS: 7/10
PAIN LOCATION: LEFT SHOULDER
PAIN LOCATION - PAIN DURATION: DAILY
PAIN LOCATION - PAIN SEVERITY: 6/10
DEBILITATING PAIN: 1
PAIN LOCATION - EXACERBATING FACTORS: MOVEMENT
PAIN LOCATION - RELIEVING FACTORS: V
PERSON REPORTING PAIN: PATIENT
PAIN SEVERITY GOAL: 3/10
PAIN LOCATION - RELIEVING FACTORS: REST, MEDICATION
PAIN LOCATION - PAIN FREQUENCY: FREQUENT
PAIN: PAIN LIMITS FUNCTION AND SLEEP DAILY NOT CONSTANT
PAIN LOCATION - PAIN SEVERITY: 6/10
AGORAPHOBIA: 1
PAIN LOCATION - EXACERBATING FACTORS: MOVEMENT

## 2024-02-05 ASSESSMENT — ACTIVITIES OF DAILY LIVING (ADL): OASIS_M1830: 03

## 2024-02-05 ASSESSMENT — PATIENT HEALTH QUESTIONNAIRE - PHQ9
CLINICAL INTERPRETATION OF PHQ2 SCORE: 1
5. POOR APPETITE OR OVEREATING: 0 - NOT AT ALL
SUM OF ALL RESPONSES TO PHQ QUESTIONS 1-9: 1

## 2024-02-05 NOTE — Clinical Note
P.T. recertification performed on 2/5/2024 and I will continue maintenance P.T. services 1 time for 8 weeks effective 2/11/2024

## 2024-02-05 NOTE — Clinical Note
Noted.  ----- Message -----  From: Cristina Jaramillo, PT  Sent: 2/5/2024   2:16 PM PST  To: Maria Luisa Song R.N.; Boo Hernandez; *      P.T. recertification performed on 2/5/2024 and I will continue maintenance P.T. services 1 time for 8 weeks effective 2/11/2024

## 2024-02-05 NOTE — HOME HEALTH
Gloria Patel  Recertification of Care to Home Health for maintenance physical therapy services. Current clinical summary, reason for continued home health services, new issues identified: Patient is preserving her functional mobility, balance, activity tolerance, pain control with maintenance physical therapy services. She has not had a fall, ER visit or hospitalization but had SN visits due to wound on R foot. In addition she continues to have bilateral shoulder pain which is limiting her participation in treatment. . Patient continues to express desire to have maintenance physical therapy services. Though patient lives in an intermediate there is no exercise class that she can participate in and staff does not assist patient with exercise programs plus are not skilled clinicians to perform balance re education. In addition patient has cognitive deficits and problems with initiation and follow-through. Since maintenance services are being very effective for this patient and she is at risk for decline without maintenance services, therapist agrees she would continue to benefit and would likely decline without this service. Primary focus is on balance reeducation and response to activity tolerance maintenance which are skilled therapy services. Frequency: 1 week 8, Effective 2/11/2024 · . Does the patient get SOB with exertion? No shortness of breath has been noted How often (if at all) does pain interfere with patient's movements? On a daily but not constant basis limits function and sleep LIVING SITUATION AND CAREGIVING: Homebound Status: cognitive deficits, difficulty with ambulation/transfers, needs assistance to leave home, needs assistive devices to ambulate, unable to manage stairs, unsafe to drive or leave residence without assistance, unsteady gait, weakness and fatigue, debiltating pain and impaired thoughts/judgment Type of Home: Assisted living facility Lives Alone but Receives Assistance: For some ADLs,  medication management and whenever ambulating outside her apartment Unresolved Safety Concerns: Decreased safety awareness Does the patient have an Advanced Directive? Yes, copy provided or photographed for chart Does the patient have a POLST? Yes completed POLST is in the home and visible, photographed for chart and DNR entered as a signed order in EPIC. REASON FOR HOME HEALTH SUPPORTING INFORMATION: Physical therapy to assess and teach the following but not limited to: - mobility, fall prevention, hydration and home safety CURRENT LEVEL OF FUNCTION: Ambulation and Mobility: Independent in her apartment and supervised whenever exiting her apartment. Patient Equipment: walker for ambulation. Transferring: Independent except assisted with shower and car transfers Bathing: Shower Dressing:Assisted for shower and Independent dressing except to apply her pants, compression socks and shoes Special equipment used: 4WW, grab bars, shower chair How noticeably Short of Breath is the patient during the OASIS walk or other activity? NA MEDICATION MANAGEMENT: Patient medications administered by another person Medication issues: na

## 2024-02-06 ENCOUNTER — DOCUMENTATION (OUTPATIENT)
Dept: CARDIOLOGY | Facility: MEDICAL CENTER | Age: 89
End: 2024-02-06
Payer: MEDICARE

## 2024-02-06 PROCEDURE — 665999 HH PPS REVENUE DEBIT

## 2024-02-06 PROCEDURE — 665998 HH PPS REVENUE CREDIT

## 2024-02-06 PROCEDURE — G0179 MD RECERTIFICATION HHA PT: HCPCS | Performed by: INTERNAL MEDICINE

## 2024-02-06 NOTE — PROGRESS NOTES
Medication chart review for [facility] services    Received referral from [facility].   Medications reviewed  compared with discharge summary if available.  Discharge summary date, if applicable:   8/29/23    Current medication list per [facility]     Medication list one, patient is currently taking    Current Outpatient Medications:     Dorzolamide HCl-Timolol Mal PF, 1 Dose, Ophthalmic, BID    amLODIPine, 5 mg, Oral, QDAY    sertraline, 50 mg, Oral, QDAY    Theraworx Relief, 1 Application, Topical, 4X/DAY PRN    meclizine, 25 mg, Oral, TID PRN    bacitracin-neomycin-polymyxin, 1 Each, Topical, QDAY PRN    lidocaine, 1 Application, Percutaneous, BID    levothyroxine, 75 mcg, Oral, QDAY    lisinopril, TAKE 1/2 TABLET BY MOUTH TWICE A DAY    Ibuprofen-Acetaminophen, 2 Tablet, Oral, TID PRN    Vitamin D3, 1.25 mg, Oral, DAILY    Apoaequorin, 10 mg, Oral, DAILY    Pediatric Vitamins (MULTIVITAMIN GUMMIES CHILDRENS PO), 1 Dose, Oral, DAILY    ondansetron, 4 mg, Oral, Q8HRS PRN    DORZOLAMIDE HCL-TIMOLOL MAL OP, 1 Drop, Both Eyes, QAM    Famotidine-Ca Carb-Mag Hydrox, 1 Tablet, Oral, QDAY PRN    diclofenac sodium, 2 g, Topical, BID PRN    Linh Saline, 1 Drop, Both Eyes, DAILY    Melatonin, 1 Capsule, Oral, QHS PRN    Multiple Vitamins-Minerals (PRESERVISION AREDS 2 PO), 1 Tablet, Oral, DAILY    Tums Chewy Bites, 1 Tablet, Oral, QDAY PRN    travoprost, 1 Drop, Both Eyes, Q EVENING    polyethylene glycol 3350, 17 g, Oral, DAILY      Medication list two, drugs that the patient has been prescribed or recommended to take by their healthcare provider on discharge summary  N/a    Allergies   Allergen Reactions    Morphine Anaphylaxis     anaphylaxis    Cephalexin Rash     Full body    Codeine Vomiting and Nausea    Metronidazole Vomiting and Nausea    Sulfa Drugs Rash     Full body       Labs     Lab Results   Component Value Date/Time    SODIUM 133 (L) 08/29/2023 04:06 AM    POTASSIUM 4.3 08/29/2023 04:06 AM     CHLORIDE 102 08/29/2023 04:06 AM    CO2 20 08/29/2023 04:06 AM    GLUCOSE 109 (H) 08/29/2023 04:06 AM    BUN 16 08/29/2023 04:06 AM    CREATININE 0.72 08/29/2023 04:06 AM    CREATININE 0.99 01/09/2013 07:58 AM    BUNCREATRAT 20 01/09/2013 07:58 AM    GLOMRATE >59 08/04/2010 07:50 AM     Lab Results   Component Value Date/Time    ALKPHOSPHAT 110 (H) 08/29/2023 04:06 AM    ASTSGOT 21 08/29/2023 04:06 AM    ALTSGPT 19 08/29/2023 04:06 AM    TBILIRUBIN 0.3 08/29/2023 04:06 AM    INR 1.03 12/21/2021 08:26 PM    ALBUMIN 3.82 09/13/2023 10:15 AM        Assessment for clinically significant drug interactions, drug omissions/additions, duplicative therapies.            CC   Mike Otero M.D.  [address] [facility] [address]  [place] [address]  Fax: [phone]    [facility]  Phone [phone] fax [phone]    This note was created using voice recognition software (Dragon). The accuracy of the dictation is limited by the abilities of the software. I have reviewed the note prior to signing, however some errors in grammar and context are still possible. If you have any questions related to this note please do not hesitate to contact our office.

## 2024-02-07 PROCEDURE — 665998 HH PPS REVENUE CREDIT

## 2024-02-07 PROCEDURE — 665999 HH PPS REVENUE DEBIT

## 2024-02-08 PROCEDURE — 665999 HH PPS REVENUE DEBIT

## 2024-02-08 PROCEDURE — 665998 HH PPS REVENUE CREDIT

## 2024-02-09 PROCEDURE — 665999 HH PPS REVENUE DEBIT

## 2024-02-09 PROCEDURE — 665998 HH PPS REVENUE CREDIT

## 2024-02-10 PROCEDURE — 665998 HH PPS REVENUE CREDIT

## 2024-02-10 PROCEDURE — 665999 HH PPS REVENUE DEBIT

## 2024-02-11 PROCEDURE — 665999 HH PPS REVENUE DEBIT

## 2024-02-11 PROCEDURE — 665998 HH PPS REVENUE CREDIT

## 2024-02-12 ENCOUNTER — HOME CARE VISIT (OUTPATIENT)
Dept: HOME HEALTH SERVICES | Facility: HOME HEALTHCARE | Age: 89
End: 2024-02-12
Payer: MEDICARE

## 2024-02-12 VITALS
OXYGEN SATURATION: 97 % | HEART RATE: 56 BPM | SYSTOLIC BLOOD PRESSURE: 120 MMHG | TEMPERATURE: 99.6 F | RESPIRATION RATE: 18 BRPM | DIASTOLIC BLOOD PRESSURE: 50 MMHG

## 2024-02-12 PROCEDURE — 665003 FOLLOW UP-HOME HEALTH

## 2024-02-12 PROCEDURE — 665998 HH PPS REVENUE CREDIT

## 2024-02-12 PROCEDURE — 665999 HH PPS REVENUE DEBIT

## 2024-02-12 PROCEDURE — G0159 HHC PT MAINT EA 15 MIN: HCPCS

## 2024-02-12 ASSESSMENT — ENCOUNTER SYMPTOMS
DIFFICULTY THINKING: 1
PAIN: 1
PERSON REPORTING PAIN: PATIENT
PAIN: PAIN LIMITS FUNCTION AND SLEEP DAILY NOT CONSTANT
LOWEST PAIN SEVERITY IN PAST 24 HOURS: 0/10
DEBILITATING PAIN: 1
PAIN LOCATION - PAIN DURATION: DAILY
PAIN LOCATION: LEFT SHOULDER
PAIN LOCATION - PAIN QUALITY: ACHE
PAIN SEVERITY GOAL: 3/10
HIGHEST PAIN SEVERITY IN PAST 24 HOURS: 6/10
POOR JUDGMENT: 1
PAIN LOCATION - PAIN SEVERITY: 6/10
SUBJECTIVE PAIN PROGRESSION: WAXING AND WANING
PAIN LOCATION - PAIN FREQUENCY: FREQUENT
PAIN LOCATION - EXACERBATING FACTORS: MOVEMENT

## 2024-02-13 ENCOUNTER — HOME CARE VISIT (OUTPATIENT)
Dept: HOME HEALTH SERVICES | Facility: HOME HEALTHCARE | Age: 89
End: 2024-02-13
Payer: MEDICARE

## 2024-02-13 PROCEDURE — 665998 HH PPS REVENUE CREDIT

## 2024-02-13 PROCEDURE — 665999 HH PPS REVENUE DEBIT

## 2024-02-13 NOTE — CASE COMMUNICATION
Quality Review for Recertification OASIS by JENNI Elizondo RN on  February 13, 2024     Edits completed by JENNI Elizondo RN:  1.  and  diagnosis coding updated per chart review.

## 2024-02-13 NOTE — Clinical Note
agree with changes. Thanks  ----- Message -----  From: Aby Elizondo R.N.  Sent: 2/13/2024   9:27 AM PST  To: Cristina Jaramillo, PT      Quality Review for Recertification OASIS by JENNI Elizondo RN on  February 13, 2024     Edits completed by JENNI Elizondo RN:  1.  and  diagnosis coding updated per chart review.

## 2024-02-14 PROCEDURE — 665998 HH PPS REVENUE CREDIT

## 2024-02-14 PROCEDURE — 665999 HH PPS REVENUE DEBIT

## 2024-02-15 PROCEDURE — 665999 HH PPS REVENUE DEBIT

## 2024-02-15 PROCEDURE — 665998 HH PPS REVENUE CREDIT

## 2024-02-16 PROCEDURE — 665999 HH PPS REVENUE DEBIT

## 2024-02-16 PROCEDURE — 665998 HH PPS REVENUE CREDIT

## 2024-02-17 ENCOUNTER — HOME CARE VISIT (OUTPATIENT)
Dept: HOME HEALTH SERVICES | Facility: HOME HEALTHCARE | Age: 89
End: 2024-02-17
Payer: MEDICARE

## 2024-02-17 PROCEDURE — 665998 HH PPS REVENUE CREDIT

## 2024-02-17 PROCEDURE — 665999 HH PPS REVENUE DEBIT

## 2024-02-18 PROCEDURE — 665998 HH PPS REVENUE CREDIT

## 2024-02-18 PROCEDURE — 665999 HH PPS REVENUE DEBIT

## 2024-02-19 PROCEDURE — 665998 HH PPS REVENUE CREDIT

## 2024-02-19 PROCEDURE — 665999 HH PPS REVENUE DEBIT

## 2024-02-20 PROCEDURE — 665999 HH PPS REVENUE DEBIT

## 2024-02-20 PROCEDURE — 665998 HH PPS REVENUE CREDIT

## 2024-02-20 RX ORDER — LEVOTHYROXINE SODIUM 0.07 MG/1
75 TABLET ORAL
Qty: 90 TABLET | Refills: 3 | Status: SHIPPED | OUTPATIENT
Start: 2024-02-20

## 2024-02-21 PROCEDURE — 665999 HH PPS REVENUE DEBIT

## 2024-02-21 PROCEDURE — 665998 HH PPS REVENUE CREDIT

## 2024-02-22 ENCOUNTER — HOME CARE VISIT (OUTPATIENT)
Dept: HOME HEALTH SERVICES | Facility: HOME HEALTHCARE | Age: 89
End: 2024-02-22
Payer: MEDICARE

## 2024-02-22 PROCEDURE — 665998 HH PPS REVENUE CREDIT

## 2024-02-22 PROCEDURE — 665999 HH PPS REVENUE DEBIT

## 2024-02-23 PROCEDURE — 665999 HH PPS REVENUE DEBIT

## 2024-02-23 PROCEDURE — 665998 HH PPS REVENUE CREDIT

## 2024-02-24 PROCEDURE — 665998 HH PPS REVENUE CREDIT

## 2024-02-24 PROCEDURE — 665999 HH PPS REVENUE DEBIT

## 2024-02-25 PROCEDURE — 665998 HH PPS REVENUE CREDIT

## 2024-02-25 PROCEDURE — 665999 HH PPS REVENUE DEBIT

## 2024-02-25 RX ORDER — AMOXICILLIN 500 MG/1
500 CAPSULE ORAL 3 TIMES DAILY
Qty: 30 CAPSULE | Refills: 0 | Status: SHIPPED | OUTPATIENT
Start: 2024-02-25 | End: 2024-03-08

## 2024-02-26 PROCEDURE — 665998 HH PPS REVENUE CREDIT

## 2024-02-26 PROCEDURE — 665999 HH PPS REVENUE DEBIT

## 2024-02-27 PROCEDURE — 665998 HH PPS REVENUE CREDIT

## 2024-02-27 PROCEDURE — 665999 HH PPS REVENUE DEBIT

## 2024-02-28 PROCEDURE — 665999 HH PPS REVENUE DEBIT

## 2024-02-28 PROCEDURE — 665998 HH PPS REVENUE CREDIT

## 2024-02-29 PROCEDURE — 665998 HH PPS REVENUE CREDIT

## 2024-02-29 PROCEDURE — 665999 HH PPS REVENUE DEBIT

## 2024-03-01 ENCOUNTER — HOME CARE VISIT (OUTPATIENT)
Dept: HOME HEALTH SERVICES | Facility: HOME HEALTHCARE | Age: 89
End: 2024-03-01
Payer: MEDICARE

## 2024-03-01 PROCEDURE — 665999 HH PPS REVENUE DEBIT

## 2024-03-01 PROCEDURE — 665998 HH PPS REVENUE CREDIT

## 2024-03-01 PROCEDURE — G0151 HHCP-SERV OF PT,EA 15 MIN: HCPCS

## 2024-03-02 PROCEDURE — 665999 HH PPS REVENUE DEBIT

## 2024-03-02 PROCEDURE — 665998 HH PPS REVENUE CREDIT

## 2024-03-03 ENCOUNTER — HOSPITAL ENCOUNTER (EMERGENCY)
Facility: MEDICAL CENTER | Age: 89
DRG: 151 | End: 2024-03-03
Attending: EMERGENCY MEDICINE
Payer: MEDICARE

## 2024-03-03 ENCOUNTER — PHARMACY VISIT (OUTPATIENT)
Dept: PHARMACY | Facility: MEDICAL CENTER | Age: 89
End: 2024-03-03
Payer: COMMERCIAL

## 2024-03-03 VITALS
OXYGEN SATURATION: 95 % | TEMPERATURE: 97.8 F | DIASTOLIC BLOOD PRESSURE: 72 MMHG | HEART RATE: 67 BPM | HEIGHT: 64 IN | WEIGHT: 125 LBS | BODY MASS INDEX: 21.34 KG/M2 | RESPIRATION RATE: 16 BRPM | SYSTOLIC BLOOD PRESSURE: 161 MMHG

## 2024-03-03 VITALS
TEMPERATURE: 98.4 F | HEART RATE: 58 BPM | OXYGEN SATURATION: 96 % | DIASTOLIC BLOOD PRESSURE: 82 MMHG | SYSTOLIC BLOOD PRESSURE: 138 MMHG | RESPIRATION RATE: 18 BRPM

## 2024-03-03 VITALS
SYSTOLIC BLOOD PRESSURE: 186 MMHG | OXYGEN SATURATION: 95 % | HEART RATE: 63 BPM | BODY MASS INDEX: 21.34 KG/M2 | TEMPERATURE: 98.1 F | HEIGHT: 64 IN | RESPIRATION RATE: 18 BRPM | DIASTOLIC BLOOD PRESSURE: 79 MMHG | WEIGHT: 125 LBS

## 2024-03-03 DIAGNOSIS — R04.0 EPISTAXIS: ICD-10-CM

## 2024-03-03 DIAGNOSIS — D64.9 ANEMIA, UNSPECIFIED TYPE: ICD-10-CM

## 2024-03-03 DIAGNOSIS — D72.818 OTHER DECREASED WHITE BLOOD CELL (WBC) COUNT: ICD-10-CM

## 2024-03-03 DIAGNOSIS — E03.9 HYPOTHYROIDISM, UNSPECIFIED TYPE: ICD-10-CM

## 2024-03-03 DIAGNOSIS — I10 HYPERTENSION, UNSPECIFIED TYPE: ICD-10-CM

## 2024-03-03 DIAGNOSIS — E78.00 PURE HYPERCHOLESTEROLEMIA: ICD-10-CM

## 2024-03-03 DIAGNOSIS — I10 PRIMARY HYPERTENSION: ICD-10-CM

## 2024-03-03 DIAGNOSIS — M81.0 OSTEOPOROSIS, UNSPECIFIED OSTEOPOROSIS TYPE, UNSPECIFIED PATHOLOGICAL FRACTURE PRESENCE: ICD-10-CM

## 2024-03-03 PROCEDURE — 99284 EMERGENCY DEPT VISIT MOD MDM: CPT

## 2024-03-03 PROCEDURE — RXMED WILLOW AMBULATORY MEDICATION CHARGE: Performed by: EMERGENCY MEDICINE

## 2024-03-03 PROCEDURE — 665999 HH PPS REVENUE DEBIT

## 2024-03-03 PROCEDURE — 99283 EMERGENCY DEPT VISIT LOW MDM: CPT

## 2024-03-03 PROCEDURE — A9270 NON-COVERED ITEM OR SERVICE: HCPCS | Performed by: EMERGENCY MEDICINE

## 2024-03-03 PROCEDURE — 665998 HH PPS REVENUE CREDIT

## 2024-03-03 PROCEDURE — 303620 HCHG EPISTAXIS CONTROL

## 2024-03-03 PROCEDURE — 700102 HCHG RX REV CODE 250 W/ 637 OVERRIDE(OP): Performed by: EMERGENCY MEDICINE

## 2024-03-03 PROCEDURE — 700101 HCHG RX REV CODE 250: Performed by: EMERGENCY MEDICINE

## 2024-03-03 RX ORDER — OXYMETAZOLINE HYDROCHLORIDE 0.05 G/100ML
2 SPRAY NASAL ONCE
Status: COMPLETED | OUTPATIENT
Start: 2024-03-03 | End: 2024-03-03

## 2024-03-03 RX ORDER — AMOXICILLIN 500 MG/1
500 CAPSULE ORAL 3 TIMES DAILY
Qty: 6 CAPSULE | Refills: 0 | Status: ON HOLD | OUTPATIENT
Start: 2024-03-03 | End: 2024-03-06

## 2024-03-03 RX ORDER — TRANEXAMIC ACID 100 MG/ML
3 INJECTION, SOLUTION INTRAVENOUS ONCE
Status: COMPLETED | OUTPATIENT
Start: 2024-03-03 | End: 2024-03-03

## 2024-03-03 RX ADMIN — OXYMETAZOLINE HCL 2 SPRAY: 0.05 SPRAY NASAL at 14:23

## 2024-03-03 RX ADMIN — TRANEXAMIC ACID 300 MG: 100 INJECTION, SOLUTION INTRAVENOUS at 08:29

## 2024-03-03 ASSESSMENT — FIBROSIS 4 INDEX
FIB4 SCORE: 2.66
FIB4 SCORE: 2.66

## 2024-03-03 ASSESSMENT — ENCOUNTER SYMPTOMS
PAIN LOCATION - PAIN SEVERITY: 6/10
PAIN LOCATION - PAIN DURATION: DAILY
PAIN LOCATION: LEFT SHOULDER
PAIN LOCATION - PAIN FREQUENCY: CONSTANT
PAIN LOCATION - PAIN SEVERITY: 6/10
PAIN LOCATION - PAIN QUALITY: ACHING
PAIN LOCATION - PAIN FREQUENCY: CONSTANT
PAIN LOCATION - PAIN DURATION: DAILY
PAIN LOCATION: RIGHT SHOULDER
PERSON REPORTING PAIN: PATIENT
DEBILITATING PAIN: 1
PAIN LOCATION - PAIN QUALITY: ACHING
PAIN: 1

## 2024-03-03 NOTE — ED NOTES
Patient ready for discharge.  Instructions provided to patient and daughter.  They verbalized understanding and signed.  Patient epistaxis remains controlled.  Patient leaves ER via WC.  Assisted to the car, daughter is driving patient back to her residence.

## 2024-03-03 NOTE — ED NOTES
ERP at bedside.    Tazorac Counseling:  Patient advised that medication is irritating and drying.  Patient may need to apply sparingly and wash off after an hour before eventually leaving it on overnight.  The patient verbalized understanding of the proper use and possible adverse effects of tazorac.  All of the patient's questions and concerns were addressed.

## 2024-03-03 NOTE — ED PROVIDER NOTES
ED Provider Note    CHIEF COMPLAINT  Chief Complaint   Patient presents with    Nose Bleed     Seen here earlier for nose bleed, blew nose and nose bleed resumed. No blood thinners.        HPI/ROS  LIMITATION TO HISTORY   Select: Memory deficits  OUTSIDE HISTORIAN(S):  Daughter at the bedside    Gloria Patel is a 89 y.o. female who presents for recurrence of epistaxis.  Evaluated here in emergency department just earlier today, her daughter reports that because of her short-term memory problems she forgot that she was possible to blow her nose, she blew her nose and had a return of the bleeding.  Currently stopped again.  No lightheadedness.  No chest pain.  No anticoagulation.  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    SURGICAL HISTORY   has a past surgical history that includes other (1945); other (1954); other orthopedic surgery; other orthopedic surgery (2000); cholecystectomy (1966); other abdominal surgery (1954); gyn surgery (1970); hip arthroplasty total (6/21/2011); us-needle core bx-breast panel; and orif, ankle (Right, 12/22/2021).    FAMILY HISTORY  Family History   Problem Relation Age of Onset    Diabetes Other     Heart Disease Other     Lung Disease Other     Cancer Sister        SOCIAL HISTORY  Social History     Tobacco Use    Smoking status: Never    Smokeless tobacco: Never   Vaping Use    Vaping Use: Never used   Substance and Sexual Activity    Alcohol use: Yes     Alcohol/week: 0.0 oz     Comment: occassional wine    Drug use: No    Sexual activity: Not on file       CURRENT MEDICATIONS  Home Medications       Reviewed by Vanessa Aceves R.N. (Registered Nurse) on  "03/03/24 at 1307  Med List Status: Partial     Medication Last Dose Status   amLODIPine (NORVASC) 5 MG Tab  Active   amoxicillin (AMOXIL) 500 MG Cap  Active   Apoaequorin 10 MG Cap  Active   bacitracin-neomycin-polymyxin (NEOSPORIN) 400-5-5000 Ointment  Active   calcium carbonate (TUMS CHEWY BITES) 750 MG chewable tablet  Active   Cholecalciferol (VITAMIN D3) 1.25 MG (15084 UT) Tab  Active   diclofenac sodium (VOLTAREN) 1 % Gel  Active   DORZOLAMIDE HCL-TIMOLOL MAL OP  Active   Dorzolamide HCl-Timolol Mal PF 22.3-6.8 MG/ML Solution  Active   Famotidine-Ca Carb-Mag Hydrox -165 MG Chew Tab  Active   Homeopathic Products (THERAWORX RELIEF) Foam  Active   Ibuprofen-Acetaminophen 125-250 MG Tab  Active   levothyroxine (SYNTHROID) 75 MCG Tab  Active   Lidocaine (HM LIDOCAINE PATCH) 4 % Patch  Active   lisinopril (PRINIVIL) 20 MG Tab  Active   meclizine (ANTIVERT) 25 MG Tab  Active   Melatonin 5 MG Cap  Active   Multiple Vitamins-Minerals (PRESERVISION AREDS 2 PO)  Active   ondansetron (ZOFRAN ODT) 4 MG TABLET DISPERSIBLE  Active   Pediatric Vitamins (MULTIVITAMIN GUMMIES CHILDRENS PO)  Active   polyethylene glycol 3350 (MIRALAX) Powder  Active   sertraline (ZOLOFT) 50 MG Tab  Active   Soft Lens Products (FRANCESCA SALINE) Solution  Active   travoprost (TRAVATAN Z) 0.004 % Solution  Active                    ALLERGIES  Allergies   Allergen Reactions    Morphine Anaphylaxis     anaphylaxis    Cephalexin Rash     Full body    Codeine Vomiting and Nausea    Metronidazole Vomiting and Nausea    Sulfa Drugs Rash     Full body       PHYSICAL EXAM  VITAL SIGNS: BP (!) 174/77   Pulse 64   Resp 18   Ht 1.626 m (5' 4\")   Wt 56.7 kg (125 lb)   SpO2 95%   BMI 21.46 kg/m²    Constitutional: Alert in no apparent distress.  HENT: Nasal clamp in place.  Once removed there is no obvious active bleeding.  Intranasal inspection reveals some friable mucosa on the septum in the left nare, possibly a septal defect.  But no obvious active " area of bleeding.  No active bleeding the posterior pharynx  Eyes: Conjunctiva normal, non-icteric.   Chest: Normal nonlabored respirations  Skin: No appreciable rash on the exposed skin  Musculoskeletal: No obvious acute trauma appreciated  Neurologic: Alert, no obvious focal deficits noted.        DIAGNOSTIC STUDIES / PROCEDURES      Epistaxis Procedure Note    Indication: Bleeding    Procedure: The patient was positioned appropriately and the nares were cleared as well as possible. The bleeding site was localized to the left nare and tamponaded with a Merocel nasal tampon.  Hemostasis was obtained.    The patient tolerated the procedure well.    Complications: None      COURSE & MEDICAL DECISION MAKING    ED Observation Status? No; Patient does not meet criteria for ED Observation.     INITIAL ASSESSMENT, COURSE AND PLAN  Care Narrative: 89-year-old female returns with a recurrence of epistaxis, treated earlier today, no active bleeding on initial exam but given her dementia and memory deficit I thought would be best to put some nasal packing in there to help prevent a recurrence of bleeding.  The daughter is in agreement.  Has an appointment Tuesday morning to PCP, will put her on amoxicillin in the meantime, of note she just finished a course amoxicillin a couple hours ago for UTI.  Discharged home in stable condition with strict return instructions provided  Prescription for amoxicillin    FINAL DIAGNOSIS  1. Epistaxis           Electronically signed by: James Monroy M.D., 3/3/2024 2:35 PM

## 2024-03-03 NOTE — ED PROVIDER NOTES
ED Provider Note    CHIEF COMPLAINT  Chief Complaint   Patient presents with    Epistaxis     Pt states she has had nasal bleeding for 3x hours. Left nare worse than right.        EXTERNAL RECORDS REVIEWED  Reviewed outpatient medications as well as recent clinic visits    HPI/ROS  LIMITATION TO HISTORY   None  OUTSIDE HISTORIAN(S):  None    Gloria Patel is a 89 y.o. female who presents for evaluation of epistaxis, rather brisk left greater than right.  Patient is currently not on any anticoagulant therapy.  There is no report of trauma.  She has developed some brisk bleeding on the left side that is nonpulsatile for the last 3 hours.  She is also spitting up but not vomiting blood.  This is never happened to this degree in the past.  No high fevers or chills.  No recent trauma    PAST MEDICAL HISTORY   has a past medical history of Arthritis, Diverticulitis, GERD (gastroesophageal reflux disease), Glaucoma, Heart burn, Hiatus hernia syndrome, Hypertension, Primary osteoarthritis of both shoulders (1/22/2019), and Unspecified urinary incontinence.    SURGICAL HISTORY   has a past surgical history that includes other (1945); other (1954); other orthopedic surgery; other orthopedic surgery (2000); cholecystectomy (1966); other abdominal surgery (1954); gyn surgery (1970); hip arthroplasty total (6/21/2011); us-needle core bx-breast panel; and orif, ankle (Right, 12/22/2021).    FAMILY HISTORY  Family History   Problem Relation Age of Onset    Diabetes Other     Heart Disease Other     Lung Disease Other     Cancer Sister        SOCIAL HISTORY  Social History     Tobacco Use    Smoking status: Never    Smokeless tobacco: Never   Vaping Use    Vaping Use: Never used   Substance and Sexual Activity    Alcohol use: Yes     Alcohol/week: 0.0 oz     Comment: occassional wine    Drug use: No    Sexual activity: Not on file       CURRENT MEDICATIONS  Home Medications    **Home medications have not yet been reviewed for  this encounter**     No current facility-administered medications for this encounter.    Current Outpatient Medications:     amoxicillin (AMOXIL) 500 MG Cap, Take 1 Capsule by mouth 3 times a day., Disp: 30 Capsule, Rfl: 0    levothyroxine (SYNTHROID) 75 MCG Tab, TAKE 1 TABLET BY MOUTH EVERY DAY, Disp: 90 Tablet, Rfl: 3    Dorzolamide HCl-Timolol Mal PF 22.3-6.8 MG/ML Solution, Administer 1 Dose into affected eye(s) 2 times a day. Indications: Wide-Angle Glaucoma, Disp: , Rfl:     amLODIPine (NORVASC) 5 MG Tab, TAKE 1 TABLET BY MOUTH EVERY DAY, Disp: 90 Tablet, Rfl: 3    sertraline (ZOLOFT) 50 MG Tab, TAKE 1 TABLET BY MOUTH EVERY DAY, Disp: 90 Tablet, Rfl: 3    Homeopathic Products (THERAWORX RELIEF) Foam, Apply 1 Application topically 4 times a day as needed (pain in shoulders). apply to both shoulders as needed for pain after showers  Indications: pain in shoulders, Disp: , Rfl:     meclizine (ANTIVERT) 25 MG Tab, Take 1 Tablet by mouth 3 times a day as needed (for dizziness). Indications: Sensation of Spinning or Whirling, Disp: 60 Tablet, Rfl: 0    bacitracin-neomycin-polymyxin (NEOSPORIN) 400-5-5000 Ointment, Apply 1 Each topically 1 time a day as needed (itching). aplly to R arm whwere itching as meeded daily  Indications: itching, Disp: , Rfl:     Lidocaine (HM LIDOCAINE PATCH) 4 % Patch, 1 Application by Percutaneous route 2 times a day. apply to painful areas 2 times a day as neede for moderate pain  Indications: Mild to Moderate Pain, Disp: , Rfl:     lisinopril (PRINIVIL) 20 MG Tab, TAKE 1/2 TABLET BY MOUTH TWICE A DAY, Disp: 90 Tablet, Rfl: 3    Ibuprofen-Acetaminophen 125-250 MG Tab, Take 2 Tablets by mouth 3 times a day as needed (pain). take up to 3 times a day  Indications: pain, Disp: , Rfl:     Cholecalciferol (VITAMIN D3) 1.25 MG (22835 UT) Tab, Take 1.25 mg by mouth every day. Indications: supplement, Disp: , Rfl:     Apoaequorin 10 MG Cap, Take 10 mg by mouth every day. Indications: memory loss,  Disp: , Rfl:     Pediatric Vitamins (MULTIVITAMIN GUMMIES CHILDRENS PO), Take 1 Dose by mouth every day. Indications: supplement, Disp: , Rfl:     ondansetron (ZOFRAN ODT) 4 MG TABLET DISPERSIBLE, Take 1 Tablet by mouth every 8 hours as needed for Nausea., Disp: 60 Tablet, Rfl: 0    DORZOLAMIDE HCL-TIMOLOL MAL OP, Administer 1 Drop into both eyes every morning. Indications: Glaucoma, Disp: , Rfl:     Famotidine-Ca Carb-Mag Hydrox -165 MG Chew Tab, Chew 1 Tablet 1 time a day as needed (for indegestion). take if tums is not effective  Indications: Heartburn, Disp: , Rfl:     diclofenac sodium (VOLTAREN) 1 % Gel, Apply 2 g topically 2 times a day as needed (for mild to moderate pain). Indications: Joint Damage causing Pain and Loss of Function, Disp: , Rfl:     Soft Lens Products (FRANCESCA SALINE) Solution, Administer 1 Drop into both eyes every day. Indications: supplement, Disp: , Rfl:     Melatonin 5 MG Cap, Take 1 Capsule by mouth at bedtime as needed (for insomnia, takes first).  10 mg as needed for sleep  Indications: Trouble Sleeping, Disp: , Rfl:     Multiple Vitamins-Minerals (PRESERVISION AREDS 2 PO), Take 1 Tablet by mouth every day. Indications: supplement, Disp: , Rfl:     calcium carbonate (TUMS CHEWY BITES) 750 MG chewable tablet, Chew 1 Tablet 1 time a day as needed (for stomach upset). Indications: Heartburn, Disp: , Rfl:     travoprost (TRAVATAN Z) 0.004 % Solution, Administer 1 Drop into both eyes every evening. Indications: Wide-Angle Glaucoma, Disp: , Rfl:     polyethylene glycol 3350 (MIRALAX) Powder, Take 17 g by mouth every day. Indications: Constipation, Disp: , Rfl:       ALLERGIES  Allergies   Allergen Reactions    Morphine Anaphylaxis     anaphylaxis    Cephalexin Rash     Full body    Codeine Vomiting and Nausea    Metronidazole Vomiting and Nausea    Sulfa Drugs Rash     Full body       PHYSICAL EXAM  VITAL SIGNS: BP (!) 186/79   Pulse 63   Temp 36.7 °C (98.1 °F) (Temporal)   Resp 18   " Ht 1.626 m (5' 4\")   Wt 56.7 kg (125 lb)   SpO2 95%   BMI 21.46 kg/m²    Pulse ox interpretation: I interpret this pulse ox as normal.  Constitutional: Alert and oriented x 3, no acute distress  HEENT: Atraumatic normocephalic, pupils are equal round reactive to light extraocular movements are intact. The nares is clear, external ears are normal, mouth shows moist mucous membranes normal dentition for age dried blood in the bilateral nares no active or arterial pulsation slow trickle of blood in the back of the throat bleeding is greater on the left than right  Neck: Supple, no JVD no tracheal deviation  Cardiovascular: Regular rate and rhythm no murmur rub or gallop 2+ pulses peripherally x4  Thorax & Lungs: No respiratory distress, no wheezes rales or rhonchi, No chest tenderness.   GI: Soft nontender nondistended positive bowel sounds, no peritoneal signs  Skin: Warm dry no acute rash or lesion  Musculoskeletal: Moving all extremities with full range and 5 of 5 strength no acute  deformity  Neurologic: Cranial nerves III through XII are grossly intact no sensory deficit no cerebellar dysfunction   Psychiatric: Anxious      DIAGNOSTIC STUDIES / PROCEDURES  Medical management of anterior epistaxis: Verbal consent was obtained.  I had the patient gently blow her nose.  I instilled 2 pledgets of one by one rolled gauze with 3 cc of tranexamic acid each into the anterior nares.  These were left in place for 45 minutes then gently removed.  When they removed there was no breakthrough bleeding or bleeding in the posterior pharynx.  The patient was observed for an additional hour and did not have any breakthrough bleeding.  No complications  LABS  Considered but not performed    RADIOLOGY  None indicated  COURSE & MEDICAL DECISION MAKING    ED Observation Status? No; Patient does not meet criteria for ED Observation.     INITIAL ASSESSMENT, COURSE AND PLAN  Care Narrative:     This is a very pleasant 89-year-old " female presents here with nontraumatic epistaxis.  I reviewed her records she is known to be hypertensive on therapy and not on any anticoagulants or antiplatelet agents.  Here the patient was moderately hypertensive.  She clearly had what looks like an anterior epistaxis bleed primarily on the left.  She was treated with tranexamic acid and observed for around 2 hours and had no breakthrough bleeding.  I considered but did not feel that nasal packing would be in the patient's best interest as she is 89 years old.  In addition, aggressive blood pressure treatment was considered but could likely precipitate hypotension ground-level fall head injury hip fracture etc.  I also consider blood testing but the amount of blood seems to be clinically insignificant and very unlikely to merit blood transfusion.  I counseled the daughter to apply Vaseline or antibiotic ointment to the nares nightly and to return as needed for new or worsening symptoms      ADDITIONAL PROBLEM LIST    DISPOSITION AND DISCUSSIONS  I have discussed management of the patient with the following physicians and NIRMALA's: None    Discussion of management with other [facility] or appropriate source(s): None    Escalation of care considered, and ultimately not performed: Considered blood testing as well as nasal packing    Barriers to care at this time, including but not limited to: None.     Decision tools and prescription drugs considered including, but not limited to: None.    FINAL DIAGNOSIS  1. Epistaxis    2. Hypertension, unspecified type           Electronically signed by: Lino Barrientos M.D., 3/3/2024 8:15 AM

## 2024-03-03 NOTE — ED TRIAGE NOTES
"..  Chief Complaint   Patient presents with    Nose Bleed     Seen here earlier for nose bleed, blew nose and nose bleed resumed. No blood thinners.        89 yo female brought in by EMS for above complaint. GCS 15. Denies lightheadedness or dizziness at this time.     Nose clamp in place.     BP (!) 169/73   Pulse 67   Resp 18   Ht 1.626 m (5' 4\")   Wt 56.7 kg (125 lb)   SpO2 97%   BMI 21.46 kg/m²     "

## 2024-03-03 NOTE — ED TRIAGE NOTES
"Chief Complaint   Patient presents with    Epistaxis     Pt states she has had nasal bleeding for 3x hours. Left nare worse than right.        Pt BIBA from home with the above complaint. Pt has hx of htn, takes lisinopril. Pt denies taking blood thinners. Pt is GCS 15.     BP (!) 184/70   Pulse (!) 58   Temp 36.3 °C (97.3 °F) (Temporal)   Resp 16   Ht 1.626 m (5' 4\")   Wt 56.7 kg (125 lb)   SpO2 93%   BMI 21.46 kg/m²       "

## 2024-03-04 ENCOUNTER — HOME CARE VISIT (OUTPATIENT)
Dept: HOME HEALTH SERVICES | Facility: HOME HEALTHCARE | Age: 89
End: 2024-03-04
Payer: MEDICARE

## 2024-03-04 ENCOUNTER — HOSPITAL ENCOUNTER (INPATIENT)
Facility: MEDICAL CENTER | Age: 89
LOS: 1 days | DRG: 151 | End: 2024-03-06
Attending: EMERGENCY MEDICINE | Admitting: INTERNAL MEDICINE
Payer: MEDICARE

## 2024-03-04 DIAGNOSIS — R04.0 EPISTAXIS: ICD-10-CM

## 2024-03-04 DIAGNOSIS — F03.90 DEMENTIA, UNSPECIFIED DEMENTIA SEVERITY, UNSPECIFIED DEMENTIA TYPE, UNSPECIFIED WHETHER BEHAVIORAL, PSYCHOTIC, OR MOOD DISTURBANCE OR ANXIETY (HCC): ICD-10-CM

## 2024-03-04 DIAGNOSIS — I10 PRIMARY HYPERTENSION: ICD-10-CM

## 2024-03-04 DIAGNOSIS — D64.9 ANEMIA, UNSPECIFIED TYPE: ICD-10-CM

## 2024-03-04 PROBLEM — Z71.89 ADVANCE CARE PLANNING: Status: ACTIVE | Noted: 2024-03-04

## 2024-03-04 LAB
ANISOCYTOSIS BLD QL SMEAR: ABNORMAL
BASOPHILS # BLD AUTO: 0 % (ref 0–1.8)
BASOPHILS # BLD: 0 K/UL (ref 0–0.12)
EOSINOPHIL # BLD AUTO: 0 K/UL (ref 0–0.51)
EOSINOPHIL NFR BLD: 0 % (ref 0–6.9)
ERYTHROCYTE [DISTWIDTH] IN BLOOD BY AUTOMATED COUNT: 59.3 FL (ref 35.9–50)
HCT VFR BLD AUTO: 22.1 % (ref 37–47)
HCT VFR BLD AUTO: 24 % (ref 37–47)
HGB BLD-MCNC: 7.2 G/DL (ref 12–16)
HGB BLD-MCNC: 7.7 G/DL (ref 12–16)
LYMPHOCYTES # BLD AUTO: 1.2 K/UL (ref 1–4.8)
LYMPHOCYTES NFR BLD: 34.2 % (ref 22–41)
MANUAL DIFF BLD: NORMAL
MCH RBC QN AUTO: 29.4 PG (ref 27–33)
MCHC RBC AUTO-ENTMCNC: 32.1 G/DL (ref 32.2–35.5)
MCV RBC AUTO: 91.6 FL (ref 81.4–97.8)
METAMYELOCYTES NFR BLD MANUAL: 0.9 %
MICROCYTES BLD QL SMEAR: ABNORMAL
MONOCYTES # BLD AUTO: 0.09 K/UL (ref 0–0.85)
MONOCYTES NFR BLD AUTO: 2.6 % (ref 0–13.4)
MORPHOLOGY BLD-IMP: NORMAL
MYELOCYTES NFR BLD MANUAL: 0.9 %
NEUTROPHILS # BLD AUTO: 2.15 K/UL (ref 1.82–7.42)
NEUTROPHILS NFR BLD: 61.4 % (ref 44–72)
NRBC # BLD AUTO: 0 K/UL
NRBC BLD-RTO: 0 /100 WBC (ref 0–0.2)
PLATELET # BLD AUTO: 230 K/UL (ref 164–446)
PLATELET BLD QL SMEAR: NORMAL
PMV BLD AUTO: 9.1 FL (ref 9–12.9)
RBC # BLD AUTO: 2.62 M/UL (ref 4.2–5.4)
RBC BLD AUTO: PRESENT
WBC # BLD AUTO: 3.5 K/UL (ref 4.8–10.8)

## 2024-03-04 PROCEDURE — 700105 HCHG RX REV CODE 258: Performed by: EMERGENCY MEDICINE

## 2024-03-04 PROCEDURE — 700102 HCHG RX REV CODE 250 W/ 637 OVERRIDE(OP): Performed by: EMERGENCY MEDICINE

## 2024-03-04 PROCEDURE — 36415 COLL VENOUS BLD VENIPUNCTURE: CPT

## 2024-03-04 PROCEDURE — 665998 HH PPS REVENUE CREDIT

## 2024-03-04 PROCEDURE — 85014 HEMATOCRIT: CPT

## 2024-03-04 PROCEDURE — 96375 TX/PRO/DX INJ NEW DRUG ADDON: CPT

## 2024-03-04 PROCEDURE — A9270 NON-COVERED ITEM OR SERVICE: HCPCS | Performed by: INTERNAL MEDICINE

## 2024-03-04 PROCEDURE — A9270 NON-COVERED ITEM OR SERVICE: HCPCS | Performed by: EMERGENCY MEDICINE

## 2024-03-04 PROCEDURE — 85007 BL SMEAR W/DIFF WBC COUNT: CPT

## 2024-03-04 PROCEDURE — 99285 EMERGENCY DEPT VISIT HI MDM: CPT

## 2024-03-04 PROCEDURE — 700111 HCHG RX REV CODE 636 W/ 250 OVERRIDE (IP): Performed by: EMERGENCY MEDICINE

## 2024-03-04 PROCEDURE — 96374 THER/PROPH/DIAG INJ IV PUSH: CPT

## 2024-03-04 PROCEDURE — 303620 HCHG EPISTAXIS CONTROL

## 2024-03-04 PROCEDURE — A9270 NON-COVERED ITEM OR SERVICE: HCPCS | Performed by: NURSE PRACTITIONER

## 2024-03-04 PROCEDURE — G0378 HOSPITAL OBSERVATION PER HR: HCPCS

## 2024-03-04 PROCEDURE — 2Y41X5Z PACKING OF NASAL REGION USING PACKING MATERIAL: ICD-10-PCS | Performed by: EMERGENCY MEDICINE

## 2024-03-04 PROCEDURE — 700102 HCHG RX REV CODE 250 W/ 637 OVERRIDE(OP): Performed by: NURSE PRACTITIONER

## 2024-03-04 PROCEDURE — 85018 HEMOGLOBIN: CPT

## 2024-03-04 PROCEDURE — 700102 HCHG RX REV CODE 250 W/ 637 OVERRIDE(OP): Performed by: INTERNAL MEDICINE

## 2024-03-04 PROCEDURE — 700101 HCHG RX REV CODE 250: Performed by: EMERGENCY MEDICINE

## 2024-03-04 PROCEDURE — 700111 HCHG RX REV CODE 636 W/ 250 OVERRIDE (IP): Mod: JZ

## 2024-03-04 PROCEDURE — 306565 RIGID MIT RESTRAINT(PAIR): Performed by: INTERNAL MEDICINE

## 2024-03-04 PROCEDURE — 99223 1ST HOSP IP/OBS HIGH 75: CPT | Mod: AI,25 | Performed by: INTERNAL MEDICINE

## 2024-03-04 PROCEDURE — 700111 HCHG RX REV CODE 636 W/ 250 OVERRIDE (IP): Mod: JG | Performed by: INTERNAL MEDICINE

## 2024-03-04 PROCEDURE — 85027 COMPLETE CBC AUTOMATED: CPT

## 2024-03-04 PROCEDURE — 99497 ADVNCD CARE PLAN 30 MIN: CPT | Performed by: INTERNAL MEDICINE

## 2024-03-04 PROCEDURE — 665999 HH PPS REVENUE DEBIT

## 2024-03-04 RX ORDER — POLYETHYLENE GLYCOL 3350 17 G/17G
1 POWDER, FOR SOLUTION ORAL
Status: DISCONTINUED | OUTPATIENT
Start: 2024-03-04 | End: 2024-03-06

## 2024-03-04 RX ORDER — HALOPERIDOL 5 MG/ML
2.5 INJECTION INTRAMUSCULAR ONCE
Status: COMPLETED | OUTPATIENT
Start: 2024-03-04 | End: 2024-03-04

## 2024-03-04 RX ORDER — LABETALOL HYDROCHLORIDE 5 MG/ML
10 INJECTION, SOLUTION INTRAVENOUS EVERY 4 HOURS PRN
Status: DISCONTINUED | OUTPATIENT
Start: 2024-03-04 | End: 2024-03-06

## 2024-03-04 RX ORDER — LIDOCAINE HYDROCHLORIDE AND EPINEPHRINE 10; 10 MG/ML; UG/ML
5 INJECTION, SOLUTION INFILTRATION; PERINEURAL ONCE
Status: DISCONTINUED | OUTPATIENT
Start: 2024-03-04 | End: 2024-03-04

## 2024-03-04 RX ORDER — TRANEXAMIC ACID 100 MG/ML
3 INJECTION, SOLUTION INTRAVENOUS ONCE
Status: COMPLETED | OUTPATIENT
Start: 2024-03-04 | End: 2024-03-04

## 2024-03-04 RX ORDER — LEVOTHYROXINE SODIUM 0.07 MG/1
75 TABLET ORAL DAILY
Status: DISCONTINUED | OUTPATIENT
Start: 2024-03-04 | End: 2024-03-06 | Stop reason: HOSPADM

## 2024-03-04 RX ORDER — LIDOCAINE HYDROCHLORIDE AND EPINEPHRINE 10; 10 MG/ML; UG/ML
5 INJECTION, SOLUTION INFILTRATION; PERINEURAL ONCE
Status: COMPLETED | OUTPATIENT
Start: 2024-03-04 | End: 2024-03-04

## 2024-03-04 RX ORDER — AMLODIPINE BESYLATE 5 MG/1
5 TABLET ORAL DAILY
Status: DISCONTINUED | OUTPATIENT
Start: 2024-03-04 | End: 2024-03-04

## 2024-03-04 RX ORDER — HYDRALAZINE HYDROCHLORIDE 20 MG/ML
5 INJECTION INTRAMUSCULAR; INTRAVENOUS ONCE
Status: COMPLETED | OUTPATIENT
Start: 2024-03-04 | End: 2024-03-04

## 2024-03-04 RX ORDER — ACETAMINOPHEN 325 MG/1
650 TABLET ORAL EVERY 6 HOURS PRN
Status: DISCONTINUED | OUTPATIENT
Start: 2024-03-04 | End: 2024-03-06 | Stop reason: HOSPADM

## 2024-03-04 RX ORDER — HYDRALAZINE HYDROCHLORIDE 20 MG/ML
20 INJECTION INTRAMUSCULAR; INTRAVENOUS EVERY 6 HOURS PRN
Status: DISCONTINUED | OUTPATIENT
Start: 2024-03-04 | End: 2024-03-05

## 2024-03-04 RX ORDER — AMLODIPINE BESYLATE 10 MG/1
10 TABLET ORAL DAILY
Status: DISCONTINUED | OUTPATIENT
Start: 2024-03-05 | End: 2024-03-06 | Stop reason: HOSPADM

## 2024-03-04 RX ORDER — AMLODIPINE BESYLATE 5 MG/1
5 TABLET ORAL ONCE
Status: COMPLETED | OUTPATIENT
Start: 2024-03-04 | End: 2024-03-04

## 2024-03-04 RX ORDER — AMOXICILLIN 500 MG/1
500 CAPSULE ORAL EVERY 8 HOURS
Status: DISCONTINUED | OUTPATIENT
Start: 2024-03-04 | End: 2024-03-06 | Stop reason: HOSPADM

## 2024-03-04 RX ORDER — QUETIAPINE FUMARATE 25 MG/1
25 TABLET, FILM COATED ORAL ONCE
Status: COMPLETED | OUTPATIENT
Start: 2024-03-04 | End: 2024-03-04

## 2024-03-04 RX ORDER — LATANOPROST 50 UG/ML
1 SOLUTION/ DROPS OPHTHALMIC EVERY EVENING
Status: DISCONTINUED | OUTPATIENT
Start: 2024-03-04 | End: 2024-03-06 | Stop reason: HOSPADM

## 2024-03-04 RX ORDER — TRAVOPROST OPHTHALMIC SOLUTION 0.04 MG/ML
1 SOLUTION OPHTHALMIC EVERY EVENING
Status: DISCONTINUED | OUTPATIENT
Start: 2024-03-04 | End: 2024-03-04

## 2024-03-04 RX ORDER — OXYMETAZOLINE HYDROCHLORIDE 0.05 G/100ML
2 SPRAY NASAL ONCE
Status: COMPLETED | OUTPATIENT
Start: 2024-03-04 | End: 2024-03-04

## 2024-03-04 RX ORDER — AMOXICILLIN 250 MG
2 CAPSULE ORAL EVERY EVENING
Status: DISCONTINUED | OUTPATIENT
Start: 2024-03-04 | End: 2024-03-06

## 2024-03-04 RX ORDER — LIDOCAINE HYDROCHLORIDE AND EPINEPHRINE 10; 10 MG/ML; UG/ML
10 INJECTION, SOLUTION INFILTRATION; PERINEURAL ONCE
Status: COMPLETED | OUTPATIENT
Start: 2024-03-04 | End: 2024-03-04

## 2024-03-04 RX ADMIN — LABETALOL HYDROCHLORIDE 10 MG: 5 INJECTION INTRAVENOUS at 14:59

## 2024-03-04 RX ADMIN — SERTRALINE 50 MG: 50 TABLET, FILM COATED ORAL at 11:21

## 2024-03-04 RX ADMIN — LEVOTHYROXINE SODIUM 75 MCG: 0.07 TABLET ORAL at 11:21

## 2024-03-04 RX ADMIN — LIDOCAINE HYDROCHLORIDE AND EPINEPHRINE 10 ML: 10; 10 INJECTION, SOLUTION INFILTRATION; PERINEURAL at 04:52

## 2024-03-04 RX ADMIN — QUETIAPINE FUMARATE 25 MG: 25 TABLET ORAL at 20:09

## 2024-03-04 RX ADMIN — HYDRALAZINE HYDROCHLORIDE 5 MG: 20 INJECTION, SOLUTION INTRAMUSCULAR; INTRAVENOUS at 07:32

## 2024-03-04 RX ADMIN — AMOXICILLIN 500 MG: 500 CAPSULE ORAL at 14:26

## 2024-03-04 RX ADMIN — OXYMETAZOLINE HCL 2 SPRAY: 0.05 SPRAY NASAL at 04:42

## 2024-03-04 RX ADMIN — TRANEXAMIC ACID 1000 MG: 100 INJECTION, SOLUTION INTRAVENOUS at 07:14

## 2024-03-04 RX ADMIN — AMOXICILLIN 500 MG: 500 CAPSULE ORAL at 21:58

## 2024-03-04 RX ADMIN — TRANEXAMIC ACID 300 MG: 100 INJECTION, SOLUTION INTRAVENOUS at 05:27

## 2024-03-04 RX ADMIN — AMLODIPINE BESYLATE 5 MG: 5 TABLET ORAL at 11:21

## 2024-03-04 RX ADMIN — LIDOCAINE HYDROCHLORIDE AND EPINEPHRINE 5 ML: 10; 10 INJECTION, SOLUTION INFILTRATION; PERINEURAL at 05:10

## 2024-03-04 RX ADMIN — LATANOPROST 1 DROP: 50 SOLUTION OPHTHALMIC at 17:56

## 2024-03-04 RX ADMIN — AMLODIPINE BESYLATE 5 MG: 5 TABLET ORAL at 05:38

## 2024-03-04 RX ADMIN — HALOPERIDOL LACTATE 2.5 MG: 5 INJECTION, SOLUTION INTRAMUSCULAR at 22:41

## 2024-03-04 RX ADMIN — DOCUSATE SODIUM 50 MG AND SENNOSIDES 8.6 MG 2 TABLET: 8.6; 5 TABLET, FILM COATED ORAL at 17:56

## 2024-03-04 ASSESSMENT — LIFESTYLE VARIABLES
ON A TYPICAL DAY WHEN YOU DRINK ALCOHOL HOW MANY DRINKS DO YOU HAVE: 0
HAVE YOU EVER FELT YOU SHOULD CUT DOWN ON YOUR DRINKING: NO
TOTAL SCORE: 0
EVER HAD A DRINK FIRST THING IN THE MORNING TO STEADY YOUR NERVES TO GET RID OF A HANGOVER: NO
HOW MANY TIMES IN THE PAST YEAR HAVE YOU HAD 5 OR MORE DRINKS IN A DAY: 0
ALCOHOL_USE: NO
CONSUMPTION TOTAL: NEGATIVE
AVERAGE NUMBER OF DAYS PER WEEK YOU HAVE A DRINK CONTAINING ALCOHOL: 0
TOTAL SCORE: 0
HAVE PEOPLE ANNOYED YOU BY CRITICIZING YOUR DRINKING: NO
EVER FELT BAD OR GUILTY ABOUT YOUR DRINKING: NO
TOTAL SCORE: 0
DOES PATIENT WANT TO STOP DRINKING: CANNOT ASSESS

## 2024-03-04 ASSESSMENT — FIBROSIS 4 INDEX
FIB4 SCORE: 2.07
FIB4 SCORE: 2.66
FIB4 SCORE: 2.66
FIB4 SCORE: 2.07

## 2024-03-04 ASSESSMENT — COPD QUESTIONNAIRES
HAVE YOU SMOKED AT LEAST 100 CIGARETTES IN YOUR ENTIRE LIFE: NO/DON'T KNOW
DURING THE PAST 4 WEEKS HOW MUCH DID YOU FEEL SHORT OF BREATH: SOME OF THE TIME
COPD SCREENING SCORE: 5
DO YOU EVER COUGH UP ANY MUCUS OR PHLEGM?: YES, A FEW DAYS A WEEK OR MONTH

## 2024-03-04 ASSESSMENT — PAIN DESCRIPTION - PAIN TYPE
TYPE: ACUTE PAIN
TYPE: ACUTE PAIN

## 2024-03-04 NOTE — ASSESSMENT & PLAN NOTE
Uncontrolled  Increase to Norvasc 10 mg daily  IV labetalol as needed    Overnight, hypotensive after sedatives.   Improve in AM  Monitor closely

## 2024-03-04 NOTE — PROGRESS NOTES
Pt up to room, Pt ambulated from [facility] to [facility] x 1 assist. Resting in bed comfortably, call light within reach

## 2024-03-04 NOTE — ED NOTES
Assumed pt care bedside report received. Right ac iv infiltrated catheter removed. IV placed to left arm.

## 2024-03-04 NOTE — ED TRIAGE NOTES
"Chief Complaint   Patient presents with    Epistaxis     X 2 days on and off      BIB EMS to [facility] from [facility] with complaint of nose bleed for 2 days, patient denies any trauma and denies use of blood thinner, patient was in ED for the same complaint twice yesterday being discharge after nasal packing. Patient removed the packing by own.     Patient Alert, respirations even and unlabored on room air.     Nasal clip applied.    Medications given en route:  none    BP (!) 178/70   Pulse (!) 57   Temp 36.8 °C (98.2 °F) (Temporal)   Resp 18   Ht 1.575 m (5' 2\")   Wt 56.7 kg (125 lb)   SpO2 97%   BMI 22.86 kg/m²     "

## 2024-03-04 NOTE — PROGRESS NOTES
4 Eyes Skin Assessment Completed by CHETAN Chan and CHETAN Begum.    Head WDL  Ears WDL  Nose WDL presents with epistaxis, no bleeding currently  Mouth WDL  Neck WDL  Breast/Chest Bruising  Shoulder Blades WDL  Spine WDL  (R) Arm/Elbow/Hand Bruising  (L) Arm/Elbow/Hand Bruising  Abdomen WDL  Groin WDL  Scrotum/Coccyx/Buttocks WDL  (R) Leg Bruising  (L) Leg Bruising  (R) Heel/Foot/Toe WDL  (L) Heel/Foot/Toe WDL          Devices In Places Blood Pressure Cuff and Pulse Ox      Interventions In Place Pillows and Low Air Loss Mattress    Possible Skin Injury No    Pictures Uploaded Into Epic N/A  Wound Consult Placed N/A  RN Wound Prevention Protocol Ordered No

## 2024-03-04 NOTE — ED PROVIDER NOTES
ED Provider Note    Scribed for Destiny Benavides M.D. by Marcia Corado. 3/4/2024, 4:17 AM.    Primary care provider: Mike Otero M.D.  Means of arrival: EMS  History obtained from: Nurse at bedside  History limited by: None    CHIEF COMPLAINT  Chief Complaint   Patient presents with    Epistaxis     X 2 days on and off          HPI/ROS  Gloria Patel is a 89 y.o. female who presents to the Emergency Department by EMS for a nose bleed onset this morning. Per EMS, the patient has been experiencing an intermittent nose bleeds over the last two days, with her last nose bleed being yesterday. She presented to the ED twice yesterday for her nose bleed and her nose was packed, patient does not recall having packing done yesterday.  Patient is unsure if it fell out or if she pulled it out.  Per daughter patient does have baseline dementia and forgets easily.  Patient's daughter reports that she did have packing in yesterday and must have removed at some point during the night.  She does live at a care facility due to her dementia.  She endorses the use of Advil for recent knee pain. No alleviating or exacerbating factors reported.     EXTERNAL RECORDS REVIEWED  Hospital Records Reviewed and show that the patient presented to the ED twice yesterday for a nose bleed and was packed.        LIMITATION TO HISTORY   Select: : None    OUTSIDE HISTORIAN(S):  EMS and daughter, see above      PAST MEDICAL HISTORY   has a past medical history of Arthritis, Diverticulitis, GERD (gastroesophageal reflux disease), Glaucoma, Heart burn, Hiatus hernia syndrome, Hypertension, Primary osteoarthritis of both shoulders (1/22/2019), and Unspecified urinary incontinence.    SURGICAL HISTORY   has a past surgical history that includes other (1945); other (1954); other orthopedic surgery; other orthopedic surgery (2000); cholecystectomy (1966); other abdominal surgery (1954); gyn surgery (1970); hip arthroplasty total (6/21/2011); us-needle  "core bx-breast panel; and orif, ankle (Right, 12/22/2021).    SOCIAL HISTORY  Social History     Tobacco Use    Smoking status: Never    Smokeless tobacco: Never   Vaping Use    Vaping Use: Never used   Substance Use Topics    Alcohol use: Yes     Alcohol/week: 0.0 oz     Comment: occassional wine    Drug use: No      Social History     Substance and Sexual Activity   Drug Use No       FAMILY HISTORY  Family History   Problem Relation Age of Onset    Diabetes Other     Heart Disease Other     Lung Disease Other     Cancer Sister        CURRENT MEDICATIONS  Home Medications    **Home medications have not yet been reviewed for this encounter**         ALLERGIES  Allergies   Allergen Reactions    Morphine Anaphylaxis     anaphylaxis    Cephalexin Rash     Full body    Codeine Vomiting and Nausea    Metronidazole Vomiting and Nausea    Sulfa Drugs Rash     Full body       PHYSICAL EXAM  VITAL SIGNS: BP (!) 178/70   Pulse (!) 57   Temp 36.8 °C (98.2 °F) (Temporal)   Resp 18   Ht 1.575 m (5' 2\")   Wt 56.7 kg (125 lb)   SpO2 97%   BMI 22.86 kg/m²   Vitals reviewed by myself.  Nursing note and vitals reviewed.  Constitutional: Well-developed and well-nourished. No acute distress.   HENT: Head is normocephalic and atraumatic. Bleeding from the left nare, bleeding noted in the posterior oropharynx as well  Eyes: extra-ocular movements intact  Cardiovascular: Bradycardic rate and regular rhythm. No murmur heard.  Pulmonary/Chest: Breath sounds normal. No wheezes or rales.   Abdominal: Soft and non-tender. No distention.    Musculoskeletal: Extremities exhibit normal range of motion without edema or tenderness.   Neurological: Awake and alert  Skin: Skin is warm and dry. No rash.     PROCEDURES  Epistaxis Procedure Note    Indication: Bleeding    Pre-medication: Afrin 0.05% nasal spray    Procedure: The patient was positioned appropriately and the nares were cleared as well as possible. The bleeding site was in the left " nare with diffuse oozing and tamponaded with an 8cm Merocel nasal tampon.     The patient tolerated the procedure well     Complications: Continued bleeding requiring repacking      COURSE & MEDICAL DECISION MAKING    ED Observation Status? No; Patient does not meet criteria for ED Observation.     INITIAL ASSESSMENT, ED COURSE AND PLAN    Patient is a 89-year-old female who presents for evaluation of nosebleed.  Differential diagnosis includes anterior epistaxis, posterior epistaxis, anemia.    On initial exam patient is well-appearing, vitals notable for hypertension.  She has not had her morning antihypertensives and is therefore given a dose of amlodipine.  Initially Afrin is sprayed in the emergency department and nose is clamped.  This did not resolve the bleeding and therefore discussed packing with patient and daughter.  They are amenable to this plan.  Lidocaine with epinephrine was aerosolized into the left nare and subsequently she was packed with 8 cm Merocel.    This was left in for approximately 30 minutes, patient continued to ooze through this and have bleeding into her posterior oropharynx and therefore this was removed and I attempted placement of 10 cm Merocel.  Unfortunately patient did not tolerate this placement well and there was 2 cm left sticking out of the nare.  I did leave this in for approximately 45 minutes.  Patient was not tolerating this well and continued to hack up and coughed up blood clots from her posterior oropharynx.  Therefore this packing was subsequently removed.  At this time IV was placed and patient was given intranasal and IV TXA as well as more lidocaine with epinephrine into the left nare.  Nose was then clamped.    After approximately 40 more minutes of clamping after instillation of TXA and lidocaine with epinephrine patient's bleeding improved however she continued to have scant oozing and therefore at this time elected to repack with 8cm Merocel.  Patient  tolerated this well and packing was able to be placed completely into the left nare.  She continued to have some scant oozing into the posterior oropharynx and therefore case was discussed with ENT Dr. Field.  She recommended controlling blood pressure better and if this did not resolve the bleeding to pack within Epistat.    Patient's blood pressure was around 160 systolic and therefore she was given hydralazine.  After receiving hydralazine and the TXA finished infusing through the IV patient's bleeding resolved.  She no longer was having oozing into the posterior oropharynx.  Labs returned and patient was quite anemic with a hemoglobin of 7.7.  At this time she is not actively bleeding and therefore I did not pack her with Epistat.  I will hospitalize her for ongoing hemoglobin monitoring and monitoring for bleeding.  If she rebleeds ENT will likely need to be reconsulted.  Case was discussed with hospitalist and she is hospitalized in guarded condition.       REASSESSMENTS   4:18 AM - Patient seen and examined at bedside. Discussed plan of care, including treating the patient for pain and stopping her bleed. Patient agrees to the plan of care.     4:30 AM - I spoke to the patient's daughter over the phone. She informed me that when the patient was discharged yesterday, she took the patient to her living facility and her packing was still in place. Daughter believes she pulled her packing out. I instructed her on my plan for treatment and the daughter is requesting I pack the patient's nose.     4:40 AM - I packed the patient's nose as detailed above.     4:55 AM - Patient was reevaluated at bedside. Her nose continues to bleed.    5:08 AM - Patient was reevaluated at bedside. Patient's daughter is now at bedside. I repacked the patient's nose with a larger Merocel nasal tampon.    5:15 AM - Her nose continues to bleed so I removed the packing and clamped the patient's nose to slow her bleed.     5:29 AM -  "Patient was reevaluated at bedside. The patient was medicated with TXA and I clamped her nose in order to slow her bleed. Daughter also informs me that she has not taken her hypertension medication so I will give her a dose.     5:54 AM - Patient was reevaluated at bedside. Patient explains that she feels like the bleed is going down the back of her throat. Daughter reports only a \"little bit of oozing\" down the front.     5:59 AM - I performed the epistaxis procedure as detailed above.     6:23 AM - Patient was reevaluated at bedside. She is still coughing up some blood and will require an IV and additional TXA.    6:45 AM - Patient was reevaluated at bedside.     7:17 AM - Patient was reevaluated at bedside. Patient does not feel like her bleed is slowing down and continues to cough up blood.     7:25 AM - Patient was reevaluated at bedside. I informed the patient that I am waiting for the ENT specialist to consult the patient.     7:31 AM - I discussed the patient's case and the above findings with Dr. Field (ENT) who advises me to put in an epistat.      7:44 AM - Patient was reevaluated at bedside. I informed her of my updated plan of care after my consultation with Dr. Field. Her blood pressure is improved at this time.  Systolic in the 130s    8:09 AM - Patient was reevaluated at bedside. I do not see anymore active bleeding in the posterior oropharynx.    8:36 AM - I discussed the patient's case and the above findings with Dr. Tejeda (Hospitalist) who agrees to evaluate the patient for hospitalization.     8:37 AM - Patient was reevaluated at bedside. Patient is feeling cold and requested an additional blanket. I updated her on my plan of care and informed her that her blood count is low and the patient is anemic due to her blood loss.  No active bleeding at this time.  Systolic in the 130s        DISPOSITION AND DISCUSSIONS  I have discussed management of the patient with the following physicians " and NIRMALA's:  Dr. Field (ENT), Dr. Tejeda (Hospitalist)     Discussion of management with other [facility] or appropriate source(s): None     Escalation of care considered, and ultimately not performed: see above    Barriers to care at this time, including but not limited to: none.     Decision tools and prescription drugs considered including, but not limited to: see above.    DISPOSITION:  Patient will be hospitalized by Dr. Tejeda  in guarded condition.    FINAL IMPRESSION  1. Epistaxis    2. Primary hypertension    3. Anemia, unspecified type    4.      Epistaxis procedure     Marcia GERBER (Scribe), am scribing for, and in the presence of, Destiny Benavides M.D..    Electronically signed by: Marcia Corado (Gael), 3/4/2024    Destiny GERBER M.D. personally performed the services described in this documentation, as scribed by Marcia Corado in my presence, and it is both accurate and complete.    The note accurately reflects work and decisions made by me.  Destiny Benavides M.D.  3/4/2024  9:05 AM

## 2024-03-04 NOTE — H&P
Hospital Medicine History & Physical Note    Date of Service  3/4/2024    Primary Care Physician  Mike Otero M.D.    Consultants  ENT    Specialist Names: Dr Acosta     Code Status  Full Code    Chief Complaint  Chief Complaint   Patient presents with    Epistaxis     X 2 days on and off          History of Presenting Illness  Gloria Patel is a 89 y.o. female with a past medical history of dementia, hypertension who presented 3/4/2024 with epistaxis for the past 2 days.  The patient presented to the ER twice yesterday for nosebleeds and was packed and discharged with oral antibiotics.  Patient returns today again with ongoing bleeding from her nose.  Her initial hemoglobin was noted to be low at 7.7.  In the ER she was noted to be hypertensive with a blood pressure of 177/74.  In the ER the patient was given Afrin nasal spray and her nose was clamped.  She was then given lidocaine with epinephrine and left nare packing was placed.  Patient had continued oozing of blood therefore IV and intranasal TXA was given.  Nose was clamped again and was repacked with Merisel 8 cm.  Case was discussed with ENT who recommended strict blood pressure control and if persistent epistaxis to place Epistat.  Patient was given IV hydralazine with improvement of her blood pressure and her bleeding then resolved.  Since patient does not have active bleeding at the time Epistat was not placed.  At this time there is no active bleeding.  Patient has had dried blood clot in the back of her oropharynx and nares.  Her blood pressure currently is elevated at 161/83.  Have ordered IV labetalol.  I have restarted her amlodipine.  If persistently elevated will consider adding losartan.  Will monitor hemoglobin every 8 hours and if less than 7 will transfuse with a unit of PRBC.  Patient has been advised to avoid aspirin and NSAIDs which may increase the risk of bleeding.  Patient denies any fevers, chills, chest pain or shortness of  breath at this time.    I discussed the plan of care with patient, family, bedside RN, and ERP .    Review of Systems  Review of Systems   HENT:          Epistaxis       Past Medical History   has a past medical history of Arthritis, Diverticulitis, GERD (gastroesophageal reflux disease), Glaucoma, Heart burn, Hiatus hernia syndrome, Hypertension, Primary osteoarthritis of both shoulders (1/22/2019), and Unspecified urinary incontinence.    Surgical History   has a past surgical history that includes other (1945); other (1954); other orthopedic surgery; other orthopedic surgery (2000); cholecystectomy (1966); other abdominal surgery (1954); gyn surgery (1970); hip arthroplasty total (6/21/2011); us-needle core bx-breast panel; and orif, ankle (Right, 12/22/2021).     Family History  family history includes Cancer in her sister; Diabetes in an other family member; Heart Disease in an other family member; Lung Disease in an other family member.   Family history reviewed with patient. There is no family history that is pertinent to the chief complaint.     Social History   reports that she has never smoked. She has never used smokeless tobacco. She reports current alcohol use. She reports that she does not use drugs.    Allergies  Allergies   Allergen Reactions    Morphine Anaphylaxis     anaphylaxis    Cephalexin Rash     Full body    Codeine Vomiting and Nausea    Metronidazole Vomiting and Nausea    Sulfa Drugs Rash     Full body       Medications  Prior to Admission Medications   Prescriptions Last Dose Informant Patient Reported? Taking?   Apoaequorin 10 MG Cap 3/3/2024 at unk  Yes Yes   Sig: Take 10 mg by mouth every day. Indications: memory loss   Cholecalciferol (VITAMIN D3) 1.25 MG (58859 UT) Tab 3/3/2024 at unk  Yes Yes   Sig: Take 1.25 mg by mouth every day. Indications: supplement   Famotidine-Ca Carb-Mag Hydrox -165 MG Chew Tab unk at unk  Yes No   Sig: Chew 1 Tablet 1 time a day as needed (for  indegestion). take if tums is not effective  Indications: Heartburn   Homeopathic Products (THERAWORX RELIEF) Foam unk at unk  Yes No   Sig: Apply 1 Application topically 4 times a day as needed (pain in shoulders). apply to both shoulders as needed for pain after showers  Indications: pain in shoulders   Ibuprofen-Acetaminophen 125-250 MG Tab 3/3/2024 at unk  Yes Yes   Sig: Take 2 Tablets by mouth 3 times a day as needed (pain). take up to 3 times a day  Indications: pain   Lidocaine (HM LIDOCAINE PATCH) 4 % Patch unk at unk  Yes No   Si Application by Percutaneous route 2 times a day. apply to painful areas 2 times a day as neede for moderate pain  Indications: Mild to Moderate Pain   Multiple Vitamins-Minerals (PRESERVISION AREDS 2 PO) 3/3/2024 at unk  Yes Yes   Sig: Take 1 Tablet by mouth every day. Indications: supplement   Soft Lens Products (FRANCESCA SALINE) Solution 3/3/2024 at unk  Yes Yes   Sig: Administer 1 Drop into both eyes every day. Indications: supplement   amLODIPine (NORVASC) 5 MG Tab 3/3/2024 at unk  No Yes   Sig: TAKE 1 TABLET BY MOUTH EVERY DAY   amoxicillin (AMOXIL) 500 MG Cap Completed at 3/3/24  No No   Sig: Take 1 Capsule by mouth 3 times a day.   amoxicillin (AMOXIL) 500 MG Cap NEW RX at NEW RX  No Yes   Sig: Take 1 Capsule by mouth 3 times a day for 2 days.   calcium carbonate (TUMS CHEWY BITES) 750 MG chewable tablet unk at unk  Yes No   Sig: Chew 1 Tablet 1 time a day as needed (for stomach upset). Indications: Heartburn   diclofenac sodium (VOLTAREN) 1 % Gel 3/3/2024 at unk  Yes Yes   Sig: Apply 2 g topically 2 times a day as needed (for mild to moderate pain). Indications: Joint Damage causing Pain and Loss of Function   dorzolamide-timolol (COSOPT) 2-0.5 % Solution 3/3/2024 at unk  Yes Yes   Sig: Administer 1 Drop into both eyes every morning. Indications: Glaucoma   levothyroxine (SYNTHROID) 75 MCG Tab 3/3/2024 at unk  No Yes   Sig: TAKE 1 TABLET BY MOUTH EVERY DAY   lisinopril  (PRINIVIL) 20 MG Tab 3/3/2024 at [facility]  No Yes   Sig: TAKE 1/2 TABLET BY MOUTH TWICE A DAY   Patient taking differently: Take 10 mg by mouth 2 times a day. TAKE 1/2 TABLET BY MOUTH TWICE A DAY  Indications: High Blood Pressure Disorder   meclizine (ANTIVERT) 25 MG Tab unk at k  No No   Sig: Take 1 Tablet by mouth 3 times a day as needed (for dizziness). Indications: Sensation of Spinning or Whirling   ondansetron (ZOFRAN ODT) 4 MG TABLET DISPERSIBLE unk at [facility]  No No   Sig: Take 1 Tablet by mouth every 8 hours as needed for Nausea.   polyethylene glycol 3350 (MIRALAX) Powder 3/3/2024 at [facility] Patient Yes Yes   Sig: Take 17 g by mouth every day. Indications: Constipation   sertraline (ZOLOFT) 50 MG Tab 3/3/2024 at [facility]  No Yes   Sig: TAKE 1 TABLET BY MOUTH EVERY DAY   travoprost (TRAVATAN Z) 0.004 % Solution 3/3/2024 at [facility] Patient Yes Yes   Sig: Administer 1 Drop into both eyes every evening. Indications: Wide-Angle Glaucoma      Facility-Administered Medications: None       Physical Exam  Temp:  [36.7 °C (98.1 °F)-36.8 °C (98.2 °F)] 36.7 °C (98.1 °F)  Pulse:  [54-77] 75  Resp:  [16-18] 16  BP: (135-199)/(53-83) 161/83  SpO2:  [96 %-98 %] 97 %  Blood Pressure : (!) 161/83   Temperature: 36.7 °C (98.1 °F)   Pulse: 75   Respiration: 16   Pulse Oximetry: 97 %       Physical Exam  Vitals and nursing note reviewed.   Constitutional:       General: She is not in acute distress.  HENT:      Head: Normocephalic.      Nose:      Comments: Nasal packing in left nare with dried blood noted in both nares      Mouth/Throat:      Mouth: Mucous membranes are moist.   Eyes:      Pupils: Pupils are equal, round, and reactive to light.   Cardiovascular:      Rate and Rhythm: Normal rate and regular rhythm.      Pulses: Normal pulses.      Heart sounds: Normal heart sounds.   Pulmonary:      Effort: Pulmonary effort is normal.      Breath sounds: Normal breath sounds.   Abdominal:      Palpations: Abdomen is soft.      Tenderness: There  "is no abdominal tenderness.   Musculoskeletal:         General: No swelling.      Cervical back: Neck supple.   Skin:     General: Skin is warm.      Coloration: Skin is not jaundiced.   Neurological:      General: No focal deficit present.      Mental Status: She is alert and oriented to person, place, and time.   Psychiatric:         Mood and Affect: Mood normal.         Behavior: Behavior normal.         Laboratory:  Recent Labs     03/04/24  0645   WBC 3.5*   RBC 2.62*   HEMOGLOBIN 7.7*   HEMATOCRIT 24.0*   MCV 91.6   MCH 29.4   MCHC 32.1*   RDW 59.3*   PLATELETCT 230   MPV 9.1         No results for input(s): \"ALTSGPT\", \"ASTSGOT\", \"ALKPHOSPHAT\", \"TBILIRUBIN\", \"DBILIRUBIN\", \"GAMMAGT\", \"AMYLASE\", \"LIPASE\", \"ALB\", \"PREALBUMIN\", \"GLUCOSE\" in the last 72 hours.      No results for input(s): \"NTPROBNP\" in the last 72 hours.      No results for input(s): \"TROPONINT\" in the last 72 hours.    Imaging:  No orders to display         Assessment/Plan:  Justification for Admission Status  I anticipate this patient is appropriate for observation status at this time because epistaxis and anemia    Patient will need a Med/Surg bed on MEDICAL service .  The need is secondary to epistaxis and anemia.    * Epistaxis- (present on admission)  Assessment & Plan  Status post Afrin nasal spray, nasal clamping, lidocaine with epinephrine and left nare packing, IV TXA and nasal packing  ENT recommended strict BP control and if ongoing bleeding place Epistat  If patient has ongoing bleeding we will consider epi stat placement and reconsult ENT  Monitor hemoglobin every 8 hours  Amoxicillin prophylaxis will nasal packing in place      Dementia (HCC)- (present on admission)  Assessment & Plan  frequent reorientation  avoid sedatives  watch for constipation            Advance care planning- (present on admission)  Assessment & Plan  I discussed advance care planning for at least 16 minutes with the patient and daughter, including her " advanced age, dementia with guarded prognosis, plan of care. The patient has opted Full Code. Time spent is exclusive of evaluation and management or other separately billable procedures.     HTN (hypertension)- (present on admission)  Assessment & Plan  Uncontrolled  Increase to Norvasc 10 mg daily  IV labetalol as needed    GERD (gastroesophageal reflux disease)- (present on admission)  Assessment & Plan  Continue famotidine    Hypothyroid- (present on admission)  Assessment & Plan  Continue Synthroid        VTE prophylaxis: SCDs/TEDs

## 2024-03-04 NOTE — ASSESSMENT & PLAN NOTE
Frequent reorientation  Avoid sedatives, avoid anti-cholinergics    Nonpharmacologic management of delirium  -Re-orient and re-direct patient (time/place/situation)  -Mobilize early and promote daytime activity (Lights ON and blinds OPEN during the day, no naps after 4 PM)  -Promote night time sleep (Lights OFF, TV OFF, cluster nursing care to minimize awakenings, reduce noise, warm blankets and document hours of sleep)  -Manage toileting and provide bowel care (monitor for constipation and/or urinary retention).   -Remove lines/tubing that is not needed  -Educate and involve family (encourage family to be at bedside during visiting hours, provide delirium education and request family bring in familiar objects from home).

## 2024-03-04 NOTE — ED NOTES
"Patient given medication as per MAR, education given, patient verbalize understanding.  BP (!) 173/73   Pulse (!) 57   Temp 36.8 °C (98.2 °F) (Temporal)   Resp 17   Ht 1.575 m (5' 2\")   Wt 56.7 kg (125 lb)   SpO2 96%   BMI 22.86 kg/m²     "

## 2024-03-04 NOTE — ED NOTES
Puriwick placed, patient stated she is uncomfortable, removed on patient request, Patient was offered with bed pan, patient refused, will continue to assess patient needs.

## 2024-03-04 NOTE — ASSESSMENT & PLAN NOTE
Status post Afrin nasal spray, nasal clamping, lidocaine with epinephrine and left nare packing, IV TXA and nasal packing  ENT recommended strict BP control and if ongoing bleeding, place Epistat  Monitor hemoglobin every 8 hours  Amoxicillin prophylaxis will nasal packing in place      ENT Dr. Field consult appreciated - continue packing for at least 72 hours and follow up outpatient on Thursday 3/7.

## 2024-03-05 ENCOUNTER — APPOINTMENT (OUTPATIENT)
Dept: RADIOLOGY | Facility: MEDICAL CENTER | Age: 89
DRG: 151 | End: 2024-03-05
Payer: MEDICARE

## 2024-03-05 ENCOUNTER — APPOINTMENT (OUTPATIENT)
Dept: INTERNAL MEDICINE | Facility: IMAGING CENTER | Age: 89
End: 2024-03-05
Payer: MEDICARE

## 2024-03-05 LAB
ABO GROUP BLD: NORMAL
ALBUMIN SERPL BCP-MCNC: 3.3 G/DL (ref 3.2–4.9)
ALBUMIN/GLOB SERPL: 1.1 G/DL
ALP SERPL-CCNC: 74 U/L (ref 30–99)
ALT SERPL-CCNC: 6 U/L (ref 2–50)
ANION GAP SERPL CALC-SCNC: 16 MMOL/L (ref 7–16)
ANISOCYTOSIS BLD QL SMEAR: ABNORMAL
AST SERPL-CCNC: 18 U/L (ref 12–45)
BARCODED ABORH UBTYP: 1700
BARCODED PRD CODE UBPRD: NORMAL
BARCODED UNIT NUM UBUNT: NORMAL
BASE EXCESS BLDA CALC-SCNC: -7 MMOL/L (ref -4–3)
BASOPHILS # BLD AUTO: 0 % (ref 0–1.8)
BASOPHILS # BLD: 0 K/UL (ref 0–0.12)
BILIRUB SERPL-MCNC: 0.4 MG/DL (ref 0.1–1.5)
BLD GP AB SCN SERPL QL: NORMAL
BODY TEMPERATURE: 37.5 CENTIGRADE
BUN SERPL-MCNC: 23 MG/DL (ref 8–22)
CALCIUM ALBUM COR SERPL-MCNC: 8 MG/DL (ref 8.5–10.5)
CALCIUM SERPL-MCNC: 7.4 MG/DL (ref 8.5–10.5)
CHLORIDE SERPL-SCNC: 102 MMOL/L (ref 96–112)
CO2 SERPL-SCNC: 13 MMOL/L (ref 20–33)
COMPONENT R 8504R: NORMAL
CREAT SERPL-MCNC: 0.64 MG/DL (ref 0.5–1.4)
DACRYOCYTES BLD QL SMEAR: NORMAL
DOHLE BOD BLD QL SMEAR: NORMAL
EKG IMPRESSION: NORMAL
EOSINOPHIL # BLD AUTO: 0 K/UL (ref 0–0.51)
EOSINOPHIL NFR BLD: 0 % (ref 0–6.9)
ERYTHROCYTE [DISTWIDTH] IN BLOOD BY AUTOMATED COUNT: 59.2 FL (ref 35.9–50)
GFR SERPLBLD CREATININE-BSD FMLA CKD-EPI: 79 ML/MIN/1.73 M 2
GLOBULIN SER CALC-MCNC: 2.9 G/DL (ref 1.9–3.5)
GLUCOSE BLD STRIP.AUTO-MCNC: 135 MG/DL (ref 65–99)
GLUCOSE SERPL-MCNC: 134 MG/DL (ref 65–99)
HCO3 BLDA-SCNC: 16 MMOL/L (ref 17–25)
HCT VFR BLD AUTO: 21 % (ref 37–47)
HCT VFR BLD AUTO: 28.6 % (ref 37–47)
HGB BLD-MCNC: 6.6 G/DL (ref 12–16)
HGB BLD-MCNC: 9.3 G/DL (ref 12–16)
INHALED O2 FLOW RATE: ABNORMAL L/MIN
LACTATE SERPL-SCNC: 1.1 MMOL/L (ref 0.5–2)
LACTATE SERPL-SCNC: 2 MMOL/L (ref 0.5–2)
LYMPHOCYTES # BLD AUTO: 1.93 K/UL (ref 1–4.8)
LYMPHOCYTES NFR BLD: 33.9 % (ref 22–41)
MANUAL DIFF BLD: NORMAL
MCH RBC QN AUTO: 28.7 PG (ref 27–33)
MCHC RBC AUTO-ENTMCNC: 31.4 G/DL (ref 32.2–35.5)
MCV RBC AUTO: 91.3 FL (ref 81.4–97.8)
MICROCYTES BLD QL SMEAR: ABNORMAL
MONOCYTES # BLD AUTO: 0.1 K/UL (ref 0–0.85)
MONOCYTES NFR BLD AUTO: 1.7 % (ref 0–13.4)
MORPHOLOGY BLD-IMP: NORMAL
NEUTROPHILS # BLD AUTO: 3.67 K/UL (ref 1.82–7.42)
NEUTROPHILS NFR BLD: 64.4 % (ref 44–72)
NRBC # BLD AUTO: 0 K/UL
NRBC BLD-RTO: 0 /100 WBC (ref 0–0.2)
NT-PROBNP SERPL IA-MCNC: 1430 PG/ML (ref 0–125)
OVALOCYTES BLD QL SMEAR: NORMAL
PCO2 BLDA: 21.8 MMHG (ref 26–37)
PCO2 TEMP ADJ BLDA: 22.3 MMHG (ref 26–37)
PH BLDA: 7.47 [PH] (ref 7.4–7.5)
PH TEMP ADJ BLDA: 7.46 [PH] (ref 7.4–7.5)
PLATELET # BLD AUTO: 235 K/UL (ref 164–446)
PLATELET BLD QL SMEAR: NORMAL
PMV BLD AUTO: 9.6 FL (ref 9–12.9)
PO2 BLDA: 75.8 MMHG (ref 64–87)
PO2 TEMP ADJ BLDA: 78.4 MMHG (ref 64–87)
POIKILOCYTOSIS BLD QL SMEAR: NORMAL
POTASSIUM SERPL-SCNC: 3.4 MMOL/L (ref 3.6–5.5)
PROCALCITONIN SERPL-MCNC: 0.17 NG/ML
PRODUCT TYPE UPROD: NORMAL
PROT SERPL-MCNC: 6.2 G/DL (ref 6–8.2)
RBC # BLD AUTO: 2.3 M/UL (ref 4.2–5.4)
RBC BLD AUTO: PRESENT
RH BLD: NORMAL
SAO2 % BLDA: 95.2 % (ref 93–99)
SODIUM SERPL-SCNC: 131 MMOL/L (ref 135–145)
UNIT STATUS USTAT: NORMAL
WBC # BLD AUTO: 5.7 K/UL (ref 4.8–10.8)

## 2024-03-05 PROCEDURE — 83605 ASSAY OF LACTIC ACID: CPT | Mod: 91

## 2024-03-05 PROCEDURE — 84145 PROCALCITONIN (PCT): CPT

## 2024-03-05 PROCEDURE — 700111 HCHG RX REV CODE 636 W/ 250 OVERRIDE (IP): Performed by: INTERNAL MEDICINE

## 2024-03-05 PROCEDURE — 30233N1 TRANSFUSION OF NONAUTOLOGOUS RED BLOOD CELLS INTO PERIPHERAL VEIN, PERCUTANEOUS APPROACH: ICD-10-PCS | Performed by: STUDENT IN AN ORGANIZED HEALTH CARE EDUCATION/TRAINING PROGRAM

## 2024-03-05 PROCEDURE — 85014 HEMATOCRIT: CPT

## 2024-03-05 PROCEDURE — 99233 SBSQ HOSP IP/OBS HIGH 50: CPT | Performed by: STUDENT IN AN ORGANIZED HEALTH CARE EDUCATION/TRAINING PROGRAM

## 2024-03-05 PROCEDURE — 85018 HEMOGLOBIN: CPT

## 2024-03-05 PROCEDURE — 86923 COMPATIBILITY TEST ELECTRIC: CPT

## 2024-03-05 PROCEDURE — 770006 HCHG ROOM/CARE - MED/SURG/GYN SEMI*

## 2024-03-05 PROCEDURE — 93010 ELECTROCARDIOGRAM REPORT: CPT | Performed by: INTERNAL MEDICINE

## 2024-03-05 PROCEDURE — 71045 X-RAY EXAM CHEST 1 VIEW: CPT

## 2024-03-05 PROCEDURE — 96376 TX/PRO/DX INJ SAME DRUG ADON: CPT

## 2024-03-05 PROCEDURE — 36430 TRANSFUSION BLD/BLD COMPNT: CPT

## 2024-03-05 PROCEDURE — 36415 COLL VENOUS BLD VENIPUNCTURE: CPT

## 2024-03-05 PROCEDURE — 700102 HCHG RX REV CODE 250 W/ 637 OVERRIDE(OP): Mod: JZ | Performed by: STUDENT IN AN ORGANIZED HEALTH CARE EDUCATION/TRAINING PROGRAM

## 2024-03-05 PROCEDURE — 665998 HH PPS REVENUE CREDIT

## 2024-03-05 PROCEDURE — 93005 ELECTROCARDIOGRAM TRACING: CPT

## 2024-03-05 PROCEDURE — P9016 RBC LEUKOCYTES REDUCED: HCPCS

## 2024-03-05 PROCEDURE — 83880 ASSAY OF NATRIURETIC PEPTIDE: CPT

## 2024-03-05 PROCEDURE — 82962 GLUCOSE BLOOD TEST: CPT

## 2024-03-05 PROCEDURE — 85027 COMPLETE CBC AUTOMATED: CPT

## 2024-03-05 PROCEDURE — 96375 TX/PRO/DX INJ NEW DRUG ADDON: CPT

## 2024-03-05 PROCEDURE — 86850 RBC ANTIBODY SCREEN: CPT

## 2024-03-05 PROCEDURE — 80053 COMPREHEN METABOLIC PANEL: CPT

## 2024-03-05 PROCEDURE — 86901 BLOOD TYPING SEROLOGIC RH(D): CPT

## 2024-03-05 PROCEDURE — A9270 NON-COVERED ITEM OR SERVICE: HCPCS | Performed by: INTERNAL MEDICINE

## 2024-03-05 PROCEDURE — 665999 HH PPS REVENUE DEBIT

## 2024-03-05 PROCEDURE — 700105 HCHG RX REV CODE 258: Performed by: STUDENT IN AN ORGANIZED HEALTH CARE EDUCATION/TRAINING PROGRAM

## 2024-03-05 PROCEDURE — 85007 BL SMEAR W/DIFF WBC COUNT: CPT

## 2024-03-05 PROCEDURE — 700102 HCHG RX REV CODE 250 W/ 637 OVERRIDE(OP): Performed by: INTERNAL MEDICINE

## 2024-03-05 PROCEDURE — 700111 HCHG RX REV CODE 636 W/ 250 OVERRIDE (IP)

## 2024-03-05 PROCEDURE — A9270 NON-COVERED ITEM OR SERVICE: HCPCS | Mod: JZ | Performed by: STUDENT IN AN ORGANIZED HEALTH CARE EDUCATION/TRAINING PROGRAM

## 2024-03-05 PROCEDURE — 82803 BLOOD GASES ANY COMBINATION: CPT

## 2024-03-05 PROCEDURE — 86900 BLOOD TYPING SEROLOGIC ABO: CPT

## 2024-03-05 PROCEDURE — 92610 EVALUATE SWALLOWING FUNCTION: CPT

## 2024-03-05 RX ORDER — ZIPRASIDONE MESYLATE 20 MG/ML
10 INJECTION, POWDER, LYOPHILIZED, FOR SOLUTION INTRAMUSCULAR ONCE
Status: DISCONTINUED | OUTPATIENT
Start: 2024-03-05 | End: 2024-03-05

## 2024-03-05 RX ORDER — SODIUM CHLORIDE 9 MG/ML
INJECTION, SOLUTION INTRAVENOUS CONTINUOUS
Status: DISCONTINUED | OUTPATIENT
Start: 2024-03-05 | End: 2024-03-06

## 2024-03-05 RX ORDER — POTASSIUM CHLORIDE 20 MEQ/1
40 TABLET, EXTENDED RELEASE ORAL ONCE
Status: COMPLETED | OUTPATIENT
Start: 2024-03-05 | End: 2024-03-05

## 2024-03-05 RX ORDER — DIPHENHYDRAMINE HYDROCHLORIDE 50 MG/ML
25 INJECTION INTRAMUSCULAR; INTRAVENOUS ONCE
Status: COMPLETED | OUTPATIENT
Start: 2024-03-05 | End: 2024-03-05

## 2024-03-05 RX ORDER — SODIUM CHLORIDE 9 MG/ML
INJECTION, SOLUTION INTRAVENOUS CONTINUOUS
Status: DISCONTINUED | OUTPATIENT
Start: 2024-03-05 | End: 2024-03-05

## 2024-03-05 RX ORDER — HALOPERIDOL 5 MG/ML
2.5 INJECTION INTRAMUSCULAR ONCE
Status: COMPLETED | OUTPATIENT
Start: 2024-03-05 | End: 2024-03-05

## 2024-03-05 RX ORDER — CHOLECALCIFEROL (VITAMIN D3) 125 MCG
5 CAPSULE ORAL NIGHTLY
Status: DISCONTINUED | OUTPATIENT
Start: 2024-03-05 | End: 2024-03-06 | Stop reason: HOSPADM

## 2024-03-05 RX ADMIN — SODIUM CHLORIDE: 9 INJECTION, SOLUTION INTRAVENOUS at 16:48

## 2024-03-05 RX ADMIN — LEVOTHYROXINE SODIUM 75 MCG: 0.07 TABLET ORAL at 09:27

## 2024-03-05 RX ADMIN — AMOXICILLIN 500 MG: 500 CAPSULE ORAL at 16:34

## 2024-03-05 RX ADMIN — HALOPERIDOL LACTATE 2.5 MG: 5 INJECTION, SOLUTION INTRAMUSCULAR at 00:54

## 2024-03-05 RX ADMIN — Medication 5 MG: at 20:09

## 2024-03-05 RX ADMIN — SERTRALINE 50 MG: 50 TABLET, FILM COATED ORAL at 09:27

## 2024-03-05 RX ADMIN — HYDRALAZINE HYDROCHLORIDE 20 MG: 20 INJECTION, SOLUTION INTRAMUSCULAR; INTRAVENOUS at 00:20

## 2024-03-05 RX ADMIN — POTASSIUM CHLORIDE 40 MEQ: 1500 TABLET, EXTENDED RELEASE ORAL at 09:27

## 2024-03-05 RX ADMIN — LATANOPROST 1 DROP: 50 SOLUTION OPHTHALMIC at 16:34

## 2024-03-05 RX ADMIN — AMOXICILLIN 500 MG: 500 CAPSULE ORAL at 09:33

## 2024-03-05 RX ADMIN — AMOXICILLIN 500 MG: 500 CAPSULE ORAL at 21:15

## 2024-03-05 RX ADMIN — DIPHENHYDRAMINE HYDROCHLORIDE 25 MG: 50 INJECTION, SOLUTION INTRAMUSCULAR; INTRAVENOUS at 00:54

## 2024-03-05 RX ADMIN — AMLODIPINE BESYLATE 10 MG: 10 TABLET ORAL at 09:26

## 2024-03-05 ASSESSMENT — PAIN DESCRIPTION - PAIN TYPE
TYPE: ACUTE PAIN
TYPE: ACUTE PAIN

## 2024-03-05 ASSESSMENT — ENCOUNTER SYMPTOMS: SHORTNESS OF BREATH: 0

## 2024-03-05 NOTE — PROGRESS NOTES
Pt awake, A&O to self still. Calls and yells out repeatedly. Is very restless and agitated again. Redirection, distraction, fluids, and all other interventions ineffective. Unfortunatly, bilat soft wrist restraints remain on.    Otherwise, BP normalized without any further intervention.

## 2024-03-05 NOTE — PROGRESS NOTES
Family member has been at bedside since start of shift, constantly needing to redirect pt from pulling out her nasal packing and IV. At this time, prior to family member leaving, bilat mitts placed on pt. Tele sitter set up.

## 2024-03-05 NOTE — CARE PLAN
The patient is Stable - Low risk of patient condition declining or worsening    Shift Goals  Clinical Goals: nasal packing, BP management  Patient Goals: rest, oral suctioning  Family Goals: comfort, rest    Progress made toward(s) clinical / shift goals:  Patient understands purpose of hospitalization and treatment, uses call light appropriately    Problem: Knowledge Deficit - Standard  Goal: Patient and family/care givers will demonstrate understanding of plan of care, disease process/condition, diagnostic tests and medications  Outcome: Progressing     Problem: Fall Risk  Goal: Patient will remain free from falls  Outcome: Progressing        Patient is not progressing towards the following goals:

## 2024-03-05 NOTE — PROGRESS NOTES
Pt continued to be very agitated, attempting to bite staff and pulling at bilat soft wrist restraints. PRN medication ordered and administered. Pt also hypertensive and PRN medications administered. Pt BP then soft and RRT called. 250mL bolus given which pt responded well too. BP normalized and pt drowsy.

## 2024-03-05 NOTE — DISCHARGE PLANNING
Care Transition Team Assessment    Spoke with daughter Atiya at bedside. Patient lives at [facility] room 112. PCP Mike Otero. Has [facility]. Called and verified HHC with K[facility] @ [facility]. Uses [facility] on [address] foe meds. Has walker and W/C to ambulate. Walks to cafeteria for all meals. Anticipate return to [facility] when medically cleared. Spoke with daughter regarding HHC  and daughter signed HHC for [facility] as patient is on service at present time. Choice form faxed to Claudy PIERRE. Message sent to [facility] to update. Choice form to be scanned to media tab. Daughter will be ride at D/C.     Information Source  Orientation Level: Oriented to place, Oriented to person  Information Given By: Relative  Informant's Name: Atiya Urbina Daughter    Readmission Evaluation  Is this a readmission?: No    Interdisciplinary Discharge Planning  Primary Care Physician: Mike Otero  Lives with - Patient's Self Care Capacity: Alone and Able to Care For Self ([facility])  Patient or legal guardian wants to designate a caregiver: No  Caregiver name: Atiya Urbina  Support Systems: Family Member(s)  Housing / Facility: Assisted Living Residence  Name of Care Facility: [facility]  Do You Take your Prescribed Medications Regularly: Yes  Able to Return to Previous ADL's: Other (Pending Medical clearence)  Mobility Issues: Yes  Prior Services: Skilled Home Health Services  Patient Prefers to be Discharged to:: Home with [facility]  Assistance Needed: Yes  Durable Medical Equipment: Walker, Other - Specify (Wheelchair)    Discharge Preparedness  What are your discharge supports?: Child  Prior Functional Level: Uses Walker, Uses Wheelchair    Functional Assesment  Prior Functional Level: Uses Walker, Uses Wheelchair    Finances  Prescription Coverage: Yes    Discharge Risks or Barriers  Patient risk factors: Vulnerable adult    Anticipated Discharge Information  Discharge Disposition: D/T to home under [facility] care in anticipation  of covered skilled care (06)  Discharge Contact Phone Number: [phone]

## 2024-03-05 NOTE — THERAPY
Speech Language Pathology   Clinical Swallow Evaluation     Patient Name: Gloria Patel  AGE:  89 y.o., SEX:  female  Medical Record #: [id number]  Date of Service: 3/5/2024      History of Present Illness  Epistaxis x2 days. Rapid early this AM r/t hypotension. Pt had been given meds for agitation and b/p.   Per MD notes- In the ER the patient was given Afrin nasal spray and her nose was clamped.  She was then given lidocaine with epinephrine and left nare packing was placed.  Patient had continued oozing of blood therefore IV and intranasal TXA was given.  Nose was clamped again and was repacked with Merisel 8 cm.  Case was discussed with ENT who recommended strict blood pressure control and if persistent epistaxis to place Epistat.  Patient was given IV hydralazine with improvement of her blood pressure and her bleeding then resolved.  Since patient does not have active bleeding at the time Epistat was not placed.  At this time there is no active bleeding.  Patient has had dried blood clot in the back of her oropharynx and nares.    Chest 3/5-1.  Left basilar atelectasis and/or consolidation. Underlying infection is possible.  2.  Stable enlargement of the cardiomediastinal silhouette.      PMH-dementia, HTN, arthritis, GERD, hiatal hernia syndrome. SEE EMR for complete hist. No prior SLP per EMR.     General Information:  Vitals  O2 Delivery Device: None - Room Air  Level of Consciousness: Drowsy  Patient Behaviors: Confused (Requires repetiton for simple directives.) Hearing aides are not with pt per dgtr. Pt is [place].   Orientation: Self     Prior Living Situation & Level of Function:        Communication: hist of dementia. Able to communicate wants and needs.  Swallowing: WNL       Oral Mechanism Evaluation:  Dentition: Good   Facial Symmetry: Equal        Labial Observations: WFL   Lingual Observations: Midline  Motor Speech: Mild slowness probably r/t sleepiness            Laryngeal Function:  Secretion  "Management: Expectoration of secretions  Voice Quality: WFL        Cough: Perceptually WNL         Subjective  \"I need to spit.\"      Assessment  Current Method of Nutrition: NPO until cleared by speech pathology  Positioning: Burkett's (60-90 degrees)  Bolus Administration: SLP    O2 Delivery Device: None - Room Air  Factor(s) Affecting Performance: Impaired mental status  Tracheostomy : No     Swallowing Trials:  Swallowing Trials  Ice: WFL  Thin Liquid (TN0): Impaired  Mildly Thick Liquid (MT2): Not tested  Liquidised (LQ3): Impaired      Comments: Xerostomia with no marked dried blood visualized in the oral cavity. Per pt's dgtr, pt at \"40% of her normal\" when asked to rate in percentage. Pt closing eyes when not stimulated Pt prefers to spit, expectorate, small amount of thin mucous and this is her baseline per pt's dgtr, Rosa. Pt complains of globus with applesauce. Two coughs post TNO by straw and cup. Pt with coughing on TNO broth and water earlier pt staff and dgtr.       Clinical Impressions  Pt demonstrating s/s of dysphagia that are acute. Also, possible chronic issues r/t frequent expectoration of mucous per pt's dgtr. Pt is also sleepy and received sedation meds last HS. Query possible posterior oral cavity and pharyngeal irritation with pt's dgtr and RN reporting large blood clots removed from pt's oral cavity on Monday.   NPO is recommended for nutrition and meds. Swallow strategy sign provided for ice and nurs to pass off to RN on May 6. Ice with staff as tolerated. SLP to see in the AM.   Recommendations  Diet Consistency: NPO, single ice chips with staff,  Instrumentation:  (dependent on pt status. No FEES r/t significant epistaxis.)  Medication: Non Oral  Supervision:  (1-1 staff superv for single ice chips.)  Positioning: Fully upright and midline during oral intake  Risk Management :  (single ice chips only with slow rate.)  Oral Care: Q4h         SLP Treatment Plan  Treatment Plan: " Dysphagia Treatment  SLP Frequency: 3x Per Week  Estimated Duration: Until Therapy Goals Met      Anticipated Discharge Needs  Discharge Recommendations:  (dependent on pt status at d/c)            Patient / Family Goals  Patient / Family Goal #1: per dgtr-to eat and drink.  Goal #1 Outcome: Goal not met  Short Term Goals  Short Term Goal # 1: Pt will consume single ice chips with 1-1 superv by staff without s/s of difficulty.  Goal Outcome # 1: Goal not met      Laxmi Evangelista, SLP

## 2024-03-05 NOTE — PROGRESS NOTES
NOC HOSPITALIST CROSS COVER    Rapid response called for hypotension.  Upon my arrival patient's blood pressure was 78/41.  She had received hydralazine prior to this as well as IV Haldol and Benadryl for significant agitation.  Suspect that this commendation medications contributed to hypotension.  She received 250 mL bolus and improved significantly.  She was drowsy however did open her eyes when spoken to and was able to follow all commands with no focal deficits.  Labs during rapid response notable for anemia with a hemoglobin of 6.6, 1 unit PRBC ordered and consent was received from next of kin by nursing staff.        A/P:  # Hypotension  -Patient received hydralazine, Benadryl, Haldol.  Hydralazine discontinued  -Blood pressure improved with IV fluid bolus.  -Anemia also likely contributed, hemoglobin 6.61 unit PRBC ordered.        -----------------------------------------------------------------------------------------------------------    Electronically signed by:  JENNI Benavides PA-C  Hospitalist Services

## 2024-03-05 NOTE — CARE PLAN
The patient is Watcher - Medium risk of patient condition declining or worsening    Shift Goals  Clinical Goals: Nasal packing, BP management  Patient Goals: rest  Family Goals: to try and get pt to stop trying to pull out nasal packing      Problem: Knowledge Deficit - Standard  Goal: Patient and family/care givers will demonstrate understanding of plan of care, disease process/condition, diagnostic tests and medications  Outcome: Not Progressing     Problem: Safety - Medical Restraint  Goal: Free from restraint(s) (Restraint for Interference with Medical Device)  Outcome: Not Met     POC reviewed with pt, no evidence of learning. Monitoring pt orientation status, BP, and labs. Safety maintained.

## 2024-03-05 NOTE — PROGRESS NOTES
Report received from night shift RN. Patient A&O4, in bed resting comfortably. VSS, denies pain, bed in lowest position and locked, call light in reach.

## 2024-03-05 NOTE — PROGRESS NOTES
Blood products ordered s/p resulted labs. Atiya Urbina, pt daughter and next of kin contacted at [phone] to obtain two RN consent for blood products. Atiya was also updated on pts mental status and current situation regarding restraints.

## 2024-03-05 NOTE — PROGRESS NOTES
Tele sitter has numerous times attempted to redirect pt from reaching for her IV and nasal packing. Pt has demonstrated repeatedly that she is able to get bilat mitts off. Redirection has failed. Distraction has failed. IV was wrapped to prevent removal by pt, though she was able to remove that as well after removing her mitts. Education provided to pt with no evidence of learning multiple times in a row over the last couple hours. Pt is now A&O to self, very forgetful. Continues to ask for tissues to blow her nose as she is attempting to remove nasal packing. Pt does not remember that she had a bloody nose and that is why the packing is in place.    Bilat mitts removed. Soft wrist restraints applied. Due to soft wrist restraints, pt very agitated. A&O to self only still and unable to follow directions. Education about soft wrist restraints given with no evidence of learning. PRN medication administered for agitation as ordered.

## 2024-03-05 NOTE — H&P
Surgery Otolaryngology History & Physical Note    Date  3/5/2024    Primary Care Physician  Mike Otero M.D.    CC  * No surgery found *    HPI  This is a 89 y.o. female who presented with epistaxis.  She has been seen in the emergency room multiple times over several days because of epistaxis.  She has had previous packing placed in the left nostril that she self removed at home.  She was noted to be hypertensive multiple times including systolic blood pressures in the 170s and 190s.  She received multiple Merisel packings which initially had some breakthrough bleeding posteriorly.  This resolved with more tight control of blood pressure.  She was admitted to the CDU on the hospitalist service for observation of her blood pressure.  She had not had further bleeding when I saw her in the evening compared to the morning when the packing was placed.    Past Medical History:   Diagnosis Date    Arthritis     Diverticulitis     medicated    GERD (gastroesophageal reflux disease)     medicated    Glaucoma     Heart burn     Hiatus hernia syndrome     Hypertension     medicated    Primary osteoarthritis of both shoulders 1/22/2019    Unspecified urinary incontinence        Past Surgical History:   Procedure Laterality Date    ORIF, ANKLE Right 12/22/2021    Procedure: OPEN REDUCTION AND INTERNAL FIXATION, ANKLE;  Surgeon: Pop Causey M.D.;  Location: [facility];  Service: Orthopedics    HIP ARTHROPLASTY TOTAL  6/21/2011    Performed by AMPARO CRAFT at [facility] ORS    OTHER ORTHOPEDIC SURGERY  2000    back surgery    GYN SURGERY  1970    hysterectomy    CHOLECYSTECTOMY  1966    rahat    OTHER  1954    mikey. breast bx    OTHER ABDOMINAL SURGERY  1954    kidney tacked up ?    OTHER  1945    tonsilectomy    OTHER ORTHOPEDIC SURGERY      osteoporosis - medicated    US-NEEDLE CORE BX-BREAST PANEL         Current Facility-Administered Medications   Medication Dose Route Frequency Provider Last  Rate Last Admin    NS infusion   Intravenous Continuous Pop Benavides P.A.-C.        potassium chloride SA (Kdur) tablet 40 mEq  40 mEq Oral Once Stefan Benoit M.D.        Respiratory Therapy Consult   Nebulization Continuous RT Ovi Whitman M.D.        acetaminophen (Tylenol) tablet 650 mg  650 mg Oral Q6HRS PRN Ovi Whitman M.D.        senna-docusate (Pericolace Or Senokot S) 8.6-50 MG per tablet 2 Tablet  2 Tablet Oral Q EVENING Ovi Whitman M.D.   2 Tablet at 03/04/24 1756    And    polyethylene glycol/lytes (Miralax) Packet 1 Packet  1 Packet Oral QDAY PRN Ovi Whitman M.D.        labetalol (Normodyne/Trandate) injection 10 mg  10 mg Intravenous Q4HRS PRN Ovi Whitman M.D.   10 mg at 03/04/24 1459    levothyroxine (Synthroid) tablet 75 mcg  75 mcg Oral DAILY Ovi Whitman M.D.   75 mcg at 03/04/24 1121    sertraline (Zoloft) tablet 50 mg  50 mg Oral DAILY Ovi Whitman M.D.   50 mg at 03/04/24 1121    amoxicillin (Amoxil) capsule 500 mg  500 mg Oral Q8HRS Ovi Whitman M.D.   500 mg at 03/04/24 2158    latanoprost (Xalatan) 0.005 % ophthalmic solution 1 Drop  1 Drop Both Eyes Q EVENING Ovi Whitman M.D.   1 Drop at 03/04/24 1756    amLODIPine (Norvasc) tablet 10 mg  10 mg Oral DAILY Ovi Whitman M.D.           Social History     Socioeconomic History    Marital status:      Spouse name: Not on file    Number of children: Not on file    Years of education: Not on file    Highest education level: Not on file   Occupational History    Not on file   Tobacco Use    Smoking status: Never    Smokeless tobacco: Never   Vaping Use    Vaping Use: Never used   Substance and Sexual Activity    Alcohol use: Yes     Alcohol/week: 0.0 oz     Comment: occassional wine    Drug use: No    Sexual activity: Not on file   Other Topics Concern    Not on file   Social History Narrative    Not on file     Social Determinants of Health     Financial Resource Strain: Not on file   Food Insecurity: Not on file   Transportation  Needs: Not on file   Physical Activity: Not on file   Stress: Not on file   Social Connections: Not on file   Intimate Partner Violence: Not on file   Housing Stability: Not on file       Family History   Problem Relation Age of Onset    Diabetes Other     Heart Disease Other     Lung Disease Other     Cancer Sister        Allergies  Morphine, Cephalexin, Codeine, Metronidazole, and Sulfa drugs    Review of Systems  Negative except for as per HPI    Physical Exam  Constitutional:       Appearance: Normal appearance.   HENT:      Head: Normocephalic and atraumatic.      Right Ear: External ear normal.      Left Ear: External ear normal.      Nose:      Comments: Merisel packing placed in the left nostril, no bleeding on the left side or right side     Mouth/Throat:      Mouth: Mucous membranes are moist.      Comments: Oropharynx clear bleeding  Neurological:      Mental Status: She is alert.         Vital Signs  Blood Pressure : 120/51   Temperature: 37.3 °C (99.1 °F)   Pulse: 75   Respiration: (!) 22   Pulse Oximetry: 95 %       Labs:  Recent Labs     03/04/24  0645 03/04/24  1740 03/05/24  0118   WBC 3.5*  --  5.7   RBC 2.62*  --  2.30*   HEMOGLOBIN 7.7* 7.2* 6.6*   HEMATOCRIT 24.0* 22.1* 21.0*   MCV 91.6  --  91.3   MCH 29.4  --  28.7   MCHC 32.1*  --  31.4*   RDW 59.3*  --  59.2*   PLATELETCT 230  --  235   MPV 9.1  --  9.6     Recent Labs     03/05/24  0118   SODIUM 131*   POTASSIUM 3.4*   CHLORIDE 102   CO2 13*   GLUCOSE 134*   BUN 23*   CREATININE 0.64   CALCIUM 7.4*         Recent Labs     03/05/24  0118   ASTSGOT 18   ALTSGPT 6   TBILIRUBIN 0.4   ALKPHOSPHAT 74   GLOBULIN 2.9       Radiology:  DX-CHEST-PORTABLE (1 VIEW)   Final Result      1.  Left basilar atelectasis and/or consolidation. Underlying infection is possible.   2.  Stable enlargement of the cardiomediastinal silhouette.            Assessment/Plan:  Epistaxis with multiple packs placed over the last several days.  Will need to keep packing in  place for at least 72 hours to minimize risk of rebleeding.  Keep BP well controlled.  DC from hospital pending hospitalist clearance.  Call for fu with me in office on Thursday.  Continue antibiotic prophylaxis as long as nasal packing remains in place.

## 2024-03-06 ENCOUNTER — PHARMACY VISIT (OUTPATIENT)
Dept: PHARMACY | Facility: MEDICAL CENTER | Age: 89
End: 2024-03-06
Payer: MEDICARE

## 2024-03-06 ENCOUNTER — HOME CARE VISIT (OUTPATIENT)
Dept: HOME HEALTH SERVICES | Facility: HOME HEALTHCARE | Age: 89
End: 2024-03-06
Payer: MEDICARE

## 2024-03-06 VITALS
BODY MASS INDEX: 22.6 KG/M2 | HEART RATE: 68 BPM | WEIGHT: 122.8 LBS | SYSTOLIC BLOOD PRESSURE: 118 MMHG | HEIGHT: 62 IN | TEMPERATURE: 98.2 F | OXYGEN SATURATION: 95 % | DIASTOLIC BLOOD PRESSURE: 54 MMHG | RESPIRATION RATE: 16 BRPM

## 2024-03-06 LAB
ANION GAP SERPL CALC-SCNC: 15 MMOL/L (ref 7–16)
ANISOCYTOSIS BLD QL SMEAR: ABNORMAL
BASOPHILS # BLD AUTO: 0 % (ref 0–1.8)
BASOPHILS # BLD: 0 K/UL (ref 0–0.12)
BUN SERPL-MCNC: 27 MG/DL (ref 8–22)
BURR CELLS BLD QL SMEAR: NORMAL
CA-I SERPL-SCNC: 1 MMOL/L (ref 1.1–1.3)
CALCIUM SERPL-MCNC: 6.7 MG/DL (ref 8.5–10.5)
CHLORIDE SERPL-SCNC: 100 MMOL/L (ref 96–112)
CO2 SERPL-SCNC: 13 MMOL/L (ref 20–33)
CREAT SERPL-MCNC: 0.56 MG/DL (ref 0.5–1.4)
EOSINOPHIL # BLD AUTO: 0 K/UL (ref 0–0.51)
EOSINOPHIL NFR BLD: 0 % (ref 0–6.9)
ERYTHROCYTE [DISTWIDTH] IN BLOOD BY AUTOMATED COUNT: 57 FL (ref 35.9–50)
GFR SERPLBLD CREATININE-BSD FMLA CKD-EPI: 82 ML/MIN/1.73 M 2
GLUCOSE SERPL-MCNC: 102 MG/DL (ref 65–99)
HCT VFR BLD AUTO: 25.2 % (ref 37–47)
HGB BLD-MCNC: 8.4 G/DL (ref 12–16)
HGB BLD-MCNC: 9 G/DL (ref 12–16)
LYMPHOCYTES # BLD AUTO: 1.73 K/UL (ref 1–4.8)
LYMPHOCYTES NFR BLD: 19.4 % (ref 22–41)
MANUAL DIFF BLD: NORMAL
MCH RBC QN AUTO: 29.5 PG (ref 27–33)
MCHC RBC AUTO-ENTMCNC: 33.3 G/DL (ref 32.2–35.5)
MCV RBC AUTO: 88.4 FL (ref 81.4–97.8)
MICROCYTES BLD QL SMEAR: ABNORMAL
MONOCYTES # BLD AUTO: 0.16 K/UL (ref 0–0.85)
MONOCYTES NFR BLD AUTO: 1.8 % (ref 0–13.4)
MORPHOLOGY BLD-IMP: NORMAL
MYELOCYTES NFR BLD MANUAL: 0.9 %
NEUTROPHILS # BLD AUTO: 6.93 K/UL (ref 1.82–7.42)
NEUTROPHILS NFR BLD: 77.9 % (ref 44–72)
NRBC # BLD AUTO: 0 K/UL
NRBC BLD-RTO: 0 /100 WBC (ref 0–0.2)
OVALOCYTES BLD QL SMEAR: NORMAL
PLATELET # BLD AUTO: 225 K/UL (ref 164–446)
PLATELET BLD QL SMEAR: NORMAL
PMV BLD AUTO: 9.8 FL (ref 9–12.9)
POIKILOCYTOSIS BLD QL SMEAR: NORMAL
POTASSIUM SERPL-SCNC: 3.5 MMOL/L (ref 3.6–5.5)
RBC # BLD AUTO: 2.85 M/UL (ref 4.2–5.4)
RBC BLD AUTO: PRESENT
SCHISTOCYTES BLD QL SMEAR: NORMAL
SODIUM SERPL-SCNC: 128 MMOL/L (ref 135–145)
WBC # BLD AUTO: 8.9 K/UL (ref 4.8–10.8)

## 2024-03-06 PROCEDURE — 665999 HH PPS REVENUE DEBIT

## 2024-03-06 PROCEDURE — 97162 PT EVAL MOD COMPLEX 30 MIN: CPT

## 2024-03-06 PROCEDURE — 99239 HOSP IP/OBS DSCHRG MGMT >30: CPT | Performed by: STUDENT IN AN ORGANIZED HEALTH CARE EDUCATION/TRAINING PROGRAM

## 2024-03-06 PROCEDURE — 85007 BL SMEAR W/DIFF WBC COUNT: CPT

## 2024-03-06 PROCEDURE — A9270 NON-COVERED ITEM OR SERVICE: HCPCS | Performed by: INTERNAL MEDICINE

## 2024-03-06 PROCEDURE — 85018 HEMOGLOBIN: CPT

## 2024-03-06 PROCEDURE — 85027 COMPLETE CBC AUTOMATED: CPT

## 2024-03-06 PROCEDURE — 97167 OT EVAL HIGH COMPLEX 60 MIN: CPT

## 2024-03-06 PROCEDURE — 700102 HCHG RX REV CODE 250 W/ 637 OVERRIDE(OP): Performed by: INTERNAL MEDICINE

## 2024-03-06 PROCEDURE — 92526 ORAL FUNCTION THERAPY: CPT

## 2024-03-06 PROCEDURE — 665998 HH PPS REVENUE CREDIT

## 2024-03-06 PROCEDURE — 82330 ASSAY OF CALCIUM: CPT

## 2024-03-06 PROCEDURE — RXMED WILLOW AMBULATORY MEDICATION CHARGE: Performed by: STUDENT IN AN ORGANIZED HEALTH CARE EDUCATION/TRAINING PROGRAM

## 2024-03-06 PROCEDURE — 80048 BASIC METABOLIC PNL TOTAL CA: CPT

## 2024-03-06 PROCEDURE — 97535 SELF CARE MNGMENT TRAINING: CPT

## 2024-03-06 PROCEDURE — 36415 COLL VENOUS BLD VENIPUNCTURE: CPT

## 2024-03-06 RX ORDER — AMOXICILLIN 500 MG/1
500 CAPSULE ORAL 3 TIMES DAILY
Qty: 15 CAPSULE | Refills: 0 | Status: ACTIVE | OUTPATIENT
Start: 2024-03-06 | End: 2024-03-08

## 2024-03-06 RX ORDER — CHOLECALCIFEROL (VITAMIN D3) 125 MCG
10 CAPSULE ORAL
COMMUNITY
Start: 2024-03-06

## 2024-03-06 RX ADMIN — LEVOTHYROXINE SODIUM 75 MCG: 0.07 TABLET ORAL at 04:25

## 2024-03-06 RX ADMIN — AMLODIPINE BESYLATE 10 MG: 10 TABLET ORAL at 04:25

## 2024-03-06 RX ADMIN — AMOXICILLIN 500 MG: 500 CAPSULE ORAL at 16:41

## 2024-03-06 RX ADMIN — SERTRALINE 50 MG: 50 TABLET, FILM COATED ORAL at 04:25

## 2024-03-06 RX ADMIN — AMOXICILLIN 500 MG: 500 CAPSULE ORAL at 04:25

## 2024-03-06 ASSESSMENT — PAIN DESCRIPTION - PAIN TYPE
TYPE: ACUTE PAIN
TYPE: ACUTE PAIN

## 2024-03-06 ASSESSMENT — COGNITIVE AND FUNCTIONAL STATUS - GENERAL
DRESSING REGULAR UPPER BODY CLOTHING: A LOT
MOBILITY SCORE: 15
TOILETING: A LOT
STANDING UP FROM CHAIR USING ARMS: A LOT
WALKING IN HOSPITAL ROOM: A LITTLE
EATING MEALS: A LITTLE
SUGGESTED CMS G CODE MODIFIER MOBILITY: CK
PERSONAL GROOMING: A LITTLE
TURNING FROM BACK TO SIDE WHILE IN FLAT BAD: A LITTLE
HELP NEEDED FOR BATHING: A LOT
MOVING FROM LYING ON BACK TO SITTING ON SIDE OF FLAT BED: A LOT
DRESSING REGULAR LOWER BODY CLOTHING: A LOT
CLIMB 3 TO 5 STEPS WITH RAILING: A LOT
DAILY ACTIVITIY SCORE: 14
MOVING TO AND FROM BED TO CHAIR: A LITTLE
SUGGESTED CMS G CODE MODIFIER DAILY ACTIVITY: CK

## 2024-03-06 ASSESSMENT — GAIT ASSESSMENTS
GAIT LEVEL OF ASSIST: MINIMAL ASSIST
DISTANCE (FEET): 10
ASSISTIVE DEVICE: FRONT WHEEL WALKER
DEVIATION: BRADYKINETIC;SHUFFLED GAIT

## 2024-03-06 ASSESSMENT — ACTIVITIES OF DAILY LIVING (ADL): TOILETING: INDEPENDENT

## 2024-03-06 NOTE — THERAPY
"Speech Language Pathology   Daily Treatment     Patient Name: Gloria Patel  AGE:  89 y.o., SEX:  female  Medical Record #: [id number]  Date of Service: 3/6/2024      Precautions:  Precautions: Swallow Precautions         Subjective  \"I am thirsty.'      Assessment  Mild hoarse vocal quality. Pt with mild amount of white coating to posterior soft palate and uvula with MD aware. No dried blood visualized in oral cavity. No coughing per single ice chips and tsp amounts of applesauce. Pt stating globus with pudding. TNO by spoon and cup with clear voice, 1-2 swallows triggered.  One nonproductive cough at end of session. No oral suction or spitting this session which is improved from Tuesday.       Clinical Impressions  Begin full liquid TNO with 1-1 superv. Swallow strategies posted. Pt and her dgtr educ regarding pt's swallow status and recommendations. Possible d/c later today per RN.  SLP recommended.     Recommendations  Treatment Completed: Dysphagia Treatment  Consult Referral(s): ENT (for nasal packing and epistaxis per MD.)    Dysphagia Treatment  Diet Consistency: full liquids TNO  Instrumentation: None indicated at this time  Medication: Crush with applesauce, as appropriate  Supervision: Direct supervision during meals  Positioning: Fully upright and midline during oral intake  Risk Management : Small bites/sips, Alternate bites and sips, Liquids by cup only, No straws, Physical mobility, as tolerated  Oral Care: Q8h                     SLP Treatment Plan  Treatment Plan: Dysphagia Treatment  SLP Frequency: 3x Per Week  Estimated Duration: Until Therapy Goals Met      Anticipated Discharge Needs  Discharge Recommendations: Recommend home health for continued speech therapy services  Therapy Recommendations Upon DC: Dysphagia Training, Patient / Family / Caregiver Education      Patient / Family Goals  Patient / Family Goal #1: per dgtr-to eat and drink.  Goal #1 Outcome: Progressing as expected  Short " Term Goals  Short Term Goal # 1: Pt will consume single ice chips with 1-1 superv by staff without s/s of difficulty.  Goal Outcome # 1: Progressing as expected  Short Term Goal # 2: Pt will consume LQ3/TNO 1-1 superv and use of recommended and posted swallow strategies  Goal Outcome # 2 : Goal not met      Laxmi Evangelista, SLP

## 2024-03-06 NOTE — DISCHARGE PLANNING
"@0952  Received Choice form at 0937  Agency/Facility Name: [facility]   Referral sent per Choice form @ 0952     @1611  DPA fax[facility]\" [facility].  Family would like [facility] and then convert to hospice.    @1614  DPA faxed a home health referral to [facility] .    @1728  DPA faxed a hospice referral to [facility].    "

## 2024-03-06 NOTE — CARE PLAN
Problem: Knowledge Deficit - Standard  Goal: Patient and family/care givers will demonstrate understanding of plan of care, disease process/condition, diagnostic tests and medications  3/5/2024 2040 by Meghan Detweiler, R.N.  Outcome: Progressing  3/5/2024 2037 by Meghan Detweiler, R.N.  Outcome: Progressing     Problem: Fall Risk  Goal: Patient will remain free from falls  3/5/2024 2040 by Meghan Detweiler, R.N.  Outcome: Progressing  3/5/2024 2037 by Meghan Detweiler, R.N.  Outcome: Progressing     Problem: Safety - Medical Restraint  Goal: Remains free of injury from restraints (Restraint for Interference with Medical Device)  3/5/2024 2040 by Meghan Detweiler, R.N.  Outcome: Progressing  3/5/2024 2037 by Meghan Detweiler, R.N.  Outcome: Progressing  Goal: Free from restraint(s) (Restraint for Interference with Medical Device)  3/5/2024 2040 by Meghan Detweiler, R.N.  Outcome: Progressing  3/5/2024 2037 by Meghan Detweiler, R.N.  Outcome: Progressing   The patient is Stable - Low risk of patient condition declining or worsening    Shift Goals  Clinical Goals: nasal packing, patient safety  Patient Goals: rest, discharge  Family Goals: patient safety, pt not pulling out nasal packing    Progress made toward(s) clinical / shift goals:  pt progressing toward goals    Patient is not progressing towards the following goals:

## 2024-03-06 NOTE — CARE PLAN
The patient is Stable - Low risk of patient condition declining or worsening    Shift Goals  Clinical Goals: DC, monitor Hgb, safety  Patient Goals: rest, comfort and discharge  Family Goals: safety, discharge    Axo2. Able to make some need known. Family at bedside. Telesitter in place for safety. Afebrile. Denies pain. Due medications given as ordered. Remains free from injury or fall. Placed call light and personal belongings within reach. Needs met at this time.    Progress made toward(s) clinical / shift goals:      Problem: Knowledge Deficit - Standard  Goal: Patient and family/care givers will demonstrate understanding of plan of care, disease process/condition, diagnostic tests and medications  Outcome: Progressing     Problem: Fall Risk  Goal: Patient will remain free from falls  Outcome: Progressing     Problem: Safety - Medical Restraint  Goal: Free from restraint(s) (Restraint for Interference with Medical Device)  Outcome: Progressing

## 2024-03-06 NOTE — DISCHARGE INSTRUCTIONS
Discharge Instructions per Sg Tan M.D.    You were hospitalized for a blood nose (epistaxis) that resulted in low blood counts (anemia). You improved with packing of the bloody nose and will follow up with a Ears Nose Throat specialist (otolaryngology) for removal of the packing and assessment of the bleeding.    You received a blood transfusion which improved your anemia. You are prescribed antibiotics to prevent infection due to the packing.    DIET: Regular    ACTIVITY: Regular    DIAGNOSIS: Acute Blood Loss Anemia due to Epistaxis    Return to ER if you develop fever (>100.4 F or >38 C), have uncontrollable bleeding after the packing is removed, faint for any reason, or develop sudden severe chest pain or shortness of breath.

## 2024-03-06 NOTE — THERAPY
Occupational Therapy   Initial Evaluation     Patient Name: Gloria Patel  Age:  89 y.o., Sex:  female  Medical Record #: [id number]  Today's Date: 3/6/2024     Precautions: (P) Fall Risk, Swallow Precautions  Comments: L nasal packing    Assessment  Patient is 89 y.o. female admitted with epistaxis and HTN with hx of dementia. Pt presented with decline in cognition compared to baseline, impaired balance, impaired activity tolerance, and generalized weakness. Per daughter, pt's function is well below baseline. Discussed DC options at length with daughter, see below. Pt would require assist with all self cares at current functional level, and per daughter [facility] can not accommodate this level of assist. Dc recommendations TBD pending potential GOC conference, vs enhanced HH, vs placement. Will continue to follow for skilled OT and update recommendations.     Plan    Occupational Therapy Initial Treatment Plan   Treatment Interventions: (P) Self Care / Activities of Daily Living, Cognitive Skill Development, Adaptive Equipment, Neuro Re-Education / Balance, Therapeutic Exercises, Therapeutic Activity  Treatment Frequency: (P) 3 Times per Week  Duration: (P) Until Therapy Goals Met    DC Equipment Recommendations: (P) Unable to determine at this time  Discharge Recommendations: (P)  (pt would require assist with all ADLs and OOB activities at this time, tbd pending potential GOC discussion with family vs enhanced HH vs placement)        03/06/24 1024   Prior Living Situation   Prior Services Skilled Home Health Services  (HHPT for years)   Housing / Facility Assisted Living Residence   Bathroom Set up Shower Chair;Grab Bars   Equipment Owned 4-Wheel Walker;Front-Wheel Walker;Tub / Shower Seat;Grab Bar(s) In Tub / Shower   Lives with - Patient's Self Care Capacity Unrelated Adult  ([facility], provides minimal assist)   Comments Obtained PLOF from pt's daughter. Pt resides at the [facility] and recieves assist with  showers, LB dressing, and med management. Pt ambulates to the dinning [place] and mobilizes using 4WW without assist. Per daughter there is not an option for 24/7 support. Pt does have a call light to call for assist as needed   Prior Level of ADL Function   Self Feeding Independent   Grooming / Hygiene Independent   Bathing Requires Assist   Toileting Independent   Prior Level of IADL Function   Medication Management Dependent   Laundry Dependent   Kitchen Mobility Dependent   Finances Dependent   Home Management Dependent   Shopping Dependent   Precautions   Precautions Fall Risk;Swallow Precautions   Pain   Intervention Declines   Cognition    Cognition / Consciousness X   Level of Consciousness Alert   Safety Awareness Impaired   New Learning Impaired   Comments Pt only oriented to person, able to state that she is in a hospital.   Active ROM Upper Body   Active ROM Upper Body  X   Comments limited bilateral shoulder ROM to 90 degrees, baseline   Strength Upper Body   Upper Body Strength  X   Gross Strength Generalized Weakness, Equal Bilaterally.    Balance Assessment   Sitting Balance (Static) Fair -   Sitting Balance (Dynamic) Fair -   Standing Balance (Static) Fair -   Standing Balance (Dynamic) Poor +   Weight Shift Sitting Poor   Weight Shift Standing Poor   Comments with FWW   Bed Mobility    Supine to Sit Moderate Assist   Sit to Supine Maximal Assist   Scooting Maximal Assist   ADL Assessment   Eating Minimal Assist   Grooming Minimal Assist;Seated   Lower Body Dressing Maximal Assist   Toileting Maximal Assist   How much help from another person does the patient currently need...   6 Clicks Daily Activity Score 14   Functional Mobility   Sit to Stand Minimal Assist   Mobility EOB side steps, posterior lean   Comments dizzy once in standing   Patient / Family Goals   Patient / Family Goal #1 to get better   Short Term Goals   Short Term Goal # 1 Pt will demo toilet txf SPV   Short Term Goal # 2 Pt will  complete toileting hygiene SPV   Short Term Goal # 3 Pt will tolerate standing G/H routine SPV   Education Group   Role of Occupational Therapist Patient Response Patient;Acceptance;Explanation   Additional Comments extensive discussion regarding various DC options   Occupational Therapy Initial Treatment Plan    Treatment Interventions Self Care / Activities of Daily Living;Cognitive Skill Development;Adaptive Equipment;Neuro Re-Education / Balance;Therapeutic Exercises;Therapeutic Activity   Treatment Frequency 3 Times per Week   Duration Until Therapy Goals Met   Problem List   Problem List Decreased Active Daily Living Skills;Decreased Homemaking Skills;Decreased Functional Mobility;Decreased Activity Tolerance;Safety Awareness Deficits / Cognition;Impaired Cognitive Function;Impaired Postural Control / Balance   Anticipated Discharge Equipment and Recommendations   DC Equipment Recommendations Unable to determine at this time   Discharge Recommendations   (pt would require assist with all ADLs and OOB activities at this time, tbd pending potential GOC discussion with family vs enhanced  vs placement)

## 2024-03-06 NOTE — PROGRESS NOTES
Hospital Medicine Daily Progress Note    Date of Service  3/5/2024    Chief Complaint  Gloria Patel is a 89 y.o. female admitted 3/4/2024 with epistaxis     Hospital Course  89 y.o. female with dementia and hypertension who presented 3/4/2024 with epistaxis for the past 2 days. The patient presented to the ER twice 3/3 for nosebleeds and was packed and discharged with oral antibiotics.  Patient returned 3/4 with ongoing bleeding from her nose.  Her initial hemoglobin was noted to be low at 7.7.  In the ER, she was noted to be hypertensive with a blood pressure of 177/74 - the patient was given Afrin nasal spray and her nose was clamped.  She was then given lidocaine with epinephrine and left nare packing was placed.  Patient had continued oozing of blood; therefore, IV and intranasal TXA was given.  Nose was clamped again and was repacked with Merisel 8 cm.  Case was discussed with ENT who recommended strict blood pressure control and if persistent epistaxis to place Epistat.  Patient was given IV hydralazine with improvement of her blood pressure and her bleeding then resolved.  Since patient does not have active bleeding at the time of admission, Epistat was not placed.     Pt was admitted for further management.    Interval Problem Update  3/5  Overnight, course was complicated by acute delirium - requiring IV haldol and benadryl. The patient required IV hydralazine earlier in the night as well. Unfortunately, this resulted in low blood pressure requiring IVF bolus. Pt's BP responded.     No overt overnight bleeding noted. However, Hb was noted 6.61 - 1u pRBC transfused. HR this AM 9.3.     At bedside alongside daughters, Pt was quite drowsy - able to answer simple questions but hard of hearing. Requiring max assist x2 from nursing - this is quite different from her baseline at [facility].     Plan at this point is to monitor her response and mental status off sedatives. Daughter can take her to Pt's home instead  of [facility] and can stay with the patient but unable to help with ambulation, mobility and transfers.     POLST reviewed with family - code status changed to DNR/DNI.     SLP and PT/OT consulted.     ENT Dr. Field consult appreciated - continue packing for at least 72 hours and follow up outpatient on Thursday 3/7.     I have discussed this patient's plan of care and discharge plan at IDT rounds today with Case Management, Nursing, Nursing leadership, and other members of the IDT team.    Consultants/Specialty  ENT    Code Status  DNAR/DNI    Disposition  Medically Cleared  I have placed the appropriate orders for post-discharge needs.    Review of Systems  Review of Systems   Unable to perform ROS: Dementia   Constitutional:  Positive for malaise/fatigue.   Respiratory:  Negative for shortness of breath.    Cardiovascular:  Negative for chest pain.        Physical Exam  Temp:  [36.3 °C (97.3 °F)-37.9 °C (100.3 °F)] 36.9 °C (98.4 °F)  Pulse:  [] 63  Resp:  [18-24] 18  BP: ()/(41-67) 126/53  SpO2:  [94 %-97 %] 96 %    Physical Exam  Vitals and nursing note reviewed.   Constitutional:       General: She is not in acute distress.     Appearance: Normal appearance. She is ill-appearing.   HENT:      Head: Normocephalic and atraumatic.      Nose:      Comments: Left nare packing in place     Mouth/Throat:      Mouth: Mucous membranes are dry.      Pharynx: Oropharynx is clear.   Eyes:      General: No scleral icterus.     Conjunctiva/sclera: Conjunctivae normal.   Cardiovascular:      Rate and Rhythm: Normal rate and regular rhythm.      Pulses: Normal pulses.      Heart sounds: Normal heart sounds.   Pulmonary:      Effort: Pulmonary effort is normal. No respiratory distress.      Breath sounds: Normal breath sounds. No wheezing.   Abdominal:      General: Bowel sounds are normal. There is no distension.      Palpations: Abdomen is soft.      Tenderness: There is no abdominal tenderness.   Musculoskeletal:          General: No swelling or tenderness. Normal range of motion.   Skin:     General: Skin is warm and dry.      Capillary Refill: Capillary refill takes less than 2 seconds.   Neurological:      General: No focal deficit present.      Mental Status: She is lethargic, disoriented and confused.   Psychiatric:         Mood and Affect: Mood normal.         Fluids    Intake/Output Summary (Last 24 hours) at 3/5/2024 1757  Last data filed at 3/5/2024 0815  Gross per 24 hour   Intake 340 ml   Output 800 ml   Net -460 ml       Laboratory  Recent Labs     03/04/24  0645 03/04/24  1740 03/05/24  0118 03/05/24  0922   WBC 3.5*  --  5.7  --    RBC 2.62*  --  2.30*  --    HEMOGLOBIN 7.7* 7.2* 6.6* 9.3*   HEMATOCRIT 24.0* 22.1* 21.0* 28.6*   MCV 91.6  --  91.3  --    MCH 29.4  --  28.7  --    MCHC 32.1*  --  31.4*  --    RDW 59.3*  --  59.2*  --    PLATELETCT 230  --  235  --    MPV 9.1  --  9.6  --      Recent Labs     03/05/24  0118   SODIUM 131*   POTASSIUM 3.4*   CHLORIDE 102   CO2 13*   GLUCOSE 134*   BUN 23*   CREATININE 0.64   CALCIUM 7.4*                   Imaging  DX-CHEST-PORTABLE (1 VIEW)   Final Result      1.  Left basilar atelectasis and/or consolidation. Underlying infection is possible.   2.  Stable enlargement of the cardiomediastinal silhouette.           Assessment/Plan  * Epistaxis- (present on admission)  Assessment & Plan  Status post Afrin nasal spray, nasal clamping, lidocaine with epinephrine and left nare packing, IV TXA and nasal packing  ENT recommended strict BP control and if ongoing bleeding, place Epistat  Monitor hemoglobin every 8 hours  Amoxicillin prophylaxis will nasal packing in place      ENT Dr. Field consult appreciated - continue packing for at least 72 hours and follow up outpatient on Thursday 3/7.     Dementia (HCC)- (present on admission)  Assessment & Plan  Frequent reorientation  Avoid sedatives, avoid anti-cholinergics    Nonpharmacologic management of delirium  -Re-orient  and re-direct patient (time/place/situation)  -Mobilize early and promote daytime activity (Lights ON and blinds OPEN during the day, no naps after 4 PM)  -Promote night time sleep (Lights OFF, TV OFF, cluster nursing care to minimize awakenings, reduce noise, warm blankets and document hours of sleep)  -Manage toileting and provide bowel care (monitor for constipation and/or urinary retention).   -Remove lines/tubing that is not needed  -Educate and involve family (encourage family to be at bedside during visiting hours, provide delirium education and request family bring in familiar objects from home).        Advance care planning- (present on admission)  Assessment & Plan  Code status discussed again with family and POLST reviewed - changed to DNR/DNI as per POLST.     HTN (hypertension)- (present on admission)  Assessment & Plan  Uncontrolled  Increase to Norvasc 10 mg daily  IV labetalol as needed    Overnight, hypotensive after sedatives.   Improve in AM  Monitor closely     GERD (gastroesophageal reflux disease)- (present on admission)  Assessment & Plan  Continue famotidine    Hypothyroid- (present on admission)  Assessment & Plan  Continue Synthroid         VTE prophylaxis: VTE Selection    I have performed a physical exam and reviewed and updated ROS and Plan today (3/5/2024).

## 2024-03-06 NOTE — PROGRESS NOTES
4 Eyes Skin Assessment Completed by CHETAN linn and CHETAN dash.    Head WDL  Ears WDL  Nose WDL  Mouth WDL  Neck WDL  Breast/Chest WDL  Shoulder Blades WDL  Spine WDL  (R) Arm/Elbow/Hand WDL  (L) Arm/Elbow/Hand WDL  Abdomen WDL  Groin WDL  Scrotum/Coccyx/Buttocks WDL  (R) Leg WDL  (L) Leg WDL  (R) Heel/Foot/Toe WDL  (L) Heel/Foot/Toe WDL                                        Devices In Places       Interventions In Place     Possible Skin Injury No    Pictures Uploaded Into Epic N/A  Wound Consult Placed N/A  RN Wound Prevention Protocol Ordered No

## 2024-03-06 NOTE — CARE PLAN
Problem: Knowledge Deficit - Standard  Goal: Patient and family/care givers will demonstrate understanding of plan of care, disease process/condition, diagnostic tests and medications  Outcome: Progressing     Problem: Fall Risk  Goal: Patient will remain free from falls  Outcome: Progressing     Problem: Safety - Medical Restraint  Goal: Remains free of injury from restraints (Restraint for Interference with Medical Device)  Outcome: Progressing  Goal: Free from restraint(s) (Restraint for Interference with Medical Device)  Outcome: Progressing   The patient is Stable - Low risk of patient condition declining or worsening    Shift Goals  Clinical Goals: nasal packing, patient safety  Patient Goals: rest, discharge  Family Goals: patient safety, pt not pulling out nasal packing    Progress made toward(s) clinical / shift goals:  pt progressing towarad goals    Patient is not progressing towards the following goals:

## 2024-03-06 NOTE — DISCHARGE PLANNING
ATTN: Case Management  RE: Referral for Home Health    As of 3.6.24, we have accepted the Home Health referral for the patient listed above. PATIENT IS NOT A CANDIDATE FOR [facility] AS SHE RESIDES IN AN [facility]     A [facility] clinician will be out to see the patient within 48 hours. If you have any questions or concerns regarding the patient’s transition to Home Health, please do not hesitate to contact us at [phone].      We look forward to collaborating with you,  [facility] Team

## 2024-03-06 NOTE — DISCHARGE SUMMARY
Discharge Summary    CHIEF COMPLAINT ON ADMISSION  Chief Complaint   Patient presents with    Epistaxis     X 2 days on and off          Reason for Admission  Acute Blood Loss Anemia from Epistaxis    Admission Date  3/4/2024    CODE STATUS  DNAR/DNI    HPI & HOSPITAL COURSE  This is a 89 y.o. female with dementia, recurrent epistaxis, HTN, hypothyroidism here with recurrent epistaxis.    She presented with recurrent epistaxis after self-extrication of a nasal packing after evaluation in the ED resulting in afrin, compression, lidocaine/epinephrine, and nasal packing.    In the ED she was hypertensive with Hgb 7.7. ENT was consulted and recommended nasal packing with BP control. After receiving IV hydralazine she had hypotension for which further PRN antihypertensives were discontinued. Hgb decreased to 6.6 for which she received 1u PRBC with substantial improvement to 9.3. Hgb remained stable for 24h after PRBC transfusion with no visible bleeding in the nares nor posterior oropharynx. Due to pallor and weakness from ABLA her family was advised to consider hospice, though upon further counseling that hospice does not provide transfusions and substantial improvement in mentation and strength after PRBC transfusion a referral was placed for future EOL planning. ENT advised follow up in clinic for packing removal at 72h. Her family was counseled that she had no further acute care needs and must discharge the day before an outpatient appointment per Medicare rules. They were initially hesitant but then agreeable after PT/OT provided caregiver guidance and she was referred to [facility] for ongoing skilled services after discharge.    Therefore, she is discharged in fair and stable condition to home with organized home healthcare and close outpatient follow-up.    The patient met 2-midnight criteria for an inpatient stay at the time of discharge.    Discharge Date  3/6/2024    FOLLOW UP ITEMS POST  DISCHARGE    Epistaxis - follow up in ENT clinic for packing removal and expert guidance on further bleeding control and nasal hygiene    Dementia - ongoing ACP discussion with transition from  services to Hospice per family decision    DISCHARGE DIAGNOSES  Principal Problem (Resolved):    Epistaxis (POA: Yes)  Active Problems:    Acquired hypothyroidism (POA: Yes)    Gastroesophageal reflux disease without esophagitis (POA: Yes)    Primary hypertension (POA: Yes)    Advance care planning (POA: Yes)    Dementia (HCC) (POA: Yes)      FOLLOW UP  Future Appointments   Date Time Provider Department Center   3/8/2024  9:00 AM Cristina Jaramillo, PT [facility] None   3/11/2024  9:00 AM Cristina Jaramillo, PT [facility] None   3/18/2024  9:00 AM Cristina Jaramillo, PT [facility] None   3/25/2024  9:00 AM Cristina Jaramillo, PT [facility] None   3/25/2024 10:30 AM Mike Otero M.D. [facility] None   4/1/2024  9:00 AM Cristina Jaramillo, PT [facility] None     [facility]  [address]  [facility] [address]  [phone]  Call  notify of return home to [facility] health services    Isabel Field M.D.  [address] [facility] [address]  [phone]    Go on 3/7/2024  0915 follow up for packing of bloody nose    Mike Otero M.D.  [address]  [facility] [address]  [phone]    Schedule an appointment as soon as possible for a visit in 1 month(s)  As needed for after-hospital follow up    [facility]  [address]  [facility] [address]  [phone]          MEDICATIONS ON DISCHARGE     Medication List        START taking these medications        Instructions   melatonin 5 mg Tabs   Take 2 Tablets by mouth at bedtime as needed (insomnia).  Dose: 10 mg            CHANGE how you take these medications        Instructions   * amoxicillin 500 MG Caps  What changed: Another medication with the same name was changed. Make sure you understand how and when to take each.  Commonly  known as: Amoxil   Take 1 Capsule by mouth 3 times a day.  Dose: 500 mg     * amoxicillin 500 MG Caps  What changed: additional instructions  Commonly known as: Amoxil   Doctor's comments: Discontinue once packing is removed  Take 1 Capsule by mouth 3 times a day for 5 days. Discontinue or refill as instructed by the ENT doctor.  Dose: 500 mg     lisinopril 20 MG Tabs  What changed:   when to take this  additional instructions  Commonly known as: Prinivil   TAKE 1/2 TABLET BY MOUTH TWICE A DAY           * This list has 2 medication(s) that are the same as other medications prescribed for you. Read the directions carefully, and ask your doctor or other care provider to review them with you.                CONTINUE taking these medications        Instructions   amLODIPine 5 MG Tabs  Commonly known as: Norvasc   TAKE 1 TABLET BY MOUTH EVERY DAY  Dose: 5 mg     Apoaequorin 10 MG Caps   Take 10 mg by mouth every day. Indications: memory loss  Dose: 10 mg     diclofenac sodium 1 % Gel  Commonly known as: Voltaren   Apply 2 g topically 2 times a day as needed (for mild to moderate pain). Indications: Joint Damage causing Pain and Loss of Function  Dose: 2 g     dorzolamide-timolol 2-0.5 % Soln  Commonly known as: Cosopt   Administer 1 Drop into both eyes every morning. Indications: Glaucoma  Dose: 1 Drop     Famotidine-Ca Carb-Mag Hydrox -165 MG Chew   Chew 1 Tablet 1 time a day as needed (for indegestion). take if tums is not effective  Indications: Heartburn  Dose: 1 Tablet     HM Lidocaine Patch 4 % Ptch  Generic drug: lidocaine   1 Application by Percutaneous route 2 times a day. apply to painful areas 2 times a day as neede for moderate pain  Indications: Mild to Moderate Pain  Dose: 1 Application     Ibuprofen-Acetaminophen 125-250 MG Tabs   Take 2 Tablets by mouth 3 times a day as needed (pain). take up to 3 times a day  Indications: pain  Dose: 2 Tablet     levothyroxine 75 MCG Tabs  Commonly known as:  Synthroid   TAKE 1 TABLET BY MOUTH EVERY DAY  Dose: 75 mcg     ondansetron 4 MG Tbdp  Commonly known as: Zofran ODT   Take 1 Tablet by mouth every 8 hours as needed for Nausea.  Dose: 4 mg     polyethylene glycol 3350 17 GM/SCOOP Powd  Commonly known as: Miralax   Take 17 g by mouth every day. Indications: Constipation  Dose: 17 g     PRESERVISION AREDS 2 PO   Doctor's comments: supplement  Take 1 Tablet by mouth every day. Indications: supplement  Dose: 1 Tablet     Linh Saline Soln   Administer 1 Drop into both eyes every day. Indications: supplement  Dose: 1 Drop     sertraline 50 MG Tabs  Commonly known as: Zoloft   TAKE 1 TABLET BY MOUTH EVERY DAY  Dose: 50 mg     Theraworx Relief Foam   Apply 1 Application topically 4 times a day as needed (pain in shoulders). apply to both shoulders as needed for pain after showers  Indications: pain in shoulders  Dose: 1 Application     travoprost 0.004 % Soln  Commonly known as: Travatan Z   Administer 1 Drop into both eyes every evening. Indications: Wide-Angle Glaucoma  Dose: 1 Drop     Tums Chewy Bites 750 MG chewable tablet  Generic drug: calcium carbonate   Chew 1 Tablet 1 time a day as needed (for stomach upset). Indications: Heartburn  Dose: 1 Tablet     Vitamin D3 1.25 MG (26230 UT) Tabs  Generic drug: Cholecalciferol   Doctor's comments: supplement  Take 1.25 mg by mouth every day. Indications: supplement  Dose: 1.25 mg            STOP taking these medications      meclizine 25 MG Tabs  Commonly known as: Antivert              Allergies  Allergies   Allergen Reactions    Morphine Anaphylaxis     anaphylaxis    Cephalexin Rash     Full body; tolerated amoxicillin March 2024    Codeine Vomiting and Nausea    Metronidazole Vomiting and Nausea    Sulfa Drugs Rash     Full body       DIET  Orders Placed This Encounter   Procedures    Diet Order Diet: Level 3 - Liquidised; Liquid level: Level 0 - Thin; Tray Modifications (optional): SLP - 1:1 Supervision by Nursing, SLP  - Deliver to Nursing Station, No Straws     Standing Status:   Standing     Number of Occurrences:   1     Order Specific Question:   Diet:     Answer:   Level 3 - Liquidised [26]     Order Specific Question:   Liquid level     Answer:   Level 0 - Thin     Order Specific Question:   Tray Modifications (optional)     Answer:   SLP - 1:1 Supervision by Nursing     Order Specific Question:   Tray Modifications (optional)     Answer:   SLP - Deliver to Nursing Station     Order Specific Question:   Tray Modifications (optional)     Answer:   No Straws       ACTIVITY  As tolerated.  Weight bearing as tolerated    CONSULTATIONS  Otolaryngology    PROCEDURES  None    LABORATORY  Lab Results   Component Value Date    SODIUM 128 (L) 03/06/2024    POTASSIUM 3.5 (L) 03/06/2024    CHLORIDE 100 03/06/2024    CO2 13 (L) 03/06/2024    GLUCOSE 102 (H) 03/06/2024    BUN 27 (H) 03/06/2024    CREATININE 0.56 03/06/2024    CREATININE 0.99 01/09/2013    GLOMRATE >59 08/04/2010        Lab Results   Component Value Date    WBC 8.9 03/06/2024    WBC 7.8 01/09/2013    HEMOGLOBIN 9.0 (L) 03/06/2024    HEMATOCRIT 25.2 (L) 03/06/2024    PLATELETCT 225 03/06/2024        Total time of the discharge process exceeds 72 minutes.

## 2024-03-07 ENCOUNTER — HOME CARE VISIT (OUTPATIENT)
Dept: HOME HEALTH SERVICES | Facility: HOME HEALTHCARE | Age: 89
End: 2024-03-07
Payer: MEDICARE

## 2024-03-07 DIAGNOSIS — D64.9 ANEMIA, UNSPECIFIED TYPE: ICD-10-CM

## 2024-03-07 DIAGNOSIS — R54 FRAIL ELDERLY: ICD-10-CM

## 2024-03-07 DIAGNOSIS — I10 PRIMARY HYPERTENSION: ICD-10-CM

## 2024-03-07 DIAGNOSIS — E03.9 HYPOTHYROIDISM, UNSPECIFIED TYPE: ICD-10-CM

## 2024-03-07 DIAGNOSIS — I27.20 PULMONARY HYPERTENSION, UNSPECIFIED (HCC): ICD-10-CM

## 2024-03-07 DIAGNOSIS — R63.4 WEIGHT LOSS: ICD-10-CM

## 2024-03-07 PROCEDURE — 665999 HH PPS REVENUE DEBIT

## 2024-03-07 PROCEDURE — 665998 HH PPS REVENUE CREDIT

## 2024-03-07 NOTE — PROGRESS NOTES
Spoke with daughter Atiya.  She did not realize discussions with the hospital regarding hospice and home care.  She would like to speak with hospice and understand when might be the appropriate time for entering hospice.  Since discharge, Gloria has returned to prehospitalization level of care.  She and the family would like to resume [facility] and PT.

## 2024-03-07 NOTE — DISCHARGE PLANNING
ATTN: Case Management  RE: Referral for Home Health    Reason for referral denial: Family would like [facility] to convert to hospice. Referral denial has been escalated to upper management for review               Unfortunately, we are not able to accept this referral for the reason listed above. If further clarity is needed, our Transitional Care Specialists are available to discuss any barriers to service at [phone].      We look forward to collaborating with you in the future,  [facility] Team

## 2024-03-07 NOTE — THERAPY
Physical Therapy   Initial Evaluation     Patient Name: Gloria Patel  Age:  89 y.o., Sex:  female  Medical Record #: [id number]  Today's Date: 3/6/2024     Precautions  Precautions: Fall Risk;Swallow Precautions  Comments: L nasal packing    Assessment  Patient is 89 y.o. female  presenting to acute setting with epistaxix. Pt admitted for BP management. Pt presents to physical therapy requiring Min to Mod A to complete mobility as detailed below. Pt tolerated ambulation 2x10ft with FWW (EOB <> Restroom) with Min A. Per family, pt has support from staff at the [facility] but unable to state level of assist they are able/willing to provide pt. Pt will benefit from acute PT interventions to address impairments noted below.      Plan    Physical Therapy Initial Treatment Plan   Treatment Plan : Bed Mobility, Equipment, Gait Training, Neuro Re-Education / Balance, Self Care / Home Evaluation, Therapeutic Exercise, Therapeutic Activities  Treatment Frequency: 4 Times per Week  Duration: Until Therapy Goals Met    DC Equipment Recommendations: None  Discharge Recommendations:  (at a minimum will require increased caregiver support upon return home (min to Mod A) and close HH follow up)       Objective     03/06/24 1658   Precautions   Precautions Fall Risk;Swallow Precautions   Comments L nasal packing   Vitals   O2 Delivery Device None - Room Air   Pain 0 - 10 Group   Therapist Pain Assessment During Activity;Nurse Notified  (no pain reported)   Prior Living Situation   Prior Services Skilled Home Health Services   Housing / Facility Assisted Living Residence   Equipment Owned Front-Wheel Walker;4-Wheel Walker;Tub / Shower Seat;Grab Bar(s) By Toilet;Grab Bar(s) In Tub / Shower;Raised Toilet Seat With Arms   Lives with - Patient's Self Care Capacity Unrelated Adult   Comments Per OT: Obtained PLOF from pt's daughter. Pt resides at the [facility] and recieves assist with showers, LB dressing, and med management. Pt  ambulates to the dinning kenyon and mobilizes using 4WW without assist. Per daughter there is not an option for 24/7 support. Pt does have a call light to call for assist as needed   Prior Level of Functional Mobility   Bed Mobility Independent   Transfer Status Independent   Ambulation Independent   Ambulation Distance household   Assistive Devices Used Front-Wheel Walker   Cognition    Cognition / Consciousness X   Level of Consciousness Alert   Comments pleasant and receptive to PT. ample family at bedside. hearing aides in place   Active ROM Lower Body    Active ROM Lower Body  WDL   Strength Lower Body   Lower Body Strength  X   Comments generalized weakness due to age   Balance Assessment   Sitting Balance (Static) Fair +   Sitting Balance (Dynamic) Fair   Standing Balance (Static) Fair   Standing Balance (Dynamic) Fair -   Weight Shift Sitting Fair   Weight Shift Standing Fair   Comments w/ FWW   Bed Mobility    Supine to Sit Minimal Assist  (and extra time)   Sit to Supine   (seated in chair at end of session)   Scooting Moderate Assist   Comments HOB flat   Gait Analysis   Gait Level Of Assist Minimal Assist   Assistive Device Front Wheel Walker   Distance (Feet) 10   # of Times Distance was Traveled 2   Deviation Bradykinetic;Shuffled Gait   # of Stairs Climbed 0   Weight Bearing Status no restrictions   Comments slow but steady gait. assist for FWW management in tight room /bathroom space   Functional Mobility   Sit to Stand Minimal Assist   Bed, Chair, Wheelchair Transfer Minimal Assist   Toilet Transfers Moderate Assist  (from low toilet height)   Transfer Method Stand Step   6 Clicks Assessment - How much HELP from from another person do you currently need... (If the patient hasn't done an activity recently, how much help from another person do you think he/she would need if he/she tried?)   Turning from your back to your side while in a flat bed without using bedrails? 3   Moving from lying on your back  to sitting on the side of a flat bed without using bedrails? 2   Moving to and from a bed to a chair (including a wheelchair)? 3   Standing up from a chair using your arms (e.g., wheelchair, or bedside chair)? 2   Walking in hospital room? 3   Climbing 3-5 steps with a railing? 2   6 clicks Mobility Score 15   Short Term Goals    Short Term Goal # 1 pt will perform supine <> sit with HOB flat, no railing and SPV in 6 vistis   Short Term Goal # 2 pt will perform sit <> stand and functional transfers with LRAD adn SPV to improve mobility independence in 6 visits   Short Term Goal # 3 pt will ambulate 50 ft with FWW and SPV to access residence in 6 visits   Education Group   Education Provided Role of Physical Therapist   Role of Physical Therapist Patient Response Patient;Acceptance;Explanation;Verbal Demonstration   Physical Therapy Initial Treatment Plan    Treatment Plan  Bed Mobility;Equipment;Gait Training;Neuro Re-Education / Balance;Self Care / Home Evaluation;Therapeutic Exercise;Therapeutic Activities   Treatment Frequency 4 Times per Week   Duration Until Therapy Goals Met   Problem List    Problems Impaired Bed Mobility;Impaired Transfers;Impaired Ambulation;Functional Strength Deficit;Impaired Balance;Decreased Activity Tolerance   Anticipated Discharge Equipment and Recommendations   DC Equipment Recommendations None   Discharge Recommendations   (at a minimum will require increased caregiver support upon return home (min to Mod A) and close HH follow up)

## 2024-03-07 NOTE — DOCUMENTATION QUERY
On license of [facility]                                                                       Query Response Note      PATIENT:               MARY BRAND  ACCT #:                  [id number]  MRN:                     [id number]  :                      1924  ADMIT DATE:       3/4/2024 4:15 AM  DISCH DATE:        3/6/2024 5:26 PM  RESPONDING  PROVIDER #:        [id number]           QUERY TEXT:    The medical record includes the diagnosis of anemia in this patient with epistaxis.    Can the type of anemia be further specified?    The patient's Clinical Indicators include:  3/5 HM:   - Rapid response called for hypotension. Suspect that this commendation medications contributed to hypotension... Anemia also likely contributed.   - Labs during rapid response notable for anemia with a hemoglobin of 6.6, 1unit PRBC ordered    Clinical Findings: H&H 7.7/24.0 (3/4) --> 6.6/21.0 (3/5), BP 78/41  Risk Factors: Epistaxis   Treatment: PRBCs x 1, NS IVF, BP management, Hgb monitoring q8h, continued nasal packing x72h    Thank you for your time and attention,  KATHY Charles RN  Available via Voalte  Options provided:   -- Acute blood loss anemia   -- Chronic blood loss anemia   -- Other explanation, (please specify other explanation)      Query created by: Lupe Vazquez on 3/6/2024 8:54 AM    RESPONSE TEXT:    Acute blood loss anemia          Electronically signed by:  EMA MENENDEZ MD 3/7/2024 6:54 AM

## 2024-03-08 ENCOUNTER — HOME CARE VISIT (OUTPATIENT)
Dept: HOME HEALTH SERVICES | Facility: HOME HEALTHCARE | Age: 89
End: 2024-03-08
Payer: MEDICARE

## 2024-03-08 ENCOUNTER — OFFICE VISIT (OUTPATIENT)
Dept: INTERNAL MEDICINE | Facility: IMAGING CENTER | Age: 89
End: 2024-03-08
Payer: MEDICARE

## 2024-03-08 VITALS
OXYGEN SATURATION: 97 % | RESPIRATION RATE: 12 BRPM | TEMPERATURE: 98.3 F | DIASTOLIC BLOOD PRESSURE: 70 MMHG | SYSTOLIC BLOOD PRESSURE: 118 MMHG | HEART RATE: 75 BPM

## 2024-03-08 DIAGNOSIS — D50.0 ANEMIA, BLOOD LOSS: ICD-10-CM

## 2024-03-08 DIAGNOSIS — R04.0 EPISTAXIS: ICD-10-CM

## 2024-03-08 DIAGNOSIS — R54 FRAILTY: ICD-10-CM

## 2024-03-08 DIAGNOSIS — E83.51 HYPOCALCEMIA: ICD-10-CM

## 2024-03-08 DIAGNOSIS — J01.00 ACUTE NON-RECURRENT MAXILLARY SINUSITIS: ICD-10-CM

## 2024-03-08 DIAGNOSIS — R53.1 GENERAL WEAKNESS: ICD-10-CM

## 2024-03-08 PROCEDURE — 665999 HH PPS REVENUE DEBIT

## 2024-03-08 PROCEDURE — 3074F SYST BP LT 130 MM HG: CPT | Performed by: INTERNAL MEDICINE

## 2024-03-08 PROCEDURE — 3078F DIAST BP <80 MM HG: CPT | Performed by: INTERNAL MEDICINE

## 2024-03-08 PROCEDURE — 99214 OFFICE O/P EST MOD 30 MIN: CPT | Performed by: INTERNAL MEDICINE

## 2024-03-08 PROCEDURE — 665998 HH PPS REVENUE CREDIT

## 2024-03-08 RX ORDER — AZITHROMYCIN 250 MG/1
TABLET, FILM COATED ORAL
Qty: 6 TABLET | Refills: 0 | Status: SHIPPED
Start: 2024-03-08 | End: 2024-03-09 | Stop reason: SDUPTHER

## 2024-03-08 RX ORDER — FERROUS SULFATE 325(65) MG
325 TABLET ORAL
Qty: 90 TABLET | Refills: 1 | Status: SHIPPED
Start: 2024-03-08

## 2024-03-08 RX ORDER — CALCIUM CARBONATE 500 MG/1
500 TABLET, CHEWABLE ORAL 3 TIMES DAILY
Qty: 100 TABLET | Refills: 1 | Status: SHIPPED
Start: 2024-03-08

## 2024-03-08 NOTE — CASE COMMUNICATION
Quality Review Completed for Tx 3/6 OASIS by LEONEL Harkins RN on 3/7/2024:     Edits completed by LEONEL Harkins RN:  1.  is 3/5 date of admit/transfer per acute care record date of admission

## 2024-03-08 NOTE — PROGRESS NOTES
"Chief Complaint   Patient presents with    Hospital Follow-up       HISTORY OF THE PRESENT ILLNESS: Patient is a 89 y.o. female.     Patient comes in for follow-up after recent hospitalization.  She is brought in by her 2 daughters.  Patient is here in a wheelchair.  They report that she is nonambulatory.  She lives at an assisted living.  Her hospitalization was for epistaxis.  She had severe bleeding which resulted in significant anemia.  Her lowest hemoglobin was 6.6.  She was transfused 1 unit.  Bleeding was controlled with packing.  She was seen by ENT.      She reports that she feels \"horrible\".  She also has upper respiratory symptoms including runny nose and cough.  Her oxygen saturation is appropriate.    Allergies: Morphine, Cephalexin, Codeine, Metronidazole, and Sulfa drugs    Current Outpatient Medications Ordered in Epic   Medication Sig Dispense Refill    azithromycin (ZITHROMAX) 250 MG Tab Take 2 tabs on day one and 1 tabs on days 2-5 6 Tablet 0    calcium carbonate (TUMS) 500 MG Chew Tab Chew 1 Tablet 3 times a day. 100 Tablet 1    ferrous sulfate 325 (65 Fe) MG tablet Take 1 Tablet by mouth every Monday, Wednesday, and Friday. 90 Tablet 1    melatonin 5 mg Tab Take 2 Tablets by mouth at bedtime as needed (insomnia).      levothyroxine (SYNTHROID) 75 MCG Tab TAKE 1 TABLET BY MOUTH EVERY DAY 90 Tablet 3    amLODIPine (NORVASC) 5 MG Tab TAKE 1 TABLET BY MOUTH EVERY DAY 90 Tablet 3    sertraline (ZOLOFT) 50 MG Tab TAKE 1 TABLET BY MOUTH EVERY DAY 90 Tablet 3    Homeopathic Products (THERAWORX RELIEF) Foam Apply 1 Application topically 4 times a day as needed (pain in shoulders). apply to both shoulders as needed for pain after showers  Indications: pain in shoulders      Lidocaine (HM LIDOCAINE PATCH) 4 % Patch 1 Application by Percutaneous route 2 times a day. apply to painful areas 2 times a day as neede for moderate pain  Indications: Mild to Moderate Pain      lisinopril (PRINIVIL) 20 MG Tab TAKE " 1/2 TABLET BY MOUTH TWICE A DAY (Patient taking differently: Take 10 mg by mouth 2 times a day. TAKE 1/2 TABLET BY MOUTH TWICE A DAY  Indications: High Blood Pressure Disorder) 90 Tablet 3    Ibuprofen-Acetaminophen 125-250 MG Tab Take 2 Tablets by mouth 3 times a day as needed (pain). take up to 3 times a day  Indications: pain      Cholecalciferol (VITAMIN D3) 1.25 MG (43352 UT) Tab Take 1.25 mg by mouth every day. Indications: supplement      Apoaequorin 10 MG Cap Take 10 mg by mouth every day. Indications: memory loss      ondansetron (ZOFRAN ODT) 4 MG TABLET DISPERSIBLE Take 1 Tablet by mouth every 8 hours as needed for Nausea. 60 Tablet 0    dorzolamide-timolol (COSOPT) 2-0.5 % Solution Administer 1 Drop into both eyes every morning. Indications: Glaucoma      Famotidine-Ca Carb-Mag Hydrox -165 MG Chew Tab Chew 1 Tablet 1 time a day as needed (for indegestion). take if tums is not effective  Indications: Heartburn      diclofenac sodium (VOLTAREN) 1 % Gel Apply 2 g topically 2 times a day as needed (for mild to moderate pain). Indications: Joint Damage causing Pain and Loss of Function      Soft Lens Products (FRANCESCA SALINE) Solution Administer 1 Drop into both eyes every day. Indications: supplement      Multiple Vitamins-Minerals (PRESERVISION AREDS 2 PO) Take 1 Tablet by mouth every day. Indications: supplement      travoprost (TRAVATAN Z) 0.004 % Solution Administer 1 Drop into both eyes every evening. Indications: Wide-Angle Glaucoma      polyethylene glycol 3350 (MIRALAX) Powder Take 17 g by mouth every day. Indications: Constipation       No current Epic-ordered facility-administered medications on file.       Past medical history, social history and family history were reviewed from chart today    Review of systems: Per HPI.    All others negative.     Exam: /70 (BP Location: Left arm, Patient Position: Sitting, BP Cuff Size: Adult)   Pulse 75   Temp 36.8 °C (98.3 °F) (Temporal)   Resp 12    SpO2 97%   General: Raspy voice.  She is in a wheelchair.  She requires maximal assist with transfers.  HEENT: Grossly normal. Oral cavity is pink and moist.  Ears, canal and tympanic membrane are normal.  Nasal cavities are edematous with clear discharge.  Pulmonary: Clear with good breath sounds.  No rhonchi or wheezing  Cardiovascular: Regular. Carotid and radial pulses are intact.  Abdomen: Soft, nontender, nondistended. Spleen and liver are not enlarged.  Neurologic: Cranial nerves II through XII are grossly normal, alert and oriented x3      Diagnosis:  1. Anemia, blood loss        2. Hypocalcemia        3. General weakness        4. Acute non-recurrent maxillary sinusitis        5. Epistaxis        6. Frailty            89-year-old female.  She is posthospitalization.  Epistaxis issue appears stable.  She has significant anemia.  Discharge hemoglobin was 9 although I suspect it is lower.  This was after she received 1 unit of blood.  Patient had significant hypocalcemia while hospitalized.  This issue was not addressed.  I recommended starting Tums.  She is already on vitamin D.  She was not discharged on iron.  Recommend starting iron 3 times weekly.  I am avoiding daily due to constipation issues.  She has been on amoxicillin for upper respiratory symptoms with no improvement.  Recommend switching to azithromycin.    Her primary concern is her inability to care for herself.  She cannot perform any of her activities of daily living.  I believe she will require skilled nursing for 2-4 weeks.    My total time spent caring for the patient on the day of the encounter was  greater than 30 minutes.   This includes obtaining history, reviewing chart, physical exam, patient education, reviewing outside records, placing orders, interpreting tests and coordinating care.    Portions of this note were completed using voice recognition software (Dragon Naturally speaking software) . Occasional transcription errors may  have escaped proof reading. I have made every reasonable attempt to correct obvious errors, but I expect that there are errors of grammar and possibly content that I did not discover before finalizing the note.

## 2024-03-09 ENCOUNTER — HOME CARE VISIT (OUTPATIENT)
Dept: HOME HEALTH SERVICES | Facility: HOME HEALTHCARE | Age: 89
End: 2024-03-09
Payer: MEDICARE

## 2024-03-09 VITALS
BODY MASS INDEX: 22.54 KG/M2 | OXYGEN SATURATION: 98 % | RESPIRATION RATE: 16 BRPM | HEIGHT: 62 IN | TEMPERATURE: 98.4 F | SYSTOLIC BLOOD PRESSURE: 108 MMHG | HEART RATE: 68 BPM | WEIGHT: 122.5 LBS | DIASTOLIC BLOOD PRESSURE: 68 MMHG

## 2024-03-09 PROBLEM — R04.0 EPISTAXIS: Status: RESOLVED | Noted: 2024-03-04 | Resolved: 2024-03-09

## 2024-03-09 PROCEDURE — 665998 HH PPS REVENUE CREDIT

## 2024-03-09 PROCEDURE — 665003 FOLLOW UP-HOME HEALTH

## 2024-03-09 PROCEDURE — G0493 RN CARE EA 15 MIN HH/HOSPICE: HCPCS

## 2024-03-09 PROCEDURE — 665999 HH PPS REVENUE DEBIT

## 2024-03-09 RX ORDER — AZITHROMYCIN 250 MG/1
TABLET, FILM COATED ORAL
Qty: 6 TABLET | Refills: 0 | Status: SHIPPED | OUTPATIENT
Start: 2024-03-09 | End: 2024-03-25

## 2024-03-09 ASSESSMENT — ENCOUNTER SYMPTOMS
DEBILITATING PAIN: 1
HIGHEST PAIN SEVERITY IN PAST 24 HOURS: 4/10
PERSON REPORTING PAIN: PATIENT
PAIN LOCATION: BILATERAL SHOULDERS
PAIN LOCATION - PAIN QUALITY: HURTS""
PAIN LOCATION - PAIN FREQUENCY: WITH ACTIVITY
POOR JUDGMENT: 1
DESCRIPTION OF MEMORY LOSS: SHORT TERM
LAST BOWEL MOVEMENT: 66907
STOOL FREQUENCY: LESS THAN DAILY
PAIN SEVERITY GOAL: 0/10
DYSPNEA ACTIVITY LEVEL: AFTER AMBULATING 10 - 20 FT
PAIN LOCATION - PAIN SEVERITY: 4/10
LOWEST PAIN SEVERITY IN PAST 24 HOURS: 0/10
SUBJECTIVE PAIN PROGRESSION: UNCHANGED
FORGETFULNESS: 1
NAUSEA: DENIES
PAIN: 1
BOWEL PATTERN NORMAL: 1
SHORTNESS OF BREATH: 1

## 2024-03-09 ASSESSMENT — ACTIVITIES OF DAILY LIVING (ADL): OASIS_M1830: 06

## 2024-03-09 ASSESSMENT — FIBROSIS 4 INDEX: FIB4 SCORE: 3.23

## 2024-03-10 PROCEDURE — 665998 HH PPS REVENUE CREDIT

## 2024-03-10 PROCEDURE — 665999 HH PPS REVENUE DEBIT

## 2024-03-10 ASSESSMENT — ENCOUNTER SYMPTOMS
DYSPNEA ON EXERTION: 1
LOWER EXTREMITY EDEMA: 1

## 2024-03-11 ENCOUNTER — DOCUMENTATION (OUTPATIENT)
Dept: MEDICAL GROUP | Facility: PHYSICIAN GROUP | Age: 89
End: 2024-03-11
Payer: MEDICARE

## 2024-03-11 ENCOUNTER — HOME CARE VISIT (OUTPATIENT)
Dept: HOME HEALTH SERVICES | Facility: HOME HEALTHCARE | Age: 89
End: 2024-03-11
Payer: MEDICARE

## 2024-03-11 VITALS
RESPIRATION RATE: 20 BRPM | SYSTOLIC BLOOD PRESSURE: 130 MMHG | OXYGEN SATURATION: 99 % | DIASTOLIC BLOOD PRESSURE: 60 MMHG | TEMPERATURE: 99.4 F | HEART RATE: 60 BPM

## 2024-03-11 PROCEDURE — G0151 HHCP-SERV OF PT,EA 15 MIN: HCPCS

## 2024-03-11 PROCEDURE — G0179 MD RECERTIFICATION HHA PT: HCPCS | Performed by: INTERNAL MEDICINE

## 2024-03-11 PROCEDURE — 665998 HH PPS REVENUE CREDIT

## 2024-03-11 PROCEDURE — 665999 HH PPS REVENUE DEBIT

## 2024-03-11 ASSESSMENT — ACTIVITIES OF DAILY LIVING (ADL)
AMBULATION ASSISTANCE ON FLAT SURFACES: 1
AMBULATION_DISTANCE/DURATION_TOLERATED: 20 FEET

## 2024-03-11 ASSESSMENT — ENCOUNTER SYMPTOMS
LOWEST PAIN SEVERITY IN PAST 24 HOURS: 0/10
PAIN LOCATION - PAIN QUALITY: ACHE, SHARP
PERSON REPORTING PAIN: PATIENT
PAIN LOCATION - RELIEVING FACTORS: REST, MEDICATION
SUBJECTIVE PAIN PROGRESSION: WAXING AND WANING
PAIN LOCATION - PAIN FREQUENCY: FREQUENT
PAIN SEVERITY GOAL: 3/10
PAIN LOCATION: LEFT SHOULDER
PAIN: 1
PAIN LOCATION: RIGHT SHOULDER
PAIN LOCATION - PAIN SEVERITY: 6/10
PAIN LOCATION - EXACERBATING FACTORS: MOVEMENT
DEBILITATING PAIN: 1
DIFFICULTY THINKING: 1
PAIN LOCATION - PAIN DURATION: DAILY
PAIN LOCATION - PAIN FREQUENCY: FREQUENT
POOR JUDGMENT: 1
PAIN LOCATION - PAIN SEVERITY: 6/10
PAIN LOCATION - PAIN QUALITY: ACHE, SHARP
PAIN LOCATION - EXACERBATING FACTORS: MOVEMENT
HIGHEST PAIN SEVERITY IN PAST 24 HOURS: 7/10
PAIN LOCATION - PAIN DURATION: DAILY
PAIN LOCATION - RELIEVING FACTORS: REST, MEDICATION

## 2024-03-11 NOTE — Clinical Note
Noted.  ----- Message -----  From: Cristina Jaramillo, PT  Sent: 3/11/2024   6:18 PM PDT  To: Maria Luisa Song R.N.; Boo Hernandez; *      Pt has had a decline in functioning and cognition since hospitalization and I will follow 2 times a week for 4 weeks in skilled P.T. services and once pt is at prior levl of function will resume maintenance P.T. services.   In addition family would like palliative care evaluation/Tx to assist both pain management as well assessment further possible decline due to age/medical conditions with recommendations for hospice placement when appropriate.  Further insurance auth may be required.    Pt has 2 new medications - may need education

## 2024-03-11 NOTE — Clinical Note
Pt has had a decline in functioning and cognition since hospitalization and I will follow 2 times a week for 4 weeks in skilled P.T. services and once pt is at prior levl of function will resume maintenance P.T. services.   In addition family would like palliative care evaluation/Tx to assist both pain management as well assessment further possible decline due to age/medical conditions with recommendations for hospice placement when appropriate.  Further insurance auth may be required.    Pt has 2 new medications - may need education

## 2024-03-12 PROCEDURE — 665999 HH PPS REVENUE DEBIT

## 2024-03-12 PROCEDURE — 665998 HH PPS REVENUE CREDIT

## 2024-03-12 NOTE — HOME HEALTH
PHYSICAL THERAPY REASSESSMENT AND PLAN OF CARE · Patient: Gloria Patel · Patient was preserving her functional mobility and skills with maintenance physical therapy services until her hospitalization last week for nosebleeds and transfusion plus sinus infection.  Patient has shown significant decline in cognition and functional mobility.  She is now assisted with all functional mobility at least the supervised level but does need more physical assistance with showering.  She is also shown a decline in balance and strength.  Immediate standing balance is now poor to fair and had been fair to good.  Patient had better functional lower extremity strength where she was able to perform at least 15 consecutive sit/stands from her recliner using upper extremity support and now she requires multiple attempts to come to standing with 1 repetition. She did not have a fall since last assessment. In addition her bilateral shoulder pain continues to be somewhat limiting physical therapy treatment as well as her function.  Patient's family would like patient to have a palliative care evaluation and treatment to assist both pain management as well assessment further possible decline due to age/medical conditions with recommendations for hospice placement when appropriate.  Since patient has had significant decline in multiple areas she would benefit from skilled physical therapy to return to prior level of function if possible.  Patient is agreeable to begin skilled physical therapy treatment to improve balance, functional strength, activity tolerance and functional mobility.  Need: Decreased activity tolerance, Balance control, Generalized deconditioning, Gait training, Fall prevention, Pain control and Poor safety awareness. Recommend skilled HHPT to address deficits and improve function-report sent to MD · Frequency: 2 week for 4  weeks and reassess , Effective 3/11//2024 · Goals: Caregivers will continue to  verbalize understanding of home safety strategies/fall prevention measures and fall recovery related to altered mobility in 8 visits.  Patient will demonstrate at least fair immediate standing balance in 8 visits.  Patient/caregiver will continue to verbalize understand and implement pain control measures of use of heat and medication in 8 visits.  Patient will demonstrate ability to ambulate using 4WW independently within her apartment and at least 300 feet outside her apartment at no more than supervision and cue level to enable return to prior functional level in 8 visits.  Patient will demonstrate increased lower extremity muscle strength to allow for at least 15 consecutive sit/stands for purpose of returning to prior functional level in 8 visits.       How often (if at all) does pain interfere with patient's movements? Pain limits sleep and function on a daily but not constant basis

## 2024-03-13 ENCOUNTER — HOME CARE VISIT (OUTPATIENT)
Dept: HOME HEALTH SERVICES | Facility: HOME HEALTHCARE | Age: 89
End: 2024-03-13
Payer: MEDICARE

## 2024-03-13 VITALS
RESPIRATION RATE: 22 BRPM | DIASTOLIC BLOOD PRESSURE: 58 MMHG | TEMPERATURE: 84 F | OXYGEN SATURATION: 9 % | SYSTOLIC BLOOD PRESSURE: 100 MMHG

## 2024-03-13 PROCEDURE — G0151 HHCP-SERV OF PT,EA 15 MIN: HCPCS

## 2024-03-13 PROCEDURE — 665999 HH PPS REVENUE DEBIT

## 2024-03-13 PROCEDURE — 665998 HH PPS REVENUE CREDIT

## 2024-03-13 PROCEDURE — G0493 RN CARE EA 15 MIN HH/HOSPICE: HCPCS

## 2024-03-13 ASSESSMENT — ENCOUNTER SYMPTOMS
SUBJECTIVE PAIN PROGRESSION: UNCHANGED
PAIN LOCATION - PAIN DURATION: DAILY
PAIN LOCATION - RELIEVING FACTORS: REST, HEAT, MEDICATION
PERSON REPORTING PAIN: PATIENT
PAIN LOCATION: LEFT SHOULDER
PAIN LOCATION - EXACERBATING FACTORS: MOVEMENT
PAIN LOCATION - PAIN QUALITY: ACHE, SHARP
PAIN LOCATION - PAIN QUALITY: ACHE, SHARP
PAIN: 1
DIFFICULTY THINKING: 1
PAIN LOCATION - PAIN FREQUENCY: FREQUENT
DESCRIPTION OF MEMORY LOSS: LONG TERM
PAIN LOCATION - EXACERBATING FACTORS: MOVEMENT
PAIN SEVERITY GOAL: 2/10
PAIN LOCATION - PAIN FREQUENCY: FREQUENT
VOMITING: DENIES
LAST BOWEL MOVEMENT: 66912
PAIN: 1
PERSON REPORTING PAIN: PATIENT
HIGHEST PAIN SEVERITY IN PAST 24 HOURS: 6/10
PAIN LOCATION - PAIN SEVERITY: 6/10
PAIN LOCATION - RELIEVING FACTORS: REST, HEAT, MEDICATION
NAUSEA: DENIES
PAIN LOCATION - PAIN SEVERITY: 6/10
LOWEST PAIN SEVERITY IN PAST 24 HOURS: 0/10
SUBJECTIVE PAIN PROGRESSION: WAXING AND WANING
STOOL FREQUENCY: LESS THAN DAILY
HIGHEST PAIN SEVERITY IN PAST 24 HOURS: 6/10
DEBILITATING PAIN: 1
PAIN LOCATION: RIGHT SHOULDER
PAIN LOCATION - PAIN DURATION: DAILY
DESCRIPTION OF MEMORY LOSS: SHORT TERM
PAIN SEVERITY GOAL: 0/10
LOWEST PAIN SEVERITY IN PAST 24 HOURS: 6/10
BOWEL PATTERN NORMAL: 1
POOR JUDGMENT: 1
MUSCLE WEAKNESS: 1

## 2024-03-14 PROCEDURE — 665998 HH PPS REVENUE CREDIT

## 2024-03-14 PROCEDURE — 665999 HH PPS REVENUE DEBIT

## 2024-03-15 PROCEDURE — 665999 HH PPS REVENUE DEBIT

## 2024-03-15 PROCEDURE — 665998 HH PPS REVENUE CREDIT

## 2024-03-16 PROCEDURE — 665999 HH PPS REVENUE DEBIT

## 2024-03-16 PROCEDURE — 665998 HH PPS REVENUE CREDIT

## 2024-03-17 PROCEDURE — 665999 HH PPS REVENUE DEBIT

## 2024-03-17 PROCEDURE — 665998 HH PPS REVENUE CREDIT

## 2024-03-18 ENCOUNTER — HOME CARE VISIT (OUTPATIENT)
Dept: HOME HEALTH SERVICES | Facility: HOME HEALTHCARE | Age: 89
End: 2024-03-18
Payer: MEDICARE

## 2024-03-18 VITALS
RESPIRATION RATE: 24 BRPM | OXYGEN SATURATION: 99 % | SYSTOLIC BLOOD PRESSURE: 110 MMHG | HEART RATE: 76 BPM | TEMPERATURE: 99.4 F | DIASTOLIC BLOOD PRESSURE: 50 MMHG

## 2024-03-18 PROCEDURE — 665999 HH PPS REVENUE DEBIT

## 2024-03-18 PROCEDURE — 665998 HH PPS REVENUE CREDIT

## 2024-03-18 PROCEDURE — G0151 HHCP-SERV OF PT,EA 15 MIN: HCPCS

## 2024-03-18 ASSESSMENT — ENCOUNTER SYMPTOMS
PAIN LOCATION - PAIN QUALITY: ACHE, SHARP
LOWEST PAIN SEVERITY IN PAST 24 HOURS: 0/10
PAIN LOCATION: RIGHT SHOULDER
PAIN LOCATION: LEFT SHOULDER
PERSON REPORTING PAIN: PATIENT
PAIN LOCATION - EXACERBATING FACTORS: MOVEMENT
PAIN LOCATION - PAIN QUALITY: ACHE, SHARP
DIFFICULTY THINKING: 1
PAIN LOCATION - RELIEVING FACTORS: REST, MEDICATION, HEAT
PAIN: PAIN LIMITS SLEEP AND FUNCTION DAILY NOT CONSTANT
SUBJECTIVE PAIN PROGRESSION: WAXING AND WANING
DEBILITATING PAIN: 1
PAIN LOCATION - PAIN FREQUENCY: FREQUENT
DEPRESSED MOOD: 1
PAIN LOCATION - PAIN SEVERITY: 6/10
PAIN LOCATION - PAIN DURATION: DAILY
POOR JUDGMENT: 1
PAIN LOCATION - PAIN SEVERITY: 6/10
PAIN LOCATION - PAIN FREQUENCY: FREQUENT
PAIN LOCATION - RELIEVING FACTORS: REST, MEDICATION, HEAT
HIGHEST PAIN SEVERITY IN PAST 24 HOURS: 8/10
PAIN LOCATION - PAIN DURATION: DAILY
PAIN: 1
PAIN LOCATION - EXACERBATING FACTORS: MOVEMENT

## 2024-03-19 PROCEDURE — 665998 HH PPS REVENUE CREDIT

## 2024-03-19 PROCEDURE — 665999 HH PPS REVENUE DEBIT

## 2024-03-20 ENCOUNTER — HOME CARE VISIT (OUTPATIENT)
Dept: HOME HEALTH SERVICES | Facility: HOME HEALTHCARE | Age: 89
End: 2024-03-20
Payer: MEDICARE

## 2024-03-20 VITALS
OXYGEN SATURATION: 100 % | HEART RATE: 64 BPM | RESPIRATION RATE: 18 BRPM | TEMPERATURE: 98.3 F | SYSTOLIC BLOOD PRESSURE: 106 MMHG | DIASTOLIC BLOOD PRESSURE: 62 MMHG

## 2024-03-20 PROCEDURE — G0299 HHS/HOSPICE OF RN EA 15 MIN: HCPCS

## 2024-03-20 PROCEDURE — 665998 HH PPS REVENUE CREDIT

## 2024-03-20 PROCEDURE — 665999 HH PPS REVENUE DEBIT

## 2024-03-20 RX ORDER — MELOXICAM 7.5 MG/1
7.5 TABLET ORAL DAILY
Qty: 30 TABLET | Refills: 3 | Status: SHIPPED
Start: 2024-03-20

## 2024-03-20 ASSESSMENT — ENCOUNTER SYMPTOMS
FATIGUES EASILY: 1
PERSON REPORTING PAIN: PATIENT
PAIN LOCATION - PAIN FREQUENCY: FREQUENT
DRY SKIN: 1
LIMITED RANGE OF MOTION: 1
CONSTIPATION: 1
SUBJECTIVE PAIN PROGRESSION: UNCHANGED
LOWEST PAIN SEVERITY IN PAST 24 HOURS: 5/10
STOOL FREQUENCY: LESS THAN DAILY
PAIN LOCATION: SHOULDERS
PAIN: 1
FATIGUE: 1
FORGETFULNESS: 1
PAIN LOCATION - PAIN QUALITY: ACHY
HIGHEST PAIN SEVERITY IN PAST 24 HOURS: 6/10
LAST BOWEL MOVEMENT: 66918
PAIN LOCATION - PAIN SEVERITY: 6/10
LOWER EXTREMITY EDEMA: 1
PAIN SEVERITY GOAL: 1/10
PAIN LOCATION - PAIN DURATION: DAILY
ARTHRALGIAS: 1
MUSCLE WEAKNESS: 1

## 2024-03-20 ASSESSMENT — PATIENT HEALTH QUESTIONNAIRE - PHQ9: CLINICAL INTERPRETATION OF PHQ2 SCORE: 0

## 2024-03-21 PROCEDURE — 665998 HH PPS REVENUE CREDIT

## 2024-03-21 PROCEDURE — 665999 HH PPS REVENUE DEBIT

## 2024-03-22 ENCOUNTER — HOME CARE VISIT (OUTPATIENT)
Dept: HOME HEALTH SERVICES | Facility: HOME HEALTHCARE | Age: 89
End: 2024-03-22
Payer: MEDICARE

## 2024-03-22 VITALS
HEART RATE: 56 BPM | OXYGEN SATURATION: 99 % | SYSTOLIC BLOOD PRESSURE: 110 MMHG | TEMPERATURE: 99 F | RESPIRATION RATE: 20 BRPM | DIASTOLIC BLOOD PRESSURE: 54 MMHG

## 2024-03-22 PROCEDURE — 665998 HH PPS REVENUE CREDIT

## 2024-03-22 PROCEDURE — 665999 HH PPS REVENUE DEBIT

## 2024-03-22 PROCEDURE — G0151 HHCP-SERV OF PT,EA 15 MIN: HCPCS

## 2024-03-22 ASSESSMENT — ENCOUNTER SYMPTOMS
PAIN LOCATION: LEFT SHOULDER
PAIN LOCATION - PAIN QUALITY: ACHE
PERSON REPORTING PAIN: PATIENT
PAIN LOCATION - PAIN SEVERITY: 6/10
DIFFICULTY THINKING: 1
PAIN: 1
PAIN LOCATION - PAIN FREQUENCY: FREQUENT
PAIN LOCATION - RELIEVING FACTORS: REST, MEDICATION
PAIN LOCATION - PAIN DURATION: DAILY
DEBILITATING PAIN: 1
PAIN: PAIN LIMITS FUNCTION AND SLEEP DAILY NOT CONSTANT
PAIN SEVERITY GOAL: 3/10
LOWEST PAIN SEVERITY IN PAST 24 HOURS: 0/10
POOR JUDGMENT: 1
SUBJECTIVE PAIN PROGRESSION: WAXING AND WANING
PAIN LOCATION - EXACERBATING FACTORS: MOVEMENT
HIGHEST PAIN SEVERITY IN PAST 24 HOURS: 6/10

## 2024-03-23 PROCEDURE — 665998 HH PPS REVENUE CREDIT

## 2024-03-23 PROCEDURE — 665999 HH PPS REVENUE DEBIT

## 2024-03-24 PROCEDURE — 665998 HH PPS REVENUE CREDIT

## 2024-03-24 PROCEDURE — 665999 HH PPS REVENUE DEBIT

## 2024-03-25 ENCOUNTER — HOSPITAL ENCOUNTER (OUTPATIENT)
Facility: MEDICAL CENTER | Age: 89
End: 2024-03-25
Attending: INTERNAL MEDICINE
Payer: MEDICARE

## 2024-03-25 ENCOUNTER — HOME CARE VISIT (OUTPATIENT)
Dept: HOME HEALTH SERVICES | Facility: HOME HEALTHCARE | Age: 89
End: 2024-03-25
Payer: MEDICARE

## 2024-03-25 ENCOUNTER — OFFICE VISIT (OUTPATIENT)
Dept: INTERNAL MEDICINE | Facility: IMAGING CENTER | Age: 89
End: 2024-03-25
Payer: MEDICARE

## 2024-03-25 VITALS
TEMPERATURE: 99.4 F | HEIGHT: 62 IN | HEART RATE: 61 BPM | OXYGEN SATURATION: 98 % | DIASTOLIC BLOOD PRESSURE: 64 MMHG | BODY MASS INDEX: 22.45 KG/M2 | WEIGHT: 122 LBS | RESPIRATION RATE: 14 BRPM | SYSTOLIC BLOOD PRESSURE: 106 MMHG

## 2024-03-25 VITALS
DIASTOLIC BLOOD PRESSURE: 64 MMHG | RESPIRATION RATE: 24 BRPM | TEMPERATURE: 99.5 F | HEART RATE: 24 BPM | OXYGEN SATURATION: 95 % | SYSTOLIC BLOOD PRESSURE: 106 MMHG

## 2024-03-25 DIAGNOSIS — E03.9 HYPOTHYROIDISM, UNSPECIFIED TYPE: ICD-10-CM

## 2024-03-25 DIAGNOSIS — F32.0 MAJOR DEPRESSIVE DISORDER, SINGLE EPISODE, MILD (HCC): ICD-10-CM

## 2024-03-25 DIAGNOSIS — M19.012 PRIMARY OSTEOARTHRITIS OF BOTH SHOULDERS: ICD-10-CM

## 2024-03-25 DIAGNOSIS — G31.84 MCI (MILD COGNITIVE IMPAIRMENT): ICD-10-CM

## 2024-03-25 DIAGNOSIS — M19.011 PRIMARY OSTEOARTHRITIS OF BOTH SHOULDERS: ICD-10-CM

## 2024-03-25 DIAGNOSIS — D64.9 ANEMIA, UNSPECIFIED TYPE: ICD-10-CM

## 2024-03-25 DIAGNOSIS — I10 PRIMARY HYPERTENSION: ICD-10-CM

## 2024-03-25 DIAGNOSIS — Z00.00 MEDICARE ANNUAL WELLNESS VISIT, SUBSEQUENT: ICD-10-CM

## 2024-03-25 DIAGNOSIS — E83.51 HYPOCALCEMIA: ICD-10-CM

## 2024-03-25 DIAGNOSIS — J31.0 CHRONIC RHINITIS: ICD-10-CM

## 2024-03-25 LAB
25(OH)D3 SERPL-MCNC: 81 NG/ML (ref 30–100)
ANION GAP SERPL CALC-SCNC: 12 MMOL/L (ref 7–16)
ANISOCYTOSIS BLD QL SMEAR: ABNORMAL
BASOPHILS # BLD AUTO: 1.7 % (ref 0–1.8)
BASOPHILS # BLD: 0.13 K/UL (ref 0–0.12)
BUN SERPL-MCNC: 17 MG/DL (ref 8–22)
CALCIUM SERPL-MCNC: 8.6 MG/DL (ref 8.5–10.5)
CHLORIDE SERPL-SCNC: 98 MMOL/L (ref 96–112)
CO2 SERPL-SCNC: 21 MMOL/L (ref 20–33)
CREAT SERPL-MCNC: 0.79 MG/DL (ref 0.5–1.4)
EOSINOPHIL # BLD AUTO: 0.07 K/UL (ref 0–0.51)
EOSINOPHIL NFR BLD: 0.9 % (ref 0–6.9)
ERYTHROCYTE [DISTWIDTH] IN BLOOD BY AUTOMATED COUNT: 59.5 FL (ref 35.9–50)
FERRITIN SERPL-MCNC: 187 NG/ML (ref 10–291)
GFR SERPLBLD CREATININE-BSD FMLA CKD-EPI: 67 ML/MIN/1.73 M 2
GLUCOSE SERPL-MCNC: 106 MG/DL (ref 65–99)
HCT VFR BLD AUTO: 25.2 % (ref 37–47)
HGB BLD-MCNC: 8.1 G/DL (ref 12–16)
IRON SATN MFR SERPL: 51 % (ref 15–55)
IRON SERPL-MCNC: 108 UG/DL (ref 40–170)
LYMPHOCYTES # BLD AUTO: 1.72 K/UL (ref 1–4.8)
LYMPHOCYTES NFR BLD: 22.4 % (ref 22–41)
MACROCYTES BLD QL SMEAR: ABNORMAL
MANUAL DIFF BLD: NORMAL
MCH RBC QN AUTO: 29.7 PG (ref 27–33)
MCHC RBC AUTO-ENTMCNC: 32.1 G/DL (ref 32.2–35.5)
MCV RBC AUTO: 92.3 FL (ref 81.4–97.8)
METAMYELOCYTES NFR BLD MANUAL: 1.7 %
MICROCYTES BLD QL SMEAR: ABNORMAL
MONOCYTES # BLD AUTO: 0.13 K/UL (ref 0–0.85)
MONOCYTES NFR BLD AUTO: 1.7 % (ref 0–13.4)
MORPHOLOGY BLD-IMP: NORMAL
NEUTROPHILS # BLD AUTO: 5.51 K/UL (ref 1.82–7.42)
NEUTROPHILS NFR BLD: 71.6 % (ref 44–72)
NRBC # BLD AUTO: 0 K/UL
NRBC BLD-RTO: 0 /100 WBC (ref 0–0.2)
OVALOCYTES BLD QL SMEAR: NORMAL
PLATELET # BLD AUTO: 251 K/UL (ref 164–446)
PLATELET BLD QL SMEAR: NORMAL
PMV BLD AUTO: 9.6 FL (ref 9–12.9)
POIKILOCYTOSIS BLD QL SMEAR: NORMAL
POTASSIUM SERPL-SCNC: 4.5 MMOL/L (ref 3.6–5.5)
RBC # BLD AUTO: 2.73 M/UL (ref 4.2–5.4)
RBC BLD AUTO: PRESENT
SODIUM SERPL-SCNC: 131 MMOL/L (ref 135–145)
TIBC SERPL-MCNC: 211 UG/DL (ref 250–450)
UIBC SERPL-MCNC: 103 UG/DL (ref 110–370)
WBC # BLD AUTO: 7.7 K/UL (ref 4.8–10.8)

## 2024-03-25 PROCEDURE — 83550 IRON BINDING TEST: CPT

## 2024-03-25 PROCEDURE — 82728 ASSAY OF FERRITIN: CPT

## 2024-03-25 PROCEDURE — 83540 ASSAY OF IRON: CPT

## 2024-03-25 PROCEDURE — 82306 VITAMIN D 25 HYDROXY: CPT

## 2024-03-25 PROCEDURE — 3078F DIAST BP <80 MM HG: CPT | Performed by: INTERNAL MEDICINE

## 2024-03-25 PROCEDURE — 665998 HH PPS REVENUE CREDIT

## 2024-03-25 PROCEDURE — 85027 COMPLETE CBC AUTOMATED: CPT

## 2024-03-25 PROCEDURE — G0151 HHCP-SERV OF PT,EA 15 MIN: HCPCS

## 2024-03-25 PROCEDURE — 665999 HH PPS REVENUE DEBIT

## 2024-03-25 PROCEDURE — G0439 PPPS, SUBSEQ VISIT: HCPCS | Performed by: INTERNAL MEDICINE

## 2024-03-25 PROCEDURE — G0299 HHS/HOSPICE OF RN EA 15 MIN: HCPCS

## 2024-03-25 PROCEDURE — 80048 BASIC METABOLIC PNL TOTAL CA: CPT

## 2024-03-25 PROCEDURE — 85007 BL SMEAR W/DIFF WBC COUNT: CPT

## 2024-03-25 PROCEDURE — 3074F SYST BP LT 130 MM HG: CPT | Performed by: INTERNAL MEDICINE

## 2024-03-25 RX ORDER — SENNOSIDES 8.6 MG
CAPSULE ORAL
Qty: 100 TABLET | Refills: 3 | Status: SHIPPED
Start: 2024-03-25

## 2024-03-25 ASSESSMENT — ENCOUNTER SYMPTOMS
HIGHEST PAIN SEVERITY IN PAST 24 HOURS: 7/10
DIFFICULTY THINKING: 1
PAIN LOCATION: LEFT SHOULDER
PAIN LOCATION - RELIEVING FACTORS: REST, HEAT, MEDICATION
PERSON REPORTING PAIN: PATIENT
GENERAL WELL-BEING: FAIR
PAIN LOCATION - PAIN QUALITY: ACHE, SHARP
PAIN LOCATION - PAIN FREQUENCY: FREQUENT
LOWEST PAIN SEVERITY IN PAST 24 HOURS: 0/10
DEBILITATING PAIN: 1
PAIN LOCATION - EXACERBATING FACTORS: MOVEMENT
PAIN: 1
POOR JUDGMENT: 1
PAIN LOCATION - PAIN SEVERITY: 6/10
PAIN LOCATION - PAIN DURATION: DAILY
SUBJECTIVE PAIN PROGRESSION: WAXING AND WANING

## 2024-03-25 ASSESSMENT — PATIENT HEALTH QUESTIONNAIRE - PHQ9: CLINICAL INTERPRETATION OF PHQ2 SCORE: 0

## 2024-03-25 ASSESSMENT — ACTIVITIES OF DAILY LIVING (ADL): BATHING_REQUIRES_ASSISTANCE: 1

## 2024-03-25 ASSESSMENT — FIBROSIS 4 INDEX: FIB4 SCORE: 3.23

## 2024-03-25 NOTE — CASE COMMUNICATION
I agree with these changes  ----- Message -----  From: iKara Harkins R.N.  Sent: 3/25/2024   6:11 AM PDT  To: Lily Gann R.N.      Quality Review Completed for [facility] by LEONEL Harkins, RN on 3/18/2024:     Edits completed by LEONEL Harkins RN:     1.  and  diagnosis coding updated per chart review  2.  is 3/7 per intake for valid referral   3.  is D. Therapeutic diet for HTN management  4.  changed to 3 pe r 485 functional limitation documentation  5. BV4190 E changed to dependent per  documentation  6. [id number] DC goal changed to #4 per PT goals 'demonstrate ability to ambulate at least 300 feet using 4WW at no more than supervision '  7.  changed to 3/11 for PT collaboration  8.  indication noted for ABX  9. [id number] changed to DC goal of max assist for Car Transfers  10. [id number] changed to #3 per narrative

## 2024-03-25 NOTE — CASE COMMUNICATION
Quality Review Completed for [facility] OASIS by LEONEL Harkins, RN on 3/18/2024:     Edits completed by LEONEL Harkins RN:     1.  and  diagnosis coding updated per chart review  2.  is 3/7 per intake for valid referral   3.  is D. Therapeutic diet for HTN management  4.  changed to 3 per 485 functional limitation documentation  5. AS5614 E changed to dependent per  documentation  6. [id number] DC goal changed to #4 per PT go als 'demonstrate ability to ambulate at least 300 feet using 4WW at no more than supervision '  7.  changed to 3/11 for PT collaboration  8.  indication noted for ABX  9. [id number] changed to DC goal of max assist for Car Transfers  10. [id number] changed to #3 per narrative

## 2024-03-25 NOTE — PROGRESS NOTES
89 y.o. female presents for the following:    Patient comes in for annual health risk assessment, physical and review of laboratory.  She considers herself in fair health.  She was recently hospitalized for severe epistaxis.  This may have been related to Flonase use.  She has a history of depression.  She is currently on Zoloft.  She denies cognitive issues but admits to short-term memory problems.  She has balance issues and currently has home health in place for physical therapy.    Her primary complaint is ongoing pain in her shoulders from her osteoarthritis.  She was recently started on meloxicam.  She also takes Tylenol in the evening.    He has a history of hypertension hypothyroid.  Both of these have been under good control with current medications.    Patient has anemia.  This is related to recent hospitalization and epistaxis.  Her hemoglobin is improving prior to discharge.  She is currently on 1 iron tablet weekly.  She is intolerant of a higher dose because of constipation issues.    Patient hypokalemia while in the hospital.  No further workup was done.  She is now on vitamin D and calcium carbonate.    Patient was unable to recall any of the 3 objects and was unable to complete a clock.  She is short-term memory issues.    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 Continue with current defined treatment plan: Continue Zoloft. Follow-up at least annually.  Last edited 03/25/24 12:04 PDT by Mike Otero M.D.           Annual Wellness Visit/Health Risk Assessment:    Past medical:  Past Medical History:   Diagnosis Date    Arthritis     Diverticulitis     medicated    GERD (gastroesophageal reflux disease)     medicated    Glaucoma     Heart burn     Hiatus hernia syndrome     Hypertension     medicated    Primary osteoarthritis of both shoulders 1/22/2019    Unspecified urinary incontinence        Past  surgical:  Past Surgical History:   Procedure Laterality Date    ORIF, ANKLE Right 12/22/2021    Procedure: OPEN REDUCTION AND INTERNAL FIXATION, ANKLE;  Surgeon: Pop Causey M.D.;  Location: SURGERY [facility];  Service: Orthopedics    HIP ARTHROPLASTY TOTAL  6/21/2011    Performed by AMPARO CRAFT at SURGERY [facility] ORS    OTHER ORTHOPEDIC SURGERY  2000    back surgery    GYN SURGERY  1970    hysterectomy    CHOLECYSTECTOMY  1966    rahat    OTHER  1954    mikey. breast bx    OTHER ABDOMINAL SURGERY  1954    kidney tacked up ?    OTHER  1945    tonsilectomy    OTHER ORTHOPEDIC SURGERY      osteoporosis - medicated    US-NEEDLE CORE BX-BREAST PANEL         Family history: relating to possible risk factors for your patient  Family History   Problem Relation Age of Onset    Diabetes Other     Heart Disease Other     Lung Disease Other     Cancer Sister        Current Providers (including home care/DME’s):   Colonoscopy No repeat recommended  2/16/18  FIT NEG Dexa 9/22/15  Osteoporosis   Mammo 5/17/19 Cat 2 A1c 1/9/13  5.7   [facility] ENT-El[facility]  [facility]    Patient Care Team:  Mike Otero M.D. as PCP - General  Khloe Jones R.N. (Inactive) as Registered Nurse  [facility] as Home Health Provider  [facility] as Home Health Provider  SELWYN James R.N.  [facility] as Home Health Provider (Home Health)      Medications:   Current Outpatient Medications Ordered in Epic   Medication Sig Dispense Refill    hydrocortisone 2.5 % Cream topical cream Apply thin layer to rash on face daily 20 g 1    acetaminophen (TYLENOL 8 HOUR ARTHRITIS PAIN) 650 MG CR tablet Take 1 tab in AM and at lunch. Take 2 tabs at bedtime. 100 Tablet 3    meloxicam (MOBIC) 7.5 MG Tab Take 1 Tablet by mouth every day. 30 Tablet 3    CRANBERRY PO Take 504 mg by mouth 2 times a day. Indications: supplement      Magnesium Hydroxide (MILK OF  MAGNESIA PO) Take 30 mL by mouth 1 time a day as needed (constipation). take daily by mouth if miralax is not effective for constipation  Indications: constipation      latanoprost (XALATAN) 0.005 % Solution Administer 1 Drop into both eyes every evening. Indications: Wide-Angle Glaucoma      calcium carbonate (TUMS) 500 MG Chew Tab Chew 1 Tablet 3 times a day. 100 Tablet 1    ferrous sulfate 325 (65 Fe) MG tablet Take 1 Tablet by mouth every Monday, Wednesday, and Friday. (Patient taking differently: Take 325 mg by mouth every 7 days. Indications: Anemia From Inadequate Iron in the Body) 90 Tablet 1    melatonin 5 mg Tab Take 2 Tablets by mouth at bedtime as needed (insomnia).      levothyroxine (SYNTHROID) 75 MCG Tab TAKE 1 TABLET BY MOUTH EVERY DAY 90 Tablet 3    amLODIPine (NORVASC) 5 MG Tab TAKE 1 TABLET BY MOUTH EVERY DAY 90 Tablet 3    sertraline (ZOLOFT) 50 MG Tab TAKE 1 TABLET BY MOUTH EVERY DAY 90 Tablet 3    Lidocaine (HM LIDOCAINE PATCH) 4 % Patch 1 Application by Percutaneous route 2 times a day. apply to painful areas 2 times a day as neede for moderate pain  Indications: Mild to Moderate Pain      lisinopril (PRINIVIL) 20 MG Tab TAKE 1/2 TABLET BY MOUTH TWICE A DAY (Patient taking differently: Take 10 mg by mouth 2 times a day. TAKE 1/2 TABLET BY MOUTH TWICE A DAY  Indications: High Blood Pressure Disorder) 90 Tablet 3    Cholecalciferol (VITAMIN D3) 1.25 MG (34870 UT) Tab Take 1.25 mg by mouth every day. Indications: supplement      Apoaequorin 10 MG Cap Take 10 mg by mouth every day. Indications: memory loss      ondansetron (ZOFRAN ODT) 4 MG TABLET DISPERSIBLE Take 1 Tablet by mouth every 8 hours as needed for Nausea. 60 Tablet 0    dorzolamide-timolol (COSOPT) 2-0.5 % Solution Administer 1 Drop into both eyes every morning. Indications: Glaucoma      Famotidine-Ca Carb-Mag Hydrox -165 MG Chew Tab Chew 1 Tablet 1 time a day as needed (for indegestion). take if tums is not effective   Indications: Heartburn      diclofenac sodium (VOLTAREN) 1 % Gel Apply 2 g topically 2 times a day as needed (for mild to moderate pain). Indications: Joint Damage causing Pain and Loss of Function      Soft Lens Products (FRANCESCA SALINE) Solution Administer 1 Drop into both eyes every day. Indications: supplement      Multiple Vitamins-Minerals (PRESERVISION AREDS 2 PO) Take 1 Tablet by mouth every day. Indications: supplement      polyethylene glycol 3350 (MIRALAX) Powder Take 17 g by mouth every day. Indications: Constipation      Homeopathic Products (THERAWORX RELIEF) Foam Apply 1 Application topically 4 times a day as needed (pain in shoulders). apply to both shoulders as needed for pain after showers  Indications: pain in shoulders (Patient not taking: Reported on 3/25/2024)       No current Epic-ordered facility-administered medications on file.       Supplements (calcium/vitamins): if not lisited in medications    Chief Complaint   Patient presents with    Annual Exam         HPI:  Gloria Patel is a 89 y.o. here for Medicare Annual Wellness Visit     Patient Active Problem List    Diagnosis Date Noted    Advance care planning 03/04/2024    Dementia (HCC) 03/04/2024    Major depressive disorder, single episode, mild (HCC) 02/02/2023    Pulmonary hypertension, unspecified (HCC) 02/02/2023    Carpal tunnel syndrome of right wrist 10/10/2022    Osteoarthritis of right hand 10/10/2022    Age-related physical debility 01/13/2022    Sundowning 12/27/2021    Fracture of right ankle 12/26/2021    Recurrent UTI 01/20/2020    Renal cyst 09/28/2019    Bradycardia 08/24/2019    Primary osteoarthritis of both shoulders 01/22/2019    TIA (transient ischemic attack) 04/28/2016    Primary hypertension 04/28/2016    Vision changes 04/28/2016    Osteoporosis 09/08/2015    Gastroesophageal reflux disease without esophagitis 05/22/2012    Spondylolisthesis of lumbar region 05/22/2012    Intervertebral disc protrusion  05/22/2012    Hyperlipidemia 03/07/2012    Hiatal hernia 11/09/2010    Hip pain 08/24/2010    Acquired hypothyroidism 08/24/2010    Overactive bladder 08/24/2010       Current Outpatient Medications   Medication Sig Dispense Refill    hydrocortisone 2.5 % Cream topical cream Apply thin layer to rash on face daily 20 g 1    acetaminophen (TYLENOL 8 HOUR ARTHRITIS PAIN) 650 MG CR tablet Take 1 tab in AM and at lunch. Take 2 tabs at bedtime. 100 Tablet 3    meloxicam (MOBIC) 7.5 MG Tab Take 1 Tablet by mouth every day. 30 Tablet 3    CRANBERRY PO Take 504 mg by mouth 2 times a day. Indications: supplement      Magnesium Hydroxide (MILK OF MAGNESIA PO) Take 30 mL by mouth 1 time a day as needed (constipation). take daily by mouth if miralax is not effective for constipation  Indications: constipation      latanoprost (XALATAN) 0.005 % Solution Administer 1 Drop into both eyes every evening. Indications: Wide-Angle Glaucoma      calcium carbonate (TUMS) 500 MG Chew Tab Chew 1 Tablet 3 times a day. 100 Tablet 1    ferrous sulfate 325 (65 Fe) MG tablet Take 1 Tablet by mouth every Monday, Wednesday, and Friday. (Patient taking differently: Take 325 mg by mouth every 7 days. Indications: Anemia From Inadequate Iron in the Body) 90 Tablet 1    melatonin 5 mg Tab Take 2 Tablets by mouth at bedtime as needed (insomnia).      levothyroxine (SYNTHROID) 75 MCG Tab TAKE 1 TABLET BY MOUTH EVERY DAY 90 Tablet 3    amLODIPine (NORVASC) 5 MG Tab TAKE 1 TABLET BY MOUTH EVERY DAY 90 Tablet 3    sertraline (ZOLOFT) 50 MG Tab TAKE 1 TABLET BY MOUTH EVERY DAY 90 Tablet 3    Lidocaine (HM LIDOCAINE PATCH) 4 % Patch 1 Application by Percutaneous route 2 times a day. apply to painful areas 2 times a day as neede for moderate pain  Indications: Mild to Moderate Pain      lisinopril (PRINIVIL) 20 MG Tab TAKE 1/2 TABLET BY MOUTH TWICE A DAY (Patient taking differently: Take 10 mg by mouth 2 times a day. TAKE 1/2 TABLET BY MOUTH TWICE A DAY   Indications: High Blood Pressure Disorder) 90 Tablet 3    Cholecalciferol (VITAMIN D3) 1.25 MG (14117 UT) Tab Take 1.25 mg by mouth every day. Indications: supplement      Apoaequorin 10 MG Cap Take 10 mg by mouth every day. Indications: memory loss      ondansetron (ZOFRAN ODT) 4 MG TABLET DISPERSIBLE Take 1 Tablet by mouth every 8 hours as needed for Nausea. 60 Tablet 0    dorzolamide-timolol (COSOPT) 2-0.5 % Solution Administer 1 Drop into both eyes every morning. Indications: Glaucoma      Famotidine-Ca Carb-Mag Hydrox -165 MG Chew Tab Chew 1 Tablet 1 time a day as needed (for indegestion). take if tums is not effective  Indications: Heartburn      diclofenac sodium (VOLTAREN) 1 % Gel Apply 2 g topically 2 times a day as needed (for mild to moderate pain). Indications: Joint Damage causing Pain and Loss of Function      Soft Lens Products (FRANCESCA SALINE) Solution Administer 1 Drop into both eyes every day. Indications: supplement      Multiple Vitamins-Minerals (PRESERVISION AREDS 2 PO) Take 1 Tablet by mouth every day. Indications: supplement      polyethylene glycol 3350 (MIRALAX) Powder Take 17 g by mouth every day. Indications: Constipation      Homeopathic Products (THERAWORX RELIEF) Foam Apply 1 Application topically 4 times a day as needed (pain in shoulders). apply to both shoulders as needed for pain after showers  Indications: pain in shoulders (Patient not taking: Reported on 3/25/2024)       No current facility-administered medications for this visit.            Current supplements as per medication list.       Allergies: Morphine, Cephalexin, Codeine, Metronidazole, and Sulfa drugs    Current social contact/activities:  Social with family.    She  reports that she has never smoked. She has never used smokeless tobacco. She reports current alcohol use. She reports that she does not use drugs.  Counseling given: Not Answered        DPA/Advanced Directive: In epic      ROS:    Gait: Uses : Front  wheel walker Ostomy: No  Other tubes: no   Amputations: no   Chronic oxygen use: no   Last eye exam: Less than 1 year ago  : Denies any urinary leakage during the last 6 months incontinence.       Screening:  Colonoscopy No repeat recommended  2/16/18  FIT NEG Dexa 9/22/15  Osteoporosis   Mammo 5/17/19 Cat 2 A1c 1/9/13  5.7   -[facility]  Neurosurg-[facility]    Depression Screening  Little interest or pleasure in doing things?  0 - not at all  Feeling down, depressed , or hopeless? 0 - not at all  Patient Health Questionnaire Score: 0     If depressive symptoms identified deferred to follow up visit unless specifically addressed in assessment and plan.    Interpretation of PHQ-9 Total Score   Score Severity   1-4 No Depression   5-9 Mild Depression   10-14 Moderate Depression   15-19 Moderately Severe Depression   20-27 Severe Depression    Screening for Cognitive Impairment  Do you or any of your friends or family members have any concern about your memory? Yes  Three Minute Recall (Leader, Season, Table) 0/3    Sg clock face with all 12 numbers and set the hands to show 10 minutes after 11.  No    Cognitive concerns identified deferred for follow up unless specifically addressed in assessment and plan.    Fall Risk Assessment  Has the patient had two or more falls in the last year or any fall with injury in the last year?  No    Safety Assessment  Do you always wear your seatbelt?  Yes  Any changes to home needed to function safely? No  Difficulty hearing.  Yes  Patient counseled about all safety risks that were identified.    Functional Assessment ADLs  Are there any barriers preventing you from cooking for yourself or meeting nutritional needs?  Yes.    Are there any barriers preventing you from driving safely or obtaining transportation?  Yes.    Are there any barriers preventing you from using a telephone or calling for help?  No    Are there any  barriers preventing you from shopping?  Yes.    Are there any barriers preventing you from taking care of your own finances?  Yes    Are there any barriers preventing you from managing your medications?  Yes    Are there any barriers preventing you from showering, bathing or dressing yourself? Yes    Are there any barriers preventing you from doing housework or laundry? Yes  Are there any barriers preventing you from using the toilet?No  Are you currently engaging in any exercise or physical activity?  No.      Self-Assessment of Health  What is your perception of your health? Fair  Do you sleep more than six hours a night? No  In the past 7 days, how much did pain keep you from doing your normal work? None  Do you spend quality time with family or friends (virtually or in person)? Yes  Do you usually eat a heart healthy diet that constists of a variety of fruits, vegetables, whole grains and fiber? Yes  Do you eat foods high in fat and/or Fast Food more than three times per week? No    Advance Care Planning  Do you have an Advance Directive, Living Will, Durable Power of , or POLST? Yes        POLST        Health Maintenance Summary            Overdue - IMM DTaP/Tdap/Td Vaccine (1 - Tdap) Never done      No completion history exists for this topic.              Overdue - Bone Density Scan (Every 5 Years) Overdue since 9/22/2020 09/22/2015  DS-BONE DENSITY STUDY (DEXA)    08/28/2012  DS-BONE DENSITY STUDY (DEXA)    08/11/2010  DS-BONE DENSITY STUDY (DEXA)    12/16/2008  DS-BONE DENSITY STUDY (DEXA)    12/11/2006  DS-BONE DENSITY STUDY (DEXA)    Only the first 5 history entries have been loaded, but more history exists.              Overdue - COVID-19 Vaccine (5 - 2023-24 season) Overdue since 9/1/2023 07/14/2022  Imm Admin: PFIZER JACQUES CAP SARS-COV-2 VACCINATION (12+)    11/12/2021  Imm Admin: PFIZER PURPLE CAP SARS-COV-2 VACCINATION (12+)    01/31/2021  Imm Admin: PFIZER PURPLE CAP SARS-COV-2  VACCINATION (12+)    01/12/2021  Imm Admin: PFIZER PURPLE CAP SARS-COV-2 VACCINATION (12+)              Annual Wellness Visit (Yearly) Next due on 3/25/2025      03/25/2024  Visit Dx: Medicare annual wellness visit, subsequent    03/20/2023  Visit Dx: Medicare annual wellness visit, subsequent    03/07/2022  Visit Dx: Medicare annual wellness visit, subsequent    09/28/2020  Visit Dx: Medicare annual wellness visit, subsequent    02/16/2018  Visit Dx: Medicare annual wellness visit, subsequent    Only the first 5 history entries have been loaded, but more history exists.              Pneumococcal Vaccine: 65+ Years (Series Information) Completed      05/18/2015  Imm Admin: Pneumococcal Conjugate Vaccine (Prevnar/PCV-13)    10/14/2005  Imm Admin: Pneumococcal polysaccharide vaccine (PPSV-23)              Influenza Vaccine (Series Information) Completed      09/13/2023  Imm Admin: Influenza Vaccine Adult HD    11/01/2022  Imm Admin: Influenza Vaccine Adult HD    10/19/2021  Imm Admin: Influenza Vaccine Adult HD    09/21/2020  Imm Admin: Influenza Vaccine Adult HD    09/19/2019  Imm Admin: Influenza Vaccine Adult HD    Only the first 5 history entries have been loaded, but more history exists.              Hepatitis A Vaccine (Hep A) (Series Information) Aged Out      No completion history exists for this topic.              HPV Vaccines (Series Information) Aged Out      No completion history exists for this topic.              Polio Vaccine (Inactivated Polio) (Series Information) Aged Out      No completion history exists for this topic.              Meningococcal Immunization (Series Information) Aged Out      No completion history exists for this topic.              Discontinued - Mammogram  Discontinued        Frequency changed to Never automatically (Topic No Longer Applies)    05/17/2019  MA-SCREENING MAMMO BILAT W/TOMOSYNTHESIS W/CAD    05/16/2018  MA-MAMMO SCREENING BILAT W/LESTER W/CAD    02/27/2017  MA-MAMMO  SCREENING BILAT W/LESTER W/CAD    12/29/2014  MA-SCREENING MAMMOGRAM W/ CAD    Only the first 5 history entries have been loaded, but more history exists.              Discontinued - Colorectal Cancer Screening  Discontinued        Frequency changed to Never automatically (Topic No Longer Applies)    10/01/2020  OCCULT BLOOD FECES IMMUNOASSAY    02/16/2018  OCCULT BLOOD FECES IMMUNOASSAY    08/12/2010  OCCULT BLOOD X3 (STOOL)    01/08/2004  Colonoscopy (Done)    Only the first 5 history entries have been loaded, but more history exists.              Discontinued - Hepatitis B Vaccine (Hep B)  Discontinued      02/06/2008  Imm Admin: Hepatitis B Vaccine Adolescent/Pediatric              Discontinued - Zoster (Shingles) Vaccines  Discontinued      02/06/2008  Imm Admin: Zoster Vaccine Live (ZVL) (Zostavax) - HISTORICAL DATA                    Patient Care Team:  Mike Otero M.D. as PCP - General  Khloe Jones R.N. (Inactive) as Registered Nurse  [facility] as Home Health Provider  [facility] as Home Health Provider  SELWYN James R.N.  [facility] as Home Health Provider (Home Health)        Social History     Tobacco Use    Smoking status: Never    Smokeless tobacco: Never   Vaping Use    Vaping Use: Never used   Substance Use Topics    Alcohol use: Yes     Alcohol/week: 0.0 oz     Comment: occassional wine    Drug use: No     Family History   Problem Relation Age of Onset    Diabetes Other     Heart Disease Other     Lung Disease Other     Cancer Sister      She  has a past medical history of Arthritis, Diverticulitis, GERD (gastroesophageal reflux disease), Glaucoma, Heart burn, Hiatus hernia syndrome, Hypertension, Primary osteoarthritis of both shoulders (1/22/2019), and Unspecified urinary incontinence.   Past Surgical History:   Procedure Laterality Date    ORIF, ANKLE Right 12/22/2021    Procedure: OPEN REDUCTION AND INTERNAL FIXATION, ANKLE;  Surgeon: Pop KING  "TAMIA Causey;  Location: SURGERY [facility];  Service: Orthopedics    HIP ARTHROPLASTY TOTAL  6/21/2011    Performed by AMPARO CRAFT at SURGERY [facility] ORS    OTHER ORTHOPEDIC SURGERY  2000    back surgery    GYN SURGERY  1970    hysterectomy    CHOLECYSTECTOMY  1966    rahat    OTHER  1954    mikey. breast bx    OTHER ABDOMINAL SURGERY  1954    kidney tacked up ?    OTHER  1945    tonsilectomy    OTHER ORTHOPEDIC SURGERY      osteoporosis - medicated    US-NEEDLE CORE BX-BREAST PANEL         Exam:     /64 (BP Location: Left arm, Patient Position: Sitting, BP Cuff Size: Adult)   Pulse 61   Temp 37.4 °C (99.4 °F) (Temporal)   Resp 14   Ht 1.575 m (5' 2\")   Wt 55.3 kg (122 lb)   SpO2 98%  Body mass index is 22.31 kg/m².    Hearing good.    Dentition good  Alert, oriented in no acute distress.  Eye contact is good, speech goal directed, affect calm  General: Well-appearing and in no distress.  HEENT: Grossly normal. Oral cavity is pink and moist.  Ears, canal and tympanic membrane are normal.   Neck: Supple without JVD or bruit.  Pulmonary: Clear with good breath sounds. Normal effort.  Cardiovascular: Regular. Carotid and radial pulses are intact.  Abdomen: Soft, nontender, nondistended.  Neurologic: Grossly nonfocal    Assessment and Plan. The following treatment and monitoring plan is recommended:    1. Medicare annual wellness visit, subsequent        2. Hypocalcemia  Basic Metabolic Panel    VITAMIN D,25 HYDROXY (DEFICIENCY)      3. Anemia, unspecified type  CBC WITH DIFFERENTIAL    FERRITIN    IRON/TOTAL IRON BIND      4. Primary hypertension        5. Hypothyroidism, unspecified type        6. Primary osteoarthritis of both shoulders        7. MCI (mild cognitive impairment)        8. Chronic rhinitis        9. Major depressive disorder, single episode, mild (HCC)              89-year-old female.  Overall she is doing okay.  She has cognitive and memory issues which I think are likely " related to age.  No further workup.  She has moderate hypocalcemia seen in the hospital.  She is on supplementation.  We will recheck labs today.  Recheck CBC with iron.  Blood pressure and thyroid are stable.  No change in treatment.  Continue with meloxicam and increase Tylenol to 1 tablet a.m., 1 tablet at lunch and 2 tablets in the evening.  Continue with home health including physical therapy.  Trial of nasal saline for rhinitis.  Suspect overuse of Flonase may have contributed to her epistaxis    Services suggested: No services required at this time  Health Care Screening: Age-appropriate preventive services Medicare covers discussed today and ordered if indicated.  Referrals offered: Community-based lifestyle interventions to reduce health risks and promote self-management and wellness, fall prevention, nutrition, physical activity, tobacco-use cessation, weight loss, and mental health services as per orders if indicated.    Discussion today about general wellness and lifestyle habits:    Prevent falls and reduce trip hazards; Cautioned about securing or removing rugs.  Have a working fire alarm and carbon monoxide detector;   Engage in regular physical activity and social activities       Follow-up: 2 months

## 2024-03-26 VITALS
HEART RATE: 60 BPM | RESPIRATION RATE: 20 BRPM | DIASTOLIC BLOOD PRESSURE: 64 MMHG | OXYGEN SATURATION: 95 % | SYSTOLIC BLOOD PRESSURE: 106 MMHG | TEMPERATURE: 99.5 F

## 2024-03-26 PROCEDURE — 665999 HH PPS REVENUE DEBIT

## 2024-03-26 PROCEDURE — 665998 HH PPS REVENUE CREDIT

## 2024-03-26 ASSESSMENT — ENCOUNTER SYMPTOMS
LAST BOWEL MOVEMENT: 66925
MUSCLE WEAKNESS: 1
SUBJECTIVE PAIN PROGRESSION: UNCHANGED
LOWEST PAIN SEVERITY IN PAST 24 HOURS: 0/10
STOOL FREQUENCY: LESS THAN DAILY
DENIES PAIN: 1
DESCRIPTION OF MEMORY LOSS: SHORT TERM
PERSON REPORTING PAIN: PATIENT
BOWEL PATTERN NORMAL: 1
VOMITING: DENIES
NAUSEA: DENIES
PAIN SEVERITY GOAL: 0/10
HIGHEST PAIN SEVERITY IN PAST 24 HOURS: 0/10

## 2024-03-26 ASSESSMENT — PATIENT HEALTH QUESTIONNAIRE - PHQ9: CLINICAL INTERPRETATION OF PHQ2 SCORE: 0

## 2024-03-27 ENCOUNTER — HOME CARE VISIT (OUTPATIENT)
Dept: HOME HEALTH SERVICES | Facility: HOME HEALTHCARE | Age: 89
End: 2024-03-27
Payer: MEDICARE

## 2024-03-27 PROCEDURE — 665999 HH PPS REVENUE DEBIT

## 2024-03-27 PROCEDURE — 665998 HH PPS REVENUE CREDIT

## 2024-03-29 ENCOUNTER — HOME CARE VISIT (OUTPATIENT)
Dept: HOME HEALTH SERVICES | Facility: HOME HEALTHCARE | Age: 89
End: 2024-03-29
Payer: MEDICARE

## 2024-03-29 PROCEDURE — G0159 HHC PT MAINT EA 15 MIN: HCPCS

## 2024-03-30 VITALS
SYSTOLIC BLOOD PRESSURE: 120 MMHG | OXYGEN SATURATION: 97 % | DIASTOLIC BLOOD PRESSURE: 52 MMHG | TEMPERATURE: 98.3 F | HEART RATE: 64 BPM | RESPIRATION RATE: 20 BRPM

## 2024-03-30 ASSESSMENT — ENCOUNTER SYMPTOMS
LOWEST PAIN SEVERITY IN PAST 24 HOURS: 0/10
PAIN: 1
HIGHEST PAIN SEVERITY IN PAST 24 HOURS: 7/10
PAIN LOCATION - PAIN DURATION: DAILY
PAIN LOCATION - PAIN FREQUENCY: FREQUENT
POOR JUDGMENT: 1
PAIN LOCATION - PAIN QUALITY: ACHE, SHARP
PAIN LOCATION - PAIN FREQUENCY: INTERMITTENT
PAIN LOCATION - PAIN DURATION: LESS THAN DAILY
PERSON REPORTING PAIN: PATIENT
PAIN LOCATION - PAIN SEVERITY: 6/10
PAIN LOCATION: LEFT SHOULDER
PAIN LOCATION - EXACERBATING FACTORS: MOVEMENT
PAIN LOCATION - PAIN QUALITY: BURN
DIFFICULTY THINKING: 1
PAIN LOCATION - PAIN SEVERITY: 5/10
SUBJECTIVE PAIN PROGRESSION: WAXING AND WANING
PAIN LOCATION: RIGHT FOOT

## 2024-04-01 ENCOUNTER — HOME CARE VISIT (OUTPATIENT)
Dept: HOME HEALTH SERVICES | Facility: HOME HEALTHCARE | Age: 89
End: 2024-04-01
Payer: MEDICARE

## 2024-04-01 VITALS
OXYGEN SATURATION: 98 % | SYSTOLIC BLOOD PRESSURE: 118 MMHG | DIASTOLIC BLOOD PRESSURE: 58 MMHG | RESPIRATION RATE: 18 BRPM | HEART RATE: 60 BPM | TEMPERATURE: 99.2 F

## 2024-04-01 VITALS
RESPIRATION RATE: 18 BRPM | HEART RATE: 60 BPM | DIASTOLIC BLOOD PRESSURE: 58 MMHG | SYSTOLIC BLOOD PRESSURE: 118 MMHG | TEMPERATURE: 99.2 F | OXYGEN SATURATION: 98 %

## 2024-04-01 PROCEDURE — G0299 HHS/HOSPICE OF RN EA 15 MIN: HCPCS

## 2024-04-01 PROCEDURE — G0151 HHCP-SERV OF PT,EA 15 MIN: HCPCS

## 2024-04-01 ASSESSMENT — ENCOUNTER SYMPTOMS
PERSON REPORTING PAIN: PATIENT
PAIN LOCATION - PAIN QUALITY: ACHE, SHARP
VOMITING: DENIES
HIGHEST PAIN SEVERITY IN PAST 24 HOURS: 0/10
PAIN SEVERITY GOAL: 0/10
BOWEL PATTERN NORMAL: 1
PAIN LOCATION - PAIN FREQUENCY: FREQUENT
DENIES PAIN: 1
MUSCLE WEAKNESS: 1
PAIN LOCATION - EXACERBATING FACTORS: MOVEMENT
PAIN: 1
NAUSEA: DENIES
POOR JUDGMENT: 1
SUBJECTIVE PAIN PROGRESSION: UNCHANGED
SUBJECTIVE PAIN PROGRESSION: WAXING AND WANING
PAIN SEVERITY GOAL: 3/10
PAIN LOCATION: LEFT SHOULDER
HIGHEST PAIN SEVERITY IN PAST 24 HOURS: 7/10
DIFFICULTY THINKING: 1
STOOL FREQUENCY: LESS THAN DAILY
DESCRIPTION OF MEMORY LOSS: LONG TERM
DESCRIPTION OF MEMORY LOSS: SHORT TERM
DEBILITATING PAIN: 1
LOWEST PAIN SEVERITY IN PAST 24 HOURS: 0/10
LAST BOWEL MOVEMENT: 66931
PERSON REPORTING PAIN: PATIENT
PAIN: PAIN LIMITS SLEEP AND FUNCTION DAILY NOT CONSTANT
PAIN LOCATION - RELIEVING FACTORS: REST, MEDICATION, HEAT
LOWEST PAIN SEVERITY IN PAST 24 HOURS: 0/10
PAIN LOCATION - PAIN SEVERITY: 6/10
PAIN LOCATION - PAIN DURATION: DAILY

## 2024-04-01 ASSESSMENT — PATIENT HEALTH QUESTIONNAIRE - PHQ9: CLINICAL INTERPRETATION OF PHQ2 SCORE: 0

## 2024-04-04 ENCOUNTER — HOME CARE VISIT (OUTPATIENT)
Dept: HOME HEALTH SERVICES | Facility: HOME HEALTHCARE | Age: 89
End: 2024-04-04
Payer: MEDICARE

## 2024-04-04 VITALS
TEMPERATURE: 99.2 F | OXYGEN SATURATION: 97 % | RESPIRATION RATE: 18 BRPM | HEART RATE: 72 BPM | DIASTOLIC BLOOD PRESSURE: 58 MMHG | SYSTOLIC BLOOD PRESSURE: 112 MMHG

## 2024-04-04 PROCEDURE — G0151 HHCP-SERV OF PT,EA 15 MIN: HCPCS

## 2024-04-04 ASSESSMENT — ENCOUNTER SYMPTOMS
SUBJECTIVE PAIN PROGRESSION: WAXING AND WANING
PAIN LOCATION - PAIN SEVERITY: 4/10
PAIN: PAIN LIMITS FUNCTION AND SLEEP DAILY NOT CONSTANT
PERSON REPORTING PAIN: PATIENT
PAIN LOCATION - EXACERBATING FACTORS: MOVEMENT
PAIN LOCATION - PAIN DURATION: DAILY
PAIN LOCATION: LEFT SHOULDER
PAIN LOCATION - RELIEVING FACTORS: REST, MEDICATION, HEAT
PAIN LOCATION - PAIN FREQUENCY: FREQUENT
DEBILITATING PAIN: 1
POOR JUDGMENT: 1
PAIN LOCATION - PAIN QUALITY: ACHE, SHARP
LOWEST PAIN SEVERITY IN PAST 24 HOURS: 0/10
HIGHEST PAIN SEVERITY IN PAST 24 HOURS: 5/10
DIFFICULTY THINKING: 1
PAIN: 1

## 2024-04-04 ASSESSMENT — ACTIVITIES OF DAILY LIVING (ADL): OASIS_M1830: 03

## 2024-04-04 NOTE — Clinical Note
Recertification performed 4/4/2024 and P.T. will follow for maintenance 1 time for 9 weeks. SN will reassess for wound care. Further insurance Auth maybe required. Thanks

## 2024-04-04 NOTE — Clinical Note
Thank you Noted.  ----- Message -----  From: Cristina Jaramillo, PT  Sent: 4/4/2024   6:51 PM PDT  To: Maria Luisa Song R.N.; Boo Hernandez; *      Recertification performed 4/4/2024 and P.T. will follow for maintenance 1 time for 9 weeks. SN will reassess for wound care. Further insurance Auth maybe required. Thanks

## 2024-04-05 NOTE — HOME HEALTH
Gloria Patel   Recertification of Care to Home Health for maintenance physical therapy services and skilled nursing services. Current clinical summary, reason for continued home health services, new issues identified: Patient has returned to her prior functional mobility, balance, activity tolerance, pain control with maintenance physical therapy services following hospitalization for recurrent nosebleeds with a decline. She has not had a fall during this certification but had SN visits due to condition management and wound on R foot. In addition she continues to have bilateral shoulder pain which is limiting her participation in treatment. . Patient expresses desire to have maintenance physical therapy services once again now that she has returned to her prior level of function. Though patient lives in an [facility] there is no exercise class that she can participate in and staff does not assist patient with exercise programs plus are not skilled clinicians to perform balance re education. In addition patient has cognitive deficits and problems with initiation and follow-through. Since maintenance services have been very effective for this patient and she is at risk for decline without maintenance services, therapist agrees she would continue to benefit and would likely decline without this service. Primary focus is on balance reeducation and response to activity tolerance maintenance which are skilled therapy services. Frequency: 1 week 9, Effective 4/7/2024. In addition skilled nursing will reassess and develop a plan of care.  · . Does the patient get SOB with exertion? No shortness of breath has been noted How often (if at all) does pain interfere with patient's movements? On a daily but not constant basis limits function and sleep LIVING SITUATION AND CAREGIVING: Homebound Status: cognitive deficits, difficulty with ambulation/transfers, needs assistance to leave home, needs assistive devices to ambulate, unable  to manage stairs, unsafe to drive or leave residence without assistance, unsteady gait, weakness and fatigue, debilitating pain and impaired thoughts/judgment Type of Home: Assisted living facility Lives Alone but Receives Assistance: For some ADLs, medication management and whenever ambulating outside her apartment Unresolved Safety Concerns: Decreased safety awareness Does the patient have an Advanced Directive? Yes, copy provided or photographed for chart Does the patient have a POLST? Yes completed POLST is in the home and visible, photographed for chart and DNR entered as a signed order in EPIC. REASON FOR HOME HEALTH SUPPORTING INFORMATION: Physical therapy to assess and teach the following but not limited to: - mobility, fall prevention, hydration and home safety.  Skilled nursing to assess and develop plan of care CURRENT LEVEL OF FUNCTION: Ambulation and Mobility: Independent in her apartment and supervised whenever exiting her apartment. Patient Equipment: walker for ambulation  Patient currently has a hospital bed but she does not like it and this will be removed and replaced with a standard bed.  Transferring: Independent except assisted with shower and car transfers Bathing: Shower Dressing:Assisted for shower and Independent dressing except to apply her pants, compression socks and shoes Special equipment used: 4WW, grab bars, shower chair, hand-held shower, wheelchair how noticeably Short of Breath is the patient during the OASIS walk or other activity? NA MEDICATION MANAGEMENT: Patient medications administered by another person Medication issues: na

## 2024-04-06 PROCEDURE — 665999 HH PPS REVENUE DEBIT

## 2024-04-06 PROCEDURE — 665998 HH PPS REVENUE CREDIT

## 2024-04-07 PROCEDURE — 665999 HH PPS REVENUE DEBIT

## 2024-04-07 PROCEDURE — 665998 HH PPS REVENUE CREDIT

## 2024-04-07 PROCEDURE — G0179 MD RECERTIFICATION HHA PT: HCPCS | Performed by: INTERNAL MEDICINE

## 2024-04-08 ENCOUNTER — HOME CARE VISIT (OUTPATIENT)
Dept: HOME HEALTH SERVICES | Facility: HOME HEALTHCARE | Age: 89
End: 2024-04-08
Payer: MEDICARE

## 2024-04-08 VITALS
TEMPERATURE: 97.7 F | HEART RATE: 64 BPM | RESPIRATION RATE: 17 BRPM | SYSTOLIC BLOOD PRESSURE: 118 MMHG | DIASTOLIC BLOOD PRESSURE: 64 MMHG | OXYGEN SATURATION: 100 %

## 2024-04-08 DIAGNOSIS — D64.9 ANEMIA, UNSPECIFIED TYPE: ICD-10-CM

## 2024-04-08 PROCEDURE — 665998 HH PPS REVENUE CREDIT

## 2024-04-08 PROCEDURE — G0299 HHS/HOSPICE OF RN EA 15 MIN: HCPCS

## 2024-04-08 PROCEDURE — 665999 HH PPS REVENUE DEBIT

## 2024-04-08 PROCEDURE — 665003 FOLLOW UP-HOME HEALTH

## 2024-04-08 ASSESSMENT — ENCOUNTER SYMPTOMS
LOWER EXTREMITY EDEMA: 1
PAIN: 1
DESCRIPTION OF MEMORY LOSS: SHORT TERM
PAIN SEVERITY GOAL: 0/10
PAIN LOCATION - PAIN SEVERITY: 5/10
MUSCLE WEAKNESS: 1
LAST BOWEL MOVEMENT: 66938
FATIGUE: 1
FORGETFULNESS: 1
LIMITED RANGE OF MOTION: 1
ARTHRALGIAS: 1
HIGHEST PAIN SEVERITY IN PAST 24 HOURS: 5/10
LOWEST PAIN SEVERITY IN PAST 24 HOURS: 3/10
FATIGUES EASILY: 1
DRY SKIN: 1
BOWEL PATTERN NORMAL: 1
PERSON REPORTING PAIN: PATIENT
STOOL FREQUENCY: LESS THAN DAILY
PAIN LOCATION - RELIEVING FACTORS: REST

## 2024-04-08 ASSESSMENT — PAIN SCALES - PAIN ASSESSMENT IN ADVANCED DEMENTIA (PAINAD)
CONSOLABILITY: 0 - NO NEED TO CONSOLE.
BODYLANGUAGE: 0 - RELAXED.
FACIALEXPRESSION: 0 - SMILING OR INEXPRESSIVE.
NEGVOCALIZATION: 0 - NONE.
TOTALSCORE: 0

## 2024-04-08 ASSESSMENT — PATIENT HEALTH QUESTIONNAIRE - PHQ9: CLINICAL INTERPRETATION OF PHQ2 SCORE: 0

## 2024-04-08 ASSESSMENT — ACTIVITIES OF DAILY LIVING (ADL)
AMBULATION ASSISTANCE: STAND BY ASSIST
CURRENT_FUNCTION: STAND BY ASSIST

## 2024-04-09 ENCOUNTER — HOME CARE VISIT (OUTPATIENT)
Dept: HOME HEALTH SERVICES | Facility: HOME HEALTHCARE | Age: 89
End: 2024-04-09
Payer: MEDICARE

## 2024-04-09 VITALS
TEMPERATURE: 99.1 F | OXYGEN SATURATION: 96 % | DIASTOLIC BLOOD PRESSURE: 50 MMHG | HEART RATE: 68 BPM | SYSTOLIC BLOOD PRESSURE: 100 MMHG | RESPIRATION RATE: 24 BRPM

## 2024-04-09 PROCEDURE — 665999 HH PPS REVENUE DEBIT

## 2024-04-09 PROCEDURE — G0159 HHC PT MAINT EA 15 MIN: HCPCS

## 2024-04-09 PROCEDURE — 665998 HH PPS REVENUE CREDIT

## 2024-04-09 ASSESSMENT — ENCOUNTER SYMPTOMS
PAIN LOCATION - EXACERBATING FACTORS: MOVEMENT
LOWEST PAIN SEVERITY IN PAST 24 HOURS: 0/10
PAIN: 1
DIFFICULTY THINKING: 1
HIGHEST PAIN SEVERITY IN PAST 24 HOURS: 6/10
PERSON REPORTING PAIN: PATIENT
PAIN LOCATION - PAIN DURATION: DAILY
PAIN LOCATION - PAIN SEVERITY: 6/10
SUBJECTIVE PAIN PROGRESSION: WAXING AND WANING
DEBILITATING PAIN: 1
PAIN LOCATION - PAIN QUALITY: ACHE, SHARP
PAIN: PAIN LIMITS FUNCTION AND SLEEP DAILY NOT CONSTANT
PAIN LOCATION - PAIN FREQUENCY: FREQUENT
POOR JUDGMENT: 1
PAIN LOCATION: LEFT SHOULDER
PAIN LOCATION - RELIEVING FACTORS: REST, MEDICATION

## 2024-04-10 ENCOUNTER — HOME CARE VISIT (OUTPATIENT)
Dept: HOME HEALTH SERVICES | Facility: HOME HEALTHCARE | Age: 89
End: 2024-04-10
Payer: MEDICARE

## 2024-04-10 PROCEDURE — 665999 HH PPS REVENUE DEBIT

## 2024-04-10 PROCEDURE — 665998 HH PPS REVENUE CREDIT

## 2024-04-10 NOTE — CASE COMMUNICATION
Quality Review Completed for [facility] 4/4 OASIS by LEONEL Harkins RN on 4/10/2024:     Edits completed by LEONEL Harkins RN:     1.  and  diagnosis coding updated per chart review  2.  changed to 1 for use of walker

## 2024-04-10 NOTE — Clinical Note
agree with changes. Thanks  ----- Message -----  From: Kiara Harkins R.N.  Sent: 4/10/2024  12:25 PM PDT  To: Cristina Jaramillo PT      Quality Review Completed for [facility] 4/4 OASIS by LEONEL Harkins RN on 4/10/2024:     Edits completed by LEONEL Harkins RN:     1.  and  diagnosis coding updated per chart review  2.  changed to 1 for use of walker

## 2024-04-11 ENCOUNTER — HOSPITAL ENCOUNTER (INPATIENT)
Facility: MEDICAL CENTER | Age: 89
LOS: 2 days | DRG: 378 | End: 2024-04-13
Attending: STUDENT IN AN ORGANIZED HEALTH CARE EDUCATION/TRAINING PROGRAM | Admitting: STUDENT IN AN ORGANIZED HEALTH CARE EDUCATION/TRAINING PROGRAM
Payer: MEDICARE

## 2024-04-11 ENCOUNTER — HOSPITAL ENCOUNTER (OUTPATIENT)
Facility: MEDICAL CENTER | Age: 89
DRG: 378 | End: 2024-04-11
Attending: INTERNAL MEDICINE
Payer: MEDICARE

## 2024-04-11 ENCOUNTER — HOME CARE VISIT (OUTPATIENT)
Dept: HOME HEALTH SERVICES | Facility: HOME HEALTHCARE | Age: 89
End: 2024-04-11
Payer: MEDICARE

## 2024-04-11 VITALS
TEMPERATURE: 97.8 F | DIASTOLIC BLOOD PRESSURE: 58 MMHG | RESPIRATION RATE: 18 BRPM | SYSTOLIC BLOOD PRESSURE: 112 MMHG | HEART RATE: 63 BPM | OXYGEN SATURATION: 96 %

## 2024-04-11 DIAGNOSIS — R10.13 DYSPEPSIA: ICD-10-CM

## 2024-04-11 DIAGNOSIS — D62 ACUTE BLOOD LOSS ANEMIA: ICD-10-CM

## 2024-04-11 DIAGNOSIS — R11.0 NAUSEA: ICD-10-CM

## 2024-04-11 DIAGNOSIS — K92.2 GASTROINTESTINAL HEMORRHAGE, UNSPECIFIED GASTROINTESTINAL HEMORRHAGE TYPE: ICD-10-CM

## 2024-04-11 LAB
ABO GROUP BLD: NORMAL
ALBUMIN SERPL BCP-MCNC: 3.7 G/DL (ref 3.2–4.9)
ALBUMIN/GLOB SERPL: 1.1 G/DL
ALP SERPL-CCNC: 138 U/L (ref 30–99)
ALT SERPL-CCNC: 8 U/L (ref 2–50)
ANION GAP SERPL CALC-SCNC: 9 MMOL/L (ref 7–16)
ANISOCYTOSIS BLD QL SMEAR: ABNORMAL
AST SERPL-CCNC: 16 U/L (ref 12–45)
BARCODED ABORH UBTYP: 7300
BARCODED PRD CODE UBPRD: NORMAL
BARCODED UNIT NUM UBUNT: NORMAL
BASOPHILS # BLD AUTO: 0.8 % (ref 0–1.8)
BASOPHILS # BLD: 0.06 K/UL (ref 0–0.12)
BILIRUB SERPL-MCNC: 0.3 MG/DL (ref 0.1–1.5)
BLD GP AB SCN SERPL QL: NORMAL
BUN SERPL-MCNC: 26 MG/DL (ref 8–22)
CALCIUM ALBUM COR SERPL-MCNC: 9.4 MG/DL (ref 8.5–10.5)
CALCIUM SERPL-MCNC: 9.2 MG/DL (ref 8.5–10.5)
CHLORIDE SERPL-SCNC: 97 MMOL/L (ref 96–112)
CO2 SERPL-SCNC: 22 MMOL/L (ref 20–33)
COMPONENT R 8504R: NORMAL
CREAT SERPL-MCNC: 1.28 MG/DL (ref 0.5–1.4)
EOSINOPHIL # BLD AUTO: 0.06 K/UL (ref 0–0.51)
EOSINOPHIL NFR BLD: 0.8 % (ref 0–6.9)
ERYTHROCYTE [DISTWIDTH] IN BLOOD BY AUTOMATED COUNT: 66.3 FL (ref 35.9–50)
ERYTHROCYTE [DISTWIDTH] IN BLOOD BY AUTOMATED COUNT: 66.7 FL (ref 35.9–50)
GFR SERPLBLD CREATININE-BSD FMLA CKD-EPI: 37 ML/MIN/1.73 M 2
GLOBULIN SER CALC-MCNC: 3.3 G/DL (ref 1.9–3.5)
GLUCOSE SERPL-MCNC: 112 MG/DL (ref 65–99)
HCT VFR BLD AUTO: 20.9 % (ref 37–47)
HCT VFR BLD AUTO: 21.3 % (ref 37–47)
HGB BLD-MCNC: 6.5 G/DL (ref 12–16)
HGB BLD-MCNC: 6.6 G/DL (ref 12–16)
LYMPHOCYTES # BLD AUTO: 1.05 K/UL (ref 1–4.8)
LYMPHOCYTES NFR BLD: 13.5 % (ref 22–41)
MANUAL DIFF BLD: NORMAL
MCH RBC QN AUTO: 28.6 PG (ref 27–33)
MCH RBC QN AUTO: 29.5 PG (ref 27–33)
MCHC RBC AUTO-ENTMCNC: 30.5 G/DL (ref 32.2–35.5)
MCHC RBC AUTO-ENTMCNC: 31.6 G/DL (ref 32.2–35.5)
MCV RBC AUTO: 93.3 FL (ref 81.4–97.8)
MCV RBC AUTO: 93.8 FL (ref 81.4–97.8)
MICROCYTES BLD QL SMEAR: ABNORMAL
MONOCYTES # BLD AUTO: 0.27 K/UL (ref 0–0.85)
MONOCYTES NFR BLD AUTO: 3.4 % (ref 0–13.4)
MORPHOLOGY BLD-IMP: NORMAL
MYELOCYTES NFR BLD MANUAL: 1.7 %
NEUTROPHILS # BLD AUTO: 6.22 K/UL (ref 1.82–7.42)
NEUTROPHILS NFR BLD: 79.8 % (ref 44–72)
NRBC # BLD AUTO: 0.03 K/UL
NRBC BLD-RTO: 0.4 /100 WBC (ref 0–0.2)
PLATELET # BLD AUTO: 193 K/UL (ref 164–446)
PLATELET # BLD AUTO: 216 K/UL (ref 164–446)
PLATELET BLD QL SMEAR: NORMAL
PMV BLD AUTO: 8.9 FL (ref 9–12.9)
PMV BLD AUTO: 9.9 FL (ref 9–12.9)
POTASSIUM SERPL-SCNC: 5.2 MMOL/L (ref 3.6–5.5)
PRODUCT TYPE UPROD: NORMAL
PROT SERPL-MCNC: 7 G/DL (ref 6–8.2)
RBC # BLD AUTO: 2.24 M/UL (ref 4.2–5.4)
RBC # BLD AUTO: 2.27 M/UL (ref 4.2–5.4)
RBC BLD AUTO: PRESENT
RH BLD: NORMAL
SODIUM SERPL-SCNC: 128 MMOL/L (ref 135–145)
UNIT STATUS USTAT: NORMAL
WBC # BLD AUTO: 7.8 K/UL (ref 4.8–10.8)
WBC # BLD AUTO: 8.9 K/UL (ref 4.8–10.8)

## 2024-04-11 PROCEDURE — 86850 RBC ANTIBODY SCREEN: CPT

## 2024-04-11 PROCEDURE — 85027 COMPLETE CBC AUTOMATED: CPT

## 2024-04-11 PROCEDURE — 96374 THER/PROPH/DIAG INJ IV PUSH: CPT

## 2024-04-11 PROCEDURE — 85007 BL SMEAR W/DIFF WBC COUNT: CPT

## 2024-04-11 PROCEDURE — P9016 RBC LEUKOCYTES REDUCED: HCPCS

## 2024-04-11 PROCEDURE — 99223 1ST HOSP IP/OBS HIGH 75: CPT | Mod: AI | Performed by: STUDENT IN AN ORGANIZED HEALTH CARE EDUCATION/TRAINING PROGRAM

## 2024-04-11 PROCEDURE — 99285 EMERGENCY DEPT VISIT HI MDM: CPT

## 2024-04-11 PROCEDURE — 36430 TRANSFUSION BLD/BLD COMPNT: CPT

## 2024-04-11 PROCEDURE — 700111 HCHG RX REV CODE 636 W/ 250 OVERRIDE (IP): Performed by: STUDENT IN AN ORGANIZED HEALTH CARE EDUCATION/TRAINING PROGRAM

## 2024-04-11 PROCEDURE — 36415 COLL VENOUS BLD VENIPUNCTURE: CPT

## 2024-04-11 PROCEDURE — 85027 COMPLETE CBC AUTOMATED: CPT | Mod: 91

## 2024-04-11 PROCEDURE — 86900 BLOOD TYPING SEROLOGIC ABO: CPT

## 2024-04-11 PROCEDURE — 770006 HCHG ROOM/CARE - MED/SURG/GYN SEMI*

## 2024-04-11 PROCEDURE — G0299 HHS/HOSPICE OF RN EA 15 MIN: HCPCS

## 2024-04-11 PROCEDURE — C9113 INJ PANTOPRAZOLE SODIUM, VIA: HCPCS | Performed by: STUDENT IN AN ORGANIZED HEALTH CARE EDUCATION/TRAINING PROGRAM

## 2024-04-11 PROCEDURE — 86923 COMPATIBILITY TEST ELECTRIC: CPT

## 2024-04-11 PROCEDURE — 665998 HH PPS REVENUE CREDIT

## 2024-04-11 PROCEDURE — 665999 HH PPS REVENUE DEBIT

## 2024-04-11 PROCEDURE — 80053 COMPREHEN METABOLIC PANEL: CPT

## 2024-04-11 PROCEDURE — 86901 BLOOD TYPING SEROLOGIC RH(D): CPT

## 2024-04-11 RX ORDER — OMEPRAZOLE 20 MG/1
20 CAPSULE, DELAYED RELEASE ORAL 2 TIMES DAILY
Qty: 180 CAPSULE | Refills: 3 | Status: ON HOLD
Start: 2024-04-11 | End: 2024-04-13

## 2024-04-11 RX ORDER — PANTOPRAZOLE SODIUM 40 MG/10ML
40 INJECTION, POWDER, LYOPHILIZED, FOR SOLUTION INTRAVENOUS 2 TIMES DAILY
Status: DISCONTINUED | OUTPATIENT
Start: 2024-04-11 | End: 2024-04-13 | Stop reason: HOSPADM

## 2024-04-11 RX ORDER — AMLODIPINE BESYLATE 5 MG/1
5 TABLET ORAL DAILY
Status: DISCONTINUED | OUTPATIENT
Start: 2024-04-12 | End: 2024-04-13 | Stop reason: HOSPADM

## 2024-04-11 RX ADMIN — PANTOPRAZOLE SODIUM 40 MG: 40 INJECTION, POWDER, FOR SOLUTION INTRAVENOUS at 23:07

## 2024-04-11 ASSESSMENT — PATIENT HEALTH QUESTIONNAIRE - PHQ9: CLINICAL INTERPRETATION OF PHQ2 SCORE: 0

## 2024-04-11 ASSESSMENT — ENCOUNTER SYMPTOMS
DRY SKIN: 1
DESCRIPTION OF MEMORY LOSS: SHORT TERM
LIMITED RANGE OF MOTION: 1
NAUSEA: 1
MUSCLE WEAKNESS: 1
BOWEL PATTERN NORMAL: 1
PAIN: 1
STOOL FREQUENCY: LESS THAN DAILY
PERSON REPORTING PAIN: PATIENT
FATIGUES EASILY: 1
ARTHRALGIAS: 1
FORGETFULNESS: 1
PAIN LOCATION - PAIN QUALITY: BURNING
BLOOD IN STOOL: 1
LAST BOWEL MOVEMENT: 66940
FATIGUE: 1
LOWER EXTREMITY EDEMA: 1

## 2024-04-11 ASSESSMENT — ACTIVITIES OF DAILY LIVING (ADL)
AMBULATION ASSISTANCE: STAND BY ASSIST
CURRENT_FUNCTION: STAND BY ASSIST

## 2024-04-11 ASSESSMENT — PAIN SCALES - PAIN ASSESSMENT IN ADVANCED DEMENTIA (PAINAD)
FACIALEXPRESSION: 0 - SMILING OR INEXPRESSIVE.
NEGVOCALIZATION: 0 - NONE.
TOTALSCORE: 1
BODYLANGUAGE: 0 - RELAXED.
CONSOLABILITY: 1 - DISTRACTED OR REASSURED BY VOICE OR TOUCH.

## 2024-04-11 ASSESSMENT — FIBROSIS 4 INDEX: FIB4 SCORE: 3.77

## 2024-04-12 ENCOUNTER — HOME CARE VISIT (OUTPATIENT)
Dept: HOME HEALTH SERVICES | Facility: HOME HEALTHCARE | Age: 89
End: 2024-04-12
Payer: MEDICARE

## 2024-04-12 LAB
HCT VFR BLD AUTO: 24 % (ref 37–47)
HCT VFR BLD AUTO: 25.8 % (ref 37–47)
HCT VFR BLD AUTO: 26.5 % (ref 37–47)
HGB BLD-MCNC: 7.7 G/DL (ref 12–16)
HGB BLD-MCNC: 8.4 G/DL (ref 12–16)
HGB BLD-MCNC: 8.6 G/DL (ref 12–16)

## 2024-04-12 PROCEDURE — 99233 SBSQ HOSP IP/OBS HIGH 50: CPT | Performed by: STUDENT IN AN ORGANIZED HEALTH CARE EDUCATION/TRAINING PROGRAM

## 2024-04-12 PROCEDURE — C9113 INJ PANTOPRAZOLE SODIUM, VIA: HCPCS | Performed by: STUDENT IN AN ORGANIZED HEALTH CARE EDUCATION/TRAINING PROGRAM

## 2024-04-12 PROCEDURE — 665998 HH PPS REVENUE CREDIT

## 2024-04-12 PROCEDURE — 30233N1 TRANSFUSION OF NONAUTOLOGOUS RED BLOOD CELLS INTO PERIPHERAL VEIN, PERCUTANEOUS APPROACH: ICD-10-PCS | Performed by: STUDENT IN AN ORGANIZED HEALTH CARE EDUCATION/TRAINING PROGRAM

## 2024-04-12 PROCEDURE — A9270 NON-COVERED ITEM OR SERVICE: HCPCS | Performed by: STUDENT IN AN ORGANIZED HEALTH CARE EDUCATION/TRAINING PROGRAM

## 2024-04-12 PROCEDURE — 665999 HH PPS REVENUE DEBIT

## 2024-04-12 PROCEDURE — 85014 HEMATOCRIT: CPT

## 2024-04-12 PROCEDURE — 700102 HCHG RX REV CODE 250 W/ 637 OVERRIDE(OP): Performed by: STUDENT IN AN ORGANIZED HEALTH CARE EDUCATION/TRAINING PROGRAM

## 2024-04-12 PROCEDURE — 85018 HEMOGLOBIN: CPT

## 2024-04-12 PROCEDURE — 770006 HCHG ROOM/CARE - MED/SURG/GYN SEMI*

## 2024-04-12 PROCEDURE — 36415 COLL VENOUS BLD VENIPUNCTURE: CPT

## 2024-04-12 PROCEDURE — 700111 HCHG RX REV CODE 636 W/ 250 OVERRIDE (IP): Performed by: STUDENT IN AN ORGANIZED HEALTH CARE EDUCATION/TRAINING PROGRAM

## 2024-04-12 RX ORDER — LISINOPRIL 10 MG/1
10 TABLET ORAL 2 TIMES DAILY
Status: DISCONTINUED | OUTPATIENT
Start: 2024-04-12 | End: 2024-04-12

## 2024-04-12 RX ORDER — LEVOTHYROXINE SODIUM 0.07 MG/1
75 TABLET ORAL
Status: DISCONTINUED | OUTPATIENT
Start: 2024-04-12 | End: 2024-04-13 | Stop reason: HOSPADM

## 2024-04-12 RX ORDER — DORZOLAMIDE HYDROCHLORIDE AND TIMOLOL MALEATE 20; 5 MG/ML; MG/ML
1 SOLUTION/ DROPS OPHTHALMIC EVERY MORNING
Status: DISCONTINUED | OUTPATIENT
Start: 2024-04-12 | End: 2024-04-13 | Stop reason: HOSPADM

## 2024-04-12 RX ORDER — LATANOPROST 50 UG/ML
1 SOLUTION/ DROPS OPHTHALMIC NIGHTLY
Status: DISCONTINUED | OUTPATIENT
Start: 2024-04-12 | End: 2024-04-13 | Stop reason: HOSPADM

## 2024-04-12 RX ORDER — CALCIUM CARBONATE 500 MG/1
500 TABLET, CHEWABLE ORAL DAILY
COMMUNITY

## 2024-04-12 RX ORDER — TRAVOPROST OPHTHALMIC SOLUTION 0.04 MG/ML
1 SOLUTION OPHTHALMIC EVERY EVENING
COMMUNITY
Start: 2024-03-28

## 2024-04-12 RX ORDER — FERROUS SULFATE 325(65) MG
325 TABLET ORAL
Status: DISCONTINUED | OUTPATIENT
Start: 2024-04-15 | End: 2024-04-13 | Stop reason: HOSPADM

## 2024-04-12 RX ADMIN — LATANOPROST 1 DROP: 50 SOLUTION OPHTHALMIC at 20:55

## 2024-04-12 RX ADMIN — PANTOPRAZOLE SODIUM 40 MG: 40 INJECTION, POWDER, FOR SOLUTION INTRAVENOUS at 06:00

## 2024-04-12 RX ADMIN — PANTOPRAZOLE SODIUM 40 MG: 40 INJECTION, POWDER, FOR SOLUTION INTRAVENOUS at 17:35

## 2024-04-12 RX ADMIN — DORZOLAMIDE HYDROCHLORIDE AND TIMOLOL MALEATE 1 DROP: 20; 5 SOLUTION/ DROPS OPHTHALMIC at 16:24

## 2024-04-12 RX ADMIN — SERTRALINE 50 MG: 50 TABLET, FILM COATED ORAL at 05:41

## 2024-04-12 RX ADMIN — LEVOTHYROXINE SODIUM 75 MCG: 0.07 TABLET ORAL at 09:28

## 2024-04-12 ASSESSMENT — LIFESTYLE VARIABLES
DOES PATIENT WANT TO STOP DRINKING: NO
AVERAGE NUMBER OF DAYS PER WEEK YOU HAVE A DRINK CONTAINING ALCOHOL: 0
HOW MANY TIMES IN THE PAST YEAR HAVE YOU HAD 5 OR MORE DRINKS IN A DAY: 0
HAVE PEOPLE ANNOYED YOU BY CRITICIZING YOUR DRINKING: NO
TOTAL SCORE: 0
HAVE YOU EVER FELT YOU SHOULD CUT DOWN ON YOUR DRINKING: NO
ALCOHOL_USE: NO
TOTAL SCORE: 0
SUBSTANCE_ABUSE: 0
TOTAL SCORE: 0
ON A TYPICAL DAY WHEN YOU DRINK ALCOHOL HOW MANY DRINKS DO YOU HAVE: 0
CONSUMPTION TOTAL: NEGATIVE
EVER HAD A DRINK FIRST THING IN THE MORNING TO STEADY YOUR NERVES TO GET RID OF A HANGOVER: NO
EVER FELT BAD OR GUILTY ABOUT YOUR DRINKING: NO

## 2024-04-12 ASSESSMENT — COGNITIVE AND FUNCTIONAL STATUS - GENERAL
SUGGESTED CMS G CODE MODIFIER MOBILITY: CM
MOVING TO AND FROM BED TO CHAIR: A LOT
DRESSING REGULAR LOWER BODY CLOTHING: A LOT
SUGGESTED CMS G CODE MODIFIER DAILY ACTIVITY: CL
WALKING IN HOSPITAL ROOM: TOTAL
CLIMB 3 TO 5 STEPS WITH RAILING: TOTAL
MOBILITY SCORE: 9
EATING MEALS: A LITTLE
TOILETING: A LOT
DRESSING REGULAR UPPER BODY CLOTHING: A LOT
MOVING FROM LYING ON BACK TO SITTING ON SIDE OF FLAT BED: A LOT
TURNING FROM BACK TO SIDE WHILE IN FLAT BAD: A LOT
STANDING UP FROM CHAIR USING ARMS: TOTAL
PERSONAL GROOMING: A LOT
DAILY ACTIVITIY SCORE: 13
HELP NEEDED FOR BATHING: A LOT

## 2024-04-12 ASSESSMENT — ENCOUNTER SYMPTOMS
HEADACHES: 0
FEVER: 0
EYE PAIN: 0
COUGH: 0
PALPITATIONS: 0
ABDOMINAL PAIN: 0
BACK PAIN: 0
INSOMNIA: 0
VOMITING: 0
DIZZINESS: 0
NAUSEA: 0
BLURRED VISION: 0
SHORTNESS OF BREATH: 0
CHILLS: 0

## 2024-04-12 ASSESSMENT — FIBROSIS 4 INDEX
FIB4 SCORE: 2.592724864350674256
FIB4 SCORE: 2.592724864350674256

## 2024-04-12 ASSESSMENT — PATIENT HEALTH QUESTIONNAIRE - PHQ9
SUM OF ALL RESPONSES TO PHQ9 QUESTIONS 1 AND 2: 0
1. LITTLE INTEREST OR PLEASURE IN DOING THINGS: NOT AT ALL
2. FEELING DOWN, DEPRESSED, IRRITABLE, OR HOPELESS: NOT AT ALL

## 2024-04-12 ASSESSMENT — PAIN DESCRIPTION - PAIN TYPE: TYPE: ACUTE PAIN

## 2024-04-12 NOTE — ASSESSMENT & PLAN NOTE
I discussed advance care planning with the patient for at least 18 minutes, including diagnosis, prognosis, plan of care, risks and benefits of any therapies that could be offered, as well as alternatives including palliation and hospice, as appropriate.     DNR/DNI

## 2024-04-12 NOTE — ED NOTES
RN to bedside for 15 min for transfusion initiation. No s/s of reaction. Daughter remains at bedside. Blood administration continues on IV pump via 20 LAC.

## 2024-04-12 NOTE — PROGRESS NOTES
[facility] Medicine Daily Progress Note    Date of Service  4/12/2024    Chief Complaint  Gloria Patel is a 89 y.o. female admitted 4/11/2024 with anemia.    Hospital Course  Gloria Patel is a 89 y.o. female past medical history of hypertension, chronic pain using NSAIDs who presented 4/11/2024 referral from lab with hemoglobin of 6 patient is accompanied with daughter.  Who is bedside reports she received a transfusion 1 month ago.  Patient previously on Advil and was switched to meloxicam which she stopped taking yesterday.  Patient was admitted on 3/4/2024 with epistaxis and her anemia was thought to be secondary to this.  She currently denies any abdominal pain.  In the ER, stool guaiac test was Hemoccult positive with brown stool.    Hemoglobin returned at 6.51 unit PRBC was ordered. Admitted to medicine service.     Interval Problem Update  No acute events overnight.  Patient feels well, has no complaints.  Daughter and son in law at bedside, report patient has short term memory issues and has been having ongoing upset stomach the last month or so but deny any signs of obvious bleeding.  Patient received RBC transfusion this morning, Hgb 7.7 post transfusion.  Occult stool blood positive in ER.  Discussed stopping home meloxicam, continuing PPI for possible GERD related gastritis/ulcer.  Can consider CT scan of abdomen pelvis if patient continues to have dropping hemoglobin on labs.  Check H/H level 3pm today and 3am tomorrow. Transfuse for Hgb<7.  Discussed option of getting GI consultation for possible endoscopies, family is wary of doing this as patient had very long recovery time after surgery previously. Given her age of 89, also question utility of diagnostic endoscopy as it will likely not change her management. She is not a good surgical or chemotherapy candidate should she be diagnosed with any cancer.  Family at bedside agreeable to conservative management with H/H checks and medication  management.  Possible discharge home tomorrow if Hgb remains stable.      I have discussed this patient's plan of care and discharge plan at IDT rounds today with Case Management, Nursing, Nursing leadership, and other members of the IDT team.    Consultants/Specialty  none    Code Status  DNAR/DNI    Disposition  The patient is not medically cleared for discharge to home or a post-acute facility.  Anticipate discharge to: home with close outpatient follow-up    I have placed the appropriate orders for post-discharge needs.    Review of Systems  Review of Systems   Constitutional:  Negative for chills and fever.   Eyes:  Negative for blurred vision and pain.   Respiratory:  Negative for cough and shortness of breath.    Cardiovascular:  Negative for chest pain, palpitations and leg swelling.   Gastrointestinal:  Negative for abdominal pain, nausea and vomiting.   Genitourinary:  Negative for dysuria and urgency.   Musculoskeletal:  Negative for back pain.   Skin:  Negative for itching and rash.   Neurological:  Negative for dizziness and headaches.   Psychiatric/Behavioral:  Negative for substance abuse. The patient does not have insomnia.         Physical Exam  Temp:  [36.2 °C (97.2 °F)-37.2 °C (99 °F)] 37 °C (98.6 °F)  Pulse:  [60-69] 62  Resp:  [16-20] 18  BP: (142-177)/(48-76) 149/54  SpO2:  [93 %-100 %] 99 %    Physical Exam  Vitals reviewed.   Constitutional:       General: She is not in acute distress.     Appearance: She is not diaphoretic.   HENT:      Head: Normocephalic and atraumatic.      Right Ear: External ear normal.      Left Ear: External ear normal.      Nose: Nose normal. No congestion.      Mouth/Throat:      Pharynx: No oropharyngeal exudate or posterior oropharyngeal erythema.   Eyes:      Extraocular Movements: Extraocular movements intact.      Pupils: Pupils are equal, round, and reactive to light.   Cardiovascular:      Rate and Rhythm: Normal rate and regular rhythm.   Pulmonary:       Effort: Pulmonary effort is normal.      Breath sounds: Normal breath sounds. No wheezing.   Abdominal:      General: Bowel sounds are normal. There is no distension.      Palpations: Abdomen is soft.      Tenderness: There is no abdominal tenderness. There is no guarding.   Musculoskeletal:         General: No swelling. Normal range of motion.      Cervical back: Normal range of motion and neck supple.   Skin:     General: Skin is warm and dry.   Neurological:      General: No focal deficit present.      Mental Status: She is alert and oriented to person, place, and time.   Psychiatric:         Mood and Affect: Mood normal.         Behavior: Behavior normal.         Fluids    Intake/Output Summary (Last 24 hours) at 4/12/2024 1443  Last data filed at 4/12/2024 1306  Gross per 24 hour   Intake 370 ml   Output 1100 ml   Net -730 ml       Laboratory  Recent Labs     04/11/24  0950 04/11/24  1935/24  0048 04/12/24  0934   WBC 7.8 8.9  --   --    RBC 2.24* 2.27*  --   --    HEMOGLOBIN 6.6* 6.5* 7.7* 8.6*   HEMATOCRIT 20.9* 21.3* 24.0* 26.5*   MCV 93.3 93.8  --   --    MCH 29.5 28.6  --   --    MCHC 31.6* 30.5*  --   --    RDW 66.3* 66.7*  --   --    PLATELETCT 193 216  --   --    MPV 9.9 8.9*  --   --      Recent Labs     04/1935   SODIUM 128*   POTASSIUM 5.2   CHLORIDE 97   CO2 22   GLUCOSE 112*   BUN 26*   CREATININE 1.28   CALCIUM 9.2                   Imaging  No orders to display        Assessment/Plan  * Acute GI bleeding- (present on admission)  Assessment & Plan  1 unit PRBC ordered  Serial hemoglobin/hematocrit  Hold all NSAIDs and anticoagulants  IV PPI twice daily  Transfuse less than 7  Check H/H today 3pm and at 3am tomorrow  Likely conservative approach and avoiding endoscopy if possible  Can consider CTA if having signs of brisk bleeding which I do not suspect is occurring    Acute blood loss anemia  Assessment & Plan  See GI bleed plan     ACP (advance care planning)- (present on  admission)  Assessment & Plan  I discussed advance care planning with the patient for at least 18 minutes, including diagnosis, prognosis, plan of care, risks and benefits of any therapies that could be offered, as well as alternatives including palliation and hospice, as appropriate.     DNR/DNI      Major depressive disorder, single episode, mild (HCC)- (present on admission)  Assessment & Plan  SSRI    Hypertension- (present on admission)  Assessment & Plan  Amlodipine          VTE prophylaxis: SCDs    My total time spent caring for the patient on the day of the encounter was 46 minutes. This time includes reviewing the hospital chart vitals, lab tests, and imaging; counseling and educating the patient about their diagnoses; coordinating care with the nurse, pharmacist, and specialists; documenting clinical information in the electronic medical record.  This does not include time spent on separately billable procedures/tests.

## 2024-04-12 NOTE — ASSESSMENT & PLAN NOTE
1 unit PRBC ordered  Serial hemoglobin/hematocrit  Hold all NSAIDs and anticoagulants  IV PPI twice daily  Transfuse less than 7  Check H/H today 3pm and at 3am tomorrow  Likely conservative approach and avoiding endoscopy if possible  Can consider CTA if having signs of brisk bleeding which I do not suspect is occurring

## 2024-04-12 NOTE — DISCHARGE PLANNING
ATTN: Case Management  RE: Referral for Home Health    As of 4/12/2024, we have accepted the Home Health referral for the patient listed above.    A [facility] clinician will be out to see the patient within 48 hours. If you have any questions or concerns regarding the patient’s transition to Home Health, please do not hesitate to contact us at [phone].      We look forward to collaborating with you,  [facility] Team

## 2024-04-12 NOTE — PROGRESS NOTES
4 Eyes Skin Assessment Completed by CHETAN Senior and CHETAN Rincon.    Head WDL  Ears WDL  Nose :two tiny bleeding scabs on bridge of nose  Mouth WDL  Neck WDL  Breast/Chest WDL  Shoulder Blades WDL  Spine WDL  (R) Arm/Elbow/Hand Bruising  (L) Arm/Elbow/Hand Bruising  Abdomen WDL  Groin WDL  Scrotum/Coccyx/Buttocks WDL  (R) Leg Bruising  (L) Leg Bruising  (R) Heel/Foot/Toe : open sore  (L) Heel/Foot/Toe WDL          Devices In Places Nasal Cannula      Interventions In Place Pillows    Possible Skin Injury No    Pictures Uploaded Into Epic N/A  Wound Consult Placed N/A  RN Wound Prevention Protocol Ordered No

## 2024-04-12 NOTE — ED PROVIDER NOTES
ED Provider Note    CHIEF COMPLAINT  Chief Complaint   Patient presents with    Abnormal Labs     BIB [facility] from [facility] in [place] for low Hgb and associated weakness. Hx of blood transfusions. Per EMS Hgb was 6.        EXTERNAL RECORDS REVIEWED  Inpatient Notes discharge summary on 3/6/2024 for acute anemia and epistaxis    HPI/ROS  LIMITATION TO HISTORY   Select: : None  OUTSIDE HISTORIAN(S):  Family patient's daughter reports that the patient had been acting very weak lately with also looking pale    Gloria CYNTHIA Patel is a 89 y.o. female who presents with low blood counts.  Patient denies black or bloody stools.  Patient has had heartburn symptoms and epigastric discomfort and burning.  Patient denies any syncope.  Patient without chest pain or shortness of breath.  Patient is not on anticoagulation, had severe epistaxis and was evaluated by ENT and was told to discontinue Flonase and NSAIDs.  She has followed these directions but it appears that her blood counts have still declined.    PAST MEDICAL HISTORY   has a past medical history of Arthritis, Diverticulitis, GERD (gastroesophageal reflux disease), Glaucoma, Heart burn, Hiatus hernia syndrome, Hypertension, Primary osteoarthritis of both shoulders (1/22/2019), and Unspecified urinary incontinence.    SURGICAL HISTORY   has a past surgical history that includes other (1945); other (1954); other orthopedic surgery; other orthopedic surgery (2000); cholecystectomy (1966); other abdominal surgery (1954); gyn surgery (1970); hip arthroplasty total (6/21/2011); us-needle core bx-breast panel; and orif, ankle (Right, 12/22/2021).    FAMILY HISTORY  Family History   Problem Relation Age of Onset    Diabetes Other     Heart Disease Other     Lung Disease Other     Cancer Sister        SOCIAL HISTORY  Social History     Tobacco Use    Smoking status: Never    Smokeless tobacco: Never   Vaping Use    Vaping Use: Never used   Substance and Sexual Activity     "Alcohol use: Yes     Alcohol/week: 0.0 oz     Comment: occassional wine    Drug use: No    Sexual activity: Not on file       CURRENT MEDICATIONS  Home Medications       Reviewed by Maik Carter (Pharmacy Tech) on 04/11/24 at 2137  Med List Status: Partial     Medication Last Dose Status   acetaminophen (TYLENOL 8 HOUR ARTHRITIS PAIN) 650 MG CR tablet  Active   amLODIPine (NORVASC) 5 MG Tab  Active   Apoaequorin 10 MG Cap  Active   calcium carbonate (TUMS) 500 MG Chew Tab  Active   Cholecalciferol (VITAMIN D3) 1.25 MG (77335 UT) Tab  Active   CRANBERRY PO  Active   diclofenac sodium (VOLTAREN) 1 % Gel  Active   dorzolamide-timolol (COSOPT) 2-0.5 % Solution  Active   Famotidine-Ca Carb-Mag Hydrox -165 MG Chew Tab  Active   ferrous sulfate 325 (65 Fe) MG tablet  Active   Homeopathic Products (THERAWORX RELIEF) Foam  Active   hydrocortisone 2.5 % Cream topical cream  Active   latanoprost (XALATAN) 0.005 % Solution  Active   levothyroxine (SYNTHROID) 75 MCG Tab  Active   lisinopril (PRINIVIL) 20 MG Tab  Active   Magnesium Hydroxide (MILK OF MAGNESIA PO)  Active   melatonin 5 mg Tab  Active   meloxicam (MOBIC) 7.5 MG Tab  Active   Multiple Vitamins-Minerals (PRESERVISION AREDS 2 PO)  Active   omeprazole (PRILOSEC) 20 MG delayed-release capsule  Active   ondansetron (ZOFRAN ODT) 4 MG TABLET DISPERSIBLE  Active   polyethylene glycol 3350 (MIRALAX) Powder  Active   Saline (SIMPLY SALINE) 0.9 % Aero Soln  Active   sertraline (ZOLOFT) 50 MG Tab  Active   Soft Lens Products (FRANCESCA SALINE) Solution  Active                    ALLERGIES  Allergies   Allergen Reactions    Morphine Anaphylaxis     anaphylaxis    Cephalexin Rash     Full body; tolerated amoxicillin March 2024    Codeine Vomiting and Nausea    Metronidazole Vomiting and Nausea    Sulfa Drugs Rash     Full body       PHYSICAL EXAM  VITAL SIGNS: BP (!) 152/67   Pulse 62   Temp 37 °C (98.6 °F) (Temporal)   Resp 18   Ht 1.575 m (5' 2\")   Wt 55.3 kg (122 " lb)   SpO2 100%   BMI 22.31 kg/m²    Vitals and nursing note reviewed.  Chaperone present  Constitutional:       Comments: Patient is lying in bed supine, pleasant, conversant, speaking in complete sentences   HENT:      Head: Normocephalic and atraumatic.   Eyes:      Extraocular Movements: Extraocular movements intact.      Conjunctiva/sclera: Conjunctivae normal.      Pupils: Pupils are equal, round, and reactive to light.   Cardiovascular:      Pulses: Normal pulses.      Comments: HR 62  Pulmonary:      Effort: Pulmonary effort is normal. No respiratory distress.   Abdominal:      Comments: Abdomen is soft, mild epigastric tenderness to palpation, non-distended, non-rigid, no rebound, guarding, masses, no McBurney's point tenderness, no peritoneal signs, negative Rovsing sign, negative Benavides sign.    :  Normal colored stool, not grossly bloody or melanotic, Hemoccult positive however  Musculoskeletal:         General: No swelling. Normal range of motion.      Cervical back: Normal range of motion. No rigidity.   Skin:     General: Skin is warm and dry.      Capillary Refill: Capillary refill takes less than 2 seconds.   Neurological:      Mental Status: Alert.           Radiologist interpretation:  No orders to display       COURSE & MEDICAL DECISION MAKING    ASSESSMENT, COURSE AND PLAN  Care Narrative: Lab work demonstrates acute blood loss anemia, elevated BUN concerning for GI bleed, unclear whether this is upper or lower, could possibly be upper with either erosive gastritis or peptic ulcer disease secondary to NSAID use.  Could also be lower however.  Patient with Hemoccult positive stool.  1 unit PRBCs has been ordered.  Disposition to the hospital medicine service for blood transfusion and possible GI intervention to find source of bleeding.    This dictation has been created using voice recognition software. I am continuously working with the software to minimize the number of voice recognition  errors and I have made every attempt to manually correct the errors within my dictation. However errors  related to this voice recognition software may still exist and should be interpreted within the appropriate context.     Electronically signed by: Lorenzo Lazar M.D., 4/11/2024 9:53 PM    CRITICAL CARE  The very real possibilty of a deterioration of this patient's condition required the highest level of my preparedness for sudden, emergent intervention.  I provided critical care services, which included medication orders, frequent reevaluations of the patient's condition and response to treatment, ordering and reviewing test results, and discussing the case with various consultants.  The critical care time associated with the care of the patient was 36 minutes. Review chart for interventions. This time is exclusive of any other billable procedures.       Transfusion: I have explained to the patient the risks and benefits of transfusion of blood products.  This includes, as appropriate, the risk of mild allergic reaction, hemolytic reaction, transfusion-associated lung injury, febrile reactions, circulatory or iron overload, and infection.    We discussed possible alternatives and their risks, including directed donation, autologous transfusion, and no transfusion, including IV or oral iron supplementation, as appropriate.  I believe the patient understands the risks and benefits and was able to express understanding.              FINAL DIAGNOSIS  1. Acute blood loss anemia    2. Gastrointestinal hemorrhage, unspecified gastrointestinal hemorrhage type           Electronically signed by: Lorenzo Lazar M.D., 4/11/2024 9:50 PM

## 2024-04-12 NOTE — FACE TO FACE
Face to Face Supporting Documentation - Home Health    The encounter with this patient was in whole or in part the primary reason for home health admission.    Date of encounter:   Patient:                    MRN:                       YOB: 2024  Gloria Patel  [id number]  7/9/1924     Home health to see patient for:  Skilled Nursing care for assessment, interventions & education, Physical Therapy evaluation and treatment, and Occupational therapy evaluation and treatment    Skilled need for:  New Onset Medical Diagnosis anemia    Skilled nursing interventions to include:  Comment: PT/OT    Homebound status evidenced by:  Needs the assistance of another person in order to leave the home. Leaving home requires a considerable and taxing effort. There is a normal inability to leave the home.    Community Physician to provide follow up care: Mike Otero M.D.     Optional Interventions? No      I certify the face to face encounter for this home health care referral meets the CMS requirements and the encounter/clinical assessment with the patient was, in whole, or in part, for the medical condition(s) listed above, which is the primary reason for home health care. Based on my clinical findings: the service(s) are medically necessary, support the need for home health care, and the homebound criteria are met.  I certify that this patient has had a face to face encounter by myself.  Nawaf Jones M.D. - NPI: [id number]

## 2024-04-12 NOTE — ED TRIAGE NOTES
Gloria CYNTHIA Amanda  89 y.o. female  Chief Complaint   Patient presents with    Abnormal Labs     BIB [facility] from [facility] in [place] for low Hgb and associated weakness. Hx of blood transfusions. Per EMS Hgb was 6.      BIB [facility] for above complaint.     Pt is alert, oriented, and follows commands. Pt speaking in full sentences and responds appropriately to questions. No acute distress noted in triage and respirations are even and unlabored.     Pt placed in BLUE 14 and educated on triage process. Pt encouraged to alert staff for any changes in condition.    Connected to cardiac monitoring. Care assumed, ayo up for ERP.

## 2024-04-12 NOTE — DISCHARGE PLANNING
Met with pt and pt's daughter, Atiya @ bedside to complete assessment and discuss d/c planning.   [facility] with all ADL's.   Pt uses a walker at baseline   Pt was on service with [facility] prior to admission and would like to resume    Choice form completed and faxed to [facility]  HH order requested from MD    Family will provide transportation home when medically cleared    Care Transition Team Assessment    Information Source  Orientation Level: Oriented to person, Oriented to time, Oriented to place, Disoriented to situation  Information Given By: Patient    Elopement Risk  Legal Hold: No  Ambulatory or Self Mobile in Wheelchair: No-Not an Elopement Risk  Disoriented: Situation-At Risk for Elopement  Psychiatric Symptoms: None  History of Wandering: No  Elopement this Admit: No  Vocalizing Wanting to Leave: No  Displays Behaviors, Body Language Wanting to Leave: No-Not at Risk for Elopement  Elopement Risk: Not at Risk for Elopement    Interdisciplinary Discharge Planning  Lives with - Patient's Self Care Capacity: Other (Comments)  Support Systems: Children  Housing / Facility: [facility] ([facility])  Durable Medical Equipment: Not Applicable    Discharge Preparedness  What is your plan after discharge?: Home health care  What are your discharge supports?: Child  Prior Functional Level: Needs Assist with Activities of Daily Living, Needs Assist with Medication Management    Functional Assesment  Prior Functional Level: Needs Assist with Activities of Daily Living, Needs Assist with Medication Management    Finances  Financial Barriers to Discharge: No  Prescription Coverage: Yes    Vision / Hearing Impairment  Vision Impairment : Yes  Right Eye Vision: Impaired, Wears Glasses  Left Eye Vision: Impaired, Wears Glasses  Hearing Impairment : Yes  Hearing Impairment: Both Ears, Hearing Device(s) Available  Does Pt Need Special Equipment for the  Hearing Impaired?: No    Advance Directive  Advance Directive?: DPOA for Health Care    Domestic Abuse  Have you ever been the victim of abuse or violence?: No  Physical Abuse or Sexual Abuse: No  Verbal Abuse or Emotional Abuse: No  Possible Abuse/Neglect Reported to:: Not Applicable    Psychological Assessment  History of Substance Abuse: None  History of Psychiatric Problems: No  Non-compliant with Treatment: No  Newly Diagnosed Illness: Yes    Discharge Risks or Barriers  Discharge risks or barriers?: No    Anticipated Discharge Information  Discharge Disposition: D/T to home under A care in anticipation of covered skilled care (06)

## 2024-04-12 NOTE — CARE PLAN
"Pt AO x3, disoriented to situation during assessment.  Pt denies pain.  Call light and belongings within reach.  Bed locked and in lowest position.  Hourly rounding.  Needs currently met.           The patient is Stable - Low risk of patient condition declining or worsening    Shift Goals  Clinical Goals: monitor labs  Patient Goals: \"go home soon I hope\"  Family Goals: TETE    Progress made toward(s) clinical / shift goals:    Pt H&H continued to trend upwards during shift blood draws.      Problem: Knowledge Deficit - Standard  Goal: Patient and family/care givers will demonstrate understanding of plan of care, disease process/condition, diagnostic tests and medications  Outcome: Progressing     Problem: Fall Risk  Goal: Patient will remain free from falls  Outcome: Progressing         "

## 2024-04-12 NOTE — CARE PLAN
The patient is Stable - Low risk of patient condition declining or worsening    Shift Goals  Clinical Goals: monitor H&H  Patient Goals: rest    Progress made toward(s) clinical / shift goals:  patient resting in bed. A&O X3. Purewick in place. No acute events overnight    Patient is not progressing towards the following goals:

## 2024-04-12 NOTE — H&P
Hospital Medicine History & Physical Note    Date of Service  4/11/2024    Primary Care Physician  Mike Otero M.D.    Consultants  None     Code Status  DNAR/DNI    Chief Complaint  Chief Complaint   Patient presents with    Abnormal Labs     BIB [facility] from [facility] in [place] for low Hgb and associated weakness. Hx of blood transfusions. Per EMS Hgb was 6.        History of Presenting Illness  Gloria Patel is a 89 y.o. female past medical history of hypertension, chronic pain using NSAIDs who presented 4/11/2024 referral from lab with hemoglobin of 6 patient is accompanied with daughter.  Who is bedside reports she received a transfusion 1 month ago.  Patient previously on Advil and was switched to meloxicam which she stopped taking yesterday.  Patient was admitted on 3/4/2024 with epistaxis and her anemia was thought to be secondary to this.  She currently denies any abdominal pain.  In the ER, stool guaiac test was Hemoccult positive with brown stool.    Hemoglobin returned at 6.51 unit PRBC was ordered. Admitted to medicine service.     I discussed the plan of care with patient and ERP, Dr. Lazar .    Review of Systems  Review of Systems   Constitutional:  Positive for malaise/fatigue.   Gastrointestinal:  Positive for blood in stool.       Past Medical History   has a past medical history of Arthritis, Diverticulitis, GERD (gastroesophageal reflux disease), Glaucoma, Heart burn, Hiatus hernia syndrome, Hypertension, Primary osteoarthritis of both shoulders (1/22/2019), and Unspecified urinary incontinence.    Surgical History   has a past surgical history that includes other (1945); other (1954); other orthopedic surgery; other orthopedic surgery (2000); cholecystectomy (1966); other abdominal surgery (1954); gyn surgery (1970); hip arthroplasty total (6/21/2011); us-needle core bx-breast panel; and orif, ankle (Right, 12/22/2021).     Family History  family history includes Cancer in her sister;  Diabetes in an other family member; Heart Disease in an other family member; Lung Disease in an other family member.   Family history reviewed with patient. There is no family history that is pertinent to the chief complaint.     Social History   reports that she has never smoked. She has never used smokeless tobacco. She reports current alcohol use. She reports that she does not use drugs.    Allergies  Allergies   Allergen Reactions    Morphine Anaphylaxis     anaphylaxis    Cephalexin Rash     Full body; tolerated amoxicillin March 2024    Codeine Vomiting and Nausea    Metronidazole Vomiting and Nausea    Sulfa Drugs Rash     Full body       Medications  Prior to Admission Medications   Prescriptions Last Dose Informant Patient Reported? Taking?   Apoaequorin 10 MG Cap   Yes No   Sig: Take 10 mg by mouth every day. Indications: memory loss   CRANBERRY PO   Yes No   Sig: Take 504 mg by mouth 2 times a day. Indications: supplement   Cholecalciferol (VITAMIN D3) 1.25 MG (28374 UT) Tab   Yes No   Sig: Take 1.25 mg by mouth every day. Indications: supplement   Famotidine-Ca Carb-Mag Hydrox -165 MG Chew Tab   Yes No   Sig: Chew 1 Tablet 1 time a day as needed (for indegestion). take if tums is not effective  Indications: Heartburn   Homeopathic Products (THERAWORX RELIEF) Foam   Yes No   Sig: Apply 1 Application topically 4 times a day as needed (pain in shoulders). apply to both shoulders as needed for pain after showers  Indications: pain in shoulders   Patient not taking: Reported on 3/25/2024   Magnesium Hydroxide (MILK OF MAGNESIA PO)   Yes No   Sig: Take 30 mL by mouth 1 time a day as needed (constipation). take daily by mouth if miralax is not effective for constipation  Indications: constipation   Multiple Vitamins-Minerals (PRESERVISION AREDS 2 PO)   Yes No   Sig: Take 1 Tablet by mouth every day. Indications: supplement   Saline (SIMPLY SALINE) 0.9 % Aero Soln   Yes No   Sig: Administer 1 Dose into  affected nostril(S) 3 times a day as needed (nasal congestion). 1 spray in each nostril for nasal congestion  Indications: nasal congestion   Soft Lens Products (FRANCESCA SALINE) Solution   Yes No   Sig: Administer 1 Drop into both eyes every day. Indications: supplement   acetaminophen (TYLENOL 8 HOUR ARTHRITIS PAIN) 650 MG CR tablet   No No   Sig: Take 1 tab in AM and at lunch. Take 2 tabs at bedtime.   amLODIPine (NORVASC) 5 MG Tab   No No   Sig: TAKE 1 TABLET BY MOUTH EVERY DAY   calcium carbonate (TUMS) 500 MG Chew Tab   No No   Sig: Chew 1 Tablet 3 times a day.   diclofenac sodium (VOLTAREN) 1 % Gel   Yes No   Sig: Apply 2 g topically 2 times a day as needed (for mild to moderate pain). Indications: Joint Damage causing Pain and Loss of Function   dorzolamide-timolol (COSOPT) 2-0.5 % Solution   Yes No   Sig: Administer 1 Drop into both eyes every morning. Indications: Glaucoma   ferrous sulfate 325 (65 Fe) MG tablet   No No   Sig: Take 1 Tablet by mouth every Monday, Wednesday, and Friday.   Patient taking differently: Take 325 mg by mouth every 7 days. Indications: Anemia From Inadequate Iron in the Body   hydrocortisone 2.5 % Cream topical cream   No No   Sig: Apply thin layer to rash on face daily   latanoprost (XALATAN) 0.005 % Solution   Yes No   Sig: Administer 1 Drop into both eyes every evening. Indications: Wide-Angle Glaucoma   levothyroxine (SYNTHROID) 75 MCG Tab   No No   Sig: TAKE 1 TABLET BY MOUTH EVERY DAY   lisinopril (PRINIVIL) 20 MG Tab   No No   Sig: TAKE 1/2 TABLET BY MOUTH TWICE A DAY   Patient taking differently: Take 10 mg by mouth 2 times a day. TAKE 1/2 TABLET BY MOUTH TWICE A DAY  Indications: High Blood Pressure Disorder   melatonin 5 mg Tab   No No   Sig: Take 2 Tablets by mouth at bedtime as needed (insomnia).   meloxicam (MOBIC) 7.5 MG Tab   No No   Sig: Take 1 Tablet by mouth every day.   omeprazole (PRILOSEC) 20 MG delayed-release capsule   No No   Sig: Take 1 Capsule by mouth 2  times a day.   ondansetron (ZOFRAN ODT) 4 MG TABLET DISPERSIBLE   No No   Sig: Take 1 Tablet by mouth every 8 hours as needed for Nausea.   polyethylene glycol 3350 (MIRALAX) Powder  Patient Yes No   Sig: Take 17 g by mouth every day. Indications: Constipation   sertraline (ZOLOFT) 50 MG Tab   No No   Sig: TAKE 1 TABLET BY MOUTH EVERY DAY      Facility-Administered Medications: None       Physical Exam  Temp:  [36.6 °C (97.8 °F)-37.2 °C (99 °F)] 37 °C (98.6 °F)  Pulse:  [60-69] 66  Resp:  [16-20] 20  BP: (112-175)/(58-71) 175/71  SpO2:  [93 %-100 %] 100 %  Blood Pressure : (!) 175/71   Temperature: 37 °C (98.6 °F)   Pulse: 66   Respiration: 20   Pulse Oximetry: 100 %       Physical Exam  Vitals and nursing note reviewed.   Constitutional:       Appearance: Normal appearance.   HENT:      Head: Normocephalic and atraumatic.      Right Ear: Tympanic membrane normal.      Left Ear: Tympanic membrane normal.      Nose: Nose normal.      Mouth/Throat:      Mouth: Mucous membranes are moist.      Pharynx: Oropharynx is clear.   Eyes:      Extraocular Movements: Extraocular movements intact.      Pupils: Pupils are equal, round, and reactive to light.   Cardiovascular:      Rate and Rhythm: Normal rate and regular rhythm.      Pulses: Normal pulses.      Heart sounds: Normal heart sounds.   Pulmonary:      Effort: Pulmonary effort is normal.      Breath sounds: Normal breath sounds.   Abdominal:      General: Bowel sounds are normal. There is no distension.      Palpations: Abdomen is soft. There is no mass.   Musculoskeletal:         General: Normal range of motion.      Cervical back: Neck supple.   Skin:     General: Skin is warm.      Capillary Refill: Capillary refill takes less than 2 seconds.   Neurological:      General: No focal deficit present.      Mental Status: She is alert and oriented to person, place, and time. Mental status is at baseline.   Psychiatric:         Mood and Affect: Mood normal.          "Behavior: Behavior normal.         Laboratory:  Recent Labs     04/11/24  0950 04/1935   WBC 7.8 8.9   RBC 2.24* 2.27*   HEMOGLOBIN 6.6* 6.5*   HEMATOCRIT 20.9* 21.3*   MCV 93.3 93.8   MCH 29.5 28.6   MCHC 31.6* 30.5*   RDW 66.3* 66.7*   PLATELETCT 193 216   MPV 9.9 8.9*     Recent Labs     04/1935   SODIUM 128*   POTASSIUM 5.2   CHLORIDE 97   CO2 22   GLUCOSE 112*   BUN 26*   CREATININE 1.28   CALCIUM 9.2     Recent Labs     04/1935   ALTSGPT 8   ASTSGOT 16   ALKPHOSPHAT 138*   TBILIRUBIN 0.3   GLUCOSE 112*         No results for input(s): \"NTPROBNP\" in the last 72 hours.      No results for input(s): \"TROPONINT\" in the last 72 hours.    Imaging:  No orders to display       no X-Ray or EKG requiring interpretation    Assessment/Plan:  Justification for Admission Status  I anticipate this patient will require at least two midnights for appropriate medical management, necessitating inpatient admission because GI Bleed requiring PRBC transfusion.    Patient will need a Med/Surg bed on MEDICAL service .  The need is secondary to see above.    * Acute GI bleeding- (present on admission)  Assessment & Plan  1 unit PRBC ordered  Serial hemoglobin/hematocrit  Hold all NSAIDs and anticoagulants  IV PPI twice daily  Transfuse less than 7      Acute blood loss anemia  Assessment & Plan  See GI bleed plan     Major depressive disorder, single episode, mild (HCC)- (present on admission)  Assessment & Plan  SSRI    Hypertension- (present on admission)  Assessment & Plan  Amlodipine     ACP (advance care planning)- (present on admission)  Assessment & Plan  I discussed advance care planning with the patient for at least 18 minutes, including diagnosis, prognosis, plan of care, risks and benefits of any therapies that could be offered, as well as alternatives including palliation and hospice, as appropriate.     DNR/DNI          VTE prophylaxis: SCDs/TEDs DVT Ppx contraindicated d/t bleed    This note was " "generated using voice recognition software which is a small chance of losing errors of grammar and possibly guarded.  Occasional wrong word or \"sound alike\" substitutions may have occurred due to inherent limitation of voice recognition software.  Read the chart carefully recognize and context, where the substitutions have occurred.  I have made every reasonable attempt to find and correct any obvious errors, but expect that some may not be found prior to finalization of these notes.    "

## 2024-04-13 VITALS
RESPIRATION RATE: 17 BRPM | HEART RATE: 61 BPM | SYSTOLIC BLOOD PRESSURE: 140 MMHG | TEMPERATURE: 98.7 F | WEIGHT: 124.56 LBS | HEIGHT: 62 IN | DIASTOLIC BLOOD PRESSURE: 55 MMHG | BODY MASS INDEX: 22.92 KG/M2 | OXYGEN SATURATION: 91 %

## 2024-04-13 LAB
ERYTHROCYTE [DISTWIDTH] IN BLOOD BY AUTOMATED COUNT: 65.7 FL (ref 35.9–50)
HCT VFR BLD AUTO: 26.4 % (ref 37–47)
HGB BLD-MCNC: 8.2 G/DL (ref 12–16)
MCH RBC QN AUTO: 28.6 PG (ref 27–33)
MCHC RBC AUTO-ENTMCNC: 31.1 G/DL (ref 32.2–35.5)
MCV RBC AUTO: 92 FL (ref 81.4–97.8)
PLATELET # BLD AUTO: 173 K/UL (ref 164–446)
PMV BLD AUTO: 9.5 FL (ref 9–12.9)
RBC # BLD AUTO: 2.87 M/UL (ref 4.2–5.4)
WBC # BLD AUTO: 8.9 K/UL (ref 4.8–10.8)

## 2024-04-13 PROCEDURE — 700102 HCHG RX REV CODE 250 W/ 637 OVERRIDE(OP): Performed by: STUDENT IN AN ORGANIZED HEALTH CARE EDUCATION/TRAINING PROGRAM

## 2024-04-13 PROCEDURE — A9270 NON-COVERED ITEM OR SERVICE: HCPCS | Performed by: STUDENT IN AN ORGANIZED HEALTH CARE EDUCATION/TRAINING PROGRAM

## 2024-04-13 PROCEDURE — 700111 HCHG RX REV CODE 636 W/ 250 OVERRIDE (IP): Performed by: STUDENT IN AN ORGANIZED HEALTH CARE EDUCATION/TRAINING PROGRAM

## 2024-04-13 PROCEDURE — 665998 HH PPS REVENUE CREDIT

## 2024-04-13 PROCEDURE — 36415 COLL VENOUS BLD VENIPUNCTURE: CPT

## 2024-04-13 PROCEDURE — 700111 HCHG RX REV CODE 636 W/ 250 OVERRIDE (IP): Mod: JZ

## 2024-04-13 PROCEDURE — C9113 INJ PANTOPRAZOLE SODIUM, VIA: HCPCS | Performed by: STUDENT IN AN ORGANIZED HEALTH CARE EDUCATION/TRAINING PROGRAM

## 2024-04-13 PROCEDURE — 665999 HH PPS REVENUE DEBIT

## 2024-04-13 PROCEDURE — 99239 HOSP IP/OBS DSCHRG MGMT >30: CPT | Performed by: STUDENT IN AN ORGANIZED HEALTH CARE EDUCATION/TRAINING PROGRAM

## 2024-04-13 PROCEDURE — 85027 COMPLETE CBC AUTOMATED: CPT

## 2024-04-13 RX ORDER — OMEPRAZOLE 20 MG/1
40 CAPSULE, DELAYED RELEASE ORAL 2 TIMES DAILY
Qty: 180 CAPSULE | Refills: 3 | Status: SHIPPED | OUTPATIENT
Start: 2024-04-13 | End: 2024-04-13

## 2024-04-13 RX ORDER — OMEPRAZOLE 20 MG/1
40 CAPSULE, DELAYED RELEASE ORAL 2 TIMES DAILY
Qty: 120 CAPSULE | Refills: 0 | Status: SHIPPED | OUTPATIENT
Start: 2024-04-13 | End: 2024-05-13

## 2024-04-13 RX ORDER — HALOPERIDOL 5 MG/ML
2 INJECTION INTRAMUSCULAR EVERY 4 HOURS PRN
Status: DISCONTINUED | OUTPATIENT
Start: 2024-04-13 | End: 2024-04-13 | Stop reason: HOSPADM

## 2024-04-13 RX ORDER — HALOPERIDOL 5 MG/ML
1 INJECTION INTRAMUSCULAR ONCE
Status: COMPLETED | OUTPATIENT
Start: 2024-04-13 | End: 2024-04-13

## 2024-04-13 RX ADMIN — LEVOTHYROXINE SODIUM 75 MCG: 0.07 TABLET ORAL at 09:39

## 2024-04-13 RX ADMIN — DORZOLAMIDE HYDROCHLORIDE AND TIMOLOL MALEATE 1 DROP: 20; 5 SOLUTION/ DROPS OPHTHALMIC at 09:41

## 2024-04-13 RX ADMIN — HALOPERIDOL LACTATE 1 MG: 5 INJECTION, SOLUTION INTRAMUSCULAR at 07:03

## 2024-04-13 RX ADMIN — SERTRALINE 50 MG: 50 TABLET, FILM COATED ORAL at 09:39

## 2024-04-13 RX ADMIN — PANTOPRAZOLE SODIUM 40 MG: 40 INJECTION, POWDER, FOR SOLUTION INTRAVENOUS at 05:28

## 2024-04-13 ASSESSMENT — PAIN DESCRIPTION - PAIN TYPE: TYPE: ACUTE PAIN

## 2024-04-13 NOTE — DISCHARGE PLANNING
Called the [facility] to inform them of patient returning to their facility via daughter transporting.

## 2024-04-13 NOTE — PROGRESS NOTES
Resumed Care of this patient. Patient was being given haldol upon arrival of AM nurses. Per noc shift nurse patient was agitated and was not calming down despite de- escalating attempts. . Prn medication was given, patient is now in bed continuous to be confused a/o x1. Oriented to person.  Attempting to get out of bed by self. Patient is unsteady and requires continuous fall precaution reminders. MD notified.

## 2024-04-13 NOTE — CARE PLAN
The patient is Stable - Low risk of patient condition declining or worsening    Shift Goals  Clinical Goals: monitor labs  Patient Goals: rest  Family Goals: TETE    Progress made toward(s) clinical / shift goals:  patient A&O X3. Resting in bed. No acute events overnight    Patient is not progressing towards the following goals:

## 2024-04-13 NOTE — PROGRESS NOTES
Patient discharged home with family. All personal belongings taken with. Denies at discomfort at time of discharge. Stable IV was removed.

## 2024-04-13 NOTE — DISCHARGE SUMMARY
Discharge Summary    CHIEF COMPLAINT ON ADMISSION  Chief Complaint   Patient presents with    Abnormal Labs     BIB [facility] from [facility] in [place] for low Hgb and associated weakness. Hx of blood transfusions. Per EMS Hgb was 6.        Reason for Admission  EMS     Admission Date  4/11/2024    CODE STATUS  DNAR/DNI    HPI & HOSPITAL COURSE  Gloria Patel is a 89 y.o. female past medical history of hypertension, chronic pain using NSAIDs who presented 4/11/2024 referral from lab with hemoglobin of 6 patient is accompanied with daughter. Patient previously on Advil and was switched to meloxicam which she stopped taking day prior to presentation.  Patient was admitted on 3/4/2024 with epistaxis and her anemia was thought to be secondary to this.  She currently denies any abdominal pain.  In the ER, stool guaiac test was Hemoccult positive with brown stool.    Hemoglobin returned at 6.5, 1 unit PRBC was ordered. Patients anemia improved after red blood cell transfusion. She is treated for suspected gastritis/possible gastric ulcer with PPI therapy. Her hemoglobin remains relatively stable at 8.2 at time of discharge. She is advised to hold off on meloxicam at home as well as any other NSAIDs. She is discharged on increased dose PPI 40 mg BID. Discussed possibility of GI bleed from elsewhere in GI tract. She is 89 years old with some dementia and poor short term memory. Per family she had prolonged recovery period after surgery from the anesthesia. I discussed with patient, patients daughter and son in law, as well as patients sister at bedside my recommendations against diagnostic endoscopy given patient is not a good surgical candidate or chemotherapy candidate should malignancy be found. Family can consider CT scan to evaluate for mass vs brisk bleeding but this diagnostic information would likely not  for patient either. At this time recommend continued medical management for possible gastric  source of bleed and focus on quality of life. Should she have recurrent bleeding, she may be hospice appropriate at that time, per family patient was recently declined by hospice due to lack of eligibility. I recommend patient follows up with PCP in 1 week for repeat CBC and lab monitoring with continued PPI dosing. Patient and family understanding and agreeable with plan. Patient is discharged back to assisted living facility with resumption of home health services.    Therefore, she is discharged in fair and stable condition to home with organized home healthcare and close outpatient follow-up.    The patient met 2-midnight criteria for an inpatient stay at the time of discharge.    Discharge Date  4/13/2024    FOLLOW UP ITEMS POST DISCHARGE  Take medications as prescribed.  Follow up with PCP.    DISCHARGE DIAGNOSES  Principal Problem:    Acute GI bleeding (POA: Yes)  Active Problems:    Hypertension (POA: Yes)    Major depressive disorder, single episode, mild (HCC) (POA: Yes)    ACP (advance care planning) (POA: Yes)    Acute blood loss anemia (POA: Unknown)  Resolved Problems:    * No resolved hospital problems. *      FOLLOW UP  Future Appointments   Date Time Provider Department Center   4/16/2024  9:00 AM Op Palliative Care Provider [facility] None   5/29/2024  9:30 AM Mike Otero M.D. [facility] None     Mike Otero M.D.  [address]  [address]  [facility] [id number]  [phone]    Follow up in 1 week(s)        MEDICATIONS ON DISCHARGE     Medication List        CHANGE how you take these medications        Instructions   acetaminophen 650 MG CR tablet  What changed:   how much to take  how to take this  when to take this  additional instructions  Commonly known as: Tylenol 8 Hour Arthritis Pain   Take 1 tab in AM and at lunch. Take 2 tabs at bedtime.     lisinopril 20 MG Tabs  What changed:   when to take this  additional instructions  Commonly known as: Prinivil   TAKE 1/2 TABLET BY MOUTH TWICE A DAY      melatonin 5 mg Tabs  What changed:   how much to take  reasons to take this   Take 2 Tablets by mouth at bedtime as needed (insomnia).  Dose: 10 mg     omeprazole 20 MG delayed-release capsule  What changed: how much to take  Commonly known as: PriLOSEC   Take 2 Capsules by mouth 2 times a day for 30 days.  Dose: 40 mg            CONTINUE taking these medications        Instructions   amLODIPine 5 MG Tabs  Commonly known as: Norvasc   TAKE 1 TABLET BY MOUTH EVERY DAY  Dose: 5 mg     Apoaequorin 10 MG Caps   Take 1 Tablet by mouth every day. Indications: memory loss  Dose: 1 Tablet     calcium carbonate 500 MG Chew  Commonly known as: Tums   Chew 500 mg every day.  Dose: 500 mg     CRANBERRY PO   Take 1 Tablet by mouth every day. Indications: supplement  Dose: 1 Tablet     dorzolamide-timolol 2-0.5 % Soln  Commonly known as: Cosopt   Administer 1 Drop into both eyes every morning. Indications: Glaucoma  Dose: 1 Drop     ferrous sulfate 325 (65 Fe) MG tablet   Take 1 Tablet by mouth every Monday, Wednesday, and Friday.  Dose: 325 mg     latanoprost 0.005 % Soln  Commonly known as: Xalatan   Administer 1 Drop into both eyes every evening. Indications: Wide-Angle Glaucoma  Dose: 1 Drop     levothyroxine 75 MCG Tabs  Commonly known as: Synthroid   TAKE 1 TABLET BY MOUTH EVERY DAY  Dose: 75 mcg     PRESERVISION AREDS 2 PO   Doctor's comments: supplement  Take 1 Tablet by mouth 2 times a day. Indications: supplement  Dose: 1 Tablet     sertraline 50 MG Tabs  Commonly known as: Zoloft   TAKE 1 TABLET BY MOUTH EVERY DAY  Dose: 50 mg     travoprost 0.004 % Soln  Commonly known as: Travatan Z   Administer 1 Drop into both eyes every day.  Dose: 1 Drop     VITAMIN D PO   Take 1 Tablet by mouth every day.  Dose: 1 Tablet              Allergies  Allergies   Allergen Reactions    Morphine Anaphylaxis     anaphylaxis    Cephalexin Rash     Full body; tolerated amoxicillin March 2024    Codeine Vomiting and Nausea    Metronidazole  Vomiting and Nausea    Sulfa Drugs Rash     Full body       DIET  Orders Placed This Encounter   Procedures    Diet Order Diet: Regular     Standing Status:   Standing     Number of Occurrences:   1     Order Specific Question:   Diet:     Answer:   Regular [1]       ACTIVITY  As tolerated.  Weight bearing as tolerated    CONSULTATIONS  none    PROCEDURES  none    LABORATORY  Lab Results   Component Value Date    SODIUM 128 (L) 04/11/2024    POTASSIUM 5.2 04/11/2024    CHLORIDE 97 04/11/2024    CO2 22 04/11/2024    GLUCOSE 112 (H) 04/11/2024    BUN 26 (H) 04/11/2024    CREATININE 1.28 04/11/2024    CREATININE 0.99 01/09/2013    GLOMRATE >59 08/04/2010        Lab Results   Component Value Date    WBC 8.9 04/13/2024    WBC 7.8 01/09/2013    HEMOGLOBIN 8.2 (L) 04/13/2024    HEMATOCRIT 26.4 (L) 04/13/2024    PLATELETCT 173 04/13/2024        Total time of the discharge process exceeds 39 minutes.

## 2024-04-13 NOTE — PROGRESS NOTES
"0630: Patient agitated trying to get out of bed. Wanting to go to the second floor because her \"grandson owns it.\" She kept saying this is not her room.  Tried to reorient patient but patient still agitated trying to get out of bed and leave.  Patient not cooperating.  Patient unsteady to get up alone.    0645: Lap belt placed.   Prn haldol given.    Patient yelling to get the lap belt off of her. Educated patient on lap belt and the importance of staying in bed.    Report given to day RN    Will continue to monitor.   "

## 2024-04-14 PROCEDURE — 665998 HH PPS REVENUE CREDIT

## 2024-04-14 PROCEDURE — 665999 HH PPS REVENUE DEBIT

## 2024-04-15 ENCOUNTER — HOME CARE VISIT (OUTPATIENT)
Dept: HOME HEALTH SERVICES | Facility: HOME HEALTHCARE | Age: 89
End: 2024-04-15
Payer: MEDICARE

## 2024-04-15 VITALS
TEMPERATURE: 97.8 F | RESPIRATION RATE: 18 BRPM | OXYGEN SATURATION: 97 % | SYSTOLIC BLOOD PRESSURE: 110 MMHG | DIASTOLIC BLOOD PRESSURE: 65 MMHG | HEART RATE: 78 BPM

## 2024-04-15 PROCEDURE — G0299 HHS/HOSPICE OF RN EA 15 MIN: HCPCS

## 2024-04-15 PROCEDURE — 665998 HH PPS REVENUE CREDIT

## 2024-04-15 PROCEDURE — 665999 HH PPS REVENUE DEBIT

## 2024-04-15 RX ORDER — FERROUS SULFATE 325(65) MG
325 TABLET ORAL DAILY
Qty: 90 TABLET | Refills: 1 | Status: SHIPPED | OUTPATIENT
Start: 2024-04-15 | End: 2024-04-24 | Stop reason: SDUPTHER

## 2024-04-15 ASSESSMENT — ENCOUNTER SYMPTOMS
DENIES PAIN: 1
DESCRIPTION OF MEMORY LOSS: LONG TERM
PAIN SEVERITY GOAL: 0/10
DESCRIPTION OF MEMORY LOSS: SHORT TERM
BOWEL PATTERN NORMAL: 1
SUBJECTIVE PAIN PROGRESSION: UNCHANGED
LAST BOWEL MOVEMENT: 66944
VOMITING: DENIES
NAUSEA: DENIES
PERSON REPORTING PAIN: PATIENT
HIGHEST PAIN SEVERITY IN PAST 24 HOURS: 0/10
STOOL FREQUENCY: LESS THAN DAILY
LOWEST PAIN SEVERITY IN PAST 24 HOURS: 0/10

## 2024-04-15 ASSESSMENT — ACTIVITIES OF DAILY LIVING (ADL)
BATHING ASSESSED: 1
GROOMING ASSESSED: 1
GROOMING_CURRENT_FUNCTION: MODERATE ASSIST
LAUNDRY: DEPENDENT
HOUSEKEEPING ASSESSED: 1
LAUNDRY ASSESSED: 1
TRANSPORTATION ASSESSED: 1
AMBULATION ASSISTANCE: SUPERVISION
DRESSING_UB_CURRENT_FUNCTION: MODERATE ASSIST
BATHING_CURRENT_FUNCTION: MAXIMUM ASSIST
ORAL_CARE_ASSESSED: 1
USING THE TELPHONE: NEEDS ASSISTANCE
SHOPPING ASSESSED: 1
DRESSING_LB_CURRENT_FUNCTION: MODERATE ASSIST
SHOPPING: DEPENDENT
FEEDING: SUPERVISION
ORAL_CARE_CURRENT_FUNCTION: NEEDS ASSISTANCE
PHYSICAL TRANSFERS ASSESSED: 1
AMBULATION ASSISTANCE: 1
PREPARING MEALS: DEPENDENT
TOILETING: 1
TOILETING: SUPERVISION
FEEDING ASSESSED: 1
CURRENT_FUNCTION: MODERATE ASSIST
TELEPHONE USE ASSESSED: 1
TRANSPORTATION: DEPENDENT
LIGHT HOUSEKEEPING: DEPENDENT

## 2024-04-15 NOTE — CASE COMMUNICATION
I agree with the changes    ----- Message -----  From: Aby Elizondo R.N.  Sent: 4/15/2024   7:46 AM PDT  To: Maria Luisa Song R.N.      Quality Review for Transfer [facility] by JENNI Elizondo, RN on  April 15, 2024    Edits completed by JENNI Elizondo, RN:  1. Changed M 1041 to yes, last LUIS CARLOS was in March  2. Changed  to yes, flu vaccine received from another healthcare provider per Epic  3. Changed  to 4/11/24, date of in patient admission

## 2024-04-15 NOTE — CASE COMMUNICATION
Quality Review for Transfer [facility] by JENNI Elizondo, RN on  April 15, 2024    Edits completed by JENNI Elizondo, RN:  1. Changed M 1041 to yes, last LUIS CARLOS was in March  2. Changed  to yes, flu vaccine received from another healthcare provider per Epic  3. Changed  to 4/11/24, date of inpatient admission

## 2024-04-16 ENCOUNTER — OFFICE VISIT (OUTPATIENT)
Dept: INTERNAL MEDICINE | Facility: IMAGING CENTER | Age: 89
End: 2024-04-16
Payer: MEDICARE

## 2024-04-16 ENCOUNTER — OFF SITE VISIT (OUTPATIENT)
Dept: PALLIATIVE MEDICINE | Facility: HOSPICE | Age: 89
End: 2024-04-16
Payer: MEDICARE

## 2024-04-16 VITALS
RESPIRATION RATE: 12 BRPM | SYSTOLIC BLOOD PRESSURE: 108 MMHG | TEMPERATURE: 98.7 F | DIASTOLIC BLOOD PRESSURE: 68 MMHG | OXYGEN SATURATION: 97 % | HEART RATE: 71 BPM

## 2024-04-16 DIAGNOSIS — J06.9 UPPER RESPIRATORY TRACT INFECTION, UNSPECIFIED TYPE: ICD-10-CM

## 2024-04-16 DIAGNOSIS — M19.012 LOCALIZED OSTEOARTHRITIS OF BOTH SHOULDER REGIONS: ICD-10-CM

## 2024-04-16 DIAGNOSIS — E87.71 TRANSFUSION ASSOCIATED CIRCULATORY OVERLOAD: ICD-10-CM

## 2024-04-16 DIAGNOSIS — F03.90 DEMENTIA, UNSPECIFIED DEMENTIA SEVERITY, UNSPECIFIED DEMENTIA TYPE, UNSPECIFIED WHETHER BEHAVIORAL, PSYCHOTIC, OR MOOD DISTURBANCE OR ANXIETY (HCC): ICD-10-CM

## 2024-04-16 DIAGNOSIS — M19.011 LOCALIZED OSTEOARTHRITIS OF BOTH SHOULDER REGIONS: ICD-10-CM

## 2024-04-16 DIAGNOSIS — Z71.89 ACP (ADVANCE CARE PLANNING): ICD-10-CM

## 2024-04-16 DIAGNOSIS — K29.01 GASTROINTESTINAL HEMORRHAGE ASSOCIATED WITH ACUTE GASTRITIS: ICD-10-CM

## 2024-04-16 DIAGNOSIS — D50.0 ANEMIA, BLOOD LOSS: ICD-10-CM

## 2024-04-16 PROCEDURE — 99497 ADVNCD CARE PLAN 30 MIN: CPT

## 2024-04-16 PROCEDURE — 99350 HOME/RES VST EST HIGH MDM 60: CPT | Mod: 25

## 2024-04-16 PROCEDURE — 3074F SYST BP LT 130 MM HG: CPT | Performed by: INTERNAL MEDICINE

## 2024-04-16 PROCEDURE — G0179 MD RECERTIFICATION HHA PT: HCPCS | Performed by: INTERNAL MEDICINE

## 2024-04-16 PROCEDURE — 3078F DIAST BP <80 MM HG: CPT | Performed by: INTERNAL MEDICINE

## 2024-04-16 PROCEDURE — 665998 HH PPS REVENUE CREDIT

## 2024-04-16 PROCEDURE — 665999 HH PPS REVENUE DEBIT

## 2024-04-16 PROCEDURE — 99214 OFFICE O/P EST MOD 30 MIN: CPT | Performed by: INTERNAL MEDICINE

## 2024-04-16 NOTE — PROGRESS NOTES
"In-Home Palliative Medicine Evaluation      Gloria Patel  89 y.o.  Female  MRN [id number]  PCP Mike Otero M.D.  Referral Source: Mike Otero  Location: [facility]    Reason for palliative medicine consultation and/or visit: Advance Care Planning    Assessment and Plan:    Summary:  Esther is a 89 year old woman, who is  for at least 15 years, who suffers with age related decline, anemia, and dementia (at least 6e).  She is [facility].  Met with Esther in her private apartment at [facility], with her daughter Atiya present.  Esther presents fairly well today, although reports still \"not feeling recovered\" since recent hospitalization.  She still has some weakness, forgetfulness, and is requiring more assistance with her ADLs.  She complains of back/shoulder pain that she takes tylenol TID and uses Voltaren gel as needed with good effects.  She continues to have blood transfusions as needed to treat her anemia, and possible blood loss through unknown origin, suspected small gastric ulcer.  Since this suspicion she stopped NSAIDS for pain, and she has had improved symptoms.  Current plan is to continue current medical focused plan, with support of Palliative care with ACP, and symptom management as needed.  POLST already completed, and daughter Atiya Urbina is Esther's decision maker.    Primary diagnosis: Dementia 6e, anemia frequent blood draws and blood transfusions as needed      Physical aspects of care:  PPS 50%, Assist with tolieting, dressing, showering  Walker for ambulation  FALL RISK - has life alert pendant on at all times  Pain - shoulders, back, tylenol (500mg morning, noon, and 100mg at bedtime) and voltaren effective  Appetite - fair and improving  Edema- mild to moderate edema BLEE  Anemia - fatigue, pallor symptoms, sometimes causes weakness  Breathing - denies SOB  Macular Degeneration - having check ups  New " "orders/recommendations  Continue current regimen, call for changes    Psychological aspects of care:   15 years ago.  History of progressing dementia symptoms.  No behavioral issues.  Complains of agitation if in hospital or facility too long.    Social aspects of care:  Lives at [facility] for last 2 years.  Owns a home in [place] that she would like to go to when she declines more.  Her daughter Atiya is main caregiver though lives two hours away.  She states that she will move in with Esther \"when the time comes.\"   Nephew Finesse is also actively involved, as he owns [facility].  Esther [place] and is the youngest of 5 brothers and 1 sister.  She  her  after college.    Spiritual aspects of care:  Will discuss further at future encounters.    Goals of care:  Esther has clarity of mind today, though makes decisions with her daughter Atiya's involvement.  She expresses that she wants to return to her own home, though she will stay at the [facility] as long as she needs to. She wants to continue to have blood tranfusions as needed, and wants to also avoid hospitalization.  She would like to die at home with her family present on Hospice eventually.  POLST already completed stating DNR, Selective Treatment,     Advance care planning:  Daughter Atiya Urbina is DPOA and is actively assisting Trudyin making medical decisions at this time, as capacity for Esther isn't consistently present.  POLST - DNR, Selective Treatment.    The patient and/or legal decision maker has provided voluntary consent to discuss advance care planning. We discussed disease process, hospitalization, treatment options, hospice, prognosis, comfort measures, POLST.Total time spent in ACP discussion 22 minutes, which is separate from the time spent completing the evaluation and management visit.     Pertinent Physical Exam Findings:  Vital Signs: HR 62, RR 18, O2 sat 91% " RA  Constitutional: pallor, well groomed, well dressed, slightly ill appearing  Pulm/Chest: diminished lower lobes, some rhonchi in upper left lobe  CVS: RRR  Abdomen: nontender  MSK: global weakness  Neuro: awake, alert, oriented to person, place, time and situation  Skin: intact, fragile  Psych: pleasant, appropriate behavior    Other Pertinent Medical History OR Surgery Not Listed Above:   TIA (transient ischemic attack) Pulmonary hypertension, unspecified, Major depressive disorder, single episode, mild     Current Medications:    Current Outpatient Medications:     ferrous sulfate 325 (65 Fe) MG tablet, Take 1 Tablet by mouth every day. PER PATIENT DR BHANDARI SAID TAKE IRON ONLY ONCE A WEEK  Indications: Anemia From Inadequate Iron in the Body, Disp: 90 Tablet, Rfl: 1    omeprazole (PRILOSEC) 20 MG delayed-release capsule, Take 2 Capsules by mouth 2 times a day for 30 days., Disp: 120 Capsule, Rfl: 0    travoprost (TRAVATAN Z) 0.004 % Solution, Administer 1 Drop into both eyes every evening. Indications: Wide-Angle Glaucoma, Disp: , Rfl:     VITAMIN D PO, Take 1 Tablet by mouth every day. Indications: SUPPLEMENT, Disp: , Rfl:     calcium carbonate (TUMS) 500 MG Chew Tab, Chew 500 mg every day. Indications: Heartburn, Disp: , Rfl:     acetaminophen (TYLENOL 8 HOUR ARTHRITIS PAIN) 650 MG CR tablet, Take 1 tab in AM and at lunch. Take 2 tabs at bedtime. (Patient taking differently: Take 650-1,300 mg by mouth in the morning, at noon, and at bedtime. 650 mg in the  mg in the Afternoon 1300 mg in the PM   Indications: Pain), Disp: 100 Tablet, Rfl: 3    CRANBERRY PO, Take 1 Tablet by mouth every day. Indications: supplement, Disp: , Rfl:     latanoprost (XALATAN) 0.005 % Solution, Administer 1 Drop into both eyes every evening. Indications: Wide-Angle Glaucoma, Disp: , Rfl:     melatonin 5 mg Tab, Take 2 Tablets by mouth at bedtime as needed (insomnia)., Disp: , Rfl:     levothyroxine (SYNTHROID) 75 MCG Tab, TAKE  1 TABLET BY MOUTH EVERY DAY, Disp: 90 Tablet, Rfl: 3    amLODIPine (NORVASC) 5 MG Tab, TAKE 1 TABLET BY MOUTH EVERY DAY, Disp: 90 Tablet, Rfl: 3    sertraline (ZOLOFT) 50 MG Tab, TAKE 1 TABLET BY MOUTH EVERY DAY, Disp: 90 Tablet, Rfl: 3    lisinopril (PRINIVIL) 20 MG Tab, TAKE 1/2 TABLET BY MOUTH TWICE A DAY (Patient taking differently: Take 10 mg by mouth 2 times a day. TAKE 1/2 TABLET BY MOUTH TWICE A DAY  Indications: High Blood Pressure Disorder), Disp: 90 Tablet, Rfl: 3    Apoaequorin 10 MG Cap, Take 1 Tablet by mouth every day. Indications: memory loss, Disp: , Rfl:     dorzolamide-timolol (COSOPT) 2-0.5 % Solution, Administer 1 Drop into both eyes every morning. Indications: Glaucoma, Disp: , Rfl:     Multiple Vitamins-Minerals (PRESERVISION AREDS 2 PO), Take 1 Tablet by mouth 2 times a day. Indications: supplement, Disp: , Rfl:     Medication Allergies:  Morphine, Cephalexin, Codeine, Metronidazole, and Sulfa drugs    Thank you for allowing me the opportunity to participate in the care of Gloria Patel    I spent a total of 90 minutes reviewing medical records, direct face-to-face time with the patient and/or family, documentation and coordination of care. This is separate from the time spent on advance care planning, which is documented above.    Kathryn FUENTES  [facility]   P - [phone]   C - [phone]

## 2024-04-17 ENCOUNTER — HOME CARE VISIT (OUTPATIENT)
Dept: HOME HEALTH SERVICES | Facility: HOME HEALTHCARE | Age: 89
End: 2024-04-17
Payer: MEDICARE

## 2024-04-17 VITALS
SYSTOLIC BLOOD PRESSURE: 116 MMHG | HEART RATE: 72 BPM | OXYGEN SATURATION: 99 % | RESPIRATION RATE: 20 BRPM | TEMPERATURE: 98.2 F | DIASTOLIC BLOOD PRESSURE: 62 MMHG

## 2024-04-17 PROCEDURE — 665999 HH PPS REVENUE DEBIT

## 2024-04-17 PROCEDURE — G0151 HHCP-SERV OF PT,EA 15 MIN: HCPCS

## 2024-04-17 PROCEDURE — 665998 HH PPS REVENUE CREDIT

## 2024-04-17 ASSESSMENT — ENCOUNTER SYMPTOMS
HIGHEST PAIN SEVERITY IN PAST 24 HOURS: 6/10
LOWEST PAIN SEVERITY IN PAST 24 HOURS: 0/10
PAIN LOCATION - PAIN QUALITY: ACHE
PAIN LOCATION - PAIN FREQUENCY: FREQUENT
PAIN LOCATION - RELIEVING FACTORS: REST, MEDICATION
PAIN LOCATION: LEFT SHOULDER
PERSON REPORTING PAIN: PATIENT
PAIN LOCATION - PAIN SEVERITY: 6/10
POOR JUDGMENT: 1
PAIN SEVERITY GOAL: 3/10
PAIN LOCATION - EXACERBATING FACTORS: MOVEMENT
PAIN LOCATION - PAIN DURATION: DAILY
SUBJECTIVE PAIN PROGRESSION: WAXING AND WANING
DIFFICULTY THINKING: 1
PAIN: 1

## 2024-04-17 NOTE — Clinical Note
nOTED.  ----- Message -----  From: Cristina Jaramillo, PT  Sent: 4/17/2024   1:47 PM PDT  To: Maria Luisa Song R.N.; Boo Hernandez; *      P.T. reassessment performed 4/17/2024 and I will continue 1 time a week for 7 weeks of maintenance P.T. and then most likely recertify. Further insurance Auth may be required. Thanks

## 2024-04-17 NOTE — HOME HEALTH
THERAPY REASSESSMENT AND PLAN OF CARE · Patient: Gloria Patel · Patient was preserving her functional mobility and skills with maintenance physical therapy services despite being hospitalized again this past week. She is now able to ambulate independently again in her room with 4WW but does need assistance with showering and ambulation outside of her room. Immediate standing balance is fair to good. Patient is able able to perform at least 15 consecutive sit/stands from her recliner using upper extremity support. She did not have a fall since last assessment. In addition her bilateral shoulder pain continues to be somewhat limiting physical therapy treatment as well as her function. Patient had a palliative care evaluation and will have further assessment possible decline due to age/medical conditions with recommendations for hospice placement when appropriate. Patient is agreeable to continue maintenance physical therapy treatment for balance, functional strength, activity tolerance and functional mobility. Need: Decreased activity tolerance, Balance control, Generalized deconditioning, Gait training, Fall prevention, Pain control and Poor safety awareness. Recommend skilled HHPT to address deficits and improve function-report sent to MD · Frequency: 1 week for 8 weeks and reassess , Effective 4/17/2024 · Goals: Caregivers will continue to verbalize understanding of home safety strategies/fall prevention measures and fall recovery related to altered mobility in 8 visits. Patient will continue to demonstrate at least fair immediate standing balance in 8 visits. Patient/caregiver will continue to verbalize understand and implement pain control measures of use of heat and medication in 8 visits. Patient will continue to demonstrate ability to ambulate using 4WW independently within her apartment and at least 300 feet outside her apartment at no more than supervision and cue level to enable return to prior  functional level in 8 visits. Patient will continue to demonstrate increased lower extremity muscle strength to allow for at least 15 consecutive sit/stands for purpose of returning to prior functional level in 8 visits.   How often (if at all) does pain interfere with patient's movements? Pain limits sleep and function on a daily but not constant basis

## 2024-04-17 NOTE — PROGRESS NOTES
Chief Complaint   Patient presents with    Hospital Follow-up       HISTORY OF THE PRESENT ILLNESS: Patient is a 89 y.o. female.     Patient was recently hospitalized for G.I. bleed. This was found through severe anemia on labs. She was transfused and is doing better. She is on supplemental iron. She is on high-dose proton pump inhibitor. She reports that her dyspepsia has improved. Her appetite is improved.    She's primarily here because home caregiver felt that she discovered pneumonia on physical exam. Patient reportedly had abnormal breath sounds bilaterally. Things improved between first and second nurse over two days. No coughing no sputum. No dyspnea.    Allergies: Morphine, Cephalexin, Codeine, Metronidazole, and Sulfa drugs    Current Outpatient Medications Ordered in Epic   Medication Sig Dispense Refill    ferrous sulfate 325 (65 Fe) MG tablet Take 1 Tablet by mouth every day. PER PATIENT DR BHANDARI SAID TAKE IRON ONLY ONCE A WEEK  Indications: Anemia From Inadequate Iron in the Body 90 Tablet 1    omeprazole (PRILOSEC) 20 MG delayed-release capsule Take 2 Capsules by mouth 2 times a day for 30 days. 120 Capsule 0    travoprost (TRAVATAN Z) 0.004 % Solution Administer 1 Drop into both eyes every evening. Indications: Wide-Angle Glaucoma      VITAMIN D PO Take 1 Tablet by mouth every day. Indications: SUPPLEMENT      calcium carbonate (TUMS) 500 MG Chew Tab Chew 500 mg every day. Indications: Heartburn      acetaminophen (TYLENOL 8 HOUR ARTHRITIS PAIN) 650 MG CR tablet Take 1 tab in AM and at lunch. Take 2 tabs at bedtime. (Patient taking differently: Take 650-1,300 mg by mouth in the morning, at noon, and at bedtime. 650 mg in the AM  650 mg in the Afternoon  1300 mg in the PM   Indications: Pain) 100 Tablet 3    CRANBERRY PO Take 1 Tablet by mouth every day. Indications: supplement      latanoprost (XALATAN) 0.005 % Solution Administer 1 Drop into both eyes every evening. Indications: Wide-Angle Glaucoma       melatonin 5 mg Tab Take 2 Tablets by mouth at bedtime as needed (insomnia).      levothyroxine (SYNTHROID) 75 MCG Tab TAKE 1 TABLET BY MOUTH EVERY DAY 90 Tablet 3    amLODIPine (NORVASC) 5 MG Tab TAKE 1 TABLET BY MOUTH EVERY DAY 90 Tablet 3    sertraline (ZOLOFT) 50 MG Tab TAKE 1 TABLET BY MOUTH EVERY DAY 90 Tablet 3    lisinopril (PRINIVIL) 20 MG Tab TAKE 1/2 TABLET BY MOUTH TWICE A DAY (Patient taking differently: Take 10 mg by mouth 2 times a day. TAKE 1/2 TABLET BY MOUTH TWICE A DAY  Indications: High Blood Pressure Disorder) 90 Tablet 3    Apoaequorin 10 MG Cap Take 1 Tablet by mouth every day. Indications: memory loss      dorzolamide-timolol (COSOPT) 2-0.5 % Solution Administer 1 Drop into both eyes every morning. Indications: Glaucoma      Multiple Vitamins-Minerals (PRESERVISION AREDS 2 PO) Take 1 Tablet by mouth 2 times a day. Indications: supplement       No current Epic-ordered facility-administered medications on file.       Past medical history, social history and family history were reviewed from chart today    Review of systems: Per HPI.    All others negative.     Exam: /68 (BP Location: Left arm, Patient Position: Sitting, BP Cuff Size: Adult)   Pulse 71   Temp 37.1 °C (98.7 °F) (Temporal)   Resp 12   SpO2 97%   General: Well-appearing. Well-developed. No signs of distress.  HEENT: Grossly normal. Oral cavity is pink and moist.   Neck: Supple without JVD or bruit.  Pulmonary: Bilateral basilar crackles  Cardiovascular: Regular. Carotid and radial pulses are intact.  Abdomen: Soft, nontender, nondistended. Spleen and liver are not enlarged.  Neurologic: Cranial nerves II through XII are grossly normal, alert and oriented x3      Diagnosis:  1. Transfusion associated circulatory overload  Basic Metabolic Panel      2. Gastrointestinal hemorrhage associated with acute gastritis        3. Localized osteoarthritis of both shoulder regions        4. Anemia, blood loss  CBC WITH DIFFERENTIAL     IRON/TOTAL IRON BIND    FERRITIN    RETICULOCYTES COUNT      5. Upper respiratory tract infection, unspecified type            Patient with probable fluid overload based on exam. She has no significant edema but she has crackles in the lungs bilaterally. Symptoms seem more consistent with fluid overload or atelectasis versus pneumonia. She has no cough, sputum, fever, dyspnea or other classic symptoms.     Patient has gravelly voice.  She may be developing upper respiratory infection.  I do not believe her findings are consistent with pneumonia.  Oxygen saturation is normal.  No fever.  No consolidation on pulmonary exam.    She is recovering from recent generally. We discussed that this was likely related to her chronic NSAID use for arthritis. The patient and her daughter reports she is doing much better with regular tunnel and Volteren gel topically.    She is aware that she needs to discontinue NSAIDs indefinitely. Recommend twice-daily productive inhibitor for at least one month followed by chronic daily use.    He has improved. We will continue monitor. Recommend labs with home health next week      My total time spent caring for the patient on the day of the encounter was  greater than 30 minutes.   This includes obtaining history, reviewing chart, physical exam, patient education, reviewing outside records, placing orders, interpreting tests and coordinating care.    Portions of this note were completed using voice recognition software (Dragon Naturally speaking software) . Occasional transcription errors may have escaped proof reading. I have made every reasonable attempt to correct obvious errors, but I expect that there are errors of grammar and possibly content that I did not discover before finalizing the note.

## 2024-04-17 NOTE — Clinical Note
P.T. reassessment performed 4/17/2024 and I will continue 1 time a week for 7 weeks of maintenance P.T. and then most likely recertify. Further insurance Auth may be required. Thanks

## 2024-04-18 ENCOUNTER — HOME CARE VISIT (OUTPATIENT)
Dept: HOME HEALTH SERVICES | Facility: HOME HEALTHCARE | Age: 89
End: 2024-04-18
Payer: MEDICARE

## 2024-04-18 ENCOUNTER — APPOINTMENT (OUTPATIENT)
Dept: RADIOLOGY | Facility: MEDICAL CENTER | Age: 89
End: 2024-04-18
Attending: EMERGENCY MEDICINE
Payer: MEDICARE

## 2024-04-18 ENCOUNTER — HOSPITAL ENCOUNTER (EMERGENCY)
Facility: MEDICAL CENTER | Age: 89
End: 2024-04-18
Attending: EMERGENCY MEDICINE
Payer: MEDICARE

## 2024-04-18 VITALS
HEIGHT: 62 IN | HEART RATE: 85 BPM | WEIGHT: 124 LBS | RESPIRATION RATE: 17 BRPM | DIASTOLIC BLOOD PRESSURE: 67 MMHG | BODY MASS INDEX: 22.82 KG/M2 | SYSTOLIC BLOOD PRESSURE: 150 MMHG | OXYGEN SATURATION: 92 % | TEMPERATURE: 98 F

## 2024-04-18 DIAGNOSIS — J06.9 UPPER RESPIRATORY TRACT INFECTION, UNSPECIFIED TYPE: ICD-10-CM

## 2024-04-18 LAB
ALBUMIN SERPL BCP-MCNC: 3.8 G/DL (ref 3.2–4.9)
ALBUMIN/GLOB SERPL: 1.2 G/DL
ALP SERPL-CCNC: 120 U/L (ref 30–99)
ALT SERPL-CCNC: 8 U/L (ref 2–50)
ANION GAP SERPL CALC-SCNC: 11 MMOL/L (ref 7–16)
ANISOCYTOSIS BLD QL SMEAR: ABNORMAL
AST SERPL-CCNC: 16 U/L (ref 12–45)
BASOPHILS # BLD AUTO: 1.7 % (ref 0–1.8)
BASOPHILS # BLD: 0.15 K/UL (ref 0–0.12)
BILIRUB SERPL-MCNC: 0.4 MG/DL (ref 0.1–1.5)
BUN SERPL-MCNC: 17 MG/DL (ref 8–22)
CALCIUM ALBUM COR SERPL-MCNC: 8.1 MG/DL (ref 8.5–10.5)
CALCIUM SERPL-MCNC: 7.9 MG/DL (ref 8.5–10.5)
CHLORIDE SERPL-SCNC: 104 MMOL/L (ref 96–112)
CO2 SERPL-SCNC: 21 MMOL/L (ref 20–33)
CREAT SERPL-MCNC: 1.09 MG/DL (ref 0.5–1.4)
EKG IMPRESSION: NORMAL
EKG IMPRESSION: NORMAL
EOSINOPHIL # BLD AUTO: 0 K/UL (ref 0–0.51)
EOSINOPHIL NFR BLD: 0 % (ref 0–6.9)
ERYTHROCYTE [DISTWIDTH] IN BLOOD BY AUTOMATED COUNT: 62.3 FL (ref 35.9–50)
GFR SERPLBLD CREATININE-BSD FMLA CKD-EPI: 45 ML/MIN/1.73 M 2
GLOBULIN SER CALC-MCNC: 3.3 G/DL (ref 1.9–3.5)
GLUCOSE SERPL-MCNC: 103 MG/DL (ref 65–99)
HCT VFR BLD AUTO: 25.2 % (ref 37–47)
HGB BLD-MCNC: 8 G/DL (ref 12–16)
LYMPHOCYTES # BLD AUTO: 1.36 K/UL (ref 1–4.8)
LYMPHOCYTES NFR BLD: 15.5 % (ref 22–41)
MACROCYTES BLD QL SMEAR: ABNORMAL
MANUAL DIFF BLD: NORMAL
MCH RBC QN AUTO: 28.7 PG (ref 27–33)
MCHC RBC AUTO-ENTMCNC: 31.7 G/DL (ref 32.2–35.5)
MCV RBC AUTO: 90.3 FL (ref 81.4–97.8)
METAMYELOCYTES NFR BLD MANUAL: 0.9 %
MICROCYTES BLD QL SMEAR: ABNORMAL
MONOCYTES # BLD AUTO: 0.07 K/UL (ref 0–0.85)
MONOCYTES NFR BLD AUTO: 0.8 % (ref 0–13.4)
MORPHOLOGY BLD-IMP: NORMAL
MYELOCYTES NFR BLD MANUAL: 5.2 %
NEUTROPHILS # BLD AUTO: 6.68 K/UL (ref 1.82–7.42)
NEUTROPHILS NFR BLD: 75.9 % (ref 44–72)
NRBC # BLD AUTO: 0.02 K/UL
NRBC BLD-RTO: 0.2 /100 WBC (ref 0–0.2)
OVALOCYTES BLD QL SMEAR: NORMAL
PLATELET # BLD AUTO: 147 K/UL (ref 164–446)
PLATELET BLD QL SMEAR: NORMAL
PMV BLD AUTO: 9.7 FL (ref 9–12.9)
POIKILOCYTOSIS BLD QL SMEAR: NORMAL
POTASSIUM SERPL-SCNC: 4.9 MMOL/L (ref 3.6–5.5)
PROT SERPL-MCNC: 7.1 G/DL (ref 6–8.2)
RBC # BLD AUTO: 2.79 M/UL (ref 4.2–5.4)
RBC BLD AUTO: PRESENT
SODIUM SERPL-SCNC: 136 MMOL/L (ref 135–145)
TROPONIN T SERPL-MCNC: 31 NG/L (ref 6–19)
TROPONIN T SERPL-MCNC: 35 NG/L (ref 6–19)
WBC # BLD AUTO: 8.8 K/UL (ref 4.8–10.8)

## 2024-04-18 PROCEDURE — 93005 ELECTROCARDIOGRAM TRACING: CPT | Performed by: EMERGENCY MEDICINE

## 2024-04-18 PROCEDURE — 85007 BL SMEAR W/DIFF WBC COUNT: CPT

## 2024-04-18 PROCEDURE — 93005 ELECTROCARDIOGRAM TRACING: CPT

## 2024-04-18 PROCEDURE — 665998 HH PPS REVENUE CREDIT

## 2024-04-18 PROCEDURE — 80053 COMPREHEN METABOLIC PANEL: CPT

## 2024-04-18 PROCEDURE — 84484 ASSAY OF TROPONIN QUANT: CPT | Mod: 91

## 2024-04-18 PROCEDURE — 665999 HH PPS REVENUE DEBIT

## 2024-04-18 PROCEDURE — 36415 COLL VENOUS BLD VENIPUNCTURE: CPT

## 2024-04-18 PROCEDURE — 85027 COMPLETE CBC AUTOMATED: CPT

## 2024-04-18 PROCEDURE — 99284 EMERGENCY DEPT VISIT MOD MDM: CPT

## 2024-04-18 ASSESSMENT — FIBROSIS 4 INDEX: FIB4 SCORE: 3.24

## 2024-04-18 NOTE — ED TRIAGE NOTES
"Chief Complaint   Patient presents with    Flu Like Symptoms     Pt BIBA from independent living, [facility], reports colds and short of breath started couple of days, worst yesterday night. Pt denies fever and getting contact with sick family. Pt soaked wet with urine upon arrival. Uses walker at baseline. Pt. [place].      BP (!) 164/72   Pulse 64   Temp 36.9 °C (98.5 °F) (Temporal)   Resp 19   Ht 1.575 m (5' 2\")   Wt 56.2 kg (124 lb)   SpO2 95%   BMI 22.68 kg/m²         Pt is alert and oriented x 4, speaking in full sentences, follows commands and responds appropriately to questions.     Respirations are even and unlabored.  "

## 2024-04-18 NOTE — ED NOTES
Pt given water and meal tray ordered for pt. Daughter called and is coming to bedside pt updated of this

## 2024-04-18 NOTE — ED PROVIDER NOTES
"ED Provider Note    CHIEF COMPLAINT  Chief Complaint   Patient presents with    Flu Like Symptoms     Pt BIBA from independent living, [facility], reports colds and short of breath started couple of days, worst yesterday night. Pt denies fever and getting contact with sick family. Pt soaked wet with urine upon arrival. Uses walker at baseline. Pt. [place].        EXTERNAL RECORDS REVIEWED  Outpatient Notes   [facility]    HPI/ROS  LIMITATION TO HISTORY   Select: : None  OUTSIDE HISTORIAN(S):  None    Gloria Patel is a 89 y.o. female who presents here for evaluation of cough and flulike symptoms.  The patient states that she has had a cough for a couple of days, but no other medical concerns at this time.  She has no chest pain, vomiting, fever or chills, and no headache.  Patient states that she has no recent history of ill contacts.  She states \"I do not know why I am here, I feel just fine.\"    PAST MEDICAL HISTORY   has a past medical history of Arthritis, Diverticulitis, GERD (gastroesophageal reflux disease), Glaucoma, Heart burn, Hiatus hernia syndrome, Hypertension, Primary osteoarthritis of both shoulders (1/22/2019), and Unspecified urinary incontinence.    SURGICAL HISTORY   has a past surgical history that includes other (1945); other (1954); other orthopedic surgery; other orthopedic surgery (2000); cholecystectomy (1966); other abdominal surgery (1954); gyn surgery (1970); hip arthroplasty total (6/21/2011); us-needle core bx-breast panel; and orif, ankle (Right, 12/22/2021).    FAMILY HISTORY  Family History   Problem Relation Age of Onset    Diabetes Other     Heart Disease Other     Lung Disease Other     Cancer Sister        SOCIAL HISTORY  Social History     Tobacco Use    Smoking status: Never    Smokeless tobacco: Never   Vaping Use    Vaping Use: Never used   Substance and Sexual Activity    Alcohol use: Yes     Alcohol/week: 0.0 oz     Comment: occassional wine    Drug use: No    Sexual " "activity: Not on file       CURRENT MEDICATIONS  Home Medications       Reviewed by Elsie Roman R.N. (Registered Nurse) on 04/18/24 at 0628  Med List Status: Partial     Medication Last Dose Status   acetaminophen (TYLENOL 8 HOUR ARTHRITIS PAIN) 650 MG CR tablet  Active   amLODIPine (NORVASC) 5 MG Tab  Active   Apoaequorin 10 MG Cap  Active   calcium carbonate (TUMS) 500 MG Chew Tab  Active   CRANBERRY PO  Active   dorzolamide-timolol (COSOPT) 2-0.5 % Solution  Active   ferrous sulfate 325 (65 Fe) MG tablet  Active   guaiFENesin (MUCINEX FAST-MAX CHEST SOHEILA MS PO)  Active   latanoprost (XALATAN) 0.005 % Solution  Active   levothyroxine (SYNTHROID) 75 MCG Tab  Active   lisinopril (PRINIVIL) 20 MG Tab  Active   melatonin 5 mg Tab  Active   Multiple Vitamins-Minerals (PRESERVISION AREDS 2 PO)  Active   omeprazole (PRILOSEC) 20 MG delayed-release capsule  Active   sertraline (ZOLOFT) 50 MG Tab  Active   travoprost (TRAVATAN Z) 0.004 % Solution  Active   VITAMIN D PO  Active                    ALLERGIES  Allergies   Allergen Reactions    Morphine Anaphylaxis     anaphylaxis    Cephalexin Rash     Full body; tolerated amoxicillin March 2024    Codeine Vomiting and Nausea    Metronidazole Vomiting and Nausea    Sulfa Drugs Rash     Full body       PHYSICAL EXAM  VITAL SIGNS: BP (!) 164/72   Pulse 64   Temp 36.9 °C (98.5 °F) (Temporal)   Resp 19   Ht 1.575 m (5' 2\")   Wt 56.2 kg (124 lb)   SpO2 95%   BMI 22.68 kg/m²    Constitutional: Well developed, well nourished. No acute distress.  HEENT: Normocephalic, atraumatic. Posterior pharynx clear and moist.  Eyes:  EOMI. Normal sclera.  Neck: Supple, Full range of motion, nontender.  Chest/Pulmonary: clear to ausculation. Symmetrical expansion.   Cardio: Regular rate and rhythm with no murmur.   Abdomen: Soft, nontender. No peritoneal signs. No guarding.   Musculoskeletal: No deformity, no edema, neurovascular intact.   Neuro: Clear speech, appropriate, " cooperative, cranial nerves II-XII grossly intact.  Psych: Normal mood and affect      EKG/LABS  Results for orders placed or performed during the hospital encounter of 04/18/24   CBC with Differential   Result Value Ref Range    WBC 8.8 4.8 - 10.8 K/uL    RBC 2.79 (L) 4.20 - 5.40 M/uL    Hemoglobin 8.0 (L) 12.0 - 16.0 g/dL    Hematocrit 25.2 (L) 37.0 - 47.0 %    MCV 90.3 81.4 - 97.8 fL    MCH 28.7 27.0 - 33.0 pg    MCHC 31.7 (L) 32.2 - 35.5 g/dL    RDW 62.3 (H) 35.9 - 50.0 fL    Platelet Count 147 (L) 164 - 446 K/uL    MPV 9.7 9.0 - 12.9 fL    Neutrophils-Polys 75.90 (H) 44.00 - 72.00 %    Lymphocytes 15.50 (L) 22.00 - 41.00 %    Monocytes 0.80 0.00 - 13.40 %    Eosinophils 0.00 0.00 - 6.90 %    Basophils 1.70 0.00 - 1.80 %    Nucleated RBC 0.20 0.00 - 0.20 /100 WBC    Neutrophils (Absolute) 6.68 1.82 - 7.42 K/uL    Lymphs (Absolute) 1.36 1.00 - 4.80 K/uL    Monos (Absolute) 0.07 0.00 - 0.85 K/uL    Eos (Absolute) 0.00 0.00 - 0.51 K/uL    Baso (Absolute) 0.15 (H) 0.00 - 0.12 K/uL    NRBC (Absolute) 0.02 K/uL    Anisocytosis 1+     Macrocytosis 1+     Microcytosis 1+    Comp Metabolic Panel   Result Value Ref Range    Sodium 136 135 - 145 mmol/L    Potassium 4.9 3.6 - 5.5 mmol/L    Chloride 104 96 - 112 mmol/L    Co2 21 20 - 33 mmol/L    Anion Gap 11.0 7.0 - 16.0    Glucose 103 (H) 65 - 99 mg/dL    Bun 17 8 - 22 mg/dL    Creatinine 1.09 0.50 - 1.40 mg/dL    Calcium 7.9 (L) 8.5 - 10.5 mg/dL    Correct Calcium 8.1 (L) 8.5 - 10.5 mg/dL    AST(SGOT) 16 12 - 45 U/L    ALT(SGPT) 8 2 - 50 U/L    Alkaline Phosphatase 120 (H) 30 - 99 U/L    Total Bilirubin 0.4 0.1 - 1.5 mg/dL    Albumin 3.8 3.2 - 4.9 g/dL    Total Protein 7.1 6.0 - 8.2 g/dL    Globulin 3.3 1.9 - 3.5 g/dL    A-G Ratio 1.2 g/dL   ESTIMATED GFR   Result Value Ref Range    GFR (CKD-EPI) 45 (A) >60 mL/min/1.73 m 2   DIFFERENTIAL MANUAL   Result Value Ref Range    Metamyelocytes 0.90 %    Myelocytes 5.20 %    Manual Diff Status PERFORMED    PERIPHERAL SMEAR REVIEW    Result Value Ref Range    Peripheral Smear Review see below    PLATELET ESTIMATE   Result Value Ref Range    Plt Estimation Decreased    MORPHOLOGY   Result Value Ref Range    RBC Morphology Present     Poikilocytosis 1+     Ovalocytes 1+    TROPONIN   Result Value Ref Range    Troponin T 35 (H) 6 - 19 ng/L   TROPONIN   Result Value Ref Range    Troponin T 31 (H) 6 - 19 ng/L   EKG   Result Value Ref Range    Report       [facility] Emergency Dept.    Test Date:  2024  Pt Name:    [facility]              Department: ER  MRN:        [id number]                      Room:       GR 25  Gender:     Female                       Technician: [id number]  :        1924                   Requested By:ER TRIAGE PROTOCOL  Order #:    [id number]                    Reading MD:    Measurements  Intervals                                Axis  Rate:       70                           P:          0  HI:         0                            QRS:        -38  QRSD:       85                           T:          37  QT:         394  QTc:        426    Interpretive Statements  Atrial fibrillation  Paired ventricular premature complexes  Left axis deviation  Probable anterior infarct, age indeterminate  ST elevation, consider inferior injury  Compared to ECG 2024 01:19:57  Left-axis deviation now present  Myocardial infarct finding now present  ST (T wave) deviation now present  Sinus rhythm  no longer present  Left anterior fascicular block no longer present     EKG   Result Value Ref Range    Report       [facility] Emergency Dept.    Test Date:  2024  Pt Name:    [facility]              Department: ER  MRN:        [id number]                      Room:       GR 25  Gender:     Female                       Technician: [id number]  :        1924                   Requested By:MARIAH CHRISTOPHER  Order #:    [id number]                    Reading MD:    Measurements  Intervals    "                             Axis  Rate:       67                           P:          10  WI:         187                          QRS:        -41  QRSD:       97                           T:          -1  QT:         397  QTc:        419    Interpretive Statements  Sinus rhythm  Atrial premature complexes  Left anterior fascicular block  Probable anterior infarct, age indeterminate  Compared to ECG 04/18/2024 06:29:52  Atrial premature complex(es) now present  Left anterior fascicular block now present  Atrial fibrillation no longer present  Ventricular premature complex(es) no longer p resent  Left-axis deviation no longer present  ST (T wave) deviation no longer present  Myocardial infarct finding still present       *Note: Due to a large number of results and/or encounters for the requested time period, some results have not been displayed. A complete set of results can be found in Results Review.     EKG; normal sinus rhythm at 67.  No ST ovation or ST depression.  QTc is 419.  Compared to EKG from 4/18/2024.    RADIOLOGY/PROCEDURES   I have independently interpreted the diagnostic imaging associated with this visit and am waiting the final reading from the radiologist.   My preliminary interpretation is as follows: See below    Radiologist interpretation:  No orders to display   Patient refused x-ray    COURSE & MEDICAL DECISION MAKING    ASSESSMENT, COURSE AND PLAN  Care Narrative: This is a 89-year-old female here for evaluation of upper respiratory symptoms.  Patient was seen evaluated here, had blood work done, but states that they sent her over here because she has a cough.  Patient has declined the chest x-ray, and states \"I do not need it.\"  She has no vomiting, fever or chills, and no chest pain.  The patient states that she would like to go home.  Her blood work was mostly unremarkable, other than a mildly elevated troponin, that did come down from 38-31.  Patient never had any chest pain.  EKG is " unremarkable.  Patient again declined the chest x-ray, and would like to go home.  She has no other medical concerns at this time patient has been up ambulating around to the bathroom with her walker as per the usual.      DISPOSITION AND DISCUSSIONS  I have discussed management of the patient with the following physicians and NIRMALA's: None    Discussion of management with other QHP or appropriate source(s): None none    Escalation of care considered, and ultimately not performed: None    Barriers to care at this time, including but not limited to: Patient does not have established PCP.     Decision tools and prescription drugs considered including, but not limited to: None.    FINAL DIAGNOSIS  1. Upper respiratory tract infection, unspecified type           Electronically signed by: Norberto Gaffney D.O., 4/18/2024 7:15 AM

## 2024-04-18 NOTE — ED NOTES
Pt insistent that daughter be called, RN called daughter Berta and left her a voicemail.  Pt calling out from [place]. Reports that she doesn't know why she is here, and that she is cold despite multiple blankets already on pt. Another blanket is provided and pt updated on POC.

## 2024-04-18 NOTE — ED NOTES
..Pt verbalizes understanding of dc instructions provided. Pt states that all questions have been answered and they feel comfortable with discharge instructions provided. Pt has dc paperwork in hand. Pt has all belongings in possession. Pt to lobby w/o incident.  Giuliana van here to  pt

## 2024-04-18 NOTE — ED NOTES
"Pt requesting to go home pt states \"I'm not supposed to be here I want to go home\" erp was at bedside speaking with pt and updated her on poc.   "

## 2024-04-19 PROCEDURE — 665999 HH PPS REVENUE DEBIT

## 2024-04-19 PROCEDURE — 665998 HH PPS REVENUE CREDIT

## 2024-04-20 PROCEDURE — 665998 HH PPS REVENUE CREDIT

## 2024-04-20 PROCEDURE — 665999 HH PPS REVENUE DEBIT

## 2024-04-21 ENCOUNTER — HOME CARE VISIT (OUTPATIENT)
Dept: HOME HEALTH SERVICES | Facility: HOME HEALTHCARE | Age: 89
End: 2024-04-21
Payer: MEDICARE

## 2024-04-21 PROCEDURE — 665998 HH PPS REVENUE CREDIT

## 2024-04-21 PROCEDURE — 665999 HH PPS REVENUE DEBIT

## 2024-04-21 ASSESSMENT — ACTIVITIES OF DAILY LIVING (ADL): OASIS_M1830: 03

## 2024-04-21 NOTE — CASE COMMUNICATION
Quality Review for 4.15.24 [facility] OASIS performed on by DENISE Davis RN on 4.21.2024:    Edits completed by DENISE Davis RN:  1.  and  dx coding updated per chart review.  2. Sent case comm to pharm for med review  3. Changed  to 4.13.24 per admitting form  4. Changed  to 2   5. Changed  to 4.13.24 per EPIC  6. Added #3,4 to  per EPIC  7. Changed  to 3,  to 3,  to 2 and  to 4 per PT Eval on 4.17. 24. Changed  to 4.17.24 date of PT eval, data used as part of the collaboration convention  8. Changed flu question to NA, LUIS CARLOS is in April 9. Changed  to D, pt has HTN  10. Changed  to 3, [id number] E to 2 per ADL tab reporting max assist with bathing. Changed  to 1,  to 2,  to 3, [id number] D-F, I,J to 3 per narrative pt needs mod assist with transfers and ambulation and uses DME. Changed  to 2, [id number] C, F- H to 3 per narrative pt needs mod assist with dressing. Changed  to 2 per [id number] A response of 4   11. Changed  to 3 per ambulation score   12. Added incontinence to functional limitations  13. Added low Na diet to nutritional requirements  14. Removed F2F data  15. Changed  to 8

## 2024-04-22 ENCOUNTER — HOME CARE VISIT (OUTPATIENT)
Dept: HOME HEALTH SERVICES | Facility: HOME HEALTHCARE | Age: 89
End: 2024-04-22
Payer: MEDICARE

## 2024-04-22 ENCOUNTER — DOCUMENTATION (OUTPATIENT)
Dept: MEDICAL GROUP | Facility: PHYSICIAN GROUP | Age: 89
End: 2024-04-22
Payer: MEDICARE

## 2024-04-22 VITALS
OXYGEN SATURATION: 96 % | DIASTOLIC BLOOD PRESSURE: 65 MMHG | SYSTOLIC BLOOD PRESSURE: 100 MMHG | RESPIRATION RATE: 24 BRPM | HEART RATE: 80 BPM | TEMPERATURE: 99.6 F

## 2024-04-22 PROCEDURE — 665999 HH PPS REVENUE DEBIT

## 2024-04-22 PROCEDURE — G0299 HHS/HOSPICE OF RN EA 15 MIN: HCPCS

## 2024-04-22 PROCEDURE — G0159 HHC PT MAINT EA 15 MIN: HCPCS

## 2024-04-22 PROCEDURE — 665998 HH PPS REVENUE CREDIT

## 2024-04-22 ASSESSMENT — ENCOUNTER SYMPTOMS
LOWEST PAIN SEVERITY IN PAST 24 HOURS: 0/10
BOWEL PATTERN NORMAL: 1
HIGHEST PAIN SEVERITY IN PAST 24 HOURS: 5/10
VOMITING: DENIES
SUBJECTIVE PAIN PROGRESSION: UNCHANGED
PAIN SEVERITY GOAL: 0/10
LAST BOWEL MOVEMENT: 66951
FATIGUE: 1
STOOL FREQUENCY: LESS THAN DAILY
PAIN LOCATION - PAIN QUALITY: ACHE
PAIN: PAIN LIMITS FUNCTION AND SLEEP DAILY NOT CONSTANT
DESCRIPTION OF MEMORY LOSS: LONG TERM
SUBJECTIVE PAIN PROGRESSION: WAXING AND WANING
PERSON REPORTING PAIN: PATIENT
PAIN SEVERITY GOAL: 0/10
NAUSEA: DENES
POOR JUDGMENT: 1
DIFFICULTY THINKING: 1
PAIN LOCATION - PAIN DURATION: DAILY
LOWEST PAIN SEVERITY IN PAST 24 HOURS: 5/10
PAIN LOCATION: LEFT SHOULDER
PAIN LOCATION - RELIEVING FACTORS: REST, MEDICATION
PAIN LOCATION - PAIN FREQUENCY: FREQUENT
PAIN: 1
DEBILITATING PAIN: 1
PERSON REPORTING PAIN: PATIENT
HIGHEST PAIN SEVERITY IN PAST 24 HOURS: 5/10
PAIN LOCATION - PAIN SEVERITY: 5/10
MUSCLE WEAKNESS: 1
DESCRIPTION OF MEMORY LOSS: SHORT TERM
PAIN: 1

## 2024-04-22 ASSESSMENT — PATIENT HEALTH QUESTIONNAIRE - PHQ9: CLINICAL INTERPRETATION OF PHQ2 SCORE: 0

## 2024-04-22 NOTE — PROGRESS NOTES
Medication chart review for [facility] services    Received referral from [facility].   Medications reviewed  compared with discharge summary if available.  Discharge summary date, if applicable:   4/18    Current medication list per [facility]     Medication list one, patient is currently taking    Current Outpatient Medications:     guaiFENesin (MUCINEX FAST-MAX CHEST SOHEILA MS PO), 20 mL, Oral, BID PRN    ferrous sulfate, 325 mg, Oral, DAILY    omeprazole, 40 mg, Oral, BID    travoprost, 1 Drop, Both Eyes, Q EVENING    VITAMIN D PO, 1 Tablet, Oral, DAILY    calcium carbonate, 500 mg, Oral, DAILY    acetaminophen, Take 1 tab in AM and at lunch. Take 2 tabs at bedtime. (Patient taking differently: 650-1,300 mg, Oral, 3 times daily, 650 mg in the  mg in the Afternoon 1300 mg in the PM , Indications: Pain)    CRANBERRY PO, 1 Tablet, Oral, DAILY    latanoprost, 1 Drop, Both Eyes, Nightly    melatonin, 10 mg, Oral, QHS PRN    levothyroxine, 75 mcg, Oral, QDAY    amLODIPine, 5 mg, Oral, QDAY    sertraline, 50 mg, Oral, QDAY    lisinopril, TAKE 1/2 TABLET BY MOUTH TWICE A DAY (Patient taking differently: 10 mg, Oral, 2 TIMES DAILY, TAKE 1/2 TABLET BY MOUTH TWICE A DAY, Indications: Hypertension)    Apoaequorin, 1 Tablet, Oral, DAILY    dorzolamide-timolol, 1 Drop, Both Eyes, QAM    Multiple Vitamins-Minerals (PRESERVISION AREDS 2 PO), 1 Tablet, Oral, BID      Medication list two, drugs that the patient has been prescribed or recommended to take by their healthcare provider on discharge summary  N/a    Allergies   Allergen Reactions    Morphine Anaphylaxis     anaphylaxis    Cephalexin Rash     Full body; tolerated amoxicillin March 2024    Codeine Vomiting and Nausea    Metronidazole Vomiting and Nausea    Sulfa Drugs Rash     Full body       Labs     Lab Results   Component Value Date/Time    SODIUM 136 04/18/2024 06:27 AM    POTASSIUM 4.9 04/18/2024 06:27 AM    CHLORIDE 104 04/18/2024 06:27 AM    CO2 21  04/18/2024 06:27 AM    GLUCOSE 103 (H) 04/18/2024 06:27 AM    BUN 17 04/18/2024 06:27 AM    CREATININE 1.09 04/18/2024 06:27 AM    CREATININE 0.99 01/09/2013 07:58 AM    BUNCREATRAT 20 01/09/2013 07:58 AM    GLOMRATE >59 08/04/2010 07:50 AM     Lab Results   Component Value Date/Time    ALKPHOSPHAT 120 (H) 04/18/2024 06:27 AM    ASTSGOT 16 04/18/2024 06:27 AM    ALTSGPT 8 04/18/2024 06:27 AM    TBILIRUBIN 0.4 04/18/2024 06:27 AM    INR 1.03 12/21/2021 08:26 PM    ALBUMIN 3.8 04/18/2024 06:27 AM    ALBUMIN 3.82 09/13/2023 10:15 AM        Assessment for clinically significant drug interactions, drug omissions/additions, duplicative therapies.            CC   Mike Otero M.D.  [address] [facility] [address]  [facility] [address]  Fax: [phone]    [facility]  Phone [phone] fax [phone]    This note was created using voice recognition software (Dragon). The accuracy of the dictation is limited by the abilities of the software. I have reviewed the note prior to signing, however some errors in grammar and context are still possible. If you have any questions related to this note please do not hesitate to contact our office.

## 2024-04-22 NOTE — CASE COMMUNICATION
I agree  with changes.  ----- Message -----  From: Mena Davis R.N.  Sent: 4/21/2024   6:13 AM PDT  To: Keerthi Lindsay R.N.      Quality Review for 4.15.24 [facility] performed on by DENISE Davis RN on 4.21.2024:    Edits completed by DENISE Davis RN:  1.  and  dx coding updated per chart review.  2. Sent case comm to pharm for med review  3. Changed  to 4.13.24 per admitting form  4. Changed  to 2   5. Taylor ed  to 4.13.24 per EPIC  6. Added #3,4 to  per EPIC  7. Changed  to 3,  to 3,  to 2 and  to 4 per PT Eval on 4.17.24. Changed  to 4.17.24 date of PT eval, data used as part of the collaboration convention  8. Changed flu question to NA, LUIS CARLOS is in April 9. Changed  to D, pt has HTN  10. Changed  to 3, [id number] E to 2 per ADL tab reporting max assist with bathing. Changed  to 1,  to 2,   to 3, [id number] D-F, I,J to 3 per narrative pt needs mod assist with transfers and ambulation and uses DME. Changed  to 2, [id number] C, F-H to 3 per narrative pt needs mod assist with dressing. Changed  to 2 per [id number] A response of 4   11. Changed  to 3 per ambulation score   12. Added incontinence to functional limitations  13. Added low Na diet to nutritional requirements  14. Removed F2F data  15. Changed  to 8

## 2024-04-23 PROCEDURE — 665999 HH PPS REVENUE DEBIT

## 2024-04-23 PROCEDURE — 665998 HH PPS REVENUE CREDIT

## 2024-04-24 PROCEDURE — 665998 HH PPS REVENUE CREDIT

## 2024-04-24 PROCEDURE — 665999 HH PPS REVENUE DEBIT

## 2024-04-24 RX ORDER — FERROUS SULFATE 325(65) MG
325 TABLET ORAL
Qty: 30 TABLET | Refills: 3 | Status: SHIPPED | OUTPATIENT
Start: 2024-04-24

## 2024-04-25 ENCOUNTER — HOME CARE VISIT (OUTPATIENT)
Dept: HOME HEALTH SERVICES | Facility: HOME HEALTHCARE | Age: 89
End: 2024-04-25
Payer: MEDICARE

## 2024-04-25 VITALS
TEMPERATURE: 97.8 F | SYSTOLIC BLOOD PRESSURE: 132 MMHG | OXYGEN SATURATION: 96 % | HEART RATE: 73 BPM | RESPIRATION RATE: 18 BRPM | DIASTOLIC BLOOD PRESSURE: 72 MMHG

## 2024-04-25 PROCEDURE — 665999 HH PPS REVENUE DEBIT

## 2024-04-25 PROCEDURE — 665998 HH PPS REVENUE CREDIT

## 2024-04-25 PROCEDURE — G0299 HHS/HOSPICE OF RN EA 15 MIN: HCPCS

## 2024-04-25 ASSESSMENT — ENCOUNTER SYMPTOMS
LAST BOWEL MOVEMENT: 66954
PAIN LOCATION - PAIN SEVERITY: 5/10
SUBJECTIVE PAIN PROGRESSION: UNCHANGED
LOWEST PAIN SEVERITY IN PAST 24 HOURS: 1/10
PAIN LOCATION: BILATERAL SHOULDERS
PAIN SEVERITY GOAL: 0/10
PAIN LOCATION - EXACERBATING FACTORS: MOVEMENT
PAIN: 1
DESCRIPTION OF MEMORY LOSS: SHORT TERM
NAUSEA: DENIES
PERSON REPORTING PAIN: PATIENT
PAIN LOCATION - PAIN QUALITY: UNABLE TO DESCRIBE
PAIN LOCATION - RELIEVING FACTORS: REST
FORGETFULNESS: 1
VOMITING: DENIES
HIGHEST PAIN SEVERITY IN PAST 24 HOURS: 5/10

## 2024-04-25 ASSESSMENT — PATIENT HEALTH QUESTIONNAIRE - PHQ9: CLINICAL INTERPRETATION OF PHQ2 SCORE: 0

## 2024-04-26 PROCEDURE — 665998 HH PPS REVENUE CREDIT

## 2024-04-26 PROCEDURE — 665999 HH PPS REVENUE DEBIT

## 2024-04-27 PROCEDURE — 665999 HH PPS REVENUE DEBIT

## 2024-04-27 PROCEDURE — 665998 HH PPS REVENUE CREDIT

## 2024-04-28 PROCEDURE — 665998 HH PPS REVENUE CREDIT

## 2024-04-28 PROCEDURE — 665999 HH PPS REVENUE DEBIT

## 2024-04-29 ENCOUNTER — HOME CARE VISIT (OUTPATIENT)
Dept: HOME HEALTH SERVICES | Facility: HOME HEALTHCARE | Age: 89
End: 2024-04-29
Payer: MEDICARE

## 2024-04-29 VITALS
RESPIRATION RATE: 24 BRPM | TEMPERATURE: 98.8 F | HEART RATE: 84 BPM | OXYGEN SATURATION: 97 % | SYSTOLIC BLOOD PRESSURE: 110 MMHG | DIASTOLIC BLOOD PRESSURE: 54 MMHG

## 2024-04-29 PROCEDURE — 665998 HH PPS REVENUE CREDIT

## 2024-04-29 PROCEDURE — G0159 HHC PT MAINT EA 15 MIN: HCPCS

## 2024-04-29 PROCEDURE — 665999 HH PPS REVENUE DEBIT

## 2024-04-29 ASSESSMENT — ENCOUNTER SYMPTOMS
PAIN SEVERITY GOAL: 2/10
PAIN LOCATION - PAIN SEVERITY: 5/10
PAIN: 1
LOWEST PAIN SEVERITY IN PAST 24 HOURS: 0/10
PERSON REPORTING PAIN: PATIENT
PAIN LOCATION - PAIN DURATION: DAILY
PAIN LOCATION - PAIN FREQUENCY: FREQUENT
HIGHEST PAIN SEVERITY IN PAST 24 HOURS: 5/10
POOR JUDGMENT: 1
SUBJECTIVE PAIN PROGRESSION: WAXING AND WANING
PAIN LOCATION - EXACERBATING FACTORS: MOVEMENT
DIFFICULTY THINKING: 1
PAIN LOCATION: LEFT SHOULDER
PAIN: PAIN LIMITS SLEEP AND FUNCTION DAILY NOT CONSTANT
PAIN LOCATION - PAIN QUALITY: ACHE
DEBILITATING PAIN: 1

## 2024-04-30 ENCOUNTER — TELEPHONE (OUTPATIENT)
Dept: INTERNAL MEDICINE | Facility: IMAGING CENTER | Age: 89
End: 2024-04-30
Payer: MEDICARE

## 2024-04-30 ENCOUNTER — HOSPITAL ENCOUNTER (EMERGENCY)
Facility: MEDICAL CENTER | Age: 89
End: 2024-04-30
Attending: EMERGENCY MEDICINE
Payer: MEDICARE

## 2024-04-30 ENCOUNTER — HOME CARE VISIT (OUTPATIENT)
Dept: HOME HEALTH SERVICES | Facility: HOME HEALTHCARE | Age: 89
End: 2024-04-30
Payer: MEDICARE

## 2024-04-30 ENCOUNTER — APPOINTMENT (OUTPATIENT)
Dept: RADIOLOGY | Facility: MEDICAL CENTER | Age: 89
End: 2024-04-30
Attending: EMERGENCY MEDICINE
Payer: MEDICARE

## 2024-04-30 ENCOUNTER — HOSPITAL ENCOUNTER (OUTPATIENT)
Facility: MEDICAL CENTER | Age: 89
End: 2024-04-30
Attending: INTERNAL MEDICINE
Payer: MEDICARE

## 2024-04-30 VITALS
RESPIRATION RATE: 18 BRPM | HEART RATE: 71 BPM | TEMPERATURE: 99.2 F | SYSTOLIC BLOOD PRESSURE: 130 MMHG | DIASTOLIC BLOOD PRESSURE: 70 MMHG | OXYGEN SATURATION: 98 %

## 2024-04-30 VITALS
RESPIRATION RATE: 20 BRPM | HEART RATE: 69 BPM | TEMPERATURE: 97.7 F | BODY MASS INDEX: 21.95 KG/M2 | SYSTOLIC BLOOD PRESSURE: 196 MMHG | OXYGEN SATURATION: 93 % | WEIGHT: 120 LBS | DIASTOLIC BLOOD PRESSURE: 82 MMHG

## 2024-04-30 DIAGNOSIS — R53.1 GENERALIZED WEAKNESS: ICD-10-CM

## 2024-04-30 DIAGNOSIS — D64.9 ANEMIA, UNSPECIFIED TYPE: ICD-10-CM

## 2024-04-30 LAB
ABO GROUP BLD: NORMAL
ALBUMIN SERPL BCP-MCNC: 3.4 G/DL (ref 3.2–4.9)
ALBUMIN/GLOB SERPL: 1.1 G/DL
ALP SERPL-CCNC: 123 U/L (ref 30–99)
ALT SERPL-CCNC: 5 U/L (ref 2–50)
ANION GAP SERPL CALC-SCNC: 12 MMOL/L (ref 7–16)
ANION GAP SERPL CALC-SCNC: 13 MMOL/L (ref 7–16)
ANISOCYTOSIS BLD QL SMEAR: ABNORMAL
APTT PPP: 44.6 SEC (ref 24.7–36)
AST SERPL-CCNC: 16 U/L (ref 12–45)
BARCODED ABORH UBTYP: 7300
BARCODED PRD CODE UBPRD: NORMAL
BARCODED UNIT NUM UBUNT: NORMAL
BASOPHILS # BLD AUTO: 0 % (ref 0–1.8)
BASOPHILS # BLD AUTO: 0 % (ref 0–1.8)
BASOPHILS # BLD: 0 K/UL (ref 0–0.12)
BASOPHILS # BLD: 0 K/UL (ref 0–0.12)
BILIRUB SERPL-MCNC: 0.3 MG/DL (ref 0.1–1.5)
BLD GP AB SCN SERPL QL: NORMAL
BUN SERPL-MCNC: 25 MG/DL (ref 8–22)
BUN SERPL-MCNC: 27 MG/DL (ref 8–22)
CALCIUM ALBUM COR SERPL-MCNC: 8.8 MG/DL (ref 8.5–10.5)
CALCIUM SERPL-MCNC: 8.1 MG/DL (ref 8.4–10.2)
CALCIUM SERPL-MCNC: 8.3 MG/DL (ref 8.5–10.5)
CHLORIDE SERPL-SCNC: 105 MMOL/L (ref 96–112)
CHLORIDE SERPL-SCNC: 105 MMOL/L (ref 96–112)
CO2 SERPL-SCNC: 17 MMOL/L (ref 20–33)
CO2 SERPL-SCNC: 18 MMOL/L (ref 20–33)
COMPONENT R 8504R: NORMAL
CREAT SERPL-MCNC: 0.94 MG/DL (ref 0.5–1.4)
CREAT SERPL-MCNC: 0.95 MG/DL (ref 0.5–1.4)
EOSINOPHIL # BLD AUTO: 0 K/UL (ref 0–0.51)
EOSINOPHIL # BLD AUTO: 0 K/UL (ref 0–0.51)
EOSINOPHIL NFR BLD: 0 % (ref 0–6.9)
EOSINOPHIL NFR BLD: 0 % (ref 0–6.9)
ERYTHROCYTE [DISTWIDTH] IN BLOOD BY AUTOMATED COUNT: 64.8 FL (ref 35.9–50)
ERYTHROCYTE [DISTWIDTH] IN BLOOD BY AUTOMATED COUNT: 65.7 FL (ref 35.9–50)
FERRITIN SERPL-MCNC: 535 NG/ML (ref 10–291)
GFR SERPLBLD CREATININE-BSD FMLA CKD-EPI: 54 ML/MIN/1.73 M 2
GFR SERPLBLD CREATININE-BSD FMLA CKD-EPI: 54 ML/MIN/1.73 M 2
GLOBULIN SER CALC-MCNC: 3.1 G/DL (ref 1.9–3.5)
GLUCOSE SERPL-MCNC: 106 MG/DL (ref 65–99)
GLUCOSE SERPL-MCNC: 124 MG/DL (ref 65–99)
HCT VFR BLD AUTO: 20.3 % (ref 37–47)
HCT VFR BLD AUTO: 21.2 % (ref 37–47)
HGB BLD-MCNC: 6.4 G/DL (ref 12–16)
HGB BLD-MCNC: 6.6 G/DL (ref 12–16)
HGB RETIC QN AUTO: 29.6 PG/CELL (ref 29–35)
HYPOCHROMIA BLD QL SMEAR: ABNORMAL
IMM RETICS NFR: 30.5 % (ref 2.6–16.1)
INR PPP: 1.09 (ref 0.87–1.13)
IRON SATN MFR SERPL: ABNORMAL % (ref 15–55)
IRON SERPL-MCNC: 127 UG/DL (ref 40–170)
LYMPHOCYTES # BLD AUTO: 1.05 K/UL (ref 1–4.8)
LYMPHOCYTES # BLD AUTO: 2.83 K/UL (ref 1–4.8)
LYMPHOCYTES NFR BLD: 23 % (ref 22–41)
LYMPHOCYTES NFR BLD: 7.8 % (ref 22–41)
MANUAL DIFF BLD: ABNORMAL
MANUAL DIFF BLD: NORMAL
MCH RBC QN AUTO: 28.7 PG (ref 27–33)
MCH RBC QN AUTO: 28.8 PG (ref 27–33)
MCHC RBC AUTO-ENTMCNC: 31.1 G/DL (ref 32.2–35.5)
MCHC RBC AUTO-ENTMCNC: 31.5 G/DL (ref 32.2–35.5)
MCV RBC AUTO: 91 FL (ref 81.4–97.8)
MCV RBC AUTO: 92.6 FL (ref 81.4–97.8)
METAMYELOCYTES NFR BLD MANUAL: 0.9 %
METAMYELOCYTES NFR BLD MANUAL: 4 %
MICROCYTES BLD QL SMEAR: ABNORMAL
MONOCYTES # BLD AUTO: 0.49 K/UL (ref 0–0.85)
MONOCYTES # BLD AUTO: 0.58 K/UL (ref 0–0.85)
MONOCYTES NFR BLD AUTO: 4 % (ref 0–13.4)
MONOCYTES NFR BLD AUTO: 4.3 % (ref 0–13.4)
MORPHOLOGY BLD-IMP: NORMAL
MYELOCYTES NFR BLD MANUAL: 3 %
MYELOCYTES NFR BLD MANUAL: 6 %
NEUTROPHILS # BLD AUTO: 10.81 K/UL (ref 1.82–7.42)
NEUTROPHILS # BLD AUTO: 7.87 K/UL (ref 1.82–7.42)
NEUTROPHILS NFR BLD: 53 % (ref 44–72)
NEUTROPHILS NFR BLD: 78.4 % (ref 44–72)
NEUTS BAND NFR BLD MANUAL: 1.7 % (ref 0–10)
NEUTS BAND NFR BLD MANUAL: 11 % (ref 0–10)
NRBC # BLD AUTO: 0.06 K/UL
NRBC # BLD AUTO: 0.06 K/UL
NRBC BLD-RTO: 0.4 /100 WBC (ref 0–0.2)
NRBC BLD-RTO: 0.5 /100 WBC (ref 0–0.2)
OVALOCYTES BLD QL SMEAR: NORMAL
PLATELET # BLD AUTO: 165 K/UL (ref 164–446)
PLATELET # BLD AUTO: 176 K/UL (ref 164–446)
PLATELET BLD QL SMEAR: NORMAL
PLATELET BLD QL SMEAR: NORMAL
PMV BLD AUTO: 9.8 FL (ref 9–12.9)
PMV BLD AUTO: 9.9 FL (ref 9–12.9)
POIKILOCYTOSIS BLD QL SMEAR: NORMAL
POTASSIUM SERPL-SCNC: 4.2 MMOL/L (ref 3.6–5.5)
POTASSIUM SERPL-SCNC: 4.4 MMOL/L (ref 3.6–5.5)
PRODUCT TYPE UPROD: NORMAL
PROMYELOCYTES NFR BLD MANUAL: 0.9 %
PROMYELOCYTES NFR BLD MANUAL: 2 %
PROT SERPL-MCNC: 6.5 G/DL (ref 6–8.2)
PROTHROMBIN TIME: 14.3 SEC (ref 12–14.6)
RBC # BLD AUTO: 2.23 M/UL (ref 4.2–5.4)
RBC # BLD AUTO: 2.29 M/UL (ref 4.2–5.4)
RBC BLD AUTO: NORMAL
RBC BLD AUTO: PRESENT
RETICS # AUTO: 0.08 M/UL (ref 0.04–0.12)
RETICS/RBC NFR: 3.4 % (ref 0.8–2.6)
RH BLD: NORMAL
SODIUM SERPL-SCNC: 135 MMOL/L (ref 135–145)
SODIUM SERPL-SCNC: 135 MMOL/L (ref 135–145)
STOMATOCYTES BLD QL SMEAR: NORMAL
TIBC SERPL-MCNC: ABNORMAL UG/DL (ref 250–450)
UIBC SERPL-MCNC: <17 UG/DL (ref 110–370)
UNIT STATUS USTAT: NORMAL
WBC # BLD AUTO: 12.3 K/UL (ref 4.8–10.8)
WBC # BLD AUTO: 13.5 K/UL (ref 4.8–10.8)

## 2024-04-30 PROCEDURE — G0299 HHS/HOSPICE OF RN EA 15 MIN: HCPCS

## 2024-04-30 RX ORDER — SODIUM CHLORIDE 9 MG/ML
INJECTION, SOLUTION INTRAVENOUS CONTINUOUS
Status: DISCONTINUED | OUTPATIENT
Start: 2024-04-30 | End: 2024-04-30 | Stop reason: HOSPADM

## 2024-04-30 RX ORDER — SODIUM CHLORIDE 9 MG/ML
500 INJECTION, SOLUTION INTRAVENOUS ONCE
Status: COMPLETED | OUTPATIENT
Start: 2024-04-30 | End: 2024-04-30

## 2024-04-30 RX ADMIN — SODIUM CHLORIDE 500 ML: 9 INJECTION, SOLUTION INTRAVENOUS at 15:40

## 2024-04-30 RX ADMIN — IOHEXOL 100 ML: 350 INJECTION, SOLUTION INTRAVENOUS at 17:09

## 2024-04-30 ASSESSMENT — ENCOUNTER SYMPTOMS
LOWEST PAIN SEVERITY IN PAST 24 HOURS: 0/10
MUSCLE WEAKNESS: 1
DESCRIPTION OF MEMORY LOSS: SHORT TERM
FATIGUES EASILY: 1
HIGHEST PAIN SEVERITY IN PAST 24 HOURS: 5/10
PERSON REPORTING PAIN: PATIENT
PAIN LOCATION - RELIEVING FACTORS: REST; MEDICATION
PAIN LOCATION - PAIN FREQUENCY: INTERMITTENT
FORGETFULNESS: 1
PAIN LOCATION - PAIN SEVERITY: 0/10
PAIN SEVERITY GOAL: 0/10
SUBJECTIVE PAIN PROGRESSION: UNCHANGED
NAUSEA: DENIES
VOMITING: DENIES
DENIES PAIN: 1
PAIN LOCATION - PAIN QUALITY: ACHY
LAST BOWEL MOVEMENT: 66960
PAIN LOCATION: BILATERAL SHOULDERS

## 2024-04-30 ASSESSMENT — PATIENT HEALTH QUESTIONNAIRE - PHQ9: CLINICAL INTERPRETATION OF PHQ2 SCORE: 0

## 2024-04-30 ASSESSMENT — FIBROSIS 4 INDEX: FIB4 SCORE: 3.18

## 2024-04-30 NOTE — ED PROVIDER NOTES
ED Provider Note    CHIEF COMPLAINT  Chief Complaint   Patient presents with    Weakness       EXTERNAL RECORDS REVIEWED  Discharge summary 4/13/2024, was sent to the hospital for anemia, Hemoccult positive stool, received blood transfusion    HPI/ROS  LIMITATION TO HISTORY   History of dementia  OUTSIDE HISTORIAN(S):  Daughter at the bedside    Gloria Patel is a 89 y.o. female who presents for evaluation of generalized weakness, found to be anemic on outpatient blood work.  The patient was admitted to the hospital about 2 weeks ago for anemia, received blood transfusions, her daughter reports that she was not interested in pursuing further workup to identify the source of her blood loss as she did have Hemoccult positive stool.  The patient has dementia, her daughter provides much of the history.  The patient states she feels fine, she really offers no complaints but the daughter reports she has been progressively weak, she has been unable to even stand up on her own.  The daughter reports that the blood work she had performed today was just routine follow-up from her hospitalization.  No chest pain or shortness of breath.  She has been coughing over the past week, the daughter reports she suspects she got a URI from the assisted living facility in which she resides.  The patient reports she does bring up mucus when she coughs.  The daughter reports she does have a history of significant sundowning and various complications and hospitals related to her dementia.    PAST MEDICAL HISTORY   has a past medical history of Arthritis, Diverticulitis, GERD (gastroesophageal reflux disease), Glaucoma, Heart burn, Hiatus hernia syndrome, Hypertension, Primary osteoarthritis of both shoulders (1/22/2019), and Unspecified urinary incontinence.    SURGICAL HISTORY   has a past surgical history that includes other (1945); other (1954); other orthopedic surgery; other orthopedic surgery (2000); cholecystectomy (1966); other  abdominal surgery (1954); gyn surgery (1970); hip arthroplasty total (6/21/2011); us-needle core bx-breast panel; and orif, ankle (Right, 12/22/2021).    FAMILY HISTORY  Family History   Problem Relation Age of Onset    Diabetes Other     Heart Disease Other     Lung Disease Other     Cancer Sister        SOCIAL HISTORY  Social History     Tobacco Use    Smoking status: Never    Smokeless tobacco: Never   Vaping Use    Vaping Use: Never used   Substance and Sexual Activity    Alcohol use: Yes     Alcohol/week: 0.0 oz     Comment: occassional wine    Drug use: No    Sexual activity: Not on file       CURRENT MEDICATIONS  Home Medications       Reviewed by Bonnie Singh R.N. (Registered Nurse) on 04/30/24 at 1435  Med List Status: Partial     Medication Last Dose Status   acetaminophen (TYLENOL 8 HOUR ARTHRITIS PAIN) 650 MG CR tablet  Active   amLODIPine (NORVASC) 5 MG Tab  Active   Apoaequorin 10 MG Cap  Active   calcium carbonate (TUMS) 500 MG Chew Tab  Active   CRANBERRY PO  Active   dorzolamide-timolol (COSOPT) 2-0.5 % Solution  Active   ferrous sulfate 325 (65 Fe) MG tablet  Active   guaiFENesin (MUCINEX FAST-MAX CHEST SOHEILA MS PO)  Active   latanoprost (XALATAN) 0.005 % Solution  Active   levothyroxine (SYNTHROID) 75 MCG Tab  Active   lisinopril (PRINIVIL) 20 MG Tab  Active   melatonin 5 mg Tab  Active   Multiple Vitamins-Minerals (PRESERVISION AREDS 2 PO)  Active   NON SPECIFIED  Active   non-formulary med  Active   omeprazole (PRILOSEC) 20 MG delayed-release capsule  Active   SALINE NASAL SPRAY NA  Active   sertraline (ZOLOFT) 50 MG Tab  Active   travoprost (TRAVATAN Z) 0.004 % Solution  Active   VITAMIN D PO  Active                    ALLERGIES  Allergies   Allergen Reactions    Morphine Anaphylaxis     anaphylaxis    Cephalexin Rash     Full body; tolerated amoxicillin March 2024    Codeine Vomiting and Nausea    Metronidazole Vomiting and Nausea    Sulfa Drugs Rash     Full body       PHYSICAL  EXAM  VITAL SIGNS: BP (!) 157/66   Pulse 73   Temp 36.8 °C (98.3 °F) (Temporal)   Resp 20   Wt 54.4 kg (120 lb)   SpO2 93%   BMI 21.95 kg/m²    Constitutional: Elderly, frail-appearing, awake, alert, no acute distress  HENT: Normocephalic, no obvious evidence of acute trauma.  Eyes: No scleral icterus. Normal conjunctiva   Thorax & Lungs: Normal nonlabored respirations. I appreciate no wheezing, rhonchi or rales. There is normal air movement.  Upon cardiac ascultation I appreciate a regular heart rhythm and a normal rate.   Abdomen: The abdomen is not visibly distended. Upon palpation, I find it to be without tenderness.  No mass appreciated.      EKG/LABS  Results for orders placed or performed during the hospital encounter of 04/30/24   CBC WITH DIFFERENTIAL   Result Value Ref Range    WBC 13.5 (H) 4.8 - 10.8 K/uL    RBC 2.23 (L) 4.20 - 5.40 M/uL    Hemoglobin 6.4 (L) 12.0 - 16.0 g/dL    Hematocrit 20.3 (L) 37.0 - 47.0 %    MCV 91.0 81.4 - 97.8 fL    MCH 28.7 27.0 - 33.0 pg    MCHC 31.5 (L) 32.2 - 35.5 g/dL    RDW 64.8 (H) 35.9 - 50.0 fL    Platelet Count 165 164 - 446 K/uL    MPV 9.8 9.0 - 12.9 fL    Neutrophils-Polys 78.40 (H) 44.00 - 72.00 %    Lymphocytes 7.80 (L) 22.00 - 41.00 %    Monocytes 4.30 0.00 - 13.40 %    Eosinophils 0.00 0.00 - 6.90 %    Basophils 0.00 0.00 - 1.80 %    Nucleated RBC 0.40 (H) 0.00 - 0.20 /100 WBC    Neutrophils (Absolute) 10.81 (H) 1.82 - 7.42 K/uL    Lymphs (Absolute) 1.05 1.00 - 4.80 K/uL    Monos (Absolute) 0.58 0.00 - 0.85 K/uL    Eos (Absolute) 0.00 0.00 - 0.51 K/uL    Baso (Absolute) 0.00 0.00 - 0.12 K/uL    NRBC (Absolute) 0.06 K/uL    Hypochromia 1+     Anisocytosis 1+     Microcytosis 1+    COMP METABOLIC PANEL   Result Value Ref Range    Sodium 135 135 - 145 mmol/L    Potassium 4.4 3.6 - 5.5 mmol/L    Chloride 105 96 - 112 mmol/L    Co2 17 (L) 20 - 33 mmol/L    Anion Gap 13.0 7.0 - 16.0    Glucose 124 (H) 65 - 99 mg/dL    Bun 27 (H) 8 - 22 mg/dL    Creatinine 0.95  0.50 - 1.40 mg/dL    Calcium 8.3 (L) 8.5 - 10.5 mg/dL    Correct Calcium 8.8 8.5 - 10.5 mg/dL    AST(SGOT) 16 12 - 45 U/L    ALT(SGPT) 5 2 - 50 U/L    Alkaline Phosphatase 123 (H) 30 - 99 U/L    Total Bilirubin 0.3 0.1 - 1.5 mg/dL    Albumin 3.4 3.2 - 4.9 g/dL    Total Protein 6.5 6.0 - 8.2 g/dL    Globulin 3.1 1.9 - 3.5 g/dL    A-G Ratio 1.1 g/dL   PROTHROMBIN TIME (INR)   Result Value Ref Range    PT 14.3 12.0 - 14.6 sec    INR 1.09 0.87 - 1.13   APTT   Result Value Ref Range    APTT 44.6 (H) 24.7 - 36.0 sec   COD (ADULT)   Result Value Ref Range    ABO Grouping Only B     Rh Grouping Only POS     Antibody Screen-Cod NEG     Component R       R99                 Red Cells, LR       [id number]   transfused   04/30/24   16:28      Product Type R99     Dispense Status transfused     Unit Number (Barcoded) [id number]     Product Code (Barcoded) N6820J90     Blood Type (Barcoded) 7300    DIFFERENTIAL MANUAL   Result Value Ref Range    Bands-Stabs 1.70 0.00 - 10.00 %    Metamyelocytes 0.90 %    Myelocytes 6.00 %    Progranulocytes 0.90 %    Manual Diff Status PERFORMED    PERIPHERAL SMEAR REVIEW   Result Value Ref Range    Peripheral Smear Review see below    PLATELET ESTIMATE   Result Value Ref Range    Plt Estimation Normal    MORPHOLOGY   Result Value Ref Range    RBC Morphology Present     Poikilocytosis 1+     Ovalocytes 1+     Stomatocytes 1+    ESTIMATED GFR   Result Value Ref Range    GFR (CKD-EPI) 54 (A) >60 mL/min/1.73 m 2     *Note: Due to a large number of results and/or encounters for the requested time period, some results have not been displayed. A complete set of results can be found in Results Review.      I have independently interpreted this EKG    RADIOLOGY/PROCEDURES   I have independently interpreted the diagnostic imaging associated with this visit and am waiting the final reading from the radiologist.   My preliminary interpretation is as follows: No obvious acute infiltrate    Radiologist  interpretation:  CT-CHEST,ABDOMEN,PELVIS WITH   Final Result      1.  Diverticulosis without diverticulitis.      2.  Large hiatal hernia.      3.  No significant change in simple appearing pancreatic cysts and bilateral renal cysts.      4.  Cardiomegaly      DX-CHEST-PORTABLE (1 VIEW)   Final Result      1.  Cardiomegaly.   2.  Opacity in the retrocardiac left lower lobe with obscuration of the left hemidiaphragm. Correlation with CT suggests this is a combination of moderate hiatus hernia and subsegmental atelectatic changes in the left lower lobe.          COURSE & MEDICAL DECISION MAKING    ASSESSMENT, COURSE AND PLAN  Care Narrative: This 89-year-old female comes in with anemia, hemoglobin was 6, generally weak according to her daughter.  The patient actually offers no complaints but the daughter reports that that is quite unreliable.  She has a benign abdomen.  During her recent hospitalization couple weeks ago she was found of heme positive stool but the patient and family decided not to pursue further workup.  Sent back in today for blood transfusion with another recurrence of significant anemia below 7.  I spent a lot of time the bedside talking to the patient as well as her daughter about options.  It seems as though the family wants to stay away from invasive procedures which I think is completely appropriate.  We did discuss the possible utility to CT scan, low probability of it finding an etiology of her bleeding, and any etiology to CT scan would find would likely not be treated although could help perhaps guide further care in the discussion for hospice so a CT scan will be obtained.  Of note there was a past CT scan revealing an abnormality in the patient's pancreas.  Blood transfusion will be initiated.  She will be reevaluated          Transfusion: I have explained to the patient representative the risks and benefits of transfusion of blood products.  This includes, as appropriate, the risk of  mild allergic reaction, hemolytic reaction, transfusion-associated lung injury, febrile reactions, circulatory or iron overload, and infection.    We discussed possible alternatives and their risks, including directed donation, autologous transfusion, and no transfusion, including IV or oral iron supplementation, as appropriate.  I believe the patient representative understands the risks and benefits and was able to express understanding.      Blood work reveals a hemoglobin of 6.4, baseline renal dysfunction improved from most recent, the imaging reveals no acute abnormalities that would cause her symptoms.  At this point, had a long discussion with the patient's daughter about how to proceed.  I offered admission for further workup, the patient's daughter reports that she, along with her sister, I do not think the patient would benefit from any further workup.  They would prefer she go home as she ends up sundowning and having all sorts of complications while in the hospital.  I think that is absolutely reasonable.  At this time she is discharged home in stable condition          DISPOSITION AND DISCUSSIONS    Escalation of care considered, and ultimately not performed:after discussion with the patient / family, they have elected to decline an escalation in care      FINAL DIAGNOSIS  1. Generalized weakness    2. Anemia, unspecified type           Electronically signed by: James Monroy M.D., 4/30/2024 3:20 PM

## 2024-04-30 NOTE — TELEPHONE ENCOUNTER
I spoke with the patient's daughter regarding her lab results.  Recommended transport to ER.  We had discussion regarding the findings of anemia and elevated white blood cell count including left shift which would indicate infection.  The patient's daughter reports that she has had a severe cold.  I told her this could be pneumonia.  Patient may choose to forego treatment.  In that case then I would recommend starting hospice

## 2024-04-30 NOTE — ED TRIAGE NOTES
Pt bib ems from home c/o generalized weakness x 2 days. Pt has home health rn who visits her and she jim labs on her and then was contacted by her md and told needed to come to ed for blood transfusion. Pt denies any blood in stool. Pt denies pain. Pt states has had cold symptoms and dry cough. Nad. vss

## 2024-05-01 ENCOUNTER — HOME CARE VISIT (OUTPATIENT)
Dept: HOME HEALTH SERVICES | Facility: HOME HEALTHCARE | Age: 89
End: 2024-05-01
Payer: MEDICARE

## 2024-05-01 NOTE — ED NOTES
Discharge instructions given and discussed, signed copy in chart. Pt verbalized understanding and all questions answered.Pt discharged with daughter in stable condition on room air. Personal belongings given to  patient. IV removed and tolerated well. Pt transported to the lobby via    170.18

## 2024-05-03 ENCOUNTER — OFFICE VISIT (OUTPATIENT)
Dept: INTERNAL MEDICINE | Facility: IMAGING CENTER | Age: 89
End: 2024-05-03
Payer: MEDICARE

## 2024-05-03 DIAGNOSIS — I27.20 PULMONARY HYPERTENSION, UNSPECIFIED (HCC): ICD-10-CM

## 2024-05-03 DIAGNOSIS — D46.9 MYELODYSPLASIA (MYELODYSPLASTIC SYNDROME) (HCC): ICD-10-CM

## 2024-05-03 DIAGNOSIS — R54 FRAIL ELDERLY: ICD-10-CM

## 2024-05-03 DIAGNOSIS — D50.0 ANEMIA, BLOOD LOSS: ICD-10-CM

## 2024-05-03 PROCEDURE — 99999 PR NO CHARGE: CPT | Performed by: INTERNAL MEDICINE

## 2024-05-06 ENCOUNTER — HOME CARE VISIT (OUTPATIENT)
Dept: HOME HEALTH SERVICES | Facility: HOME HEALTHCARE | Age: 89
End: 2024-05-06
Payer: MEDICARE

## 2024-05-07 ENCOUNTER — HOME CARE VISIT (OUTPATIENT)
Dept: HOME HEALTH SERVICES | Facility: HOME HEALTHCARE | Age: 89
End: 2024-05-07
Payer: MEDICARE

## 2024-05-07 DIAGNOSIS — I10 ESSENTIAL HYPERTENSION: ICD-10-CM

## 2024-05-07 RX ORDER — LISINOPRIL 20 MG/1
TABLET ORAL
Qty: 90 TABLET | Refills: 3 | Status: SHIPPED | OUTPATIENT
Start: 2024-05-07

## 2024-05-07 NOTE — PROGRESS NOTES
Patient's family including both daughters and son are present today for consultation regarding hospice for their mother.  She was recently seen in ER for recurrence of severe anemia.    Family comes in with outpatient to discuss hospice.  Patient has moderate dementia.  More recently she has been diagnosed with worsening anemia.  This is possibly due to chronic GI bleed but cannot exclude myelodysplastic syndrome.  Patient is not interested in further workup.  She was recently discharged from ER after transfusion of 1 unit of blood.      We spent considerable time discussing her current diagnosis and prognosis.  Based on her age and worsening condition I think hospice is a good option.  She is a poor candidate for GI intervention.  She is a very poor candidate for surgery and I do not recommend bone marrow biopsy    We will proceed with hospice.  At this time recommend she continue with lisinopril and Prilosec.  I think it is okay to discontinue iron.  Continue with eyedrops

## 2024-05-08 ASSESSMENT — ACTIVITIES OF DAILY LIVING (ADL)
OASIS_M1830: 03
HOME_HEALTH_OASIS: 01

## 2024-05-08 ASSESSMENT — PATIENT HEALTH QUESTIONNAIRE - PHQ9: PATIENT UNABLE TO COMPLETE PHQ: OFFICE DC

## 2024-05-13 ENCOUNTER — HOME CARE VISIT (OUTPATIENT)
Dept: HOME HEALTH SERVICES | Facility: HOME HEALTHCARE | Age: 89
End: 2024-05-13
Payer: MEDICARE

## 2024-05-13 NOTE — Clinical Note
I agree with these changes.  Thank you.   Kathryn Villalta RN    ----- Message -----  From: Mena Davis R.N.  Sent: 5/13/2024   6:56 AM PDT  To: Kathryn Villalta R.N.      Quality Review for 5.7.24 DC OASIS performed on by DENISE Davis RN on 5.13.2024:     Edits completed by DENISE Davis RN:  1. Changed  to 8, - to 8,  to 8 as this was an office dc.  2. Changed  E to yes per POC  3. Changed  to 9 per LUIS CARLOS data

## 2024-05-13 NOTE — CASE COMMUNICATION
Quality Review for 5.7.24 DC OASIS performed on by DENISE Davis RN on 5.13.2024:     Edits completed by DENISE Davis RN:  1. Changed  to 8, - to 8,  to 8 as this was an office dc.  2. Changed  E to yes per POC  3. Changed  to 9 per LUIS CARLOS data

## 2024-05-30 ENCOUNTER — PATIENT MESSAGE (OUTPATIENT)
Dept: INTERNAL MEDICINE | Facility: IMAGING CENTER | Age: 89
End: 2024-05-30
Payer: MEDICARE

## 2024-05-30 DIAGNOSIS — D50.0 ANEMIA, BLOOD LOSS: ICD-10-CM

## 2024-05-30 DIAGNOSIS — R54 FRAIL ELDERLY: ICD-10-CM

## 2024-05-30 DIAGNOSIS — D46.9 MYELODYSPLASIA (MYELODYSPLASTIC SYNDROME) (HCC): ICD-10-CM

## 2024-05-30 DIAGNOSIS — I27.20 PULMONARY HYPERTENSION, UNSPECIFIED (HCC): ICD-10-CM

## 2024-08-28 ENCOUNTER — TELEPHONE (OUTPATIENT)
Dept: INTERNAL MEDICINE | Facility: IMAGING CENTER | Age: 89
End: 2024-08-28
Payer: MEDICARE

## 2025-01-02 NOTE — TELEPHONE ENCOUNTER
Informed urine culture should be covered by current antibiotic.  Patient asking what can be done for these chronic UTI.  Will consult Dr Otero.   Refill Routing Note   Medication(s) are not appropriate for processing by [facility] for the following reason(s):        Drug-disease interaction    [facility] action(s):  Defer      Medication Therapy Plan: PVD    Pharmacist review requested: Yes     Appointments  past 12m or future 3m with PCP    Date Provider   Last Visit   11/29/2024 Kyree Yeager MD   Next Visit   Visit date not found Kyree Yeager MD   ED visits in past 90 days: 0        Note composed:10:32 AM 01/02/2025

## (undated) DEVICE — GLOVE BIOGEL PI INDICATOR SZ 6.5 SURGICAL PF LF - (50/BX 4BX/CA)

## (undated) DEVICE — SUTURE 2-0 VICRYL PLUS CT-1 36 (36PK/BX)"

## (undated) DEVICE — SET LEADWIRE 5 LEAD BEDSIDE DISPOSABLE ECG (1SET OF 5/EA)

## (undated) DEVICE — GLOVE BIOGEL SZ 6.5 SURGICAL PF LTX (50PR/BX 4BX/CA)

## (undated) DEVICE — SLEEVE, VASO, THIGH, MED

## (undated) DEVICE — GOWN WARMING STANDARD FLEX - (30/CA)

## (undated) DEVICE — ELECTRODE DUAL RETURN W/ CORD - (50/PK)

## (undated) DEVICE — KIT ANESTHESIA W/CIRCUIT & 3/LT BAG W/FILTER (20EA/CA)

## (undated) DEVICE — GLOVE BIOGEL INDICATOR SZ 7.5 SURGICAL PF LTX - (50PR/BX 4BX/CA)

## (undated) DEVICE — HEAD HOLDER JUNIOR/ADULT

## (undated) DEVICE — SUCTION INSTRUMENT YANKAUER BULBOUS TIP W/O VENT (50EA/CA)

## (undated) DEVICE — SENSOR SPO2 NEO LNCS ADHESIVE (20/BX) SEE USER NOTES

## (undated) DEVICE — PAD PREP 24 X 48 CUFFED - (100/CA)

## (undated) DEVICE — ELECTRODE 850 FOAM ADHESIVE - HYDROGEL RADIOTRNSPRNT (50/PK)

## (undated) DEVICE — MASK ANESTHESIA ADULT  - (100/CA)

## (undated) DEVICE — TOWEL STOP TIMEOUT SAFETY FLAG (40EA/CA)

## (undated) DEVICE — DRAPE 36X28IN RAD CARM BND BG - (25/CA) O

## (undated) DEVICE — SUTURE 3-0 ETHILON FSLX 30 (36PK/BX)"

## (undated) DEVICE — DRAPE C ARMOR (12EA/CA)

## (undated) DEVICE — SODIUM CHL IRRIGATION 0.9% 1000ML (12EA/CA)

## (undated) DEVICE — BANDAGE ELASTIC STERILE MATRIX 6 X 10 (20EA/CA)

## (undated) DEVICE — PAD LAP STERILE 18 X 18 - (5/PK 40PK/CA)

## (undated) DEVICE — NEPTUNE 4 PORT MANIFOLD - (20/PK)

## (undated) DEVICE — SUTURE GENERAL

## (undated) DEVICE — SET EXTENSION WITH 2 PORTS (48EA/CA) ***PART #2C8610 IS A SUBSTITUTE*****

## (undated) DEVICE — LACTATED RINGERS INJ 1000 ML - (14EA/CA 60CA/PF)

## (undated) DEVICE — PADDING CAST 6 IN STERILE - 6 X 4 YDS (24/CA)

## (undated) DEVICE — BIT DRILL DIA2.6MM SCALED FOR VARIAX 2 WRIST FUSION LOCKING PLATE SYSTEM

## (undated) DEVICE — PROTECTOR ULNA NERVE - (36PR/CA)

## (undated) DEVICE — TUBING CLEARLINK DUO-VENT - C-FLO (48EA/CA)

## (undated) DEVICE — BLADE SURGICAL #10 - (50/BX)

## (undated) DEVICE — PACK LOWER EXTREMITY - (2/CA)

## (undated) DEVICE — CANISTER SUCTION 3000ML MECHANICAL FILTER AUTO SHUTOFF MEDI-VAC NONSTERILE LF DISP  (40EA/CA)

## (undated) DEVICE — SPLINT PLASTER 5 IN X 30 IN - (50EA/BX 6BX/CA)

## (undated) DEVICE — SUTURE 3-0 VICRYL PLUS CT-1 - 36 INCH (36/BX)

## (undated) DEVICE — SUTURE 0 VICRYL PLUS CT-1 - 36 INCH (36/BX)